# Patient Record
Sex: MALE | Race: WHITE | NOT HISPANIC OR LATINO | Employment: OTHER | URBAN - METROPOLITAN AREA
[De-identification: names, ages, dates, MRNs, and addresses within clinical notes are randomized per-mention and may not be internally consistent; named-entity substitution may affect disease eponyms.]

---

## 2017-01-09 ENCOUNTER — GENERIC CONVERSION - ENCOUNTER (OUTPATIENT)
Dept: OTHER | Facility: OTHER | Age: 77
End: 2017-01-09

## 2017-01-12 DIAGNOSIS — Z12.5 ENCOUNTER FOR SCREENING FOR MALIGNANT NEOPLASM OF PROSTATE: ICD-10-CM

## 2017-01-12 DIAGNOSIS — E78.5 HYPERLIPIDEMIA: ICD-10-CM

## 2017-01-12 DIAGNOSIS — M10.9 GOUT: ICD-10-CM

## 2017-01-12 DIAGNOSIS — N18.9 CHRONIC KIDNEY DISEASE: ICD-10-CM

## 2017-01-12 DIAGNOSIS — I10 ESSENTIAL (PRIMARY) HYPERTENSION: ICD-10-CM

## 2017-03-13 ENCOUNTER — APPOINTMENT (OUTPATIENT)
Dept: LAB | Facility: CLINIC | Age: 77
End: 2017-03-13
Payer: MEDICARE

## 2017-03-13 DIAGNOSIS — I10 ESSENTIAL (PRIMARY) HYPERTENSION: ICD-10-CM

## 2017-03-13 DIAGNOSIS — N18.9 CHRONIC KIDNEY DISEASE: ICD-10-CM

## 2017-03-13 DIAGNOSIS — E78.5 HYPERLIPIDEMIA: ICD-10-CM

## 2017-03-13 DIAGNOSIS — M10.9 GOUT: ICD-10-CM

## 2017-03-13 DIAGNOSIS — Z12.5 ENCOUNTER FOR SCREENING FOR MALIGNANT NEOPLASM OF PROSTATE: ICD-10-CM

## 2017-03-13 LAB
ALBUMIN SERPL BCP-MCNC: 3.7 G/DL (ref 3.5–5)
ALP SERPL-CCNC: 114 U/L (ref 46–116)
ALT SERPL W P-5'-P-CCNC: 31 U/L (ref 12–78)
ANION GAP SERPL CALCULATED.3IONS-SCNC: 9 MMOL/L (ref 4–13)
AST SERPL W P-5'-P-CCNC: 13 U/L (ref 5–45)
BASOPHILS # BLD AUTO: 0.01 THOUSANDS/ΜL (ref 0–0.1)
BASOPHILS NFR BLD AUTO: 0 % (ref 0–1)
BILIRUB SERPL-MCNC: 0.71 MG/DL (ref 0.2–1)
BUN SERPL-MCNC: 25 MG/DL (ref 5–25)
CALCIUM SERPL-MCNC: 9.4 MG/DL (ref 8.3–10.1)
CHLORIDE SERPL-SCNC: 103 MMOL/L (ref 100–108)
CHOLEST SERPL-MCNC: 165 MG/DL (ref 50–200)
CO2 SERPL-SCNC: 29 MMOL/L (ref 21–32)
CREAT SERPL-MCNC: 1.46 MG/DL (ref 0.6–1.3)
EOSINOPHIL # BLD AUTO: 0.22 THOUSAND/ΜL (ref 0–0.61)
EOSINOPHIL NFR BLD AUTO: 4 % (ref 0–6)
ERYTHROCYTE [DISTWIDTH] IN BLOOD BY AUTOMATED COUNT: 13.1 % (ref 11.6–15.1)
GFR SERPL CREATININE-BSD FRML MDRD: 46.8 ML/MIN/1.73SQ M
GLUCOSE SERPL-MCNC: 84 MG/DL (ref 65–140)
HCT VFR BLD AUTO: 41.7 % (ref 36.5–49.3)
HDLC SERPL-MCNC: 43 MG/DL (ref 40–60)
HGB BLD-MCNC: 14.3 G/DL (ref 12–17)
LDLC SERPL CALC-MCNC: 99 MG/DL (ref 0–100)
LYMPHOCYTES # BLD AUTO: 1.71 THOUSANDS/ΜL (ref 0.6–4.47)
LYMPHOCYTES NFR BLD AUTO: 29 % (ref 14–44)
MCH RBC QN AUTO: 30.6 PG (ref 26.8–34.3)
MCHC RBC AUTO-ENTMCNC: 34.3 G/DL (ref 31.4–37.4)
MCV RBC AUTO: 89 FL (ref 82–98)
MONOCYTES # BLD AUTO: 0.36 THOUSAND/ΜL (ref 0.17–1.22)
MONOCYTES NFR BLD AUTO: 6 % (ref 4–12)
NEUTROPHILS # BLD AUTO: 3.64 THOUSANDS/ΜL (ref 1.85–7.62)
NEUTS SEG NFR BLD AUTO: 61 % (ref 43–75)
NRBC BLD AUTO-RTO: 0 /100 WBCS
PLATELET # BLD AUTO: 192 THOUSANDS/UL (ref 149–390)
PMV BLD AUTO: 11.8 FL (ref 8.9–12.7)
POTASSIUM SERPL-SCNC: 3.5 MMOL/L (ref 3.5–5.3)
PROT SERPL-MCNC: 7.4 G/DL (ref 6.4–8.2)
RBC # BLD AUTO: 4.67 MILLION/UL (ref 3.88–5.62)
SODIUM SERPL-SCNC: 141 MMOL/L (ref 136–145)
TRIGL SERPL-MCNC: 115 MG/DL
TSH SERPL DL<=0.05 MIU/L-ACNC: 3.21 UIU/ML (ref 0.36–3.74)
URATE SERPL-MCNC: 7.6 MG/DL (ref 4.2–8)
WBC # BLD AUTO: 5.95 THOUSAND/UL (ref 4.31–10.16)

## 2017-03-13 PROCEDURE — 84443 ASSAY THYROID STIM HORMONE: CPT

## 2017-03-13 PROCEDURE — 84550 ASSAY OF BLOOD/URIC ACID: CPT

## 2017-03-13 PROCEDURE — 85025 COMPLETE CBC W/AUTO DIFF WBC: CPT

## 2017-03-13 PROCEDURE — 84153 ASSAY OF PSA TOTAL: CPT

## 2017-03-13 PROCEDURE — 84154 ASSAY OF PSA FREE: CPT

## 2017-03-13 PROCEDURE — 80053 COMPREHEN METABOLIC PANEL: CPT

## 2017-03-13 PROCEDURE — 36415 COLL VENOUS BLD VENIPUNCTURE: CPT

## 2017-03-13 PROCEDURE — 80061 LIPID PANEL: CPT

## 2017-03-17 ENCOUNTER — ALLSCRIPTS OFFICE VISIT (OUTPATIENT)
Dept: OTHER | Facility: OTHER | Age: 77
End: 2017-03-17

## 2017-03-27 ENCOUNTER — GENERIC CONVERSION - ENCOUNTER (OUTPATIENT)
Dept: OTHER | Facility: OTHER | Age: 77
End: 2017-03-27

## 2017-04-08 LAB — PSA SERPL-MCNC: NORMAL NG/ML

## 2017-04-10 ENCOUNTER — GENERIC CONVERSION - ENCOUNTER (OUTPATIENT)
Dept: OTHER | Facility: OTHER | Age: 77
End: 2017-04-10

## 2017-05-04 ENCOUNTER — GENERIC CONVERSION - ENCOUNTER (OUTPATIENT)
Dept: OTHER | Facility: OTHER | Age: 77
End: 2017-05-04

## 2017-06-05 ENCOUNTER — ALLSCRIPTS OFFICE VISIT (OUTPATIENT)
Dept: OTHER | Facility: OTHER | Age: 77
End: 2017-06-05

## 2017-06-09 ENCOUNTER — GENERIC CONVERSION - ENCOUNTER (OUTPATIENT)
Dept: OTHER | Facility: OTHER | Age: 77
End: 2017-06-09

## 2017-06-09 ENCOUNTER — ALLSCRIPTS OFFICE VISIT (OUTPATIENT)
Dept: OTHER | Facility: OTHER | Age: 77
End: 2017-06-09

## 2017-10-11 ENCOUNTER — ALLSCRIPTS OFFICE VISIT (OUTPATIENT)
Dept: OTHER | Facility: OTHER | Age: 77
End: 2017-10-11

## 2017-10-12 ENCOUNTER — GENERIC CONVERSION - ENCOUNTER (OUTPATIENT)
Dept: OTHER | Facility: OTHER | Age: 77
End: 2017-10-12

## 2017-10-14 NOTE — PROGRESS NOTES
Assessment  1  Bronchitis, acute (466 0) (J20 9)    Plan  Health Maintenance    · *VB - Urinary Incontinence Screen (Dx Z13 89 Screen for UI); Status:Complete -  Retrospective By Protocol Authorization;   Done: 64SFS6677 01:49PM    Discussion/Summary    1  Acute bronchitis-recommend starting Levaquin 500 mg daily for 10 days with food  Patient also given Advair 250/50 inhaler to be used twice daily and demonstrated in the office  He may also continue over-the-counter Tussin as needed for cough suppression in the evening  He is to follow-up in the next week if symptoms are not improving or sooner if symptoms would worsen  Chief Complaint  Pt c/o chest congestion, head congestion and wheezing x 10 days  Pt has been using Robitussin with no relief  Pt also had some Tessalon left over and he states that doesn't work either  Also pt states the Antibiotic given last time he would not like again b/c it left a terrible taste in his mouth kw      History of Present Illness  HPI: This is a 50-year-old gentleman that presents to the office with a history of severe bronchitis  He has started about 10 days ago having symptoms of increased sinus congestion and severe cough  Coughing episodes have been very violent and he has been concerned that he may pass out during an episode  He has tried over-the-counter Tussin and left over benzonatate without any relief  He has not had any fevers or chills present  He is feeling very fatigued and wiped out  Review of Systems    Constitutional: feeling poorly-- and-- feeling tired, but-- no fever-- and-- no chills  ENT: sore throat,-- nasal discharge-- and-- hoarseness, but-- no earache  Cardiovascular: the heart rate was not slow,-- no chest pain,-- the heart rate was not fast-- and-- no palpitations  Respiratory: cough, but-- as noted in HPI,-- no shortness of breath-- and-- no wheezing  Gastrointestinal: no nausea-- and-- no diarrhea  Musculoskeletal: no arthralgias  Neurological: no headache  Preventive Quality 65 and Older: The patient currently has no urinary incontinence symptoms  Active Problems  1  Actinic keratosis (702 0) (L57 0)   2  Bronchitis, acute (466 0) (J20 9)   3  Chronic kidney disease (585 9) (N18 9)   4  Encounter for prostate cancer screening (V76 44) (Z12 5)   5  Gout (274 9) (M10 9)   6  Hyperlipidemia (272 4) (E78 5)   7  Hypertension (401 9) (I10)   8  Irritable bowel syndrome (564 1) (K58 9)   9  Macular degeneration (362 50) (H35 30)   10  Special screening examination for neoplasm of prostate (V76 44) (Z12 5)   11  Urinary incontinence, post-void dribbling (788 35) (N39 43)    Past Medical History  1  History of Acute Medial Meniscus Tear (836 0)   2  History of Colonoscopy (Fiberoptic) Screening   3  History of Herniated nucleus pulposus, L4-5 (722 10) (M51 26)   4  History of Reactive airway disease (493 90) (J45 909)  Active Problems And Past Medical History Reviewed: The active problems and past medical history were reviewed and updated today  Family History  Mother    1  Family history of Heart Disease (V17 49)   2  Family history of Stroke Syndrome (V17 1)  Father    3  Family history of Colon Cancer (V16 0)   4  Family history of Congestive Heart Failure    Social History   · Former smoker (Z09 96) (W92 901)    Surgical History  1  History of Mastoidectomy    Current Meds   1  Aspirin 81 MG TABS; Take 1 tablet daily Recorded   2  Lisinopril-Hydrochlorothiazide 20-25 MG Oral Tablet; Take 1 tablet by mouth  daily; Therapy: 95IVU8023 to (Evaluate:03Rrk5908)  Requested for: 69EFR1389; Last   Rx:[id number] Ordered    The medication list was reviewed and updated today  Allergies  1   Erythromycin Derivatives    Vitals   Recorded: 00WDI4816 01:45PM   Temperature 98 7 F   Heart Rate 64   Respiration 18   Systolic 010   Diastolic 88   Height 5 ft 8 in   Weight 171 lb 8 oz   BMI Calculated 26 08   BSA Calculated 1 91     Physical Exam    Constitutional   General appearance: No acute distress, well appearing and well nourished  Eyes   Conjunctiva and lids: No swelling, erythema, or discharge  Pupils and irises: Equal, round and reactive to light  Ears, Nose, Mouth, and Throat   External inspection of ears and nose: Normal     Otoscopic examination: Tympanic membrance translucent with normal light reflex  Canals patent without erythema  Nasal mucosa, septum, and turbinates: Normal without edema or erythema  Oropharynx: Normal with no erythema, edema, exudate or lesions  Pulmonary   Respiratory effort: No increased work of breathing or signs of respiratory distress  Auscultation of lungs: Abnormal  -- Scant crackles bilateral lung fields  No wheezes, rhonchi, or rales  Cardiovascular   Auscultation of heart: Normal rate and rhythm, normal S1 and S2, without murmurs  Examination of extremities for edema and/or varicosities: Normal     Abdomen   Abdomen: Non-tender, no masses  Liver and spleen: No hepatomegaly or splenomegaly  Musculoskeletal   Gait and station: Normal     Skin   Skin and subcutaneous tissue: Normal without rashes or lesions  Neurologic   Reflexes: 2+ and symmetric  Sensation: No sensory loss      Psychiatric   Orientation to person, place and time: Normal     Mood and affect: Normal          Results/Data  *VB - Urinary Incontinence Screen (Dx Z13 89 Screen for UI) 27YDW8081 01:49PM Maura Hewitt     Test Name Result Flag Reference   Urinary Incontinence Assessment [id number]         Signatures   Electronically signed by : Najma Hathaway, [facility]; Oct 11 2017  2:02PM EST                       (Author)    Electronically signed by : DEANNE Mayberry ; Oct 12 2017  7:11PM EST

## 2017-11-08 ENCOUNTER — GENERIC CONVERSION - ENCOUNTER (OUTPATIENT)
Dept: OTHER | Facility: OTHER | Age: 77
End: 2017-11-08

## 2017-11-20 ENCOUNTER — GENERIC CONVERSION - ENCOUNTER (OUTPATIENT)
Dept: OTHER | Facility: OTHER | Age: 77
End: 2017-11-20

## 2018-01-12 VITALS
WEIGHT: 171.5 LBS | SYSTOLIC BLOOD PRESSURE: 172 MMHG | HEART RATE: 64 BPM | RESPIRATION RATE: 18 BRPM | HEIGHT: 68 IN | BODY MASS INDEX: 25.99 KG/M2 | DIASTOLIC BLOOD PRESSURE: 88 MMHG | TEMPERATURE: 98.7 F

## 2018-01-13 VITALS
HEIGHT: 68 IN | DIASTOLIC BLOOD PRESSURE: 78 MMHG | BODY MASS INDEX: 26.54 KG/M2 | SYSTOLIC BLOOD PRESSURE: 136 MMHG | WEIGHT: 175.13 LBS | HEART RATE: 72 BPM

## 2018-01-14 VITALS
HEIGHT: 68 IN | BODY MASS INDEX: 25.78 KG/M2 | WEIGHT: 170.13 LBS | HEART RATE: 64 BPM | SYSTOLIC BLOOD PRESSURE: 140 MMHG | DIASTOLIC BLOOD PRESSURE: 80 MMHG

## 2018-01-14 VITALS
WEIGHT: 170.13 LBS | SYSTOLIC BLOOD PRESSURE: 130 MMHG | BODY MASS INDEX: 25.87 KG/M2 | DIASTOLIC BLOOD PRESSURE: 70 MMHG | TEMPERATURE: 97.9 F

## 2018-01-15 NOTE — MISCELLANEOUS
Message   Recorded as Task   Date: 06/13/2017 08:59 AM, Created By: Genet Braun   Task Name: Medical Complaint Callback   Assigned To: [facility],Clinical Team   Regarding Patient: Moira Catalan, Status: In Progress   Comment:    Ann Marie Barraza - 13 Jun 2017 8:59 AM     TASK CREATED  Caller: Self; Medical Complaint; [phone] (Home); [phone] (Work)  PATIENT IS ON 2 MEDICATIONS FOR DIARHHEA, HE HAS BEEN ON ANTIBIOTIC FOR 8 DAYS AND ONE FOR GOUT, FOR THE LAST 12 DAYS, HAS HAD A BIG ISSUES WITH DIARHHEA, SHOULD HE STOPPED TAKING THE MEDICATION  GOUT HAS RESOLVED, ONLY HAS 2 DAYS LEFT FOR ANTIBIOTIC, [place]AVING [place] VACATION ON [place] [phone]   Bindu Lot - 13 Jun 2017 10:21 AM     TASK IN PROGRESS   Bindu Lot - 13 Jun 2017 10:26 AM     TASK EDITED  I spoke to pt and he states after 3 days of Colcrys he started with urgent bowel movements  I advised he D/C the Colcrys per TS and add Yogurt or Probiotic and finish antibiotic  Pt agrees  Active Problems    1  Actinic keratosis (702 0) (L57 0)   2  Bronchitis, acute (466 0) (J20 9)   3  Chronic kidney disease (585 9) (N18 9)   4  Encounter for prostate cancer screening (V76 44) (Z12 5)   5  Gout (274 9) (M10 9)   6  Hyperlipidemia (272 4) (E78 5)   7  Hypertension (401 9) (I10)   8  Irritable bowel syndrome (564 1) (K58 9)   9  Macular degeneration (362 50) (H35 30)   10  Special screening examination for neoplasm of prostate (V76 44) (Z12 5)   11  Urinary incontinence, post-void dribbling (788 35) (N39 43)    Current Meds   1  Aspirin 81 MG TABS; Take 1 tablet daily Recorded   2  Benzonatate 200 MG Oral Capsule; TAKE 1 CAPSULE 3 TIMES DAILY AS NEEDED; Therapy: 09WCH3136 to (Last Rx:05Jun2017)  Requested for: 95HQG5925 Ordered   3  Cefuroxime Axetil 500 MG Oral Tablet; Take 1 tablet twice daily; Therapy: 82MBW0831 to (Last Rx:05Jun2017)  Requested for: 57DKC0699 Ordered   4   Colcrys 0 6 MG Oral Tablet (Colchicine); Take 1 tablet 3 times daily as needed for gout; Therapy: 61Xkh8942 to (Last Rx:09Jun2017)  Requested for: 47OYL9133 Ordered   5  Lisinopril-Hydrochlorothiazide 20-25 MG Oral Tablet; Take 1 tablet by mouth  daily; Therapy: 30KQN7404 to (Evaluate:11Nov2017)  Requested for: 51VUV9275; Last   Rx:99Aqv9408 Ordered    Allergies    1   Erythromycin Derivatives    Signatures   Electronically signed by : Odilon Floyd, ; Jun 13 2017 10:26AM EST                       (Author)

## 2018-01-16 NOTE — RESULT NOTES
Verified Results  (1) PSA FREE & TOTAL 56XHS1050 07:48AM [facility] Order Number: [id number]     Test Name Result Flag Reference   PSA, FREE  ng/mL     % FREE PSA  %     PROSTATE SPECIFIC ANTIGEN TOTAL      SEE WRITTEN REPORT FROM [facility]

## 2018-01-29 DIAGNOSIS — I10 BENIGN ESSENTIAL HYPERTENSION: Primary | ICD-10-CM

## 2018-01-29 RX ORDER — LISINOPRIL AND HYDROCHLOROTHIAZIDE 25; 20 MG/1; MG/1
TABLET ORAL
Qty: 90 TABLET | Refills: 3 | Status: SHIPPED | OUTPATIENT
Start: 2018-01-29 | End: 2018-04-02 | Stop reason: SDUPTHER

## 2018-03-01 DIAGNOSIS — N18.9 CHRONIC KIDNEY DISEASE: ICD-10-CM

## 2018-03-01 DIAGNOSIS — M10.9 GOUT: ICD-10-CM

## 2018-03-01 DIAGNOSIS — I10 ESSENTIAL (PRIMARY) HYPERTENSION: ICD-10-CM

## 2018-03-01 DIAGNOSIS — E78.5 HYPERLIPIDEMIA: ICD-10-CM

## 2018-03-01 DIAGNOSIS — H61.20 IMPACTED CERUMEN: ICD-10-CM

## 2018-03-01 DIAGNOSIS — Z00.00 ENCOUNTER FOR GENERAL ADULT MEDICAL EXAMINATION WITHOUT ABNORMAL FINDINGS: ICD-10-CM

## 2018-03-12 PROBLEM — N39.43 URINARY INCONTINENCE, POST-VOID DRIBBLING: Status: ACTIVE | Noted: 2017-03-17

## 2018-03-13 ENCOUNTER — OFFICE VISIT (OUTPATIENT)
Dept: SURGICAL ONCOLOGY | Facility: CLINIC | Age: 78
End: 2018-03-13
Payer: MEDICARE

## 2018-03-13 VITALS
WEIGHT: 173 LBS | HEIGHT: 68 IN | DIASTOLIC BLOOD PRESSURE: 120 MMHG | SYSTOLIC BLOOD PRESSURE: 180 MMHG | BODY MASS INDEX: 26.22 KG/M2 | RESPIRATION RATE: 18 BRPM | HEART RATE: 92 BPM | TEMPERATURE: 97.9 F

## 2018-03-13 DIAGNOSIS — C49.0 SARCOMA OF SCALP (HCC): Primary | ICD-10-CM

## 2018-03-13 PROCEDURE — 99204 OFFICE O/P NEW MOD 45 MIN: CPT | Performed by: SURGERY

## 2018-03-13 NOTE — LETTER
March 13, 2018     Carrie Na,   [address]    Patient: Loc Banks   YOB: 1940   Date of Visit: 3/13/2018       Dear Dr Mikey Paez: Thank you for referring Reece Khoury to me for evaluation  Below are my notes for this consultation  If you have questions, please do not hesitate to call me  I look forward to following your patient along with you  Sincerely,        Sylvie Rasheed MD        CC: DEON Grant PA-C Enedelia Baar, PA-C Ozella Fogo, MD Tish Clink, MD  3/13/2018  9:30 AM  Sign at close encounter               Surgical Oncology Follow Up       [address] [facility] ONCOLOGY [facility]  [address] [address] [address] [facility]  1940  [id number]  [address] [facility] CANCER CARE SURGICAL ONCOLOGY [address] [address] [phone]    No chief complaint on file  Assessment/Plan:    No problem-specific Assessment & Plan notes found for this encounter  Diagnoses and all orders for this visit:    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[facility])  -     Ambulatory referral to Radiation Oncology; Future        Advance Care Planning/Advance Directives:  Discussed disease status, cancer treatment plans and/or cancer treatment goals with the patient  No history exists  History of Present Illness:  Patient is a 50-year-old man who was found have a scalp lesion back in the fall of 2017  He underwent resection and graft for what turned out to be a atypical epithelioid fibrohistiocytic tumor, 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  Since then, he has had what appears to be recurrence at the anterior portion of the graft  He is set to undergo resection by Dr Guerita Wiggins in the next week and half  He is here for 2nd opinion  Review of Systems   Constitutional: Negative  HENT: Negative  Eyes: Negative  Respiratory: Negative  Cardiovascular: Negative  Gastrointestinal: Negative  Endocrine: Negative  Genitourinary: Negative  Musculoskeletal: Negative  Skin:        Recurrent scalp lesion   Allergic/Immunologic: Negative  Neurological: Negative  Hematological: Negative  Psychiatric/Behavioral: Negative  Patient Active Problem List   Diagnosis    Actinic keratosis    Chronic kidney disease    Gout    Hyperlipidemia    Hypertension    Irritable bowel syndrome    Macular degeneration    Urinary incontinence, post-void dribbling    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[address] )     Past Medical History:   Diagnosis Date    Hard of hearing     Macular degeneration      History reviewed  No pertinent surgical history  Family History   Problem Relation Age of Onset    Colon cancer Father      Social History     Social History    Marital status: /Civil Union     Spouse name: N/A    Number of children: N/A    Years of education: N/A     Occupational History    Not on file  Social History Main Topics    Smoking status: Former Smoker     Quit date: 3/13/1988    Smokeless tobacco: Never Used    Alcohol use No    Drug use: No    Sexual activity: Not on file     Other Topics Concern    Not on file     Social History Narrative    No narrative on file       Current Outpatient Prescriptions:     aspirin 81 MG tablet, Take 1 tablet by mouth daily, Disp: , Rfl:     lisinopril-hydrochlorothiazide (PRINZIDE,ZESTORETIC) 20-25 MG per tablet, TAKE 1 TABLET BY MOUTH  DAILY, Disp: 90 tablet, Rfl: 3  Allergies   Allergen Reactions    Erythromycin      Vitals:    03/13/18 0842   BP: (!) 180/120   Pulse: 92   Resp: 18   Temp: 97 9 °F (36 6 °C)       Physical Exam   HENT:   Head: Normocephalic and atraumatic  Eyes: EOM are normal  Pupils are equal, round, and reactive to light  Neck: Normal range of motion  Neck supple     Cardiovascular: Normal rate and regular rhythm  Pulmonary/Chest: Effort normal and breath sounds normal    Abdominal: Soft  Lymphadenopathy:     He has no cervical adenopathy  Skin:   Scalp has raised pedunculated lesion measuring approximately 2 5 cm in diameter along the anterior aspect of a previous scalp graft on the vertex of the skull  Pathology:  Outside pathology report dated January 29, 2018 is a biopsy from the anterior aspect of the graft showing atypical epithelioid fibro histiocytic tumor, with lesion extending to the deep tissue edge  This is similar to prior biopsy done  Differential includes pleomorphic sarcoma versus an extreme atypical example of atypical fibro histiocytoma  Labs:  none    Imaging  No results found  I reviewed the above laboratory and imaging data  Discussion/Summary:  Scalp sarcoma  He is ready set up to undergo resection in the next week and a half  I advised him to keep that appointment  Will also set him up to see our radiation Oncology colleagues to determine whether not adjuvant treatment would be needed after his surgery has been completed

## 2018-03-13 NOTE — PROGRESS NOTES
Surgical Oncology Follow Up       [address] [facility] CANCER CARE SURGICAL ONCOLOGY [facility]  [address]  [place] [address] [facility] [place] [address] [phone]    Bobby Cheung  1940  [id number]  [address] [facility] CANCER CARE SURGICAL ONCOLOGY [facility]  [address]  [facility] [address] [facility] [place] [address] [phone]    No chief complaint on file  Assessment/Plan:    No problem-specific Assessment & Plan notes found for this encounter  Diagnoses and all orders for this visit:    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[facility])  -     Ambulatory referral to Radiation Oncology; Future        Advance Care Planning/Advance Directives:  Discussed disease status, cancer treatment plans and/or cancer treatment goals with the patient  No history exists  History of Present Illness:  Patient is a 74-year-old man who was found have a scalp lesion back in the fall of 2017  He underwent resection and graft for what turned out to be a atypical epithelioid fibrohistiocytic tumor, 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  Since then, he has had what appears to be recurrence at the anterior portion of the graft  He is set to undergo resection by Dr Jennifer Logan in the next week and half  He is here for 2nd opinion  Review of Systems   Constitutional: Negative  HENT: Negative  Eyes: Negative  Respiratory: Negative  Cardiovascular: Negative  Gastrointestinal: Negative  Endocrine: Negative  Genitourinary: Negative  Musculoskeletal: Negative  Skin:        Recurrent scalp lesion   Allergic/Immunologic: Negative  Neurological: Negative  Hematological: Negative  Psychiatric/Behavioral: Negative            Patient Active Problem List   Diagnosis    Actinic keratosis    Chronic kidney disease    Gout    Hyperlipidemia    Hypertension    Irritable bowel syndrome    Macular degeneration    Urinary incontinence, post-void dribbling    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[facility])     Past Medical History:   Diagnosis Date    Hard of hearing     Macular degeneration      History reviewed  No pertinent surgical history  Family History   Problem Relation Age of Onset    Colon cancer Father      Social History     Social History    Marital status: /Civil Union     Spouse name: N/A    Number of children: N/A    Years of education: N/A     Occupational History    Not on file  Social History Main Topics    Smoking status: Former Smoker     Quit date: 3/13/1988    Smokeless tobacco: Never Used    Alcohol use No    Drug use: No    Sexual activity: Not on file     Other Topics Concern    Not on file     Social History Narrative    No narrative on file       Current Outpatient Prescriptions:     aspirin 81 MG tablet, Take 1 tablet by mouth daily, Disp: , Rfl:     lisinopril-hydrochlorothiazide (PRINZIDE,ZESTORETIC) 20-25 MG per tablet, TAKE 1 TABLET BY MOUTH  DAILY, Disp: 90 tablet, Rfl: 3  Allergies   Allergen Reactions    Erythromycin      Vitals:    03/13/18 0842   BP: (!) 180/120   Pulse: 92   Resp: 18   Temp: 97 9 °F (36 6 °C)       Physical Exam   HENT:   Head: Normocephalic and atraumatic  Eyes: EOM are normal  Pupils are equal, round, and reactive to light  Neck: Normal range of motion  Neck supple  Cardiovascular: Normal rate and regular rhythm  Pulmonary/Chest: Effort normal and breath sounds normal    Abdominal: Soft  Lymphadenopathy:     He has no cervical adenopathy  Skin:   Scalp has raised pedunculated lesion measuring approximately 2 5 cm in diameter along the anterior aspect of a previous scalp graft on the vertex of the skull  Pathology:  Outside pathology report dated January 29, 2018 is a biopsy from the anterior aspect of the graft showing atypical epithelioid fibro histiocytic tumor, with lesion extending to the deep tissue edge  This is similar to prior biopsy done    Differential includes pleomorphic sarcoma versus an extreme atypical example of atypical fibro histiocytoma  Labs:  none    Imaging  No results found  I reviewed the above laboratory and imaging data  Discussion/Summary:  Scalp sarcoma  He is ready set up to undergo resection in the next week and a half  I advised him to keep that appointment  Will also set him up to see our radiation Oncology colleagues to determine whether not adjuvant treatment would be needed after his surgery has been completed

## 2018-03-15 ENCOUNTER — APPOINTMENT (OUTPATIENT)
Dept: LAB | Facility: HOSPITAL | Age: 78
End: 2018-03-15
Payer: MEDICARE

## 2018-03-15 ENCOUNTER — ANESTHESIA EVENT (OUTPATIENT)
Dept: PERIOP | Facility: HOSPITAL | Age: 78
End: 2018-03-15
Payer: MEDICARE

## 2018-03-15 ENCOUNTER — TRANSCRIBE ORDERS (OUTPATIENT)
Dept: ADMINISTRATIVE | Facility: HOSPITAL | Age: 78
End: 2018-03-15

## 2018-03-15 ENCOUNTER — HOSPITAL ENCOUNTER (OUTPATIENT)
Dept: NON INVASIVE DIAGNOSTICS | Facility: HOSPITAL | Age: 78
Discharge: HOME | End: 2018-03-15
Attending: SURGERY
Payer: MEDICARE

## 2018-03-15 ENCOUNTER — APPOINTMENT (OUTPATIENT)
Dept: PREADMISSION TESTING | Facility: HOSPITAL | Age: 78
End: 2018-03-15
Payer: MEDICARE

## 2018-03-15 DIAGNOSIS — I10 ESSENTIAL (PRIMARY) HYPERTENSION: ICD-10-CM

## 2018-03-15 DIAGNOSIS — Z01.810 PRE-OPERATIVE CARDIOVASCULAR EXAMINATION: ICD-10-CM

## 2018-03-15 DIAGNOSIS — D48.5 NEOPLASM OF UNCERTAIN BEHAVIOR OF SKIN: ICD-10-CM

## 2018-03-15 DIAGNOSIS — Z00.00 ENCOUNTER FOR GENERAL ADULT MEDICAL EXAMINATION WITHOUT ABNORMAL FINDINGS: ICD-10-CM

## 2018-03-15 DIAGNOSIS — H61.20 IMPACTED CERUMEN: ICD-10-CM

## 2018-03-15 DIAGNOSIS — M10.9 GOUT: ICD-10-CM

## 2018-03-15 DIAGNOSIS — Z01.818 PREOP EXAMINATION: ICD-10-CM

## 2018-03-15 DIAGNOSIS — Z01.818 PREOP EXAMINATION: Primary | ICD-10-CM

## 2018-03-15 DIAGNOSIS — N18.9 CHRONIC KIDNEY DISEASE: ICD-10-CM

## 2018-03-15 DIAGNOSIS — E78.5 HYPERLIPIDEMIA: ICD-10-CM

## 2018-03-15 LAB
ALBUMIN SERPL BCP-MCNC: 3.8 G/DL (ref 3.5–5)
ALP SERPL-CCNC: 113 U/L (ref 46–116)
ALT SERPL W P-5'-P-CCNC: 26 U/L (ref 12–78)
ANION GAP SERPL CALCULATED.3IONS-SCNC: 7 MMOL/L (ref 4–13)
AST SERPL W P-5'-P-CCNC: 17 U/L (ref 5–45)
ATRIAL RATE: 73 BPM
BASOPHILS # BLD AUTO: 0.02 THOUSANDS/ΜL (ref 0–0.1)
BASOPHILS NFR BLD AUTO: 0 % (ref 0–1)
BILIRUB SERPL-MCNC: 0.94 MG/DL (ref 0.2–1)
BUN SERPL-MCNC: 21 MG/DL (ref 5–25)
CALCIUM SERPL-MCNC: 10 MG/DL (ref 8.3–10.1)
CHLORIDE SERPL-SCNC: 103 MMOL/L (ref 100–108)
CHOLEST SERPL-MCNC: 165 MG/DL (ref 50–200)
CO2 SERPL-SCNC: 30 MMOL/L (ref 21–32)
CREAT SERPL-MCNC: 1.42 MG/DL (ref 0.6–1.3)
EOSINOPHIL # BLD AUTO: 0.2 THOUSAND/ΜL (ref 0–0.61)
EOSINOPHIL NFR BLD AUTO: 3 % (ref 0–6)
ERYTHROCYTE [DISTWIDTH] IN BLOOD BY AUTOMATED COUNT: 13.2 % (ref 11.6–15.1)
GFR SERPL CREATININE-BSD FRML MDRD: 47 ML/MIN/1.73SQ M
GLUCOSE P FAST SERPL-MCNC: 97 MG/DL (ref 65–99)
HCT VFR BLD AUTO: 41.3 % (ref 36.5–49.3)
HDLC SERPL-MCNC: 40 MG/DL (ref 40–60)
HGB BLD-MCNC: 14.7 G/DL (ref 12–17)
LDLC SERPL CALC-MCNC: 97 MG/DL (ref 0–100)
LYMPHOCYTES # BLD AUTO: 1.42 THOUSANDS/ΜL (ref 0.6–4.47)
LYMPHOCYTES NFR BLD AUTO: 20 % (ref 14–44)
MCH RBC QN AUTO: 31.7 PG (ref 26.8–34.3)
MCHC RBC AUTO-ENTMCNC: 35.6 G/DL (ref 31.4–37.4)
MCV RBC AUTO: 89 FL (ref 82–98)
MONOCYTES # BLD AUTO: 0.36 THOUSAND/ΜL (ref 0.17–1.22)
MONOCYTES NFR BLD AUTO: 5 % (ref 4–12)
NEUTROPHILS # BLD AUTO: 4.95 THOUSANDS/ΜL (ref 1.85–7.62)
NEUTS SEG NFR BLD AUTO: 72 % (ref 43–75)
NRBC BLD AUTO-RTO: 0 /100 WBCS
P AXIS: 81 DEGREES
PLATELET # BLD AUTO: 190 THOUSANDS/UL (ref 149–390)
PMV BLD AUTO: 10.7 FL (ref 8.9–12.7)
POTASSIUM SERPL-SCNC: 4.3 MMOL/L (ref 3.5–5.3)
PR INTERVAL: 144 MS
PROT SERPL-MCNC: 7.9 G/DL (ref 6.4–8.2)
QRS AXIS: 33 DEGREES
QRSD INTERVAL: 132 MS
QT INTERVAL: 408 MS
QTC INTERVAL: 449 MS
RBC # BLD AUTO: 4.64 MILLION/UL (ref 3.88–5.62)
SODIUM SERPL-SCNC: 140 MMOL/L (ref 136–145)
T WAVE AXIS: 33 DEGREES
TRIGL SERPL-MCNC: 139 MG/DL
TSH SERPL DL<=0.05 MIU/L-ACNC: 2.3 UIU/ML (ref 0.36–3.74)
URATE SERPL-MCNC: 6.8 MG/DL (ref 4.2–8)
VENTRICULAR RATE: 73 BPM
WBC # BLD AUTO: 6.95 THOUSAND/UL (ref 4.31–10.16)

## 2018-03-15 PROCEDURE — 80053 COMPREHEN METABOLIC PANEL: CPT

## 2018-03-15 PROCEDURE — 84550 ASSAY OF BLOOD/URIC ACID: CPT

## 2018-03-15 PROCEDURE — 36415 COLL VENOUS BLD VENIPUNCTURE: CPT

## 2018-03-15 PROCEDURE — 84153 ASSAY OF PSA TOTAL: CPT

## 2018-03-15 PROCEDURE — 93010 ELECTROCARDIOGRAM REPORT: CPT | Performed by: INTERNAL MEDICINE

## 2018-03-15 PROCEDURE — 80061 LIPID PANEL: CPT

## 2018-03-15 PROCEDURE — 84154 ASSAY OF PSA FREE: CPT

## 2018-03-15 PROCEDURE — 93005 ELECTROCARDIOGRAM TRACING: CPT

## 2018-03-15 PROCEDURE — 85025 COMPLETE CBC W/AUTO DIFF WBC: CPT

## 2018-03-15 PROCEDURE — 84443 ASSAY THYROID STIM HORMONE: CPT

## 2018-03-15 RX ORDER — SODIUM CHLORIDE 9 MG/ML
125 INJECTION, SOLUTION INTRAVENOUS CONTINUOUS
Status: CANCELLED | OUTPATIENT
Start: 2018-03-28

## 2018-03-15 RX ORDER — DESOXIMETASONE 2.5 MG/G
1 CREAM TOPICAL AS NEEDED
COMMUNITY
End: 2018-04-16

## 2018-03-15 RX ORDER — KETOCONAZOLE 20 MG/ML
1 SHAMPOO TOPICAL DAILY PRN
COMMUNITY
End: 2021-04-16

## 2018-03-15 RX ORDER — FLUOCINONIDE TOPICAL SOLUTION USP, 0.05% 0.5 MG/ML
1 SOLUTION TOPICAL AS NEEDED
COMMUNITY
End: 2018-04-16

## 2018-03-15 RX ORDER — ACETAMINOPHEN 500 MG
500-1000 TABLET ORAL EVERY 4 HOURS PRN
COMMUNITY

## 2018-03-15 NOTE — PRE-PROCEDURE INSTRUCTIONS
Pre-Surgery Instructions:   Medication Instructions    acetaminophen (TYLENOL) 500 mg tablet Instructed patient per Anesthesia Guidelines   aspirin 81 MG tablet Patient was instructed by Physician and understands   desoximetasone (TOPICORT) 0 25 % cream Instructed patient per Anesthesia Guidelines   fluocinonide (LIDEX) 0 05 % external solution Instructed patient per Anesthesia Guidelines   ketoconazole (NIZORAL) 2 % shampoo Instructed patient per Anesthesia Guidelines   lisinopril-hydrochlorothiazide (PRINZIDE,ZESTORETIC) 20-25 MG per tablet Instructed patient per Anesthesia Guidelines   Multiple Vitamins-Minerals (ICAPS AREDS 2) CAPS Instructed patient per Anesthesia Guidelines  No meds needed am of surgery per anesthesia DR PEREIRA

## 2018-03-15 NOTE — ANESTHESIA PREPROCEDURE EVALUATION
Review of Systems/Medical History  Patient summary reviewed  Chart reviewed      Cardiovascular  EKG reviewed, Hyperlipidemia, Hypertension ,    Pulmonary  Negative pulmonary ROS Smoker ex-smoker  Cumulative Pack Years: 8,        GI/Hepatic  Negative GI/hepatic ROS          Negative  ROS        Endo/Other  Negative endo/other ROS      GYN       Hematology  Negative hematology ROS      Musculoskeletal  Negative musculoskeletal ROS   Comment: Hearing aids in both ears    Macular degeneration      Neurology  Negative neurology ROS      Psychology   Negative psychology ROS              Physical Exam    Airway    Mallampati score: III  TM Distance: >3 FB  Neck ROM: full     Dental   No notable dental hx     Cardiovascular  Rhythm: regular, Rate: normal, Cardiovascular exam normal    Pulmonary  Pulmonary exam normal Breath sounds clear to auscultation,     Other Findings        Anesthesia Plan  ASA Score- 2     Anesthesia Type- general with ASA Monitors  Additional Monitors:   Airway Plan: LMA  Plan Factors- Patient instructed to abstain from smoking on day of procedure  Patient did not smoke on day of surgery  Induction- intravenous  Postoperative Plan- Plan for postoperative opioid use  Informed Consent- Anesthetic plan and risks discussed with patient

## 2018-03-16 LAB
PSA FREE MFR SERPL: 24.5 %
PSA FREE SERPL-MCNC: 1.3 NG/ML
PSA SERPL-MCNC: 5.3 NG/ML (ref 0–4)

## 2018-03-18 ENCOUNTER — TELEPHONE (OUTPATIENT)
Dept: FAMILY MEDICINE CLINIC | Facility: CLINIC | Age: 78
End: 2018-03-18

## 2018-03-18 DIAGNOSIS — R97.20 ELEVATED PSA MEASUREMENT: Primary | ICD-10-CM

## 2018-03-28 ENCOUNTER — HOSPITAL ENCOUNTER (OUTPATIENT)
Facility: HOSPITAL | Age: 78
Setting detail: OUTPATIENT SURGERY
Discharge: HOME | End: 2018-03-28
Attending: SURGERY | Admitting: SURGERY
Payer: MEDICARE

## 2018-03-28 ENCOUNTER — ANESTHESIA (OUTPATIENT)
Dept: PERIOP | Facility: HOSPITAL | Age: 78
End: 2018-03-28
Payer: MEDICARE

## 2018-03-28 VITALS
TEMPERATURE: 97.8 F | DIASTOLIC BLOOD PRESSURE: 72 MMHG | SYSTOLIC BLOOD PRESSURE: 147 MMHG | RESPIRATION RATE: 18 BRPM | BODY MASS INDEX: 26.1 KG/M2 | OXYGEN SATURATION: 97 % | HEIGHT: 68 IN | WEIGHT: 172.2 LBS | HEART RATE: 88 BPM

## 2018-03-28 DIAGNOSIS — C49.0 SARCOMA OF SCALP (HCC): Primary | ICD-10-CM

## 2018-03-28 DIAGNOSIS — C49.0 MALIGNANT NEOPLASM OF CONNECTIVE AND SOFT TISSUE OF HEAD, FACE, AND NECK (HCC): ICD-10-CM

## 2018-03-28 PROCEDURE — 88305 TISSUE EXAM BY PATHOLOGIST: CPT | Performed by: PATHOLOGY

## 2018-03-28 PROCEDURE — 88342 IMHCHEM/IMCYTCHM 1ST ANTB: CPT | Performed by: PATHOLOGY

## 2018-03-28 RX ORDER — HYDROCODONE BITARTRATE AND ACETAMINOPHEN 5; 325 MG/1; MG/1
1 TABLET ORAL EVERY 6 HOURS PRN
Qty: 40 TABLET | Refills: 0 | Status: SHIPPED | OUTPATIENT
Start: 2018-03-28 | End: 2018-04-07

## 2018-03-28 RX ORDER — CEPHALEXIN 500 MG/1
500 CAPSULE ORAL 4 TIMES DAILY
Qty: 40 CAPSULE | Refills: 0 | Status: SHIPPED | OUTPATIENT
Start: 2018-03-28 | End: 2018-04-07

## 2018-03-28 RX ORDER — DEXAMETHASONE SODIUM PHOSPHATE 4 MG/ML
INJECTION, SOLUTION INTRA-ARTICULAR; INTRALESIONAL; INTRAMUSCULAR; INTRAVENOUS; SOFT TISSUE AS NEEDED
Status: DISCONTINUED | OUTPATIENT
Start: 2018-03-28 | End: 2018-03-28 | Stop reason: SURG

## 2018-03-28 RX ORDER — MEPERIDINE HYDROCHLORIDE 50 MG/ML
12.5 INJECTION INTRAMUSCULAR; INTRAVENOUS; SUBCUTANEOUS
Status: DISCONTINUED | OUTPATIENT
Start: 2018-03-28 | End: 2018-03-28 | Stop reason: HOSPADM

## 2018-03-28 RX ORDER — HYDROCODONE BITARTRATE AND ACETAMINOPHEN 5; 325 MG/1; MG/1
1 TABLET ORAL EVERY 4 HOURS PRN
Status: DISCONTINUED | OUTPATIENT
Start: 2018-03-28 | End: 2018-03-28 | Stop reason: HOSPADM

## 2018-03-28 RX ORDER — SODIUM CHLORIDE 9 MG/ML
125 INJECTION, SOLUTION INTRAVENOUS CONTINUOUS
Status: DISCONTINUED | OUTPATIENT
Start: 2018-03-28 | End: 2018-03-28 | Stop reason: HOSPADM

## 2018-03-28 RX ORDER — FENTANYL CITRATE/PF 50 MCG/ML
25 SYRINGE (ML) INJECTION
Status: DISCONTINUED | OUTPATIENT
Start: 2018-03-28 | End: 2018-03-28 | Stop reason: HOSPADM

## 2018-03-28 RX ORDER — HYDROCODONE BITARTRATE AND ACETAMINOPHEN 5; 325 MG/1; MG/1
2 TABLET ORAL EVERY 4 HOURS PRN
Status: DISCONTINUED | OUTPATIENT
Start: 2018-03-28 | End: 2018-03-28 | Stop reason: HOSPADM

## 2018-03-28 RX ORDER — FENTANYL CITRATE 50 UG/ML
INJECTION, SOLUTION INTRAMUSCULAR; INTRAVENOUS AS NEEDED
Status: DISCONTINUED | OUTPATIENT
Start: 2018-03-28 | End: 2018-03-28 | Stop reason: SURG

## 2018-03-28 RX ORDER — ONDANSETRON 2 MG/ML
4 INJECTION INTRAMUSCULAR; INTRAVENOUS ONCE
Status: DISCONTINUED | OUTPATIENT
Start: 2018-03-28 | End: 2018-03-28 | Stop reason: HOSPADM

## 2018-03-28 RX ORDER — ONDANSETRON 2 MG/ML
INJECTION INTRAMUSCULAR; INTRAVENOUS AS NEEDED
Status: DISCONTINUED | OUTPATIENT
Start: 2018-03-28 | End: 2018-03-28 | Stop reason: SURG

## 2018-03-28 RX ORDER — PROPOFOL 10 MG/ML
INJECTION, EMULSION INTRAVENOUS AS NEEDED
Status: DISCONTINUED | OUTPATIENT
Start: 2018-03-28 | End: 2018-03-28 | Stop reason: SURG

## 2018-03-28 RX ORDER — LIDOCAINE HYDROCHLORIDE AND EPINEPHRINE 10; 10 MG/ML; UG/ML
INJECTION, SOLUTION INFILTRATION; PERINEURAL AS NEEDED
Status: DISCONTINUED | OUTPATIENT
Start: 2018-03-28 | End: 2018-03-28 | Stop reason: HOSPADM

## 2018-03-28 RX ORDER — GINSENG 100 MG
CAPSULE ORAL AS NEEDED
Status: DISCONTINUED | OUTPATIENT
Start: 2018-03-28 | End: 2018-03-28 | Stop reason: HOSPADM

## 2018-03-28 RX ADMIN — FENTANYL CITRATE 50 MCG: 50 INJECTION, SOLUTION INTRAMUSCULAR; INTRAVENOUS at 15:20

## 2018-03-28 RX ADMIN — FENTANYL CITRATE 50 MCG: 50 INJECTION, SOLUTION INTRAMUSCULAR; INTRAVENOUS at 15:12

## 2018-03-28 RX ADMIN — DEXAMETHASONE SODIUM PHOSPHATE 4 MG: 4 INJECTION, SOLUTION INTRAMUSCULAR; INTRAVENOUS at 15:12

## 2018-03-28 RX ADMIN — LIDOCAINE HYDROCHLORIDE 60 MG: 20 INJECTION, SOLUTION INTRAVENOUS at 15:10

## 2018-03-28 RX ADMIN — ONDANSETRON HYDROCHLORIDE 4 MG: 2 INJECTION, SOLUTION INTRAVENOUS at 15:12

## 2018-03-28 RX ADMIN — SODIUM CHLORIDE 125 ML/HR: 0.9 INJECTION, SOLUTION INTRAVENOUS at 12:57

## 2018-03-28 RX ADMIN — SODIUM CHLORIDE 125 ML/HR: 0.9 INJECTION, SOLUTION INTRAVENOUS at 16:50

## 2018-03-28 RX ADMIN — PROPOFOL 150 MG: 10 INJECTION, EMULSION INTRAVENOUS at 15:12

## 2018-03-28 NOTE — DISCHARGE INSTRUCTIONS
[facility]  Postoperative Instructions for Outpatient Surgery  [address] [facility], [address] [facility], [address], [place], [address] [facility] Karla / Lucius Gu  / www [email or web address]      These  instructions are being provided by you doctor to give you basic guidelines during you post-op recovery  Please let our office know your contact information has changed  Please call the office today to schedule a post operative appointment, and tell the office staff  that you doctor needs see you in our office in 7-10 days  Dressing:              Apply ice as needed       Bathing  Keep dressing dry scalp    Showering permitted          Medication    Resume preparative medication  Skin glue was applied to area  Motrin or Tylenol is OK    Other Instruction: ok to wash Right abdomen      Activities  No bending , lifting, or straining    You may drive when you are off you pain medications    Bruising, and welling I expected  incision and surrounding area  It is normal to have a slight fever after surgery  If the fever I above 101 5 please call our office  If you have a drain, leaking around the drain it may occur  The normal  Occasionally, a drain may clog  If this happens carefully remove the bulb and try milking the obstruction  out of the tubing  Garments after liposuction will become soiled  You should protect area where you will sitting or lying  The majority of the drainage should subside within 48 hrs  Do Not remove garment unless otherwise instructed  A side effect of the pain medication is constipation  If this dose happen your doctor recommends that you take Senokot, Colace or something over the counter for this  Do not hesitate to call the office at [phone] if you have any questions about your surgery  The nursing staff will be glad to assist you in any possible way   If it is necessary for you to contract a doctor when the office is closed or on the weekend, please call [phone] and it will direct you to the answering service  A physician will contact you to assist you with any problems or questions

## 2018-03-28 NOTE — ANESTHESIA POSTPROCEDURE EVALUATION
Post-Op Assessment Note      CV Status:  Stable    Mental Status:  Alert and awake    Hydration Status:  Euvolemic    PONV Controlled:  Controlled    Airway Patency:  Patent    Post Op Vitals Reviewed: Yes          Staff: Anesthesiologist           /60 (03/28/18 1654)    Temp 97 8 °F (36 6 °C) (03/28/18 1654)    Pulse 86 (03/28/18 1654)   Resp 15 (03/28/18 1654)    SpO2 96 % (03/28/18 1654)

## 2018-03-28 NOTE — OP NOTE
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, face, and neck ([address] ) [C49 0]    Procedure(s) (LRB):  SCALP SARCOMA EXCISION WITH FULL THICKNESS SKIN GRAFT (N/A)    Specimen(s):  ID Type Source Tests Collected by Time Destination   1 : 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Tissue Lesion TISSUE EXAM Adelina Pacheco MD 3/28/2018 1554        Estimated Blood Loss:   Minimal    Drains:       Anesthesia Type:   General    Operative Indications:  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, face, and neck ([address] ) [C49 0]  Sarcoma  5x6cm    Operative Findings:   involvement down to skull    Complications:   None    Procedure and Technique:  Patient was identified correctly on the table  Intubated by anesthesia  The area was prepped and draped in sterile surgical fashion  The lesion was located on the scalp  We marked out 10 mm margins  The total size of the lesion with margins was 5 x 6  cm  The donor site for the skin graft was the abdomen  Total size of the skin graft was 10 x 5  cm  The donor site was closed in a complex fashion with deep 2-0 Vicryl suture, 3-0 PDS, 3-0 Monocryl stitch and Dermabond dressing  The skin graft was trimmed to size then sutured in place with 4-0 Monocryl as bolster sutures and 4-0, 5 0 chromic sutures  The graft was bolstered in place with rest on foam Xeroform and bacitracin ointment  Patient tolerated procedure wel,l was awakened from surgery  Taken to recovery room  All counts were correct x2     I was present for the entire procedure    Patient Disposition:  extubated and stable    SIGNATURE: Adelina Pacheco MD  DATE: March 28, 2018  TIME: 4:26 PM

## 2018-03-28 NOTE — DISCHARGE SUMMARY
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 [address] ) [C49 0]  Principal Procedure:   SCALP SARCOMA EXCISION WITH FULL THICKNESS SKIN GRAFT (N/A Face)  Discharge Medications:  See after visit summary for reconciled discharge medications provided to patient and family  Reason for Admission:  Luna Born was electively admitted to undergo the above named procedure on 3/28/2018 as an outpatient  Hospital Course:  Patient underwent the above named procedure on the day of admission without complications  They were discharged home the same day  Disposition:  To home in care of self and family    Condition:  Good  Follow up:  Patient with follow up in the office with Dr Fabrice Aponte MD in 1 week(s) or as scheduled per his office  Fabrice Aponte MD  3/28/2018 3:10 PM

## 2018-04-02 DIAGNOSIS — I10 BENIGN ESSENTIAL HYPERTENSION: ICD-10-CM

## 2018-04-02 RX ORDER — LISINOPRIL AND HYDROCHLOROTHIAZIDE 25; 20 MG/1; MG/1
1 TABLET ORAL DAILY
Qty: 90 TABLET | Refills: 1 | Status: SHIPPED | OUTPATIENT
Start: 2018-04-02 | End: 2018-08-26 | Stop reason: SDUPTHER

## 2018-04-16 ENCOUNTER — RADIATION ONCOLOGY CONSULT (OUTPATIENT)
Dept: RADIATION ONCOLOGY | Facility: CLINIC | Age: 78
End: 2018-04-16

## 2018-04-16 ENCOUNTER — APPOINTMENT (OUTPATIENT)
Dept: RADIATION ONCOLOGY | Facility: CLINIC | Age: 78
End: 2018-04-16
Attending: RADIOLOGY
Payer: MEDICARE

## 2018-04-16 VITALS
RESPIRATION RATE: 16 BRPM | BODY MASS INDEX: 26.8 KG/M2 | SYSTOLIC BLOOD PRESSURE: 162 MMHG | HEIGHT: 68 IN | WEIGHT: 176.8 LBS | DIASTOLIC BLOOD PRESSURE: 100 MMHG | HEART RATE: 95 BPM | TEMPERATURE: 99 F

## 2018-04-16 DIAGNOSIS — C49.0 SARCOMA OF SCALP (HCC): ICD-10-CM

## 2018-04-16 DIAGNOSIS — C49.0 SARCOMA OF SCALP (HCC): Primary | ICD-10-CM

## 2018-04-16 PROCEDURE — 99214 OFFICE O/P EST MOD 30 MIN: CPT | Performed by: RADIOLOGY

## 2018-04-16 RX ORDER — ANTIOX #8/OM3/DHA/EPA/LUT/ZEAX 250-2.5 MG
1 CAPSULE ORAL 2 TIMES DAILY
COMMUNITY

## 2018-04-16 NOTE — PROGRESS NOTES
Emery Ruggiero  1940  Mr Ella Bentley is a 65 y o  male    Chief Complaint   Patient presents with    Cancer     radiation consult for scalp sarcoma       No matching staging information was found for the patient  Oncology History    Scalp lesion right vertex of scalp    10/12/17 shave biopsy of right vertex scalp   for atypical fibroxanthoma  Reviewed by [facility]: Upper portion of malignant spindle and epithelioid tumor, in conjunction with the excisional specimen, representing the upper portion of a superficial sarcoma     11/8/17 excision and full thickness graft for closure by Dr Middleton Course  11/8/17 right vertex scalp, excision reviewed by [facility]:Residual malignant tumor with substantial subcutaneous involvement, consistent with superficial sarcoma, high grade (2 5 cm per gross report); not seen at inked margins      On 1/29/18 he was seen for dermatology follow up, patient reported lump at edge of graft that is very tender  Biopsy of anterior aspect of graft revealed atypical epithelioid fibrohistiocytic tumor  Lesion extends to deep tissue edge  1/29/18 pathology reviewed by 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    History of multiple basal cell and squamous cell carcinomas of the skin with multiple Mohs surgery  History of melanoma x2, right flank, and left lateral cheek  Using Neosporin to scalp wound and covering with Xeroform twice daily  No bleeding or drainage  Denies pain  4/19/18 returns to Dr Dania Jenkins for wound check            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[facility] [address] )    11/8/2017 Surgery     right vertex of scalp - s/p excision and full thickness graft for closure on 11/8/17 for atypical fibroxanthoma  1/29/2018 Biopsy     biopsy from the anterior aspect of the graft showing atypical epithelioid fibro histiocytic tumor, with lesion extending to the deep tissue edge  This is similar to prior biopsy done  Differential includes pleomorphic sarcoma versus an extreme atypical example of atypical fibro histiocytoma  3/2018 Initial Diagnosis     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[address] )         3/28/2018 Surgery     excision of scalp sarcoma with full thickness graft - Dr Flora Blackburn  Soft Tissue/Skin, Sarcoma of scalp, wide excision:  - Recurrent pleomorphic sarcoma, 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  See Note  -- FNCLCC Histologic Grade 3  (total score: 7 of 8)  * Tumor differentiation score: 3 of 3         * Mitotic count score: 3 of 3; > 19 mitoses/10 HPF (33 mitoses/10 HPF)  * Necrosis score: 1 of 2; present, < 50%  - Tumor extends into deep reticular dermis, subcutis and fascia  - Perineural invasion: Present; intraneural invasion identified (0 4 mm largest nerve diameter)  - Lymphovascular invasion: Focally suspicious  - Bone invasion: Not identified    - Central nervous system extension: Not identified  - Margins: uninvolved by sarcoma but close  -- Sarcoma  0 15 mm from nearest deep margin    - Additional Pathologic Findings: Changes consistent with prior surgical site  -- Focal ulceration with bacterial colonization, acute and chronic inflammation       -- Best representative tumor block: A5              Clinical Trial: no    Health Maintenance   Topic Date Due    Depression Screening PHQ-9  06/03/1952    DTaP,Tdap,and Td Vaccines (1 - Tdap) 06/03/1960    Fall Risk  06/03/2004    PNEUMOCOCCAL POLYSACCHARIDE VACCINE AGE 72 AND OVER  06/03/2004    GLAUCOMA SCREENING 67+ YR  06/03/2006    INFLUENZA VACCINE  09/01/2017       Patient Active Problem List   Diagnosis    Actinic keratosis    Chronic kidney disease    Gout    Hyperlipidemia    Hypertension    Irritable bowel syndrome    Macular degeneration    Urinary incontinence, post-void dribbling    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HCC)    Basal cell carcinoma of skin of other parts of face     Past Medical History:   Diagnosis Date    Basal cell carcinoma (BCC)     Chronic bronchitis (HCC)     Gout     Hard of hearing     Hearing aid worn     ziyad    Hypertension     Macular degeneration     Melanoma ([address] )     left cheek- history    Rash     under left breast area- instructed to notify md if worsens    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[address] )     Skin cancer     Squamous cell carcinoma     Wears glasses      Past Surgical History:   Procedure Laterality Date    COLONOSCOPY      MASTOID SURGERY Left     MOHS SURGERY      OTHER SURGICAL HISTORY      sarcoma scalp with skin graft    SCALP EXCISION N/A 3/28/2018    Procedure: SCALP SARCOMA EXCISION WITH FULL THICKNESS SKIN GRAFT;  Surgeon: Priscilla Esposito MD;  Location: [facility];  Service: Plastics    TESTICLE SURGERY      TONSILLECTOMY AND ADENOIDECTOMY       Family History   Problem Relation Age of Onset    Colon cancer Father      Social History     Social History    Marital status: /Civil Union     Spouse name: N/A    Number of children: N/A    Years of education: N/A     Occupational History    Not on file       Social History Main Topics    Smoking status: Former Smoker     Quit date: 3/13/1988    Smokeless tobacco: Never Used    Alcohol use Yes      Comment: occasional beer    Drug use: No    Sexual activity: Not on file     Other Topics Concern    Not on file     Social History Narrative    No narrative on file       Current Outpatient Prescriptions:     acetaminophen (TYLENOL) 500 mg tablet, Take 500-1,000 mg by mouth every 4 (four) hours as needed for mild pain, Disp: , Rfl:     lisinopril-hydrochlorothiazide (PRINZIDE,ZESTORETIC) 20-25 MG per tablet, Take 1 tablet by mouth daily, Disp: 90 tablet, Rfl: 1   Multiple Vitamins-Minerals (PRESERVISION AREDS 2) CAPS, Take 1 capsule by mouth 2 (two) times a day, Disp: , Rfl:     ketoconazole (NIZORAL) 2 % shampoo, Apply 1 application topically daily as needed for dandruff, Disp: , Rfl:     Allergies   Allergen Reactions    Erythromycin Other (See Comments)     Thrush        Review of Systems:  Review of Systems   Constitutional: Negative  HENT: Positive for hearing loss (wears hearing aid in both ears)  Eyes: Positive for visual disturbance (macular degeneration)  Cardiovascular: Negative  Gastrointestinal: Negative  Endocrine: Negative  Genitourinary: Negative  Musculoskeletal: Negative  Skin: Positive for wound (scalp )  Allergic/Immunologic: Negative  Neurological: Negative  Hematological: Negative  Psychiatric/Behavioral: Negative  Vitals:    04/16/18 0945   BP: 162/100   Pulse: 95   Resp: 16   Temp: 99 °F (37 2 °C)   TempSrc: Temporal   Weight: 80 2 kg (176 lb 12 8 oz)   Height: 5' 8" (1 727 m)         Imaging:No results found      Teaching:  [employer] RT packet provided

## 2018-04-16 NOTE — PROGRESS NOTES
Consultation - Radiation Oncology     [facility]:[id number] : 1940  Encounter: [id number]  Patient Information: Loni Wilhelm    CHIEF COMPLAINT  Chief Complaint   Patient presents with    Cancer     radiation consult for scalp sarcoma        History of Present Illness   Loni Wilhelm is a 65y o  year old male who presents with a Scalp lesion right vertex of scalp     10/12/17 shave biopsy of right vertex scalp   for atypical fibroxanthoma      Reviewed by [facility] Upper portion of malignant spindle and epithelioid tumor, in conjunction with the excisional specimen, representing the upper portion of a superficial sarcoma      17 excision and full thickness graft for closure by Dr Karyna Hernandez  17 right vertex scalp, excision reviewed by [facility]:Residual malignant tumor with substantial subcutaneous involvement, consistent with superficial sarcoma, high grade (2 5 cm per gross report); not seen at inked margins     On 18 he was seen for dermatology follow up, patient reported lump at edge of graft that is very tender  Biopsy of anterior aspect of graft revealed atypical epithelioid fibrohistiocytic tumor  Lesion extends to deep tissue edge      18 pathology reviewed by [facility]  Recurrent pleomorphic sarcoma, 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      3/13/18 seen by Dr Taniya Arias for second opinion of recurrent tumor at anterior portion of graft  Recommended he undergo scheduled resection by Dr Kassie Corona  Refer to radiation oncology for evaluation of adjuvant treatment      3/28/18 excision of scalp sarcoma with full thickness graft by Dr Fernando Rather - recurrent pleomorphic sarcoma, 3 5 cm  Margins of resection were negative but the deep margin was close at 0 15 mm      History of multiple basal cell and squamous cell carcinomas of the skin with multiple Mohs surgery   He has been following with Dr Karyna Hernandez for 15 years     History of melanoma x2, right flank excised 7 or 8 years ago, and left lateral cheek excised at [address] by   Ej with skin graft approximately 10 years ago  Postoperatively, he was followed by Dr Deion Gustafson in medical oncology at [facility] for 5 years  He had a negative PET-CT done at that time      Using Neosporin to scalp wound and covering with Xeroform twice daily  No bleeding or drainage  Denies pain       18 returns to Dr Mac Meléndez for wound check  His father had skin carcinomas diagnosed in his 46s and  from metastatic colon carcinoma  His mother had no history of skin cancer and  with CHF at 80  He also has macular degeneration and goes for ocular injections every 8 weeks  Historical Information      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[facility] [address] )    2017 Surgery     right vertex of scalp - s/p excision and full thickness graft for closure on 17 for atypical fibroxanthoma  2018 Biopsy     biopsy from the anterior aspect of the graft showing atypical epithelioid fibro histiocytic tumor, with lesion extending to the deep tissue edge  This is similar to prior biopsy done  Differential includes pleomorphic sarcoma versus an extreme atypical example of atypical fibro histiocytoma  3/2018 Initial Diagnosis     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  See Note  -- FNCLCC Histologic Grade 3  (total score: 7 of 8)  * Tumor differentiation score: 3 of 3         * Mitotic count score: 3 of 3; > 19 mitoses/10 HPF (33 mitoses/10 HPF)  * Necrosis score: 1 of 2; present, < 50%  - Tumor extends into deep reticular dermis, subcutis and fascia  - Perineural invasion: Present; intraneural invasion identified (0 4 mm largest nerve diameter)  - Lymphovascular invasion: Focally suspicious  - Bone invasion: Not identified    - Central nervous system extension: Not identified     - Margins: uninvolved by sarcoma but close  -- Sarcoma  0 15 mm from nearest deep margin    - Additional Pathologic Findings: Changes consistent with prior surgical site  -- Focal ulceration with bacterial colonization, acute and chronic inflammation       -- Best representative tumor block: A5            Past Medical History:   Diagnosis Date    Basal cell carcinoma (BCC)     Chronic bronchitis (HCC)     Gout     Hard of hearing     Hearing aid worn     ziyad    Hypertension     Macular degeneration     Melanoma ([address] )     left cheek- history    Rash     under left breast area- instructed to notify md if worsens    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[address] )     Skin cancer     Squamous cell carcinoma     Wears glasses      Past Surgical History:   Procedure Laterality Date    COLONOSCOPY      MASTOID SURGERY Left     MOHS SURGERY      OTHER SURGICAL HISTORY      sarcoma scalp with skin graft    SCALP EXCISION N/A 3/28/2018    Procedure: SCALP SARCOMA EXCISION WITH FULL THICKNESS SKIN GRAFT;  Surgeon: Iona Wade MD;  Location: [facility];  Service: Plastics    TESTICLE SURGERY      TONSILLECTOMY AND ADENOIDECTOMY       Family History   Problem Relation Age of Onset    Colon cancer Father        Social History   History   Alcohol Use    Yes     Comment: occasional beer     History   Drug Use No     History   Smoking Status    Former Smoker    Quit date: 3/13/1988   Smokeless Tobacco    Never Used     Meds/Allergies     Current Outpatient Prescriptions:     acetaminophen (TYLENOL) 500 mg tablet, Take 500-1,000 mg by mouth every 4 (four) hours as needed for mild pain, Disp: , Rfl:     lisinopril-hydrochlorothiazide (PRINZIDE,ZESTORETIC) 20-25 MG per tablet, Take 1 tablet by mouth daily, Disp: 90 tablet, Rfl: 1    Multiple Vitamins-Minerals (PRESERVISION AREDS 2) CAPS, Take 1 capsule by mouth 2 (two) times a day, Disp: , Rfl:     ketoconazole (NIZORAL) 2 % shampoo, Apply 1 application topically daily as needed for dandruff, Disp: , Rfl:   Allergies   Allergen Reactions    Erythromycin Other (See Comments)     Thrush      Review of Systems  Constitutional: Negative  HENT: Positive for hearing loss (wears hearing aid in both ears)  Eyes: Positive for visual disturbance (macular degeneration)  Cardiovascular: Negative  Gastrointestinal: Negative  Endocrine: Negative  Genitourinary: Negative  Musculoskeletal: Negative  Skin: Positive for wound (scalp )  Allergic/Immunologic: Negative  Neurological: Negative  Hematological: Negative  Psychiatric/Behavioral: Negative  OBJECTIVE:   /100   Pulse 95   Temp 99 °F (37 2 °C) (Temporal)   Resp 16   Ht 5' 8" (1 727 m)   Wt 80 2 kg (176 lb 12 8 oz)   BMI 26 88 kg/m²   Pain Assessment:  0  Performance Status: ECOG/Zubrod/WHO: 1 - Symptomatic but completely ambulatory    Physical Exam   Constitutional: He is oriented to person, place, and time  He appears well-developed and well-nourished  No distress  HENT:   Head: Normocephalic and atraumatic  Mouth/Throat: No oropharyngeal exudate  Eyes: Conjunctivae and EOM are normal  Pupils are equal, round, and reactive to light  No scleral icterus  Neck: Normal range of motion  Neck supple  No tracheal deviation present  No thyromegaly present  Cardiovascular: Normal rate, regular rhythm and normal heart sounds  Pulmonary/Chest: Effort normal and breath sounds normal  No respiratory distress  He has no wheezes  He has no rales  He exhibits no tenderness  Abdominal: Soft  Bowel sounds are normal  He exhibits no distension and no mass  There is no tenderness  Musculoskeletal: Normal range of motion  He exhibits no edema or tenderness  Lymphadenopathy:     He has no cervical adenopathy  Right cervical: No superficial cervical, no deep cervical and no posterior cervical adenopathy present         Left cervical: No superficial cervical, no deep cervical and no posterior cervical adenopathy present  He has no axillary adenopathy  Neurological: He is alert and oriented to person, place, and time  No cranial nerve deficit  Coordination normal    Skin: Skin is warm and dry  No rash noted  He is not diaphoretic  No erythema  No pallor  Examination of the scalp at the vertex just to the right of midline reveals a 6 0 x 4 5 cm recent skin graft that is still healing  Posterior to this and abutting the posterior margin is a 2 5 x 1 5 cm previous skin graft that is healed  There is no nodularity, masses nor evidence of any recurrent disease  There is no evidence of any bleeding  Xeroform gauze is intact over the skin graft  Psychiatric: He has a normal mood and affect  His behavior is normal  Judgment and thought content normal    Nursing note and vitals reviewed  RESULTS  Lab Results    Chemistry        Component Value Date/Time     03/15/2018 0822     05/05/2015 0708    K 4 3 03/15/2018 0822    K 3 5 05/05/2015 0708     03/15/2018 0822     05/05/2015 0708    CO2 30 03/15/2018 0822    CO2 27 05/05/2015 0708    BUN 21 03/15/2018 0822    BUN 28 (H) 05/05/2015 0708    CREATININE 1 42 (H) 03/15/2018 0822    CREATININE 1 44 (H) 05/05/2015 0708        Component Value Date/Time    CALCIUM 10 0 03/15/2018 0822    CALCIUM 9 3 05/05/2015 0708    ALKPHOS 113 03/15/2018 0822    ALKPHOS 119 (H) 05/05/2015 0708    AST 17 03/15/2018 0822    AST 27 05/05/2015 0708    ALT 26 03/15/2018 0822    ALT 53 05/05/2015 0708    BILITOT 0 94 03/15/2018 0822    BILITOT 0 72 05/05/2015 0708        Lab Results   Component Value Date    WBC 6 95 03/15/2018    HGB 14 7 03/15/2018    HCT 41 3 03/15/2018    MCV 89 03/15/2018     03/15/2018     Imaging Studies  No results found  Pathology:  See Above    ASSESSMENT  1   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[address] )  CT chest wo contrast    CT soft tissue neck wo contrast    Radiation Simulation Treatment     No matching staging information was found for the patient  PLAN/DISCUSSION  Orders Placed This Encounter   Procedures    CT chest wo contrast    CT soft tissue neck wo contrast    Radiation Simulation Treatment      Yudy Strickland is a 65y o  year old male with a history of multiple basal cell and squamous cell carcinomas of the skin over the last 15 years and he is status post multiple Mohs surgical procedures  He also has a history of melanoma twice once in the right flank 7 or 8 years ago and left lateral cheek 10 years ago  He was diagnosed with a new superficial cutaneous sarcoma of the scalp in November 2017  He is status post excision and full-thickness skin graft with Dr Waqar Sotelo on November 8, 2017  He had biopsy proven recurrence at the edge of the skin graft on January 29, 2018  He then had excision with Dr Mac Meléndez on March 28, 2018 along with a full-thickness skin graft  Final pathology confirmed recurrent pleomorphic sarcoma 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  This was a grade 3 tumor with disease extending into the deep reticular dermis, subcutis and fascia  There was perineural invasion identified and there was focal suspicion for lymphovascular invasion  The margins were negative but close at the deep margin that was 0 15 mm  The patient is healing from surgery and will be seeing Dr Mac Meléndez for a wound check April 19, 2018  He is at high risk for local recurrence of his 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  He has a close margin with perineural invasion and lymphovascular invasion  We would recommend postoperative radiation therapy once his graft has healed  We discussed the acute side effects and the potential chronic complications of radiation therapy with him and his wife  This would include potential decreased wound healing in the future and/or breakdown of his graft  We also discussed observation once he heals from surgery and reserving radiation for the future if he develops recurrence    He is not in favor of observation and wishes to pursue more aggressive treatment to prevent local recurrence now  We would recommend a 6 week course of postoperative superficial electron beam radiation therapy to his resection bed including overlying skin graft  He will return tentatively for simulation on April 30, 2018  We do not plan to start treatment probably until mid May to allow for full healing from his surgery including skin graft  Gilberto Garcia MD  9/00/4230,05:12 AM      Portions of the record may have been created with voice recognition software   Occasional wrong word or "sound a like" substitutions may have occurred due to the inherent limitations of voice recognition software   Read the chart carefully and recognize, using context, where substitutions have occurred

## 2018-04-21 ENCOUNTER — HOSPITAL ENCOUNTER (OUTPATIENT)
Dept: CT IMAGING | Facility: HOSPITAL | Age: 78
Discharge: HOME | End: 2018-04-21
Attending: RADIOLOGY
Payer: MEDICARE

## 2018-04-21 DIAGNOSIS — C49.0 SARCOMA OF SCALP (HCC): ICD-10-CM

## 2018-04-21 PROCEDURE — 71250 CT THORAX DX C-: CPT

## 2018-04-21 PROCEDURE — 70490 CT SOFT TISSUE NECK W/O DYE: CPT

## 2018-04-30 ENCOUNTER — APPOINTMENT (OUTPATIENT)
Dept: RADIATION ONCOLOGY | Facility: CLINIC | Age: 78
End: 2018-04-30
Attending: RADIOLOGY
Payer: MEDICARE

## 2018-04-30 PROCEDURE — 77290 THER RAD SIMULAJ FIELD CPLX: CPT | Performed by: RADIOLOGY

## 2018-05-01 ENCOUNTER — APPOINTMENT (OUTPATIENT)
Dept: RADIATION ONCOLOGY | Facility: CLINIC | Age: 78
End: 2018-05-01
Attending: RADIOLOGY
Payer: MEDICARE

## 2018-05-01 DIAGNOSIS — R91.8 PULMONARY NODULES: ICD-10-CM

## 2018-05-01 DIAGNOSIS — C49.0 SARCOMA OF SCALP (HCC): Primary | ICD-10-CM

## 2018-05-15 PROCEDURE — 77334 RADIATION TREATMENT AID(S): CPT | Performed by: RADIOLOGY

## 2018-05-15 PROCEDURE — 77307 TELETHX ISODOSE PLAN CPLX: CPT | Performed by: RADIOLOGY

## 2018-05-16 ENCOUNTER — HOSPITAL ENCOUNTER (OUTPATIENT)
Dept: CT IMAGING | Facility: HOSPITAL | Age: 78
Discharge: HOME | End: 2018-05-16
Attending: RADIOLOGY
Payer: MEDICARE

## 2018-05-16 VITALS
OXYGEN SATURATION: 100 % | BODY MASS INDEX: 25.62 KG/M2 | HEART RATE: 101 BPM | SYSTOLIC BLOOD PRESSURE: 176 MMHG | RESPIRATION RATE: 18 BRPM | TEMPERATURE: 98.8 F | WEIGHT: 173 LBS | HEIGHT: 69 IN | DIASTOLIC BLOOD PRESSURE: 81 MMHG

## 2018-05-16 DIAGNOSIS — C49.0 SARCOMA OF SCALP (HCC): ICD-10-CM

## 2018-05-16 DIAGNOSIS — R91.8 PULMONARY NODULES: ICD-10-CM

## 2018-05-16 LAB
INR PPP: 1.04 (ref 0.86–1.16)
PROTHROMBIN TIME: 13.6 SECONDS (ref 11.8–14.2)

## 2018-05-16 PROCEDURE — 85610 PROTHROMBIN TIME: CPT | Performed by: RADIOLOGY

## 2018-05-16 PROCEDURE — 71250 CT THORAX DX C-: CPT

## 2018-05-16 RX ORDER — SODIUM CHLORIDE 9 MG/ML
75 INJECTION, SOLUTION INTRAVENOUS CONTINUOUS
Status: DISCONTINUED | OUTPATIENT
Start: 2018-05-16 | End: 2018-05-17 | Stop reason: HOSPADM

## 2018-05-16 RX ADMIN — SODIUM CHLORIDE 75 ML/HR: 0.9 INJECTION, SOLUTION INTRAVENOUS at 12:24

## 2018-05-16 NOTE — BRIEF OP NOTE (RAD/CATH)
CT CHEST WO CONTRAST  Procedure Note    PATIENT NAME: Krys Schaffer  : 1940  MRN: [id number]     Pre-op Diagnosis:   1  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[address] )    2  Pulmonary nodules      Post-op Diagnosis:   1  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[facility] [address] )    2  Pulmonary nodules        Surgeon:   Kenneth Ng MD  Assistants:     No qualified resident was available, Resident is only observing    Estimated Blood Loss: None  Findings: Multiple left pulmonary nodules have either resolved or decreased in size  Therefore, the biopsy was canceled      Specimens: none    Complications:  none    Anesthesia: None    Kenneth Ng MD     Date: 2018  Time: 3:19 PM

## 2018-05-16 NOTE — SEDATION DOCUMENTATION
Patient to CT for lung biopsy  Dr Estell Sandhoff spoke with Dr Dandre Henry after initial scanning completed and biopsy cancelled at this time

## 2018-05-21 PROCEDURE — 77280 THER RAD SIMULAJ FIELD SMPL: CPT | Performed by: RADIOLOGY

## 2018-05-29 PROCEDURE — 77331 SPECIAL RADIATION DOSIMETRY: CPT | Performed by: RADIOLOGY

## 2018-05-29 PROCEDURE — 77412 RADIATION TX DELIVERY LVL 3: CPT | Performed by: RADIOLOGY

## 2018-05-30 ENCOUNTER — APPOINTMENT (OUTPATIENT)
Dept: LAB | Facility: CLINIC | Age: 78
End: 2018-05-30
Attending: RADIOLOGY
Payer: MEDICARE

## 2018-05-30 DIAGNOSIS — C49.0 MALIGNANT NEOPLASM OF CONNECTIVE AND SOFT TISSUE OF HEAD, FACE, AND NECK (HCC): ICD-10-CM

## 2018-05-30 DIAGNOSIS — C49.0 MALIGNANT NEOPLASM OF CARTILAGE OF EAR (HCC): ICD-10-CM

## 2018-05-30 DIAGNOSIS — C49.0 MALIGNANT NEOPLASM OF CONNECTIVE AND SOFT TISSUE OF HEAD, FACE, AND NECK (HCC): Primary | ICD-10-CM

## 2018-05-30 LAB
ERYTHROCYTE [DISTWIDTH] IN BLOOD BY AUTOMATED COUNT: 14.2 % (ref 11.6–15.1)
GRANULOCYTES NFR BLD AUTO: 69.2 % (ref 47–80)
GRANULOCYTES NFR BLD: 5.1 THOUSAND/ΜL (ref 1.85–7.82)
HCT VFR BLD AUTO: 43.9 % (ref 36.5–49.3)
HGB BLD-MCNC: 14.1 G/DL (ref 12–17)
LYMPHOCYTES # BLD AUTO: 2 THOUSANDS/ΜL (ref 0.6–4.47)
LYMPHOCYTES NFR BLD AUTO: 27 % (ref 14–44)
MCH RBC QN AUTO: 30.2 PG (ref 26.8–34.3)
MCHC RBC AUTO-ENTMCNC: 32.2 G/DL (ref 31.4–37.4)
MCV RBC AUTO: 94 FL (ref 82–98)
MONOCYTES # BLD AUTO: 0.3 THOUSAND/ΜL (ref 0.17–1.22)
MONOCYTES NFR BLD AUTO: 4 % (ref 4–12)
PLATELET # BLD AUTO: 203 THOUSANDS/UL (ref 149–390)
PMV BLD AUTO: 7.4 FL (ref 8.9–12.7)
RBC # BLD AUTO: 4.68 MILLION/UL (ref 3.88–5.62)
WBC # BLD AUTO: 7.3 THOUSAND/UL (ref 4.31–10.16)
WBC NRBC COR # BLD: 7.3 THOUSAND/UL (ref 4.31–10.16)

## 2018-05-30 PROCEDURE — 77412 RADIATION TX DELIVERY LVL 3: CPT | Performed by: RADIOLOGY

## 2018-05-30 PROCEDURE — 85025 COMPLETE CBC W/AUTO DIFF WBC: CPT

## 2018-05-30 PROCEDURE — 36415 COLL VENOUS BLD VENIPUNCTURE: CPT

## 2018-05-31 PROCEDURE — 77412 RADIATION TX DELIVERY LVL 3: CPT | Performed by: RADIOLOGY

## 2018-06-01 ENCOUNTER — RADIATION THERAPY TREATMENT (OUTPATIENT)
Dept: RADIATION ONCOLOGY | Facility: CLINIC | Age: 78
End: 2018-06-01
Attending: RADIOLOGY
Payer: MEDICARE

## 2018-06-01 PROCEDURE — 77412 RADIATION TX DELIVERY LVL 3: CPT | Performed by: RADIOLOGY

## 2018-06-04 PROCEDURE — 77336 RADIATION PHYSICS CONSULT: CPT | Performed by: RADIOLOGY

## 2018-06-04 PROCEDURE — 77412 RADIATION TX DELIVERY LVL 3: CPT | Performed by: RADIOLOGY

## 2018-06-05 PROCEDURE — 77412 RADIATION TX DELIVERY LVL 3: CPT | Performed by: RADIOLOGY

## 2018-06-06 PROCEDURE — 77412 RADIATION TX DELIVERY LVL 3: CPT | Performed by: RADIOLOGY

## 2018-06-07 PROCEDURE — 77412 RADIATION TX DELIVERY LVL 3: CPT | Performed by: RADIOLOGY

## 2018-06-08 PROCEDURE — 77412 RADIATION TX DELIVERY LVL 3: CPT | Performed by: RADIOLOGY

## 2018-06-11 PROCEDURE — 77336 RADIATION PHYSICS CONSULT: CPT | Performed by: RADIOLOGY

## 2018-06-11 PROCEDURE — 77412 RADIATION TX DELIVERY LVL 3: CPT | Performed by: RADIOLOGY

## 2018-06-12 PROCEDURE — 77412 RADIATION TX DELIVERY LVL 3: CPT | Performed by: RADIOLOGY

## 2018-06-13 PROCEDURE — 77412 RADIATION TX DELIVERY LVL 3: CPT | Performed by: RADIOLOGY

## 2018-06-14 PROCEDURE — 77412 RADIATION TX DELIVERY LVL 3: CPT | Performed by: RADIOLOGY

## 2018-06-15 PROCEDURE — 77412 RADIATION TX DELIVERY LVL 3: CPT | Performed by: RADIOLOGY

## 2018-06-18 PROCEDURE — 77412 RADIATION TX DELIVERY LVL 3: CPT | Performed by: RADIOLOGY

## 2018-06-18 PROCEDURE — 77336 RADIATION PHYSICS CONSULT: CPT | Performed by: RADIOLOGY

## 2018-06-19 PROCEDURE — 77412 RADIATION TX DELIVERY LVL 3: CPT | Performed by: RADIOLOGY

## 2018-06-20 PROCEDURE — 77412 RADIATION TX DELIVERY LVL 3: CPT | Performed by: RADIOLOGY

## 2018-06-21 PROCEDURE — 77412 RADIATION TX DELIVERY LVL 3: CPT | Performed by: RADIOLOGY

## 2018-06-22 PROCEDURE — 77412 RADIATION TX DELIVERY LVL 3: CPT | Performed by: RADIOLOGY

## 2018-06-25 PROCEDURE — 77412 RADIATION TX DELIVERY LVL 3: CPT | Performed by: RADIOLOGY

## 2018-06-25 PROCEDURE — 77336 RADIATION PHYSICS CONSULT: CPT | Performed by: RADIOLOGY

## 2018-06-26 PROCEDURE — 77412 RADIATION TX DELIVERY LVL 3: CPT | Performed by: RADIOLOGY

## 2018-06-27 PROCEDURE — 77412 RADIATION TX DELIVERY LVL 3: CPT | Performed by: RADIOLOGY

## 2018-06-28 PROCEDURE — 77412 RADIATION TX DELIVERY LVL 3: CPT | Performed by: RADIOLOGY

## 2018-06-29 DIAGNOSIS — C49.0 MALIGNANT NEOPLASM OF CONNECTIVE AND SOFT TISSUE OF HEAD, FACE, AND NECK (HCC): Primary | ICD-10-CM

## 2018-06-29 PROCEDURE — 77412 RADIATION TX DELIVERY LVL 3: CPT | Performed by: RADIOLOGY

## 2018-07-02 ENCOUNTER — APPOINTMENT (OUTPATIENT)
Dept: RADIATION ONCOLOGY | Facility: CLINIC | Age: 78
End: 2018-07-02
Attending: RADIOLOGY
Payer: MEDICARE

## 2018-07-02 PROCEDURE — 77412 RADIATION TX DELIVERY LVL 3: CPT | Performed by: RADIOLOGY

## 2018-07-02 PROCEDURE — 77336 RADIATION PHYSICS CONSULT: CPT | Performed by: RADIOLOGY

## 2018-07-03 PROCEDURE — 77412 RADIATION TX DELIVERY LVL 3: CPT | Performed by: RADIOLOGY

## 2018-07-05 PROCEDURE — 77412 RADIATION TX DELIVERY LVL 3: CPT | Performed by: RADIOLOGY

## 2018-08-15 ENCOUNTER — HOSPITAL ENCOUNTER (OUTPATIENT)
Dept: CT IMAGING | Facility: HOSPITAL | Age: 78
Discharge: HOME | End: 2018-08-15
Attending: RADIOLOGY
Payer: MEDICARE

## 2018-08-15 DIAGNOSIS — C49.0 MALIGNANT NEOPLASM OF CONNECTIVE AND SOFT TISSUE OF HEAD, FACE, AND NECK (HCC): ICD-10-CM

## 2018-08-15 PROCEDURE — 71250 CT THORAX DX C-: CPT

## 2018-08-16 ENCOUNTER — RADIATION ONCOLOGY FOLLOW-UP (OUTPATIENT)
Dept: RADIATION ONCOLOGY | Facility: CLINIC | Age: 78
End: 2018-08-16
Attending: RADIOLOGY
Payer: MEDICARE

## 2018-08-16 ENCOUNTER — CLINICAL SUPPORT (OUTPATIENT)
Dept: RADIATION ONCOLOGY | Facility: CLINIC | Age: 78
End: 2018-08-16
Payer: MEDICARE

## 2018-08-16 ENCOUNTER — ONCOLOGY SURVIVORSHIP (OUTPATIENT)
Dept: RADIATION ONCOLOGY | Facility: CLINIC | Age: 78
End: 2018-08-16

## 2018-08-16 VITALS
RESPIRATION RATE: 18 BRPM | BODY MASS INDEX: 26.46 KG/M2 | SYSTOLIC BLOOD PRESSURE: 138 MMHG | HEART RATE: 94 BPM | DIASTOLIC BLOOD PRESSURE: 92 MMHG | OXYGEN SATURATION: 95 % | WEIGHT: 174.6 LBS | HEIGHT: 68 IN | TEMPERATURE: 98.7 F

## 2018-08-16 DIAGNOSIS — R91.8 PULMONARY NODULES: ICD-10-CM

## 2018-08-16 DIAGNOSIS — C49.0 SARCOMA OF SCALP (HCC): Primary | ICD-10-CM

## 2018-08-16 PROCEDURE — 99215 OFFICE O/P EST HI 40 MIN: CPT | Performed by: RADIOLOGY

## 2018-08-16 NOTE — PROGRESS NOTES
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  He is status post surgical resection with perineural invasion and focal suspicion for lymphovascular invasion and close margins resection  Therefore, recommendations were made for postoperative radiation therapy that was completed on July 5, 2018  He returns today for follow-up visit  Interval History:  8/15/18 CT chest:  IMPRESSION:   Most of the pulmonary nodules have continued to decrease in size   There are no new or enlarging nodules  Negrito Aaron are very likely infectious/inflammatory rather than neoplastic  Based on current Fleischner Society 2017 Guidelines on incidental pulmonary nodule, patients with a known malignancy are at increased risk of metastasis and should receive followup CT at intervals appropriate for the type of cancer and its risk of pulmonary metastases      Denies pain/discomfort of scalp  He has some scabbing on top area of scalp  He denies any bleeding  Some dryness of scalp, no itching  He alternates use of baby shampoo and Clean and Clear shampoo  Has not been applying any moisturizer such as Remedy cream nor Aquaphor  He has also not been using any antibiotic ointment  He denies any new or suspicious skin lesions      Dr Elizabeth Pathak (dermatoloy) - no appointments scheduled      Clinical Trial: no     Screening  Tobacco  Current tobacco user: no  If yes, brief counseling provided: NA     Hypertension  Hypertension screening performed: yes  Normotensive:  no  If no, referred to PCP: yes     Depression Screening  Screened for depression using PHQ-2: yes     Screened for depression using PHQ-9:  no  Screening positive or negative:  negative  If score >4, was any of the following actions taken?    Additional evaluation for depression, suicide risk assesment, referral to PCP or psychiatry, medication started:  n/a     Advanced Care Planning for Patients >65 years  Advanced Care Planning Discussed:  yes  Patient named surrogate decision maker or care plan in chart: yes    Historical Information      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[facility] )    11/8/2017 Surgery     right vertex of scalp - s/p excision and full thickness graft for closure on 11/8/17 for atypical fibroxanthoma  1/29/2018 Biopsy     biopsy from the anterior aspect of the graft showing atypical epithelioid fibro histiocytic tumor, with lesion extending to the deep tissue edge  This is similar to prior biopsy done  Differential includes pleomorphic sarcoma versus an extreme atypical example of atypical fibro histiocytoma  3/2018 Initial Diagnosis     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[facility] [address] )         3/28/2018 Surgery     excision of scalp sarcoma with full thickness graft - Dr Jose L Owen  Soft Tissue/Skin, Sarcoma of scalp, wide excision:  - Recurrent pleomorphic sarcoma, 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  See Note  -- FNCLCC Histologic Grade 3  (total score: 7 of 8)  * Tumor differentiation score: 3 of 3         * Mitotic count score: 3 of 3; > 19 mitoses/10 HPF (33 mitoses/10 HPF)  * Necrosis score: 1 of 2; present, < 50%  - Tumor extends into deep reticular dermis, subcutis and fascia  - Perineural invasion: Present; intraneural invasion identified (0 4 mm largest nerve diameter)  - Lymphovascular invasion: Focally suspicious  - Bone invasion: Not identified    - Central nervous system extension: Not identified  - Margins: uninvolved by sarcoma but close  -- Sarcoma  0 15 mm from nearest deep margin    - Additional Pathologic Findings: Changes consistent with prior surgical site  -- Focal ulceration with bacterial colonization, acute and chronic inflammation       -- Best representative tumor block: A5           5/29/2018 - 7/5/2018 Radiation     Plan ID Energy Fractions Dose per Fraction (cGy) Dose Correction (cGy) Total Dose Delivered (cGy) Elapsed Days   Vertex Scalp 9E 32 / 32 200 0 5,400 40                   Past Medical History:   Diagnosis Date    Basal cell carcinoma (BCC)     Chronic bronchitis (HCC)     Gout     Hard of hearing     Hearing aid worn     ziyad    Hypertension     Macular degeneration     Melanoma ([address] )     left cheek- history    Rash     under left breast area- instructed to notify md if worsens    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[address] )     Skin cancer     Squamous cell carcinoma     Wears glasses      Past Surgical History:   Procedure Laterality Date    COLONOSCOPY      HERNIA REPAIR      HYDROCELE EXCISION / REPAIR      MASTOID SURGERY Left     MOHS SURGERY      OTHER SURGICAL HISTORY      sarcoma scalp with skin graft    SCALP EXCISION N/A 3/28/2018    Procedure: SCALP SARCOMA EXCISION WITH FULL THICKNESS SKIN GRAFT;  Surgeon: Deb Schwab MD;  Location: [facility];  Service: Plastics    TONSILLECTOMY AND ADENOIDECTOMY         Social History   History   Alcohol Use    Yes     Comment: occasional beer     History   Drug Use No     History   Smoking Status    Former Smoker    Quit date: 3/13/1988   Smokeless Tobacco    Never Used     Meds/Allergies     Current Outpatient Prescriptions:     acetaminophen (TYLENOL) 500 mg tablet, Take 500-1,000 mg by mouth every 4 (four) hours as needed for mild pain, Disp: , Rfl:     lisinopril-hydrochlorothiazide (PRINZIDE,ZESTORETIC) 20-25 MG per tablet, Take 1 tablet by mouth daily, Disp: 90 tablet, Rfl: 1    Multiple Vitamins-Minerals (PRESERVISION AREDS 2) CAPS, Take 1 capsule by mouth 2 (two) times a day, Disp: , Rfl:     ketoconazole (NIZORAL) 2 % shampoo, Apply 1 application topically daily as needed for dandruff, Disp: , Rfl:   Allergies   Allergen Reactions    Erythromycin Other (See Comments)     Thrush      Review of Systems   Constitutional: Negative  HENT: Positive for hearing loss (wears b/l hearing aids)  Eyes: Negative  Respiratory: Negative  Cardiovascular: Negative  Gastrointestinal: Negative  Endocrine: Negative  Genitourinary: Negative  Musculoskeletal: Negative  Skin: Positive for color change (mild redness of scalp) and rash (arms (L>R) and chest, upper abdomen)  Some dryness and scabbing of scalp   Allergic/Immunologic: Negative  Neurological: Negative  Hematological: Negative  Psychiatric/Behavioral: Negative  OBJECTIVE:   /92   Pulse 94   Temp 98 7 °F (37 1 °C) (Temporal)   Resp 18   Ht 5' 8" (1 727 m)   Wt 79 2 kg (174 lb 9 6 oz)   SpO2 95%   BMI 26 55 kg/m²   Pain Assessment:  0  ECOG/Zubrod/WHO: 1 - Symptomatic but completely ambulatory    Physical Exam   Constitutional: He is oriented to person, place, and time  He appears well-developed and well-nourished  No distress  HENT:   Head: Normocephalic and atraumatic  Right Ear: External ear normal    Left Ear: External ear normal    Nose: Nose normal    Mouth/Throat: No oropharyngeal exudate  Eyes: Conjunctivae and EOM are normal  Pupils are equal, round, and reactive to light  No scleral icterus  Neck: Normal range of motion  Neck supple  No tracheal deviation present  No thyromegaly present  Cardiovascular: Normal rate, regular rhythm and normal heart sounds  Pulmonary/Chest: Effort normal and breath sounds normal  No respiratory distress  He has no wheezes  He has no rales  He exhibits no tenderness  Abdominal: Soft  Bowel sounds are normal  He exhibits no distension and no mass  There is no tenderness  Musculoskeletal: Normal range of motion  He exhibits no edema or tenderness  Lymphadenopathy:     He has no cervical adenopathy  He has no axillary adenopathy  Right: No supraclavicular adenopathy present  Left: No supraclavicular adenopathy present  Neurological: He is alert and oriented to person, place, and time  No cranial nerve deficit   Coordination normal    Skin: Skin is warm and dry  No rash noted  He is not diaphoretic  No erythema  No pallor  Skin of the scalp reveals some dryness and alopecia confined to the area of the radiation field  There is well-healed skin graft without any evidence of recurrent nodules  There are some actinic keratosis and dryness of the skin within the treated field  There is no evidence of any bleeding or infection  He also has dryness of skin throughout the scalp outside the treated area  Psychiatric: He has a normal mood and affect  His behavior is normal  Judgment and thought content normal    Nursing note and vitals reviewed  RESULTS    Lab Results: No results found for this or any previous visit (from the past 672 hour(s))  Imaging Studies:Ct Chest Wo Contrast    Result Date: 8/15/2018  Narrative: CT CHEST WITHOUT IV CONTRAST INDICATION:   C49 0: 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, face and neck  Patient has history of scalp sarcoma  Evaluate pulmonary nodules  COMPARISON:  Chest CT from 5/16/2018 at chest CT from 4/21/2018  TECHNIQUE: CT examination of the chest was performed without intravenous contrast   Axial, sagittal, and coronal 2D reformatted images were created from the source data and submitted for interpretation  Radiation dose length product (DLP) for this visit:  145 mGy-cm   This examination, like all CT scans performed in the [facility], was performed utilizing techniques to minimize radiation dose exposure, including the use of iterative reconstruction and automated exposure control  FINDINGS: LUNGS:  Most of the pre-existing pulmonary nodules have continued to decrease in size  For example an index left lower lobe pulmonary nodule on series 2 image 34 now measures 6 mm, compared to 9 mm on the 5/16/2018 CT, and 13 mm on the 4/21/2018 CT  A pulmonary nodule in the right lower lobe is unchanged in size at 8 mm (series 2 image 36 ) PLEURA:  Unremarkable  HEART/GREAT VESSELS:  Unremarkable for patient's age  MEDIASTINUM AND GALE:  Unremarkable  CHEST WALL AND LOWER NECK:   Unremarkable  VISUALIZED STRUCTURES IN THE UPPER ABDOMEN:  Unremarkable  OSSEOUS STRUCTURES:  No acute fracture or destructive osseous lesion  Impression: Most of the pulmonary nodules have continued to decrease in size  There are no new or enlarging nodules  These are very likely infectious/inflammatory rather than neoplastic  Based on current Fleischner Society 2017 Guidelines on incidental pulmonary nodule, patients with a known malignancy are at increased risk of metastasis and should receive followup CT at intervals appropriate for the type of cancer and its risk of pulmonary metastases  Workstation performed: [id number]       Assessment/Plan:  Orders Placed This Encounter   Procedures    CT chest wo contrast        Henry Borges is a 77y o  year old male with a history of multiple basal cell and squamous cell carcinomas of the skin over the last 15 years and he is status post multiple Mohs surgical procedures  He also has a history of melanoma twice once in the right flank 7 or 8 years ago and left lateral cheek 10 years ago  He was diagnosed with a new superficial cutaneous sarcoma of the scalp in November 2017  He is status post excision and full-thickness skin graft with Dr Jennifer Smith on November 8, 2017  He had biopsy proven recurrence at the edge of the skin graft on January 29, 2018  He then had excision with Dr Carlos Bello on March 28, 2018 along with a full-thickness skin graft  Final pathology confirmed recurrent pleomorphic sarcoma 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  This was a grade 3 tumor with disease extending into the deep reticular dermis, subcutis and fascia  There was perineural invasion identified and there was focal suspicion for lymphovascular invasion  The margins were negative but close at the deep margin that was 0 15 mm    He is at high risk for local recurrence of his 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  He has a close margin with perineural invasion and lymphovascular invasion  We recommended postoperative radiation therapy that was completed on July 5, 2018  He returns today for follow-up visit  He has no clinical evidence of any recurrent disease  There was some dryness of the scalp both within and outside of the treated areas  His graft sites are well healed and without any nodularity  He does have some actinic keratoses just lateral to the graft site within the treated field  We recommended he apply moisturizer daily with Remedy cream during the day and to use Aquaphor or antibiotic ointment in the evening  He previously had some nodules on chest CT that have been followed  His most recent chest CT August 15, 2018 reveals most of the pulmonary nodules continue to decrease in size  There were no new or enlarging nodules  These were thought be infectious or inflammatory in nature  He will return in January 2019 with a repeat chest CT for follow-up  He reports he has follow-up scheduled for October or November in Dr Ritesh Weller office  Angie Peters MD  8/16/2018,9:23 AM    Portions of the record may have been created with voice recognition software   Occasional wrong word or "sound a like" substitutions may have occurred due to the inherent limitations of voice recognition software   Read the chart carefully and recognize, using context, where substitutions have occurred

## 2018-08-16 NOTE — PROGRESS NOTES
Aryan Lang  1940   Mr Sammy Judge is a 77 y o  male       Chief Complaint   Patient presents with    Follow-up     radiation oncology       Cancer Staging  No matching staging information was found for the patient  Oncology History    77year old patient with sarcoma of scalp here for follow up status post radiation therapy completed on 7/5/2018  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[facility] [address] )    11/8/2017 Surgery     right vertex of scalp - s/p excision and full thickness graft for closure on 11/8/17 for atypical fibroxanthoma  1/29/2018 Biopsy     biopsy from the anterior aspect of the graft showing atypical epithelioid fibro histiocytic tumor, with lesion extending to the deep tissue edge  This is similar to prior biopsy done  Differential includes pleomorphic sarcoma versus an extreme atypical example of atypical fibro histiocytoma  3/2018 Initial Diagnosis     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[facility] [address] )         3/28/2018 Surgery     excision of scalp sarcoma with full thickness graft - Dr Devera Lesch  Soft Tissue/Skin, Sarcoma of scalp, wide excision:  - Recurrent pleomorphic sarcoma, 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  See Note  -- FNCLCC Histologic Grade 3  (total score: 7 of 8)  * Tumor differentiation score: 3 of 3         * Mitotic count score: 3 of 3; > 19 mitoses/10 HPF (33 mitoses/10 HPF)  * Necrosis score: 1 of 2; present, < 50%  - Tumor extends into deep reticular dermis, subcutis and fascia  - Perineural invasion: Present; intraneural invasion identified (0 4 mm largest nerve diameter)  - Lymphovascular invasion: Focally suspicious  - Bone invasion: Not identified    - Central nervous system extension: Not identified  - Margins: uninvolved by sarcoma but close  -- Sarcoma  0 15 mm from nearest deep margin    - Additional Pathologic Findings: Changes consistent with prior surgical site  -- Focal ulceration with bacterial colonization, acute and chronic inflammation       -- Best representative tumor block: A5           5/29/2018 - 7/5/2018 Radiation     Plan ID Energy Fractions Dose per Fraction (cGy) Dose Correction (cGy) Total Dose Delivered (cGy) Elapsed Days   Vertex Scalp 9E 27 / 27 200 0 5,400 37                   Clinical Trial: no    Interval History:  8/15/18 CT chest:  IMPRESSION:   Most of the pulmonary nodules have continued to decrease in size  There are no new or enlarging nodules  These are very likely infectious/inflammatory rather than neoplastic  Based on current Fleischner Society 2017 Guidelines on incidental pulmonary nodule, patients with a known malignancy are at increased risk of metastasis and should receive followup CT at intervals appropriate for the type of cancer and its risk of pulmonary metastases  Denies pain/discomfort of scalp  He has some scabbing on top area of scalp  He denies any bleeding  Some dryness of scalp, no itching  He alternates use of baby shampoo and Clean and Clear shampoo  Dr Deandre Krause (dermatoloy) - no appointments scheduled       Screening  Tobacco  Current tobacco user: no  If yes, brief counseling provided: NA    Hypertension  Hypertension screening performed: yes  Normotensive:  no  If no, referred to PCP: yes    Depression Screening  Screened for depression using PHQ-2: yes    Screened for depression using PHQ-9:  no  Screening positive or negative:  negative  If score >4, was any of the following actions taken?    Additional evaluation for depression, suicide risk assesment, referral to PCP or psychiatry, medication started:  n/a    Advanced Care Planning for Patients >65 years  Advanced Care Planning Discussed:  yes  Patient named surrogate decision maker or care plan in chart: yes      Health Maintenance   Topic Date Due    Depression Screening PHQ-9  1940    DTaP,Tdap,and Td Vaccines (1 - Tdap) 06/03/1960    Fall Risk  06/03/2004    PNEUMOCOCCAL POLYSACCHARIDE VACCINE AGE 65 AND OVER  06/03/2004    GLAUCOMA SCREENING 65 + YR  06/03/2006    INFLUENZA VACCINE  09/01/2018       Patient Active Problem List   Diagnosis    Actinic keratosis    Chronic kidney disease    Gout    Hyperlipidemia    Hypertension    Irritable bowel syndrome    Macular degeneration    Urinary incontinence, post-void dribbling    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[address]    Basal cell carcinoma of skin of other parts of face    Pulmonary nodules     Past Medical History:   Diagnosis Date    Basal cell carcinoma (BCC)     Chronic bronchitis (HCC)     Gout     Hard of hearing     Hearing aid worn     ziyad    Hypertension     Macular degeneration     Melanoma ([address] )     left cheek- history    Rash     under left breast area- instructed to notify md if worsens    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[address]     Skin cancer     Squamous cell carcinoma     Wears glasses      Past Surgical History:   Procedure Laterality Date    COLONOSCOPY      HERNIA REPAIR      HYDROCELE EXCISION / REPAIR      MASTOID SURGERY Left     MOHS SURGERY      OTHER SURGICAL HISTORY      sarcoma scalp with skin graft    SCALP EXCISION N/A 3/28/2018    Procedure: SCALP SARCOMA EXCISION WITH FULL THICKNESS SKIN GRAFT;  Surgeon: Pj Madsen MD;  Location: [facility];  Service: Plastics    TONSILLECTOMY AND ADENOIDECTOMY       Family History   Problem Relation Age of Onset    Colon cancer Father      Social History     Social History    Marital status: /Civil Union     Spouse name: N/A    Number of children: N/A    Years of education: N/A     Occupational History    Not on file       Social History Main Topics    Smoking status: Former Smoker     Quit date: 3/13/1988    Smokeless tobacco: Never Used    Alcohol use Yes      Comment: occasional beer    Drug use: No    Sexual activity: Not on file     Other Topics Concern    Not on file     Social History Narrative    No narrative on file       Current Outpatient Prescriptions:     acetaminophen (TYLENOL) 500 mg tablet, Take 500-1,000 mg by mouth every 4 (four) hours as needed for mild pain, Disp: , Rfl:     lisinopril-hydrochlorothiazide (PRINZIDE,ZESTORETIC) 20-25 MG per tablet, Take 1 tablet by mouth daily, Disp: 90 tablet, Rfl: 1    Multiple Vitamins-Minerals (PRESERVISION AREDS 2) CAPS, Take 1 capsule by mouth 2 (two) times a day, Disp: , Rfl:     ketoconazole (NIZORAL) 2 % shampoo, Apply 1 application topically daily as needed for dandruff, Disp: , Rfl:   Allergies   Allergen Reactions    Erythromycin Other (See Comments)     Thrush        Review of Systems:  Review of Systems   Constitutional: Negative  HENT: Positive for hearing loss (wears b/l hearing aids)  Eyes: Negative  Respiratory: Negative  Cardiovascular: Negative  Gastrointestinal: Negative  Endocrine: Negative  Genitourinary: Negative  Musculoskeletal: Negative  Skin: Positive for color change (mild redness of scalp) and rash (arms (L>R) and chest, upper abdomen)  Some dryness and scabbing of scalp   Allergic/Immunologic: Negative  Neurological: Negative  Hematological: Negative  Psychiatric/Behavioral: Negative  Vitals:    08/16/18 0814   BP: 138/92   Pulse: 94   Resp: 18   Temp: 98 7 °F (37 1 °C)   TempSrc: Temporal   SpO2: 95%   Weight: 79 2 kg (174 lb 9 6 oz)   Height: 5' 8" (1 727 m)            Imaging:Ct Chest Wo Contrast    Result Date: 8/15/2018  Narrative: CT CHEST WITHOUT IV CONTRAST INDICATION:   C49 0: 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, face and neck  Patient has history of scalp sarcoma  Evaluate pulmonary nodules  COMPARISON:  Chest CT from 5/16/2018 at chest CT from 4/21/2018  TECHNIQUE: CT examination of the chest was performed without intravenous contrast   Axial, sagittal, and coronal 2D reformatted images were created from the source data and submitted for interpretation  Radiation dose length product (DLP) for this visit:  145 mGy-cm     This examination, like all CT scans performed in the [facility], was performed utilizing techniques to minimize radiation dose exposure, including the use of iterative reconstruction and automated exposure control  FINDINGS: LUNGS:  Most of the pre-existing pulmonary nodules have continued to decrease in size  For example an index left lower lobe pulmonary nodule on series 2 image 34 now measures 6 mm, compared to 9 mm on the 5/16/2018 CT, and 13 mm on the 4/21/2018 CT  A pulmonary nodule in the right lower lobe is unchanged in size at 8 mm (series 2 image 36 ) PLEURA:  Unremarkable  HEART/GREAT VESSELS:  Unremarkable for patient's age  MEDIASTINUM AND GALE:  Unremarkable  CHEST WALL AND LOWER NECK:   Unremarkable  VISUALIZED STRUCTURES IN THE UPPER ABDOMEN:  Unremarkable  OSSEOUS STRUCTURES:  No acute fracture or destructive osseous lesion  Impression: Most of the pulmonary nodules have continued to decrease in size  There are no new or enlarging nodules  These are very likely infectious/inflammatory rather than neoplastic  Based on current Fleischner Society 2017 Guidelines on incidental pulmonary nodule, patients with a known malignancy are at increased risk of metastasis and should receive followup CT at intervals appropriate for the type of cancer and its risk of pulmonary metastases   Workstation performed: [id number]

## 2018-08-16 NOTE — PROGRESS NOTES
Survivorship treatment summary and care plan provided to patient and reviewed  All questions were answered and form signed  Copies made and sent to PCP on record and to [facility] for scanning to be placed into electronic chart

## 2018-08-26 DIAGNOSIS — I10 BENIGN ESSENTIAL HYPERTENSION: ICD-10-CM

## 2018-08-26 RX ORDER — LISINOPRIL AND HYDROCHLOROTHIAZIDE 25; 20 MG/1; MG/1
1 TABLET ORAL DAILY
Qty: 90 TABLET | Refills: 0 | Status: SHIPPED | OUTPATIENT
Start: 2018-08-26 | End: 2018-11-21 | Stop reason: SDUPTHER

## 2018-10-04 NOTE — TELEPHONE ENCOUNTER
Estrada, I received a call from patient requesting a 90 day refill for his Lisinopril/Hctz but pt has not been here in quite awhile , May we refill pt uses [facility]

## 2018-11-21 DIAGNOSIS — I10 BENIGN ESSENTIAL HYPERTENSION: ICD-10-CM

## 2018-11-21 RX ORDER — LISINOPRIL AND HYDROCHLOROTHIAZIDE 25; 20 MG/1; MG/1
1 TABLET ORAL DAILY
Qty: 90 TABLET | Refills: 3 | Status: SHIPPED | OUTPATIENT
Start: 2018-11-21 | End: 2019-10-03 | Stop reason: SDUPTHER

## 2019-01-02 ENCOUNTER — HOSPITAL ENCOUNTER (OUTPATIENT)
Dept: CT IMAGING | Facility: HOSPITAL | Age: 79
Discharge: HOME | End: 2019-01-02
Attending: RADIOLOGY
Payer: MEDICARE

## 2019-01-02 DIAGNOSIS — R91.8 PULMONARY NODULES: ICD-10-CM

## 2019-01-02 DIAGNOSIS — C49.0 SARCOMA OF SCALP (HCC): ICD-10-CM

## 2019-01-02 PROCEDURE — 71250 CT THORAX DX C-: CPT

## 2019-01-17 ENCOUNTER — CLINICAL SUPPORT (OUTPATIENT)
Dept: RADIATION ONCOLOGY | Facility: CLINIC | Age: 79
End: 2019-01-17

## 2019-01-17 ENCOUNTER — RADIATION ONCOLOGY FOLLOW-UP (OUTPATIENT)
Dept: RADIATION ONCOLOGY | Facility: CLINIC | Age: 79
End: 2019-01-17
Attending: RADIOLOGY
Payer: MEDICARE

## 2019-01-17 VITALS
OXYGEN SATURATION: 98 % | HEART RATE: 74 BPM | TEMPERATURE: 97.5 F | BODY MASS INDEX: 27.31 KG/M2 | SYSTOLIC BLOOD PRESSURE: 132 MMHG | DIASTOLIC BLOOD PRESSURE: 82 MMHG | HEIGHT: 67 IN | WEIGHT: 174 LBS | RESPIRATION RATE: 16 BRPM

## 2019-01-17 DIAGNOSIS — R91.8 PULMONARY NODULES: ICD-10-CM

## 2019-01-17 DIAGNOSIS — C44.319 BASAL CELL CARCINOMA OF SKIN OF OTHER PARTS OF FACE: Primary | ICD-10-CM

## 2019-01-17 DIAGNOSIS — C49.0 SARCOMA OF SCALP (HCC): Primary | ICD-10-CM

## 2019-01-17 PROCEDURE — 99214 OFFICE O/P EST MOD 30 MIN: CPT | Performed by: RADIOLOGY

## 2019-01-17 RX ORDER — FLUOCINONIDE TOPICAL SOLUTION USP, 0.05% 0.5 MG/ML
SOLUTION TOPICAL DAILY PRN
COMMUNITY
End: 2020-12-08

## 2019-01-17 RX ORDER — ALCLOMETASONE DIPROPIONATE 0.5 MG/G
CREAM TOPICAL 2 TIMES DAILY PRN
COMMUNITY
End: 2020-12-08

## 2019-01-17 NOTE — PROGRESS NOTES
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  He is status post surgical resection with perineural invasion and focal suspicion for lymphovascular invasion and close margins resection  Therefore, recommendations were made for postoperative radiation therapy that was completed on July 5, 2018  He returns today for follow-up visit  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[facility] [address] )    11/8/2017 Surgery     right vertex of scalp - s/p excision and full thickness graft for closure on 11/8/17 for atypical fibroxanthoma  1/29/2018 Biopsy     biopsy from the anterior aspect of the graft showing atypical epithelioid fibro histiocytic tumor, with lesion extending to the deep tissue edge  This is similar to prior biopsy done  Differential includes pleomorphic sarcoma versus an extreme atypical example of atypical fibro histiocytoma  3/2018 Initial Diagnosis     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[facility] [address] )         3/28/2018 Surgery     excision of scalp sarcoma with full thickness graft - Dr Yossi Holm  Soft Tissue/Skin, Sarcoma of scalp, wide excision:  - Recurrent pleomorphic sarcoma, 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  See Note  -- FNCLCC Histologic Grade 3  (total score: 7 of 8)  * Tumor differentiation score: 3 of 3         * Mitotic count score: 3 of 3; > 19 mitoses/10 HPF (33 mitoses/10 HPF)  * Necrosis score: 1 of 2; present, < 50%  - Tumor extends into deep reticular dermis, subcutis and fascia  - Perineural invasion: Present; intraneural invasion identified (0 4 mm largest nerve diameter)  - Lymphovascular invasion: Focally suspicious  - Bone invasion: Not identified    - Central nervous system extension: Not identified     - Margins: uninvolved by sarcoma but close  -- Sarcoma  0 15 mm from nearest deep margin    - Additional Pathologic Findings: Changes consistent with prior surgical site  -- Focal ulceration with bacterial colonization, acute and chronic inflammation  -- Best representative tumor block: A5           5/29/2018 - 7/5/2018 Radiation     Plan ID Energy Fractions Dose per Fraction (cGy) Dose Correction (cGy) Total Dose Delivered (cGy) Elapsed Days   Vertex Scalp 9E 27 / 27 200 0 5,400 37                   Clinical Trial: no    Interval History: Last seen on 8/16/18 1/2/19 CT chest  IMPRESSION:  Stable bilateral lung nodules  No new nodule  1/3/19 Dr Carlos Watt  Shave biopsy right mastoid lesion done  - benign keratosis as per phone call to Dr Ki Mix office    Denies any new lesions on scalp  He continues to use Ketoconazole 2% shampoo daily  He was recently prescribed Fluocinonide 0 05% solution to apply daily to scalp and ears and Alclometasone 0 05% cream to apply externally to affected areas twice daily by Dr Carlos Watt  4/2019 Dr Carlos Watt FU      Screening  Tobacco  Current tobacco user: no  If yes, brief counseling provided: NA    Hypertension  Hypertension screening performed: yes  Normotensive:  no  If no, referred to PCP: no    Depression Screening  Screened for depression using PHQ-2: yes    Screened for depression using PHQ-9:  no  Screening positive or negative:  negative  If score >4, was any of the following actions taken?    Additional evaluation for depression, suicide risk assesment, referral to PCP or psychiatry, medication started:  n/a    Advanced Care Planning for Patients >65 years  Advanced Care Planning Discussed:  yes  Patient named surrogate decision maker or care plan in chart: yes      Health Maintenance   Topic Date Due    Medicare Annual Wellness Visit (AWV)  1940    DTaP,Tdap,and Td Vaccines (1 - Tdap) 06/03/1960    Fall Risk  06/03/2004    Pneumococcal PPSV23/PCV13 65+ Years / High and Highest Risk (2 of 2 - PPSV23) 11/24/2016    Depression Screening PHQ  08/16/2019    INFLUENZA VACCINE  Completed       Patient Active Problem List   Diagnosis    Actinic keratosis    Chronic kidney disease    Gout    Hyperlipidemia    Hypertension    Irritable bowel syndrome    Macular degeneration    Urinary incontinence, post-void dribbling    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HCC)    Basal cell carcinoma of skin of other parts of face    Pulmonary nodules     Past Medical History:   Diagnosis Date    Basal cell carcinoma (BCC)     Chronic bronchitis (HCC)     Gout     Hard of hearing     Hearing aid worn     ziyad    Hypertension     Macular degeneration     Melanoma ([address] )     left cheek- history    Rash     under left breast area- instructed to notify md if worsens    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[address] )     Skin cancer     Squamous cell carcinoma     Wears glasses      Past Surgical History:   Procedure Laterality Date    COLONOSCOPY      HERNIA REPAIR      HYDROCELE EXCISION / REPAIR      MASTOID SURGERY Left     MOHS SURGERY      OTHER SURGICAL HISTORY      sarcoma scalp with skin graft    SCALP EXCISION N/A 3/28/2018    Procedure: SCALP SARCOMA EXCISION WITH FULL THICKNESS SKIN GRAFT;  Surgeon: Maddison Metcalf MD;  Location: [facility];  Service: Plastics    TONSILLECTOMY AND ADENOIDECTOMY       Family History   Problem Relation Age of Onset    Colon cancer Father      Social History     Social History    Marital status: /Civil Union     Spouse name: N/A    Number of children: N/A    Years of education: N/A     Occupational History    Not on file       Social History Main Topics    Smoking status: Former Smoker     Quit date: 3/13/1988    Smokeless tobacco: Never Used    Alcohol use Yes      Comment: occasional beer    Drug use: No    Sexual activity: Not on file     Other Topics Concern    Not on file     Social History Narrative    No narrative on file       Current Outpatient Prescriptions:     alclomethasone (ACLOVATE) 0 05 % cream, Apply topically 2 (two) times a day, Disp: , Rfl:     fluocinonide (LIDEX) 0 05 % external solution, Apply topically daily, Disp: , Rfl:     ketoconazole (NIZORAL) 2 % shampoo, Apply 1 application topically daily as needed for dandruff, Disp: , Rfl:     lisinopril-hydrochlorothiazide (PRINZIDE,ZESTORETIC) 20-25 MG per tablet, TAKE 1 TABLET BY MOUTH  DAILY, Disp: 90 tablet, Rfl: 3    Multiple Vitamins-Minerals (PRESERVISION AREDS 2) CAPS, Take 1 capsule by mouth 2 (two) times a day, Disp: , Rfl:     acetaminophen (TYLENOL) 500 mg tablet, Take 500-1,000 mg by mouth every 4 (four) hours as needed for mild pain, Disp: , Rfl:   Allergies   Allergen Reactions    Erythromycin Other (See Comments)     Thrush        Review of Systems:  Review of Systems   Constitutional: Negative  HENT: Positive for hearing loss (wears b/l hearing aids)  Eyes: Negative  Respiratory: Negative  Cardiovascular: Negative  Gastrointestinal: Negative  Endocrine: Negative  Genitourinary: Negative  Musculoskeletal: Negative  Skin: Negative  Allergic/Immunologic: Negative  Neurological: Negative  Hematological: Negative  Psychiatric/Behavioral: Negative  Vitals:    01/17/19 0805   BP: 132/82   Pulse: 74   Resp: 16   Temp: 97 5 °F (36 4 °C)   TempSrc: Temporal   SpO2: 98%   Weight: 78 9 kg (174 lb)   Height: 5' 7" (1 702 m)            Imaging:Ct Chest Wo Contrast    Result Date: 1/3/2019  Narrative: CT CHEST WITHOUT IV CONTRAST INDICATION:   C49 0: 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, face and neck R91 8: Other nonspecific abnormal finding of lung field  Former smoker  COMPARISON:  CT chest 8/15/2018  TECHNIQUE: CT examination of the chest was performed without intravenous contrast   Axial, sagittal, and coronal 2D reformatted images were created from the source data and submitted for interpretation  Radiation dose length product (DLP) for this visit:  378 mGy-cm   This examination, like all CT scans performed in the [facility], was performed utilizing techniques to minimize radiation dose exposure, including the use of iterative reconstruction and automated exposure control  FINDINGS: LUNGS:  Stable 8 mm right lower lobe nodule image 3/79  Stable 4 mm left lower lobe nodule image 3/72  No new nodule  Stable biapical scarring right greater than left  No endotracheal or endobronchial lesion  PLEURA:  Unremarkable  HEART/GREAT VESSELS:  Unremarkable for patient's age  MEDIASTINUM AND GALE:  Unremarkable  CHEST WALL AND LOWER NECK:   Unremarkable  VISUALIZED STRUCTURES IN THE UPPER ABDOMEN:  Unremarkable  OSSEOUS STRUCTURES:  No acute fracture or destructive osseous lesion  Impression: Stable bilateral lung nodules  No new nodule   Workstation performed: [id number]

## 2019-01-17 NOTE — PROGRESS NOTES
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   He is status post surgical resection with perineural invasion and focal suspicion for lymphovascular invasion and close margins resection   Therefore, recommendations were made for postoperative radiation therapy that was completed on July 5, 2018  Jolie Paz returns today for follow-up visit      Interval History: Last seen on 8/16/18 1/2/19 CT chest  IMPRESSION:  Stable bilateral lung nodules   No new nodule      1/3/19 Dr Alesia Joy  Shave biopsy right mastoid lesion done  - benign keratosis as per phone call to Dr Sharla Pyle office     Denies any new lesions on scalp  He continues to use Ketoconazole 2% shampoo daily  He was recently prescribed Fluocinonide 0 05% solution to apply daily to scalp and ears and Alclometasone 0 05% cream to apply externally to affected areas twice daily by Dr Alesia Joy  He was also started on a new vaseline sample moisturizer  Overall, he reports scalp skin healing much better  The area of the right mastoid has healed  He has no respiratory complaints      4/2019 Dr Alesia Joy     Clinical Trial: no      Screening  Tobacco  Current tobacco user: no  If yes, brief counseling provided: NA     Hypertension  Hypertension screening performed: yes  Normotensive:  no  If no, referred to PCP: no     Depression Screening  Screened for depression using PHQ-2: yes     Screened for depression using PHQ-9:  no  Screening positive or negative:  negative  If score >4, was any of the following actions taken?    Additional evaluation for depression, suicide risk assesment, referral to PCP or psychiatry, medication started:  n/a     Advanced Care Planning for Patients >65 years  Advanced Care Planning Discussed:  yes  Patient named surrogate decision maker or care plan in chart: yes    Historical Information      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[facility] [address] )    11/8/2017 Surgery     right vertex of scalp - s/p excision and full thickness graft for closure on 11/8/17 for atypical fibroxanthoma  1/29/2018 Biopsy     biopsy from the anterior aspect of the graft showing atypical epithelioid fibro histiocytic tumor, with lesion extending to the deep tissue edge  This is similar to prior biopsy done  Differential includes pleomorphic sarcoma versus an extreme atypical example of atypical fibro histiocytoma  3/2018 Initial Diagnosis     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[facility] [address] )         3/28/2018 Surgery     excision of scalp sarcoma with full thickness graft - Dr Nolan Vazquez  Soft Tissue/Skin, Sarcoma of scalp, wide excision:  - Recurrent pleomorphic sarcoma, 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  See Note  -- FNCLCC Histologic Grade 3  (total score: 7 of 8)  * Tumor differentiation score: 3 of 3         * Mitotic count score: 3 of 3; > 19 mitoses/10 HPF (33 mitoses/10 HPF)  * Necrosis score: 1 of 2; present, < 50%  - Tumor extends into deep reticular dermis, subcutis and fascia  - Perineural invasion: Present; intraneural invasion identified (0 4 mm largest nerve diameter)  - Lymphovascular invasion: Focally suspicious  - Bone invasion: Not identified    - Central nervous system extension: Not identified  - Margins: uninvolved by sarcoma but close  -- Sarcoma  0 15 mm from nearest deep margin    - Additional Pathologic Findings: Changes consistent with prior surgical site  -- Focal ulceration with bacterial colonization, acute and chronic inflammation       -- Best representative tumor block: A5           5/29/2018 - 7/5/2018 Radiation     Plan ID Energy Fractions Dose per Fraction (cGy) Dose Correction (cGy) Total Dose Delivered (cGy) Elapsed Days   Vertex Scalp 9E 27 / 27 200 0 5,400 40                   Past Medical History:   Diagnosis Date    Basal cell carcinoma (BCC)     Chronic bronchitis (HCC)     Gout     Hard of hearing     Hearing aid worn     ziyad    Hypertension     Macular degeneration     Melanoma ([address] )     left cheek- history    Rash     under left breast area- instructed to notify md if worsens    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[address] )     Skin cancer     Squamous cell carcinoma     Wears glasses      Past Surgical History:   Procedure Laterality Date    COLONOSCOPY      HERNIA REPAIR      HYDROCELE EXCISION / REPAIR      MASTOID SURGERY Left     MOHS SURGERY      OTHER SURGICAL HISTORY      sarcoma scalp with skin graft    SCALP EXCISION N/A 3/28/2018    Procedure: SCALP SARCOMA EXCISION WITH FULL THICKNESS SKIN GRAFT;  Surgeon: Mckenna Ignacio MD;  Location: [facility];  Service: Plastics    TONSILLECTOMY AND ADENOIDECTOMY         Social History   History   Alcohol Use    Yes     Comment: occasional beer     History   Drug Use No     History   Smoking Status    Former Smoker    Quit date: 3/13/1988   Smokeless Tobacco    Never Used     Meds/Allergies     Current Outpatient Prescriptions:     alclomethasone (ACLOVATE) 0 05 % cream, Apply topically 2 (two) times a day, Disp: , Rfl:     fluocinonide (LIDEX) 0 05 % external solution, Apply topically daily, Disp: , Rfl:     ketoconazole (NIZORAL) 2 % shampoo, Apply 1 application topically daily as needed for dandruff, Disp: , Rfl:     lisinopril-hydrochlorothiazide (PRINZIDE,ZESTORETIC) 20-25 MG per tablet, TAKE 1 TABLET BY MOUTH  DAILY, Disp: 90 tablet, Rfl: 3    Multiple Vitamins-Minerals (PRESERVISION AREDS 2) CAPS, Take 1 capsule by mouth 2 (two) times a day, Disp: , Rfl:     acetaminophen (TYLENOL) 500 mg tablet, Take 500-1,000 mg by mouth every 4 (four) hours as needed for mild pain, Disp: , Rfl:   Allergies   Allergen Reactions    Erythromycin Other (See Comments)     Thrush      Review of Systems   Constitutional: Negative      HENT: Positive for hearing loss (wears b/l hearing aids)  Eyes: Negative  Respiratory: Negative  Cardiovascular: Negative  Gastrointestinal: Negative  Endocrine: Negative  Genitourinary: Negative  Musculoskeletal: Negative  Skin: Negative  Allergic/Immunologic: Negative  Neurological: Negative  Hematological: Negative  Psychiatric/Behavioral: Negative  OBJECTIVE:   /82   Pulse 74   Temp 97 5 °F (36 4 °C) (Temporal)   Resp 16   Ht 5' 7" (1 702 m)   Wt 78 9 kg (174 lb)   SpO2 98%   BMI 27 25 kg/m²   Pain Assessment:  0  ECOG/Zubrod/WHO: 1 - Symptomatic but completely ambulatory    Physical Exam   Constitutional: He is oriented to person, place, and time  He appears well-developed and well-nourished  No distress  HENT:   Head: Normocephalic and atraumatic  Mouth/Throat: No oropharyngeal exudate  Eyes: Pupils are equal, round, and reactive to light  Conjunctivae and EOM are normal  No scleral icterus  Neck: Normal range of motion  Neck supple  No tracheal deviation present  No thyromegaly present  Cardiovascular: Normal rate, regular rhythm and normal heart sounds  Pulmonary/Chest: Effort normal and breath sounds normal  No respiratory distress  He has no wheezes  He has no rales  He exhibits no tenderness  Abdominal: Soft  Bowel sounds are normal  He exhibits no distension and no mass  There is no tenderness  Musculoskeletal: Normal range of motion  He exhibits no edema or tenderness  Lymphadenopathy:     He has no cervical adenopathy  He has no axillary adenopathy  Right: No supraclavicular adenopathy present  Left: No supraclavicular adenopathy present  Neurological: He is alert and oriented to person, place, and time  No cranial nerve deficit  Coordination normal    Skin: Skin is warm and dry  No rash noted  He is not diaphoretic  No erythema  No pallor     Skin of the scalp reveals less dryness and alopecia confined to the area of the radiation field  There is well-healed skin graft without any evidence of recurrent nodules  There are some actinic keratosis and dryness of the skin within the treated field  There is no evidence of any bleeding or infection  He also has dryness of skin throughout the scalp outside the treated area that has improved  Psychiatric: He has a normal mood and affect  His behavior is normal  Judgment and thought content normal    Nursing note and vitals reviewed  RESULTS    Lab Results: No results found for this or any previous visit (from the past 672 hour(s))  Imaging Studies:Ct Chest Wo Contrast    Result Date: 1/3/2019  Narrative: CT CHEST WITHOUT IV CONTRAST INDICATION:   C49 0: 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, face and neck R91 8: Other nonspecific abnormal finding of lung field  Former smoker  COMPARISON:  CT chest 8/15/2018  TECHNIQUE: CT examination of the chest was performed without intravenous contrast   Axial, sagittal, and coronal 2D reformatted images were created from the source data and submitted for interpretation  Radiation dose length product (DLP) for this visit:  378 mGy-cm   This examination, like all CT scans performed in the [facility], was performed utilizing techniques to minimize radiation dose exposure, including the use of iterative reconstruction and automated exposure control  FINDINGS: LUNGS:  Stable 8 mm right lower lobe nodule image 3/79  Stable 4 mm left lower lobe nodule image 3/72  No new nodule  Stable biapical scarring right greater than left  No endotracheal or endobronchial lesion  PLEURA:  Unremarkable  HEART/GREAT VESSELS:  Unremarkable for patient's age  MEDIASTINUM AND GALE:  Unremarkable  CHEST WALL AND LOWER NECK:   Unremarkable  VISUALIZED STRUCTURES IN THE UPPER ABDOMEN:  Unremarkable  OSSEOUS STRUCTURES:  No acute fracture or destructive osseous lesion  Impression: Stable bilateral lung nodules    No new nodule  Workstation performed: [id number]       Assessment/Plan:  Orders Placed This Encounter   Procedures    CT chest wo contrast        Vance Trevizo is a 77y o  year old male with a history of multiple basal cell and squamous cell carcinomas of the skin over the last 15 years and he is status post multiple Mohs surgical procedures   He also has a history of melanoma twice once in the right flank 7 or 8 years ago and left lateral cheek 10 years ago  Ari Norton was diagnosed with a new superficial cutaneous sarcoma of the scalp in November 2017  Ari Norton is status post excision and full-thickness skin graft with Dr Barb Rasheed on November 8[facility] 2017[facility] had biopsy proven recurrence at the edge of the skin graft on January 29, 2018  Ari Norton then had excision with Dr Garcia Rings 28, 2018 along with a full-thickness skin graft   Final pathology confirmed recurrent pleomorphic sarcoma 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   This was a grade 3 tumor with disease extending into the deep reticular dermis, subcutis and fascia   There was perineural invasion identified and there was focal suspicion for lymphovascular invasion   The margins were negative but close at the deep margin that was 0 15 mm   He is at high risk for local recurrence of his 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   He has a close margin with perineural invasion and lymphovascular invasion   We recommended postoperative radiation therapy that was completed on July 5, 2018  He returns today for follow-up visit      He has no clinical evidence of any recurrent disease  There was some dryness of the scalp both within and outside of the treated areas that has improved  His graft sites are well healed and without any nodularity  He will continue to apply moisturizer daily and use the akin/scalp products given to him by Dr Barb Rasheed outlined above  He previously had some nodules on chest CT that have been followed   His chest CT August 15, 2018 reveals most of the pulmonary nodules continue to decrease in size  There were no new or enlarging nodules  These were thought be infectious or inflammatory in nature  His most recent chest CT from January 2, 2019 revealed stable bilateral lung nodule with no new nodules  Results of chest CT were reviewed today with patient and his wife  He will return in July 2019 with a repeat chest CT for follow-up  He has follow-up scheduled for April with Dr Devin Amaya office  Dannielle Lemus MD  1/17/2019,8:42 AM    Portions of the record may have been created with voice recognition software   Occasional wrong word or "sound a like" substitutions may have occurred due to the inherent limitations of voice recognition software   Read the chart carefully and recognize, using context, where substitutions have occurred

## 2019-04-04 ENCOUNTER — TELEPHONE (OUTPATIENT)
Dept: FAMILY MEDICINE CLINIC | Facility: CLINIC | Age: 79
End: 2019-04-04

## 2019-04-04 DIAGNOSIS — I10 ESSENTIAL HYPERTENSION: Primary | ICD-10-CM

## 2019-04-04 DIAGNOSIS — E78.5 HYPERLIPIDEMIA, UNSPECIFIED HYPERLIPIDEMIA TYPE: ICD-10-CM

## 2019-04-04 DIAGNOSIS — M10.9 GOUT, UNSPECIFIED CAUSE, UNSPECIFIED CHRONICITY, UNSPECIFIED SITE: ICD-10-CM

## 2019-04-04 DIAGNOSIS — Z12.5 SCREENING FOR MALIGNANT NEOPLASM OF PROSTATE: ICD-10-CM

## 2019-04-04 DIAGNOSIS — N18.9 CHRONIC KIDNEY DISEASE, UNSPECIFIED CKD STAGE: ICD-10-CM

## 2019-05-30 ENCOUNTER — APPOINTMENT (OUTPATIENT)
Dept: LAB | Facility: CLINIC | Age: 79
End: 2019-05-30
Payer: MEDICARE

## 2019-05-30 LAB
ALBUMIN SERPL BCP-MCNC: 3.9 G/DL (ref 3.5–5)
ALP SERPL-CCNC: 117 U/L (ref 46–116)
ALT SERPL W P-5'-P-CCNC: 31 U/L (ref 12–78)
ANION GAP SERPL CALCULATED.3IONS-SCNC: 5 MMOL/L (ref 4–13)
AST SERPL W P-5'-P-CCNC: 17 U/L (ref 5–45)
BASOPHILS # BLD AUTO: 0.05 THOUSANDS/ΜL (ref 0–0.1)
BASOPHILS NFR BLD AUTO: 1 % (ref 0–1)
BILIRUB SERPL-MCNC: 0.71 MG/DL (ref 0.2–1)
BUN SERPL-MCNC: 25 MG/DL (ref 5–25)
CALCIUM SERPL-MCNC: 9.5 MG/DL (ref 8.3–10.1)
CHLORIDE SERPL-SCNC: 107 MMOL/L (ref 100–108)
CHOLEST SERPL-MCNC: 177 MG/DL (ref 50–200)
CO2 SERPL-SCNC: 28 MMOL/L (ref 21–32)
CREAT SERPL-MCNC: 1.35 MG/DL (ref 0.6–1.3)
EOSINOPHIL # BLD AUTO: 0.23 THOUSAND/ΜL (ref 0–0.61)
EOSINOPHIL NFR BLD AUTO: 4 % (ref 0–6)
ERYTHROCYTE [DISTWIDTH] IN BLOOD BY AUTOMATED COUNT: 13.2 % (ref 11.6–15.1)
GFR SERPL CREATININE-BSD FRML MDRD: 50 ML/MIN/1.73SQ M
GLUCOSE P FAST SERPL-MCNC: 90 MG/DL (ref 65–99)
HCT VFR BLD AUTO: 42.4 % (ref 36.5–49.3)
HDLC SERPL-MCNC: 34 MG/DL (ref 40–60)
HGB BLD-MCNC: 14.2 G/DL (ref 12–17)
IMM GRANULOCYTES # BLD AUTO: 0.02 THOUSAND/UL (ref 0–0.2)
IMM GRANULOCYTES NFR BLD AUTO: 0 % (ref 0–2)
LDLC SERPL CALC-MCNC: 117 MG/DL (ref 0–100)
LYMPHOCYTES # BLD AUTO: 1.91 THOUSANDS/ΜL (ref 0.6–4.47)
LYMPHOCYTES NFR BLD AUTO: 32 % (ref 14–44)
MCH RBC QN AUTO: 30.6 PG (ref 26.8–34.3)
MCHC RBC AUTO-ENTMCNC: 33.5 G/DL (ref 31.4–37.4)
MCV RBC AUTO: 91 FL (ref 82–98)
MONOCYTES # BLD AUTO: 0.45 THOUSAND/ΜL (ref 0.17–1.22)
MONOCYTES NFR BLD AUTO: 8 % (ref 4–12)
NEUTROPHILS # BLD AUTO: 3.37 THOUSANDS/ΜL (ref 1.85–7.62)
NEUTS SEG NFR BLD AUTO: 55 % (ref 43–75)
NRBC BLD AUTO-RTO: 0 /100 WBCS
PLATELET # BLD AUTO: 183 THOUSANDS/UL (ref 149–390)
PMV BLD AUTO: 11.3 FL (ref 8.9–12.7)
POTASSIUM SERPL-SCNC: 4.2 MMOL/L (ref 3.5–5.3)
PROT SERPL-MCNC: 7.5 G/DL (ref 6.4–8.2)
PSA SERPL-MCNC: 3.9 NG/ML (ref 0–4)
RBC # BLD AUTO: 4.64 MILLION/UL (ref 3.88–5.62)
SODIUM SERPL-SCNC: 140 MMOL/L (ref 136–145)
TRIGL SERPL-MCNC: 131 MG/DL
TSH SERPL DL<=0.05 MIU/L-ACNC: 2.53 UIU/ML (ref 0.36–3.74)
URATE SERPL-MCNC: 7.1 MG/DL (ref 4.2–8)
WBC # BLD AUTO: 6.03 THOUSAND/UL (ref 4.31–10.16)

## 2019-05-30 PROCEDURE — 80053 COMPREHEN METABOLIC PANEL: CPT

## 2019-05-30 PROCEDURE — 80061 LIPID PANEL: CPT

## 2019-05-30 PROCEDURE — G0103 PSA SCREENING: HCPCS

## 2019-05-30 PROCEDURE — 84443 ASSAY THYROID STIM HORMONE: CPT

## 2019-05-30 PROCEDURE — 85025 COMPLETE CBC W/AUTO DIFF WBC: CPT

## 2019-05-30 PROCEDURE — 36415 COLL VENOUS BLD VENIPUNCTURE: CPT

## 2019-05-30 PROCEDURE — 84550 ASSAY OF BLOOD/URIC ACID: CPT

## 2019-06-06 ENCOUNTER — OFFICE VISIT (OUTPATIENT)
Dept: FAMILY MEDICINE CLINIC | Facility: CLINIC | Age: 79
End: 2019-06-06
Payer: MEDICARE

## 2019-06-06 VITALS
SYSTOLIC BLOOD PRESSURE: 164 MMHG | HEART RATE: 100 BPM | BODY MASS INDEX: 27.15 KG/M2 | WEIGHT: 173 LBS | DIASTOLIC BLOOD PRESSURE: 94 MMHG | HEIGHT: 67 IN

## 2019-06-06 DIAGNOSIS — R91.8 PULMONARY NODULES: ICD-10-CM

## 2019-06-06 DIAGNOSIS — C49.0 SARCOMA OF SCALP (HCC): ICD-10-CM

## 2019-06-06 DIAGNOSIS — C43.62 MALIGNANT MELANOMA OF LEFT UPPER EXTREMITY INCLUDING SHOULDER (HCC): ICD-10-CM

## 2019-06-06 DIAGNOSIS — Z23 NEED FOR PNEUMOCOCCAL VACCINE: ICD-10-CM

## 2019-06-06 DIAGNOSIS — Z00.00 HEALTH CARE MAINTENANCE: ICD-10-CM

## 2019-06-06 DIAGNOSIS — E78.5 HYPERLIPIDEMIA, UNSPECIFIED HYPERLIPIDEMIA TYPE: ICD-10-CM

## 2019-06-06 DIAGNOSIS — R97.20 ELEVATED PSA: ICD-10-CM

## 2019-06-06 DIAGNOSIS — K40.90 LEFT INGUINAL HERNIA: ICD-10-CM

## 2019-06-06 DIAGNOSIS — N18.9 CHRONIC KIDNEY DISEASE, UNSPECIFIED CKD STAGE: ICD-10-CM

## 2019-06-06 DIAGNOSIS — N52.9 ERECTILE DYSFUNCTION, UNSPECIFIED ERECTILE DYSFUNCTION TYPE: ICD-10-CM

## 2019-06-06 DIAGNOSIS — I10 ESSENTIAL HYPERTENSION: Primary | ICD-10-CM

## 2019-06-06 DIAGNOSIS — R32 URINARY INCONTINENCE, UNSPECIFIED TYPE: ICD-10-CM

## 2019-06-06 PROCEDURE — G0009 ADMIN PNEUMOCOCCAL VACCINE: HCPCS

## 2019-06-06 PROCEDURE — 90732 PPSV23 VACC 2 YRS+ SUBQ/IM: CPT

## 2019-06-06 PROCEDURE — 99214 OFFICE O/P EST MOD 30 MIN: CPT | Performed by: PHYSICIAN ASSISTANT

## 2019-06-06 PROCEDURE — G0439 PPPS, SUBSEQ VISIT: HCPCS | Performed by: PHYSICIAN ASSISTANT

## 2019-06-18 ENCOUNTER — CONSULT (OUTPATIENT)
Dept: SURGERY | Facility: MEDICAL CENTER | Age: 79
End: 2019-06-18
Payer: MEDICARE

## 2019-06-18 VITALS
DIASTOLIC BLOOD PRESSURE: 100 MMHG | HEART RATE: 112 BPM | RESPIRATION RATE: 16 BRPM | SYSTOLIC BLOOD PRESSURE: 160 MMHG | BODY MASS INDEX: 26.74 KG/M2 | HEIGHT: 67 IN | WEIGHT: 170.4 LBS | TEMPERATURE: 96.6 F

## 2019-06-18 DIAGNOSIS — K40.90 LEFT INGUINAL HERNIA: ICD-10-CM

## 2019-06-18 DIAGNOSIS — K40.91 UNILATERAL RECURRENT INGUINAL HERNIA WITHOUT OBSTRUCTION OR GANGRENE: Primary | ICD-10-CM

## 2019-06-18 PROCEDURE — 99203 OFFICE O/P NEW LOW 30 MIN: CPT | Performed by: SURGERY

## 2019-06-18 PROCEDURE — 1123F ACP DISCUSS/DSCN MKR DOCD: CPT | Performed by: SURGERY

## 2019-06-19 ENCOUNTER — TELEPHONE (OUTPATIENT)
Dept: FAMILY MEDICINE CLINIC | Facility: CLINIC | Age: 79
End: 2019-06-19

## 2019-06-19 PROBLEM — K40.91 UNILATERAL RECURRENT INGUINAL HERNIA WITHOUT OBSTRUCTION OR GANGRENE: Status: ACTIVE | Noted: 2019-06-18

## 2019-06-24 RX ORDER — CEFAZOLIN SODIUM 1 G/50ML
1000 SOLUTION INTRAVENOUS ONCE
Status: CANCELLED | OUTPATIENT
Start: 2019-08-29 | End: 2019-08-15

## 2019-06-24 RX ORDER — SODIUM CHLORIDE, SODIUM LACTATE, POTASSIUM CHLORIDE, CALCIUM CHLORIDE 600; 310; 30; 20 MG/100ML; MG/100ML; MG/100ML; MG/100ML
125 INJECTION, SOLUTION INTRAVENOUS CONTINUOUS
Status: CANCELLED | OUTPATIENT
Start: 2019-08-29

## 2019-07-02 ENCOUNTER — HOSPITAL ENCOUNTER (OUTPATIENT)
Dept: CT IMAGING | Facility: HOSPITAL | Age: 79
Discharge: HOME | End: 2019-07-02
Attending: RADIOLOGY
Payer: MEDICARE

## 2019-07-02 DIAGNOSIS — R91.8 PULMONARY NODULES: ICD-10-CM

## 2019-07-02 DIAGNOSIS — C49.0 SARCOMA OF SCALP (HCC): ICD-10-CM

## 2019-07-02 PROCEDURE — 71250 CT THORAX DX C-: CPT

## 2019-07-12 ENCOUNTER — RADIATION ONCOLOGY FOLLOW-UP (OUTPATIENT)
Dept: RADIATION ONCOLOGY | Facility: CLINIC | Age: 79
End: 2019-07-12
Attending: RADIOLOGY
Payer: MEDICARE

## 2019-07-12 VITALS
DIASTOLIC BLOOD PRESSURE: 86 MMHG | HEIGHT: 68 IN | BODY MASS INDEX: 26.28 KG/M2 | TEMPERATURE: 98.7 F | RESPIRATION RATE: 18 BRPM | SYSTOLIC BLOOD PRESSURE: 144 MMHG | WEIGHT: 173.4 LBS | HEART RATE: 92 BPM | OXYGEN SATURATION: 97 %

## 2019-07-12 DIAGNOSIS — C49.0 SARCOMA OF SCALP (HCC): Primary | ICD-10-CM

## 2019-07-12 DIAGNOSIS — R91.8 PULMONARY NODULES: ICD-10-CM

## 2019-07-12 PROCEDURE — 99211 OFF/OP EST MAY X REQ PHY/QHP: CPT | Performed by: RADIOLOGY

## 2019-07-12 NOTE — PROGRESS NOTES
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   He is status post surgical resection with perineural invasion and focal suspicion for lymphovascular invasion and close margins of resection  Therefore, recommendations were made for postoperative radiation therapy that was completed on July 5, 2018  He was last seen by Rad Onc 1/17/19  He returns today for follow-up visit  Interval History:   1/3/19 Dr Javier Lemon  [place] biopsy right mastoid lesion done  - benign keratosis as per phone call to Dr Jadiel Crocker office     6/6/19- PCP monitoring PSA which has come down from 5 3 to 3 9, urinary incontinence, rec further evaluation by Urology     6/19/19 Dr Javier Lemon completed excision of malignant melanoma 2 8cm lesion of L deltoid  Specimen sent to pathology to ensure clear margins and patient will be contacted with results  Pt refused biopsy of L mid cheek lesion (patient was advised that it clinically resembles a basal cell carcinoma) Will observe      7/2/19 CT chest Stable pulmonary nodules   No new or suspicious lesions identified      7/3/19 Pt reports when he had stitches from Left deltoid removed he did get a biopsy of his L cheek, did not receive results yet     He denies any new lesions on scalp  He continues to use Ketoconazole 2% shampoo daily  He also uses Fluocinonide 0 05% solution to apply daily to scalp and ears and Alclometasone 0 05% cream to apply externally to affected areas twice daily given by Dr Javier Lemon  He is still using a vaseline moisturizer  Overall, he reports scalp skin feeling much better  He has no respiratory complaints  The left deltoid region has healed well  He remains active in cutting his lawn and always wears a hat    He is scheduled for left inguinal hernia repair August 28, 2019   His wife is recovering from a hip replacement in April 2019  They are planning a family trip with all their children and grandchildren over [place] to [place]  8/8/19 PCP f/u  8/28/19 hernia repair     Historical Information      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[facility] )    11/8/2017 Surgery     right vertex of scalp - s/p excision and full thickness graft for closure on 11/8/17 for atypical fibroxanthoma  1/29/2018 Biopsy     biopsy from the anterior aspect of the graft showing atypical epithelioid fibro histiocytic tumor, with lesion extending to the deep tissue edge  This is similar to prior biopsy done  Differential includes pleomorphic sarcoma versus an extreme atypical example of atypical fibro histiocytoma  3/2018 Initial Diagnosis     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[facility] )      3/28/2018 Surgery     excision of scalp sarcoma with full thickness graft - Dr Delphine Morris  Soft Tissue/Skin, Sarcoma of scalp, wide excision:  - Recurrent pleomorphic sarcoma, 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  See Note  -- FNCLCC Histologic Grade 3  (total score: 7 of 8)  * Tumor differentiation score: 3 of 3         * Mitotic count score: 3 of 3; > 19 mitoses/10 HPF (33 mitoses/10 HPF)  * Necrosis score: 1 of 2; present, < 50%  - Tumor extends into deep reticular dermis, subcutis and fascia  - Perineural invasion: Present; intraneural invasion identified (0 4 mm largest nerve diameter)  - Lymphovascular invasion: Focally suspicious  - Bone invasion: Not identified    - Central nervous system extension: Not identified  - Margins: uninvolved by sarcoma but close  -- Sarcoma  0 15 mm from nearest deep margin    - Additional Pathologic Findings: Changes consistent with prior surgical site  -- Focal ulceration with bacterial colonization, acute and chronic inflammation       -- Best representative tumor block: A5        5/29/2018 - 7/5/2018 Radiation     Plan ID Energy Fractions Dose per Fraction (cGy) Dose Correction (cGy) Total Dose Delivered (cGy) Elapsed Days   Vertex Scalp 9E 32 / 32 200 0 5,400 40                Past Medical History:   Diagnosis Date    Acute medial meniscus tear     Basal cell carcinoma (BCC)     Chronic bronchitis (HCC)     Gout     Hard of hearing     Hearing aid worn     ziyad    Herniated nucleus pulposus, L4-5     Hypertension     Macular degeneration     Melanoma ([address] )     left cheek- history    Rash     under left breast area- instructed to notify md if worsens    Reactive airway disease     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[address] )     Skin cancer     Squamous cell carcinoma     Wears glasses      Past Surgical History:   Procedure Laterality Date    COLONOSCOPY      HERNIA REPAIR      HYDROCELE EXCISION / REPAIR      MASTOID SURGERY Left     MOHS SURGERY      OTHER SURGICAL HISTORY      sarcoma scalp with skin graft    SCALP EXCISION N/A 3/28/2018    Procedure: SCALP SARCOMA EXCISION WITH FULL THICKNESS SKIN GRAFT;  Surgeon: Noreen Man MD;  Location: [facility];  Service: Plastics    TONSILLECTOMY AND ADENOIDECTOMY         Social History   Social History     Substance and Sexual Activity   Alcohol Use Yes    Comment: occasional beer     Social History     Substance and Sexual Activity   Drug Use No     Social History     Tobacco Use   Smoking Status Former Smoker    Types: Cigarettes    Last attempt to quit: 3/13/1988    Years since quittin 3   Smokeless Tobacco Never Used       Meds/Allergies     Current Outpatient Medications:     acetaminophen (TYLENOL) 500 mg tablet, Take 500-1,000 mg by mouth every 4 (four) hours as needed for mild pain, Disp: , Rfl:     fluocinonide (LIDEX) 0 05 % external solution, Apply topically daily, Disp: , Rfl:     ketoconazole (NIZORAL) 2 % shampoo, Apply 1 application topically daily as needed for dandruff, Disp: , Rfl:     lisinopril-hydrochlorothiazide (PRINZIDE,ZESTORETIC) 20-25 MG per tablet, TAKE 1 TABLET BY MOUTH  DAILY, Disp: 90 tablet, Rfl: 3    Multiple Vitamins-Minerals (PRESERVISION AREDS 2) CAPS, Take 1 capsule by mouth 2 (two) times a day, Disp: , Rfl:     alclomethasone (ACLOVATE) 0 05 % cream, Apply topically 2 (two) times a day, Disp: , Rfl:   Allergies   Allergen Reactions    Erythromycin Other (See Comments)     Thrush      Review of Systems   Constitutional: Negative  HENT: Positive for hearing loss (bilateral hearing aids)  Eyes: Positive for visual disturbance (macular degeneration, receives injections)  Respiratory: Negative  Cardiovascular: Negative  Gastrointestinal: Negative  Endocrine: Negative  Genitourinary: Negative  Nocturia x 3   Musculoskeletal: Negative  Skin: Positive for color change (Scalp skin graft well healed, lesion on L cheek) and wound (L deltoid surgical incision healing well, some dry area on scalp)  Allergic/Immunologic: Negative  Neurological: Negative  Hematological: Negative  Psychiatric/Behavioral: Negative         OBJECTIVE:   /86   Pulse 92   Temp 98 7 °F (37 1 °C)   Resp 18   Ht 5' 8" (1 727 m)   Wt 78 7 kg (173 lb 6 4 oz)   SpO2 97%   BMI 26 37 kg/m²   Pain Assessment:  0  ECOG/Zubrod/WHO: 1 - Symptomatic but completely ambulatory    Physical Exam   Constitutional: He is oriented to person, place, and time  He appears well-developed and well-nourished  No distress  HENT:   Head: Normocephalic and atraumatic  Mouth/Throat: No oropharyngeal exudate  Eyes: Pupils are equal, round, and reactive to light  Conjunctivae and EOM are normal  No scleral icterus  Neck: Normal range of motion  Neck supple  No tracheal deviation present  No thyromegaly present  Cardiovascular: Normal rate, regular rhythm and normal heart sounds  Pulmonary/Chest: Effort normal and breath sounds normal  No respiratory distress  He has no wheezes  He has no rales  He exhibits no tenderness  Abdominal: Soft   Bowel sounds are normal  He exhibits no distension and no mass  There is no tenderness  Musculoskeletal: Normal range of motion  He exhibits no edema or tenderness  Lymphadenopathy:     He has no cervical adenopathy  He has no axillary adenopathy  Right: No supraclavicular adenopathy present  Left: No supraclavicular adenopathy present  Neurological: He is alert and oriented to person, place, and time  No cranial nerve deficit  Coordination normal    Skin: Skin is warm and dry  No rash noted  He is not diaphoretic  No erythema  No pallor  Skin of the scalp reveals less dryness and alopecia confined to the area of the radiation field  There is well-healed skin graft without any evidence of recurrent nodules  There are some actinic keratosis and dryness of the skin within the treated field  There is no evidence of any bleeding or infection  He also has dryness of skin throughout the scalp outside the treated area that has improved  There is a well-healed incision along the left deltoid region without any underlying masses  Psychiatric: He has a normal mood and affect  His behavior is normal  Judgment and thought content normal    Nursing note and vitals reviewed  RESULTS    Lab Results: No results found for this or any previous visit (from the past 672 hour(s))  Imaging Studies:Ct Chest Wo Contrast    Result Date: 7/5/2019  Narrative: CT CHEST WITHOUT IV CONTRAST INDICATION:   C49 0: 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, face and neck R91 8: Other nonspecific abnormal finding of lung field  COMPARISON:  1/2/2019  TECHNIQUE: CT examination of the chest was performed without intravenous contrast   Axial, sagittal, and coronal 2D reformatted images were created from the source data and submitted for interpretation  Radiation dose length product (DLP) for this visit:  344 mGy-cm     This examination, like all CT scans performed in the [facility], was performed utilizing techniques to minimize radiation dose exposure, including the use of iterative reconstruction and automated exposure control  FINDINGS: LUNGS:  Stable linear 8 mm right lower lobe pulmonary nodule is noted series 3 image 80  Stable 4 mm subpleural left lower lobe pulmonary nodule is noted series 3 image 76  Lungs are otherwise clear  PLEURA:  Unremarkable  HEART/GREAT VESSELS:  Coronary artery calcification is present  MEDIASTINUM AND GALE:  Unremarkable  CHEST WALL AND LOWER NECK:   Unremarkable  VISUALIZED STRUCTURES IN THE UPPER ABDOMEN:  Unremarkable  OSSEOUS STRUCTURES:  No acute fracture or destructive osseous lesion  Impression: Stable pulmonary nodules  No new or suspicious lesions identified   Workstation performed: [id number]     Assessment/Plan:  Orders Placed This Encounter   Procedures    CT chest wo contrast        Raul Tomas is a [de-identified]y o  year old male with a history of multiple basal cell and squamous cell carcinomas of the skin over the last 15 years and he is status post multiple Mohs surgical procedures   He also has a history of melanoma twice once in the right flank 7 or 8 years ago and left lateral cheek 10 years ago  [facility] was diagnosed with a new superficial cutaneous sarcoma of the scalp in November 2017  [facility] is status post excision and full-thickness skin graft with Dr Skye Ferreira on November 8, 2017  [facility] had biopsy proven recurrence at the edge of the skin graft on January 29, 2018  [facility] then had excision with Dr Horner Ridge 28, 2018 along with a full-thickness skin graft   Final pathology confirmed recurrent pleomorphic sarcoma 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   This was a grade 3 tumor with disease extending into the deep reticular dermis, subcutis and fascia   There was perineural invasion identified and there was focal suspicion for lymphovascular invasion   The margins were negative but close at the deep margin that was 0 15 mm   He is at high risk for local recurrence of his 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  [facility] has a close margin with perineural invasion and lymphovascular invasion   We recommended postoperative radiation therapy that was completed on July 5, 2018  [facility] returns today for follow-up visit      He has no clinical evidence of any recurrent disease  Manjit Resides was some dryness of the scalp both within and outside of the treated areas that has improved   His graft sites are well healed and without any nodularity  [facility] will continue to apply moisturizer daily and continue to use the skin/scalp products given to him by Dr Gadiel Foley outlined above  [facility] previously had some nodules on chest CT that have been followed  His chest CT August 15, 2018 reveals most of the pulmonary nodules continue to decrease in size   There were no new or enlarging nodules  Evelene Pay were thought be infectious or inflammatory in nature    His chest CT from January 2, 2019 revealed stable bilateral lung nodule with no new nodules  His most recent chest CT from July 2, 2019 continues to reveal stable pulmonary nodules with no new or suspicious lesions  Results of chest CT were reviewed today with patient  He will return in January 2020 with a repeat chest CT for follow-up  [facility] recently had melanoma resected from the left deltoid with Dr Gadiel Foley which has healed well  He will continue follow-up with Dr Lucho Anton office   Cathryn Bella MD  7/12/2019,3:35 PM    Portions of the record may have been created with voice recognition software   Occasional wrong word or "sound a like" substitutions may have occurred due to the inherent limitations of voice recognition software   Read the chart carefully and recognize, using context, where substitutions have occurred

## 2019-07-12 NOTE — PROGRESS NOTES
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   He is status post surgical resection with perineural invasion and focal suspicion for lymphovascular invasion and close margins resection  Therefore, recommendations were made for postoperative radiation therapy that was completed on July 5, 2018  He was last seen by Rad Onc 1/17/19  He returns today for follow-up visit  1/3/19 Dr Poli Vu Cleaves biopsy right mastoid lesion done  - benign keratosis as per phone call to Dr Luz Maria Booker office    6/6/19- PCP monitoring PSA which has come down from 5 3 to 3 9, urinary incontinence, rec further evaluation by Urology    6/19/19 Dr Poli Scherer completed excision of malignant melanoma 2 8cm lesion of L deltoid  Specimen sent to pathology to ensure clear margins and patient will be contacted with results  Pt refused biopsy of L mid cheek lesion (patient was advised that it clinically resembles a basal cell carcinoma) Will observe     7/2/19 CT chest Stable pulmonary nodules  No new or suspicious lesions identified  7/3/19 Pt reports when he had stitches from L deltoid removed he did get a biopsy of his L cheek, did not receive results yet    8/8/19 PCP f/u  8/28/19 hernia repair                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[address] )    11/8/2017 Surgery     right vertex of scalp - s/p excision and full thickness graft for closure on 11/8/17 for atypical fibroxanthoma  1/29/2018 Biopsy     biopsy from the anterior aspect of the graft showing atypical epithelioid fibro histiocytic tumor, with lesion extending to the deep tissue edge  This is similar to prior biopsy done  Differential includes pleomorphic sarcoma versus an extreme atypical example of atypical fibro histiocytoma        3/2018 Initial Diagnosis     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[address] )      3/28/2018 Surgery     excision of scalp sarcoma with full thickness graft - Dr Ervin Organ  Soft Tissue/Skin, Sarcoma of scalp, wide excision:  - Recurrent pleomorphic sarcoma, 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  See Note  -- FNCLCC Histologic Grade 3  (total score: 7 of 8)  * Tumor differentiation score: 3 of 3         * Mitotic count score: 3 of 3; > 19 mitoses/10 HPF (33 mitoses/10 HPF)  * Necrosis score: 1 of 2; present, < 50%  - Tumor extends into deep reticular dermis, subcutis and fascia  - Perineural invasion: Present; intraneural invasion identified (0 4 mm largest nerve diameter)  - Lymphovascular invasion: Focally suspicious  - Bone invasion: Not identified    - Central nervous system extension: Not identified  - Margins: uninvolved by sarcoma but close  -- Sarcoma  0 15 mm from nearest deep margin    - Additional Pathologic Findings: Changes consistent with prior surgical site  -- Focal ulceration with bacterial colonization, acute and chronic inflammation       -- Best representative tumor block: A5        5/29/2018 - 7/5/2018 Radiation     Plan ID Energy Fractions Dose per Fraction (cGy) Dose Correction (cGy) Total Dose Delivered (cGy) Elapsed Days   Vertex Scalp 9E 27 / 27 200 0 5,400 37                Clinical Trial: no      Health Maintenance   Topic Date Due    BMI: Followup Plan  06/03/1957    HEPATITIS B VACCINES (1 of 3 - Risk 3-dose series) 06/03/1958    INFLUENZA VACCINE  07/01/2019    DTaP,Tdap,and Td Vaccines (1 - Tdap) 06/06/2020 (Originally 6/3/1960)    Fall Risk  06/06/2020    Depression Screening PHQ  06/06/2020    Medicare Annual Wellness Visit (AWV)  06/06/2020    BMI: Adult  06/18/2020    Pneumococcal Vaccine: 65+ Years  Completed    Pneumococcal Vaccine: Pediatrics (0 to 5 Years) and At-Risk Patients (6 to 59 Years)  Aged Out       Patient Active Problem List   Diagnosis    Actinic keratosis    Chronic kidney disease    Gout    Hyperlipidemia    Hypertension    Irritable bowel syndrome    Macular degeneration    Urinary incontinence, post-void dribbling    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HCC)    Basal cell carcinoma of skin of other parts of face    Pulmonary nodules    Left inguinal hernia    Unilateral recurrent inguinal hernia without obstruction or gangrene     Past Medical History:   Diagnosis Date    Acute medial meniscus tear     Basal cell carcinoma (BCC)     Chronic bronchitis (HCC)     Gout     Hard of hearing     Hearing aid worn     ziyad    Herniated nucleus pulposus, L4-5     Hypertension     Macular degeneration     Melanoma ([address] )     left cheek- history    Rash     under left breast area- instructed to notify md if worsens    Reactive airway disease     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[address] )     Skin cancer     Squamous cell carcinoma     Wears glasses      Past Surgical History:   Procedure Laterality Date    COLONOSCOPY      HERNIA REPAIR      HYDROCELE EXCISION / REPAIR      MASTOID SURGERY Left     MOHS SURGERY      OTHER SURGICAL HISTORY      sarcoma scalp with skin graft    SCALP EXCISION N/A 3/28/2018    Procedure: SCALP SARCOMA EXCISION WITH FULL THICKNESS SKIN GRAFT;  Surgeon: Priscilla Esposito MD;  Location: [facility];  Service: Plastics    TONSILLECTOMY AND ADENOIDECTOMY       Family History   Problem Relation Age of Onset    Colon cancer Father     Heart failure Father     Heart disease Mother     Stroke Mother      Social History     Socioeconomic History    Marital status: /Civil Union     Spouse name: Not on file    Number of children: Not on file    Years of education: Not on file    Highest education level: Not on file   Occupational History    Not on file   Social Needs    Financial resource strain: Not on file    Food insecurity:     Worry: Not on file     Inability: Not on file    Transportation needs:     Medical: Not on file     Non-medical: Not on file   Tobacco Use    Smoking status: Former Smoker     Types: Cigarettes     Last attempt to quit: 3/13/1988 Years since quittin 3    Smokeless tobacco: Never Used   Substance and Sexual Activity    Alcohol use: Yes     Comment: occasional beer    Drug use: No    Sexual activity: Not on file   Lifestyle    Physical activity:     Days per week: Not on file     Minutes per session: Not on file    Stress: Not on file   Relationships    Social connections:     Talks on phone: Not on file     Gets together: Not on file     Attends Confucianist service: Not on file     Active member of club or organization: Not on file     Attends meetings of clubs or organizations: Not on file     Relationship status: Not on file    Intimate partner violence:     Fear of current or ex partner: Not on file     Emotionally abused: Not on file     Physically abused: Not on file     Forced sexual activity: Not on file   Other Topics Concern    Not on file   Social History Narrative    Not on file       Current Outpatient Medications:     acetaminophen (TYLENOL) 500 mg tablet, Take 500-1,000 mg by mouth every 4 (four) hours as needed for mild pain, Disp: , Rfl:     fluocinonide (LIDEX) 0 05 % external solution, Apply topically daily, Disp: , Rfl:     ketoconazole (NIZORAL) 2 % shampoo, Apply 1 application topically daily as needed for dandruff, Disp: , Rfl:     lisinopril-hydrochlorothiazide (PRINZIDE,ZESTORETIC) 20-25 MG per tablet, TAKE 1 TABLET BY MOUTH  DAILY, Disp: 90 tablet, Rfl: 3    Multiple Vitamins-Minerals (PRESERVISION AREDS 2) CAPS, Take 1 capsule by mouth 2 (two) times a day, Disp: , Rfl:     alclomethasone (ACLOVATE) 0 05 % cream, Apply topically 2 (two) times a day, Disp: , Rfl:   Allergies   Allergen Reactions    Erythromycin Other (See Comments)     Thrush        Review of Systems:  Review of Systems   Constitutional: Negative  HENT: Positive for hearing loss (bilateral hearing aids)  Eyes: Positive for visual disturbance (macular degeneration, receives injections)  Respiratory: Negative  Cardiovascular: Negative  Gastrointestinal: Negative  Endocrine: Negative  Genitourinary: Negative  Nocturia x 3   Musculoskeletal: Negative  Skin: Positive for color change (Scalp skin graft well healed, lesion on L cheek) and wound (L deltoid surgical incision healing well, some dry area on scalp)  Allergic/Immunologic: Negative  Neurological: Negative  Hematological: Negative  Psychiatric/Behavioral: Negative  Vitals:    07/12/19 1450   BP: 144/86   Pulse: 92   Resp: 18   Temp: 98 7 °F (37 1 °C)   SpO2: 97%   Weight: 78 7 kg (173 lb 6 4 oz)   Height: 5' 8" (1 727 m)       Pain Score: 0-No pain    Imaging:Ct Chest Wo Contrast    Result Date: 7/5/2019  Narrative: CT CHEST WITHOUT IV CONTRAST INDICATION:   C49 0: 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, face and neck R91 8: Other nonspecific abnormal finding of lung field  COMPARISON:  1/2/2019  TECHNIQUE: CT examination of the chest was performed without intravenous contrast   Axial, sagittal, and coronal 2D reformatted images were created from the source data and submitted for interpretation  Radiation dose length product (DLP) for this visit:  344 mGy-cm   This examination, like all CT scans performed in the [facility], was performed utilizing techniques to minimize radiation dose exposure, including the use of iterative reconstruction and automated exposure control  FINDINGS: LUNGS:  Stable linear 8 mm right lower lobe pulmonary nodule is noted series 3 image 80  Stable 4 mm subpleural left lower lobe pulmonary nodule is noted series 3 image 76  Lungs are otherwise clear  PLEURA:  Unremarkable  HEART/GREAT VESSELS:  Coronary artery calcification is present  MEDIASTINUM AND GALE:  Unremarkable  CHEST WALL AND LOWER NECK:   Unremarkable  VISUALIZED STRUCTURES IN THE UPPER ABDOMEN:  Unremarkable  OSSEOUS STRUCTURES:  No acute fracture or destructive osseous lesion       Impression: Stable pulmonary nodules  No new or suspicious lesions identified   Workstation performed: [id number]

## 2019-08-02 ENCOUNTER — TELEPHONE (OUTPATIENT)
Dept: FAMILY MEDICINE CLINIC | Facility: CLINIC | Age: 79
End: 2019-08-02

## 2019-08-02 ENCOUNTER — TRANSCRIBE ORDERS (OUTPATIENT)
Dept: LAB | Facility: CLINIC | Age: 79
End: 2019-08-02

## 2019-08-02 DIAGNOSIS — Z01.818 PREOPERATIVE EVALUATION OF A MEDICAL CONDITION TO RULE OUT SURGICAL CONTRAINDICATIONS (TAR REQUIRED): Primary | ICD-10-CM

## 2019-08-02 NOTE — TELEPHONE ENCOUNTER
Patient just had full labs completed 2 months ago so I will only order what is necessary for the surgery at this time

## 2019-08-02 NOTE — TELEPHONE ENCOUNTER
SHERRI WOODS, PATIENT IS COMING IN ON Thursday FOR A PRE OP FOR YOU FOR HERNIA SURGERY  HE WENT TO [place] THIS MORNING FOR B/W AND THERE IS NONE IN THERE SO HE CAME HERE AND ASKED  JOSE ADVISED YOU TO PLEASE DECIDE WHAT B/W YOU'D LIKE FOR PT FOR PRE-OP THEN PLEASE CALL PT WHEN ORDERS ARE IN  HE WOULD LIKE TO GET THE B/W Monday  THANK YOU

## 2019-08-05 ENCOUNTER — APPOINTMENT (OUTPATIENT)
Dept: LAB | Facility: CLINIC | Age: 79
End: 2019-08-05
Payer: MEDICARE

## 2019-08-05 DIAGNOSIS — Z01.818 PREOPERATIVE EVALUATION OF A MEDICAL CONDITION TO RULE OUT SURGICAL CONTRAINDICATIONS (TAR REQUIRED): ICD-10-CM

## 2019-08-05 LAB
ANION GAP SERPL CALCULATED.3IONS-SCNC: 5 MMOL/L (ref 4–13)
BASOPHILS # BLD AUTO: 0.04 THOUSANDS/ΜL (ref 0–0.1)
BASOPHILS NFR BLD AUTO: 1 % (ref 0–1)
BUN SERPL-MCNC: 26 MG/DL (ref 5–25)
CALCIUM SERPL-MCNC: 9.6 MG/DL (ref 8.3–10.1)
CHLORIDE SERPL-SCNC: 108 MMOL/L (ref 100–108)
CO2 SERPL-SCNC: 30 MMOL/L (ref 21–32)
CREAT SERPL-MCNC: 1.47 MG/DL (ref 0.6–1.3)
EOSINOPHIL # BLD AUTO: 0.19 THOUSAND/ΜL (ref 0–0.61)
EOSINOPHIL NFR BLD AUTO: 3 % (ref 0–6)
ERYTHROCYTE [DISTWIDTH] IN BLOOD BY AUTOMATED COUNT: 12.8 % (ref 11.6–15.1)
GFR SERPL CREATININE-BSD FRML MDRD: 44 ML/MIN/1.73SQ M
GLUCOSE P FAST SERPL-MCNC: 96 MG/DL (ref 65–99)
HCT VFR BLD AUTO: 43 % (ref 36.5–49.3)
HGB BLD-MCNC: 14.2 G/DL (ref 12–17)
IMM GRANULOCYTES # BLD AUTO: 0.02 THOUSAND/UL (ref 0–0.2)
IMM GRANULOCYTES NFR BLD AUTO: 0 % (ref 0–2)
INR PPP: 1.05 (ref 0.84–1.19)
LYMPHOCYTES # BLD AUTO: 1.57 THOUSANDS/ΜL (ref 0.6–4.47)
LYMPHOCYTES NFR BLD AUTO: 28 % (ref 14–44)
MCH RBC QN AUTO: 30.5 PG (ref 26.8–34.3)
MCHC RBC AUTO-ENTMCNC: 33 G/DL (ref 31.4–37.4)
MCV RBC AUTO: 92 FL (ref 82–98)
MONOCYTES # BLD AUTO: 0.37 THOUSAND/ΜL (ref 0.17–1.22)
MONOCYTES NFR BLD AUTO: 7 % (ref 4–12)
NEUTROPHILS # BLD AUTO: 3.38 THOUSANDS/ΜL (ref 1.85–7.62)
NEUTS SEG NFR BLD AUTO: 61 % (ref 43–75)
NRBC BLD AUTO-RTO: 0 /100 WBCS
PLATELET # BLD AUTO: 188 THOUSANDS/UL (ref 149–390)
PMV BLD AUTO: 11.1 FL (ref 8.9–12.7)
POTASSIUM SERPL-SCNC: 3.9 MMOL/L (ref 3.5–5.3)
PROTHROMBIN TIME: 13.3 SECONDS (ref 11.6–14.5)
RBC # BLD AUTO: 4.66 MILLION/UL (ref 3.88–5.62)
SODIUM SERPL-SCNC: 143 MMOL/L (ref 136–145)
WBC # BLD AUTO: 5.57 THOUSAND/UL (ref 4.31–10.16)

## 2019-08-05 PROCEDURE — 80048 BASIC METABOLIC PNL TOTAL CA: CPT

## 2019-08-05 PROCEDURE — 85610 PROTHROMBIN TIME: CPT

## 2019-08-05 PROCEDURE — 36415 COLL VENOUS BLD VENIPUNCTURE: CPT

## 2019-08-05 PROCEDURE — 85025 COMPLETE CBC W/AUTO DIFF WBC: CPT

## 2019-08-08 ENCOUNTER — OFFICE VISIT (OUTPATIENT)
Dept: FAMILY MEDICINE CLINIC | Facility: CLINIC | Age: 79
End: 2019-08-08
Payer: MEDICARE

## 2019-08-08 VITALS
DIASTOLIC BLOOD PRESSURE: 88 MMHG | SYSTOLIC BLOOD PRESSURE: 138 MMHG | HEIGHT: 67 IN | WEIGHT: 174.4 LBS | BODY MASS INDEX: 27.37 KG/M2 | HEART RATE: 80 BPM

## 2019-08-08 DIAGNOSIS — N18.9 CHRONIC KIDNEY DISEASE, UNSPECIFIED CKD STAGE: ICD-10-CM

## 2019-08-08 DIAGNOSIS — I10 ESSENTIAL HYPERTENSION: ICD-10-CM

## 2019-08-08 DIAGNOSIS — Z01.818 PRE-OP EXAM: Primary | ICD-10-CM

## 2019-08-08 DIAGNOSIS — E78.5 HYPERLIPIDEMIA, UNSPECIFIED HYPERLIPIDEMIA TYPE: ICD-10-CM

## 2019-08-08 DIAGNOSIS — K40.91 UNILATERAL RECURRENT INGUINAL HERNIA WITHOUT OBSTRUCTION OR GANGRENE: ICD-10-CM

## 2019-08-08 PROCEDURE — 93000 ELECTROCARDIOGRAM COMPLETE: CPT | Performed by: PHYSICIAN ASSISTANT

## 2019-08-08 PROCEDURE — 99214 OFFICE O/P EST MOD 30 MIN: CPT | Performed by: PHYSICIAN ASSISTANT

## 2019-08-08 NOTE — PROGRESS NOTES
BMI Counseling: Body mass index is 27 31 kg/m²  Discussed the patient's BMI with him  The BMI is above average  BMI counseling and education was provided to the patient  Nutrition recommendations include reducing portion sizes  Assessment and Plan:  Patient Instructions    1  Preop consultation -patient is medically stable at this time for planned procedure scheduled on the 8/29/2019 with Dr Carolina Trujillo  EKG was performed in the office and without acute changes  Comparison from March of 2018 was stable  Blood work was reviewed with the patient and stable  Recent CT of the chest showing stable pulmonary nodules  2  Left inguinal hernia-stable for surgery as planned  3   Chronic kidney disease -presently stable  Creatinine is at baseline at 1 47   4   Hypertension -elevated in the office initially but patient does keep home readings of averaging 130/85  He continues lisinopril with hydrochlorothiazide  5   Hyperlipidemia, stable no medication changes  Problem List Items Addressed This Visit        Cardiovascular and Mediastinum    Hypertension       Genitourinary    Chronic kidney disease       Other    Hyperlipidemia    Unilateral recurrent inguinal hernia without obstruction or gangrene      Other Visit Diagnoses     Pre-op exam    -  Primary    Relevant Orders    POCT ECG (Completed)                 Diagnoses and all orders for this visit:    Pre-op exam  -     POCT ECG    Unilateral recurrent inguinal hernia without obstruction or gangrene    Chronic kidney disease, unspecified CKD stage    Hyperlipidemia, unspecified hyperlipidemia type    Essential hypertension              Subjective:      Patient ID: Luna Born is a [de-identified] y o  male      CC:    Chief Complaint   Patient presents with    Pre-op Exam     pt is having  left sisded hernia repaired on 8/29~cd       HPI:    HPI:  This is an 25-year-old gentleman that presents to the office for preop consultation for left inguinal surgery to be performed on the 8/29/2019 with Dr Jimbo Steve  He is here to review preop blood work that he had drawn last week  He also is here for EKG  He does have a history of chronic kidney disease but his creatinine has been stable at baseline between 1 4 in 1 5  He does have a history of hypertension and tends to get worked up at the office visits but at home his readings have been around 130/80  He does continue lisinopril with hydrochlorothiazide  Patient also continues to follow with dermatology regularly and has had multiple lesions removed from his skin including squamous cell, basal cell, and melanoma cancers  Melanoma has not been metastatic and was only superficial stage I  He has had a recent CT of the chest showing stable pulmonary nodules  The following portions of the patient's history were reviewed and updated as appropriate: allergies, current medications, past family history, past medical history, past social history, past surgical history and problem list       Review of Systems   Constitutional: Negative for chills, fatigue and fever  HENT: Negative for congestion, ear pain and sinus pressure  Eyes: Negative for visual disturbance  Respiratory: Negative for cough, chest tightness and shortness of breath  Cardiovascular: Negative for chest pain and palpitations  Gastrointestinal: Negative for diarrhea, nausea and vomiting  Endocrine: Negative for polyuria  Genitourinary: Negative for dysuria and frequency  Musculoskeletal: Negative for arthralgias and myalgias  Skin: Negative for pallor and rash  Neurological: Negative for dizziness, weakness, light-headedness, numbness and headaches  Psychiatric/Behavioral: Negative for agitation, behavioral problems and sleep disturbance  All other systems reviewed and are negative          Data to review:       Objective:    Vitals:    08/08/19 0949 08/08/19 1016   BP: 162/90 138/88   BP Location: Left arm    Patient Position: Sitting    Cuff Size: Standard    Pulse: 80    Weight: 79 1 kg (174 lb 6 4 oz)    Height: 5' 7" (1 702 m)         Physical Exam   Constitutional: He is oriented to person, place, and time  He appears well-developed and well-nourished  No distress  HENT:   Head: Normocephalic and atraumatic  Right Ear: External ear normal    Left Ear: External ear normal    Nose: Nose normal    Mouth/Throat: Oropharynx is clear and moist  No oropharyngeal exudate  Eyes: Pupils are equal, round, and reactive to light  Conjunctivae and EOM are normal    Neck: Normal range of motion  Neck supple  No tracheal deviation present  No thyromegaly present  Cardiovascular: Normal rate, regular rhythm and normal heart sounds  Exam reveals no friction rub  No murmur heard  Pulmonary/Chest: Effort normal and breath sounds normal  No respiratory distress  He has no wheezes  He has no rales  Abdominal: Soft  Bowel sounds are normal  He exhibits no distension  There is no tenderness  There is no rebound and no guarding  Musculoskeletal: Normal range of motion  He exhibits no edema or tenderness  Lymphadenopathy:     He has no cervical adenopathy  Neurological: He is alert and oriented to person, place, and time  No cranial nerve deficit  Coordination normal    Skin: Skin is warm and dry  No rash noted  No erythema  Psychiatric: He has a normal mood and affect  His behavior is normal  Thought content normal    Nursing note and vitals reviewed

## 2019-08-14 ENCOUNTER — ANESTHESIA EVENT (OUTPATIENT)
Dept: PERIOP | Facility: HOSPITAL | Age: 79
End: 2019-08-14
Payer: MEDICARE

## 2019-08-14 ENCOUNTER — OFFICE VISIT (OUTPATIENT)
Dept: PODIATRY | Facility: CLINIC | Age: 79
End: 2019-08-14
Payer: MEDICARE

## 2019-08-14 VITALS
HEART RATE: 78 BPM | HEIGHT: 67 IN | SYSTOLIC BLOOD PRESSURE: 130 MMHG | DIASTOLIC BLOOD PRESSURE: 80 MMHG | BODY MASS INDEX: 27.4 KG/M2 | WEIGHT: 174.6 LBS

## 2019-08-14 DIAGNOSIS — L60.3 NAIL DYSTROPHY: Primary | ICD-10-CM

## 2019-08-14 PROCEDURE — 99202 OFFICE O/P NEW SF 15 MIN: CPT | Performed by: PODIATRIST

## 2019-08-14 RX ORDER — SODIUM CHLORIDE 9 MG/ML
125 INJECTION, SOLUTION INTRAVENOUS CONTINUOUS
Status: CANCELLED | OUTPATIENT
Start: 2019-08-29

## 2019-08-14 NOTE — PROGRESS NOTES
Assessment/Plan:    Treatment consisted of nail trimming  Vascular status in sensorium is intact  No problem-specific Assessment & Plan notes found for this encounter  Diagnoses and all orders for this visit:    Nail dystrophy          Subjective:      Patient ID: Pako Ivy is a [de-identified] y o  male  HPI     Patient, an [de-identified]year old male with macular degeneration presents with extremely long toenails that he is unable to cut  He cannot see the nails to affectively trim them  No other foot disorder noted  The following portions of the patient's history were reviewed and updated as appropriate: allergies, current medications, past family history, past medical history, past social history, past surgical history and problem list     Review of Systems   HENT: Positive for hearing loss  Eyes: Positive for visual disturbance  Macular degeneration   Cardiovascular: Negative  Gastrointestinal: Negative  Musculoskeletal: Negative  Objective:      /80   Pulse 78   Ht 5' 7" (1 702 m)   Wt 79 2 kg (174 lb 9 6 oz)   BMI 27 35 kg/m²          Physical Exam   Constitutional: He is oriented to person, place, and time  He appears well-developed and well-nourished  Cardiovascular: Regular rhythm and intact distal pulses  Musculoskeletal: Normal range of motion  He exhibits no deformity  Neurological: He is alert and oriented to person, place, and time  No sensory deficit  Skin:   All toenails are extremely long

## 2019-08-15 ENCOUNTER — APPOINTMENT (OUTPATIENT)
Dept: PREADMISSION TESTING | Facility: HOSPITAL | Age: 79
End: 2019-08-15
Payer: MEDICARE

## 2019-08-15 NOTE — PRE-PROCEDURE INSTRUCTIONS
Pre-Surgery Instructions:   Medication Instructions    acetaminophen (TYLENOL) 500 mg tablet Instructed patient per Anesthesia Guidelines   alclomethasone (ACLOVATE) 0 05 % cream Instructed patient per Anesthesia Guidelines   fluocinonide (LIDEX) 0 05 % external solution Instructed patient per Anesthesia Guidelines   ketoconazole (NIZORAL) 2 % shampoo Instructed patient per Anesthesia Guidelines   lisinopril-hydrochlorothiazide (PRINZIDE,ZESTORETIC) 20-25 MG per tablet Instructed patient per Anesthesia Guidelines   Multiple Vitamins-Minerals (PRESERVISION AREDS 2) CAPS Instructed patient per Anesthesia Guidelines  Seen by Dr Caty Sutton  Instructed has no medications to be taken the morning of surgery  No aspirin NSAIDs, vitamins, or supplements 1 week before surgery

## 2019-08-15 NOTE — ANESTHESIA PREPROCEDURE EVALUATION
Review of Systems/Medical History  Patient summary reviewed  Chart reviewed  History of anesthetic complications PONV    Cardiovascular  EKG reviewed, Hyperlipidemia, Hypertension ,    Pulmonary  Smoker ex-smoker  Cumulative Pack Years: 8,   Comment: Nodules--stable     GI/Hepatic      Comment: IBS     Kidney disease CKD,        Endo/Other  Negative endo/other ROS      GYN       Hematology  Negative hematology ROS      Musculoskeletal  Negative musculoskeletal ROS Gout,   Comment: Hearing aids in both ears    Macular degeneration      Neurology    Visual impairment (MACULAR DEGENERATION),    Psychology   Negative psychology ROS              Physical Exam    Airway    Mallampati score: III  TM Distance: >3 FB  Neck ROM: full     Dental   No notable dental hx     Cardiovascular  Rhythm: regular, Rate: normal, Cardiovascular exam normal    Pulmonary  Pulmonary exam normal Breath sounds clear to auscultation,     Other Findings        Anesthesia Plan  ASA Score- 2     Anesthesia Type- general with ASA Monitors  Additional Monitors:   Airway Plan:         Plan Factors- Patient instructed to abstain from smoking on day of procedure  Patient did not smoke on day of surgery  Induction- intravenous  Postoperative Plan-     Informed Consent- Anesthetic plan and risks discussed with patient and spouse

## 2019-08-28 ENCOUNTER — OFFICE VISIT (OUTPATIENT)
Dept: PODIATRY | Facility: CLINIC | Age: 79
End: 2019-08-28
Payer: MEDICARE

## 2019-08-28 VITALS
HEART RATE: 60 BPM | BODY MASS INDEX: 27.56 KG/M2 | SYSTOLIC BLOOD PRESSURE: 123 MMHG | WEIGHT: 175.6 LBS | HEIGHT: 67 IN | DIASTOLIC BLOOD PRESSURE: 80 MMHG

## 2019-08-28 DIAGNOSIS — M79.674 PAIN IN TOE OF RIGHT FOOT: ICD-10-CM

## 2019-08-28 DIAGNOSIS — L60.0 INGROWN TOENAIL: Primary | ICD-10-CM

## 2019-08-28 PROCEDURE — 1123F ACP DISCUSS/DSCN MKR DOCD: CPT | Performed by: PODIATRIST

## 2019-08-28 PROCEDURE — 99212 OFFICE O/P EST SF 10 MIN: CPT | Performed by: PODIATRIST

## 2019-08-28 NOTE — PROGRESS NOTES
Patient presents for assessment of his right great toe  For the past 5 days he has had pain along the medial nail border  He is concerned that he may have an ingrown toenail infection  Patient is scheduled to have hernia repair tomorrow at the hospital   On exam, pedal pulses are within normal limits  Pain with palpation medial nail border right great toe  There is no drainage  Superficial avulsion was performed  Patient will contact me if he still has pain once he gets out of the hospital and a partial avulsion would be recommended

## 2019-08-29 ENCOUNTER — ANESTHESIA (OUTPATIENT)
Dept: PERIOP | Facility: HOSPITAL | Age: 79
End: 2019-08-29
Payer: MEDICARE

## 2019-08-29 ENCOUNTER — HOSPITAL ENCOUNTER (OUTPATIENT)
Facility: HOSPITAL | Age: 79
Setting detail: OUTPATIENT SURGERY
Discharge: HOME | End: 2019-08-29
Attending: SURGERY | Admitting: SURGERY
Payer: MEDICARE

## 2019-08-29 VITALS
DIASTOLIC BLOOD PRESSURE: 76 MMHG | TEMPERATURE: 98.5 F | OXYGEN SATURATION: 96 % | RESPIRATION RATE: 18 BRPM | WEIGHT: 174 LBS | HEART RATE: 81 BPM | HEIGHT: 67 IN | SYSTOLIC BLOOD PRESSURE: 161 MMHG | BODY MASS INDEX: 27.31 KG/M2

## 2019-08-29 DIAGNOSIS — K40.90 LEFT INGUINAL HERNIA: Primary | ICD-10-CM

## 2019-08-29 PROBLEM — K40.91 UNILATERAL RECURRENT INGUINAL HERNIA WITHOUT OBSTRUCTION OR GANGRENE: Status: RESOLVED | Noted: 2019-06-18 | Resolved: 2019-08-29

## 2019-08-29 PROCEDURE — NC001 PR NO CHARGE: Performed by: SURGERY

## 2019-08-29 PROCEDURE — C1781 MESH (IMPLANTABLE): HCPCS | Performed by: SURGERY

## 2019-08-29 PROCEDURE — 49651 LAP ING HERNIA REPAIR RECUR: CPT | Performed by: SURGERY

## 2019-08-29 DEVICE — MESH LAP BILATERAL ANTM PROGRIP: Type: IMPLANTABLE DEVICE | Site: INGUINAL | Status: FUNCTIONAL

## 2019-08-29 RX ORDER — HYDROCODONE BITARTRATE AND ACETAMINOPHEN 5; 325 MG/1; MG/1
1 TABLET ORAL EVERY 4 HOURS PRN
Qty: 16 TABLET | Refills: 0 | Status: SHIPPED | OUTPATIENT
Start: 2019-08-29 | End: 2019-09-08

## 2019-08-29 RX ORDER — PROPOFOL 10 MG/ML
INJECTION, EMULSION INTRAVENOUS AS NEEDED
Status: DISCONTINUED | OUTPATIENT
Start: 2019-08-29 | End: 2019-08-29 | Stop reason: SURG

## 2019-08-29 RX ORDER — BUPIVACAINE HYDROCHLORIDE 2.5 MG/ML
INJECTION, SOLUTION EPIDURAL; INFILTRATION; INTRACAUDAL AS NEEDED
Status: DISCONTINUED | OUTPATIENT
Start: 2019-08-29 | End: 2019-08-29 | Stop reason: HOSPADM

## 2019-08-29 RX ORDER — HYDROCODONE BITARTRATE AND ACETAMINOPHEN 5; 325 MG/1; MG/1
1 TABLET ORAL EVERY 4 HOURS PRN
Status: DISCONTINUED | OUTPATIENT
Start: 2019-08-29 | End: 2019-08-29 | Stop reason: HOSPADM

## 2019-08-29 RX ORDER — FENTANYL CITRATE/PF 50 MCG/ML
25 SYRINGE (ML) INJECTION
Status: COMPLETED | OUTPATIENT
Start: 2019-08-29 | End: 2019-08-29

## 2019-08-29 RX ORDER — GLYCOPYRROLATE 0.2 MG/ML
INJECTION INTRAMUSCULAR; INTRAVENOUS AS NEEDED
Status: DISCONTINUED | OUTPATIENT
Start: 2019-08-29 | End: 2019-08-29 | Stop reason: SURG

## 2019-08-29 RX ORDER — ONDANSETRON 2 MG/ML
4 INJECTION INTRAMUSCULAR; INTRAVENOUS ONCE AS NEEDED
Status: DISCONTINUED | OUTPATIENT
Start: 2019-08-29 | End: 2019-08-29 | Stop reason: HOSPADM

## 2019-08-29 RX ORDER — SODIUM CHLORIDE, SODIUM LACTATE, POTASSIUM CHLORIDE, CALCIUM CHLORIDE 600; 310; 30; 20 MG/100ML; MG/100ML; MG/100ML; MG/100ML
125 INJECTION, SOLUTION INTRAVENOUS CONTINUOUS
Status: DISCONTINUED | OUTPATIENT
Start: 2019-08-29 | End: 2019-08-29 | Stop reason: HOSPADM

## 2019-08-29 RX ORDER — NEOSTIGMINE METHYLSULFATE 1 MG/ML
INJECTION INTRAVENOUS AS NEEDED
Status: DISCONTINUED | OUTPATIENT
Start: 2019-08-29 | End: 2019-08-29 | Stop reason: SURG

## 2019-08-29 RX ORDER — MIDAZOLAM HYDROCHLORIDE 1 MG/ML
INJECTION INTRAMUSCULAR; INTRAVENOUS AS NEEDED
Status: DISCONTINUED | OUTPATIENT
Start: 2019-08-29 | End: 2019-08-29 | Stop reason: SURG

## 2019-08-29 RX ORDER — DEXAMETHASONE SODIUM PHOSPHATE 10 MG/ML
INJECTION, SOLUTION INTRAMUSCULAR; INTRAVENOUS AS NEEDED
Status: DISCONTINUED | OUTPATIENT
Start: 2019-08-29 | End: 2019-08-29 | Stop reason: SURG

## 2019-08-29 RX ORDER — FENTANYL CITRATE 50 UG/ML
INJECTION, SOLUTION INTRAMUSCULAR; INTRAVENOUS AS NEEDED
Status: DISCONTINUED | OUTPATIENT
Start: 2019-08-29 | End: 2019-08-29 | Stop reason: SURG

## 2019-08-29 RX ORDER — ROCURONIUM BROMIDE 10 MG/ML
INJECTION, SOLUTION INTRAVENOUS AS NEEDED
Status: DISCONTINUED | OUTPATIENT
Start: 2019-08-29 | End: 2019-08-29 | Stop reason: SURG

## 2019-08-29 RX ORDER — SODIUM CHLORIDE 9 MG/ML
125 INJECTION, SOLUTION INTRAVENOUS CONTINUOUS
Status: DISCONTINUED | OUTPATIENT
Start: 2019-08-29 | End: 2019-08-29 | Stop reason: HOSPADM

## 2019-08-29 RX ORDER — CEFAZOLIN SODIUM 1 G/50ML
1000 SOLUTION INTRAVENOUS ONCE
Status: COMPLETED | OUTPATIENT
Start: 2019-08-29 | End: 2019-08-29

## 2019-08-29 RX ADMIN — FENTANYL CITRATE 50 MCG: 50 INJECTION, SOLUTION INTRAMUSCULAR; INTRAVENOUS at 15:29

## 2019-08-29 RX ADMIN — GLYCOPYRROLATE 0.4 MG: 0.2 INJECTION INTRAMUSCULAR; INTRAVENOUS at 15:30

## 2019-08-29 RX ADMIN — FENTANYL CITRATE 25 MCG: 50 INJECTION INTRAMUSCULAR; INTRAVENOUS at 16:27

## 2019-08-29 RX ADMIN — NEOSTIGMINE METHYLSULFATE 3 MG: 1 INJECTION, SOLUTION INTRAVENOUS at 15:30

## 2019-08-29 RX ADMIN — PROPOFOL 200 MG: 10 INJECTION, EMULSION INTRAVENOUS at 14:24

## 2019-08-29 RX ADMIN — FENTANYL CITRATE 50 MCG: 50 INJECTION, SOLUTION INTRAMUSCULAR; INTRAVENOUS at 15:01

## 2019-08-29 RX ADMIN — DEXAMETHASONE SODIUM PHOSPHATE 4 MG: 10 INJECTION, SOLUTION INTRAMUSCULAR; INTRAVENOUS at 14:24

## 2019-08-29 RX ADMIN — SODIUM CHLORIDE 125 ML/HR: 0.9 INJECTION, SOLUTION INTRAVENOUS at 11:15

## 2019-08-29 RX ADMIN — FENTANYL CITRATE 25 MCG: 50 INJECTION INTRAMUSCULAR; INTRAVENOUS at 16:17

## 2019-08-29 RX ADMIN — HYDROCODONE BITARTRATE AND ACETAMINOPHEN 1 TABLET: 5; 325 TABLET ORAL at 18:16

## 2019-08-29 RX ADMIN — FENTANYL CITRATE 25 MCG: 50 INJECTION INTRAMUSCULAR; INTRAVENOUS at 15:57

## 2019-08-29 RX ADMIN — CEFAZOLIN SODIUM 1000 MG: 1 SOLUTION INTRAVENOUS at 14:11

## 2019-08-29 RX ADMIN — FENTANYL CITRATE 25 MCG: 50 INJECTION INTRAMUSCULAR; INTRAVENOUS at 16:07

## 2019-08-29 RX ADMIN — MIDAZOLAM 2 MG: 1 INJECTION INTRAMUSCULAR; INTRAVENOUS at 14:11

## 2019-08-29 RX ADMIN — FENTANYL CITRATE 100 MCG: 50 INJECTION, SOLUTION INTRAMUSCULAR; INTRAVENOUS at 14:24

## 2019-08-29 RX ADMIN — ROCURONIUM BROMIDE 50 MG: 100 INJECTION, SOLUTION INTRAVENOUS at 14:24

## 2019-08-29 NOTE — H&P
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[address] )    Basal cell carcinoma of skin of other parts of face    Pulmonary nodules    Left inguinal hernia    Unilateral recurrent inguinal hernia without obstruction or gangrene     Plan:  Arelis Verdin is scheduled for robotic repair of recurrent left inguinal hernia possible right inguinal hernia      HPI    Historical Information   Past Medical History:   Diagnosis Date    Acute medial meniscus tear     Basal cell carcinoma (BCC)     Chronic bronchitis (HCC)     Gout     Gout     Hard of hearing     Hearing aid worn     ziyad    Herniated nucleus pulposus, L4-5     Hypertension     Macular degeneration     Melanoma ([address] )     left cheek- history    Pulmonary nodules     Rash     under left breast area- instructed to notify md if worsens    Reactive airway disease     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[address] )     Skin cancer     Squamous cell carcinoma     Wears glasses      Past Surgical History:   Procedure Laterality Date    CATARACT EXTRACTION Bilateral     COLONOSCOPY      HERNIA REPAIR      HYDROCELE EXCISION / REPAIR      MASTOID SURGERY Left     MOHS SURGERY      OTHER SURGICAL HISTORY      sarcoma scalp with skin graft    SCALP EXCISION N/A 3/28/2018    Procedure: SCALP SARCOMA EXCISION WITH FULL THICKNESS SKIN GRAFT;  Surgeon: Priscilla Esposito MD;  Location: [facility];  Service: Plastics    TONSILLECTOMY AND ADENOIDECTOMY       Social History   Social History     Substance and Sexual Activity   Alcohol Use Yes    Frequency: Monthly or less    Comment: occasional beer     Social History     Substance and Sexual Activity   Drug Use No     Social History     Tobacco Use   Smoking Status Former Smoker    Types: Cigarettes    Last attempt to quit: 3/13/1988    Years since quittin 4   Smokeless Tobacco Never Used     Family History   Problem Relation Age of Onset    Colon cancer Father     Heart failure Father     Heart disease Mother     Stroke Mother         Meds/Allergies   Allergies   Allergen Reactions    Erythromycin Other (See Comments)     Thrush        Current Facility-Administered Medications:     ceFAZolin (ANCEF) IVPB (premix) 1,000 mg, 1,000 mg, Intravenous, Once, Reyna Shen MD    sodium chloride 0 9 % infusion, 125 mL/hr, Intravenous, Continuous, Helio Simon DO, Last Rate: 125 mL/hr at 19 1115, 125 mL/hr at 19 1115    Review of Systems    Vitals:    19 1054   BP: (!) 172/83   Pulse: 83   Resp: 16   Temp: (!) 97 3 °F (36 3 °C)   SpO2: 97%     Physical Exam  GEN: NAD, A+OX3   HEENT: Normocephalic, atraumatic,   NECK: Supple, trachea midline,   CARDIAC: regular rate & rhythm, S1 & S2 normal    LUNGS: Clear to auscultation, No Wheeze, Rales, or Rhonchi  ABDOMEN:  Left inguinal hernia  EXTREMITIES: No evidence of cyanosis, clubbing or edema  Pulses +2 B/L LE  NEURO: CN II-XII intact grossly, No sensory or motor deficits    Lab Results: I have personally reviewed pertinent lab results  Imaging: I have personally reviewed pertinent films in PACS  EKG, Pathology, and Other Studies: I have personally reviewed pertinent reports      Lab Results   Component Value Date    GLUCOSE 91 2015    CALCIUM 9 6 2019     2015    K 3 9 2019    CO2 30 2019     2019    BUN 26 (H) 2019    CREATININE 1 47 (H) 2019     Lab Results   Component Value Date    WBC 5 57 2019    HGB 14 2 2019    HCT 43 0 2019    MCV 92 2019     2019     Lab Results   Component Value Date    ALT 31 2019    AST 17 05/30/2019    ALKPHOS 117 (H) 05/30/2019    BILITOT 0 72 05/05/2015

## 2019-08-29 NOTE — ANESTHESIA POSTPROCEDURE EVALUATION
Post-Op Assessment Note    CV Status:  Stable  Pain Score: 2    Pain management: adequate     Mental Status:  Alert and awake   Hydration Status:  Euvolemic   PONV Controlled:  Controlled   Airway Patency:  Patent  Airway: intubated   Post Op Vitals Reviewed: Yes      Staff: Anesthesiologist           BP      Temp      Pulse 86 (08/29/19 1557)   Resp 12 (08/29/19 1557)    SpO2 96 % (08/29/19 1557)

## 2019-08-29 NOTE — OP NOTE
OPERATIVE REPORT  PATIENT NAME: Emery Ruggiero    :  1940  MRN: [id number]  Pt Location: [facility] 06    SURGERY DATE: 2019    Surgeon(s) and Role:     * Deysi Abad MD - Primary     Ángel Linares PA-C - Assisting    Preop Diagnosis:  Unilateral recurrent inguinal hernia without obstruction or gangrene [K40 91]    Post-Op Diagnosis Codes:     Bilateral recurrent inguinal hernia without obstruction or gangrene [K40 91]    Procedure(s) (LRB):  REPAIR RECURRENT BILATERAL HERNIA INGUINAL LAPAROSCOPIC W/ ROBOTICS (Bilateral)    Specimen(s):  * No specimens in log *    Estimated Blood Loss:   Minimal    Drains:  Urethral Catheter Latex 16 Fr  (Active)   Number of days: 0       Anesthesia Type:   General    Operative Indications:  Bilateral recurrent inguinal hernia without obstruction or gangrene [K40 91]      Operative Findings:  Recurrent left inguinal hernia direct  Right direct inguinal    Complications:   None    Procedure and Technique:  The patient was seen again in the Holding Room  The risks, benefits, complications, treatment options, and expected outcomes were discussed with the patient  The possibilities of reaction to medication, pulmonary aspiration, perforation of viscus, bleeding, postoperative short or long term nerve entrapment causing pain,recurrent infection, the need for additional procedures, and development of a complication requiring transfusion or further operation were discussed with the patient and/or family  There was concurrence with the proposed plan, and informed consent was obtained  The site of surgery was properly noted/marked  The patient was taken to the Operating Room, identified as Emery Ruggiero and the procedure verified as hernia repair  A Time Out was held and the above information confirmed  The patient was prepped and draped in a sterile fashion  A timeout was again performed  Local anesthesia was used in the incision   An umbilical incision was made   Dissection carried out to the fascia which was grasped with Kocher's and elevated  The fascia was incised an 8 mm trocar was placed in under direct visualization  The abdomen was inflated and the camera was placed in    Two8 mm trocars were placed lateral to rectus muscle approximately at the level of the umbilicus  At this point the patient was placed into Trendelenburg position and the robot was docked and the instruments were placed in  The patient was noted to have bilateral inguinal hernia   The peritoneum was incised from the medial umbilical fold out laterally past the internal ring on the left  Next the direct space was mobilized by exposing Angus's ligament all the way along its length to the pubic tubercle  If a direct hernia defect was seen this was dissected and reduced  If there was indirect hernia sac this was carefully mobilized off the cord structures with care to avoid injury to the gonadal vessels or spermatic cord  The remainder of the inferior flap was created  At this point  Pro  mesh was selected and placed into the preperitoneal space  The mesh was deployed and placed in the appropriate position  The edge of the peritoneum was well below the edge of the mesh  The peritoneum was then sutured closed with the V lock suture  The peritoneum was incised from the medial umbilical fold out laterally past the internal ring on the right  Next the direct space was mobilized by exposing Angus's ligament all the way along its length to the pubic tubercle  If a direct hernia defect was seen this was dissected and reduced  If there was indirect hernia sac this was carefully mobilized off the cord structures with care to avoid injury to the gonadal vessels or spermatic cord  The remainder of the inferior flap was created  At this point  Pro  mesh was selected and placed into the preperitoneal space  The mesh was deployed and placed in the appropriate position    The edge of the peritoneum was well below the edge of the mesh  The peritoneum was then sutured closed with the V lock suture  Now the repair was complete the robot was undocked  The umbilical trocor site was closed with an  0-vicryl using a suture Passer  The wound was closed in multiple layers using 3-0 Vicryl sutures and the skin closed using a 4-0 Monocryl subcuticular stitch  The wound was dressed  The patient was anatomically correct at the end of the procedure  The patient tolerated the procedure in good condition  Instrument, sponge, and needle counts were correct prior to closure and at the conclusion of the case  The PA was essential for assistance with abdominal access and docking the robot as well as retraction and suturing  This text is generated with voice recognition software  There may be translation, syntax,  or grammatical errors  If you have any questions, please contact the dictating provider        I was present for the entire procedure and A qualified resident physician was not available    Patient Disposition:  PACU     SIGNATURE: Tomasa Verma MD  DATE: August 29, 2019  TIME: 3:29 PM

## 2019-08-29 NOTE — DISCHARGE INSTRUCTIONS
[facility]  Post-Operative Care Instructions  Dr Rafita Bruce MD, [place] [address]  [phone]    1  General: You will feel pulling sensations around the wound or funny aches and pains around the incisions  This is normal  Even minor surgery is a change in your body and this is your bodys way of reaction to it  If you have had abdominal surgery, it may help to support the incision with a small pillow or blanket for comfort when moving or coughing  2  Wound care:  Okay to shower  The glue will fall off over the next week or 2     3  Water: You may shower over the wound, unless there are drain tubes left in place  Do not bathe or use a pool or hot tub until cleared by the physician  4  Activity: You may go up and down stairs, walk as much as you are comfortable, but walk at least 3 times each day  If you have had abdominal surgery, do not lift anything heavier than 20 pounds for at least 4 weeks, unless cleared by the doctor  5  Diet: You may resume a regular diet  If you had a same-day surgery or overnight stay surgery, he may wish to eat lightly for a few days: soups, crackers, and sandwiches  You may resume a regular diet when ready  6  Medications: Resume all of your previous medications, unless told otherwise by the doctor  A good option for pain control is to start with Tylenol and ibuprofen and alternate taking them every 2 hours for 1-2 days  If this is not sufficient then substituted the Tylenol for the narcotic pain medicine prescribed  Insure that you do not take more than 4000 mg of Tylenol per day  You do not need to take the narcotic pain medications unless you are having significant pain and discomfort  7  Driving: You will need someone to drive you home on the day of surgery  Do not drive or make any important decisions while on narcotic pain medication or 24 hours and after anesthesia or sedation for surgery   Generally, you may drive when your off all narcotic pain medications  8  Upset Stomach: You may take Maalox, Tums, or similar items for an upset stomach  If your narcotic pain medication causes an upset stomach, do not take it on an empty stomach  Try taking it with at least some crackers or toast      9  Constipation: Patients often experienced constipation after surgery  You may take over-the-counter medication for this, such as Metamucil, Senokot, Dulcolax, milk of magnesia, etc  You may take a suppository unless you have had anorectal surgery such as a procedure on your hemorrhoids  If you experience significant nausea or vomiting after abdominal surgery, call the office before trying any of these medications  10  Call the office: If you are experiencing any of the following, fevers above 101 5°, significant nausea or vomiting, if the wound develops drainage and/or is excessive redness around the wound, or if you have significant diarrhea or other worsening symptoms  11  Pain: You may be given a prescription for pain  This will be given to the hospital, the day of surgery  12  Sexual Activity: You may resume sexual activity when you feel ready and comfortable and your incision is sealed and healed without apparent infection risk

## 2019-09-03 ENCOUNTER — TELEPHONE (OUTPATIENT)
Dept: SURGERY | Facility: MEDICAL CENTER | Age: 79
End: 2019-09-03

## 2019-09-03 NOTE — TELEPHONE ENCOUNTER
Two day post op call  Spoke to patient  Patient states is doing well no questions or concerns    Reminded patient of post op appointment and to call office if there are any questions or concerns that arise prior to post op appointment

## 2019-09-11 ENCOUNTER — OFFICE VISIT (OUTPATIENT)
Dept: SURGERY | Facility: MEDICAL CENTER | Age: 79
End: 2019-09-11

## 2019-09-11 VITALS — WEIGHT: 171.6 LBS | BODY MASS INDEX: 26.93 KG/M2 | TEMPERATURE: 98.2 F | HEIGHT: 67 IN

## 2019-09-11 DIAGNOSIS — Z98.890 STATUS POST LAPAROSCOPIC HERNIA REPAIR: Primary | ICD-10-CM

## 2019-09-11 DIAGNOSIS — Z87.19 STATUS POST LAPAROSCOPIC HERNIA REPAIR: Primary | ICD-10-CM

## 2019-09-11 PROCEDURE — 99024 POSTOP FOLLOW-UP VISIT: CPT | Performed by: SURGERY

## 2019-09-11 NOTE — PROGRESS NOTES
Assessment/Plan: Shamir Hinds is a [de-identified]year old male who presents today in post-operative state status post robotic laparoscopic recurrent bilateral inguinal repair performed on 8/29/19  Patient is doing well after the surgery  Explained to him that the swelling and tenderness should improve over time  He still has lifting restrictions of no greater than 20 pounds for two more weeks  He may follow up as needed  He knows to contact the office if any questions or concerns arise  No problem-specific Assessment & Plan notes found for this encounter  Diagnoses and all orders for this visit:    Status post laparoscopic hernia repair          Subjective:      Patient ID: Loni Wilhelm is a [de-identified] y o  male  Shamir Hinds is a [de-identified]year old male who presents today in post-operative state status post robotic laparoscopic left inguinal hernia repair performed on 8/29/19  He states that he is sore on the groin and testicles, especially on the left side  He reports that he is doing well overall  The following portions of the patient's history were reviewed and updated as appropriate: allergies, current medications, past family history, past medical history, past social history, past surgical history and problem list     Review of Systems      Objective:      Temp 98 2 °F (36 8 °C) (Tympanic)   Ht 5' 7" (1 702 m)   Wt 77 8 kg (171 lb 9 6 oz)   BMI 26 88 kg/m²          Physical Exam   Skin:   Incisions are healing well         By signing my name below, I, Evangelist Meyer, attest that this documentation has been prepared under the direction and in the presence of Bisi Buck MD  Electronically Signed: Maryann Moy  9/11/19  Michelle Tipton personally performed the services described in this documentation  All medical record entries made by the scribe were at my direction and in my presence   I have reviewed the chart and discharge instructions and agree that the record reflects my personal performance and is accurate and complete  Maribell Leonardo MD  9/11/19

## 2019-10-03 DIAGNOSIS — I10 BENIGN ESSENTIAL HYPERTENSION: ICD-10-CM

## 2019-10-03 RX ORDER — LISINOPRIL AND HYDROCHLOROTHIAZIDE 25; 20 MG/1; MG/1
1 TABLET ORAL DAILY
Qty: 90 TABLET | Refills: 3 | Status: SHIPPED | OUTPATIENT
Start: 2019-10-03 | End: 2020-11-14

## 2020-01-02 ENCOUNTER — HOSPITAL ENCOUNTER (OUTPATIENT)
Dept: CT IMAGING | Facility: HOSPITAL | Age: 80
Discharge: HOME | End: 2020-01-02
Attending: RADIOLOGY
Payer: MEDICARE

## 2020-01-02 DIAGNOSIS — C49.0 SARCOMA OF SCALP (HCC): ICD-10-CM

## 2020-01-02 DIAGNOSIS — R91.8 PULMONARY NODULES: ICD-10-CM

## 2020-01-02 PROCEDURE — 71250 CT THORAX DX C-: CPT

## 2020-01-09 ENCOUNTER — CLINICAL SUPPORT (OUTPATIENT)
Dept: RADIATION ONCOLOGY | Facility: CLINIC | Age: 80
End: 2020-01-09
Attending: RADIOLOGY
Payer: MEDICARE

## 2020-01-09 VITALS
TEMPERATURE: 98.3 F | HEIGHT: 67 IN | BODY MASS INDEX: 27.61 KG/M2 | OXYGEN SATURATION: 98 % | SYSTOLIC BLOOD PRESSURE: 142 MMHG | WEIGHT: 175.93 LBS | DIASTOLIC BLOOD PRESSURE: 80 MMHG | RESPIRATION RATE: 18 BRPM | HEART RATE: 84 BPM

## 2020-01-09 DIAGNOSIS — C49.0 SARCOMA OF SCALP (HCC): Primary | ICD-10-CM

## 2020-01-09 DIAGNOSIS — R91.8 PULMONARY NODULES: ICD-10-CM

## 2020-01-09 PROCEDURE — 99211 OFF/OP EST MAY X REQ PHY/QHP: CPT | Performed by: RADIOLOGY

## 2020-01-09 NOTE — PROGRESS NOTES
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, no treatment given, continue moisturizer    1/2/20 CT Chest- Stable lung nodules as described above  Based on current Fleischner Society 2017 Guidelines on incidental pulmonary nodule, patients with a known malignancy are at increased risk of metastasis and should receive followup CT at intervals appropriate for   the type of cancer and its risk of pulmonary metastases  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[address] )    11/8/2017 Surgery     right vertex of scalp - s/p excision and full thickness graft for closure on 11/8/17 for atypical fibroxanthoma  1/29/2018 Biopsy     biopsy from the anterior aspect of the graft showing atypical epithelioid fibro histiocytic tumor, with lesion extending to the deep tissue edge  This is similar to prior biopsy done  Differential includes pleomorphic sarcoma versus an extreme atypical example of atypical fibro histiocytoma  3/2018 Initial Diagnosis     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[facility] [address] )      3/28/2018 Surgery     excision of scalp sarcoma with full thickness graft - Dr Roxy Hernandez   Soft Tissue/Skin, Sarcoma of scalp, wide excision:  - Recurrent pleomorphic sarcoma, 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  See Note  -- FNCLCC Histologic Grade 3  (total score: 7 of 8)  * Tumor differentiation score: 3 of 3         * Mitotic count score: 3 of 3; > 19 mitoses/10 HPF (33 mitoses/10 HPF)  * Necrosis score: 1 of 2; present, < 50%  - Tumor extends into deep reticular dermis, subcutis and fascia  - Perineural invasion: Present; intraneural invasion identified (0 4 mm largest nerve diameter)  - Lymphovascular invasion: Focally suspicious  - Bone invasion: Not identified    - Central nervous system extension: Not identified  - Margins: uninvolved by sarcoma but close  -- Sarcoma  0 15 mm from nearest deep margin    - Additional Pathologic Findings: Changes consistent with prior surgical site  -- Focal ulceration with bacterial colonization, acute and chronic inflammation       -- Best representative tumor block: A5        5/29/2018 - 7/5/2018 Radiation     Plan ID Energy Fractions Dose per Fraction (cGy) Dose Correction (cGy) Total Dose Delivered (cGy) Elapsed Days   Vertex Scalp 9E 27 / 27 200 0 5,400 37               Basal cell carcinoma of skin of other parts of face     Initial Diagnosis     Basal cell carcinoma of skin of other parts of face         Clinical Trial: no      Health Maintenance   Topic Date Due    Influenza Vaccine  07/01/2019    DTaP,Tdap,and Td Vaccines (1 - Tdap) 06/06/2020 (Originally 6/3/1951)    Fall Risk  06/06/2020    Medicare Annual Wellness Visit (AWV)  06/06/2020    Depression Screening PHQ  07/12/2020    BMI: Followup Plan  08/08/2020    BMI: Adult  09/11/2020    Pneumococcal Vaccine: 65+ Years  Completed    Pneumococcal Vaccine: Pediatrics (0 to 5 Years) and At-Risk Patients (6 to 59 Years)  Aged Out    HIB Vaccine  Aged Out    Hepatitis B Vaccine  Aged Out    IPV Vaccine  Aged Out    Hepatitis A Vaccine  Aged Out    Meningococcal ACWY Vaccine  Aged Out    HPV Vaccine  Aged Out       Patient Active Problem List   Diagnosis    Actinic keratosis    Chronic kidney disease    Gout    Hyperlipidemia    Hypertension    Irritable bowel syndrome    Macular degeneration    Urinary incontinence, post-void dribbling    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HCC)    Basal cell carcinoma of skin of other parts of face    Pulmonary nodules     Past Medical History:   Diagnosis Date    Acute medial meniscus tear     Basal cell carcinoma (BCC)     Chronic bronchitis (HCC)     Gout     Gout     Hard of hearing     Hearing aid worn     ziyad    Herniated nucleus pulposus, L4-5     Hypertension     Macular degeneration     Melanoma ([address] )     left cheek- history    Pulmonary nodules     Rash     under left breast area- instructed to notify md if worsens    Reactive airway disease     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[address] )     Skin cancer     Squamous cell carcinoma     Wears glasses      Past Surgical History:   Procedure Laterality Date    CATARACT EXTRACTION Bilateral     COLONOSCOPY      HERNIA REPAIR      HYDROCELE EXCISION / REPAIR      MASTOID SURGERY Left     MOHS SURGERY      OTHER SURGICAL HISTORY      sarcoma scalp with skin graft    SCALP EXCISION N/A 3/28/2018    Procedure: SCALP SARCOMA EXCISION WITH FULL THICKNESS SKIN GRAFT;  Surgeon: Jorge L Vilchis MD;  Location: [facility];  Service: Plastics    TONSILLECTOMY AND ADENOIDECTOMY       Family History   Problem Relation Age of Onset    Colon cancer Father     Heart failure Father     Heart disease Mother     Stroke Mother      Social History     Socioeconomic History    Marital status: /Civil Union     Spouse name: Not on file    Number of children: Not on file    Years of education: Not on file    Highest education level: Not on file   Occupational History    Not on file   Social Needs    Financial resource strain: Not on file    Food insecurity:     Worry: Not on file     Inability: Not on file    Transportation needs:     Medical: Not on file Positive for hearing loss (bilateral hearing aids)  Eyes: Positive for visual disturbance (macular degeneration, receives injections)  Respiratory: Negative  Cardiovascular: Negative  Gastrointestinal: Negative  Endocrine: Negative  Genitourinary: Negative  Nocturia x 2-3   Musculoskeletal: Negative  Skin: Positive for color change (Scalp skin graft well healed, lesion on L cheek)  Lesion L cheek, scheduled for MOH's surgery, scalp with open area and surrounding erythema   Allergic/Immunologic: Negative  Neurological: Negative  Hematological: Negative  Psychiatric/Behavioral: Negative  Vitals:    01/09/20 1435   BP: 142/80   Pulse: 84   Resp: 18   Temp: 98 3 °F (36 8 °C)   SpO2: 98%   Weight: 79 8 kg (175 lb 14 8 oz)   Height: 5' 7" (1 702 m)        Pain Score: 0-No pain      Imaging:Ct Chest Wo Contrast    Result Date: 1/7/2020  Narrative: CT CHEST WITHOUT IV CONTRAST INDICATION:   C49 0: 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, face and neck R91 8: Other nonspecific abnormal finding of lung field  COMPARISON:  CT dated 7/2/2019  TECHNIQUE: CT examination of the chest was performed without intravenous contrast   Axial, sagittal, and coronal 2D reformatted images were created from the source data and submitted for interpretation  Radiation dose length product (DLP) for this visit:  344 mGy-cm   This examination, like all CT scans performed in the [facility], was performed utilizing techniques to minimize radiation dose exposure, including the use of iterative reconstruction and automated exposure control  FINDINGS: LUNGS:  Multiple stable appearing lung nodules   Index lesions as below: 4 mm solid nodule in the right middle lobe on series 3, image #56 7 mm solid nodule in the right lower lobe on series 3, image #73 3 mm nodule in the left upper lobe on series 3, image #44  3 mm nodule in the left lower lobe on series 3, image #91 There is no tracheal or endobronchial lesion  PLEURA:  Unremarkable  HEART/GREAT VESSELS:  Severe coronary artery calcifications  MEDIASTINUM AND GALE:  Unremarkable  CHEST WALL AND LOWER NECK:   Unremarkable  VISUALIZED STRUCTURES IN THE UPPER ABDOMEN:  Severe hepatic steatosis  Partially visualized duodenal diverticulum  OSSEOUS STRUCTURES:  No acute fracture or destructive osseous lesion  Impression: Stable lung nodules as described above  Based on current Fleischner Society 2017 Guidelines on incidental pulmonary nodule, patients with a known malignancy are at increased risk of metastasis and should receive followup CT at intervals appropriate for the type of cancer and its risk of pulmonary metastases   Workstation performed: [id number]

## 2020-01-09 NOTE — PROGRESS NOTES
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   He is status post surgical resection with perineural invasion and focal suspicion for lymphovascular invasion and close margins resection  Therefore, recommendations were made for postoperative radiation therapy that was completed on July 5, 2018  He was last seen on 7/12/19  He returns today for follow-up visit  Interval History:  8/29/19 Robotic laparoscopic repair of recurrent bilateral inguinal hernias by Dr Tod Duong     10/17/19 Dr Mela Danielson, dermatology FU  - recommending skin biopsy for several areas of possible [facility], Mohs scheduled for 1/20/20 for L mid cheek, patient declining biopsy and treatment of other areas of skin cancer  - 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, no treatment given, continue moisturizer    12/2019 - Dr Yvonne Her  1/2/20 CT Chest- Stable lung nodules as described above  Based on current Fleischner Society 2017 Guidelines on incidental pulmonary nodule, patients with a known malignancy are at increased risk of metastasis and should receive followup CT at intervals appropriate for   the type of cancer and its risk of pulmonary metastases  Patient reports he is feeling well  He denies any pain nor bleeding in the scalp  There is an open area/ulcer of the scalp without any infection  He has recovered well from his bilateral inguinal hernia repairs and has no inguinal pains  He will be going to [facility] for plastic surgery consultation  Historical Information      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[facility] [address] )    11/8/2017 Surgery     right vertex of scalp - s/p excision and full thickness graft for closure on 11/8/17 for atypical fibroxanthoma          1/29/2018 Biopsy     biopsy from the anterior aspect of the graft showing atypical epithelioid fibro histiocytic tumor, with lesion extending to the deep tissue edge  This is similar to prior biopsy done  Differential includes pleomorphic sarcoma versus an extreme atypical example of atypical fibro histiocytoma  3/2018 Initial Diagnosis     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[address] )      3/28/2018 Surgery     excision of scalp sarcoma with full thickness graft - Dr Chloe Leiva  Soft Tissue/Skin, Sarcoma of scalp, wide excision:  - Recurrent pleomorphic sarcoma, 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  See Note  -- FNCLCC Histologic Grade 3  (total score: 7 of 8)  * Tumor differentiation score: 3 of 3         * Mitotic count score: 3 of 3; > 19 mitoses/10 HPF (33 mitoses/10 HPF)  * Necrosis score: 1 of 2; present, < 50%  - Tumor extends into deep reticular dermis, subcutis and fascia  - Perineural invasion: Present; intraneural invasion identified (0 4 mm largest nerve diameter)  - Lymphovascular invasion: Focally suspicious  - Bone invasion: Not identified    - Central nervous system extension: Not identified  - Margins: uninvolved by sarcoma but close  -- Sarcoma  0 15 mm from nearest deep margin    - Additional Pathologic Findings: Changes consistent with prior surgical site  -- Focal ulceration with bacterial colonization, acute and chronic inflammation       -- Best representative tumor block: A5        5/29/2018 - 7/5/2018 Radiation     Plan ID Energy Fractions Dose per Fraction (cGy) Dose Correction (cGy) Total Dose Delivered (cGy) Elapsed Days   Vertex Scalp 9E 27 / 27 200 0 5,400 37               Basal cell carcinoma of skin of other parts of face     Initial Diagnosis     Basal cell carcinoma of skin of other parts of face         Past Medical History:   Diagnosis Date    Acute medial meniscus tear     Basal cell carcinoma (BCC)     Chronic bronchitis (HCC)     Gout     Gout     Hard of hearing     Hearing aid worn     ziyad    Herniated nucleus pulposus, L4-5     Hypertension     Macular degeneration     Melanoma ([facility] [address] )     left cheek- history    Pulmonary nodules     Rash     under left breast area- instructed to notify md if worsens    Reactive airway disease     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[facility] [address] )     Skin cancer     Squamous cell carcinoma     Wears glasses      Past Surgical History:   Procedure Laterality Date    CATARACT EXTRACTION Bilateral     COLONOSCOPY      HERNIA REPAIR      HYDROCELE EXCISION / REPAIR      MASTOID SURGERY Left     MOHS SURGERY      OTHER SURGICAL HISTORY      sarcoma scalp with skin graft    SCALP EXCISION N/A 3/28/2018    Procedure: SCALP SARCOMA EXCISION WITH FULL THICKNESS SKIN GRAFT;  Surgeon: Hilaria St MD;  Location: [facility];  Service: Plastics    TONSILLECTOMY AND ADENOIDECTOMY         Social History   Social History     Substance and Sexual Activity   Alcohol Use Yes    Frequency: Monthly or less    Comment: occasional beer     Social History     Substance and Sexual Activity   Drug Use No     Social History     Tobacco Use   Smoking Status Former Smoker    Types: Cigarettes    Last attempt to quit: 3/13/1988    Years since quittin 8   Smokeless Tobacco Never Used     Meds/Allergies     Current Outpatient Medications:     acetaminophen (TYLENOL) 500 mg tablet, Take 500-1,000 mg by mouth every 4 (four) hours as needed for mild pain, Disp: , Rfl:     alclomethasone (ACLOVATE) 0 05 % cream, Apply topically 2 (two) times a day as needed , Disp: , Rfl:     fluocinonide (LIDEX) 0 05 % external solution, Apply topically daily, Disp: , Rfl:     ketoconazole (NIZORAL) 2 % shampoo, Apply 1 application topically daily as needed for dandruff, Disp: , Rfl:     lisinopril-hydrochlorothiazide (PRINZIDE,ZESTORETIC) 20-25 MG per tablet, TAKE 1 TABLET BY MOUTH  DAILY, Disp: 90 tablet, Rfl: 3    Multiple Vitamins-Minerals (PRESERVISION AREDS 2) CAPS, Take 1 capsule by mouth 2 (two) times a day, Disp: , Rfl:   Allergies   Allergen Reactions    Erythromycin Other (See Comments)     Thrush      Review of Systems  Constitutional: Negative  HENT: Positive for hearing loss (bilateral hearing aids)  Eyes: Positive for visual disturbance (macular degeneration, receives injections)  Respiratory: Negative  Cardiovascular: Negative  Gastrointestinal: Negative  Endocrine: Negative  Genitourinary: Negative  Nocturia x 2-3   Musculoskeletal: Negative  Skin: Positive for color change (Scalp skin graft well healed, lesion on L cheek)  Lesion L cheek, scheduled for MOH's surgery, scalp with open area and surrounding erythema   Allergic/Immunologic: Negative  Neurological: Negative  Hematological: Negative  Psychiatric/Behavioral: Negative  OBJECTIVE:   /80   Pulse 84   Temp 98 3 °F (36 8 °C)   Resp 18   Ht 5' 7" (1 702 m)   Wt 79 8 kg (175 lb 14 8 oz)   SpO2 98%   BMI 27 55 kg/m²   Pain Assessment:  0  ECOG/Zubrod/WHO: 1 - Symptomatic but completely ambulatory    Physical Exam   Constitutional: He is oriented to person, place, and time  He appears well-developed and well-nourished  No distress  HENT:   Head: Normocephalic and atraumatic  Mouth/Throat: No oropharyngeal exudate  Eyes: Pupils are equal, round, and reactive to light  Conjunctivae and EOM are normal  No scleral icterus  Neck: Normal range of motion  Neck supple  No tracheal deviation present  No thyromegaly present  Cardiovascular: Normal rate, regular rhythm and normal heart sounds     Pulmonary/Chest: Effort normal and breath sounds normal  No respiratory distress  He has no wheezes  He has no rales  He exhibits no tenderness  Abdominal: Soft  Bowel sounds are normal  He exhibits no distension and no mass  There is no tenderness  Musculoskeletal: Normal range of motion  He exhibits no edema or tenderness  Lymphadenopathy:     He has no cervical adenopathy      He has no axillary adenopathy         Right: No supraclavicular adenopathy present       Left: No supraclavicular adenopathy present  Neurological: He is alert and oriented to person, place, and time  No cranial nerve deficit  Coordination normal    Skin: Skin is warm and dry  No rash noted  He is not diaphoretic  No erythema  No pallor    Skin of the scalp reveals less dryness and alopecia confined to the area of the radiation field   There is well-healed skin graft without any evidence of recurrent nodules  Apple Rich is a 2 x 3 5 cm area of ulceration within the prior treatment area  There are some actinic keratosis and dryness of the skin within the treated field   There is no evidence of any bleeding or infection   He also has dryness of skin throughout the scalp outside the treated area that has improved    There is a well-healed incision along the left deltoid region without any underlying masses  Psychiatric: He has a normal mood and affect  His behavior is normal  Judgment and thought content normal    Nursing note and vitals reviewed  RESULTS    Lab Results: No results found for this or any previous visit (from the past 672 hour(s))  Imaging Studies:Ct Chest Wo Contrast    Result Date: 1/7/2020  Narrative: CT CHEST WITHOUT IV CONTRAST INDICATION:   C49 0: 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, face and neck R91 8: Other nonspecific abnormal finding of lung field  COMPARISON:  CT dated 7/2/2019  TECHNIQUE: CT examination of the chest was performed without intravenous contrast   Axial, sagittal, and coronal 2D reformatted images were created from the source data and submitted for interpretation  Radiation dose length product (DLP) for this visit:  344 mGy-cm   This examination, like all CT scans performed in the [facility], was performed utilizing techniques to minimize radiation dose exposure, including the use of iterative reconstruction and automated exposure control  FINDINGS: LUNGS:  Multiple stable appearing lung nodules   Index lesions as below: 4 mm solid nodule in the right middle lobe on series 3, image #56 7 mm solid nodule in the right lower lobe on series 3, image #73 3 mm nodule in the left upper lobe on series 3, image #44  3 mm nodule in the left lower lobe on series 3, image #91 There is no tracheal or endobronchial lesion  PLEURA:  Unremarkable  HEART/GREAT VESSELS:  Severe coronary artery calcifications  MEDIASTINUM AND GALE:  Unremarkable  CHEST WALL AND LOWER NECK:   Unremarkable  VISUALIZED STRUCTURES IN THE UPPER ABDOMEN:  Severe hepatic steatosis  Partially visualized duodenal diverticulum  OSSEOUS STRUCTURES:  No acute fracture or destructive osseous lesion  Impression: Stable lung nodules as described above  Based on current Fleischner Society 2017 Guidelines on incidental pulmonary nodule, patients with a known malignancy are at increased risk of metastasis and should receive followup CT at intervals appropriate for the type of cancer and its risk of pulmonary metastases   Workstation performed: [id number]     Assessment/Plan:  Orders Placed This Encounter   Procedures    CT chest wo contrast        Brady Leach is a [de-identified]y o  year old male with a history of multiple basal cell and squamous cell carcinomas of the skin over the last 15 years and he is status post multiple Mohs surgical procedures  Eleonora Betancourt also has a history of melanoma twice once in the right flank 7 or 8 years ago and left lateral cheek 10 years ago  Elenoora Betancourt was diagnosed with a new superficial cutaneous sarcoma of the scalp in November 2017  Eleonora Betancourt is status post excision and full-thickness skin graft with Dr Akhil Celeste on November 8, 2017  Eleonora Betancourt had biopsy proven recurrence at the edge of the skin graft on January 29, 2018  Eleonora Betancourt then had excision with Dr Shweta Woods 28, 2018 along with a full-thickness skin graft   Final pathology confirmed recurrent pleomorphic sarcoma 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   This was a grade 3 tumor with disease extending into the deep reticular dermis, subcutis and fascia   There was perineural invasion identified and there was focal suspicion for lymphovascular invasion   The margins were negative but close at the deep margin that was 0 15 mm   He is at high risk for local recurrence of his 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   He has a close margin with perineural invasion and lymphovascular invasion   We recommended postoperative radiation therapy that was completed on July 5, 2018  [facility] returns today for follow-up visit      He has no clinical evidence of any recurrent disease  Eron Spencer was some dryness of the scalp both within and outside of the treated areas that has improved   His graft sites are well healed and without any nodularity  Fadiane Footevelyne is an area breakdown/ulceration within the treatment area without any signs of infection or bleeding  He will continue to apply moisturizer daily and continue to use the skin/scalp products given to him by Dr Ludy Melton outlined above  [facility] previously had some nodules on chest CT that have been followed  His chest CT August 15, 2018 reveals most of the pulmonary nodules continue to decrease in size   There were no new or enlarging nodules  Kath Newton were thought be infectious or inflammatory in nature    His chest CT from January 2, 2019 revealed stable bilateral lung nodule with no new nodules  His chest CT from July 2, 2019 and January 7, 2020 continues to reveal stable pulmonary nodules with no new or suspicious lesions     Results of chest CT were reviewed today with patient  [facility] will return in 9 months with a repeat chest CT for follow-up  [facility] he has an appointment for Mohs surgery January 20, 2020 of the left mid cheek with Dr Ludy Melton    He recently saw Dr Suma Sage regarding the area of ulceration in the scalp and will be going to [facility] for consultation with plastic surgery to discuss his surgical options for reconstruction/closure    Justin Francisco MD  1/9/2020,3:38 PM    Portions of the record may have been created with voice recognition software   Occasional wrong word or "sound a like" substitutions may have occurred due to the inherent limitations of voice recognition software   Read the chart carefully and recognize, using context, where substitutions have occurred

## 2020-01-23 ENCOUNTER — TRANSCRIBE ORDERS (OUTPATIENT)
Dept: ADMINISTRATIVE | Facility: HOSPITAL | Age: 80
End: 2020-01-23

## 2020-01-23 DIAGNOSIS — D49.2 NEOPLASM OF SCALP: Primary | ICD-10-CM

## 2020-01-29 ENCOUNTER — HOSPITAL ENCOUNTER (OUTPATIENT)
Dept: MRI IMAGING | Facility: HOSPITAL | Age: 80
Discharge: HOME | End: 2020-01-29
Attending: PLASTIC SURGERY
Payer: MEDICARE

## 2020-01-29 DIAGNOSIS — D49.2 NEOPLASM OF SCALP: ICD-10-CM

## 2020-01-29 PROCEDURE — A9585 GADOBUTROL INJECTION: HCPCS | Performed by: PLASTIC SURGERY

## 2020-01-29 PROCEDURE — 70543 MRI ORBT/FAC/NCK W/O &W/DYE: CPT

## 2020-01-29 RX ADMIN — GADOBUTROL 8 ML: 604.72 INJECTION INTRAVENOUS at 14:34

## 2020-02-20 ENCOUNTER — TRANSCRIBE ORDERS (OUTPATIENT)
Dept: ADMINISTRATIVE | Facility: HOSPITAL | Age: 80
End: 2020-02-20
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
## 2020-02-20 DIAGNOSIS — L59.8 SOFT TISSUE RADIONECROSIS: ICD-10-CM
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
## 2020-02-20 DIAGNOSIS — L59.8 RADIATION NECROSIS OF SKIN AND SUBCUTANEOUS: Primary | ICD-10-CM

## 2020-02-25 ENCOUNTER — HOSPITAL ENCOUNTER (OUTPATIENT)
Dept: CT IMAGING | Facility: HOSPITAL | Age: 80
Discharge: HOME | End: 2020-02-25
Payer: MEDICARE
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
## 2020-02-25 DIAGNOSIS — L59.8 SOFT TISSUE RADIONECROSIS: ICD-10-CM

## 2020-02-25 DIAGNOSIS — L59.8 RADIATION NECROSIS OF SKIN AND SUBCUTANEOUS: ICD-10-CM
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
## 2020-02-25 PROCEDURE — 70450 CT HEAD/BRAIN W/O DYE: CPT

## 2020-06-17 ENCOUNTER — OFFICE VISIT (OUTPATIENT)
Dept: PODIATRY | Facility: CLINIC | Age: 80
End: 2020-06-17
Payer: MEDICARE

## 2020-06-17 VITALS — HEIGHT: 67 IN | BODY MASS INDEX: 26.92 KG/M2 | WEIGHT: 171.5 LBS

## 2020-06-17 DIAGNOSIS — I73.9 PERIPHERAL VASCULAR DISEASE, UNSPECIFIED (HCC): Primary | ICD-10-CM

## 2020-06-17 PROCEDURE — 11719 TRIM NAIL(S) ANY NUMBER: CPT | Performed by: PODIATRIST

## 2020-06-18 ENCOUNTER — TELEPHONE (OUTPATIENT)
Dept: FAMILY MEDICINE CLINIC | Facility: CLINIC | Age: 80
End: 2020-06-18

## 2020-06-18 DIAGNOSIS — Z01.818 PRE-OP TESTING: Primary | ICD-10-CM

## 2020-06-25 ENCOUNTER — TRANSCRIBE ORDERS (OUTPATIENT)
Dept: LAB | Facility: CLINIC | Age: 80
End: 2020-06-25

## 2020-06-25 DIAGNOSIS — Z01.818 OTHER SPECIFIED PRE-OPERATIVE EXAMINATION: Primary | ICD-10-CM

## 2020-06-29 DIAGNOSIS — Z01.818 OTHER SPECIFIED PRE-OPERATIVE EXAMINATION: ICD-10-CM

## 2020-06-29 LAB — SARS-COV-2 RNA RESP QL NAA+PROBE: NEGATIVE

## 2020-06-29 PROCEDURE — U0003 INFECTIOUS AGENT DETECTION BY NUCLEIC ACID (DNA OR RNA); SEVERE ACUTE RESPIRATORY SYNDROME CORONAVIRUS 2 (SARS-COV-2) (CORONAVIRUS DISEASE [COVID-19]), AMPLIFIED PROBE TECHNIQUE, MAKING USE OF HIGH THROUGHPUT TECHNOLOGIES AS DESCRIBED BY CMS-2020-01-R: HCPCS

## 2020-06-30 ENCOUNTER — TELEPHONE (OUTPATIENT)
Dept: FAMILY MEDICINE CLINIC | Facility: CLINIC | Age: 80
End: 2020-06-30

## 2020-10-02 ENCOUNTER — HOSPITAL ENCOUNTER (OUTPATIENT)
Dept: CT IMAGING | Facility: HOSPITAL | Age: 80
Discharge: HOME | End: 2020-10-02
Attending: RADIOLOGY
Payer: MEDICARE

## 2020-10-02 DIAGNOSIS — R91.8 PULMONARY NODULES: ICD-10-CM

## 2020-10-02 DIAGNOSIS — C49.0 SARCOMA OF SCALP (HCC): ICD-10-CM

## 2020-10-02 PROCEDURE — 71250 CT THORAX DX C-: CPT

## 2020-10-08 ENCOUNTER — CLINICAL SUPPORT (OUTPATIENT)
Dept: RADIATION ONCOLOGY | Facility: CLINIC | Age: 80
End: 2020-10-08
Attending: RADIOLOGY

## 2020-10-08 ENCOUNTER — TELEMEDICINE (OUTPATIENT)
Dept: RADIATION ONCOLOGY | Facility: CLINIC | Age: 80
End: 2020-10-08
Attending: RADIOLOGY

## 2020-10-08 VITALS — WEIGHT: 173 LBS | HEIGHT: 67 IN | BODY MASS INDEX: 27.15 KG/M2

## 2020-10-08 DIAGNOSIS — C49.0 SARCOMA OF SCALP (HCC): Primary | ICD-10-CM

## 2020-10-08 DIAGNOSIS — R91.8 PULMONARY NODULES: ICD-10-CM

## 2020-10-08 RX ORDER — LISINOPRIL 20 MG/1
20 TABLET ORAL DAILY
COMMUNITY
End: 2020-12-18 | Stop reason: SDUPTHER

## 2020-11-14 DIAGNOSIS — I10 BENIGN ESSENTIAL HYPERTENSION: ICD-10-CM

## 2020-11-14 RX ORDER — LISINOPRIL AND HYDROCHLOROTHIAZIDE 25; 20 MG/1; MG/1
1 TABLET ORAL DAILY
Qty: 90 TABLET | Refills: 3 | Status: SHIPPED | OUTPATIENT
Start: 2020-11-14 | End: 2021-01-11 | Stop reason: SDUPTHER

## 2020-12-08 ENCOUNTER — OFFICE VISIT (OUTPATIENT)
Dept: DERMATOLOGY | Facility: CLINIC | Age: 80
End: 2020-12-08
Payer: MEDICARE

## 2020-12-08 VITALS — HEIGHT: 67 IN | TEMPERATURE: 98.4 F | BODY MASS INDEX: 27.62 KG/M2 | WEIGHT: 176 LBS

## 2020-12-08 DIAGNOSIS — L21.9 SEBORRHEIC DERMATITIS: ICD-10-CM

## 2020-12-08 DIAGNOSIS — Z85.828 HISTORY OF SQUAMOUS CELL CARCINOMA OF SKIN: ICD-10-CM

## 2020-12-08 DIAGNOSIS — L57.8 ACTINIC SKIN DAMAGE: ICD-10-CM

## 2020-12-08 DIAGNOSIS — Z85.820 HISTORY OF MELANOMA: ICD-10-CM

## 2020-12-08 DIAGNOSIS — D48.5 NEOPLASM OF UNCERTAIN BEHAVIOR OF SKIN: Primary | ICD-10-CM

## 2020-12-08 DIAGNOSIS — Z85.828 HISTORY OF BASAL CELL CARCINOMA: ICD-10-CM

## 2020-12-08 PROCEDURE — 99204 OFFICE O/P NEW MOD 45 MIN: CPT | Performed by: DERMATOLOGY

## 2020-12-08 RX ORDER — FLUOCINOLONE ACETONIDE 0.11 MG/ML
OIL TOPICAL
Qty: 118.28 ML | Refills: 5 | Status: SHIPPED | OUTPATIENT
Start: 2020-12-08 | End: 2022-02-22 | Stop reason: SDUPTHER

## 2020-12-08 RX ORDER — IMIQUIMOD 12.5 MG/.25G
CREAM TOPICAL
Qty: 24 EACH | Refills: 0 | Status: SHIPPED | OUTPATIENT
Start: 2020-12-08 | End: 2021-06-08 | Stop reason: SDUPTHER

## 2020-12-08 RX ORDER — IMIQUIMOD 12.5 MG/.25G
1 CREAM TOPICAL 3 TIMES WEEKLY
COMMUNITY
End: 2020-12-08

## 2020-12-18 DIAGNOSIS — I10 ESSENTIAL HYPERTENSION: Primary | ICD-10-CM

## 2020-12-18 RX ORDER — LISINOPRIL 20 MG/1
20 TABLET ORAL DAILY
Qty: 90 TABLET | Refills: 2 | Status: SHIPPED | OUTPATIENT
Start: 2020-12-18 | End: 2021-04-16

## 2021-01-11 DIAGNOSIS — I10 BENIGN ESSENTIAL HYPERTENSION: ICD-10-CM

## 2021-01-11 RX ORDER — LISINOPRIL AND HYDROCHLOROTHIAZIDE 25; 20 MG/1; MG/1
1 TABLET ORAL DAILY
Qty: 30 TABLET | Refills: 0 | Status: SHIPPED | OUTPATIENT
Start: 2021-01-11 | End: 2021-04-28 | Stop reason: SDUPTHER

## 2021-01-20 ENCOUNTER — OFFICE VISIT (OUTPATIENT)
Dept: PODIATRY | Facility: CLINIC | Age: 81
End: 2021-01-20
Payer: MEDICARE

## 2021-01-20 VITALS — BODY MASS INDEX: 27.62 KG/M2 | HEIGHT: 67 IN | WEIGHT: 176 LBS

## 2021-01-20 DIAGNOSIS — I73.9 PERIPHERAL VASCULAR DISEASE, UNSPECIFIED (HCC): Primary | ICD-10-CM

## 2021-01-20 PROCEDURE — 11719 TRIM NAIL(S) ANY NUMBER: CPT | Performed by: PODIATRIST

## 2021-01-20 NOTE — PROGRESS NOTES
Established patient with class findings presents for nail care  Vascular exam:  DP  0/4 bilateral; PT  0 4 bilateral   Dermatological exam:  Each toenail is thick and  dystrophic  Diagnosis:  PVD  Treatment: Trimmed multiple dystrophic toenails      Nail removal    Date/Time: 1/20/2021 2:26 PM  Performed by: Yvonne Olsen DPM  Authorized by: Yvonne Olsen DPM     Nails trimmed:     Number of nails trimmed:  10

## 2021-03-04 ENCOUNTER — TELEPHONE (OUTPATIENT)
Dept: FAMILY MEDICINE CLINIC | Facility: CLINIC | Age: 81
End: 2021-03-04

## 2021-03-04 DIAGNOSIS — Z12.5 SCREENING FOR MALIGNANT NEOPLASM OF PROSTATE: ICD-10-CM

## 2021-03-04 DIAGNOSIS — I10 ESSENTIAL HYPERTENSION: Primary | ICD-10-CM

## 2021-03-04 DIAGNOSIS — M10.9 GOUT, UNSPECIFIED CAUSE, UNSPECIFIED CHRONICITY, UNSPECIFIED SITE: ICD-10-CM

## 2021-03-04 DIAGNOSIS — N18.9 CHRONIC KIDNEY DISEASE, UNSPECIFIED CKD STAGE: ICD-10-CM

## 2021-03-04 DIAGNOSIS — E78.5 HYPERLIPIDEMIA, UNSPECIFIED HYPERLIPIDEMIA TYPE: ICD-10-CM

## 2021-03-04 NOTE — TELEPHONE ENCOUNTER
PT IS ASKING FOR THE GAMBIT OF BLOOD WORK, HE SAID HE IS DUE AND WOULD LIKE TO HAVE IT ALL DONE EVEN THE PROSTATE    PT CAN BE REACHED -[phone]

## 2021-03-09 ENCOUNTER — LAB (OUTPATIENT)
Dept: LAB | Facility: CLINIC | Age: 81
End: 2021-03-09
Payer: MEDICARE

## 2021-03-09 LAB
ALBUMIN SERPL BCP-MCNC: 4 G/DL (ref 3.5–5)
ALP SERPL-CCNC: 136 U/L (ref 46–116)
ALT SERPL W P-5'-P-CCNC: 41 U/L (ref 12–78)
ANION GAP SERPL CALCULATED.3IONS-SCNC: 6 MMOL/L (ref 4–13)
AST SERPL W P-5'-P-CCNC: 20 U/L (ref 5–45)
BASOPHILS # BLD AUTO: 0.03 THOUSANDS/ΜL (ref 0–0.1)
BASOPHILS NFR BLD AUTO: 0 % (ref 0–1)
BILIRUB SERPL-MCNC: 0.93 MG/DL (ref 0.2–1)
BILIRUB UR QL STRIP: NEGATIVE
BUN SERPL-MCNC: 28 MG/DL (ref 5–25)
CALCIUM SERPL-MCNC: 9.5 MG/DL (ref 8.3–10.1)
CHLORIDE SERPL-SCNC: 109 MMOL/L (ref 100–108)
CHOLEST SERPL-MCNC: 175 MG/DL (ref 50–200)
CLARITY UR: CLEAR
CO2 SERPL-SCNC: 28 MMOL/L (ref 21–32)
COLOR UR: NORMAL
CREAT SERPL-MCNC: 1.54 MG/DL (ref 0.6–1.3)
EOSINOPHIL # BLD AUTO: 0.48 THOUSAND/ΜL (ref 0–0.61)
EOSINOPHIL NFR BLD AUTO: 7 % (ref 0–6)
ERYTHROCYTE [DISTWIDTH] IN BLOOD BY AUTOMATED COUNT: 13.3 % (ref 11.6–15.1)
GFR SERPL CREATININE-BSD FRML MDRD: 42 ML/MIN/1.73SQ M
GLUCOSE P FAST SERPL-MCNC: 82 MG/DL (ref 65–99)
GLUCOSE UR STRIP-MCNC: NEGATIVE MG/DL
HCT VFR BLD AUTO: 43 % (ref 36.5–49.3)
HDLC SERPL-MCNC: 36 MG/DL
HGB BLD-MCNC: 14.4 G/DL (ref 12–17)
HGB UR QL STRIP.AUTO: NEGATIVE
IMM GRANULOCYTES # BLD AUTO: 0.03 THOUSAND/UL (ref 0–0.2)
IMM GRANULOCYTES NFR BLD AUTO: 0 % (ref 0–2)
KETONES UR STRIP-MCNC: NEGATIVE MG/DL
LDLC SERPL CALC-MCNC: 115 MG/DL (ref 0–100)
LEUKOCYTE ESTERASE UR QL STRIP: NEGATIVE
LYMPHOCYTES # BLD AUTO: 2.11 THOUSANDS/ΜL (ref 0.6–4.47)
LYMPHOCYTES NFR BLD AUTO: 29 % (ref 14–44)
MCH RBC QN AUTO: 30.6 PG (ref 26.8–34.3)
MCHC RBC AUTO-ENTMCNC: 33.5 G/DL (ref 31.4–37.4)
MCV RBC AUTO: 91 FL (ref 82–98)
MONOCYTES # BLD AUTO: 0.46 THOUSAND/ΜL (ref 0.17–1.22)
MONOCYTES NFR BLD AUTO: 6 % (ref 4–12)
NEUTROPHILS # BLD AUTO: 4.23 THOUSANDS/ΜL (ref 1.85–7.62)
NEUTS SEG NFR BLD AUTO: 58 % (ref 43–75)
NITRITE UR QL STRIP: NEGATIVE
NRBC BLD AUTO-RTO: 0 /100 WBCS
PH UR STRIP.AUTO: 5.5 [PH]
PLATELET # BLD AUTO: 203 THOUSANDS/UL (ref 149–390)
PMV BLD AUTO: 11.5 FL (ref 8.9–12.7)
POTASSIUM SERPL-SCNC: 3.7 MMOL/L (ref 3.5–5.3)
PROT SERPL-MCNC: 7.8 G/DL (ref 6.4–8.2)
PROT UR STRIP-MCNC: NEGATIVE MG/DL
PSA SERPL-MCNC: 4.8 NG/ML (ref 0–4)
RBC # BLD AUTO: 4.71 MILLION/UL (ref 3.88–5.62)
SODIUM SERPL-SCNC: 143 MMOL/L (ref 136–145)
SP GR UR STRIP.AUTO: 1.02 (ref 1–1.03)
TRIGL SERPL-MCNC: 121 MG/DL
TSH SERPL DL<=0.05 MIU/L-ACNC: 3.09 UIU/ML (ref 0.36–3.74)
URATE SERPL-MCNC: 8.3 MG/DL (ref 4.2–8)
UROBILINOGEN UR QL STRIP.AUTO: 0.2 E.U./DL
WBC # BLD AUTO: 7.34 THOUSAND/UL (ref 4.31–10.16)

## 2021-03-09 PROCEDURE — 81003 URINALYSIS AUTO W/O SCOPE: CPT

## 2021-03-09 PROCEDURE — 84443 ASSAY THYROID STIM HORMONE: CPT

## 2021-03-09 PROCEDURE — 36415 COLL VENOUS BLD VENIPUNCTURE: CPT

## 2021-03-09 PROCEDURE — 85025 COMPLETE CBC W/AUTO DIFF WBC: CPT

## 2021-03-09 PROCEDURE — 80053 COMPREHEN METABOLIC PANEL: CPT

## 2021-03-09 PROCEDURE — 80061 LIPID PANEL: CPT

## 2021-03-09 PROCEDURE — G0103 PSA SCREENING: HCPCS

## 2021-03-09 PROCEDURE — 84550 ASSAY OF BLOOD/URIC ACID: CPT

## 2021-03-18 ENCOUNTER — OFFICE VISIT (OUTPATIENT)
Dept: FAMILY MEDICINE CLINIC | Facility: CLINIC | Age: 81
End: 2021-03-18
Payer: MEDICARE

## 2021-03-18 VITALS
WEIGHT: 177 LBS | HEIGHT: 67 IN | BODY MASS INDEX: 27.78 KG/M2 | SYSTOLIC BLOOD PRESSURE: 138 MMHG | DIASTOLIC BLOOD PRESSURE: 88 MMHG | HEART RATE: 88 BPM | RESPIRATION RATE: 20 BRPM | TEMPERATURE: 98.4 F

## 2021-03-18 DIAGNOSIS — R97.20 ELEVATED PSA: ICD-10-CM

## 2021-03-18 DIAGNOSIS — N18.9 CHRONIC KIDNEY DISEASE, UNSPECIFIED CKD STAGE: ICD-10-CM

## 2021-03-18 DIAGNOSIS — C49.0 SARCOMA OF SCALP (HCC): ICD-10-CM

## 2021-03-18 DIAGNOSIS — I10 ESSENTIAL HYPERTENSION: ICD-10-CM

## 2021-03-18 DIAGNOSIS — E78.5 HYPERLIPIDEMIA, UNSPECIFIED HYPERLIPIDEMIA TYPE: Primary | ICD-10-CM

## 2021-03-18 DIAGNOSIS — K43.2 INCISIONAL HERNIA, WITHOUT OBSTRUCTION OR GANGRENE: ICD-10-CM

## 2021-03-18 DIAGNOSIS — Z00.00 HEALTH CARE MAINTENANCE: ICD-10-CM

## 2021-03-18 PROCEDURE — G0439 PPPS, SUBSEQ VISIT: HCPCS | Performed by: PHYSICIAN ASSISTANT

## 2021-03-18 PROCEDURE — 1123F ACP DISCUSS/DSCN MKR DOCD: CPT | Performed by: PHYSICIAN ASSISTANT

## 2021-03-18 PROCEDURE — 99214 OFFICE O/P EST MOD 30 MIN: CPT | Performed by: PHYSICIAN ASSISTANT

## 2021-03-18 NOTE — PATIENT INSTRUCTIONS
Assessment/plan:  1  Benign essential hypertension-stable with lisinopril hydrochlorothiazide  2  Hyperlipidemia-presently stable with diet control, no medication changes  3  Sarcoma of the scalp-stable status post surgery in July of 2020  Patient continues follow-up with specialist   3  Chronic kidney disease-stable with baseline creatinine of 1 4-1 5   4  Elevated PSA -slightly elevated at 4 8 however when assessed 3 years ago it was 5 3  Will continue to monitor  Patient favors conservative treatment at this time and does not desire further evaluation  5  Healthcare maintenance-annual Medicare wellness visit completed  See separate note

## 2021-03-18 NOTE — PROGRESS NOTES
Assessment and Plan:  Patient Instructions   Assessment/plan:  1  Benign essential hypertension-stable with lisinopril hydrochlorothiazide  2  Hyperlipidemia-presently stable with diet control, no medication changes  3  Sarcoma of the scalp-stable status post surgery in July of 2020  Patient continues follow-up with specialist   3  Chronic kidney disease-stable with baseline creatinine of 1 4-1 5   4  Elevated PSA -slightly elevated at 4 8 however when assessed 3 years ago it was 5 3  Will continue to monitor  Patient favors conservative treatment at this time and does not desire further evaluation  5  Healthcare maintenance-annual Medicare wellness visit completed  See separate note  Problem List Items Addressed This Visit        Cardiovascular and Mediastinum    Hypertension    Relevant Orders    CBC and differential    Comprehensive metabolic panel    Lipid Panel with Direct LDL reflex       Genitourinary    Chronic kidney disease       Other    Hyperlipidemia - Primary    Relevant Orders    CBC and differential    Comprehensive metabolic panel    Lipid Panel with Direct LDL reflex    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[address] )      Other Visit Diagnoses     Elevated PSA        Health care maintenance        Incisional hernia, without obstruction or gangrene        Relevant Orders    Ambulatory referral to General Surgery                 Diagnoses and all orders for this visit:    Hyperlipidemia, unspecified hyperlipidemia type  -     CBC and differential; Future  -     Comprehensive metabolic panel; Future  -     Lipid Panel with Direct LDL reflex; Future    Essential hypertension  -     CBC and differential; Future  -     Comprehensive metabolic panel; Future  -     Lipid Panel with Direct LDL reflex;  Future    Chronic kidney disease, unspecified CKD stage    Elevated PSA    Health care maintenance    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HCC)    Incisional hernia, without obstruction or gangrene  -     Ambulatory referral to General Surgery; Future    Other orders  -     Multiple Vitamins-Minerals (PRESERVISION AREDS 2 PO); Take by mouth 2 (two) times a day  -     Cancel: Ambulatory referral to Urology; Future              Subjective:      Patient ID: Brady Leach is a 79 y o  male  CC:    Chief Complaint   Patient presents with    Follow-up     Patient present today for a follow up on his lab results   Medicare Wellness Visit     pt due       HPI:    HPI:  This is an 24-year-old gentleman that presents to the office for follow-up of recent blood work and chronic health conditions as well as annual Medicare wellness visit  He does have a history of hypertension which has been stable with lisinopril and hydrochlorothiazide  He also has history of hyperlipidemia which has been diet controlled  He did have major surgery this past July for sarcoma of the scalp  He had a large section of bone removed from the right parietal lobe and he has had skin grafting from bilateral thighs cover the area  He continues follow-up with specialist and oncologist regularly  He had been through radiation therapy which did not seem to be effective  He is feeling well and still has a good quality of life  He enjoys being physically active and has been able to do his own snow blowing and shoveling this winter  The following portions of the patient's history were reviewed and updated as appropriate: allergies, current medications, past family history, past medical history, past social history, past surgical history and problem list       Review of Systems   Constitutional: Negative for chills, fatigue and fever  HENT: Negative for congestion, ear pain and sinus pressure  Eyes: Negative for visual disturbance  Respiratory: Negative for cough, chest tightness and shortness of breath  Cardiovascular: Negative for chest pain and palpitations  Gastrointestinal: Negative for diarrhea, nausea and vomiting  Endocrine: Negative for polyuria  Genitourinary: Negative for dysuria and frequency  Musculoskeletal: Negative for arthralgias and myalgias  Skin: Negative for pallor and rash  Neurological: Negative for dizziness, weakness, light-headedness, numbness and headaches  Psychiatric/Behavioral: Negative for agitation, behavioral problems and sleep disturbance  All other systems reviewed and are negative  Data to review:       Objective:    Vitals:    03/18/21 0952 03/18/21 1028   BP: 158/84 138/88   BP Location: Left arm    Patient Position: Sitting    Cuff Size: Large    Pulse: 88    Resp: 20    Temp: 98 4 °F (36 9 °C)    TempSrc: Tympanic    Weight: 80 3 kg (177 lb)    Height: 5' 7" (1 702 m)         Physical Exam  Constitutional:       General: He is not in acute distress  Appearance: He is well-developed  HENT:      Head: Normocephalic and atraumatic  Right Ear: Tympanic membrane normal       Left Ear: Tympanic membrane normal    Eyes:      Conjunctiva/sclera: Conjunctivae normal    Neck:      Musculoskeletal: Normal range of motion  Cardiovascular:      Rate and Rhythm: Normal rate and regular rhythm  Pulmonary:      Effort: Pulmonary effort is normal    Abdominal:      General: Abdomen is flat  Bowel sounds are normal  There is no distension  Palpations: Abdomen is soft  There is no mass  Musculoskeletal: Normal range of motion  Skin:     General: Skin is warm  Findings: No rash  Neurological:      Mental Status: He is alert and oriented to person, place, and time  Psychiatric:         Mood and Affect: Mood normal            BMI Counseling: Body mass index is 27 72 kg/m²  The BMI is above normal  Nutrition recommendations include decreasing portion sizes  Exercise recommendations include exercising 3-5 times per week

## 2021-03-18 NOTE — PROGRESS NOTES
Assessment and Plan:   1  Healthcare maintenance-annual Medicare wellness visit  Problem List Items Addressed This Visit        Cardiovascular and Mediastinum    Hypertension       Genitourinary    Chronic kidney disease       Other    Hyperlipidemia - Primary      Other Visit Diagnoses     Elevated PSA               Preventive health issues were discussed with patient, and age appropriate screening tests were ordered as noted in patient's After Visit Summary  Personalized health advice and appropriate referrals for health education or preventive services given if needed, as noted in patient's After Visit Summary       History of Present Illness:     Patient presents for Medicare Annual Wellness visit    Patient Care Team:  Stephenie Lombard, PA-C as PCP - General (Family Medicine)  Cheral Fischer, PA-C Stephenie Lombard, PA-C Lemuel Aquas, PA-C Janise China, MD (Radiation Oncology)  Hilaria St MD (Plastic Surgery)  Freda Henry DO (Dermatology Cancer Specialist)     Problem List:     Patient Active Problem List   Diagnosis    Actinic keratosis    Chronic kidney disease    Gout    Hyperlipidemia    Hypertension    Irritable bowel syndrome    Macular degeneration    Urinary incontinence, post-void dribbling    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[address] )    Basal cell carcinoma of skin of other parts of face    Pulmonary nodules      Past Medical and Surgical History:     Past Medical History:   Diagnosis Date    Acute medial meniscus tear     Basal cell carcinoma (BCC)     Chronic bronchitis ([address] )     Gout     Gout     Hard of hearing     Hearing aid worn     ziyad    Herniated nucleus pulposus, L4-5     Hypertension     Macular degeneration     Melanoma ([address] )     left cheek- history    Pulmonary nodules     Rash     under left breast area- instructed to notify md if worsens    Reactive airway disease     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[address] )     Skin cancer     Squamous cell carcinoma     Wears glasses Past Surgical History:   Procedure Laterality Date    CATARACT EXTRACTION Bilateral     COLONOSCOPY      HERNIA REPAIR      HYDROCELE EXCISION / REPAIR      MASTOID SURGERY Left     MOHS SURGERY      OTHER SURGICAL HISTORY      sarcoma scalp with skin graft    SCALP EXCISION N/A 3/28/2018    Procedure: SCALP SARCOMA EXCISION WITH FULL THICKNESS SKIN GRAFT;  Surgeon: Lloyd Lopez MD;  Location: [facility];  Service: Plastics    SKIN CANCER EXCISION      TONSILLECTOMY AND ADENOIDECTOMY        Family History:     Family History   Problem Relation Age of Onset    Colon cancer Father     Heart failure Father     Heart disease Mother     Stroke Mother       Social History:     E-Cigarette/Vaping    E-Cigarette Use Never User      E-Cigarette/Vaping Substances    Nicotine No     THC No     CBD No     Flavoring No     Other No     Unknown No      Social History     Socioeconomic History    Marital status: /Civil Union     Spouse name: None    Number of children: None    Years of education: None    Highest education level: None   Occupational History    None   Social Needs    Financial resource strain: None    Food insecurity     Worry: None     Inability: None    Transportation needs     Medical: None     Non-medical: None   Tobacco Use    Smoking status: Former Smoker     Types: Cigarettes     Quit date: 3/13/1988     Years since quittin 0    Smokeless tobacco: Never Used   Substance and Sexual Activity    Alcohol use: Yes     Frequency: Monthly or less     Comment: occasional beer    Drug use: No    Sexual activity: None   Lifestyle    Physical activity     Days per week: None     Minutes per session: None    Stress: None   Relationships    Social connections     Talks on phone: None     Gets together: None     Attends [employer] service: None     Active member of club or organization: None     Attends meetings of clubs or organizations: None     Relationship status: None    Intimate partner violence     Fear of current or ex partner: None     Emotionally abused: None     Physically abused: None     Forced sexual activity: None   Other Topics Concern    None   Social History Narrative    None      Medications and Allergies:     Current Outpatient Medications   Medication Sig Dispense Refill    acetaminophen (TYLENOL) 500 mg tablet Take 500-1,000 mg by mouth every 4 (four) hours as needed for mild pain      Fluocinolone Acetonide Scalp 0 01 % OIL Apply a thin layer topically daily at night one hour before bedtime  118 28 mL 5    imiquimod (ALDARA) 5 % cream Apply topically once a day Monday through Friday for 6 weeks straight  24 each 0    ketoconazole (NIZORAL) 2 % shampoo Apply 1 application topically daily as needed for dandruff      lisinopril-hydrochlorothiazide (PRINZIDE,ZESTORETIC) 20-25 MG per tablet Take 1 tablet by mouth daily 30 tablet 0    lisinopril (ZESTRIL) 20 mg tablet Take 1 tablet (20 mg total) by mouth daily (Patient not taking: Reported on 3/18/2021) 90 tablet 2    Multiple Vitamins-Minerals (PRESERVISION AREDS 2 PO) Take by mouth 2 (two) times a day      Multiple Vitamins-Minerals (PRESERVISION AREDS 2) CAPS Take 1 capsule by mouth 2 (two) times a day       No current facility-administered medications for this visit  Allergies   Allergen Reactions    Erythromycin Other (See Comments)     Thrush       Immunizations:     Immunization History   Administered Date(s) Administered    INFLUENZA 10/01/2020    Influenza Quadrivalent Preservative Free 3 years and older IM 10/28/2014    Influenza Split High Dose Preservative Free IM 11/08/2012    Pneumococcal Conjugate 13-Valent 09/29/2016    Pneumococcal Polysaccharide PPV23 06/06/2019    SARS-CoV-2 / COVID-19 mRNA IM (Moderna) 01/12/2021, 02/09/2021    Zoster 02/23/2011    influenza, trivalent, adjuvanted 10/19/2018      Health Maintenance:      There are no preventive care reminders to display for this patient  Topic Date Due    DTaP,Tdap,and Td Vaccines (1 - Tdap) Never done    Influenza Vaccine (1) 09/01/2020      Medicare Health Risk Assessment:     /84 (BP Location: Left arm, Patient Position: Sitting, Cuff Size: Large)   Pulse 88   Temp 98 4 °F (36 9 °C) (Tympanic)   Resp 20   Ht 5' 7" (1 702 m)   Wt 80 3 kg (177 lb)   BMI 27 72 kg/m²      Emmie Kidd is here for his Subsequent Wellness visit  Health Risk Assessment:   Patient rates overall health as very good  Patient feels that their physical health rating is slightly worse  Patient is very satisfied with their life  Eyesight was rated as same  Hearing was rated as same  Patient feels that their emotional and mental health rating is same  Patients states they are never, rarely angry  Patient states they are sometimes unusually tired/fatigued  Pain experienced in the last 7 days has been some  Patient's pain rating has been 6/10  Patient states that he has experienced no weight loss or gain in last 6 months  Chronic back pain    Fall Risk Screening: In the past year, patient has experienced: no history of falling in past year      Home Safety:  Patient has trouble with stairs inside or outside of their home  Patient has working smoke alarms and has working carbon monoxide detector  Home safety hazards include: none  Nutrition:   Current diet is Regular  Medications:   Patient is not currently taking any over-the-counter supplements  Patient is able to manage medications  Activities of Daily Living (ADLs)/Instrumental Activities of Daily Living (IADLs):   Walk and transfer into and out of bed and chair?: Yes  Dress and groom yourself?: Yes    Bathe or shower yourself?: Yes    Feed yourself?  Yes  Do your laundry/housekeeping?: Yes  Manage your money, pay your bills and track your expenses?: Yes  Make your own meals?: Yes    Do your own shopping?: Yes    Previous Hospitalizations:   Any hospitalizations or ED visits within the last 12 months?: Yes      Hospitalization Comments: Due to multiple surgeries  Advance Care Planning:   Living will: Yes    Advanced directive: Yes      Cognitive Screening:   Provider or family/friend/caregiver concerned regarding cognition?: No    PREVENTIVE SCREENINGS      Cardiovascular Screening:    General: Screening Not Indicated and History Lipid Disorder      Diabetes Screening:     General: Screening Current      Colorectal Cancer Screening:     General: Screening Not Indicated      Prostate Cancer Screening:    General: Screening Not Indicated      Osteoporosis Screening:    General: Screening Not Indicated      Abdominal Aortic Aneurysm (AAA) Screening:    Risk factors include: tobacco use        Lung Cancer Screening:     General: Screening Not Indicated      Hepatitis C Screening:    General: Patient Declines    Screening, Brief Intervention, and Referral to Treatment (SBIRT)    Screening  Typical number of drinks in a day: 0  Typical number of drinks in a week: 0  Interpretation: Low risk drinking behavior      Single Item Drug Screening:  How often have you used an illegal drug (including marijuana) or a prescription medication for non-medical reasons in the past year? never    Single Item Drug Screen Score: 0  Interpretation: Negative screen for possible drug use disorder      Lady George PA-C

## 2021-04-06 ENCOUNTER — CONSULT (OUTPATIENT)
Dept: SURGERY | Facility: CLINIC | Age: 81
End: 2021-04-06
Payer: MEDICARE

## 2021-04-06 VITALS
SYSTOLIC BLOOD PRESSURE: 184 MMHG | RESPIRATION RATE: 16 BRPM | DIASTOLIC BLOOD PRESSURE: 99 MMHG | BODY MASS INDEX: 27.88 KG/M2 | HEART RATE: 99 BPM | HEIGHT: 67 IN | TEMPERATURE: 98.1 F | WEIGHT: 177.6 LBS

## 2021-04-06 DIAGNOSIS — K43.2 INCISIONAL HERNIA, WITHOUT OBSTRUCTION OR GANGRENE: Primary | ICD-10-CM

## 2021-04-06 PROCEDURE — 99213 OFFICE O/P EST LOW 20 MIN: CPT | Performed by: SURGERY

## 2021-04-06 RX ORDER — SODIUM CHLORIDE, SODIUM LACTATE, POTASSIUM CHLORIDE, CALCIUM CHLORIDE 600; 310; 30; 20 MG/100ML; MG/100ML; MG/100ML; MG/100ML
125 INJECTION, SOLUTION INTRAVENOUS CONTINUOUS
OUTPATIENT
Start: 2021-04-22

## 2021-04-06 NOTE — PROGRESS NOTES
Assessment/Plan:    Incisional hernia, without obstruction or gangrene    He has evidence of an incisional hernia at his supraumbilical incision  It is small at this point  We discussed options  I recommend proceeding with repair as it will only get larger over time  Will be a open repair with mesh assistance out of hospital   We same day surgery  The risks benefits alternatives explained to is agreeable to proceed  He will discuss dates with his wife and will call back for scheduling       Diagnoses and all orders for this visit:    Incisional hernia, without obstruction or gangrene  -     Ambulatory referral to General Surgery          Subjective:      Patient ID: Marquez Pritchard is a 79 y o  male  Mr Andres Vásquez An 79year-old gentleman that is known to us since status post robotic repair bilateral inguinal hernias in September 2019  He states a few months ago he started noticing slight bulging just above his umbilicus  He does not remember specific time or event but he does report a lot of physical activity over the winter from all the snow fall  He denies any pain at this point  Of significance he had a sarcoma of the scalp that was excised and repaired with a full-thickness skin graft  In addition he does complain of some back stiffness especially in the mornings  The following portions of the patient's history were reviewed and updated as appropriate: allergies, current medications, past family history, past medical history, past social history, past surgical history and problem list     Review of Systems   Constitutional: Negative for chills and fever  HENT: Negative for ear pain and sore throat  Eyes: Negative for pain and visual disturbance  Respiratory: Negative for cough and shortness of breath  Cardiovascular: Negative for chest pain and palpitations  Gastrointestinal: Negative for abdominal pain and vomiting  Genitourinary: Positive for frequency  Negative for dysuria  Musculoskeletal: Positive for arthralgias and back pain  Skin: Negative for color change and rash  Neurological: Negative for seizures and syncope  Psychiatric/Behavioral: Negative for agitation and behavioral problems  All other systems reviewed and are negative  Objective:      BP (!) 184/99   Pulse 99   Temp 98 1 °F (36 7 °C)   Resp 16   Ht 5' 7" (1 702 m)   Wt 80 6 kg (177 lb 9 6 oz)   BMI 27 82 kg/m²          Physical Exam  Vitals signs and nursing note reviewed  Constitutional:       General: He is not in acute distress  Appearance: He is well-developed  He is not diaphoretic  HENT:      Head: Normocephalic and atraumatic  Eyes:      Pupils: Pupils are equal, round, and reactive to light  Neck:      Musculoskeletal: Normal range of motion and neck supple  Cardiovascular:      Rate and Rhythm: Normal rate and regular rhythm  Heart sounds: Normal heart sounds  No murmur  Pulmonary:      Effort: Pulmonary effort is normal  No respiratory distress  Breath sounds: Normal breath sounds  No wheezing  Abdominal:      General: Bowel sounds are normal       Palpations: Abdomen is soft  Hernia: A hernia is present  Comments: He has a small reducible incisional hernia at his supraumbilical incision site  There was no evidence of recurrent inguinal hernias  Musculoskeletal: Normal range of motion  Skin:     General: Skin is warm and dry  Neurological:      Mental Status: He is alert and oriented to person, place, and time     Psychiatric:         Behavior: Behavior normal

## 2021-04-06 NOTE — ASSESSMENT & PLAN NOTE
He has evidence of an incisional hernia at his supraumbilical incision  It is small at this point  We discussed options  I recommend proceeding with repair as it will only get larger over time  Will be a open repair with mesh assistance out of hospital   We same day surgery  The risks benefits alternatives explained to is agreeable to proceed    He will discuss dates with his wife and will call back for scheduling

## 2021-04-06 NOTE — H&P (VIEW-ONLY)
Assessment/Plan:    Incisional hernia, without obstruction or gangrene    He has evidence of an incisional hernia at his supraumbilical incision  It is small at this point  We discussed options  I recommend proceeding with repair as it will only get larger over time  Will be a open repair with mesh assistance out of hospital   We same day surgery  The risks benefits alternatives explained to is agreeable to proceed  He will discuss dates with his wife and will call back for scheduling       Diagnoses and all orders for this visit:    Incisional hernia, without obstruction or gangrene  -     Ambulatory referral to General Surgery          Subjective:      Patient ID: Francisco Javier Benton is a 79 y o  male  Mr Jean Perez An 79year-old gentleman that is known to us since status post robotic repair bilateral inguinal hernias in September 2019  He states a few months ago he started noticing slight bulging just above his umbilicus  He does not remember specific time or event but he does report a lot of physical activity over the winter from all the snow fall  He denies any pain at this point  Of significance he had a sarcoma of the scalp that was excised and repaired with a full-thickness skin graft  In addition he does complain of some back stiffness especially in the mornings  The following portions of the patient's history were reviewed and updated as appropriate: allergies, current medications, past family history, past medical history, past social history, past surgical history and problem list     Review of Systems   Constitutional: Negative for chills and fever  HENT: Negative for ear pain and sore throat  Eyes: Negative for pain and visual disturbance  Respiratory: Negative for cough and shortness of breath  Cardiovascular: Negative for chest pain and palpitations  Gastrointestinal: Negative for abdominal pain and vomiting  Genitourinary: Positive for frequency  Negative for dysuria  Musculoskeletal: Positive for arthralgias and back pain  Skin: Negative for color change and rash  Neurological: Negative for seizures and syncope  Psychiatric/Behavioral: Negative for agitation and behavioral problems  All other systems reviewed and are negative  Objective:      BP (!) 184/99   Pulse 99   Temp 98 1 °F (36 7 °C)   Resp 16   Ht 5' 7" (1 702 m)   Wt 80 6 kg (177 lb 9 6 oz)   BMI 27 82 kg/m²          Physical Exam  Vitals signs and nursing note reviewed  Constitutional:       General: He is not in acute distress  Appearance: He is well-developed  He is not diaphoretic  HENT:      Head: Normocephalic and atraumatic  Eyes:      Pupils: Pupils are equal, round, and reactive to light  Neck:      Musculoskeletal: Normal range of motion and neck supple  Cardiovascular:      Rate and Rhythm: Normal rate and regular rhythm  Heart sounds: Normal heart sounds  No murmur  Pulmonary:      Effort: Pulmonary effort is normal  No respiratory distress  Breath sounds: Normal breath sounds  No wheezing  Abdominal:      General: Bowel sounds are normal       Palpations: Abdomen is soft  Hernia: A hernia is present  Comments: He has a small reducible incisional hernia at his supraumbilical incision site  There was no evidence of recurrent inguinal hernias  Musculoskeletal: Normal range of motion  Skin:     General: Skin is warm and dry  Neurological:      Mental Status: He is alert and oriented to person, place, and time     Psychiatric:         Behavior: Behavior normal

## 2021-04-08 ENCOUNTER — HOSPITAL ENCOUNTER (OUTPATIENT)
Dept: NON INVASIVE DIAGNOSTICS | Facility: HOSPITAL | Age: 81
Discharge: HOME | End: 2021-04-08
Attending: SURGERY
Payer: MEDICARE

## 2021-04-08 DIAGNOSIS — K43.2 INCISIONAL HERNIA, WITHOUT OBSTRUCTION OR GANGRENE: ICD-10-CM

## 2021-04-08 LAB
ATRIAL RATE: 81 BPM
P AXIS: 72 DEGREES
PR INTERVAL: 152 MS
QRS AXIS: 7 DEGREES
QRSD INTERVAL: 138 MS
QT INTERVAL: 386 MS
QTC INTERVAL: 448 MS
T WAVE AXIS: 25 DEGREES
VENTRICULAR RATE: 81 BPM

## 2021-04-08 PROCEDURE — 93005 ELECTROCARDIOGRAM TRACING: CPT

## 2021-04-08 PROCEDURE — 93010 ELECTROCARDIOGRAM REPORT: CPT | Performed by: INTERNAL MEDICINE

## 2021-04-16 NOTE — PRE-PROCEDURE INSTRUCTIONS
Pre-Surgery Instructions:   Medication Instructions    acetaminophen (TYLENOL) 500 mg tablet Instructed patient per Anesthesia Guidelines   Fluocinolone Acetonide Scalp 0 01 % OIL Instructed patient per Anesthesia Guidelines   imiquimod (ALDARA) 5 % cream Instructed patient per Anesthesia Guidelines   lisinopril-hydrochlorothiazide (PRINZIDE,ZESTORETIC) 20-25 MG per tablet Instructed patient per Anesthesia Guidelines   Multiple Vitamins-Minerals (PRESERVISION AREDS 2) CAPS Instructed patient per Anesthesia Guidelines   Naproxen Sodium (ALEVE PO) Instructed patient per Anesthesia Guidelines  Instructed to take tylenol if needed am of surgery with sip of water per anesthesia guidelines

## 2021-04-21 ENCOUNTER — ANESTHESIA EVENT (OUTPATIENT)
Dept: PERIOP | Facility: HOSPITAL | Age: 81
End: 2021-04-21
Payer: MEDICARE

## 2021-04-22 ENCOUNTER — HOSPITAL ENCOUNTER (OUTPATIENT)
Facility: HOSPITAL | Age: 81
Setting detail: OUTPATIENT SURGERY
Discharge: HOME | End: 2021-04-22
Attending: SURGERY | Admitting: SURGERY
Payer: MEDICARE

## 2021-04-22 ENCOUNTER — ANESTHESIA (OUTPATIENT)
Dept: PERIOP | Facility: HOSPITAL | Age: 81
End: 2021-04-22
Payer: MEDICARE

## 2021-04-22 VITALS
WEIGHT: 177 LBS | HEIGHT: 67 IN | RESPIRATION RATE: 18 BRPM | BODY MASS INDEX: 27.78 KG/M2 | DIASTOLIC BLOOD PRESSURE: 74 MMHG | OXYGEN SATURATION: 94 % | TEMPERATURE: 98.7 F | HEART RATE: 85 BPM | SYSTOLIC BLOOD PRESSURE: 152 MMHG

## 2021-04-22 DIAGNOSIS — K43.2 INCISIONAL HERNIA, WITHOUT OBSTRUCTION OR GANGRENE: Primary | ICD-10-CM

## 2021-04-22 PROCEDURE — 49560 PR REPAIR INCISIONAL HERNIA,REDUCIBLE: CPT | Performed by: SURGERY

## 2021-04-22 PROCEDURE — 49568 PR IMPLANT MESH HERNIA REPAIR/DEBRIDEMENT CLOSURE: CPT | Performed by: SURGERY

## 2021-04-22 PROCEDURE — C1781 MESH (IMPLANTABLE): HCPCS | Performed by: SURGERY

## 2021-04-22 DEVICE — VENTRALEX ST HERNIA PATCH
Type: IMPLANTABLE DEVICE | Site: ABDOMEN | Status: FUNCTIONAL
Brand: VENTRALEX ST HERNIA PATCH

## 2021-04-22 RX ORDER — HYDROMORPHONE HCL/PF 1 MG/ML
SYRINGE (ML) INJECTION AS NEEDED
Status: DISCONTINUED | OUTPATIENT
Start: 2021-04-22 | End: 2021-04-22

## 2021-04-22 RX ORDER — HYDROMORPHONE HCL/PF 1 MG/ML
0.5 SYRINGE (ML) INJECTION
Status: DISCONTINUED | OUTPATIENT
Start: 2021-04-22 | End: 2021-04-22 | Stop reason: HOSPADM

## 2021-04-22 RX ORDER — OXYCODONE HYDROCHLORIDE 5 MG/1
5 TABLET ORAL EVERY 4 HOURS PRN
Qty: 20 TABLET | Refills: 0 | Status: SHIPPED | OUTPATIENT
Start: 2021-04-22 | End: 2021-05-02

## 2021-04-22 RX ORDER — FENTANYL CITRATE 50 UG/ML
INJECTION, SOLUTION INTRAMUSCULAR; INTRAVENOUS AS NEEDED
Status: DISCONTINUED | OUTPATIENT
Start: 2021-04-22 | End: 2021-04-22

## 2021-04-22 RX ORDER — PROMETHAZINE HYDROCHLORIDE 25 MG/ML
12.5 INJECTION, SOLUTION INTRAMUSCULAR; INTRAVENOUS ONCE AS NEEDED
Status: DISCONTINUED | OUTPATIENT
Start: 2021-04-22 | End: 2021-04-22 | Stop reason: HOSPADM

## 2021-04-22 RX ORDER — MAGNESIUM HYDROXIDE 1200 MG/15ML
LIQUID ORAL AS NEEDED
Status: DISCONTINUED | OUTPATIENT
Start: 2021-04-22 | End: 2021-04-22 | Stop reason: HOSPADM

## 2021-04-22 RX ORDER — ROCURONIUM BROMIDE 10 MG/ML
INJECTION, SOLUTION INTRAVENOUS AS NEEDED
Status: DISCONTINUED | OUTPATIENT
Start: 2021-04-22 | End: 2021-04-22

## 2021-04-22 RX ORDER — GLYCOPYRROLATE 0.2 MG/ML
INJECTION INTRAMUSCULAR; INTRAVENOUS AS NEEDED
Status: DISCONTINUED | OUTPATIENT
Start: 2021-04-22 | End: 2021-04-22

## 2021-04-22 RX ORDER — MORPHINE SULFATE 10 MG/ML
2 INJECTION, SOLUTION INTRAMUSCULAR; INTRAVENOUS
Status: DISCONTINUED | OUTPATIENT
Start: 2021-04-22 | End: 2021-04-22 | Stop reason: HOSPADM

## 2021-04-22 RX ORDER — ONDANSETRON 2 MG/ML
4 INJECTION INTRAMUSCULAR; INTRAVENOUS ONCE AS NEEDED
Status: COMPLETED | OUTPATIENT
Start: 2021-04-22 | End: 2021-04-22

## 2021-04-22 RX ORDER — NEOSTIGMINE METHYLSULFATE 1 MG/ML
INJECTION INTRAVENOUS AS NEEDED
Status: DISCONTINUED | OUTPATIENT
Start: 2021-04-22 | End: 2021-04-22

## 2021-04-22 RX ORDER — LIDOCAINE HYDROCHLORIDE 20 MG/ML
INJECTION, SOLUTION EPIDURAL; INFILTRATION; INTRACAUDAL; PERINEURAL AS NEEDED
Status: DISCONTINUED | OUTPATIENT
Start: 2021-04-22 | End: 2021-04-22

## 2021-04-22 RX ORDER — CEFAZOLIN SODIUM 2 G/50ML
SOLUTION INTRAVENOUS AS NEEDED
Status: DISCONTINUED | OUTPATIENT
Start: 2021-04-22 | End: 2021-04-22

## 2021-04-22 RX ORDER — LABETALOL 20 MG/4 ML (5 MG/ML) INTRAVENOUS SYRINGE
AS NEEDED
Status: DISCONTINUED | OUTPATIENT
Start: 2021-04-22 | End: 2021-04-22

## 2021-04-22 RX ORDER — CEFAZOLIN SODIUM 2 G/50ML
2000 SOLUTION INTRAVENOUS ONCE
Status: CANCELLED | OUTPATIENT
Start: 2021-04-22 | End: 2021-04-22

## 2021-04-22 RX ORDER — OXYCODONE HYDROCHLORIDE AND ACETAMINOPHEN 5; 325 MG/1; MG/1
1 TABLET ORAL EVERY 4 HOURS PRN
Status: DISCONTINUED | OUTPATIENT
Start: 2021-04-22 | End: 2021-04-22 | Stop reason: HOSPADM

## 2021-04-22 RX ORDER — FENTANYL CITRATE/PF 50 MCG/ML
25 SYRINGE (ML) INJECTION
Status: COMPLETED | OUTPATIENT
Start: 2021-04-22 | End: 2021-04-22

## 2021-04-22 RX ORDER — SODIUM CHLORIDE 9 MG/ML
125 INJECTION, SOLUTION INTRAVENOUS CONTINUOUS
Status: DISCONTINUED | OUTPATIENT
Start: 2021-04-22 | End: 2021-04-22 | Stop reason: HOSPADM

## 2021-04-22 RX ORDER — BUPIVACAINE HYDROCHLORIDE 2.5 MG/ML
INJECTION, SOLUTION EPIDURAL; INFILTRATION; INTRACAUDAL AS NEEDED
Status: DISCONTINUED | OUTPATIENT
Start: 2021-04-22 | End: 2021-04-22 | Stop reason: HOSPADM

## 2021-04-22 RX ORDER — PROPOFOL 10 MG/ML
INJECTION, EMULSION INTRAVENOUS AS NEEDED
Status: DISCONTINUED | OUTPATIENT
Start: 2021-04-22 | End: 2021-04-22

## 2021-04-22 RX ORDER — ONDANSETRON 2 MG/ML
INJECTION INTRAMUSCULAR; INTRAVENOUS AS NEEDED
Status: DISCONTINUED | OUTPATIENT
Start: 2021-04-22 | End: 2021-04-22

## 2021-04-22 RX ADMIN — CEFAZOLIN SODIUM 2000 MG: 2 SOLUTION INTRAVENOUS at 13:49

## 2021-04-22 RX ADMIN — HYDROMORPHONE HYDROCHLORIDE 0.5 MG: 1 INJECTION, SOLUTION INTRAMUSCULAR; INTRAVENOUS; SUBCUTANEOUS at 14:22

## 2021-04-22 RX ADMIN — ONDANSETRON 4 MG: 2 INJECTION INTRAMUSCULAR; INTRAVENOUS at 16:12

## 2021-04-22 RX ADMIN — SODIUM CHLORIDE 125 ML/HR: 0.9 INJECTION, SOLUTION INTRAVENOUS at 11:37

## 2021-04-22 RX ADMIN — FENTANYL CITRATE 25 MCG: 50 INJECTION, SOLUTION INTRAMUSCULAR; INTRAVENOUS at 15:28

## 2021-04-22 RX ADMIN — FENTANYL CITRATE 25 MCG: 50 INJECTION, SOLUTION INTRAMUSCULAR; INTRAVENOUS at 15:05

## 2021-04-22 RX ADMIN — PROPOFOL 150 MG: 10 INJECTION, EMULSION INTRAVENOUS at 13:57

## 2021-04-22 RX ADMIN — LIDOCAINE HYDROCHLORIDE 60 MG: 20 INJECTION, SOLUTION EPIDURAL; INFILTRATION; INTRACAUDAL; PERINEURAL at 13:57

## 2021-04-22 RX ADMIN — HYDROMORPHONE HYDROCHLORIDE 0.5 MG: 1 INJECTION, SOLUTION INTRAMUSCULAR; INTRAVENOUS; SUBCUTANEOUS at 15:53

## 2021-04-22 RX ADMIN — ROCURONIUM BROMIDE 40 MG: 10 INJECTION, SOLUTION INTRAVENOUS at 13:57

## 2021-04-22 RX ADMIN — FENTANYL CITRATE 50 MCG: 50 INJECTION INTRAMUSCULAR; INTRAVENOUS at 13:57

## 2021-04-22 RX ADMIN — SODIUM CHLORIDE: 0.9 INJECTION, SOLUTION INTRAVENOUS at 14:49

## 2021-04-22 RX ADMIN — LABETALOL 20 MG/4 ML (5 MG/ML) INTRAVENOUS SYRINGE 10 MG: at 14:54

## 2021-04-22 RX ADMIN — HYDROMORPHONE HYDROCHLORIDE 0.5 MG: 1 INJECTION, SOLUTION INTRAMUSCULAR; INTRAVENOUS; SUBCUTANEOUS at 16:05

## 2021-04-22 RX ADMIN — NEOSTIGMINE METHYLSULFATE 4 MG: 1 INJECTION INTRAVENOUS at 14:46

## 2021-04-22 RX ADMIN — LABETALOL 20 MG/4 ML (5 MG/ML) INTRAVENOUS SYRINGE 5 MG: at 14:50

## 2021-04-22 RX ADMIN — FENTANYL CITRATE 25 MCG: 50 INJECTION, SOLUTION INTRAMUSCULAR; INTRAVENOUS at 15:21

## 2021-04-22 RX ADMIN — FENTANYL CITRATE 25 MCG: 50 INJECTION, SOLUTION INTRAMUSCULAR; INTRAVENOUS at 15:13

## 2021-04-22 RX ADMIN — GLYCOPYRROLATE 0.6 MG: 0.2 INJECTION, SOLUTION INTRAMUSCULAR; INTRAVENOUS at 14:46

## 2021-04-22 RX ADMIN — ONDANSETRON 4 MG: 2 INJECTION INTRAMUSCULAR; INTRAVENOUS at 13:57

## 2021-04-22 RX ADMIN — HYDROMORPHONE HYDROCHLORIDE 0.5 MG: 1 INJECTION, SOLUTION INTRAMUSCULAR; INTRAVENOUS; SUBCUTANEOUS at 15:42

## 2021-04-22 RX ADMIN — FENTANYL CITRATE 50 MCG: 50 INJECTION INTRAMUSCULAR; INTRAVENOUS at 14:16

## 2021-04-22 RX ADMIN — TRIMETHOBENZAMIDE HYDROCHLORIDE 200 MG: 100 INJECTION INTRAMUSCULAR at 17:28

## 2021-04-22 NOTE — PROGRESS NOTES
Patient received IM tigan for nausea  At this time, nausea unresolved  He declines anything which will make him sleepy  He ambulated in the hallways and use the restroom  Patient belching, reports some relief  He returned to his space  Oral intake encouraged  Wife bedside  Call bell within reach

## 2021-04-22 NOTE — OP NOTE
OPERATIVE REPORT  PATIENT NAME: Malorie Farris    :  1940  MRN: [id number]  Pt Location: [facility]    SURGERY DATE: 2021    Surgeon(s) and Role:     * Lissa Larose MD - Primary     * Lore Mccoy MD - Assisting    Preop Diagnosis:  Incisional hernia, without obstruction or gangrene [K43 2]    Post-Op Diagnosis Codes:     * Incisional hernia, without obstruction or gangrene [K43 2]    Procedure(s) (LRB):  REPAIR HERNIA INCISIONAL OPEN WITH MESH (N/A)    Specimen(s):  * No specimens in log *    Estimated Blood Loss:   Minimal    Drains:  * No LDAs found *    Anesthesia Type:   General    Operative Indications:  Incisional hernia, without obstruction or gangrene [K43 2]      Operative Findings:  Incisional hernia    Complications:   None    Procedure and Technique:  The patient was seen in the Holding Room  The risks, benefits, complications, treatment options, and expected outcomes were discussed with the patient  The possibilities of reaction to medication, pulmonary aspiration, perforation of viscus, bleeding, recurrent infection, the need for additional procedures, failure to diagnose a condition, and creating a complication requiring transfusion or operation were discussed with the patient  The patient concurred with the proposed plan, giving informed consent  The site of surgery properly noted/marked  The patient was taken to Operating Room, identified as Malorie Farris and staff verified patient name, , and procedure  A Time Out was held and the above information confirmed  The patient was placed supine  After establishing general anesthesia, the abdomen was prepped and draped in standard fashion  Local anesthesia was used at the incision  An supraumbilical incision was made  Dissection was carried down to the hernia sac located above the fascia and was mobilized from surrounding structures  The edge of the fascia was identifiedcircumferentially around the defect   Adhesions anterior and posterior to the fascia were lysed using cautery and/or blunt dissection  This created a preperitoneal pocket and the peritoneum was reapproximated with 0 Vicryl suture to close off the space from the abdominal cavity  Next a medium Ventralex mesh was placed into the preperitoneal space secured the 4 corners with 2-0 Prolene suture  Then the fascia was closed the midline with 4 interrupted figure-of-eight 2 0 Prolene sutures taken leaflets of the mesh   The soft tissue was irrigated and closed in layers,using Vicryl sutures  Hemostasis was confirmed  The skin incision was closed in layers with a 4-0 Monocryl subcuticular closure  Histocryl glue was used  Instrument, sponge, and needle counts were correct prior to closure and at the conclusion of the case  This text is generated with voice recognition software  There may be translation, syntax,  or grammatical errors  If you have any questions, please contact the dictating provider          I was present for the entire procedure    Patient Disposition:  PACU     SIGNATURE: Chetan Bea MD  DATE: April 22, 2021  TIME: 2:42 PM

## 2021-04-22 NOTE — DISCHARGE INSTRUCTIONS
[facility] Surgical  Post-Operative Care Instructions  Dr Isha Tadeo MD, [facility]  [phone]    1  General: You will feel pulling sensations around the wound or funny aches and pains around the incisions  This is normal  Even minor surgery is a change in your body and this is your bodys way of reaction to it  If you have had abdominal surgery, it may help to support the incision with a small pillow or blanket for comfort when moving or coughing  2  Wound care:  Okay to shower  The glue will fall off over the next week or 2     3  Water: You may shower over the wound, unless there are drain tubes left in place  Do not bathe or use a pool or hot tub until cleared by the physician  4  Activity: You may go up and down stairs, walk as much as you are comfortable, but walk at least 3 times each day  If you have had abdominal surgery, do not lift anything heavier than 20 pounds for at least 4 weeks, unless cleared by the doctor  5  Diet: You may resume a regular diet  If you had a same-day surgery or overnight stay surgery, he may wish to eat lightly for a few days: soups, crackers, and sandwiches  You may resume a regular diet when ready  6  Medications: Resume all of your previous medications, unless told otherwise by the doctor  A good option for pain control is to start with Tylenol and ibuprofen and alternate taking them every 2 hours for 1-2 days  If this is not sufficient then substituted the Tylenol for the narcotic pain medicine prescribed  Insure that you do not take more than 4000 mg of Tylenol per day  You do not need to take the narcotic pain medications unless you are having significant pain and discomfort  7  Driving: You will need someone to drive you home on the day of surgery  Do not drive or make any important decisions while on narcotic pain medication or 24 hours and after anesthesia or sedation for surgery   Generally, you may drive when your off all narcotic pain medications  8  Upset Stomach: You may take Maalox, Tums, or similar items for an upset stomach  If your narcotic pain medication causes an upset stomach, do not take it on an empty stomach  Try taking it with at least some crackers or toast      9  Constipation: Patients often experienced constipation after surgery  You may take over-the-counter medication for this, such as Metamucil, Senokot, Dulcolax, milk of magnesia, etc  You may take a suppository unless you have had anorectal surgery such as a procedure on your hemorrhoids  If you experience significant nausea or vomiting after abdominal surgery, call the office before trying any of these medications  10  Call the office: If you are experiencing any of the following, fevers above 101 5°, significant nausea or vomiting, if the wound develops drainage and/or is excessive redness around the wound, or if you have significant diarrhea or other worsening symptoms  11  Pain: You may be given a prescription for pain  This will be given to the hospital, the day of surgery  12  Sexual Activity: You may resume sexual activity when you feel ready and comfortable and your incision is sealed and healed without apparent infection risk

## 2021-04-22 NOTE — INTERVAL H&P NOTE
H&P reviewed  After examining the patient I find no changes in the patients condition since the H&P had been written      Vitals:    04/22/21 1142   BP: (!) 188/88   Pulse:    Resp:    Temp:    SpO2:

## 2021-04-22 NOTE — ANESTHESIA POSTPROCEDURE EVALUATION
Post-Op Assessment Note    CV Status:  Stable  Pain Score: 2    Pain management: adequate     Mental Status:  Alert and awake   Hydration Status:  Euvolemic and stable   PONV Controlled:  Controlled   Airway Patency:  Patent      Post Op Vitals Reviewed: Yes      Staff: Anesthesiologist         No complications documented      BP      Temp 97 5 °F (36 4 °C) (04/22/21 1616)    Pulse     Resp 15 (04/22/21 1616)    SpO2 96 % (04/22/21 1616)

## 2021-04-22 NOTE — ANESTHESIA PREPROCEDURE EVALUATION
Procedure:  REPAIR HERNIA INCISIONAL OPEN WITH MESH (N/A Abdomen)    Relevant Problems   CARDIO   (+) Hyperlipidemia   (+) Hypertension      /RENAL   (+) Chronic kidney disease      MUSCULOSKELETAL   (+) Gout      Other   (+) Basal cell carcinoma of skin of other parts of face   (+) 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HCC)        Physical Exam    Airway    Mallampati score: II  TM Distance: >3 FB  Neck ROM: full     Dental   No notable dental hx     Cardiovascular  Rhythm: regular, Rate: normal, Cardiovascular exam normal    Pulmonary  Pulmonary exam normal Breath sounds clear to auscultation,     Other Findings        Anesthesia Plan  ASA Score- 2     Anesthesia Type- general with ASA Monitors  Additional Monitors:   Airway Plan: ETT  Plan Factors-    Chart reviewed  Patient summary reviewed  Induction- intravenous  Postoperative Plan- Plan for postoperative opioid use  Informed Consent- Anesthetic plan and risks discussed with patient and spouse

## 2021-04-27 ENCOUNTER — TELEPHONE (OUTPATIENT)
Dept: SURGERY | Facility: CLINIC | Age: 81
End: 2021-04-27

## 2021-04-28 DIAGNOSIS — I10 BENIGN ESSENTIAL HYPERTENSION: ICD-10-CM

## 2021-04-28 RX ORDER — LISINOPRIL AND HYDROCHLOROTHIAZIDE 25; 20 MG/1; MG/1
1 TABLET ORAL DAILY
Qty: 30 TABLET | Refills: 0 | Status: SHIPPED | OUTPATIENT
Start: 2021-04-28 | End: 2021-05-03 | Stop reason: SDUPTHER

## 2021-05-03 RX ORDER — LISINOPRIL AND HYDROCHLOROTHIAZIDE 25; 20 MG/1; MG/1
1 TABLET ORAL DAILY
Qty: 90 TABLET | Refills: 3 | Status: SHIPPED | OUTPATIENT
Start: 2021-05-03 | End: 2021-06-10 | Stop reason: HOSPADM

## 2021-05-03 NOTE — TELEPHONE ENCOUNTER
Estrada, can you please re-send if appropriate  Katina Vásquez He needs a 90 day sent to [facility]   Thanks

## 2021-05-05 ENCOUNTER — OFFICE VISIT (OUTPATIENT)
Dept: SURGERY | Facility: CLINIC | Age: 81
End: 2021-05-05

## 2021-05-05 VITALS — TEMPERATURE: 97.8 F | RESPIRATION RATE: 16 BRPM | HEIGHT: 67 IN | WEIGHT: 174.8 LBS | BODY MASS INDEX: 27.44 KG/M2

## 2021-05-05 DIAGNOSIS — Z87.19 STATUS POST REPAIR OF VENTRAL HERNIA: Primary | ICD-10-CM

## 2021-05-05 DIAGNOSIS — Z98.890 STATUS POST REPAIR OF VENTRAL HERNIA: Primary | ICD-10-CM

## 2021-05-05 PROCEDURE — 99024 POSTOP FOLLOW-UP VISIT: CPT | Performed by: SURGERY

## 2021-05-05 NOTE — PROGRESS NOTES
Assessment/Plan:    Status post repair of ventral hernia   Overall is doing very well  His pain is well controlled  He is tolerating regular diet  His incision is clean dry and intact  Postop instructions were provided  He can follow up p r n  Diagnoses and all orders for this visit:    Status post repair of ventral hernia          Subjective:      Patient ID: Dalia Winkler is a 79 y o  male      HPI        Review of Systems      Objective:      Temp 97 8 °F (36 6 °C)   Resp 16   Ht 5' 7" (1 702 m)   Wt 79 3 kg (174 lb 12 8 oz)   BMI 27 38 kg/m²          Physical Exam

## 2021-05-05 NOTE — ASSESSMENT & PLAN NOTE
Overall is doing very well  His pain is well controlled  He is tolerating regular diet  His incision is clean dry and intact  Postop instructions were provided  He can follow up p r n

## 2021-05-26 ENCOUNTER — OFFICE VISIT (OUTPATIENT)
Dept: PODIATRY | Facility: CLINIC | Age: 81
End: 2021-05-26
Payer: MEDICARE

## 2021-05-26 VITALS
DIASTOLIC BLOOD PRESSURE: 72 MMHG | SYSTOLIC BLOOD PRESSURE: 148 MMHG | WEIGHT: 174.2 LBS | BODY MASS INDEX: 27.34 KG/M2 | HEIGHT: 67 IN

## 2021-05-26 DIAGNOSIS — I73.9 PERIPHERAL VASCULAR DISEASE, UNSPECIFIED (HCC): Primary | ICD-10-CM

## 2021-05-26 PROCEDURE — 11719 TRIM NAIL(S) ANY NUMBER: CPT | Performed by: PODIATRIST

## 2021-05-26 NOTE — PROGRESS NOTES
Established patient with class findings presents for nail care  Vascular exam:  DP  0/4 bilateral; PT  0 4 bilateral   Dermatological exam:  Each toenail is thick and  dystrophic  Diagnosis:  PVD  Treatment: Trimmed multiple dystrophic toenails      Nail removal    Date/Time: 5/26/2021 2:13 PM  Performed by: Sonia Hughes DPM  Authorized by: Sonia Hughes DPM     Nails trimmed:     Number of nails trimmed:  10

## 2021-06-08 ENCOUNTER — OFFICE VISIT (OUTPATIENT)
Dept: DERMATOLOGY | Facility: CLINIC | Age: 81
End: 2021-06-08
Payer: MEDICARE

## 2021-06-08 ENCOUNTER — APPOINTMENT (EMERGENCY)
Dept: CT IMAGING | Facility: HOSPITAL | Age: 81
DRG: 641 | End: 2021-06-08
Payer: MEDICARE

## 2021-06-08 ENCOUNTER — HOSPITAL ENCOUNTER (INPATIENT)
Facility: HOSPITAL | Age: 81
LOS: 2 days | Discharge: HOME | DRG: 641 | End: 2021-06-10
Attending: EMERGENCY MEDICINE | Admitting: INTERNAL MEDICINE
Payer: MEDICARE

## 2021-06-08 VITALS — TEMPERATURE: 98.4 F | HEIGHT: 67 IN | BODY MASS INDEX: 27.15 KG/M2 | WEIGHT: 173 LBS

## 2021-06-08 DIAGNOSIS — Z85.89 HISTORY OF SQUAMOUS CELL CARCINOMA: ICD-10-CM

## 2021-06-08 DIAGNOSIS — Z85.820 HISTORY OF MELANOMA: ICD-10-CM

## 2021-06-08 DIAGNOSIS — L57.0 ACTINIC KERATOSIS: Primary | ICD-10-CM

## 2021-06-08 DIAGNOSIS — Z85.828 HISTORY OF BASAL CELL CARCINOMA: ICD-10-CM

## 2021-06-08 DIAGNOSIS — R93.0 ABNORMAL CT OF THE HEAD: ICD-10-CM

## 2021-06-08 DIAGNOSIS — I10 ESSENTIAL HYPERTENSION: ICD-10-CM

## 2021-06-08 DIAGNOSIS — R42 DIZZINESS: Primary | ICD-10-CM

## 2021-06-08 DIAGNOSIS — I10 HIGH BLOOD PRESSURE: ICD-10-CM

## 2021-06-08 DIAGNOSIS — D48.5 NEOPLASM OF UNCERTAIN BEHAVIOR OF SKIN: ICD-10-CM

## 2021-06-08 PROBLEM — R35.0 URINARY FREQUENCY: Status: ACTIVE | Noted: 2021-06-08

## 2021-06-08 LAB
ANION GAP SERPL CALCULATED.3IONS-SCNC: 8 MMOL/L (ref 4–13)
ATRIAL RATE: 80 BPM
BASOPHILS # BLD AUTO: 0.03 THOUSANDS/ΜL (ref 0–0.1)
BASOPHILS NFR BLD AUTO: 0 % (ref 0–1)
BUN SERPL-MCNC: 28 MG/DL (ref 5–25)
CALCIUM SERPL-MCNC: 9.8 MG/DL (ref 8.3–10.1)
CHLORIDE SERPL-SCNC: 104 MMOL/L (ref 100–108)
CO2 SERPL-SCNC: 29 MMOL/L (ref 21–32)
CREAT SERPL-MCNC: 1.46 MG/DL (ref 0.6–1.3)
EOSINOPHIL # BLD AUTO: 0.07 THOUSAND/ΜL (ref 0–0.61)
EOSINOPHIL NFR BLD AUTO: 1 % (ref 0–6)
ERYTHROCYTE [DISTWIDTH] IN BLOOD BY AUTOMATED COUNT: 13 % (ref 11.6–15.1)
GFR SERPL CREATININE-BSD FRML MDRD: 44 ML/MIN/1.73SQ M
GLUCOSE SERPL-MCNC: 100 MG/DL (ref 65–140)
GLUCOSE SERPL-MCNC: 110 MG/DL (ref 65–140)
HCT VFR BLD AUTO: 40.7 % (ref 36.5–49.3)
HGB BLD-MCNC: 13.6 G/DL (ref 12–17)
IMM GRANULOCYTES # BLD AUTO: 0.03 THOUSAND/UL (ref 0–0.2)
IMM GRANULOCYTES NFR BLD AUTO: 0 % (ref 0–2)
LYMPHOCYTES # BLD AUTO: 1.84 THOUSANDS/ΜL (ref 0.6–4.47)
LYMPHOCYTES NFR BLD AUTO: 25 % (ref 14–44)
MCH RBC QN AUTO: 30.7 PG (ref 26.8–34.3)
MCHC RBC AUTO-ENTMCNC: 33.4 G/DL (ref 31.4–37.4)
MCV RBC AUTO: 92 FL (ref 82–98)
MONOCYTES # BLD AUTO: 0.44 THOUSAND/ΜL (ref 0.17–1.22)
MONOCYTES NFR BLD AUTO: 6 % (ref 4–12)
NEUTROPHILS # BLD AUTO: 5.07 THOUSANDS/ΜL (ref 1.85–7.62)
NEUTS SEG NFR BLD AUTO: 68 % (ref 43–75)
NRBC BLD AUTO-RTO: 0 /100 WBCS
P AXIS: 86 DEGREES
PLATELET # BLD AUTO: 165 THOUSANDS/UL (ref 149–390)
PMV BLD AUTO: 11.2 FL (ref 8.9–12.7)
POTASSIUM SERPL-SCNC: 3.5 MMOL/L (ref 3.5–5.3)
PR INTERVAL: 158 MS
QRS AXIS: 36 DEGREES
QRSD INTERVAL: 144 MS
QT INTERVAL: 378 MS
QTC INTERVAL: 435 MS
RBC # BLD AUTO: 4.43 MILLION/UL (ref 3.88–5.62)
SODIUM SERPL-SCNC: 141 MMOL/L (ref 136–145)
T WAVE AXIS: 61 DEGREES
TROPONIN I SERPL-MCNC: <0.02 NG/ML
VENTRICULAR RATE: 80 BPM
WBC # BLD AUTO: 7.48 THOUSAND/UL (ref 4.31–10.16)

## 2021-06-08 PROCEDURE — 82948 REAGENT STRIP/BLOOD GLUCOSE: CPT

## 2021-06-08 PROCEDURE — 93005 ELECTROCARDIOGRAM TRACING: CPT

## 2021-06-08 PROCEDURE — 80048 BASIC METABOLIC PNL TOTAL CA: CPT | Performed by: EMERGENCY MEDICINE

## 2021-06-08 PROCEDURE — 70498 CT ANGIOGRAPHY NECK: CPT

## 2021-06-08 PROCEDURE — 84484 ASSAY OF TROPONIN QUANT: CPT | Performed by: EMERGENCY MEDICINE

## 2021-06-08 PROCEDURE — 96361 HYDRATE IV INFUSION ADD-ON: CPT

## 2021-06-08 PROCEDURE — 96374 THER/PROPH/DIAG INJ IV PUSH: CPT

## 2021-06-08 PROCEDURE — 96375 TX/PRO/DX INJ NEW DRUG ADDON: CPT

## 2021-06-08 PROCEDURE — 99285 EMERGENCY DEPT VISIT HI MDM: CPT | Performed by: EMERGENCY MEDICINE

## 2021-06-08 PROCEDURE — 99214 OFFICE O/P EST MOD 30 MIN: CPT | Performed by: DERMATOLOGY

## 2021-06-08 PROCEDURE — 36415 COLL VENOUS BLD VENIPUNCTURE: CPT | Performed by: EMERGENCY MEDICINE

## 2021-06-08 PROCEDURE — 93010 ELECTROCARDIOGRAM REPORT: CPT | Performed by: INTERNAL MEDICINE

## 2021-06-08 PROCEDURE — 85025 COMPLETE CBC W/AUTO DIFF WBC: CPT | Performed by: EMERGENCY MEDICINE

## 2021-06-08 PROCEDURE — 99223 1ST HOSP IP/OBS HIGH 75: CPT | Performed by: INTERNAL MEDICINE

## 2021-06-08 PROCEDURE — 70496 CT ANGIOGRAPHY HEAD: CPT

## 2021-06-08 PROCEDURE — 99285 EMERGENCY DEPT VISIT HI MDM: CPT

## 2021-06-08 PROCEDURE — 85049 AUTOMATED PLATELET COUNT: CPT | Performed by: INTERNAL MEDICINE

## 2021-06-08 PROCEDURE — 84484 ASSAY OF TROPONIN QUANT: CPT | Performed by: INTERNAL MEDICINE

## 2021-06-08 PROCEDURE — 17000 DESTRUCT PREMALG LESION: CPT | Performed by: DERMATOLOGY

## 2021-06-08 PROCEDURE — 17003 DESTRUCT PREMALG LES 2-14: CPT | Performed by: DERMATOLOGY

## 2021-06-08 RX ORDER — IMIQUIMOD 12.5 MG/.25G
CREAM TOPICAL
Qty: 24 EACH | Refills: 1 | Status: SHIPPED | OUTPATIENT
Start: 2021-06-08

## 2021-06-08 RX ORDER — MECLIZINE HCL 12.5 MG/1
25 TABLET ORAL ONCE
Status: COMPLETED | OUTPATIENT
Start: 2021-06-08 | End: 2021-06-08

## 2021-06-08 RX ORDER — LISINOPRIL 20 MG/1
20 TABLET ORAL DAILY
Status: DISCONTINUED | OUTPATIENT
Start: 2021-06-09 | End: 2021-06-10 | Stop reason: HOSPADM

## 2021-06-08 RX ORDER — HEPARIN SODIUM 5000 [USP'U]/ML
5000 INJECTION, SOLUTION INTRAVENOUS; SUBCUTANEOUS EVERY 8 HOURS SCHEDULED
Status: DISCONTINUED | OUTPATIENT
Start: 2021-06-08 | End: 2021-06-10 | Stop reason: HOSPADM

## 2021-06-08 RX ORDER — ASPIRIN 81 MG/1
81 TABLET, CHEWABLE ORAL DAILY
Status: DISCONTINUED | OUTPATIENT
Start: 2021-06-08 | End: 2021-06-10 | Stop reason: HOSPADM

## 2021-06-08 RX ORDER — DIAZEPAM 5 MG/ML
5 INJECTION, SOLUTION INTRAMUSCULAR; INTRAVENOUS ONCE
Status: COMPLETED | OUTPATIENT
Start: 2021-06-08 | End: 2021-06-08

## 2021-06-08 RX ORDER — AMLODIPINE BESYLATE 5 MG/1
5 TABLET ORAL DAILY
Status: DISCONTINUED | OUTPATIENT
Start: 2021-06-08 | End: 2021-06-10 | Stop reason: HOSPADM

## 2021-06-08 RX ORDER — SODIUM CHLORIDE 9 MG/ML
75 INJECTION, SOLUTION INTRAVENOUS CONTINUOUS
Status: DISCONTINUED | OUTPATIENT
Start: 2021-06-08 | End: 2021-06-09

## 2021-06-08 RX ORDER — ONDANSETRON 2 MG/ML
4 INJECTION INTRAMUSCULAR; INTRAVENOUS EVERY 6 HOURS PRN
Status: DISCONTINUED | OUTPATIENT
Start: 2021-06-08 | End: 2021-06-10 | Stop reason: HOSPADM

## 2021-06-08 RX ORDER — HYDRALAZINE HYDROCHLORIDE 20 MG/ML
5 INJECTION INTRAMUSCULAR; INTRAVENOUS EVERY 6 HOURS PRN
Status: DISCONTINUED | OUTPATIENT
Start: 2021-06-08 | End: 2021-06-10 | Stop reason: HOSPADM

## 2021-06-08 RX ORDER — LORAZEPAM 2 MG/ML
0.5 INJECTION INTRAMUSCULAR ONCE AS NEEDED
Status: COMPLETED | OUTPATIENT
Start: 2021-06-08 | End: 2021-06-09

## 2021-06-08 RX ORDER — DOCUSATE SODIUM 100 MG/1
100 CAPSULE, LIQUID FILLED ORAL 2 TIMES DAILY
Status: DISCONTINUED | OUTPATIENT
Start: 2021-06-08 | End: 2021-06-10 | Stop reason: HOSPADM

## 2021-06-08 RX ORDER — ATORVASTATIN CALCIUM 40 MG/1
40 TABLET, FILM COATED ORAL EVERY EVENING
Status: DISCONTINUED | OUTPATIENT
Start: 2021-06-08 | End: 2021-06-10 | Stop reason: HOSPADM

## 2021-06-08 RX ORDER — ONDANSETRON 2 MG/ML
4 INJECTION INTRAMUSCULAR; INTRAVENOUS ONCE
Status: COMPLETED | OUTPATIENT
Start: 2021-06-08 | End: 2021-06-08

## 2021-06-08 RX ORDER — HYDRALAZINE HYDROCHLORIDE 20 MG/ML
5 INJECTION INTRAMUSCULAR; INTRAVENOUS ONCE
Status: COMPLETED | OUTPATIENT
Start: 2021-06-08 | End: 2021-06-08

## 2021-06-08 RX ADMIN — ATORVASTATIN CALCIUM 40 MG: 40 TABLET, FILM COATED ORAL at 22:42

## 2021-06-08 RX ADMIN — AMLODIPINE BESYLATE 5 MG: 5 TABLET ORAL at 22:42

## 2021-06-08 RX ADMIN — MECLIZINE 25 MG: 12.5 TABLET ORAL at 16:57

## 2021-06-08 RX ADMIN — ONDANSETRON 4 MG: 2 INJECTION INTRAMUSCULAR; INTRAVENOUS at 16:56

## 2021-06-08 RX ADMIN — ASPIRIN 81 MG: 81 TABLET, CHEWABLE ORAL at 22:42

## 2021-06-08 RX ADMIN — SODIUM CHLORIDE 1000 ML: 0.9 INJECTION, SOLUTION INTRAVENOUS at 16:53

## 2021-06-08 RX ADMIN — HYDRALAZINE HYDROCHLORIDE 5 MG: 20 INJECTION, SOLUTION INTRAMUSCULAR; INTRAVENOUS at 21:34

## 2021-06-08 RX ADMIN — SODIUM CHLORIDE 75 ML/HR: 0.9 INJECTION, SOLUTION INTRAVENOUS at 21:35

## 2021-06-08 RX ADMIN — DOCUSATE SODIUM 100 MG: 100 CAPSULE ORAL at 22:42

## 2021-06-08 RX ADMIN — IOHEXOL 85 ML: 350 INJECTION, SOLUTION INTRAVENOUS at 17:53

## 2021-06-08 RX ADMIN — HEPARIN SODIUM 5000 UNITS: 5000 INJECTION INTRAVENOUS; SUBCUTANEOUS at 22:42

## 2021-06-08 RX ADMIN — DIAZEPAM 5 MG: 10 INJECTION, SOLUTION INTRAMUSCULAR; INTRAVENOUS at 16:56

## 2021-06-08 NOTE — PATIENT INSTRUCTIONS
1  ACTINIC KERATOSIS    Physical Exam:   Anatomic Location Affected:  Right temple   Morphological Description: Thin pink papule(s) with gritty scale       Assessment and Plan:  Based on a thorough discussion of this condition and the management approach to it (including a comprehensive discussion of the known risks, side effects and potential benefits of treatment), the patient (family) agrees to implement the following specific plan:   Treated with cryotherapy today; written and verbal consent obtained    Begin imiquimod cream daily Monday thru Friday for 6 weeks      6   NEOPLASM OF UNCERTAIN BEHAVIOR OF SKIN    Physical Exam:   (Anatomic Location);  o Nose, to be biopsied in July after vacation

## 2021-06-08 NOTE — ED ATTENDING ATTESTATION
6/8/2021  Dinora GUZMAN, saw and evaluated the patient  I have discussed the patient with the resident/non-physician practitioner and agree with the resident's/non-physician practitioner's findings, Plan of Care, and MDM as documented in the resident's/non-physician practitioner's note, except where noted  All available labs and Radiology studies were reviewed  I was present for key portions of any procedure(s) performed by the resident/non-physician practitioner and I was immediately available to provide assistance  At this point I agree with the current assessment done in the Emergency Department  I have conducted an independent evaluation of this patient a history and physical is as follows: An 80 yo male with pmhx of HTN, CKD, HLD, melanoma, sarcoma of the scalp and squamous cell carcinoma; presents with dizziness  Pt reports this morning while mowing the lawn he felt unwell, described as generalized malaise  Pt returned inside and took a cold shower with resolution of his symptoms  Pt reports around 1:30 pm today he develop room spinning dizziness  Dizziness has persisted since onset and worsened  Pt's wife now has to assist him with ambulation  Pt has had associated nausea and vomiting  Pt otherwise denies fever, chills, chest pain, SOB, abd pain, diarrhea, peripheral edema and rashes  Physical Exam  General Appearance: alert and oriented, nad, non toxic appearing  Skin:  Warm, dry, intact  HEENT: Atraumatic  Soft tissue swelling and healed surgical incision along the right scalp (chronic per patient)    PERRLA, subtle left-sided horizontal nystagmus  Neck: Supple, trachea midline  Cardiac: RRR; no murmurs, rub, gallops  Pulmonary: lungs CTAB; no wheezes, rales, rhonchi  Gastrointestinal: abdomen soft, nontender, nondistended; no guarding or rebound tenderness; good bowel sounds, no mass or bruits  Extremities:  no pedal edema, 2+ pulses; no calf tenderness, no clubbing, no cyanosis  Neuro:  no focal motor or sensory deficits, CN 2-12 grossly intact  Psych:  Normal mood and affect, normal judgement and insight    Assessment and Plan:  Dizziness, described as vertiginous  Pt resting comfortable however does have subtle left sided horizontal nystagmus  Given rapid onset, more likely peripheral in nature  Also possibly related to dehydration given that pt was mowing the lawn in the heat  Will check labs and CTA head/neck for further evaluation  Will treat symptomatically and re-assess        ED Course         Critical Care Time  Procedures

## 2021-06-08 NOTE — PROGRESS NOTES
[facility] Follow Up Note    Patient Name: Neftali Rivera  Encounter Date: 06/08/2021    Today's Chief Concerns:  [facility] Concern #1:  Skin exam   Concern #2:  lesion on nose      Current Medications:    Current Outpatient Medications:     acetaminophen (TYLENOL) 500 mg tablet, Take 500-1,000 mg by mouth every 4 (four) hours as needed for mild pain, Disp: , Rfl:     lisinopril-hydrochlorothiazide (PRINZIDE,ZESTORETIC) 20-25 MG per tablet, Take 1 tablet by mouth daily, Disp: 90 tablet, Rfl: 3    Multiple Vitamins-Minerals (PRESERVISION AREDS 2) CAPS, Take 1 capsule by mouth 2 (two) times a day, Disp: , Rfl:     Naproxen Sodium (ALEVE PO), Take 1 tablet by mouth 2 (two) times a day as needed , Disp: , Rfl:     Fluocinolone Acetonide Scalp 0 01 % OIL, Apply a thin layer topically daily at night one hour before bedtime  (Patient taking differently: Apply 1 application topically daily Apply a thin layer topically daily at night one hour before bedtime  2-3 x wk), Disp: 118 28 mL, Rfl: 5    imiquimod (ALDARA) 5 % cream, Apply topically once a day Monday through Friday for 6 weeks straight  (Patient taking differently: Apply topically once a day Monday through Friday for 6 weeks straight  Prn to ears), Disp: 24 each, Rfl: 0    CONSTITUTIONAL:   Vitals:    06/08/21 1325   Temp: 98 4 °F (36 9 °C)   TempSrc: Tympanic   Weight: 78 5 kg (173 lb)   Height: 5' 7" (1 702 m)       Specific Alerts:    Have you been seen by a [facility]atologist in the last 3 years? YES    Are you pregnant or planning to become pregnant? N/A    Are you currently or planning to be nursing or breast feeding? N/A    Allergies   Allergen Reactions    Erythromycin Other (See Comments)     Thrush        May we call your Preferred Phone number to discuss your specific medical information? YES    May we leave a detailed message that includes your specific medical information?  YES    Have you traveled outside of the [place] in the past 3 months? No    Do you currently have a pacemaker or defibrillator? No    Do you have any artificial heart valves, joints, plates, screws, rods, stents, pins, etc? No   - If Yes, were any placed within the last 2 years? Do you require any medications prior to a surgical procedure? No   - If Yes, for which procedure? - If Yes, what medications to you require? Are you taking any medications that cause you to bleed more easily ("blood thinners") No    Have you ever experienced a rapid heartbeat with epinephrine? No    Have you ever been treated with "gold" (gold sodium thiomalate) therapy? No    [facility] can help with wrinkles, "laugh lines," facial volume loss, "double chin," "love handles," age spots, and more  Are you interested in learning today about some of the skin enhancement procedures that we offer? (If Yes, please provide more detail) No    Review of Systems:  Have you recently had or currently have any of the following?     · Fever or chills: No  · Night Sweats: No  · Headaches: No  · Weight Gain: No  · Weight Loss: No  · Blurry Vision: No  · Nausea: No  · Vomiting: No  · Diarrhea: No  · Blood in Stool: No  · Abdominal Pain: No  · Itchy Skin: No  · Painful Joints: No  · Swollen Joints: No  · Muscle Pain: No  · Irregular Mole: No  · Sun Burn: No  · Dry Skin: No  · Skin Color Changes: No  · Scar or Keloid: No  · Cold Sores/Fever Blisters: No  · Bacterial Infections/MRSA: No  · Anxiety: No  · Depression: No  · Suicidal or Homicidal Thoughts: No      PSYCH: Normal mood and affect  EYES: Normal conjunctiva  ENT: Normal lips and oral mucosa  CARDIOVASCULAR: No edema  RESPIRATORY: Normal respirations  HEME/LYMPH/IMMUNO:  No regional lymphadenopathy except as noted below in ASSESSMENT AND PLAN BY DIAGNOSIS    FULL ORGAN SYSTEM SKIN EXAM (SKIN)  Hair, Scalp, Ears, Face Normal except as noted below in Assessment   Neck, Cervical Chain Nodes Normal except as noted below in Assessment   Right Arm/Hand/Fingers Normal except as noted below in Assessment   Left Arm/Hand/Fingers Normal except as noted below in Assessment   Chest/Breasts/Axillae Viewed areas Normal except as noted below in Assessment   Abdomen, Umbilicus Normal except as noted below in Assessment   Back/Spine Normal except as noted below in Assessment   Groin/Genitalia/Buttocks Viewed areas Normal except as noted below in Assessment   Right Leg, Foot, Toes Normal except as noted below in Assessment   Left Leg, Foot, Toes Normal except as noted below in Assessment       1  ACTINIC KERATOSIS    Physical Exam:   Anatomic Location Affected:  Right temple   Morphological Description: Thin pink papule(s) with gritty scale       Assessment and Plan:  Based on a thorough discussion of this condition and the management approach to it (including a comprehensive discussion of the known risks, side effects and potential benefits of treatment), the patient (family) agrees to implement the following specific plan:   Treated with cryotherapy today; written and verbal consent obtained    Begin imiquimod cream daily Monday thru Friday for 6 weeks    PROCEDURE:  DESTRUCTION OF PRE-MALIGNANT LESIONS  After a thorough discussion of treatment options and risk/benefits/alternatives (including but not limited to local pain, scarring, dyspigmentation, blistering, and possible superinfection), verbal and written consent were obtained and the aforementioned lesions were treated on with cryotherapy using liquid nitrogen x 2 cycles for 5-10 seconds  The patient tolerated the procedure well, and after-care instructions were provided   TOTAL NUMBER of 2 pre-malignant lesions were treated today on the ANATOMIC LOCATION: Right temple  Patient instructions: Your pre-cancerous lesions (called actinic keratosis) were treated with liquid nitrogen today  The treated areas will get more red, crusted over the next few days   There might be some blistering  Apply vaseline to the treated area for the next week to help it heal fully  Do not pick at the area  Return in 3-4 weeks for another round of liquid nitrogen treatment if lesion(s)  fails to fully resolve  2  HISTORY OF MELANOMA    Physical Exam:   Anatomic Location Affected:  Left cheek and left deltoid( 0 9 mm excised 06/19/2019 by Dr Johnathan Muñoz)  Alfonso Pert Morphological Description of Scar:  Well healed   Year Treated: Left cheek-2007, left deltoid 2019   TNM Classification: unknown   Suspected Recurrence: no   Regional adenopathy: no    Additional History of Present Condition:  Treated by Dr Pranay Hatch and Plan:  Based on a thorough discussion of this condition and the management approach to it (including a comprehensive discussion of the known risks, side effects and potential benefits of treatment), the patient (family) agrees to implement the following specific plan:   Continue routine skin exams   Recommend sun protection SPF 30 or higher    What happens at follow-up? The main purpose of follow-up is to detect recurrences early (metastatic melanoma), but it also offers an opportunity to diagnose a new primary melanoma at the first possible opportunity  A second invasive melanoma occurs in 5-10% of melanoma patients and a new melanoma in situ is diagnosed in more than 20% of melanoma patients  Our practice makes the following recommendations for follow-up for patients with invasive melanoma     At-least "monthly" self-skin examinations    Routine skin checks by a board certified dermatologist   Follow-up intervals are "every 3 months" within 2 years of a new melanoma diagnosis; "every 6 months" between 2-4 years of a new melanoma diagnosis; and "annually" after 4 years of a new melanoma diagnosis   Individual patient's needs should be considered before an appropriate follow-up is offered   Provide education and support to help the patient adjust to their illness    Follow-up appointments should include:   A check of the scar where the primary melanoma was removed   Checking the regional lymph nodes   A general skin examination   A full physical examination at least annually by your primary care physician    In those with more advanced primary disease, follow-up may include:   Blood tests   Imaging: ultrasound, X-ray, CT, MRI and PET scan  Most tests are not worthwhile for patients with stage 1 or 2 melanoma unless there are signs or symptoms of disease recurrence or metastasis  No tests are necessary for healthy patients who have remained well for five years or longer after removal of their melanoma  What is the outlook for patients with melanoma?  Melanoma in situ is cured by excision because it has no potential to spread around the body   The risk of spread and ultimate death from invasive melanoma depends on several factors, but the main one is the Breslow thickness of the melanoma at the time it was surgically removed   Metastases are rare for melanomas < 0 75 mm and the risk for tumours 0 75-1 mm thick is about 5%  The risk steadily increases with thickness so that melanomas > 4 mm have a risk of metastasis of about 40%  Melanoma is a potentially serious type of skin cancer, in which there is uncontrolled growth of melanocytes (pigment cells)  Melanoma is sometimes called malignant melanoma  Normal melanocytes are found in the basal layer of the epidermis (the outer layer of skin)  Melanocytes produce a protein called melanin, which protects skin cells by absorbing ultraviolet (UV) radiation  Melanocytes are found in equal numbers in black and white skin, but melanocytes in black skin produce much more melanin  People with dark brown or black skin are very much less likely to be damaged by UV radiation than those with white skin  3 HISTORY OF SQUAMOUS CELL CARCINOMA  4   HISTORY OF BASAL CELL CARCINOMA  Physical Exam:   Anatomic Location Affected:  Multiple sites   Morphological Description:  Well healed surgical scars      Additional History of Present Condition:  Treated for the last 30 years    Assessment and Plan:  Based on a thorough discussion of this condition and the management approach to it (including a comprehensive discussion of the known risks, side effects and potential benefits of treatment), the patient (family) agrees to implement the following specific plan:   Continue routine skin exams   Recommend sun protection SPF 30 or higher    5  HISTORY OF PLEOMORPHIC SARCOMA    Physical Exam:   Anatomic Location Affected:  Scalp   Morphological Description:  100 % take of free flap, no signs of recurrence      Additional History of Present Condition:  Treated at [address] [place] [address]    Assessment and Plan:  Based on a thorough discussion of this condition and the management approach to it (including a comprehensive discussion of the known risks, side effects and potential benefits of treatment), the patient (family) agrees to implement the following specific plan:   Continue to [facility] at [facility], patient to have free flap debulked in the fall    6  NEOPLASM OF UNCERTAIN BEHAVIOR OF SKIN    Physical Exam:   (Anatomic Location); (Size and Morphological Description); (Differential Diagnosis):  o Right ala; 0 6 cm lucent erosion; probable Basal sana carcinoma      Additional History of Present Condition:  Present for 6-9 months    Assessment and Plan:   I have discussed with the patient that a sample of skin via a "skin biopsy would be potentially helpful to further make a specific diagnosis under the microscope     Based on a thorough discussion of this condition and the management approach to it (including a comprehensive discussion of the known risks, side effects and potential benefits of treatment), the patient (family) agrees to implement the following specific plan:      Skin Biopsy to be done in July after trip to [facility] Attestation    I,:  Mauricio Olmos MA am acting as a scribe while in the presence of the attending physician :       I,:  Alden Smith MD personally performed the services described in this documentation    as scribed in my presence :

## 2021-06-08 NOTE — ED PROVIDER NOTES
Final Diagnosis:  1  Dizziness    2  High blood pressure    3  Abnormal CT of the head        Chief Complaint   Patient presents with    Dizziness     Pt reports mowing lawn today and since then reports dizziness  Denies falling, denies syncope  Pt also rports bilateral heaviness inlegs x1 month  HPI  Patient presents for evaluation dizziness  Patient has past medical history significant for basal cell carcinoma status post excision, melanoma, sarcoma, back pain with disc herniation  Patient states that he got up this morning at approximately 8:30 a m  In the morning and was mowing his lawn  He became hot and dehydrated so he only did have of his lawn and then went inside to rest and rehydrate  States that he felt in general weak but was improved after taking a shower going down  He then went to his Dermatology appointment  On the way there he started to feel mild unsteadiness  He did not mention this while at the appointment  He was then finding it more difficult to ambulate out to his car  He drove home  Upon arriving home he felt a progression of his symptoms  Found it difficult to walk around his house  He then called his wife to help him move from the bedroom into the living room  Due to his persistent symptoms and her inability to be able to move him into the their car she called EMS to bring him into the hospital for further evaluation  Patient denies any head trauma, loss of vision, focal neuro deficits  He does endorse general nausea  He describes his dizziness as a room spinning sensation which is still present while he is sitting in bed  This is worse if he moves in states that if he would sit up he would feel like he is going to fall over  Denies any history of the same in the past   No tinnitus  Patient denies any recent fever chills, chest pain, palpitations  No stroke history  No blood thinner use  - No language barrier    - History obtained from patient     - There are no limitations to the history obtained  - Previous charting was reviewed    PMH:   has a past medical history of Back pain, Balance problem, Basal cell carcinoma (BCC), Gout, Hard of hearing, Hearing aid worn, Herniated nucleus pulposus, L4-5, Hypertension, Incisional hernia, Macular degeneration, Melanoma ([facility] [address] ), Pulmonary nodules, Reactive airway disease, 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[facility] [address] ), Skin cancer (07/01/2020), and Squamous cell carcinoma  PSH:   has a past surgical history that includes Other surgical history; Mastoid surgery (Left); Colonoscopy; Tonsillectomy and adenoidectomy; SCALP EXCISION (N/A, 3/28/2018); Mohs surgery; Hernia repair; Hydrocele excision / repair; Skin cancer excision; Cataract extraction (Bilateral); pr repair incisional hernia,reducible (N/A, 4/22/2021); and Skin biopsy  ROS:  Review of Systems   Constitutional: Negative for activity change, chills, fatigue and fever  Respiratory: Negative for cough and shortness of breath  Cardiovascular: Negative for chest pain and palpitations  Gastrointestinal: Negative for abdominal distention, abdominal pain, constipation, diarrhea, nausea and vomiting  Genitourinary: Negative for dysuria and hematuria  Musculoskeletal: Negative for arthralgias, myalgias and neck pain  Neurological: Positive for dizziness  Negative for syncope, light-headedness and headaches  All other systems reviewed and are negative  PE:   Vitals:    06/08/21 1944 06/08/21 2030 06/08/21 2100 06/08/21 2145   BP: (!) 174/86 (!) 193/88 (!) 203/108 (!) 175/81   BP Location: Right arm      Pulse:  78 88 68   Resp:  22 (!) 24 18   Temp:       TempSrc:       SpO2:    96%     Vitals reviewed by me  Physical Exam  Vitals signs reviewed  Constitutional:       General: He is not in acute distress  Appearance: He is well-developed  He is not diaphoretic  HENT:      Head: Normocephalic        Comments: Large skin mass on right side ahead secondary to skin graft performed almost 1 year ago     Right Ear: External ear normal       Left Ear: External ear normal    Eyes:      General:         Right eye: No discharge  Left eye: No discharge  Conjunctiva/sclera: Conjunctivae normal       Pupils: Pupils are equal, round, and reactive to light  Comments: Persistent left beating nystagmus   Neck:      Musculoskeletal: Normal range of motion and neck supple  Vascular: No JVD  Trachea: No tracheal deviation  Cardiovascular:      Rate and Rhythm: Normal rate and regular rhythm  Heart sounds: Normal heart sounds  No murmur  No friction rub  No gallop  Pulmonary:      Effort: Pulmonary effort is normal  No respiratory distress  Breath sounds: Normal breath sounds  No wheezing or rales  Abdominal:      General: Bowel sounds are normal  There is no distension  Palpations: Abdomen is soft  There is no mass  Tenderness: There is no abdominal tenderness  There is no guarding  Musculoskeletal: Normal range of motion  General: No tenderness or deformity  Neurological:      Mental Status: He is alert and oriented to person, place, and time  Cranial Nerves: No cranial nerve deficit  Sensory: No sensory deficit  Motor: No abnormal muscle tone  Coordination: Coordination normal       Comments: GCS 15  Sensation grossly intact  No cranial nerve deficits noted  5/5 strength bilateral upper and lower extremities  Finger-to-nose good  Psychiatric:         Behavior: Behavior normal          Thought Content: Thought content normal          Judgment: Judgment normal           A:  - Nursing note reviewed  -                   ED Course as of Jun 08 2153   Tue Jun 08, 2021   1737 At baseline   Creatinine(!): 1 46   1752 Troponin I: <0 02   1752 POC Glucose: 110     CTA head and neck with and without contrast   Final Result      No evidence of acute intracranial hemorrhage        No evidence of hemodynamic significant stenosis, aneurysm or dissection  Right temporoparietal scalp postsurgical changes with increased residual soft tissues measuring 4 2 cm in AP dimension suspicious for recurrent/residual sarcoma  There is parietal bone destruction highly where there is thinning of the calvarium near the    vertex suspicious for recurrent/residual aggressive tumor  Surgical consult recommended  Follow-up nonemergent outpatient contrast enhanced brain MRI is recommended for further evaluation  I personally discussed this study with [address] [place] [address] on 6/8/2021 at 6:13 PM                               Workstation performed: [id number]           Orders Placed This Encounter   Procedures    CTA head and neck with and without contrast    CBC and differential    Basic metabolic panel    Troponin I    Diet Regular; Regular House    Insert peripheral IV    EKG RESULTS    ECG 12 lead    Inpatient Admission     Labs Reviewed   BASIC METABOLIC PANEL - Abnormal       Result Value Ref Range Status    Sodium 141  136 - 145 mmol/L Final    Potassium 3 5  3 5 - 5 3 mmol/L Final    Chloride 104  100 - 108 mmol/L Final    CO2 29  21 - 32 mmol/L Final    ANION GAP 8  4 - 13 mmol/L Final    BUN 28 (*) 5 - 25 mg/dL Final    Creatinine 1 46 (*) 0 60 - 1 30 mg/dL Final    Comment: Standardized to IDMS reference method    Glucose 100  65 - 140 mg/dL Final    Comment: If the patient is fasting, the ADA then defines impaired fasting glucose as > 100 mg/dL and diabetes as > or equal to 123 mg/dL  Specimen collection should occur prior to Sulfasalazine administration due to the potential for falsely depressed results  Specimen collection should occur prior to Sulfapyridine administration due to the potential for falsely elevated results      Calcium 9 8  8 3 - 10 1 mg/dL Final    eGFR 44  ml/min/1 73sq m Final    Narrative:     [place] guidelines for Chronic Kidney Disease (CKD):     Stage 1 with normal or high GFR (GFR > 90 mL/min/1 73 square meters)    Stage 2 Mild CKD (GFR = 60-89 mL/min/1 73 square meters)    Stage 3A Moderate CKD (GFR = 45-59 mL/min/1 73 square meters)    Stage 3B Moderate CKD (GFR = 30-44 mL/min/1 73 square meters)    Stage 4 Severe CKD (GFR = 15-29 mL/min/1 73 square meters)    Stage 5 End Stage CKD (GFR <15 mL/min/1 73 square meters)  Note: GFR calculation is accurate only with a steady state creatinine   TROPONIN I - Normal    Troponin I <0 02  <=0 04 ng/mL Final    Comment: 3Autovalidation override  [employer] Chemistry analyzer 99% cutoff is > 0 04 ng/mL in network labs     o cTnI 99% cutoff is useful only when applied to patients in the clinical setting of myocardial ischemia   o cTnI 99% cutoff should be interpreted in the context of clinical history, ECG findings and possibly cardiac imaging to establish correct diagnosis  o cTnI 99% cutoff may be suggestive but clearly not indicative of a coronary event without the clinical setting of myocardial ischemia       POCT GLUCOSE - Normal    POC Glucose 110  65 - 140 mg/dl Final   CBC AND DIFFERENTIAL    WBC 7 48  4 31 - 10 16 Thousand/uL Final    RBC 4 43  3 88 - 5 62 Million/uL Final    Hemoglobin 13 6  12 0 - 17 0 g/dL Final    Hematocrit 40 7  36 5 - 49 3 % Final    MCV 92  82 - 98 fL Final    MCH 30 7  26 8 - 34 3 pg Final    MCHC 33 4  31 4 - 37 4 g/dL Final    RDW 13 0  11 6 - 15 1 % Final    MPV 11 2  8 9 - 12 7 fL Final    Platelets 103  219 - 390 Thousands/uL Final    nRBC 0  /100 WBCs Final    Neutrophils Relative 68  43 - 75 % Final    Immat GRANS % 0  0 - 2 % Final    Lymphocytes Relative 25  14 - 44 % Final    Monocytes Relative 6  4 - 12 % Final    Eosinophils Relative 1  0 - 6 % Final    Basophils Relative 0  0 - 1 % Final    Neutrophils Absolute 5 07  1 85 - 7 62 Thousands/µL Final    Immature Grans Absolute 0 03  0 00 - 0 20 Thousand/uL Final    Lymphocytes Absolute 1 84  0 60 - 4 47 Thousands/µL Final Monocytes Absolute 0 44  0 17 - 1 22 Thousand/µL Final    Eosinophils Absolute 0 07  0 00 - 0 61 Thousand/µL Final    Basophils Absolute 0 03  0 00 - 0 10 Thousands/µL Final         Final Diagnosis:  1  Dizziness    2  High blood pressure    3  Abnormal CT of the head        P:  - CT head, CBC, BMP, troponin, Antivert, Valium, fluids  -patient felt improved after symptomatic treatment but not return to fully to baseline  I discussed that his CT did not show any acute vessel occlusion and discussed admission to the hospital for this  He was agreeable at this time     -received a phone call from Radiology  Patient CT scan was interpreted as worsening sarcoma with erosion of parietal bone  I went had a very lengthy discussion with the patient and his wife about the possible pathology present on his CT scan  There is no imaging from after his procedure which was done in July  He has recently followed up with his specialist to believe that his site is healing well  At this point he and his wife are concerned that there could have been a recurrence of the sarcoma but do not believe that that is the primary pathology driving his current presentation   -I discussed the patient with the medicine admitting service to initially suggested the patient be transferred to [facility] or to his specialist at [facility]  I discussed this with the patient who would prefer to stay here for further evaluation and then determine if he needed to be transferred for further surgical intervention  -medicine Team came and discussed with patient admitted him to their service for further evaluation and monitoring      Medications   sodium chloride 0 9 % infusion (75 mL/hr Intravenous New Bag 6/8/21 2135)   ondansetron (ZOFRAN) injection 4 mg (4 mg Intravenous Given 6/8/21 1656)   sodium chloride 0 9 % bolus 1,000 mL (0 mL Intravenous Stopped 6/8/21 1845)   meclizine (ANTIVERT) tablet 25 mg (25 mg Oral Given 6/8/21 1657)   diazepam (VALIUM) injection 5 mg (5 mg Intravenous Given 6/8/21 1656)   iohexol (OMNIPAQUE) 350 MG/ML injection (SINGLE-DOSE) 85 mL (85 mL Intravenous Given 6/8/21 5563)   hydrALAZINE (APRESOLINE) injection 5 mg (5 mg Intravenous Given 6/8/21 7034)     Time reflects when diagnosis was documented in both MDM as applicable and the Disposition within this note     Time User Action Codes Description Comment    6/8/2021  9:21 PM Tammy Ken Add [R42] Dizziness     6/8/2021  9:21 PM Ángel Amin Add [I10] High blood pressure     6/8/2021  9:21 PM Ángel Amin Add [R93 0] Abnormal CT of the head       ED Disposition     ED Disposition Condition Date/Time Comment    Admit Stable Tue Jun 8, 2021  9:21 PM Case was discussed with [facility] and the patient's admission status was agreed to be Admission Status: inpatient status to the service of Dr Arcadio Corado   Follow-up Information    None       Patient's Medications   Discharge Prescriptions    IMIQUIMOD (ALDARA) 5 % CREAM    Apply topically once a day Monday thru Friday for 6 weeks       Start Date: 6/8/2021  End Date: --       Order Dose: --       Quantity: 24 each    Refills: 1     No discharge procedures on file  Prior to Admission Medications   Prescriptions Last Dose Informant Patient Reported? Taking? Fluocinolone Acetonide Scalp 0 01 % OIL   No No   Sig: Apply a thin layer topically daily at night one hour before bedtime  Patient taking differently: Apply 1 application topically daily Apply a thin layer topically daily at night one hour before bedtime   2-3 x wk   Multiple Vitamins-Minerals (PRESERVISION AREDS 2) CAPS  Self Yes Yes   Sig: Take 1 capsule by mouth 2 (two) times a day   Naproxen Sodium (ALEVE PO)   Yes Yes   Sig: Take 1 tablet by mouth 2 (two) times a day as needed    acetaminophen (TYLENOL) 500 mg tablet  Self Yes Yes   Sig: Take 500-1,000 mg by mouth every 4 (four) hours as needed for mild pain   imiquimod (ALDARA) 5 % cream   No No   Sig: Apply topically once a day Monday thru Friday for 6 weeks   lisinopril-hydrochlorothiazide (PRINZIDE,ZESTORETIC) 20-25 MG per tablet 6/8/2021 at Unknown time  No Yes   Sig: Take 1 tablet by mouth daily      Facility-Administered Medications: None       Portions of the record may have been created with voice recognition software  Occasional wrong word or "sound a like" substitutions may have occurred due to the inherent limitations of voice recognition software  Read the chart carefully and recognize, using context, where substitutions have occurred      Electronically signed by:  Gallo Bowling, PGY 3, MD Bianca Bello MD  06/08/21 5191

## 2021-06-09 ENCOUNTER — APPOINTMENT (INPATIENT)
Dept: MRI IMAGING | Facility: HOSPITAL | Age: 81
DRG: 641 | End: 2021-06-09
Payer: MEDICARE

## 2021-06-09 ENCOUNTER — APPOINTMENT (INPATIENT)
Dept: NON INVASIVE DIAGNOSTICS | Facility: HOSPITAL | Age: 81
DRG: 641 | End: 2021-06-09
Payer: MEDICARE

## 2021-06-09 PROBLEM — M54.16 LUMBAR BACK PAIN WITH RADICULOPATHY AFFECTING LOWER EXTREMITY: Status: ACTIVE | Noted: 2021-06-09

## 2021-06-09 LAB
ANION GAP SERPL CALCULATED.3IONS-SCNC: 9 MMOL/L (ref 4–13)
BILIRUB UR QL STRIP: NEGATIVE
BUN SERPL-MCNC: 22 MG/DL (ref 5–25)
CALCIUM SERPL-MCNC: 9 MG/DL (ref 8.3–10.1)
CHLORIDE SERPL-SCNC: 107 MMOL/L (ref 100–108)
CHOLEST SERPL-MCNC: 169 MG/DL (ref 50–200)
CLARITY UR: CLEAR
CO2 SERPL-SCNC: 26 MMOL/L (ref 21–32)
COLOR UR: COLORLESS
CREAT SERPL-MCNC: 1.39 MG/DL (ref 0.6–1.3)
ERYTHROCYTE [DISTWIDTH] IN BLOOD BY AUTOMATED COUNT: 13.1 % (ref 11.6–15.1)
GFR SERPL CREATININE-BSD FRML MDRD: 47 ML/MIN/1.73SQ M
GLUCOSE SERPL-MCNC: 88 MG/DL (ref 65–140)
GLUCOSE UR STRIP-MCNC: NEGATIVE MG/DL
HCT VFR BLD AUTO: 37.3 % (ref 36.5–49.3)
HDLC SERPL-MCNC: 33 MG/DL
HGB BLD-MCNC: 12.6 G/DL (ref 12–17)
HGB UR QL STRIP.AUTO: NEGATIVE
KETONES UR STRIP-MCNC: NEGATIVE MG/DL
LDLC SERPL CALC-MCNC: 109 MG/DL (ref 0–100)
LEUKOCYTE ESTERASE UR QL STRIP: NEGATIVE
MCH RBC QN AUTO: 31 PG (ref 26.8–34.3)
MCHC RBC AUTO-ENTMCNC: 33.8 G/DL (ref 31.4–37.4)
MCV RBC AUTO: 92 FL (ref 82–98)
NITRITE UR QL STRIP: NEGATIVE
PH UR STRIP.AUTO: 5.5 [PH]
PLATELET # BLD AUTO: 181 THOUSANDS/UL (ref 149–390)
PLATELET # BLD AUTO: 205 THOUSANDS/UL (ref 149–390)
PMV BLD AUTO: 10.1 FL (ref 8.9–12.7)
PMV BLD AUTO: 10.2 FL (ref 8.9–12.7)
POTASSIUM SERPL-SCNC: 3.5 MMOL/L (ref 3.5–5.3)
PROT UR STRIP-MCNC: NEGATIVE MG/DL
RBC # BLD AUTO: 4.06 MILLION/UL (ref 3.88–5.62)
SODIUM SERPL-SCNC: 142 MMOL/L (ref 136–145)
SP GR UR STRIP.AUTO: 1.01 (ref 1–1.03)
TRIGL SERPL-MCNC: 135 MG/DL
TROPONIN I SERPL-MCNC: <0.02 NG/ML
TROPONIN I SERPL-MCNC: <0.02 NG/ML
UROBILINOGEN UR QL STRIP.AUTO: 0.2 E.U./DL
WBC # BLD AUTO: 6.15 THOUSAND/UL (ref 4.31–10.16)

## 2021-06-09 PROCEDURE — 97166 OT EVAL MOD COMPLEX 45 MIN: CPT

## 2021-06-09 PROCEDURE — 84484 ASSAY OF TROPONIN QUANT: CPT | Performed by: INTERNAL MEDICINE

## 2021-06-09 PROCEDURE — 99232 SBSQ HOSP IP/OBS MODERATE 35: CPT | Performed by: PHYSICIAN ASSISTANT

## 2021-06-09 PROCEDURE — 80048 BASIC METABOLIC PNL TOTAL CA: CPT | Performed by: INTERNAL MEDICINE

## 2021-06-09 PROCEDURE — G1004 CDSM NDSC: HCPCS

## 2021-06-09 PROCEDURE — 81003 URINALYSIS AUTO W/O SCOPE: CPT | Performed by: INTERNAL MEDICINE

## 2021-06-09 PROCEDURE — 93306 TTE W/DOPPLER COMPLETE: CPT

## 2021-06-09 PROCEDURE — 80061 LIPID PANEL: CPT | Performed by: INTERNAL MEDICINE

## 2021-06-09 PROCEDURE — A9585 GADOBUTROL INJECTION: HCPCS | Performed by: INTERNAL MEDICINE

## 2021-06-09 PROCEDURE — 85027 COMPLETE CBC AUTOMATED: CPT | Performed by: INTERNAL MEDICINE

## 2021-06-09 PROCEDURE — 70553 MRI BRAIN STEM W/O & W/DYE: CPT

## 2021-06-09 PROCEDURE — 97163 PT EVAL HIGH COMPLEX 45 MIN: CPT

## 2021-06-09 RX ORDER — TAMSULOSIN HYDROCHLORIDE 0.4 MG/1
0.4 CAPSULE ORAL
Status: DISCONTINUED | OUTPATIENT
Start: 2021-06-09 | End: 2021-06-10 | Stop reason: HOSPADM

## 2021-06-09 RX ADMIN — HEPARIN SODIUM 5000 UNITS: 5000 INJECTION INTRAVENOUS; SUBCUTANEOUS at 14:45

## 2021-06-09 RX ADMIN — HEPARIN SODIUM 5000 UNITS: 5000 INJECTION INTRAVENOUS; SUBCUTANEOUS at 05:18

## 2021-06-09 RX ADMIN — TAMSULOSIN HYDROCHLORIDE 0.4 MG: 0.4 CAPSULE ORAL at 17:15

## 2021-06-09 RX ADMIN — LORAZEPAM 0.5 MG: 2 INJECTION INTRAMUSCULAR; INTRAVENOUS at 22:36

## 2021-06-09 RX ADMIN — GADOBUTROL 8 ML: 604.72 INJECTION INTRAVENOUS at 23:17

## 2021-06-09 RX ADMIN — SODIUM CHLORIDE 75 ML/HR: 0.9 INJECTION, SOLUTION INTRAVENOUS at 09:53

## 2021-06-09 RX ADMIN — ATORVASTATIN CALCIUM 40 MG: 40 TABLET, FILM COATED ORAL at 17:12

## 2021-06-09 RX ADMIN — HEPARIN SODIUM 5000 UNITS: 5000 INJECTION INTRAVENOUS; SUBCUTANEOUS at 21:34

## 2021-06-09 RX ADMIN — ASPIRIN 81 MG: 81 TABLET, CHEWABLE ORAL at 09:47

## 2021-06-09 RX ADMIN — DOCUSATE SODIUM 100 MG: 100 CAPSULE ORAL at 09:47

## 2021-06-09 RX ADMIN — LISINOPRIL 20 MG: 20 TABLET ORAL at 09:47

## 2021-06-09 RX ADMIN — DOCUSATE SODIUM 100 MG: 100 CAPSULE ORAL at 17:12

## 2021-06-09 RX ADMIN — AMLODIPINE BESYLATE 5 MG: 5 TABLET ORAL at 09:47

## 2021-06-09 NOTE — ASSESSMENT & PLAN NOTE
-patient has a previous history of sarcoma of the scalp, grade 3, recurrent, pleomorphic  -[facility] is [facility], Surgical Oncology, as well as [address] [place] [address]  -patient is status post prior surgical resection, with perineural invasion and concerns for lymphovascular invasion there for underwent subsequent postoperative radiation therapy that was completed in 2018  -follow-up MRI revealed postoperative changes, and thinning of the calvarium  The scalp defect progressed  He was evaluated by neuro surgery noted to have right posterior parasagittal scalp necrosis, exposed calvarium and underwent craniotomy with scalp reconstruction, and skin graft 07/2020  -since that time patient is followed up with Neurosurgery and Plastic surgery at [facility]  -patient had a CT/CTA of the head and neck in the ER that revealed  "Right temporoparietal scalp postsurgical changes with increased residual soft tissues measuring 4 2 cm in AP dimension suspicious for recurrent/residual sarcoma  There is parietal bone destruction highly where there is thinning of the calvarium near the   vertex suspicious for recurrent/residual aggressive tumor  Surgical consult recommended   Follow-up nonemergent outpatient contrast enhanced brain MRI is recommended for further evaluation"  -this was discussed with the on-call oncology team, and recommendation was for patient to be transferred to his primary team at [facility], or to the [facility] where he would have benefit from neuro surgical/plastic/Surgical Oncology evaluation, and comparison of his films  -patient has wife declined transfer to both [facility]  -will order MRI of the brain, with attention to the areas of the temporoparietal scalp, and the calvarium near the vertex with air concerns for possible recurrence  -discussed with the MRI Department who notes that patient's MRI may not be completed as an inpatient in the next 24 hours, due to wait list, and this was conveyed to patient and his wife

## 2021-06-09 NOTE — ASSESSMENT & PLAN NOTE
· Patient noting urinary frequency nocturia  · UA negative for infection  · Start Flomax  · Outpatient neurology follow-up

## 2021-06-09 NOTE — ASSESSMENT & PLAN NOTE
Lab Results   Component Value Date    EGFR 47 06/09/2021    EGFR 44 06/08/2021    EGFR 42 03/09/2021    CREATININE 1 39 (H) 06/09/2021    CREATININE 1 46 (H) 06/08/2021    CREATININE 1 54 (H) 03/09/2021     · History of CKD stage 3 with baseline 1 4-1 5  · Currently stable at 1 39

## 2021-06-09 NOTE — ASSESSMENT & PLAN NOTE
Lab Results   Component Value Date    EGFR 44 06/08/2021    EGFR 42 03/09/2021    EGFR 44 08/05/2019    CREATININE 1 46 (H) 06/08/2021    CREATININE 1 54 (H) 03/09/2021    CREATININE 1 47 (H) 08/05/2019     Patient's old records are reviewed, he appears to have chronic kidney disease stage 3 with baseline creatinine 1 4-1 5  Current creatinine at his baseline  Continue to monitor    Avoid nephrotoxic agents  Patient confirms he is not taking Naprosyn, however was listed as an old medication

## 2021-06-09 NOTE — ASSESSMENT & PLAN NOTE
-patient presented to the ER for evaluation of dizziness  -patient relates he mowed his lawn for several hours this morning in the 80 degree temperature  -after mowing his lawn, he shower then went to his dermatology appointment  -he relates that his dermatology appointment when he stood up he felt dizziness which he clarified as the room spinning around him  He also felt lightheaded  He noted when he walked his balance felt unsteady  He returned home   -patient notes symptoms progressed so he presented to the ER  -in the ER patient was noted to have markedly elevated blood pressure of 220/114  -he had a CTA of the head and neck that revealed increasing soft tissue density at the right temporoparietal scalp, and partial bone destruction with thinning of the calvarium near the vertex, concerning for residual/aggressive tumor  -patient notes his vertiginous symptoms have improved, primarily precipitated by standing  -patient be admitted to the hospital for evaluation of dizziness  A) orthostatic:  Patient is on antihypertensive that contains a diuretic  He mowed the lawn for several hours in the intense heat  Patient likely has a component of dehydration  -check orthostatic vital signs  -start IV fluid  -discontinue the hydrochlorothiazide component of his lisinopril/HCTZ    b) cardiovascular:  No evidence of acute ischemia on his EKG, and normal troponin  -patient denies any chest pain, chest pressure, palpitations or any other symptoms concerning for acute ischemia  -check serial cardiac enzymes and EKGs to evaluate for ischemia  -monitor on telemetry for any arrhythmia  -check 2D echocardiogram to evaluate for significant valvular heart disease    c) neurologic:  -MRI to evaluate possible recurrent/residual sarcoma of the temporoparietal scalp and the parietal bone near the vertex  -will placed on the stroke pathway    No focal neurologic signs or symptoms  -continue aspirin, will initiate statin, neuro checks    D) possible heat exhaustion:  Continue supportive measures, and hydration  No hyperthermia    E) possible vertigo:  Patient was given Valium and meclizine in the ER with some improvement  -continue meclizine p r n     F) hypertensive urgency:  Patient has a history of essential hypertension however review of outpatient records his blood pressure has not been controlled on his home lisinopril/HCTZ 20-25  His blood pressure has been ranging approximately 043 systolic   -he presented to the ER with a blood pressure 220/114    Since that time his systolic blood pressures been ranging 170/200  -will confer new lisinopril 20 mg daily and discontinue the hydrochlorothiazide component  -start Norvasc 5 mg daily 1st dose now  -hydralazine IV p r n   -goal is gradual improvement in his blood pressure and avoid rapid normalization

## 2021-06-09 NOTE — PLAN OF CARE
Problem: OCCUPATIONAL THERAPY ADULT  Goal: Performs self-care activities at highest level of function for planned discharge setting  See evaluation for individualized goals  Description: Treatment Interventions: ADL retraining, UE strengthening/ROM, Functional transfer training, Cognitive reorientation, Endurance training, Patient/family training, Equipment evaluation/education, Compensatory technique education, Energy conservation, Activityengagement          See flowsheet documentation for full assessment, interventions and recommendations  Note: Limitation: Decreased ADL status, Decreased UE strength, Decreased Safe judgement during ADL, Decreased endurance, Decreased self-care trans, Decreased high-level ADLs  Prognosis: Good  Assessment: Pt is a 79 y o  male seen for OT evaluation s/p admit to [facility] on [facility]/8/[facility]21 w/ Dizziness and impaired balance and possible heat exhaustion  Comorbidities affecting pt's functional performance at time of assessment include: lumbar back pain, urinary frequency, hypertensive urgency, sarcoma of scalp followed by Estefania Zhang and [facility] radiology w/ h/o right posterior parasagittal scalp necrosis, exposed calvarium scalp reconstruction w/ craniotomy, and skin graft 07/2020, macular degeneration, CKD III  CTA head and neck: Right temporoparietal scalp postsurgical changes with increased residual soft tissues measuring 4 2 cm in AP dimension suspicious for recurrent/residual sarcoma  There is parietal bone destruction highly where there is thinning of the calvarium near the vertex suspicious for recurrent/residual aggressive tumor MRI pending  Personal factors affecting pt at time of IE include: lives w/ supportive wife  Prior to admission, pt was living w/ wife and reports independent w/ ADLs, independent w/ functional transfers and mobility w/ no AD, independent w/ IADLs, driving   Upon evaluation: Pt requires MOD I bed mobility, supervision sit<>stand functional transfers w/ VCs for hand placement, supervision functional mobility w/ no AD, supervision toileting 2* the following deficits impacting occupational performance: slightly impulsive, cues to redirect to tasks, decreased endurance, fall risk, hypertension (205/100, 181/87, 190/88, 174/80)  Pt to benefit from continued skilled OT tx while in the hospital to address deficits as defined above and maximize level of functional independence w ADL's and functional mobility  Occupational Performance areas to address include: grooming, bathing/shower, toilet hygiene, dressing, health maintenance, functional mobility, clothing management, cleaning and meal prep, home safety education  Pt receptive to [facility] education and pacing self  From OT standpoint, recommendation at time of d/c would be home w/ family support  The patient's raw score on the AM-PAC Daily Activity inpatient short form is 21, standardized score is 44 27, greater than 39 4  Patients at this level are likely to benefit from discharge to home  Please refer to the recommendation of the Occupational Therapist for safe discharge planning       OT Discharge Recommendation: No rehabilitation needs  OT - OK to Discharge: Yes(when medically stable)

## 2021-06-09 NOTE — PLAN OF CARE
Problem: Potential for Falls  Goal: Patient will remain free of falls  Description: INTERVENTIONS:  - Assess patient frequently for physical needs  -  Identify cognitive and physical deficits and behaviors that affect risk of falls  -  [place] fall precautions as indicated by assessment   - Educate patient/family on patient safety including physical limitations  - Instruct patient to call for assistance with activity based on assessment  - Modify environment to reduce risk of injury  - Consider OT/PT consult to assist with strengthening/mobility  Outcome: Progressing     Problem: Neurological Deficit  Goal: Neurological status is stable or improving  Description: Interventions:  - Monitor and assess patient's level of consciousness, motor function, sensory function, and level of assistance needed for ADLs  - Monitor and report changes from baseline  Collaborate with interdisciplinary team to initiate plan and implement interventions as ordered  - Provide and maintain a safe environment  - Consider seizure precautions  - Consider fall precautions  - Consider aspiration precautions  - Consider bleeding precautions  Outcome: Progressing     Problem: Activity Intolerance/Impaired Mobility  Goal: Mobility/activity is maintained at optimum level for patient  Description: Interventions:  - Assess and monitor patient  barriers to mobility and need for assistive/adaptive devices  - Assess patient's emotional response to limitations  - Collaborate with interdisciplinary team and initiate plans and interventions as ordered  - Encourage independent activity per ability   - Maintain proper body alignment  - Perform active/passive rom as tolerated/ordered    - Plan activities to conserve energy   - Turn patient as appropriate  Outcome: Progressing     Problem: Communication Impairment  Goal: Ability to express needs and understand communication  Description: Assess patient's communication skills and ability to understand information  Patient will demonstrate use of effective communication techniques, alternative methods of communication and understanding even if not able to speak  - Encourage communication and provide alternate methods of communication as needed  - Collaborate with case management/ for discharge needs  - Include patient/family/caregiver in decisions related to communication  Outcome: Progressing     Problem: Potential for Aspiration  Goal: Non-ventilated patient's risk of aspiration is minimized  Description: Assess and monitor vital signs, respiratory status, and labs (WBC)  Monitor for signs of aspiration (tachypnea, cough, rales, wheezing, cyanosis, fever)  - Assess and monitor patient's ability to swallow  - Place patient up in chair to eat if possible  - HOB up at 90 degrees to eat if unable to get patient up into chair   - Supervise patient during oral intake  - Instruct patient/ family to take small bites  - Instruct patient/ family to take small single sips when taking liquids  - Follow patient-specific strategies generated by speech pathologist   Outcome: Progressing  Goal: Ventilated patient's risk of aspiration is minimized  Description: Assess and monitor vital signs, respiratory status, airway cuff pressure, and labs (WBC)  Monitor for signs of aspiration (tachypnea, cough, rales, wheezing, cyanosis, fever)  - Elevate head of bed 30 degrees if patient has tube feeding   - Monitor tube feeding  Outcome: Progressing     Problem: Nutrition  Goal: Nutrition/Hydration status is improving  Description: Monitor and assess patient's nutrition/hydration status for malnutrition (ex- brittle hair, bruises, dry skin, pale skin and conjunctiva, muscle wasting, smooth red tongue, and disorientation)  Collaborate with interdisciplinary team and initiate plan and interventions as ordered  Monitor patient's weight and dietary intake as ordered or per policy   Utilize nutrition screening tool and intervene per policy  Determine patient's food preferences and provide high-protein, high-caloric foods as appropriate  - Assist patient with eating   - Allow adequate time for meals   - Encourage patient to take dietary supplement as ordered  - Collaborate with clinical nutritionist   - Include patient/family/caregiver in decisions related to nutrition    Outcome: Progressing     Problem: PAIN - ADULT  Goal: Verbalizes/displays adequate comfort level or baseline comfort level  Description: Interventions:  - Encourage patient to monitor pain and request assistance  - Assess pain using appropriate pain scale  - Administer analgesics based on type and severity of pain and evaluate response  - Implement non-pharmacological measures as appropriate and evaluate response  - Consider cultural and social influences on pain and pain management  - Notify physician/advanced practitioner if interventions unsuccessful or patient reports new pain  Outcome: Progressing     Problem: INFECTION - ADULT  Goal: Absence or prevention of progression during hospitalization  Description: INTERVENTIONS:  - Assess and monitor for signs and symptoms of infection  - Monitor lab/diagnostic results  - Monitor all insertion sites, i e  indwelling lines, tubes, and drains  - Monitor endotracheal if appropriate and nasal secretions for changes in amount and color  - [place] appropriate cooling/warming therapies per order  - Administer medications as ordered  - Instruct and encourage patient and family to use good hand hygiene technique  - Identify and instruct in appropriate isolation precautions for identified infection/condition  Outcome: Progressing  Goal: Absence of fever/infection during neutropenic period  Description: INTERVENTIONS:  - Monitor WBC    Outcome: Progressing     Problem: SAFETY ADULT  Goal: Patient will remain free of falls  Description: INTERVENTIONS:  - Assess patient frequently for physical needs  -  Identify cognitive and physical deficits and behaviors that affect risk of falls    -  [place] fall precautions as indicated by assessment   - Educate patient/family on patient safety including physical limitations  - Instruct patient to call for assistance with activity based on assessment  - Modify environment to reduce risk of injury  - Consider OT/PT consult to assist with strengthening/mobility  Outcome: Progressing  Goal: Maintain or return to baseline ADL function  Description: INTERVENTIONS:  -  Assess patient's ability to carry out ADLs; assess patient's baseline for ADL function and identify physical deficits which impact ability to perform ADLs (bathing, care of mouth/teeth, toileting, grooming, dressing, etc )  - Assess/evaluate cause of self-care deficits   - Assess range of motion  - Assess patient's mobility; develop plan if impaired  - Assess patient's need for assistive devices and provide as appropriate  - Encourage maximum independence but intervene and supervise when necessary  - Involve family in performance of ADLs  - Assess for home care needs following discharge   - Consider OT consult to assist with ADL evaluation and planning for discharge  - Provide patient education as appropriate  Outcome: Progressing  Goal: Maintain or return mobility status to optimal level  Description: INTERVENTIONS:  - Assess patient's baseline mobility status (ambulation, transfers, stairs, etc )    - Identify cognitive and physical deficits and behaviors that affect mobility  - Identify mobility aids required to assist with transfers and/or ambulation (gait belt, sit-to-stand, lift, walker, cane, etc )  - [place] fall precautions as indicated by assessment  - Record patient progress and toleration of activity level on Mobility SBAR; progress patient to next Phase/Stage  - Instruct patient to call for assistance with activity based on assessment  - Consider rehabilitation consult to assist with strengthening/weightbearing, etc   Outcome: Progressing     Problem: DISCHARGE PLANNING  Goal: Discharge to home or other facility with appropriate resources  Description: INTERVENTIONS:  - Identify barriers to discharge w/patient and caregiver  - Arrange for needed discharge resources and transportation as appropriate  - Identify discharge learning needs (meds, wound care, etc )  - Arrange for interpretive services to assist at discharge as needed  - Refer to Case Management Department for coordinating discharge planning if the patient needs post-hospital services based on physician/advanced practitioner order or complex needs related to functional status, cognitive ability, or social support system  Outcome: Progressing     Problem: Knowledge Deficit  Goal: Patient/family/caregiver demonstrates understanding of disease process, treatment plan, medications, and discharge instructions  Description: Complete learning assessment and assess knowledge base    Interventions:  - Provide teaching at level of understanding  - Provide teaching via preferred learning methods  Outcome: Progressing

## 2021-06-09 NOTE — NURSING NOTE
MRI checklist was completed upon patient's admission  The 3370 MRI order created a second MRI checklist on 6/9/21  RN contacted MRI to confirm that the first completed MRI checklist was on file  MRI confirmed that the first MRI checklist is completed and on file; as a result, the patient is on the list to receive an MRI at some point during hospital between today and tomorrow

## 2021-06-09 NOTE — PROGRESS NOTES
[address] [place] [address]  Progress Note - Waqas Langston 1940, 79 y o  male MRN: [id number]  Unit/Bed#: E4 -01 Encounter: [id number]  Primary Care Provider: Anuel Villagran PA-C   Date and time admitted to hospital: 6/8/2021  4:21 PM    * Dizziness  Assessment & Plan  · Patient is present Hospital complaint of dizziness after being outside and mowing his lawn for several hours in the heat today  · Denies any syncope but notes he was feeling lightheaded and very off balance with blurry vision  · BP was elevated to 220/114 in the ED  · CTA head and neck revealing increased soft tissue density in right temporoparietal scalp and partial bone destruction thinning calvarium near vertex concerning for residual or aggressive tumor  · Patient was hydrated with IV fluids overnight and his HCTZ was discontinued, notes symptomatic improvement today, likely large component of dehydration  · EKG without evidence of ischemia, troponins were trended and resulted normal  · Telemetry monitoring negative for acute arrhythmias  · Echocardiogram pending  · MRI ordered to evaluate recurrent/residual sarcoma of temporoparietal scalp and parietal bone  · Continue aspirin and statin  · Continue to monitor blood pressure-started on Norvasc given elevated BP in ED    Lumbar back pain with radiculopathy affecting lower extremity  Assessment & Plan  · Patient reports progressively worsening lumbar back pain with numbness and tingling to both of his legs into his knees  · Notes he has woken with back pain nearly every day radiating from his low back to his groin  · Notes numbness and tingling is worse with certain positions  · Given history of cancer and concern for metastasis, will order lumbar spine MRI with and without contrast  · PT/OT consult    Urinary frequency  Assessment & Plan  · Patient noting urinary frequency nocturia  · UA negative for infection  · Start Flomax  · Outpatient neurology follow-up    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[facility])  Assessment & Plan  · Patient with history of sarcoma of scalp, grade 3, recurrent, pleomorphic  · Follows with [facility], surgical Oncology and [facility] radiation oncology  · Status post surgical resection and radiation therapy, a status post skin graft 7/2020  · CT head in ED showing right temporoparietal scalp postsurgical changes with increased residual soft tissue measuring 4 2 centimeters in AP dimension suspicious for recurrent/residual sarcoma, parietal bone destruction  · MRI ordered and pending  · Patient was offered transfer to [facility] or [facility], declined transfer to both  · Will follow-up with [facility] if MRI showing abnormalities    Macular degeneration  Assessment & Plan  · Continue outpatient ophthalmology follow-up    Hypertension  Assessment & Plan  · History of essential hypertension, maintained outpatient on lisinopril/HCTZ 20/12 5 mg daily  · Per outpatient records, BP has not been controlled over the last several months  · HCTZ was discontinued given dehydration  · Continue lisinopril 20 milligrams daily  · Started on Norvasc 5 milligrams daily, will monitor throughout the day and consider increasing to 10 milligrams daily if BP remains above goal    Stage 3 chronic kidney disease [facility])  Assessment & Plan  Lab Results   Component Value Date    EGFR 47 06/09/2021    EGFR 44 06/08/2021    EGFR 42 03/09/2021    CREATININE 1 39 (H) 06/09/2021    CREATININE 1 46 (H) 06/08/2021    CREATININE 1 54 (H) 03/09/2021     · History of CKD stage 3 with baseline 1 4-1 5  · Currently stable at 1 39    VTE Pharmacologic Prophylaxis:   Pharmacologic: Heparin  Mechanical VTE Prophylaxis in Place: No    Patient Centered Rounds: I have performed bedside rounds with nursing staff today  Discussions with Specialists or Other Care Team Provider: PT/OT    Education and Discussions with Family / Patient: patient     Time Spent for Care: 45 minutes    More than 50% of total time spent on counseling and coordination of care as described above  Current Length of Stay: 1 day(s)    Current Patient Status: Inpatient   Certification Statement: The patient will continue to require additional inpatient hospital stay due to dizziness with abnormal CTA pending MRI     Discharge Plan: pending MRI     Code Status: Level 1 - Full Code      Subjective:   Patient notes he is doing okay today  Denies any further episodes of dizziness or lightheadedness  Does note he woke up this morning with lower back pain and numbness radiating down both of his legs which he notes has been occurring frequently when he gets up or sits in certain positions  Objective:     Vitals:   Temp (24hrs), Av 9 °F (36 6 °C), Min:97 6 °F (36 4 °C), Max:98 4 °F (36 9 °C)    Temp:  [97 6 °F (36 4 °C)-98 4 °F (36 9 °C)] 97 9 °F (36 6 °C)  HR:  [58-88] 76  Resp:  [16-24] 18  BP: (111-220)/() 155/73  SpO2:  [95 %-98 %] 96 %  Body mass index is 26 93 kg/m²  Input and Output Summary (last 24 hours): Intake/Output Summary (Last 24 hours) at 2021 1241  Last data filed at 2021 0953  Gross per 24 hour   Intake 2140 ml   Output 400 ml   Net 1740 ml       Physical Exam:     Physical Exam  Vitals signs reviewed  Constitutional:       General: He is not in acute distress  HENT:      Head: Normocephalic and atraumatic  Comments: Scalp skin flap intact  Eyes:      General: No scleral icterus  Conjunctiva/sclera: Conjunctivae normal    Neck:      Musculoskeletal: Neck supple  Cardiovascular:      Rate and Rhythm: Normal rate and regular rhythm  Heart sounds: No murmur  Pulmonary:      Effort: Pulmonary effort is normal  No respiratory distress  Breath sounds: Normal breath sounds  No wheezing  Abdominal:      General: Bowel sounds are normal  There is no distension  Palpations: Abdomen is soft  Tenderness: There is no abdominal tenderness     Musculoskeletal: Right lower leg: No edema  Left lower leg: No edema  Skin:     General: Skin is warm and dry  Neurological:      Mental Status: He is alert and oriented to person, place, and time  Psychiatric:         Mood and Affect: Mood normal          Behavior: Behavior normal        Additional Data:     Labs:    Results from last 7 days   Lab Units 06/09/21  0302  06/08/21  1628   WBC Thousand/uL 6 15  --  7 48   HEMOGLOBIN g/dL 12 6  --  13 6   HEMATOCRIT % 37 3  --  40 7   PLATELETS Thousands/uL 181   < > 165   NEUTROS PCT %  --   --  68   LYMPHS PCT %  --   --  25   MONOS PCT %  --   --  6   EOS PCT %  --   --  1    < > = values in this interval not displayed  Results from last 7 days   Lab Units 06/09/21  0302   SODIUM mmol/L 142   POTASSIUM mmol/L 3 5   CHLORIDE mmol/L 107   CO2 mmol/L 26   BUN mg/dL 22   CREATININE mg/dL 1 39*   ANION GAP mmol/L 9   CALCIUM mg/dL 9 0   GLUCOSE RANDOM mg/dL 88         Results from last 7 days   Lab Units 06/08/21  1630   POC GLUCOSE mg/dl 110                   * I Have Reviewed All Lab Data Listed Above  * Additional Pertinent Lab Tests Reviewed:  Diego 65 Admission Reviewed    Imaging:    Imaging Reports Reviewed Today Include: CTA   Imaging Personally Reviewed by Myself Includes:  none    Recent Cultures (last 7 days):           Last 24 Hours Medication List:   Current Facility-Administered Medications   Medication Dose Route Frequency Provider Last Rate    amLODIPine  5 mg Oral Daily Marylen Kill, MD      aspirin  81 mg Oral Daily Marylen Kill, MD      atorvastatin  40 mg Oral QPM Marylen Kill, MD      docusate sodium  100 mg Oral BID Marylen Kill, MD      heparin (porcine)  5,000 Units Subcutaneous [facility] Marylen Kill, MD      hydrALAZINE  5 mg Intravenous Q6H PRN Marylen Kill, MD      lisinopril  20 mg Oral Daily Marylen Kill, MD      LORazepam  0 5 mg Intravenous Once PRN Marylen Kill, MD      ondansetron  4 mg Intravenous Q6H PRN Saturnino Torres MD      sodium chloride  75 mL/hr Intravenous Continuous Saturnino Torres MD 75 mL/hr (06/09/21 0832)        Today, Patient Was Seen By: Dejon Pino PA-C    ** Please Note: Dictation voice to text software may have been used in the creation of this document   **

## 2021-06-09 NOTE — OCCUPATIONAL THERAPY NOTE
Occupational Therapy Evaluation     Patient Name: Suze Nava  EMSBM'E Date: 6/9/2021  Problem List  Principal Problem:    Dizziness  Active Problems:    Stage 3 chronic kidney disease (HCC)    Hypertension    Macular degeneration    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HCC)    Urinary frequency    Lumbar back pain with radiculopathy affecting lower extremity    Past Medical History  Past Medical History:   Diagnosis Date    Back pain     Balance problem     Basal cell carcinoma (BCC)     in past    Gout     Hard of hearing     Hearing aid worn     ziyad    Herniated nucleus pulposus, L4-5     Hypertension     Incisional hernia     Macular degeneration     Melanoma ([address] )     left cheek- history    Pulmonary nodules     Reactive airway disease     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    Skin cancer 07/01/2020    AFX- scalp    Squamous cell carcinoma     in past     Past Surgical History  Past Surgical History:   Procedure Laterality Date    CATARACT EXTRACTION Bilateral     COLONOSCOPY      HERNIA REPAIR      HYDROCELE EXCISION / REPAIR      MASTOID SURGERY Left     MOHS SURGERY      OTHER SURGICAL HISTORY      sarcoma scalp with skin graft    [facility] REPAIR INCISIONAL HERNIA,REDUCIBLE N/A 4/22/2021    Procedure: REPAIR HERNIA INCISIONAL OPEN WITH MESH;  Surgeon: Vesta Kawasaki, MD;  [facility];  Service: General    SCALP EXCISION N/A 3/28/2018    Procedure: SCALP SARCOMA EXCISION WITH FULL THICKNESS SKIN GRAFT;  Surgeon: Lucho Morrison MD;  [facility];  Service: Plastics    SKIN BIOPSY      SKIN CANCER EXCISION      TONSILLECTOMY AND ADENOIDECTOMY               06/09/21 1056   OT Last Visit   OT Visit Date 06/09/21   Note Type   Note type Evaluation   Restrictions/Precautions   Weight Bearing Precautions Per Order No   Other Precautions Fall Risk;Multiple lines;Telemetry; Visual impairment   Pain Assessment   Pain Assessment Tool Pain Assessment not indicated - pt denies pain   Pain Score No Pain   Home Living   Type of Home House   Home Layout Two level;Performs ADLs on one level; Able to live on main level with bedroom/bathroom  (2 STARR, 13 steps to walk in shower, 13 steps basement)   Bathroom Shower/Tub Walk-in shower  (1st floor has tub shower)   Bathroom Toilet Raised   Bathroom Equipment Grab bars in shower;Grab bars around toilet   [address] Walker;Cane  (not using at baseline)   Additional Comments pt driving PTA, active w/ no use of DME   Prior Function   Level of [place] Independent with ADLs and functional mobility  (w/ AD)   Lives With Spouse   Receives Help From Family   ADL Assistance Independent   IADLs Independent   Falls in the last 6 months 0   Vocational Retired   Comments pt w/ no use of DME at baseline, does yardwork and household tasks, supportive wife at home who can physically assist   Lifestyle   Autonomy per pt independent w/ ADLS, independent w/ functional transfers and mobility w/ no AD, independent w/ IADLs   Reciprocal Relationships spouse   Service to Others retired 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   [address] [place] "I am better today,I want to see if I get dizzy when moving"   ADL   Where [address] 5  Supervision/Setup   Grooming Assistance 5  [address] [place] [address]  5  Supervision/Setup   [address] 5  Supervision/Setup   [address] 5  Supervision/Setup    [facility] 5  [address]  5  [id number] [facility] 5  Supervision/Setup   Bed Mobility   Supine to Sit 6  Modified independent   Additional items HOB elevated; Increased time required   Transfers   Sit to Stand 5  Supervision   Additional items Armrests; Bedrails;Verbal cues   Stand to Sit 5  Supervision   Additional items Verbal cues;Armrests   Additional Comments cues for safety and to take time, impulsive at times as wants to do alot   Functional Mobility   Functional Mobility 5  Supervision   Additional Comments assist x1 w/ increased time to complete   Additional items Rolling walker   Balance   Static Sitting Normal   Dynamic Sitting Good   Static Standing Fair   Dynamic Standing Fair -   Ambulatory Fair -   Activity Tolerance   Activity Tolerance Patient tolerated treatment well   Nurse Made Aware appropriate to see per Thee GALINDO Assessment   RUE Assessment WFL  (4-/5)   LUE Assessment   LUE Assessment WFL  (4-/5)   Hand Function   Gross Motor Coordination Functional   Fine Motor Coordination Functional   Sensation   Light Touch No apparent deficits   Proprioception   Proprioception No apparent deficits   Vision-Basic Assessment   Current Vision Wears glasses only for reading  (impaired vision, blurry)   Vision - Complex Assessment   Ocular Range of Motion [facility]   Acuity Able to read clock/calendar on wall without difficulty   Cognition   Overall Cognitive Status [facility]   Arousal/Participation Alert; Cooperative;Responsive   Attention Attends with cues to redirect   Orientation Level Oriented X4   Memory Within functional limits   Following Commands Follows one step commands without difficulty   Comments pt engages in appropriate conversations, talkative requires redirection   Assessment   Limitation Decreased ADL status; Decreased UE strength;Decreased Safe judgement during ADL;Decreased endurance;Decreased self-care trans;Decreased high-level ADLs   Prognosis Good   Assessment Pt is a 79 y o  male seen for OT evaluation s/p admit to [facility] on 6/8/2021 w/ Dizziness and impaired balance and possible heat exhaustion    Comorbidities affecting pt's functional performance at time of assessment include: lumbar back pain, urinary frequency, hypertensive urgency, sarcoma of scalp followed by [facility] and st  Melvinia Poor radiology w/ h/o right posterior parasagittal scalp necrosis, exposed calvarium scalp reconstruction w/ craniotomy, and skin graft 07/2020, macular degeneration, CKD III  CTA head and neck: Right temporoparietal scalp postsurgical changes with increased residual soft tissues measuring 4 2 cm in AP dimension suspicious for recurrent/residual sarcoma  There is parietal bone destruction highly where there is thinning of the calvarium near the vertex suspicious for recurrent/residual aggressive tumor MRI pending  Personal factors affecting pt at time of IE include: lives w/ supportive wife  Prior to admission, pt was living w/ wife and reports independent w/ ADLs, independent w/ functional transfers and mobility w/ no AD, independent w/ IADLs, driving  Upon evaluation: Pt requires MOD I bed mobility, supervision sit<>stand functional transfers w/ VCs for hand placement, supervision functional mobility w/ no AD, supervision toileting 2* the following deficits impacting occupational performance: slightly impulsive, cues to redirect to tasks, decreased endurance, fall risk, hypertension (205/100, 181/87, 190/88, 174/80)  Pt to benefit from continued skilled OT tx while in the hospital to address deficits as defined above and maximize level of functional independence w ADL's and functional mobility  Occupational Performance areas to address include: grooming, bathing/shower, toilet hygiene, dressing, health maintenance, functional mobility, clothing management, cleaning and meal prep, home safety education  Pt receptive to [facility] education and pacing self  From OT standpoint, recommendation at time of d/c would be home w/ family support  The patient's raw score on the AM-PAC Daily Activity inpatient short form is 21, standardized score is 44 27, greater than 39 4  Patients at this level are likely to benefit from discharge to home  Please refer to the recommendation of the Occupational Therapist for safe discharge planning     Goals   Patient Goals "to go home"   LTG Time Frame 7-10 Long Term Goal please see below goals   Plan   Treatment Interventions ADL retraining;UE strengthening/ROM; Functional transfer training;Cognitive reorientation; Endurance training;Patient/family training;Equipment evaluation/education; Compensatory technique education; Energy conservation; Activityengagement   Goal Expiration Date 06/19/21   OT Frequency 1-2x/wk   Recommendation   OT Discharge Recommendation No rehabilitation needs   OT - OK to Discharge Yes  (when medically stable)   AM-PAC Daily Activity Inpatient   Lower Body Dressing 3   Bathing 3   Toileting 3   Upper Body Dressing 4   Grooming 4   Eating 4   Daily Activity Raw Score 21   Daily Activity Standardized Score (Calc for Raw Score >=11) 44 27   Occupational Therapy Goals to be met in 7-10 days:  1) Pt will complete ADLs/self care w/ mod I   2) Pt will complete toileting w/ mod I w/ G hygiene/thoroughness using DME PRN  3) Pt will improve functional transfers on/off all surfaces using DME PRN w/ G balance/safety including toileting w/ mod I  4) Pt will improve fx'l mobility during I/ADl/leisure tasks using DME PRN w/ g balance/safety w/ mod I  5) Pt will engage in ongoing cognitive assessment w/ G participation to A w/ safe d/c planning/recommendations  6) Pt will demonstrate G carryover of pt/caregiver education and training as appropriate w/ mod I  w/ G tolerance  7) Pt will demonstrate 100% carryover of E C  techniques w/ mod I t/o fx'l I/ADL/leisure tasks w/o cues s/p skilled education  8) Pt will engage in activity configuration activity w/ G participation and mod I to increase time management skills and improve participation in a structured routine to improve overall quality of life    Documentation completed by: Chuyita Martell, MS, OTR/L

## 2021-06-09 NOTE — PHYSICAL THERAPY NOTE
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[address] )    Basal cell carcinoma of skin of other parts of face    Pulmonary nodules    Incisional hernia, without obstruction or gangrene    Status post repair of ventral hernia    Dizziness    Urinary frequency       Admitting Diagnoses:   Dizziness [R42]  Leg pain [M79 606]  High blood pressure [I10]  Abnormal CT of the head [R93 0]    Past Medical History:   Diagnosis Date    Back pain     Balance problem     Basal cell carcinoma (BCC)     in past    Gout     Hard of hearing     Hearing aid worn     ziyad    Herniated nucleus pulposus, L4-5     Hypertension     Incisional hernia     Macular degeneration     Melanoma ([address] )     left cheek- history    Pulmonary nodules     Reactive airway disease     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    Skin cancer 07/01/2020    AFX- scalp    Squamous cell carcinoma     in past       Past Surgical History:   Procedure Laterality Date    CATARACT EXTRACTION Bilateral     COLONOSCOPY      HERNIA REPAIR      HYDROCELE EXCISION / REPAIR      MASTOID SURGERY Left     MOHS SURGERY      OTHER SURGICAL HISTORY      sarcoma scalp with skin graft    MS REPAIR INCISIONAL HERNIA,REDUCIBLE N/A 4/22/2021    Procedure: REPAIR HERNIA INCISIONAL OPEN WITH MESH;  Surgeon: Allen Richards MD;  [facility];  Service: General    SCALP EXCISION N/A 3/28/2018    Procedure: SCALP SARCOMA EXCISION WITH FULL THICKNESS SKIN GRAFT;  Surgeon: Maddison Metcalf MD;  [facility];  Service: Plastics    SKIN BIOPSY      SKIN CANCER EXCISION      TONSILLECTOMY AND ADENOIDECTOMY Imaging Studies:  CTA head and neck with and without contrast   Final Result by Tae Suh MD (06/08 1813)      No evidence of acute intracranial hemorrhage  No evidence of hemodynamic significant stenosis, aneurysm or dissection  Right temporoparietal scalp postsurgical changes with increased residual soft tissues measuring 4 2 cm in AP dimension suspicious for recurrent/residual sarcoma  There is parietal bone destruction highly where there is thinning of the calvarium near the    vertex suspicious for recurrent/residual aggressive tumor  Surgical consult recommended  Follow-up nonemergent outpatient contrast enhanced brain MRI is recommended for further evaluation  I personally discussed this study with [address] [place] [address] on 6/8/2021 at 6:13 PM                               Workstation performed: [id number]         MRI inpatient order    (Results Pending)   MRI inpatient order    (Results Pending)        06/09/21 1030   PT Last Visit   PT Visit Date 06/09/21   Note Type   Note type Evaluation   Pain Assessment   Pain Score No Pain   Home Living   Type of Home House  ([address] [place] [address],[address] Floor)   Home Layout Two level; Laundry in basement;Bed/bath upstairs;Stairs to enter with rails  (2STE w/o HR; 13steps in between levels)   Bathroom Shower/Tub Walk-in shower  (also has tub/shower on the 1st flr bathroom)   Bathroom Toilet Raised   Bathroom Equipment Grab bars in shower;Grab bars around toilet   Home Equipment Walker;Cane  (but does not use at baseline)   Prior Function   Level of [place] Independent with ADLs and functional mobility  (w/o AD)   Lives With Spouse   Receives Help From   [address] in the last 6 months 0   Vocational Retired   Comments (+) ; active lifestyle; never alone   Restrictions/Precautions   Wells Marvell Bearing Precautions Per Order No   Other Precautions Telemetry; Fall Risk;Multiple lines   General   Additional Pertinent History h/o SCALP SARCOMA EXCISION WITH FULL THICKNESS SKIN GRAFT in 3/28/2018; h/o right posterior parasagittal scalp necrosis, exposed calvarium and underwent craniotomy with scalp reconstruction, and skin graft in 07/2020   Family/Caregiver Present No   Cognition   Overall Cognitive Status WFL   Arousal/Participation Alert   Orientation Level Oriented X4   Following Commands Follows one step commands without difficulty   Comments pleasant & cooperative; talkative; detail-oriented   RUE Assessment   RUE Assessment   (refer to OT)   LUE Assessment   LUE Assessment   (refer to OT)   RLE Assessment   RLE Assessment WFL  (4+/5 grossly)   LLE Assessment   LLE Assessment WFL  (4+/5 grossly)   Coordination   Movements are Fluid and Coordinated 1   Sensation X  (chronic tingling sensation to B/L thigh 2* to back pain)   Bed Mobility   Supine to Sit 6  Modified independent   Additional items HOB elevated;Verbal cues   Additional Comments cues for safety   Transfers   Sit to Stand 5  Supervision   Additional items Verbal cues; Bedrails   Stand to Sit 5  Supervision   Additional items Armrests; Verbal cues   Additional Comments cues for techniques & safety; impulsive at times but can be redirected   Ambulation/Elevation   Gait pattern Wide KUNAL; Decreased foot clearance; Short stride; Excessively slow   Gait Assistance 5  Supervision   Additional items Verbal cues   Assistive Device None   Distance 15'x2 + 40'x1  (limit amb 2* to elevated BP)   Balance   Static Sitting Normal   Dynamic Sitting Good   Static Standing Fair   Dynamic Standing Fair -   Ambulatory Fair -   Endurance Deficit   Endurance Deficit No   Activity Tolerance   Activity Tolerance Patient tolerated treatment well   Medical Staff Made Aware OT Joan   Nurse Made Aware JOSIE Jack   Assessment   Prognosis Good   Problem List Decreased strength;Decreased endurance; Impaired balance;Decreased mobility; Impaired judgement; Impaired sensation   Assessment Pt  81 y o male w/ h/o scalp sarcoma w/ s/p excision & skin graft, presented w/ dizziness  Pt found to have elevated BP of 220/114 in ED  Pt admitted for hypertensive urgency w/ possible heat exhaustion, possible recurrent/residual sarcoma of scalp & for urinary frequency 2* to UTI vs  BPH  Per CTA of the head and neck showed increasing soft tissue density at the right temporoparietal scalp, and partial bone destruction with thinning of the calvarium near the vertex, concerning for residual/aggressive tumor  Pt referred to PT for mobility assessment & D/C planning w/ orders of up & OOB as tolerated  PTA, pt reports being I w/o AD  On eval, pt demonstrate minimal dec mobility, balance, endurance & amb  Pt require modified I w/ bed mobility and S for transfers & amb w/o AD  Gait deviations as above, slow w/ dec foot clearance but no gross LOB noted  Limit amb this session 2* to elevated BP noted  No dizziness reported t/o session  BP as follows: 205/100 supine; 181/87 sitting; 190/88 standing; 174/80 after amb  Nsg staff most recent vital signs as follows: /73 (BP Location: Left arm)   Pulse 76   Temp 97 9 °F (36 6 °C) (Temporal)   Resp 18   Ht 5' 7" (1 702 m)   Wt 78 kg (171 lb 15 3 oz)   SpO2 96%   BMI 26 93 kg/m²   At end of session, pt OOB in chair in stable condition, call bell & phone in reach, all lines intact  Fall precautions reinforced w/ good understanding  Pt functioning below baseline hence will continue skilled PT to improve function & safety  The patient's AM-PAC Basic Mobility Inpatient Short Form Raw Score is 20, Standardized Score is 43 99  A standardized score greater than 42 9 suggests the patient may benefit from discharge to home  From PT standpoint, will anticipate good progress in PT for safe D/C to home w/ family support  CM to follow  Nsg staff to continue to mobilized pt (OOB in chair for all meals & ambulate in room/unit) as tolerated to prevent further decline in function  Nsg notified   Co-eval was necessary to complete this PT eval for the pt's best interest given pt's medical acuity & complexity  This session includes the skills, knowledge & judgment of both disciplines to allow pt to receive optimal benefit from skilled therapy interventions  Barriers to Discharge None   Goals   Patient Goals to go home   STG Expiration Date 06/16/21   Short Term Goal #1 Goals to be met in 7 days; pt will be able to: 1) inc strength & balance by 1/2 grade to improve overall functional mobility & dec fall risk; 2) inc bed mobility to I for pt to be able to get in/OOB safely w/ proper techniques 100% of the time, to dec caregiver burden & safely function at home; 3) inc transfers to modified I for pt to transition safely from one surface to another w/o % of the time, to dec caregiver burden & safely function at home; 4) inc amb w/ appropriate AD approx  >350' w/ I/modified I for pt to ambulate community distances w/o any % of the time, to dec caregiver burden & safely function at home; 5) negotiate stairs w/ modified I for inc safety during stair mgt inside/outside of home & dec caregiver burden; 6) pt/caregiver ed   PT Treatment Day 0   Plan   Treatment/Interventions Functional transfer training;LE strengthening/ROM; Elevations; Therapeutic exercise; Endurance training;Patient/family training;Bed mobility;Gait training;Spoke to nursing;OT   PT Frequency Other (Comment)  (3-5x/wk)   Recommendation   PT Discharge Recommendation No rehabilitation needs   AM-PAC Basic Mobility Inpatient   Turning in Bed Without Bedrails 4   Lying on Back to Sitting on Edge of Flat Bed 4   Moving Bed to Chair 3   Standing Up From Chair 3   Walk in Room 3   Climb 3-5 Stairs 3   Basic Mobility Inpatient Raw Score 20   Basic Mobility Standardized Score 43 99   Hx/personal factors: co-morbidities, inaccessible home, advanced age, mutliple lines, telemetry, fall risk and assist w/ ADL's  Examination: dec mobility, dec balance, dec endurance, dec amb, risk for falls  Clinical: unpredictable (ongoing medical status, abnormal lab values and risk for falls)  Complexity: high     Loc Cummins, PT

## 2021-06-09 NOTE — ASSESSMENT & PLAN NOTE
· Patient is present Hospital complaint of dizziness after being outside and mowing his lawn for several hours in the heat today  · Denies any syncope but notes he was feeling lightheaded and very off balance with blurry vision  · BP was elevated to 220/114 in the ED  · CTA head and neck revealing increased soft tissue density in right temporoparietal scalp and partial bone destruction thinning calvarium near vertex concerning for residual or aggressive tumor  · Patient was hydrated with IV fluids overnight and his HCTZ was discontinued, notes symptomatic improvement today, likely large component of dehydration  · EKG without evidence of ischemia, troponins were trended and resulted normal  · Telemetry monitoring negative for acute arrhythmias  · Echocardiogram pending  · MRI ordered to evaluate recurrent/residual sarcoma of temporoparietal scalp and parietal bone  · Continue aspirin and statin  · Continue to monitor blood pressure-started on Norvasc given elevated BP in ED

## 2021-06-09 NOTE — PLAN OF CARE
Problem: PHYSICAL THERAPY ADULT  Goal: Performs mobility at highest level of function for planned discharge setting  See evaluation for individualized goals  Description: Treatment/Interventions: Functional transfer training, LE strengthening/ROM, Elevations, Therapeutic exercise, Endurance training, Patient/family training, Bed mobility, Gait training, Spoke to nursing, OT          See flowsheet documentation for full assessment, interventions and recommendations  Note: Prognosis: Good  Problem List: Decreased strength, Decreased endurance, Impaired balance, Decreased mobility, Impaired judgement, Impaired sensation  Assessment: Pt  81 y o male w/ h/o scalp sarcoma w/ s/p excision & skin graft, presented w/ dizziness  Pt found to have elevated BP of 220/114 in ED  Pt admitted for hypertensive urgency w/ possible heat exhaustion, possible recurrent/residual sarcoma of scalp & for urinary frequency 2* to UTI vs  BPH  Per CTA of the head and neck showed increasing soft tissue density at the right temporoparietal scalp, and partial bone destruction with thinning of the calvarium near the vertex, concerning for residual/aggressive tumor  Pt referred to PT for mobility assessment & D/C planning w/ orders of up & OOB as tolerated  PTA, pt reports being I w/o AD  On eval, pt demonstrate minimal dec mobility, balance, endurance & amb  Pt require modified I w/ bed mobility and S for transfers & amb w/o AD  Gait deviations as above, slow w/ dec foot clearance but no gross LOB noted  Limit amb this session 2* to elevated BP noted  No dizziness reported t/o session  BP as follows: 205/100 supine; 181/87 sitting; 190/88 standing; 174/80 after amb  Nsg staff most recent vital signs as follows: /73 (BP Location: Left arm)   Pulse 76   Temp 97 9 °F (36 6 °C) (Temporal)   Resp 18   Ht 5' 7" (1 702 m)   Wt 78 kg (171 lb 15 3 oz)   SpO2 96%   BMI 26 93 kg/m²    At end of session, pt OOB in chair in stable condition, call bell & phone in reach, all lines intact  Fall precautions reinforced w/ good understanding  Pt functioning below baseline hence will continue skilled PT to improve function & safety  The patient's AM-PAC Basic Mobility Inpatient Short Form Raw Score is 20, Standardized Score is 43 99  A standardized score greater than 42 9 suggests the patient may benefit from discharge to home  From PT standpoint, will anticipate good progress in PT for safe D/C to home w/ family support  CM to follow  Nsg staff to continue to mobilized pt (OOB in chair for all meals & ambulate in room/unit) as tolerated to prevent further decline in function  Nsg notified  Co-eval was necessary to complete this PT eval for the pt's best interest given pt's medical acuity & complexity  This session includes the skills, knowledge & judgment of both disciplines to allow pt to receive optimal benefit from skilled therapy interventions  Barriers to Discharge: None        PT Discharge Recommendation: No rehabilitation needs          See flowsheet documentation for full assessment

## 2021-06-09 NOTE — ASSESSMENT & PLAN NOTE
Patient notes urinary frequency and nocturia  Check urinalysis, if unremarkable, symptoms most likely secondary to BPH  Patient has not been tried on Flomax in the past  Recommend outpatient follow-up  Hold initiating flomax until orthostatic vital signs have been evaluated

## 2021-06-09 NOTE — ASSESSMENT & PLAN NOTE
· Patient with history of sarcoma of scalp, grade 3, recurrent, pleomorphic  · Follows with [facility], surgical Oncology and [facility] radiation oncology  · Status post surgical resection and radiation therapy, a status post skin graft 7/2020  · CT head in ED showing right temporoparietal scalp postsurgical changes with increased residual soft tissue measuring 4 2 centimeters in AP dimension suspicious for recurrent/residual sarcoma, parietal bone destruction  · MRI ordered and pending  · Patient was offered transfer to [facility] or [facility], declined transfer to both  · Will follow-up with [facility] if MRI showing abnormalities

## 2021-06-09 NOTE — PLAN OF CARE
Problem: Potential for Falls  Goal: Patient will remain free of falls  Description: INTERVENTIONS:  - Assess patient frequently for physical needs  -  Identify cognitive and physical deficits and behaviors that affect risk of falls  -  [place] fall precautions as indicated by assessment   - Educate patient/family on patient safety including physical limitations  - Instruct patient to call for assistance with activity based on assessment  - Modify environment to reduce risk of injury  - Consider OT/PT consult to assist with strengthening/mobility  Outcome: Progressing     Problem: Neurological Deficit  Goal: Neurological status is stable or improving  Description: Interventions:  - Monitor and assess patient's level of consciousness, motor function, sensory function, and level of assistance needed for ADLs  - Monitor and report changes from baseline  Collaborate with interdisciplinary team to initiate plan and implement interventions as ordered  - Provide and maintain a safe environment  - Consider seizure precautions  - Consider fall precautions  - Consider aspiration precautions  - Consider bleeding precautions  Outcome: Progressing     Problem: Activity Intolerance/Impaired Mobility  Goal: Mobility/activity is maintained at optimum level for patient  Description: Interventions:  - Assess and monitor patient  barriers to mobility and need for assistive/adaptive devices  - Assess patient's emotional response to limitations  - Collaborate with interdisciplinary team and initiate plans and interventions as ordered  - Encourage independent activity per ability   - Maintain proper body alignment  - Perform active/passive rom as tolerated/ordered    - Plan activities to conserve energy   - Turn patient as appropriate  Outcome: Progressing     Problem: Communication Impairment  Goal: Ability to express needs and understand communication  Description: Assess patient's communication skills and ability to understand information  Patient will demonstrate use of effective communication techniques, alternative methods of communication and understanding even if not able to speak  - Encourage communication and provide alternate methods of communication as needed  - Collaborate with case management/ for discharge needs  - Include patient/family/caregiver in decisions related to communication  Outcome: Progressing     Problem: Potential for Aspiration  Goal: Non-ventilated patient's risk of aspiration is minimized  Description: Assess and monitor vital signs, respiratory status, and labs (WBC)  Monitor for signs of aspiration (tachypnea, cough, rales, wheezing, cyanosis, fever)  - Assess and monitor patient's ability to swallow  - Place patient up in chair to eat if possible  - HOB up at 90 degrees to eat if unable to get patient up into chair   - Supervise patient during oral intake  - Instruct patient/ family to take small bites  - Instruct patient/ family to take small single sips when taking liquids  - Follow patient-specific strategies generated by speech pathologist   Outcome: Progressing  Goal: Ventilated patient's risk of aspiration is minimized  Description: Assess and monitor vital signs, respiratory status, airway cuff pressure, and labs (WBC)  Monitor for signs of aspiration (tachypnea, cough, rales, wheezing, cyanosis, fever)  - Elevate head of bed 30 degrees if patient has tube feeding   - Monitor tube feeding  Outcome: Progressing     Problem: Nutrition  Goal: Nutrition/Hydration status is improving  Description: Monitor and assess patient's nutrition/hydration status for malnutrition (ex- brittle hair, bruises, dry skin, pale skin and conjunctiva, muscle wasting, smooth red tongue, and disorientation)  Collaborate with interdisciplinary team and initiate plan and interventions as ordered  Monitor patient's weight and dietary intake as ordered or per policy   Utilize nutrition screening tool and intervene per policy  Determine patient's food preferences and provide high-protein, high-caloric foods as appropriate  - Assist patient with eating   - Allow adequate time for meals   - Encourage patient to take dietary supplement as ordered  - Collaborate with clinical nutritionist   - Include patient/family/caregiver in decisions related to nutrition    Outcome: Progressing     Problem: PAIN - ADULT  Goal: Verbalizes/displays adequate comfort level or baseline comfort level  Description: Interventions:  - Encourage patient to monitor pain and request assistance  - Assess pain using appropriate pain scale  - Administer analgesics based on type and severity of pain and evaluate response  - Implement non-pharmacological measures as appropriate and evaluate response  - Consider cultural and social influences on pain and pain management  - Notify physician/advanced practitioner if interventions unsuccessful or patient reports new pain  Outcome: Progressing     Problem: INFECTION - ADULT  Goal: Absence or prevention of progression during hospitalization  Description: INTERVENTIONS:  - Assess and monitor for signs and symptoms of infection  - Monitor lab/diagnostic results  - Monitor all insertion sites, i e  indwelling lines, tubes, and drains  - Monitor endotracheal if appropriate and nasal secretions for changes in amount and color  - [place] appropriate cooling/warming therapies per order  - Administer medications as ordered  - Instruct and encourage patient and family to use good hand hygiene technique  - Identify and instruct in appropriate isolation precautions for identified infection/condition  Outcome: Progressing  Goal: Absence of fever/infection during neutropenic period  Description: INTERVENTIONS:  - Monitor WBC    Outcome: Progressing     Problem: SAFETY ADULT  Goal: Patient will remain free of falls  Description: INTERVENTIONS:  - Assess patient frequently for physical needs  -  Identify cognitive and physical deficits and behaviors that affect risk of falls    -  [place] fall precautions as indicated by assessment   - Educate patient/family on patient safety including physical limitations  - Instruct patient to call for assistance with activity based on assessment  - Modify environment to reduce risk of injury  - Consider OT/PT consult to assist with strengthening/mobility  Outcome: Progressing  Goal: Maintain or return to baseline ADL function  Description: INTERVENTIONS:  -  Assess patient's ability to carry out ADLs; assess patient's baseline for ADL function and identify physical deficits which impact ability to perform ADLs (bathing, care of mouth/teeth, toileting, grooming, dressing, etc )  - Assess/evaluate cause of self-care deficits   - Assess range of motion  - Assess patient's mobility; develop plan if impaired  - Assess patient's need for assistive devices and provide as appropriate  - Encourage maximum independence but intervene and supervise when necessary  - Involve family in performance of ADLs  - Assess for home care needs following discharge   - Consider OT consult to assist with ADL evaluation and planning for discharge  - Provide patient education as appropriate  Outcome: Progressing  Goal: Maintain or return mobility status to optimal level  Description: INTERVENTIONS:  - Assess patient's baseline mobility status (ambulation, transfers, stairs, etc )    - Identify cognitive and physical deficits and behaviors that affect mobility  - Identify mobility aids required to assist with transfers and/or ambulation (gait belt, sit-to-stand, lift, walker, cane, etc )  - [place] fall precautions as indicated by assessment  - Record patient progress and toleration of activity level on Mobility SBAR; progress patient to next Phase/Stage  - Instruct patient to call for assistance with activity based on assessment  - Consider rehabilitation consult to assist with strengthening/weightbearing, etc   Outcome: Progressing     Problem: DISCHARGE PLANNING  Goal: Discharge to home or other facility with appropriate resources  Description: INTERVENTIONS:  - Identify barriers to discharge w/patient and caregiver  - Arrange for needed discharge resources and transportation as appropriate  - Identify discharge learning needs (meds, wound care, etc )  - Arrange for interpretive services to assist at discharge as needed  - Refer to Case Management Department for coordinating discharge planning if the patient needs post-hospital services based on physician/advanced practitioner order or complex needs related to functional status, cognitive ability, or social support system  Outcome: Progressing     Problem: Knowledge Deficit  Goal: Patient/family/caregiver demonstrates understanding of disease process, treatment plan, medications, and discharge instructions  Description: Complete learning assessment and assess knowledge base    Interventions:  - Provide teaching at level of understanding  - Provide teaching via preferred learning methods  Outcome: Progressing

## 2021-06-09 NOTE — PROGRESS NOTES
[facility]  Progress Note - Loreto Pate 1940, 79 y o  male MRN: [id number]  Unit/Bed#: E4 -01 Encounter: [id number]  Primary Care Provider: Cary Lo PA-C   Date and time admitted to hospital: 6/8/2021  4:21 PM    * Dizziness  Assessment & Plan  -patient presented to the ER for evaluation of dizziness  -patient relates he mowed his lawn for several hours this morning in the 80 degree temperature  -after mowing his lawn, he shower then went to his dermatology appointment  -he relates that his dermatology appointment when he stood up he felt dizziness which he clarified as the room spinning around him  He also felt lightheaded  He noted when he walked his balance felt unsteady  He returned home   -patient notes symptoms progressed so he presented to the ER  -in the ER patient was noted to have markedly elevated blood pressure of 220/114  -he had a CTA of the head and neck that revealed increasing soft tissue density at the right temporoparietal scalp, and partial bone destruction with thinning of the calvarium near the vertex, concerning for residual/aggressive tumor  -patient notes his vertiginous symptoms have improved, primarily precipitated by standing  -patient be admitted to the hospital for evaluation of dizziness  A) orthostatic:  Patient is on antihypertensive that contains a diuretic  He mowed the lawn for several hours in the intense heat    Patient likely has a component of dehydration  -check orthostatic vital signs  -start IV fluid  -discontinue the hydrochlorothiazide component of his lisinopril/HCTZ    b) cardiovascular:  No evidence of acute ischemia on his EKG, and normal troponin  -patient denies any chest pain, chest pressure, palpitations or any other symptoms concerning for acute ischemia  -check serial cardiac enzymes and EKGs to evaluate for ischemia  -monitor on telemetry for any arrhythmia  -check 2D echocardiogram to evaluate for significant valvular heart disease    c) neurologic:  -MRI to evaluate possible recurrent/residual sarcoma of the temporoparietal scalp and the parietal bone near the vertex  -will placed on the stroke pathway  No focal neurologic signs or symptoms  -continue aspirin, will initiate statin, neuro checks    D) possible heat exhaustion:  Continue supportive measures, and hydration  No hyperthermia    E) possible vertigo:  Patient was given Valium and meclizine in the ER with some improvement  -continue meclizine p r n     F) hypertensive urgency:  Patient has a history of essential hypertension however review of outpatient records his blood pressure has not been controlled on his home lisinopril/HCTZ 20-25  His blood pressure has been ranging approximately 222 systolic   -he presented to the ER with a blood pressure 220/114  Since that time his systolic blood pressures been ranging 170/200  -will confer new lisinopril 20 mg daily and discontinue the hydrochlorothiazide component  -start Norvasc 5 mg daily 1st dose now  -hydralazine IV p r n   -goal is gradual improvement in his blood pressure and avoid rapid normalization    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HCC)  Assessment & Plan  -patient has a previous history of sarcoma of the scalp, grade 3, recurrent, pleomorphic  -he is followed at [facility] Neuro[facility], Surgical Oncology, as well as [address] [place] [address]  -patient is status post prior surgical resection, with perineural invasion and concerns for lymphovascular invasion there for underwent subsequent postoperative radiation therapy that was completed in 2018  -follow-up MRI revealed postoperative changes, and thinning of the calvarium  The scalp defect progressed    He was evaluated by neuro surgery noted to have right posterior parasagittal scalp necrosis, exposed calvarium and underwent craniotomy with scalp reconstruction, and skin graft 07/2020  -since that time patient is followed up with Neurosurgery and Plastic surgery at [facility]  -patient had a CT/CTA of the head and neck in the ER that revealed  "Right temporoparietal scalp postsurgical changes with increased residual soft tissues measuring 4 2 cm in AP dimension suspicious for recurrent/residual sarcoma  There is parietal bone destruction highly where there is thinning of the calvarium near the   vertex suspicious for recurrent/residual aggressive tumor  Surgical consult recommended   Follow-up nonemergent outpatient contrast enhanced brain MRI is recommended for further evaluation"  -this was discussed with the on-call oncology team, and recommendation was for patient to be transferred to his primary team at [facility], or to the [facility] where he would have benefit from neuro surgical/plastic/Surgical Oncology evaluation, and comparison of his films  -patient has wife declined transfer to both [facility]  -will order MRI of the brain, with attention to the areas of the temporoparietal scalp, and the calvarium near the vertex with air concerns for possible recurrence  -discussed with the MRI Department who notes that patient's MRI may not be completed as an inpatient in the next 24 hours, due to wait list, and this was conveyed to patient and his wife    Hypertension  Assessment & Plan  -Patient has a history of essential hypertension for which he is prescribed lisinopril/HCTZ 20/12 5 mg daily  -patient's outpatient records are reviewed, during an office visit 03/2021 his blood pressure was 160/89, and his last PCP office visit his blood pressure was 158/84  -blood pressure is not been controlled in the last several months  -has wife notes initially after his brain surgery also required a 2nd antihypertensive agent, with lisinopril without HCTZ at night  -patient presented with dizziness, and was noted to have a markedly elevated blood pressure of 220/114 that 160/89 without intervention  -blood pressure currently 206/108 during my exam, symmetrical bilaterally  -patient's dizziness is possibly secondary to hypertensive urgency  -provide IV hydralazine  -continue lisinopril however will remove hydrochlorothiazide due to concerns over dehydration  -will add Norvasc 5 mg daily 1st dose now  -continue to monitor and titrate to goal      Urinary frequency  Assessment & Plan  Patient notes urinary frequency and nocturia  Check urinalysis, if unremarkable, symptoms most likely secondary to BPH  Patient has not been tried on Flomax in the past  Recommend outpatient follow-up  Hold initiating flomax until orthostatic vital signs have been evaluated    Macular degeneration  Assessment & Plan  Continue outpatient follow-up    Stage 3 chronic kidney disease [facility])  Assessment & Plan  Lab Results   Component Value Date    EGFR 44 06/08/2021    EGFR 42 03/09/2021    EGFR 44 08/05/2019    CREATININE 1 46 (H) 06/08/2021    CREATININE 1 54 (H) 03/09/2021    CREATININE 1 47 (H) 08/05/2019     Patient's old records are reviewed, he appears to have chronic kidney disease stage 3 with baseline creatinine 1 4-1 5  Current creatinine at his baseline  Continue to monitor  Avoid nephrotoxic agents  Patient confirms he is not taking Naprosyn, however was listed as an old medication        ===================================    History of Present Illness     HPI:  Waqas Langston is a 79 y o  male who presents with dizziness  History is currently taken from patient has wife at the bedside  They relate he was in his usual state of health until this morning  Patient mold the lawn, in the 80 degree heat for several hours  He notes he has a history of chronic back pain, with subsequent lower extremity fatigue and occasional lower extremity numbness  He had to stop mowing the lawn due to his back pain  He did take his lisinopril/HCTZ 1st thing this morning  After he mowed the lawn he went to his dermatology appointment    While he was in the Dermatology office when he stood to ambulate he felt dizzy  He describes if further as a vertigo sensation with the room spinning around him  Also had some lightheadedness and when he ambulated he felt unsteady gait  Patient notes he returned home  The symptoms progressed so he came to the ER for further evaluation  Patient notes hence he has been in the ER, he received Valium 5 mg, meclizine 25 mg, Zofran 4 mg, and his vertigo has improved    He denies any headache  He notes he has macular degeneration  He denies any new visual changes  He denies any current numbness  He notes occasionally he has a pins and needles sensation radiating down his legs bilaterally  None currently  He feels he has chronic bilateral lower extremity weakness, which he attributes to his chronic back pain  No new, or focal weakness  Patient denies any chest pain, chest pressure, or chest discomfort currently or earlier  He denies any palpitations  He denies any shortness of breath, chest congestion, cough  He notes earlier he had some mild nausea which has since resolved  He denies any current nausea, vomiting, diarrhea              Historical Information   Past Medical History:   Diagnosis Date    Back pain     Balance problem     Basal cell carcinoma (BCC)     in past    Gout     Hard of hearing     Hearing aid worn     ziyad    Herniated nucleus pulposus, L4-5     Hypertension     Incisional hernia     Macular degeneration     Melanoma ([address] )     left cheek- history    Pulmonary nodules     Reactive airway disease     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    Skin cancer 07/01/2020    AFX- scalp    Squamous cell carcinoma     in past     Patient Active Problem List   Diagnosis    Actinic keratosis    Stage 3 chronic kidney disease ([address] )    Gout    Hyperlipidemia    Hypertension    Irritable bowel syndrome    Macular degeneration    Urinary incontinence, post-void dribbling    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HCC)    Basal cell carcinoma of skin of other parts of face    Pulmonary nodules    Incisional hernia, without obstruction or gangrene    Status post repair of ventral hernia    Dizziness    Urinary frequency     Past Surgical History:   Procedure Laterality Date    CATARACT EXTRACTION Bilateral     COLONOSCOPY      HERNIA REPAIR      HYDROCELE EXCISION / REPAIR      MASTOID SURGERY Left     MOHS SURGERY      OTHER SURGICAL HISTORY      sarcoma scalp with skin graft    CO REPAIR INCISIONAL HERNIA,REDUCIBLE N/A 2021    Procedure: REPAIR HERNIA INCISIONAL OPEN WITH MESH;  Surgeon: Queenie Wiley MD;  Location: [facility];  Service: General    SCALP EXCISION N/A 3/28/2018    Procedure: SCALP SARCOMA EXCISION WITH FULL THICKNESS SKIN GRAFT;  Surgeon: Maggie Santiago MD;  Location: [facility];  Service: Plastics    SKIN BIOPSY      SKIN CANCER EXCISION      TONSILLECTOMY AND ADENOIDECTOMY         Social History   Social History     Substance and Sexual Activity   Alcohol Use Yes    Frequency: Monthly or less    Drinks per session: 1 or 2    Binge frequency: Never    Comment: beer     Social History     Substance and Sexual Activity   Drug Use No     Social History     Tobacco Use   Smoking Status Former Smoker    Types: Cigarettes    Quit date: 3/13/1988    Years since quittin 2   Smokeless Tobacco Never Used       Family History:   Family History   Problem Relation Age of Onset    Colon cancer Father     Heart failure Father     Heart disease Mother     Stroke Mother        Meds/Allergies       Current Facility-Administered Medications:     amLODIPine (NORVASC) tablet 5 mg, 5 mg, Oral, Daily, Anamika Panda MD    aspirin chewable tablet 81 mg, 81 mg, Oral, Daily, Anamika Panda MD    atorvastatin (LIPITOR) tablet 40 mg, 40 mg, Oral, QPM, Anamika Panda MD    docusate sodium (COLACE) capsule 100 mg, 100 mg, Oral, BID, Anamika Panda MD    heparin (porcine) subcutaneous injection 5,000 Units, 5,000 Units, Subcutaneous, Q8H [address] **AND** Platelet count, , , Once, Jared Reyes MD    hydrALAZINE (APRESOLINE) injection 5 mg, 5 mg, Intravenous, Q6H PRN, Jared Reyes MD  [employer]  [START ON 6/9/2021] lisinopril (ZESTRIL) tablet 20 mg, 20 mg, Oral, Daily, Jared Reyes MD    LORazepam (ATIVAN) injection 0 5 mg, 0 5 mg, Intravenous, Once PRN, Jared Reyes MD    ondansetron [facility]) injection 4 mg, 4 mg, Intravenous, Q6H PRN, Jared Reyes MD    sodium chloride 0 9 % infusion, 75 mL/hr, Intravenous, Continuous, Jared Reyes MD, Last Rate: 75 mL/hr at 06/08/21 2135, 75 mL/hr at 06/08/21 2135    Allergies   Allergen Reactions    Erythromycin Other (See Comments)     Thrush        Review of Systems  A detailed 12 point review of systems was conducted and is negative apart from those mentioned in the HPI  Objective   Vitals: Blood pressure (!) 175/81, pulse 68, temperature 97 9 °F (36 6 °C), temperature source Oral, resp  rate 18, SpO2 96 %  Physical Exam   General:  Very pleasant male  No acute distress  Not tachypneic  Able to speak in complete sentences without having to stop for dyspnea  HEENT: PERRLA, EOMI, sclera anicteric, dry mucous membranes,   Neck: supple,  Heart: Regular rate and rhythm, S1S2 present  No murmur, rub or gallop  Lungs; Clear to auscultation bilaterally  No wheezing, crackles or rhonchi  No accessory muscle use or respiratory distress  Abdomen: soft, non-tender, non-distended, NABS  No guarding or rebound  No peritoneal sound or mass  Extremities: no clubbing, cyanosis, or edema  2+ pedal pulses bilaterally  Full range of motion bilateral upper extremities and bilateral lower extremity  Neurologic:  Cranial nerves II-XII intact discretely  Strength:   5/5 bilaterally, biceps 5/5 bilaterally, triceps 5/5 bilaterally  Ankle flexion 5/5 bilaterally  Hip extension 5/5 bilaterally  Josiah Goody Speech fluent and goal directed    Awake, alert and oriented x 3   Skin: warm and dry  No petechiae, purpura or rash  Lab Results:   Results from last 7 days   Lab Units 21  1628   WBC Thousand/uL 7 48   HEMOGLOBIN g/dL 13 6   HEMATOCRIT % 40 7   PLATELETS Thousands/uL 165     Results from last 7 days   Lab Units 21  1628   POTASSIUM mmol/L 3 5   CHLORIDE mmol/L 104   CO2 mmol/L 29   BUN mg/dL 28*   CREATININE mg/dL 1 46*   CALCIUM mg/dL 9 8         Imagin/8:  CTA head and neck  No evidence of acute intracranial hemorrhage  No evidence of hemodynamic significant stenosis, aneurysm or dissection  Right temporoparietal scalp postsurgical changes with increased residual soft tissues measuring 4 2 cm in AP dimension suspicious for recurrent/residual sarcoma  There is parietal bone destruction highly where there is thinning of the calvarium near the   vertex suspicious for recurrent/residual aggressive tumor  Surgical consult recommended  Follow-up nonemergent outpatient contrast enhanced brain MRI is recommended for further evaluation  EKG:  Normal sinus rhythm  Right bundle branch block  No acute ST or T-wave changes        Code Status: Level 1 - Full Code   Patient has wife has conferred, patient wishes to be a full code, however in the event that he is on life support, without any meaningful chance of recovery will continuing his independent life, he would not wish to continue on life support    Family:  Updated patient's wife at the bedside  Reviewed all test results and treatment plan  Disposition:  Due to patient's dizziness, abnormal CT scan of the brain, and uncontrolled hypertension he will be admitted to the hospital   It is anticipated that is length of stay will be greater than 2 midnights and will be placed on inpatient status      Portions of the record may have been created with voice recognition software

## 2021-06-09 NOTE — ASSESSMENT & PLAN NOTE
· History of essential hypertension, maintained outpatient on lisinopril/HCTZ 20/12 5 mg daily  · Per outpatient records, BP has not been controlled over the last several months  · HCTZ was discontinued given dehydration  · Continue lisinopril 20 milligrams daily  · Started on Norvasc 5 milligrams daily, will monitor throughout the day and consider increasing to 10 milligrams daily if BP remains above goal

## 2021-06-09 NOTE — ASSESSMENT & PLAN NOTE
· Patient reports progressively worsening lumbar back pain with numbness and tingling to both of his legs into his knees  · Notes he has woken with back pain nearly every day radiating from his low back to his groin  · Notes numbness and tingling is worse with certain positions  · Given history of cancer and concern for metastasis, will order lumbar spine MRI with and without contrast  · PT/OT consult

## 2021-06-09 NOTE — ASSESSMENT & PLAN NOTE
-Patient has a history of essential hypertension for which he is prescribed lisinopril/HCTZ 20/12 5 mg daily  -patient's outpatient records are reviewed, during an office visit 03/2021 his blood pressure was 160/89, and his last PCP office visit his blood pressure was 158/84  -blood pressure is not been controlled in the last several months  -has wife notes initially after his brain surgery also required a 2nd antihypertensive agent, with lisinopril without HCTZ at night  -patient presented with dizziness, and was noted to have a markedly elevated blood pressure of 220/114 that 160/89 without intervention  -blood pressure currently 206/108 during my exam, symmetrical bilaterally  -patient's dizziness is possibly secondary to hypertensive urgency  -provide IV hydralazine  -continue lisinopril however will remove hydrochlorothiazide due to concerns over dehydration  -will add Norvasc 5 mg daily 1st dose now  -continue to monitor and titrate to goal

## 2021-06-09 NOTE — H&P
History and physical exam:      * Dizziness  Assessment & Plan  -patient presented to the ER for evaluation of dizziness  -patient relates he mowed his lawn for several hours this morning in the 80 degree temperature  -after mowing his lawn, he shower then went to his dermatology appointment  -he relates that his dermatology appointment when he stood up he felt dizziness which he clarified as the room spinning around him  He also felt lightheaded  He noted when he walked his balance felt unsteady  He returned home   -patient notes symptoms progressed so he presented to the ER  -in the ER patient was noted to have markedly elevated blood pressure of 220/114  -he had a CTA of the head and neck that revealed increasing soft tissue density at the right temporoparietal scalp, and partial bone destruction with thinning of the calvarium near the vertex, concerning for residual/aggressive tumor  -patient notes his vertiginous symptoms have improved, primarily precipitated by standing  -patient be admitted to the hospital for evaluation of dizziness  A) orthostatic:  Patient is on antihypertensive that contains a diuretic  He mowed the lawn for several hours in the intense heat  Patient likely has a component of dehydration  -check orthostatic vital signs  -start IV fluid  -discontinue the hydrochlorothiazide component of his lisinopril/HCTZ    b) cardiovascular:  No evidence of acute ischemia on his EKG, and normal troponin  -patient denies any chest pain, chest pressure, palpitations or any other symptoms concerning for acute ischemia  -check serial cardiac enzymes and EKGs to evaluate for ischemia  -monitor on telemetry for any arrhythmia  -check 2D echocardiogram to evaluate for significant valvular heart disease    c) neurologic:  -MRI to evaluate possible recurrent/residual sarcoma of the temporoparietal scalp and the parietal bone near the vertex  -will placed on the stroke pathway    No focal neurologic signs or symptoms  -continue aspirin, will initiate statin, neuro checks    D) possible heat exhaustion:  Continue supportive measures, and hydration  No hyperthermia    E) possible vertigo:  Patient was given Valium and meclizine in the ER with some improvement  -continue meclizine p r n     F) hypertensive urgency:  Patient has a history of essential hypertension however review of outpatient records his blood pressure has not been controlled on his home lisinopril/HCTZ 20-25  His blood pressure has been ranging approximately 077 systolic   -he presented to the ER with a blood pressure 220/114  Since that time his systolic blood pressures been ranging 170/200  -will confer new lisinopril 20 mg daily and discontinue the hydrochlorothiazide component  -start Norvasc 5 mg daily 1st dose now  -hydralazine IV p r n   -goal is gradual improvement in his blood pressure and avoid rapid normalization    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HCC)  Assessment & Plan  -patient has a previous history of sarcoma of the scalp, grade 3, recurrent, pleomorphic  -he is followed at [facility], as well as [address] [place] [address]  -patient is status post prior surgical resection, with perineural invasion and concerns for lymphovascular invasion there for underwent subsequent postoperative radiation therapy that was completed in 2018  -follow-up MRI revealed postoperative changes, and thinning of the calvarium  The scalp defect progressed    He was evaluated by neuro surgery noted to have right posterior parasagittal scalp necrosis, exposed calvarium and underwent craniotomy with scalp reconstruction, and skin graft 07/2020  -since that time patient is followed up with Neurosurgery and Plastic surgery at [facility]  -patient had a CT/CTA of the head and neck in the ER that revealed  "Right temporoparietal scalp postsurgical changes with increased residual soft tissues measuring 4 2 cm in AP dimension suspicious for recurrent/residual sarcoma  There is parietal bone destruction highly where there is thinning of the calvarium near the   vertex suspicious for recurrent/residual aggressive tumor  Surgical consult recommended   Follow-up nonemergent outpatient contrast enhanced brain MRI is recommended for further evaluation"  -this was discussed with the on-call oncology team, and recommendation was for patient to be transferred to his primary team at [facility], or to the [facility] where he would have benefit from neuro surgical/plastic/Surgical Oncology evaluation, and comparison of his films  -patient has wife declined transfer to both [place] and [place]  -will order MRI of the brain, with attention to the areas of the temporoparietal scalp, and the calvarium near the vertex with air concerns for possible recurrence  -discussed with the MRI Department who notes that patient's MRI may not be completed as an inpatient in the next 24 hours, due to wait list, and this was conveyed to patient and his wife    Hypertension  Assessment & Plan  -Patient has a history of essential hypertension for which he is prescribed lisinopril/HCTZ 20/12 5 mg daily  -patient's outpatient records are reviewed, during an office visit 03/2021 his blood pressure was 160/89, and his last PCP office visit his blood pressure was 158/84  -blood pressure is not been controlled in the last several months  -has wife notes initially after his brain surgery also required a 2nd antihypertensive agent, with lisinopril without HCTZ at night  -patient presented with dizziness, and was noted to have a markedly elevated blood pressure of 220/114 that 160/89 without intervention  -blood pressure currently 206/108 during my exam, symmetrical bilaterally  -patient's dizziness is possibly secondary to hypertensive urgency  -provide IV hydralazine  -continue lisinopril however will remove hydrochlorothiazide due to concerns over dehydration  -will add Norvasc 5 mg daily 1st dose now  -continue to monitor and titrate to goal      Urinary frequency  Assessment & Plan  Patient notes urinary frequency and nocturia  Check urinalysis, if unremarkable, symptoms most likely secondary to BPH  Patient has not been tried on Flomax in the past  Recommend outpatient follow-up  Hold initiating flomax until orthostatic vital signs have been evaluated    Macular degeneration  Assessment & Plan  Continue outpatient follow-up    Stage 3 chronic kidney disease [facility])  Assessment & Plan  Lab Results   Component Value Date    EGFR 44 06/08/2021    EGFR 42 03/09/2021    EGFR 44 08/05/2019    CREATININE 1 46 (H) 06/08/2021    CREATININE 1 54 (H) 03/09/2021    CREATININE 1 47 (H) 08/05/2019     Patient's old records are reviewed, he appears to have chronic kidney disease stage 3 with baseline creatinine 1 4-1 5  Current creatinine at his baseline  Continue to monitor  Avoid nephrotoxic agents  Patient confirms he is not taking Naprosyn, however was listed as an old medication        ===================================    History of Present Illness     HPI:  Junior Rubalcava is a 79 y o  male who presents with dizziness  History is currently taken from patient has wife at the bedside  They relate he was in his usual state of health until this morning  Patient mold the lawn, in the 80 degree heat for several hours  He notes he has a history of chronic back pain, with subsequent lower extremity fatigue and occasional lower extremity numbness  He had to stop mowing the lawn due to his back pain  He did take his lisinopril/HCTZ 1st thing this morning  After he mowed the lawn he went to his dermatology appointment  While he was in the Dermatology office when he stood to ambulate he felt dizzy  He describes if further as a vertigo sensation with the room spinning around him  Also had some lightheadedness and when he ambulated he felt unsteady gait  Patient notes he returned home    The symptoms progressed so he came to the ER for further evaluation  Patient notes hence he has been in the ER, he received Valium 5 mg, meclizine 25 mg, Zofran 4 mg, and his vertigo has improved    He denies any headache  He notes he has macular degeneration  He denies any new visual changes  He denies any current numbness  He notes occasionally he has a pins and needles sensation radiating down his legs bilaterally  None currently  He feels he has chronic bilateral lower extremity weakness, which he attributes to his chronic back pain  No new, or focal weakness  Patient denies any chest pain, chest pressure, or chest discomfort currently or earlier  He denies any palpitations  He denies any shortness of breath, chest congestion, cough  He notes earlier he had some mild nausea which has since resolved  He denies any current nausea, vomiting, diarrhea              Historical Information   Past Medical History:   Diagnosis Date    Back pain     Balance problem     Basal cell carcinoma (BCC)     in past    Gout     Hard of hearing     Hearing aid worn     ziyad    Herniated nucleus pulposus, L4-5     Hypertension     Incisional hernia     Macular degeneration     Melanoma ([facility] )     left cheek- history    Pulmonary nodules     Reactive airway disease     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[facility]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    Skin cancer 07/01/2020    AFX- scalp    Squamous cell carcinoma     in past     Patient Active Problem List   Diagnosis    Actinic keratosis    Stage 3 chronic kidney disease ([facility] )    Gout    Hyperlipidemia    Hypertension    Irritable bowel syndrome    Macular degeneration    Urinary incontinence, post-void dribbling    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HCC)    Basal cell carcinoma of skin of other parts of face    Pulmonary nodules    Incisional hernia, without obstruction or gangrene    Status post repair of ventral hernia    Dizziness    Urinary frequency     Past Surgical History:   Procedure Laterality Date    CATARACT EXTRACTION Bilateral     COLONOSCOPY      HERNIA REPAIR      HYDROCELE EXCISION / REPAIR      MASTOID SURGERY Left     MOHS SURGERY      OTHER SURGICAL HISTORY      sarcoma scalp with skin graft    KS REPAIR INCISIONAL HERNIA,REDUCIBLE N/A 2021    Procedure: REPAIR HERNIA INCISIONAL OPEN WITH MESH;  Surgeon: Jan Sibley MD;  Location: [facility];  Service: General    SCALP EXCISION N/A 3/28/2018    Procedure: SCALP SARCOMA EXCISION WITH FULL THICKNESS SKIN GRAFT;  Surgeon: Harvey Sharma MD;  Location: [facility];  Service: Plastics    SKIN BIOPSY      SKIN CANCER EXCISION      TONSILLECTOMY AND ADENOIDECTOMY         Social History   Social History     Substance and Sexual Activity   Alcohol Use Yes    Frequency: Monthly or less    Drinks per session: 1 or 2    Binge frequency: Never    Comment: beer     Social History     Substance and Sexual Activity   Drug Use No     Social History     Tobacco Use   Smoking Status Former Smoker    Types: Cigarettes    Quit date: 3/13/1988    Years since quittin 2   Smokeless Tobacco Never Used       Family History:   Family History   Problem Relation Age of Onset    Colon cancer Father     Heart failure Father     Heart disease Mother     Stroke Mother        Meds/Allergies       Current Facility-Administered Medications:     amLODIPine (NORVASC) tablet 5 mg, 5 mg, Oral, Daily, Sravani Dave MD    aspirin chewable tablet 81 mg, 81 mg, Oral, Daily, Sravani Dave MD    atorvastatin (LIPITOR) tablet 40 mg, 40 mg, Oral, QPM, Sravani Dave MD    docusate sodium (COLACE) capsule 100 mg, 100 mg, Oral, BID, Sravani Dave MD    heparin (porcine) subcutaneous injection 5,000 Units, 5,000 Units, Subcutaneous, Q8H [address] **AND** Platelet count, , , Once, Sravani Dave MD    hydrALAZINE (APRESOLINE) injection 5 mg, 5 mg, Intravenous, Q6H PRN, Sravani Dave MD    [START ON 2021] lisinopril (ZESTRIL) tablet 20 mg, 20 mg, Oral, Daily, Cher Garces MD    LORazepam (ATIVAN) injection 0 5 mg, 0 5 mg, Intravenous, Once PRN, Cher Garces MD    ondansetron [facility]) injection 4 mg, 4 mg, Intravenous, Q6H PRN, hCer Garces MD    sodium chloride 0 9 % infusion, 75 mL/hr, Intravenous, Continuous, Cher Garces MD, Last Rate: 75 mL/hr at 06/08/21 2135, 75 mL/hr at 06/08/21 2135    Allergies   Allergen Reactions    Erythromycin Other (See Comments)     Thrush        Review of Systems  A detailed 12 point review of systems was conducted and is negative apart from those mentioned in the HPI  Objective   Vitals: Blood pressure (!) 175/81, pulse 68, temperature 97 9 °F (36 6 °C), temperature source Oral, resp  rate 18, SpO2 96 %  Physical Exam   General:  Very pleasant male  No acute distress  Not tachypneic  Able to speak in complete sentences without having to stop for dyspnea  HEENT: PERRLA, EOMI, sclera anicteric, dry mucous membranes,   Neck: supple,  Heart: Regular rate and rhythm, S1S2 present  No murmur, rub or gallop  Lungs; Clear to auscultation bilaterally  No wheezing, crackles or rhonchi  No accessory muscle use or respiratory distress  Abdomen: soft, non-tender, non-distended, NABS  No guarding or rebound  No peritoneal sound or mass  Extremities: no clubbing, cyanosis, or edema  2+ pedal pulses bilaterally  Full range of motion bilateral upper extremities and bilateral lower extremity  Neurologic:  Cranial nerves II-XII intact discretely  Strength:   5/5 bilaterally, biceps 5/5 bilaterally, triceps 5/5 bilaterally  Ankle flexion 5/5 bilaterally  Hip extension 5/5 bilaterally  [place] Speech fluent and goal directed  Awake, alert and oriented x 3  Skin: warm and dry  No petechiae, purpura or rash      Lab Results:   Results from last 7 days   Lab Units 06/08/21  1628   WBC Thousand/uL 7 48   HEMOGLOBIN g/dL 13 6   HEMATOCRIT % 40 7   PLATELETS Thousands/uL 165 Results from last 7 days   Lab Units 21  1628   POTASSIUM mmol/L 3 5   CHLORIDE mmol/L 104   CO2 mmol/L 29   BUN mg/dL 28*   CREATININE mg/dL 1 46*   CALCIUM mg/dL 9 8         Imagin/8:  CTA head and neck  No evidence of acute intracranial hemorrhage  No evidence of hemodynamic significant stenosis, aneurysm or dissection  Right temporoparietal scalp postsurgical changes with increased residual soft tissues measuring 4 2 cm in AP dimension suspicious for recurrent/residual sarcoma  There is parietal bone destruction highly where there is thinning of the calvarium near the   vertex suspicious for recurrent/residual aggressive tumor  Surgical consult recommended  Follow-up nonemergent outpatient contrast enhanced brain MRI is recommended for further evaluation  EKG:  Normal sinus rhythm  Right bundle branch block  No acute ST or T-wave changes        Code Status: Level 1 - Full Code   Patient has wife has conferred, patient wishes to be a full code, however in the event that he is on life support, without any meaningful chance of recovery will continuing his independent life, he would not wish to continue on life support    Family:  Updated patient's wife at the bedside  Reviewed all test results and treatment plan  Disposition:  Due to patient's dizziness, abnormal CT scan of the brain, and uncontrolled hypertension he will be admitted to the hospital   It is anticipated that is length of stay will be greater than 2 midnights and will be placed on inpatient status      Portions of the record may have been created with voice recognition software

## 2021-06-10 VITALS
SYSTOLIC BLOOD PRESSURE: 176 MMHG | BODY MASS INDEX: 27.23 KG/M2 | OXYGEN SATURATION: 98 % | HEIGHT: 67 IN | DIASTOLIC BLOOD PRESSURE: 84 MMHG | HEART RATE: 79 BPM | RESPIRATION RATE: 18 BRPM | TEMPERATURE: 98.1 F | WEIGHT: 173.5 LBS

## 2021-06-10 LAB
ANION GAP SERPL CALCULATED.3IONS-SCNC: 6 MMOL/L (ref 4–13)
BASOPHILS # BLD AUTO: 0.02 THOUSANDS/ΜL (ref 0–0.1)
BASOPHILS NFR BLD AUTO: 0 % (ref 0–1)
BUN SERPL-MCNC: 24 MG/DL (ref 5–25)
CALCIUM SERPL-MCNC: 9 MG/DL (ref 8.3–10.1)
CHLORIDE SERPL-SCNC: 108 MMOL/L (ref 100–108)
CO2 SERPL-SCNC: 28 MMOL/L (ref 21–32)
CREAT SERPL-MCNC: 1.43 MG/DL (ref 0.6–1.3)
EOSINOPHIL # BLD AUTO: 0.2 THOUSAND/ΜL (ref 0–0.61)
EOSINOPHIL NFR BLD AUTO: 4 % (ref 0–6)
ERYTHROCYTE [DISTWIDTH] IN BLOOD BY AUTOMATED COUNT: 12.9 % (ref 11.6–15.1)
GFR SERPL CREATININE-BSD FRML MDRD: 45 ML/MIN/1.73SQ M
GLUCOSE SERPL-MCNC: 90 MG/DL (ref 65–140)
HCT VFR BLD AUTO: 36.9 % (ref 36.5–49.3)
HGB BLD-MCNC: 12.4 G/DL (ref 12–17)
IMM GRANULOCYTES # BLD AUTO: 0.01 THOUSAND/UL (ref 0–0.2)
IMM GRANULOCYTES NFR BLD AUTO: 0 % (ref 0–2)
LYMPHOCYTES # BLD AUTO: 1.42 THOUSANDS/ΜL (ref 0.6–4.47)
LYMPHOCYTES NFR BLD AUTO: 29 % (ref 14–44)
MCH RBC QN AUTO: 31.1 PG (ref 26.8–34.3)
MCHC RBC AUTO-ENTMCNC: 33.6 G/DL (ref 31.4–37.4)
MCV RBC AUTO: 93 FL (ref 82–98)
MONOCYTES # BLD AUTO: 0.4 THOUSAND/ΜL (ref 0.17–1.22)
MONOCYTES NFR BLD AUTO: 8 % (ref 4–12)
NEUTROPHILS # BLD AUTO: 2.89 THOUSANDS/ΜL (ref 1.85–7.62)
NEUTS SEG NFR BLD AUTO: 59 % (ref 43–75)
NRBC BLD AUTO-RTO: 0 /100 WBCS
PLATELET # BLD AUTO: 154 THOUSANDS/UL (ref 149–390)
PMV BLD AUTO: 10.2 FL (ref 8.9–12.7)
POTASSIUM SERPL-SCNC: 4 MMOL/L (ref 3.5–5.3)
RBC # BLD AUTO: 3.99 MILLION/UL (ref 3.88–5.62)
SODIUM SERPL-SCNC: 142 MMOL/L (ref 136–145)
WBC # BLD AUTO: 4.94 THOUSAND/UL (ref 4.31–10.16)

## 2021-06-10 PROCEDURE — 85025 COMPLETE CBC W/AUTO DIFF WBC: CPT | Performed by: PHYSICIAN ASSISTANT

## 2021-06-10 PROCEDURE — 80048 BASIC METABOLIC PNL TOTAL CA: CPT | Performed by: PHYSICIAN ASSISTANT

## 2021-06-10 PROCEDURE — 99239 HOSP IP/OBS DSCHRG MGMT >30: CPT | Performed by: PHYSICIAN ASSISTANT

## 2021-06-10 RX ORDER — AMLODIPINE BESYLATE 5 MG/1
5 TABLET ORAL DAILY
Qty: 30 TABLET | Refills: 0 | Status: SHIPPED | OUTPATIENT
Start: 2021-06-11 | End: 2021-06-23 | Stop reason: SDUPTHER

## 2021-06-10 RX ORDER — LISINOPRIL 20 MG/1
20 TABLET ORAL DAILY
Qty: 30 TABLET | Refills: 0 | Status: SHIPPED | OUTPATIENT
Start: 2021-06-11 | End: 2021-06-23 | Stop reason: SDUPTHER

## 2021-06-10 RX ORDER — TAMSULOSIN HYDROCHLORIDE 0.4 MG/1
0.4 CAPSULE ORAL
Qty: 30 CAPSULE | Refills: 0 | Status: SHIPPED | OUTPATIENT
Start: 2021-06-10

## 2021-06-10 RX ADMIN — DOCUSATE SODIUM 100 MG: 100 CAPSULE ORAL at 09:15

## 2021-06-10 RX ADMIN — AMLODIPINE BESYLATE 5 MG: 5 TABLET ORAL at 09:15

## 2021-06-10 RX ADMIN — ASPIRIN 81 MG: 81 TABLET, CHEWABLE ORAL at 09:15

## 2021-06-10 RX ADMIN — LISINOPRIL 20 MG: 20 TABLET ORAL at 09:15

## 2021-06-10 RX ADMIN — HEPARIN SODIUM 5000 UNITS: 5000 INJECTION INTRAVENOUS; SUBCUTANEOUS at 06:21

## 2021-06-10 NOTE — PHYSICAL THERAPY NOTE
PHYSICAL THERAPY NOTE          Patient Name: Libertad SINGHNICCILAURAQ Date: 6/10/2021     (+) MRI results  Family meeting w/ [facility] in progress  Will continue to follow per POC Evelyne Spurling, PT

## 2021-06-10 NOTE — ASSESSMENT & PLAN NOTE
Lab Results   Component Value Date    EGFR 45 06/10/2021    EGFR 47 06/09/2021    EGFR 44 06/08/2021    CREATININE 1 43 (H) 06/10/2021    CREATININE 1 39 (H) 06/09/2021    CREATININE 1 46 (H) 06/08/2021     · History of CKD stage 3 with baseline 1 4-1 5  · Stable at 1 43

## 2021-06-10 NOTE — PLAN OF CARE
Problem: Potential for Falls  Goal: Patient will remain free of falls  Description: INTERVENTIONS:  - Assess patient frequently for physical needs  -  Identify cognitive and physical deficits and behaviors that affect risk of falls  -  [place] fall precautions as indicated by assessment   - Educate patient/family on patient safety including physical limitations  - Instruct patient to call for assistance with activity based on assessment  - Modify environment to reduce risk of injury  - Consider OT/PT consult to assist with strengthening/mobility  6/10/2021 1654 by Loida Poe RN  Outcome: Adequate for Discharge  6/10/2021 0919 by Loida Poe RN  Outcome: Progressing     Problem: Neurological Deficit  Goal: Neurological status is stable or improving  Description: Interventions:  - Monitor and assess patient's level of consciousness, motor function, sensory function, and level of assistance needed for ADLs  - Monitor and report changes from baseline  Collaborate with interdisciplinary team to initiate plan and implement interventions as ordered  - Provide and maintain a safe environment  - Consider seizure precautions  - Consider fall precautions  - Consider aspiration precautions  - Consider bleeding precautions  6/10/2021 1654 by Loida Poe RN  Outcome: Adequate for Discharge  6/10/2021 0919 by Loida Poe RN  Outcome: Progressing     Problem: Activity Intolerance/Impaired Mobility  Goal: Mobility/activity is maintained at optimum level for patient  Description: Interventions:  - Assess and monitor patient  barriers to mobility and need for assistive/adaptive devices  - Assess patient's emotional response to limitations  - Collaborate with interdisciplinary team and initiate plans and interventions as ordered  - Encourage independent activity per ability   - Maintain proper body alignment  - Perform active/passive rom as tolerated/ordered    - Plan activities to conserve energy   - Turn patient as appropriate  6/10/2021 1654 by Eleazar Giraldo RN  Outcome: Adequate for Discharge  6/10/2021 0919 by Eleazar Giraldo RN  Outcome: Progressing     Problem: Communication Impairment  Goal: Ability to express needs and understand communication  Description: Assess patient's communication skills and ability to understand information  Patient will demonstrate use of effective communication techniques, alternative methods of communication and understanding even if not able to speak  - Encourage communication and provide alternate methods of communication as needed  - Collaborate with case management/ for discharge needs  - Include patient/family/caregiver in decisions related to communication  6/10/2021 1654 by Eleazar Giraldo RN  Outcome: Adequate for Discharge  6/10/2021 0919 by Eleazar Giraldo RN  Outcome: Progressing     Problem: Potential for Aspiration  Goal: Non-ventilated patient's risk of aspiration is minimized  Description: Assess and monitor vital signs, respiratory status, and labs (WBC)  Monitor for signs of aspiration (tachypnea, cough, rales, wheezing, cyanosis, fever)  - Assess and monitor patient's ability to swallow  - Place patient up in chair to eat if possible  - HOB up at 90 degrees to eat if unable to get patient up into chair   - Supervise patient during oral intake  - Instruct patient/ family to take small bites  - Instruct patient/ family to take small single sips when taking liquids  - Follow patient-specific strategies generated by speech pathologist   6/10/2021 1654 by Eleazar Giraldo RN  Outcome: Adequate for Discharge  6/10/2021 0919 by Eleazar Giraldo RN  Outcome: Progressing  Goal: Ventilated patient's risk of aspiration is minimized  Description: Assess and monitor vital signs, respiratory status, airway cuff pressure, and labs (WBC)    Monitor for signs of aspiration (tachypnea, cough, rales, wheezing, cyanosis, fever)  - Elevate head of bed 30 degrees if patient has tube feeding   - Monitor tube feeding  6/10/2021 1654 by Estefania Vasquez RN  Outcome: Adequate for Discharge  6/10/2021 0919 by Estefania Vasquez RN  Outcome: Progressing     Problem: Nutrition  Goal: Nutrition/Hydration status is improving  Description: Monitor and assess patient's nutrition/hydration status for malnutrition (ex- brittle hair, bruises, dry skin, pale skin and conjunctiva, muscle wasting, smooth red tongue, and disorientation)  Collaborate with interdisciplinary team and initiate plan and interventions as ordered  Monitor patient's weight and dietary intake as ordered or per policy  Utilize nutrition screening tool and intervene per policy  Determine patient's food preferences and provide high-protein, high-caloric foods as appropriate  - Assist patient with eating   - Allow adequate time for meals   - Encourage patient to take dietary supplement as ordered  - Collaborate with clinical nutritionist   - Include patient/family/caregiver in decisions related to nutrition    6/10/2021 1654 by Estefania Vasquez RN  Outcome: Adequate for Discharge  6/10/2021 0919 by Estefania Vasquez RN  Outcome: Progressing     Problem: PAIN - ADULT  Goal: Verbalizes/displays adequate comfort level or baseline comfort level  Description: Interventions:  - Encourage patient to monitor pain and request assistance  - Assess pain using appropriate pain scale  - Administer analgesics based on type and severity of pain and evaluate response  - Implement non-pharmacological measures as appropriate and evaluate response  - Consider cultural and social influences on pain and pain management  - Notify physician/advanced practitioner if interventions unsuccessful or patient reports new pain  6/10/2021 1654 by Estefania Vasquez RN  Outcome: Adequate for Discharge  6/10/2021 0919 by Estefania Vasquez RN  Outcome: Progressing     Problem: INFECTION - ADULT  Goal: Absence or prevention of progression during hospitalization  Description: INTERVENTIONS:  - Assess and monitor for signs and symptoms of infection  - Monitor lab/diagnostic results  - Monitor all insertion sites, i e  indwelling lines, tubes, and drains  - Monitor endotracheal if appropriate and nasal secretions for changes in amount and color  - [place] appropriate cooling/warming therapies per order  - Administer medications as ordered  - Instruct and encourage patient and family to use good hand hygiene technique  - Identify and instruct in appropriate isolation precautions for identified infection/condition  6/10/2021 1654 by Jose Shin RN  Outcome: Adequate for Discharge  6/10/2021 0919 by Jose Shin RN  Outcome: Progressing  Goal: Absence of fever/infection during neutropenic period  Description: INTERVENTIONS:  - Monitor WBC    6/10/2021 1654 by Jose Shin RN  Outcome: Adequate for Discharge  6/10/2021 0919 by Jose Shni RN  Outcome: Progressing     Problem: SAFETY ADULT  Goal: Patient will remain free of falls  Description: INTERVENTIONS:  - Assess patient frequently for physical needs  -  Identify cognitive and physical deficits and behaviors that affect risk of falls    -  [place] fall precautions as indicated by assessment   - Educate patient/family on patient safety including physical limitations  - Instruct patient to call for assistance with activity based on assessment  - Modify environment to reduce risk of injury  - Consider OT/PT consult to assist with strengthening/mobility  6/10/2021 1654 by Jose Shin RN  Outcome: Adequate for Discharge  6/10/2021 0919 by Jose Shin RN  Outcome: Progressing  Goal: Maintain or return to baseline ADL function  Description: INTERVENTIONS:  -  Assess patient's ability to carry out ADLs; assess patient's baseline for ADL function and identify physical deficits which impact ability to perform ADLs (bathing, care of mouth/teeth, toileting, grooming, dressing, etc )  - Assess/evaluate cause of self-care deficits   - Assess range of motion  - Assess patient's mobility; develop plan if impaired  - Assess patient's need for assistive devices and provide as appropriate  - Encourage maximum independence but intervene and supervise when necessary  - Involve family in performance of ADLs  - Assess for home care needs following discharge   - Consider OT consult to assist with ADL evaluation and planning for discharge  - Provide patient education as appropriate  6/10/2021 1654 by Mitch Wagner RN  Outcome: Adequate for Discharge  6/10/2021 0919 by Mitch Wagner RN  Outcome: Progressing  Goal: Maintain or return mobility status to optimal level  Description: INTERVENTIONS:  - Assess patient's baseline mobility status (ambulation, transfers, stairs, etc )    - Identify cognitive and physical deficits and behaviors that affect mobility  - Identify mobility aids required to assist with transfers and/or ambulation (gait belt, sit-to-stand, lift, walker, cane, etc )  - [place] fall precautions as indicated by assessment  - Record patient progress and toleration of activity level on Mobility SBAR; progress patient to next Phase/Stage  - Instruct patient to call for assistance with activity based on assessment  - Consider rehabilitation consult to assist with strengthening/weightbearing, etc   6/10/2021 1654 by Mitch Wagner RN  Outcome: Adequate for Discharge  6/10/2021 0919 by Mitch Wagner RN  Outcome: Progressing     Problem: DISCHARGE PLANNING  Goal: Discharge to home or other facility with appropriate resources  Description: INTERVENTIONS:  - Identify barriers to discharge w/patient and caregiver  - Arrange for needed discharge resources and transportation as appropriate  - Identify discharge learning needs (meds, wound care, etc )  - Arrange for interpretive services to assist at discharge as needed  - Refer to Case Management Department for coordinating discharge planning if the patient needs post-hospital services based on physician/advanced practitioner order or complex needs related to functional status, cognitive ability, or social support system  6/10/2021 1654 by Tiffanie Parker RN  Outcome: Adequate for Discharge  6/10/2021 0919 by Tiffanie Parker RN  Outcome: Progressing     Problem: Knowledge Deficit  Goal: Patient/family/caregiver demonstrates understanding of disease process, treatment plan, medications, and discharge instructions  Description: Complete learning assessment and assess knowledge base  Interventions:  - Provide teaching at level of understanding  - Provide teaching via preferred learning methods  6/10/2021 1654 by Tiffanie Parker RN  Outcome: Adequate for Discharge  6/10/2021 0919 by Tiffanie Parker RN  Outcome: Progressing     Problem: PHYSICAL THERAPY ADULT  Goal: Performs mobility at highest level of function for planned discharge setting  See evaluation for individualized goals  Description: Treatment/Interventions: Functional transfer training, LE strengthening/ROM, Elevations, Therapeutic exercise, Endurance training, Patient/family training, Bed mobility, Gait training, Spoke to nursing, OT          See flowsheet documentation for full assessment, interventions and recommendations  Outcome: Adequate for Discharge     Problem: OCCUPATIONAL THERAPY ADULT  Goal: Performs self-care activities at highest level of function for planned discharge setting  See evaluation for individualized goals    Description: Treatment Interventions: ADL retraining, UE strengthening/ROM, Functional transfer training, Cognitive reorientation, Endurance training, Patient/family training, Equipment evaluation/education, Compensatory technique education, Energy conservation, Activityengagement          See flowsheet documentation for full assessment, interventions and recommendations     Outcome: Adequate for Discharge

## 2021-06-10 NOTE — ASSESSMENT & PLAN NOTE
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

## 2021-06-10 NOTE — NURSING NOTE
Pt to be discharged home  Discharge teaching and instructions provided, pt and pt wife verbalized understanding of the same  IV removed  Pt to be taken down in wheelchair accompanied by PCA and wife

## 2021-06-10 NOTE — ASSESSMENT & PLAN NOTE
· Patient is present Hospital complaint of dizziness after being outside and mowing his lawn for several hours in the heat today  · Denies any syncope but notes he was feeling lightheaded and very off balance with blurry vision  · BP was elevated to 220/114 in the ED  · CTA head and neck revealing increased soft tissue density in right temporoparietal scalp and partial bone destruction thinning calvarium near vertex concerning for residual or aggressive tumor  · Patient was hydrated with IV fluids overnight and his HCTZ was discontinued, notes symptomatic improvement today, likely large component of dehydration  · EKG without evidence of ischemia, troponins were trended and resulted normal  · Telemetry monitoring negative for acute arrhythmias  · Echocardiogram with grade 1 diastolic dysfunction and EF 60%  · MRI ordered to evaluate recurrent/residual sarcoma of temporoparietal scalp and parietal bone  · Continue aspirin and statin  · BP better controlled today with addition of Norvasc

## 2021-06-10 NOTE — ASSESSMENT & PLAN NOTE
· Patient noting urinary frequency nocturia  · UA negative for infection  · Continue Flomax at discharge  · Outpatient Urology follow-up

## 2021-06-10 NOTE — PLAN OF CARE
Problem: Potential for Falls  Goal: Patient will remain free of falls  Description: INTERVENTIONS:  - Assess patient frequently for physical needs  -  Identify cognitive and physical deficits and behaviors that affect risk of falls  -  [place] fall precautions as indicated by assessment   - Educate patient/family on patient safety including physical limitations  - Instruct patient to call for assistance with activity based on assessment  - Modify environment to reduce risk of injury  - Consider OT/PT consult to assist with strengthening/mobility  Outcome: Progressing     Problem: Neurological Deficit  Goal: Neurological status is stable or improving  Description: Interventions:  - Monitor and assess patient's level of consciousness, motor function, sensory function, and level of assistance needed for ADLs  - Monitor and report changes from baseline  Collaborate with interdisciplinary team to initiate plan and implement interventions as ordered  - Provide and maintain a safe environment  - Consider seizure precautions  - Consider fall precautions  - Consider aspiration precautions  - Consider bleeding precautions  Outcome: Progressing     Problem: Activity Intolerance/Impaired Mobility  Goal: Mobility/activity is maintained at optimum level for patient  Description: Interventions:  - Assess and monitor patient  barriers to mobility and need for assistive/adaptive devices  - Assess patient's emotional response to limitations  - Collaborate with interdisciplinary team and initiate plans and interventions as ordered  - Encourage independent activity per ability   - Maintain proper body alignment  - Perform active/passive rom as tolerated/ordered    - Plan activities to conserve energy   - Turn patient as appropriate  Outcome: Progressing     Problem: Communication Impairment  Goal: Ability to express needs and understand communication  Description: Assess patient's communication skills and ability to understand information  Patient will demonstrate use of effective communication techniques, alternative methods of communication and understanding even if not able to speak  - Encourage communication and provide alternate methods of communication as needed  - Collaborate with case management/ for discharge needs  - Include patient/family/caregiver in decisions related to communication  Outcome: Progressing     Problem: Potential for Aspiration  Goal: Non-ventilated patient's risk of aspiration is minimized  Description: Assess and monitor vital signs, respiratory status, and labs (WBC)  Monitor for signs of aspiration (tachypnea, cough, rales, wheezing, cyanosis, fever)  - Assess and monitor patient's ability to swallow  - Place patient up in chair to eat if possible  - HOB up at 90 degrees to eat if unable to get patient up into chair   - Supervise patient during oral intake  - Instruct patient/ family to take small bites  - Instruct patient/ family to take small single sips when taking liquids  - Follow patient-specific strategies generated by speech pathologist   Outcome: Progressing  Goal: Ventilated patient's risk of aspiration is minimized  Description: Assess and monitor vital signs, respiratory status, airway cuff pressure, and labs (WBC)  Monitor for signs of aspiration (tachypnea, cough, rales, wheezing, cyanosis, fever)  - Elevate head of bed 30 degrees if patient has tube feeding   - Monitor tube feeding  Outcome: Progressing     Problem: Nutrition  Goal: Nutrition/Hydration status is improving  Description: Monitor and assess patient's nutrition/hydration status for malnutrition (ex- brittle hair, bruises, dry skin, pale skin and conjunctiva, muscle wasting, smooth red tongue, and disorientation)  Collaborate with interdisciplinary team and initiate plan and interventions as ordered  Monitor patient's weight and dietary intake as ordered or per policy   Utilize nutrition screening tool and intervene per policy  Determine patient's food preferences and provide high-protein, high-caloric foods as appropriate  - Assist patient with eating   - Allow adequate time for meals   - Encourage patient to take dietary supplement as ordered  - Collaborate with clinical nutritionist   - Include patient/family/caregiver in decisions related to nutrition    Outcome: Progressing     Problem: PAIN - ADULT  Goal: Verbalizes/displays adequate comfort level or baseline comfort level  Description: Interventions:  - Encourage patient to monitor pain and request assistance  - Assess pain using appropriate pain scale  - Administer analgesics based on type and severity of pain and evaluate response  - Implement non-pharmacological measures as appropriate and evaluate response  - Consider cultural and social influences on pain and pain management  - Notify physician/advanced practitioner if interventions unsuccessful or patient reports new pain  Outcome: Progressing     Problem: INFECTION - ADULT  Goal: Absence or prevention of progression during hospitalization  Description: INTERVENTIONS:  - Assess and monitor for signs and symptoms of infection  - Monitor lab/diagnostic results  - Monitor all insertion sites, i e  indwelling lines, tubes, and drains  - Monitor endotracheal if appropriate and nasal secretions for changes in amount and color  - [place] appropriate cooling/warming therapies per order  - Administer medications as ordered  - Instruct and encourage patient and family to use good hand hygiene technique  - Identify and instruct in appropriate isolation precautions for identified infection/condition  Outcome: Progressing  Goal: Absence of fever/infection during neutropenic period  Description: INTERVENTIONS:  - Monitor WBC    Outcome: Progressing     Problem: SAFETY ADULT  Goal: Patient will remain free of falls  Description: INTERVENTIONS:  - Assess patient frequently for physical needs  -  Identify cognitive and physical deficits and behaviors that affect risk of falls    -  [place] fall precautions as indicated by assessment   - Educate patient/family on patient safety including physical limitations  - Instruct patient to call for assistance with activity based on assessment  - Modify environment to reduce risk of injury  - Consider OT/PT consult to assist with strengthening/mobility  Outcome: Progressing  Goal: Maintain or return to baseline ADL function  Description: INTERVENTIONS:  -  Assess patient's ability to carry out ADLs; assess patient's baseline for ADL function and identify physical deficits which impact ability to perform ADLs (bathing, care of mouth/teeth, toileting, grooming, dressing, etc )  - Assess/evaluate cause of self-care deficits   - Assess range of motion  - Assess patient's mobility; develop plan if impaired  - Assess patient's need for assistive devices and provide as appropriate  - Encourage maximum independence but intervene and supervise when necessary  - Involve family in performance of ADLs  - Assess for home care needs following discharge   - Consider OT consult to assist with ADL evaluation and planning for discharge  - Provide patient education as appropriate  Outcome: Progressing  Goal: Maintain or return mobility status to optimal level  Description: INTERVENTIONS:  - Assess patient's baseline mobility status (ambulation, transfers, stairs, etc )    - Identify cognitive and physical deficits and behaviors that affect mobility  - Identify mobility aids required to assist with transfers and/or ambulation (gait belt, sit-to-stand, lift, walker, cane, etc )  - [place] fall precautions as indicated by assessment  - Record patient progress and toleration of activity level on Mobility SBAR; progress patient to next Phase/Stage  - Instruct patient to call for assistance with activity based on assessment  - Consider rehabilitation consult to assist with strengthening/weightbearing, etc   Outcome: Progressing     Problem: DISCHARGE PLANNING  Goal: Discharge to home or other facility with appropriate resources  Description: INTERVENTIONS:  - Identify barriers to discharge w/patient and caregiver  - Arrange for needed discharge resources and transportation as appropriate  - Identify discharge learning needs (meds, wound care, etc )  - Arrange for interpretive services to assist at discharge as needed  - Refer to Case Management Department for coordinating discharge planning if the patient needs post-hospital services based on physician/advanced practitioner order or complex needs related to functional status, cognitive ability, or social support system  Outcome: Progressing     Problem: Knowledge Deficit  Goal: Patient/family/caregiver demonstrates understanding of disease process, treatment plan, medications, and discharge instructions  Description: Complete learning assessment and assess knowledge base    Interventions:  - Provide teaching at level of understanding  - Provide teaching via preferred learning methods  Outcome: Progressing

## 2021-06-10 NOTE — ASSESSMENT & PLAN NOTE
· History of essential hypertension, maintained outpatient on lisinopril/HCTZ 20/12 5 mg daily  · Per outpatient records, BP has not been controlled over the last several months  · HCTZ was discontinued given dehydration  · Continue lisinopril 20 milligrams daily  · Started on Norvasc yesterday with improvement in blood pressure

## 2021-06-10 NOTE — DISCHARGE SUMMARY
[facility]  Discharge- Christen Argueta 1940, 79 y o  male MRN: [id number]  Unit/Bed#: E4 -01 Encounter: [id number]  Primary Care Provider: Emmie Raphael PA-C   Date and time admitted to hospital: 6/8/2021  4:21 PM    * Dizziness  Assessment & Plan  · Patient is present Hospital complaint of dizziness after being outside and mowing his lawn for several hours in the heat today  · Denies any syncope but notes he was feeling lightheaded and very off balance with blurry vision  · BP was elevated to 220/114 in the ED  · CTA head and neck revealing increased soft tissue density in right temporoparietal scalp and partial bone destruction thinning calvarium near vertex concerning for residual or aggressive tumor  · Patient was hydrated with IV fluids overnight and his HCTZ was discontinued, notes symptomatic improvement today, likely large component of dehydration  · EKG without evidence of ischemia, troponins were trended and resulted normal  · Telemetry monitoring negative for acute arrhythmias  · Echocardiogram with grade 1 diastolic dysfunction and EF 60%  · MRI ordered to evaluate recurrent/residual sarcoma of temporoparietal scalp and parietal bone  · Continue aspirin and statin  · BP better controlled today with addition of Norvasc    Lumbar back pain with radiculopathy affecting lower extremity  Assessment & Plan  · Patient reports progressively worsening lumbar back pain with numbness and tingling to both of his legs into his knees  · Notes he has woken with back pain nearly every day radiating from his low back to his groin  · Notes numbness and tingling is worse with certain positions  · Patient declining lumbar spine MRI at this time as he would like to focus on his recurrent sarcoma of skull, will place referral for comprehensive spine  · Following up outpatient with [facility] neurosurgery  · PT/OT consult-cleared for home without needs    Urinary frequency  Assessment & Plan  · Patient noting urinary frequency nocturia  · UA negative for infection  · Continue Flomax at discharge  · Outpatient Urology follow-up    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[facility])  Assessment & Plan  · Patient with history of sarcoma of scalp, grade 3, recurrent, pleomorphic  · Follows with [facility] Neurosurgery, surgical Oncology and [place] [id number] radiation oncology  · Status post surgical resection and radiation therapy, a status post skin graft 7/2020  · CT head in ED showing right temporoparietal scalp postsurgical changes with increased residual soft tissue measuring 4 2 centimeters in AP dimension suspicious for recurrent/residual sarcoma, parietal bone destruction  · MRI with evidence of recurrence of right parietal scalp sarcoma with local bony destruction, no convincing evidence of intracranial extension  · Spoke with Jackelyn Dominguez today, patient now scheduled for a neurosurgery appointment on June 17th and plastic surgery appointment on June 21st    Macular degeneration  Assessment & Plan  · Continue outpatient ophthalmology follow-up    Hypertension  Assessment & Plan  · History of essential hypertension, maintained outpatient on lisinopril/HCTZ 20/12 5 mg daily  · Per outpatient records, BP has not been controlled over the last several months  · HCTZ was discontinued given dehydration  · Continue lisinopril 20 milligrams daily  · Started on Norvasc yesterday with improvement in blood pressure    Stage 3 chronic kidney disease [facility])  Assessment & Plan  Lab Results   Component Value Date    EGFR 45 06/10/2021    EGFR 47 06/09/2021    EGFR 44 06/08/2021    CREATININE 1 43 (H) 06/10/2021    CREATININE 1 39 (H) 06/09/2021    CREATININE 1 46 (H) 06/08/2021     · History of CKD stage 3 with baseline 1 4-1 5  · Stable at 1 43    Discharging Physician / Practitioner: Blayne Gamboa PA-C  PCP: Chandni Aguilera PA-C  Admission Date:   Admission Orders (From admission, onward)     Ordered        06/08/21 2122  Inpatient Admission Once                   Discharge Date: 06/10/21    Resolved Problems  Date Reviewed: 6/10/2021    None          Consultations During Hospital Stay:  · PT/OT    Procedures Performed:   Cta Head And Neck With And Without Contrast    Result Date: 6/8/2021  Impression: No evidence of acute intracranial hemorrhage  No evidence of hemodynamic significant stenosis, aneurysm or dissection  Right temporoparietal scalp postsurgical changes with increased residual soft tissues measuring 4 2 cm in AP dimension suspicious for recurrent/residual sarcoma  There is parietal bone destruction highly where there is thinning of the calvarium near the vertex suspicious for recurrent/residual aggressive tumor  Surgical consult recommended  Follow-up nonemergent outpatient contrast enhanced brain MRI is recommended for further evaluation  I personally discussed this study with [address] [place] [address] on 6/8/2021 at 6:13 PM  Workstation performed: [id number]     Mri Brain W Wo Contrast    Result Date: 6/10/2021  Impression: Recurrence of the right parietal scalp sarcoma with local bony destruction, but no convincing evidence for pachymeningeal enhancement or intracranial extension  Workstation performed: [id number]       Significant Findings / Test Results:   · See above    Incidental Findings:   · See above     Test Results Pending at Discharge (will require follow up): · None     Outpatient Tests Requested:  · Outpatient follow-up with [facility] neurosurgery and plastic surgeon    Complications:  Recurrence scalp sarcoma    Reason for Admission:  Dizziness    Hospital Course:     Oksana Pérez is a 79 y o  male patient who originally presented to the hospital on 6/8/2021 due to dizziness  Patient is present the hospital with complaint of dizziness  Patient noted earlier in the day he was out mowing his lawn for multiple hours in the heat  Patient was hydrated with IV fluids with improvement in his symptoms    Patient blood pressure was elevated at time of admission  HCTZ was discontinued given dehydration and patient was started on Norvasc for hypertensive urgency  Blood pressure remained stable after addition of Norvasc  CT head was obtained and was found to be concerning for recurrent sarcoma  MRI was also completed with evidence of recurrent sarcoma  [facility] was contacted and patient was set up with outpatient follow-up with both Neurosurgery and Plastic surgery within the next 2 weeks  Patient was given discs of MRI and CTA to take with him to [facility]  Please see above list of diagnoses and related plan for additional information  Condition at Discharge: stable     Discharge Day Visit / Exam:     Subjective:  Patient reports he is doing okay today  Denies any lightheadedness or dizziness  Very unhappy to hear about recurrence of his cancer, aware of followups with [facility]  Vitals: Blood Pressure: 154/84 (06/10/21 1100)  Pulse: 84 (06/10/21 1100)  Temperature: 98 1 °F (36 7 °C) (06/10/21 1100)  Temp Source: Temporal (06/10/21 1100)  Respirations: 18 (06/10/21 1100)  Height: 5' 7" (170 2 cm) (06/08/21 2200)  Weight - Scale: 78 7 kg (173 lb 8 oz) (06/10/21 0546)  SpO2: 96 % (06/10/21 1100)  Exam:   Physical Exam  Vitals signs reviewed  Constitutional:       General: He is not in acute distress  HENT:      Head: Normocephalic and atraumatic  Comments: Skin graft in place  Eyes:      General: No scleral icterus  Conjunctiva/sclera: Conjunctivae normal    Neck:      Musculoskeletal: Neck supple  Cardiovascular:      Rate and Rhythm: Normal rate and regular rhythm  Heart sounds: No murmur  Pulmonary:      Effort: Pulmonary effort is normal  No respiratory distress  Breath sounds: Normal breath sounds  No wheezing  Abdominal:      General: Bowel sounds are normal  There is no distension  Palpations: Abdomen is soft  Tenderness: There is no abdominal tenderness     Musculoskeletal:      Right lower leg: No edema  Left lower leg: No edema  Skin:     General: Skin is warm and dry  Neurological:      Mental Status: He is alert and oriented to person, place, and time  Psychiatric:         Mood and Affect: Mood normal          Behavior: Behavior normal        Discussion with Family: spoke with wife at bedside    Discharge instructions/Information to patient and family:   See after visit summary for information provided to patient and family  Provisions for Follow-Up Care:  See after visit summary for information related to follow-up care and any pertinent home health orders  Disposition:     Home    For Discharges to [facility] SNF:   · Not Applicable to this Patient - Not Applicable to this Patient    Planned Readmission:  None     Discharge Statement:  I spent 60 minutes discharging the patient  This time was spent on the day of discharge  I had direct contact with the patient on the day of discharge  Greater than 50% of the total time was spent examining patient, answering all patient questions, arranging and discussing plan of care with patient as well as directly providing post-discharge instructions  Additional time then spent on discharge activities  Discharge Medications:  See after visit summary for reconciled discharge medications provided to patient and family        ** Please Note: This note has been constructed using a voice recognition system **

## 2021-06-10 NOTE — PLAN OF CARE
Problem: Potential for Falls  Goal: Patient will remain free of falls  Description: INTERVENTIONS:  - Assess patient frequently for physical needs  -  Identify cognitive and physical deficits and behaviors that affect risk of falls  -  [place] fall precautions as indicated by assessment   - Educate patient/family on patient safety including physical limitations  - Instruct patient to call for assistance with activity based on assessment  - Modify environment to reduce risk of injury  - Consider OT/PT consult to assist with strengthening/mobility  Outcome: Progressing     Problem: Neurological Deficit  Goal: Neurological status is stable or improving  Description: Interventions:  - Monitor and assess patient's level of consciousness, motor function, sensory function, and level of assistance needed for ADLs  - Monitor and report changes from baseline  Collaborate with interdisciplinary team to initiate plan and implement interventions as ordered  - Provide and maintain a safe environment  - Consider seizure precautions  - Consider fall precautions  - Consider aspiration precautions  - Consider bleeding precautions  Outcome: Progressing     Problem: Activity Intolerance/Impaired Mobility  Goal: Mobility/activity is maintained at optimum level for patient  Description: Interventions:  - Assess and monitor patient  barriers to mobility and need for assistive/adaptive devices  - Assess patient's emotional response to limitations  - Collaborate with interdisciplinary team and initiate plans and interventions as ordered  - Encourage independent activity per ability   - Maintain proper body alignment  - Perform active/passive rom as tolerated/ordered    - Plan activities to conserve energy   - Turn patient as appropriate  Outcome: Progressing     Problem: Communication Impairment  Goal: Ability to express needs and understand communication  Description: Assess patient's communication skills and ability to understand information  Patient will demonstrate use of effective communication techniques, alternative methods of communication and understanding even if not able to speak  - Encourage communication and provide alternate methods of communication as needed  - Collaborate with case management/ for discharge needs  - Include patient/family/caregiver in decisions related to communication  Outcome: Progressing     Problem: Potential for Aspiration  Goal: Non-ventilated patient's risk of aspiration is minimized  Description: Assess and monitor vital signs, respiratory status, and labs (WBC)  Monitor for signs of aspiration (tachypnea, cough, rales, wheezing, cyanosis, fever)  - Assess and monitor patient's ability to swallow  - Place patient up in chair to eat if possible  - HOB up at 90 degrees to eat if unable to get patient up into chair   - Supervise patient during oral intake  - Instruct patient/ family to take small bites  - Instruct patient/ family to take small single sips when taking liquids  - Follow patient-specific strategies generated by speech pathologist   Outcome: Progressing  Goal: Ventilated patient's risk of aspiration is minimized  Description: Assess and monitor vital signs, respiratory status, airway cuff pressure, and labs (WBC)  Monitor for signs of aspiration (tachypnea, cough, rales, wheezing, cyanosis, fever)  - Elevate head of bed 30 degrees if patient has tube feeding   - Monitor tube feeding  Outcome: Progressing     Problem: Nutrition  Goal: Nutrition/Hydration status is improving  Description: Monitor and assess patient's nutrition/hydration status for malnutrition (ex- brittle hair, bruises, dry skin, pale skin and conjunctiva, muscle wasting, smooth red tongue, and disorientation)  Collaborate with interdisciplinary team and initiate plan and interventions as ordered  Monitor patient's weight and dietary intake as ordered or per policy   Utilize nutrition screening tool and intervene per policy  Determine patient's food preferences and provide high-protein, high-caloric foods as appropriate  - Assist patient with eating   - Allow adequate time for meals   - Encourage patient to take dietary supplement as ordered  - Collaborate with clinical nutritionist   - Include patient/family/caregiver in decisions related to nutrition    Outcome: Progressing     Problem: PAIN - ADULT  Goal: Verbalizes/displays adequate comfort level or baseline comfort level  Description: Interventions:  - Encourage patient to monitor pain and request assistance  - Assess pain using appropriate pain scale  - Administer analgesics based on type and severity of pain and evaluate response  - Implement non-pharmacological measures as appropriate and evaluate response  - Consider cultural and social influences on pain and pain management  - Notify physician/advanced practitioner if interventions unsuccessful or patient reports new pain  Outcome: Progressing     Problem: INFECTION - ADULT  Goal: Absence or prevention of progression during hospitalization  Description: INTERVENTIONS:  - Assess and monitor for signs and symptoms of infection  - Monitor lab/diagnostic results  - Monitor all insertion sites, i e  indwelling lines, tubes, and drains  - Monitor endotracheal if appropriate and nasal secretions for changes in amount and color  - [place] appropriate cooling/warming therapies per order  - Administer medications as ordered  - Instruct and encourage patient and family to use good hand hygiene technique  - Identify and instruct in appropriate isolation precautions for identified infection/condition  Outcome: Progressing  Goal: Absence of fever/infection during neutropenic period  Description: INTERVENTIONS:  - Monitor WBC    Outcome: Progressing     Problem: SAFETY ADULT  Goal: Patient will remain free of falls  Description: INTERVENTIONS:  - Assess patient frequently for physical needs  -  Identify cognitive and physical deficits and behaviors that affect risk of falls    -  [place] fall precautions as indicated by assessment   - Educate patient/family on patient safety including physical limitations  - Instruct patient to call for assistance with activity based on assessment  - Modify environment to reduce risk of injury  - Consider OT/PT consult to assist with strengthening/mobility  Outcome: Progressing  Goal: Maintain or return to baseline ADL function  Description: INTERVENTIONS:  -  Assess patient's ability to carry out ADLs; assess patient's baseline for ADL function and identify physical deficits which impact ability to perform ADLs (bathing, care of mouth/teeth, toileting, grooming, dressing, etc )  - Assess/evaluate cause of self-care deficits   - Assess range of motion  - Assess patient's mobility; develop plan if impaired  - Assess patient's need for assistive devices and provide as appropriate  - Encourage maximum independence but intervene and supervise when necessary  - Involve family in performance of ADLs  - Assess for home care needs following discharge   - Consider OT consult to assist with ADL evaluation and planning for discharge  - Provide patient education as appropriate  Outcome: Progressing  Goal: Maintain or return mobility status to optimal level  Description: INTERVENTIONS:  - Assess patient's baseline mobility status (ambulation, transfers, stairs, etc )    - Identify cognitive and physical deficits and behaviors that affect mobility  - Identify mobility aids required to assist with transfers and/or ambulation (gait belt, sit-to-stand, lift, walker, cane, etc )  - [place] fall precautions as indicated by assessment  - Record patient progress and toleration of activity level on Mobility SBAR; progress patient to next Phase/Stage  - Instruct patient to call for assistance with activity based on assessment  - Consider rehabilitation consult to assist with strengthening/weightbearing, etc   Outcome: Progressing     Problem: DISCHARGE PLANNING  Goal: Discharge to home or other facility with appropriate resources  Description: INTERVENTIONS:  - Identify barriers to discharge w/patient and caregiver  - Arrange for needed discharge resources and transportation as appropriate  - Identify discharge learning needs (meds, wound care, etc )  - Arrange for interpretive services to assist at discharge as needed  - Refer to Case Management Department for coordinating discharge planning if the patient needs post-hospital services based on physician/advanced practitioner order or complex needs related to functional status, cognitive ability, or social support system  Outcome: Progressing     Problem: Knowledge Deficit  Goal: Patient/family/caregiver demonstrates understanding of disease process, treatment plan, medications, and discharge instructions  Description: Complete learning assessment and assess knowledge base    Interventions:  - Provide teaching at level of understanding  - Provide teaching via preferred learning methods  Outcome: Progressing

## 2021-06-10 NOTE — ASSESSMENT & PLAN NOTE
· Patient reports progressively worsening lumbar back pain with numbness and tingling to both of his legs into his knees  · Notes he has woken with back pain nearly every day radiating from his low back to his groin  · Notes numbness and tingling is worse with certain positions  · Patient declining lumbar spine MRI at this time as he would like to focus on his recurrent sarcoma of skull, will place referral for comprehensive spine  · Following up outpatient with [facility] neurosurgery  · PT/OT consult-cleared for home without needs

## 2021-06-11 ENCOUNTER — TRANSITIONAL CARE MANAGEMENT (OUTPATIENT)
Dept: FAMILY MEDICINE CLINIC | Facility: CLINIC | Age: 81
End: 2021-06-11

## 2021-06-23 ENCOUNTER — OFFICE VISIT (OUTPATIENT)
Dept: FAMILY MEDICINE CLINIC | Facility: CLINIC | Age: 81
End: 2021-06-23
Payer: MEDICARE

## 2021-06-23 VITALS
SYSTOLIC BLOOD PRESSURE: 164 MMHG | HEIGHT: 67 IN | BODY MASS INDEX: 27.78 KG/M2 | WEIGHT: 177 LBS | TEMPERATURE: 98.2 F | DIASTOLIC BLOOD PRESSURE: 84 MMHG | HEART RATE: 76 BPM

## 2021-06-23 DIAGNOSIS — E78.5 HYPERLIPIDEMIA, UNSPECIFIED HYPERLIPIDEMIA TYPE: Primary | ICD-10-CM

## 2021-06-23 DIAGNOSIS — C49.0 SARCOMA OF SCALP (HCC): ICD-10-CM

## 2021-06-23 DIAGNOSIS — N18.30 STAGE 3 CHRONIC KIDNEY DISEASE, UNSPECIFIED WHETHER STAGE 3A OR 3B CKD (HCC): ICD-10-CM

## 2021-06-23 DIAGNOSIS — I10 ESSENTIAL HYPERTENSION: ICD-10-CM

## 2021-06-23 DIAGNOSIS — L98.494: ICD-10-CM
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
## 2021-06-23 DIAGNOSIS — M87.38 RADIATION NECROSIS OF SKULL (HCC): ICD-10-CM

## 2021-06-23 PROBLEM — E86.0 DEHYDRATION: Status: ACTIVE | Noted: 2021-06-23

## 2021-06-23 PROCEDURE — 99214 OFFICE O/P EST MOD 30 MIN: CPT | Performed by: PHYSICIAN ASSISTANT

## 2021-06-23 RX ORDER — LISINOPRIL 20 MG/1
20 TABLET ORAL DAILY
Qty: 90 TABLET | Refills: 3 | Status: SHIPPED | OUTPATIENT
Start: 2021-06-23 | End: 2022-05-31

## 2021-06-23 RX ORDER — TAMSULOSIN HYDROCHLORIDE 0.4 MG/1
0.4 CAPSULE ORAL
Qty: 30 CAPSULE | Refills: 0 | Status: CANCELLED | OUTPATIENT
Start: 2021-06-23

## 2021-06-23 RX ORDER — AMLODIPINE BESYLATE 5 MG/1
5 TABLET ORAL DAILY
Qty: 90 TABLET | Refills: 3 | Status: SHIPPED | OUTPATIENT
Start: 2021-06-23 | End: 2021-09-28 | Stop reason: SDUPTHER

## 2021-06-23 NOTE — PATIENT INSTRUCTIONS
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

## 2021-06-23 NOTE — PROGRESS NOTES
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 [facility]  6  BPH-patient did not seem to have significant benefit with Flomax and I will be discontinued  No problem-specific Assessment & Plan notes found for this encounter  Diagnoses and all orders for this visit:    Hyperlipidemia, unspecified hyperlipidemia type    Essential hypertension  -     lisinopril (ZESTRIL) 20 mg tablet; Take 1 tablet (20 mg total) by mouth daily  -     amLODIPine (NORVASC) 5 mg tablet; Take 1 tablet (5 mg total) by mouth daily    Stage 3 chronic kidney disease, unspecified whether stage 3a or 3b CKD (HCC)    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HCC)    Scalp ulceration, with necrosis of bone (HCC)    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HCC)    Other orders  -     Cancel: tamsulosin (FLOMAX) 0 4 mg; Take 1 capsule (0 4 mg total) by mouth daily with dinner         Subjective:     Patient ID: Loreto Pate is a 79 y o  male  HPI:  This is an 65-year-old gentleman that presents to the office for follow-up of recent hospitalization  He was out on the lawnmower in the sun and heat for some time and was rushing around  He went to a dermatology appointment that day and when he returned he started to feel lightheaded and off balance  His wife called an ambulance and sent him to the hospital   His blood pressure was in excess of 200 and he was dehydrated  He was started on amlodipine and lisinopril in place of hydrochlorothiazide    He has been feeling well on these medications since  He has not had any recurrent symptoms  He states in the hospital that he also mentions that he wakes 2-3 times at night with nocturia and he was started on Flomax  Since he has been on the medication for the past week or so he has not noticed any benefit from it  He does continue to follow with specialist at [facility] after history of flap of the right cranium status post excision of cranial sarcoma  Patient did have some initial scare after imaging in the hospital, thinking that his cancer was recurrent however his specialist at [facility] told him it was the way it was supposed to look after his procedure  Review of Systems   Constitutional: Negative for chills, fatigue and fever  HENT: Negative for congestion, ear pain and sinus pressure  Eyes: Negative for visual disturbance  Respiratory: Negative for cough, chest tightness and shortness of breath  Cardiovascular: Negative for chest pain and palpitations  Gastrointestinal: Negative for diarrhea, nausea and vomiting  Endocrine: Negative for polyuria  Genitourinary: Negative for dysuria and frequency  Musculoskeletal: Negative for arthralgias and myalgias  Skin: Negative for pallor and rash  Neurological: Negative for dizziness, weakness, light-headedness, numbness and headaches  Psychiatric/Behavioral: Negative for agitation, behavioral problems and sleep disturbance  All other systems reviewed and are negative  Objective:     Physical Exam  Vitals and nursing note reviewed  Constitutional:       General: He is not in acute distress  Appearance: He is well-developed  HENT:      Head: Normocephalic and atraumatic  Right Ear: External ear normal       Left Ear: External ear normal       Nose: Nose normal       Mouth/Throat:      Pharynx: No oropharyngeal exudate  Eyes:      Conjunctiva/sclera: Conjunctivae normal       Pupils: Pupils are equal, round, and reactive to light     Neck: Thyroid: No thyromegaly  Trachea: No tracheal deviation  Cardiovascular:      Rate and Rhythm: Normal rate and regular rhythm  Heart sounds: Normal heart sounds  No murmur heard  No friction rub  Pulmonary:      Effort: Pulmonary effort is normal  No respiratory distress  Breath sounds: Normal breath sounds  No wheezing or rales  Abdominal:      General: Bowel sounds are normal  There is no distension  Palpations: Abdomen is soft  Tenderness: There is no abdominal tenderness  There is no guarding or rebound  Musculoskeletal:         General: No tenderness  Normal range of motion  Cervical back: Normal range of motion and neck supple  Lymphadenopathy:      Cervical: No cervical adenopathy  Skin:     General: Skin is warm and dry  Findings: No erythema or rash  Neurological:      Mental Status: He is alert and oriented to person, place, and time  Cranial Nerves: No cranial nerve deficit  Coordination: Coordination normal    Psychiatric:         Behavior: Behavior normal          Thought Content: Thought content normal            Vitals:    06/23/21 1430   BP: 164/84   BP Location: Left arm   Patient Position: Sitting   Cuff Size: Adult   Pulse: 76   Temp: 98 2 °F (36 8 °C)   TempSrc: Probe   Weight: 80 3 kg (177 lb)   Height: 5' 7" (1 702 m)       Transitional Care Management Review:  Bianka Bocanegra is a 79 y o  male here for TCM follow up       During the TCM phone call patient stated:    TCM Call (since 5/23/2021)     Date and time call was made  6/11/2021  9:24 AM    Hospital care reviewed  Records reviewed    Patient was hospitialized at  [facility]        Date of Admission  06/08/21    Date of discharge  06/10/21    Diagnosis  DIzziness    Disposition  Home    Were the patients medications reviewed and updated  No    Current Symptoms  None      TCM Call (since 5/23/2021)     Post hospital issues  None    Should patient be enrolled in [facility] monitoring? No    Scheduled for follow up?   Yes    Patients specialists  Neurologist    Neurologist name  Dr Neftali Coats    Neurologist contact #  [phone]    Referrals needed  Per wife appointment is scheduled for 6/17/21 at [facility]    Did you obtain your prescribed medications  Yes    Do you need help managing your prescriptions or medications  Yes    Why type of assitance do you need  wife manages his medications    Is transportation to your appointment needed  No    I have advised the patient to call PCP with any new or worsening symptoms  [employer] MA    Living Arrangements  Spouse or Significiant other    Support System  Spouse    The type of support provided  None    Do you have social support  Yes, as much as I need    Are you recieving any outpatient services  No    Are you recieving home care services  No    Are you using any community resources  No    Current waiver services  No    Have you fallen in the last 12 months  No    Interperter language line needed  No          Emmie Raphael PA-C

## 2021-07-13 ENCOUNTER — OFFICE VISIT (OUTPATIENT)
Dept: DERMATOLOGY | Facility: CLINIC | Age: 81
End: 2021-07-13
Payer: MEDICARE

## 2021-07-13 VITALS — HEIGHT: 67 IN | TEMPERATURE: 98.2 F | BODY MASS INDEX: 27.15 KG/M2 | WEIGHT: 173 LBS

## 2021-07-13 DIAGNOSIS — D48.5 NEOPLASM OF UNCERTAIN BEHAVIOR OF SKIN: Primary | ICD-10-CM

## 2021-07-13 PROCEDURE — 99214 OFFICE O/P EST MOD 30 MIN: CPT | Performed by: DERMATOLOGY

## 2021-07-13 NOTE — PROGRESS NOTES
[facility] Follow Up Note    Patient Name: Krishna Wade  Encounter Date: 07/13/2021    Today's Chief Concerns:  Radha Hylton Concern #1:  Lesions on nose and upper lip      Current Medications:    Current Outpatient Medications:     acetaminophen (TYLENOL) 500 mg tablet, Take 500-1,000 mg by mouth every 4 (four) hours as needed for mild pain, Disp: , Rfl:     amLODIPine (NORVASC) 5 mg tablet, Take 1 tablet (5 mg total) by mouth daily, Disp: 90 tablet, Rfl: 3    Fluocinolone Acetonide Scalp 0 01 % OIL, Apply a thin layer topically daily at night one hour before bedtime  (Patient taking differently: Apply 1 application topically daily Apply a thin layer topically daily at night one hour before bedtime  2-3 x wk), Disp: 118 28 mL, Rfl: 5    imiquimod (ALDARA) 5 % cream, Apply topically once a day Monday thru Friday for 6 weeks, Disp: 24 each, Rfl: 1    lisinopril (ZESTRIL) 20 mg tablet, Take 1 tablet (20 mg total) by mouth daily, Disp: 90 tablet, Rfl: 3    Multiple Vitamins-Minerals (PRESERVISION AREDS 2) CAPS, Take 1 capsule by mouth 2 (two) times a day, Disp: , Rfl:     Naproxen Sodium (ALEVE PO), Take 1 tablet by mouth 2 (two) times a day as needed , Disp: , Rfl:     tamsulosin (FLOMAX) 0 4 mg, Take 1 capsule (0 4 mg total) by mouth daily with dinner, Disp: 30 capsule, Rfl: 0    CONSTITUTIONAL:   Vitals:    07/13/21 1000   Temp: 98 2 °F (36 8 °C)   TempSrc: Tympanic   Weight: 78 5 kg (173 lb)   Height: 5' 7" (1 702 m)       Specific Alerts:    Have you been seen by a [facility]atologist in the last 3 years? YES    Are you pregnant or planning to become pregnant? N/A    Are you currently or planning to be nursing or breast feeding? N/A    Allergies   Allergen Reactions    Erythromycin Other (See Comments)     Thrush        May we call your Preferred Phone number to discuss your specific medical information?  YES    May we leave a detailed message that includes your specific medical information? YES    Have you traveled outside of the [facility] in the past 3 months? No    Do you currently have a pacemaker or defibrillator? No    Do you have any artificial heart valves, joints, plates, screws, rods, stents, pins, etc? No   - If Yes, were any placed within the last 2 years? Do you require any medications prior to a surgical procedure? No   - If Yes, for which procedure? - If Yes, what medications to you require? Are you taking any medications that cause you to bleed more easily ("blood thinners") No    Have you ever experienced a rapid heartbeat with epinephrine? No    Have you ever been treated with "gold" (gold sodium thiomalate) therapy? No    [facility] can help with wrinkles, "laugh lines," facial volume loss, "double chin," "love handles," age spots, and more  Are you interested in learning today about some of the skin enhancement procedures that we offer? (If Yes, please provide more detail) No    Review of Systems:  Have you recently had or currently have any of the following?     · Fever or chills: No  · Night Sweats: No  · Headaches: No  · Weight Gain: No  · Weight Loss: No  · Blurry Vision: No  · Nausea: No  · Vomiting: No  · Diarrhea: No  · Blood in Stool: No  · Abdominal Pain: No  · Itchy Skin: No  · Painful Joints: No  · Swollen Joints: No  · Muscle Pain: No  · Irregular Mole: No  · Sun Burn: No  · Dry Skin: No  · Skin Color Changes: No  · Scar or Keloid: No  · Cold Sores/Fever Blisters: No  · Bacterial Infections/MRSA: No  · Anxiety: No  · Depression: No  · Suicidal or Homicidal Thoughts: No      PSYCH: Normal mood and affect  EYES: Normal conjunctiva  ENT: Normal lips and oral mucosa  CARDIOVASCULAR: No edema  RESPIRATORY: Normal respirations  HEME/LYMPH/IMMUNO:  No regional lymphadenopathy except as noted below in ASSESSMENT AND PLAN BY DIAGNOSIS    FULL ORGAN SYSTEM SKIN EXAM (SKIN)    Face Normal except as noted below in Assessment 1  NEOPLASM OF UNCERTAIN BEHAVIOR OF SKIN    Physical Exam:   (Anatomic Location); (Size and Morphological Description); (Differential Diagnosis):  o Specimen A; Right temple; 2 cm crusted eschar  o Specimen B; Right ala; 1 5 cm crusted eschar  o Specimen C; Left upper lip; 1 5 cm crusted eschar   Pertinent Positives:   Pertinent Negatives: Additional History of Present Condition:  Patient used imiquimod for 2-3 weeks  Patient stopped using on 07/09/2021    Assessment and Plan:   I have discussed with the patient that a sample of skin via a "skin biopsy would be potentially helpful to further make a specific diagnosis under the microscope   Based on a thorough discussion of this condition and the management approach to it (including a comprehensive discussion of the known risks, side effects and potential benefits of treatment), the patient (family) agrees to implement the following specific plan:    Begin mupirocin ointment  Two three times a day to right temple, right nose and left upper lip  I noted that biopsy initially after imiquimod will yield spurious result   Follow up 3-4 weeks   Per Dr Jimmy Bradshaw patient to be seen 8/17/2021 at 11:30     Scribe Attestation    I,:  Saturnino Beckham MA am acting as a scribe while in the presence of the attending physician :       I,:  Darlene Man MD personally performed the services described in this documentation    as scribed in my presence :

## 2021-07-13 NOTE — PATIENT INSTRUCTIONS
1  NEOPLASM OF UNCERTAIN BEHAVIOR OF SKIN    Physical Exam:   (Anatomic Location); (Size and Morphological Description); (Differential Diagnosis):  o Specimen A; Right temple; 2 cm crusted eschar  o Specimen B; Right ala; 1 5 cm crusted eschar  o Specimen C; Left upper lip; 1 5 cm crusted eschar      Assessment and Plan:   I have discussed with the patient that a sample of skin via a "skin biopsy would be potentially helpful to further make a specific diagnosis under the microscope     Based on a thorough discussion of this condition and the management approach to it (including a comprehensive discussion of the known risks, side effects and potential benefits of treatment), the patient (family) agrees to implement the following specific plan:    Begin mupirocin ointment  Two three times a day to right temple, right nose and left upper lip     Follow up 3-4 weeks, Appointment given for 8/17/2021 at 11;30 with Dr Daniel Scruggs in [address] [place] [address]

## 2021-08-17 ENCOUNTER — OFFICE VISIT (OUTPATIENT)
Dept: DERMATOLOGY | Facility: CLINIC | Age: 81
End: 2021-08-17
Payer: MEDICARE

## 2021-08-17 VITALS — WEIGHT: 176 LBS | BODY MASS INDEX: 27.62 KG/M2 | TEMPERATURE: 97.9 F | HEIGHT: 67 IN

## 2021-08-17 DIAGNOSIS — D48.5 NEOPLASM OF UNCERTAIN BEHAVIOR OF SKIN: Primary | ICD-10-CM

## 2021-08-17 PROCEDURE — 11102 TANGNTL BX SKIN SINGLE LES: CPT | Performed by: DERMATOLOGY

## 2021-08-17 PROCEDURE — 88305 TISSUE EXAM BY PATHOLOGIST: CPT | Performed by: PATHOLOGY

## 2021-08-17 PROCEDURE — 99213 OFFICE O/P EST LOW 20 MIN: CPT | Performed by: DERMATOLOGY

## 2021-08-17 NOTE — PROGRESS NOTES
[facility] Follow Up Note    Patient Name: Chano Toney  Encounter Date: 8/17/2021    Today's Chief Concerns:  Ruba Bustos Concern #1:  Nose follow up   Concern #2:    Ruba Bustos Concern #3:      Current Medications:    Current Outpatient Medications:     acetaminophen (TYLENOL) 500 mg tablet, Take 500-1,000 mg by mouth every 4 (four) hours as needed for mild pain, Disp: , Rfl:     amLODIPine (NORVASC) 5 mg tablet, Take 1 tablet (5 mg total) by mouth daily, Disp: 90 tablet, Rfl: 3    Fluocinolone Acetonide Scalp 0 01 % OIL, Apply a thin layer topically daily at night one hour before bedtime  (Patient taking differently: Apply 1 application topically daily Apply a thin layer topically daily at night one hour before bedtime  2-3 x wk), Disp: 118 28 mL, Rfl: 5    imiquimod (ALDARA) 5 % cream, Apply topically once a day Monday thru Friday for 6 weeks, Disp: 24 each, Rfl: 1    lisinopril (ZESTRIL) 20 mg tablet, Take 1 tablet (20 mg total) by mouth daily, Disp: 90 tablet, Rfl: 3    Multiple Vitamins-Minerals (PRESERVISION AREDS 2) CAPS, Take 1 capsule by mouth 2 (two) times a day, Disp: , Rfl:     mupirocin (BACTROBAN) 2 % ointment, Apply topically two to three times a day to sore areas, Disp: 22 g, Rfl: 3    Naproxen Sodium (ALEVE PO), Take 1 tablet by mouth 2 (two) times a day as needed , Disp: , Rfl:     tamsulosin (FLOMAX) 0 4 mg, Take 1 capsule (0 4 mg total) by mouth daily with dinner, Disp: 30 capsule, Rfl: 0    CONSTITUTIONAL:   Vitals:    08/17/21 1106   Temp: 97 9 °F (36 6 °C)   Weight: 79 8 kg (176 lb)   Height: 5' 7" (1 702 m)       Specific Alerts:    Have you been seen by a [facility] Dermatologist in the last 3 years? YES    Are you pregnant or planning to become pregnant? N/A    Are you currently or planning to be nursing or breast feeding?  N/A    Allergies   Allergen Reactions    Erythromycin Other (See Comments)     Thrush        May we call your Preferred Phone number to discuss your specific medical information? YES    May we leave a detailed message that includes your specific medical information? YES    Have you traveled outside of the [facility] in the past 3 months? No    Do you currently have a pacemaker or defibrillator? No    Do you have any artificial heart valves, joints, plates, screws, rods, stents, pins, etc? No   - If Yes, were any placed within the last 2 years? Do you require any medications prior to a surgical procedure? No   - If Yes, for which procedure? - If Yes, what medications to you require? Are you taking any medications that cause you to bleed more easily ("blood thinners") No    Have you ever experienced a rapid heartbeat with epinephrine? No    Have you ever been treated with "gold" (gold sodium thiomalate) therapy? No    [facility] can help with wrinkles, "laugh lines," facial volume loss, "double chin," "love handles," age spots, and more  Are you interested in learning today about some of the skin enhancement procedures that we offer? (If Yes, please provide more detail) No    Review of Systems:  Have you recently had or currently have any of the following?     · Fever or chills: No  · Night Sweats: No  · Headaches: No  · Weight Gain: No  · Weight Loss: No  · Blurry Vision: No  · Nausea: No  · Vomiting: No  · Diarrhea: No  · Blood in Stool: No  · Abdominal Pain: No  · Itchy Skin: No  · Painful Joints: No  · Swollen Joints: No  · Muscle Pain: No  · Irregular Mole: No  · Sun Burn: No  · Dry Skin: No  · Skin Color Changes: No  · Scar or Keloid: No  · Cold Sores/Fever Blisters: No  · Bacterial Infections/MRSA: No  · Anxiety: No  · Depression: No  · Suicidal or Homicidal Thoughts: No      PSYCH: Normal mood and affect  EYES: Normal conjunctiva  ENT: Normal lips and oral mucosa  CARDIOVASCULAR: No edema  RESPIRATORY: Normal respirations  HEME/LYMPH/IMMUNO:  No regional lymphadenopathy except as noted below in ASSESSMENT AND PLAN BY DIAGNOSIS    FULL ORGAN SYSTEM SKIN EXAM (SKIN)  Hair, Scalp, Ears, Face Normal except as noted below in Assessment       1  NEOPLASM OF UNCERTAIN BEHAVIOR OF SKIN    Physical Exam:   (Anatomic Location); (Size and Morphological Description); (Differential Diagnosis):  o Right tip of nose; Non-healing ulcerated nodule 1 cm; Squamous Cell Carcinoma versus Irritated Warty Keratosis  o   Assessment and Plan:   I have discussed with the patient that a sample of skin via a "skin biopsy would be potentially helpful to further make a specific diagnosis under the microscope   Based on a thorough discussion of this condition and the management approach to it (including a comprehensive discussion of the known risks, side effects and potential benefits of treatment), the patient (family) agrees to implement the following specific plan:    o Procedure:  Skin Biopsy  After a thorough discussion of treatment options and risk/benefits/alternatives (including but not limited to local pain, scarring, dyspigmentation, blistering, possible superinfection, and inability to confirm a diagnosis via histopathology), verbal and written consent were obtained and portion of the rash was biopsied for tissue sample  See below for consent that was obtained from patient and subsequent Procedure Note  PROCEDURE SHAVE BIOPSY NOTE:     Performing Physician: Dr Caraballo   Anatomic Location; Clinical Description with size (cm); Pre-Op Diagnosis:   o Right tip of nose; Non-healing ulcerated nodule 1 cm; Squamous Cell Carcinoma versus Irritated Warty Keratosis   Post-op diagnosis: Same      Local anesthesia: 1% xylocaine with epi       Topical anesthesia: None     Hemostasis: Aluminum chloride       After obtaining informed consent  at which time there was a discussion about the purpose of biopsy  and low risks of infection and bleeding  The area was prepped and draped in the usual fashion   Anesthesia was obtained with 1% lidocaine with epinephrine  A shave biopsy to an appropriate sampling depth was obtained with a sterile blade (such as a 15-blade or DermaBlade)  The resulting wound was covered with surgical ointment and bandaged appropriately  The patient tolerated the procedure well without complications and was without signs of functional compromise  Specimen has been sent for review by Dermatopathology  Standard post-procedure care has been explained and has been included in written form within the patient's copy of Informed Consent  INFORMED CONSENT DISCUSSION AND POST-OPERATIVE INSTRUCTIONS FOR PATIENT    I   RATIONALE FOR PROCEDURE  I understand that a skin biopsy allows the Dermatologist to test a lesion or rash under the microscope to obtain a diagnosis  It usually involves numbing the area with numbing medication and removing a small piece of skin; sometimes the area will be closed with sutures  In this specific procedure, sutures are not usually needed  If any sutures are placed, then they are usually need to be removed in 2 weeks or less  I understand that my Dermatologist recommends that a skin "shave" biopsy be performed today  A local anesthetic, similar to the kind that a dentist uses when filling a cavity, will be injected with a very small needle into the skin area to be sampled  The injected skin and tissue underneath "will go to sleep and become numb so no pain should be felt afterwards  An instrument shaped like a tiny "razor blade" (shave biopsy instrument) will be used to cut a small piece of tissue and skin from the area so that a sample of tissue can be taken and examined more closely under the microscope  A slight amount of bleeding will occur, but it will be stopped with direct pressure and a pressure bandage and any other appropriate methods  I understands that a scar will form where the wound was created  Surgical ointment will be applied to help protect the wound    Sutures are not usually needed  II   RISKS AND POTENTIAL COMPLICATIONS   I understand the risks and potential complications of a skin biopsy include but are not limited to the following:   Bleeding   Infection   Pain   Scar/keloid   Skin discoloration   Incomplete Removal   Recurrence   Nerve Damage/Numbness/Loss of Function   Allergic Reaction to Anesthesia   Biopsies are diagnostic procedures and based on findings additional treatment or evaluation may be required   Loss or destruction of specimen resulting in no additional findings    My Dermatologist has explained to me the nature of the condition, the nature of the procedure, and the benefits to be reasonably expected compared with alternative approaches  My Dermatologist has discussed the likelihood of major risks or complications of this procedure including the specific risks listed above, such as bleeding, infection, and scarring/keloid  I understand that a scar is expected after this procedure  I understand that my physician cannot predict if the scar will form a "keloid," which extends beyond the borders of the wound that is created  A keloid is a thick, painful, and bumpy scar  A keloid can be difficult to treat, as it does not always respond well to therapy, which includes injecting cortisone directly into the keloid every few weeks  While this usually reduces the pain and size of the scar, it does not eliminate it  I understand that photographs may be taken before and after the procedure  These will be maintained as part of the medical providers confidential records and may not be made available to me  I further authorize the medical provider to use the photographs for teaching purposes or to illustrate scientific papers, books, or lectures if in his/her judgment, medical research, education, or science may benefit from its use  I have had an opportunity to fully inquire about the risks and benefits of this procedure and its alternatives  I have been given ample time and opportunity to ask questions and to seek a second opinion if I wished to do so  I acknowledge that there have specifically been no guarantees as to the cosmetic results from the procedure  I am aware that with any procedure there is always the possibility of an unexpected complication  III  POST-PROCEDURAL CARE (WHAT YOU WILL NEED TO DO "AFTER THE BIOPSY" TO OPTIMIZE HEALING)     Keep the area clean and dry  Try NOT to remove the bandage or get it wet for the first 24 hours   Gently clean the area and apply surgical ointment (such as Vaseline petrolatum ointment, which is available "over the counter" and not a prescription) to the biopsy site for up to 2 weeks straight  This acts to protect the wound from the outside world   Sutures are not usually placed in this procedure  If any sutures were placed, return for suture removal as instructed (generally 1 week for the face, 2 weeks for the body)   Take Acetaminophen (Tylenol) for discomfort, if no contraindications  Ibuprofen or aspirin could make bleeding worse   Call our office immediately for signs of infection: fever, chills, increased redness, warmth, tenderness, discomfort/pain, or pus or foul smell coming from the wound  WHAT TO DO IF THERE IS ANY BLEEDING? If a small amount of bleeding is noticed, place a clean cloth over the area and apply firm pressure for ten minutes  Check the wound after 10 minutes of direct pressure  If bleeding persists, try one more time for an additional 10 minutes of direct pressure on the area  If the bleeding becomes heavier or does not stop after the second attempt, or if you have any other questions about this procedure, then please call your [facility] by calling [phone] (SKIN)       I hereby acknowledge that I have reviewed and verified the site with my Dermatologist and have requested and authorized my Dermatologist to proceed with the procedure

## 2021-08-17 NOTE — PATIENT INSTRUCTIONS
1  NEOPLASM OF UNCERTAIN BEHAVIOR OF SKIN    Assessment and Plan:   I have discussed with the patient that a sample of skin via a "skin biopsy would be potentially helpful to further make a specific diagnosis under the microscope   Based on a thorough discussion of this condition and the management approach to it (including a comprehensive discussion of the known risks, side effects and potential benefits of treatment), the patient (family) agrees to implement the following specific plan:    o Procedure:  Skin Biopsy  After a thorough discussion of treatment options and risk/benefits/alternatives (including but not limited to local pain, scarring, dyspigmentation, blistering, possible superinfection, and inability to confirm a diagnosis via histopathology), verbal and written consent were obtained and portion of the rash was biopsied for tissue sample  See below for consent that was obtained from patient and subsequent Procedure Note  PROCEDURE SHAVE BIOPSY NOTE:        After obtaining informed consent  at which time there was a discussion about the purpose of biopsy  and low risks of infection and bleeding  The area was prepped and draped in the usual fashion  Anesthesia was obtained with 1% lidocaine with epinephrine  A shave biopsy to an appropriate sampling depth was obtained with a sterile blade (such as a 15-blade or DermaBlade)  The resulting wound was covered with surgical ointment and bandaged appropriately  The patient tolerated the procedure well without complications and was without signs of functional compromise  Specimen has been sent for review by Dermatopathology  Standard post-procedure care has been explained and has been included in written form within the patient's copy of Informed Consent      INFORMED CONSENT DISCUSSION AND POST-OPERATIVE INSTRUCTIONS FOR PATIENT    I   RATIONALE FOR PROCEDURE  I understand that a skin biopsy allows the Dermatologist to test a lesion or rash under the microscope to obtain a diagnosis  It usually involves numbing the area with numbing medication and removing a small piece of skin; sometimes the area will be closed with sutures  In this specific procedure, sutures are not usually needed  If any sutures are placed, then they are usually need to be removed in 2 weeks or less  I understand that my Dermatologist recommends that a skin "shave" biopsy be performed today  A local anesthetic, similar to the kind that a dentist uses when filling a cavity, will be injected with a very small needle into the skin area to be sampled  The injected skin and tissue underneath "will go to sleep and become numb so no pain should be felt afterwards  An instrument shaped like a tiny "razor blade" (shave biopsy instrument) will be used to cut a small piece of tissue and skin from the area so that a sample of tissue can be taken and examined more closely under the microscope  A slight amount of bleeding will occur, but it will be stopped with direct pressure and a pressure bandage and any other appropriate methods  I understands that a scar will form where the wound was created  Surgical ointment will be applied to help protect the wound  Sutures are not usually needed  II   RISKS AND POTENTIAL COMPLICATIONS   I understand the risks and potential complications of a skin biopsy include but are not limited to the following:   Bleeding   Infection   Pain   Scar/keloid   Skin discoloration   Incomplete Removal   Recurrence   Nerve Damage/Numbness/Loss of Function   Allergic Reaction to Anesthesia   Biopsies are diagnostic procedures and based on findings additional treatment or evaluation may be required   Loss or destruction of specimen resulting in no additional findings    My Dermatologist has explained to me the nature of the condition, the nature of the procedure, and the benefits to be reasonably expected compared with alternative approaches  My Dermatologist has discussed the likelihood of major risks or complications of this procedure including the specific risks listed above, such as bleeding, infection, and scarring/keloid  I understand that a scar is expected after this procedure  I understand that my physician cannot predict if the scar will form a "keloid," which extends beyond the borders of the wound that is created  A keloid is a thick, painful, and bumpy scar  A keloid can be difficult to treat, as it does not always respond well to therapy, which includes injecting cortisone directly into the keloid every few weeks  While this usually reduces the pain and size of the scar, it does not eliminate it  I understand that photographs may be taken before and after the procedure  These will be maintained as part of the medical providers confidential records and may not be made available to me  I further authorize the medical provider to use the photographs for teaching purposes or to illustrate scientific papers, books, or lectures if in his/her judgment, medical research, education, or science may benefit from its use  I have had an opportunity to fully inquire about the risks and benefits of this procedure and its alternatives  I have been given ample time and opportunity to ask questions and to seek a second opinion if I wished to do so  I acknowledge that there have specifically been no guarantees as to the cosmetic results from the procedure  I am aware that with any procedure there is always the possibility of an unexpected complication  III  POST-PROCEDURAL CARE (WHAT YOU WILL NEED TO DO "AFTER THE BIOPSY" TO OPTIMIZE HEALING)     Keep the area clean and dry  Try NOT to remove the bandage or get it wet for the first 24 hours       Gently clean the area and apply surgical ointment (such as Vaseline petrolatum ointment, which is available "over the counter" and not a prescription) to the biopsy site for up to 2 weeks straight  This acts to protect the wound from the outside world   Sutures are not usually placed in this procedure  If any sutures were placed, return for suture removal as instructed (generally 1 week for the face, 2 weeks for the body)   Take Acetaminophen (Tylenol) for discomfort, if no contraindications  Ibuprofen or aspirin could make bleeding worse   Call our office immediately for signs of infection: fever, chills, increased redness, warmth, tenderness, discomfort/pain, or pus or foul smell coming from the wound  WHAT TO DO IF THERE IS ANY BLEEDING? If a small amount of bleeding is noticed, place a clean cloth over the area and apply firm pressure for ten minutes  Check the wound after 10 minutes of direct pressure  If bleeding persists, try one more time for an additional 10 minutes of direct pressure on the area  If the bleeding becomes heavier or does not stop after the second attempt, or if you have any other questions about this procedure, then please call your [facility]ologist by calling [phone] (SKIN)  I hereby acknowledge that I have reviewed and verified the site with my Dermatologist and have requested and authorized my Dermatologist to proceed with the procedure

## 2021-08-23 NOTE — RESULT ENCOUNTER NOTE
DERMATOPATHOLOGY RESULT NOTE    Results reviewed by ordering physician  Called patient to personally discuss results  Discussed results with patient  Instructions for Clinical Derm Team:   (remember to route Result Note to appropriate staff):    Call patient and schedule for MOHS me or Dr Celestina Silverman by Specimen:    Specimen A: malignant  Plan: [facility]    Final Diagnosis  A   Skin, right tip of nose, shave biopsy:  - Basal cell carcinoma, nodular type

## 2021-08-24 ENCOUNTER — TELEPHONE (OUTPATIENT)
Dept: DERMATOLOGY | Facility: CLINIC | Age: 81
End: 2021-08-24

## 2021-08-24 NOTE — TELEPHONE ENCOUNTER
Pre- operative Mohs Telephone Scheduling Note    Do you have a pacemaker or defibrillator? no    Do you take antibiotics before skin or dental procedures? no  If yes, will likely require pre-operative antibiotics  Ask  the patient why they take the antibiotics (usually because of joint replacement)  Do you have a history of a joint replacements within the past 2 years? no   If yes, will likely require pre-operative antibiotics  Ask if orthopaedic surgeon has prescribed pre-operative antibiotics to take before procedures/dental work? Do you take any OTC medications that thin your blood (Aspirin, Aleve, Ibuprofen) or supplements that thin your blood (fish oil, garlic, vitamin E, Ginko Biloba)? no    Do you take any prescribed medications that thin your blood (Coumadin, Plavix, Xarelto, Eliquis or another prescribed blood thinner)? no    Do you have an allergy to lidocaine or epinephrine? no    Do you have an allergy to shellfish? no    Do you smoke? no      If yes,  patient to try and stop 2 days before surgery and 7 days after the surgery  Minimizing smoking as much as possible during this time will improve healing and the cosmetic result after surgery  Date scheduled: BCC right tip of nose scheduled with Dr Eryn Morris on 9/29/21 @ 9:00AM    [facility] required? no   IF YES, PLEASE FORWARD TO APPROPRIATE PERSONNEL TO HELP COORDINATE  Are there remaining tumors to be scheduled?  no

## 2021-08-25 ENCOUNTER — OFFICE VISIT (OUTPATIENT)
Dept: PODIATRY | Facility: CLINIC | Age: 81
End: 2021-08-25
Payer: MEDICARE

## 2021-08-25 VITALS
SYSTOLIC BLOOD PRESSURE: 150 MMHG | HEIGHT: 67 IN | WEIGHT: 175.6 LBS | BODY MASS INDEX: 27.56 KG/M2 | DIASTOLIC BLOOD PRESSURE: 72 MMHG

## 2021-08-25 DIAGNOSIS — I73.9 PERIPHERAL VASCULAR DISEASE, UNSPECIFIED (HCC): Primary | ICD-10-CM

## 2021-08-25 PROCEDURE — 11719 TRIM NAIL(S) ANY NUMBER: CPT | Performed by: PODIATRIST

## 2021-08-25 NOTE — PROGRESS NOTES
Established patient with class findings presents for nail care  Vascular exam:  DP  0/4 bilateral; PT  0 4 bilateral   Dermatological exam:  Each toenail is thick and  dystrophic  Diagnosis:  PVD  Treatment: Trimmed multiple dystrophic toenails      Nail removal    Date/Time: 8/25/2021 3:09 PM  Performed by: Micheal Hernández DPM  Authorized by: Micheal Hernández DPM     Nails trimmed:     Number of nails trimmed:  10

## 2021-09-22 ENCOUNTER — APPOINTMENT (OUTPATIENT)
Dept: LAB | Facility: CLINIC | Age: 81
End: 2021-09-22
Payer: MEDICARE

## 2021-09-22 DIAGNOSIS — E78.5 HYPERLIPIDEMIA, UNSPECIFIED HYPERLIPIDEMIA TYPE: ICD-10-CM

## 2021-09-22 DIAGNOSIS — I10 ESSENTIAL HYPERTENSION: ICD-10-CM

## 2021-09-22 LAB
ALBUMIN SERPL BCP-MCNC: 3.6 G/DL (ref 3.5–5)
ALP SERPL-CCNC: 125 U/L (ref 46–116)
ALT SERPL W P-5'-P-CCNC: 42 U/L (ref 12–78)
ANION GAP SERPL CALCULATED.3IONS-SCNC: 3 MMOL/L (ref 4–13)
AST SERPL W P-5'-P-CCNC: 20 U/L (ref 5–45)
BASOPHILS # BLD AUTO: 0.03 THOUSANDS/ΜL (ref 0–0.1)
BASOPHILS NFR BLD AUTO: 1 % (ref 0–1)
BILIRUB SERPL-MCNC: 0.87 MG/DL (ref 0.2–1)
BUN SERPL-MCNC: 22 MG/DL (ref 5–25)
CALCIUM SERPL-MCNC: 9.3 MG/DL (ref 8.3–10.1)
CHLORIDE SERPL-SCNC: 111 MMOL/L (ref 100–108)
CHOLEST SERPL-MCNC: 177 MG/DL (ref 50–200)
CO2 SERPL-SCNC: 27 MMOL/L (ref 21–32)
CREAT SERPL-MCNC: 1.3 MG/DL (ref 0.6–1.3)
EOSINOPHIL # BLD AUTO: 0.3 THOUSAND/ΜL (ref 0–0.61)
EOSINOPHIL NFR BLD AUTO: 5 % (ref 0–6)
ERYTHROCYTE [DISTWIDTH] IN BLOOD BY AUTOMATED COUNT: 13.1 % (ref 11.6–15.1)
GFR SERPL CREATININE-BSD FRML MDRD: 51 ML/MIN/1.73SQ M
GLUCOSE P FAST SERPL-MCNC: 89 MG/DL (ref 65–99)
HCT VFR BLD AUTO: 42 % (ref 36.5–49.3)
HDLC SERPL-MCNC: 34 MG/DL
HGB BLD-MCNC: 14 G/DL (ref 12–17)
IMM GRANULOCYTES # BLD AUTO: 0.02 THOUSAND/UL (ref 0–0.2)
IMM GRANULOCYTES NFR BLD AUTO: 0 % (ref 0–2)
LDLC SERPL CALC-MCNC: 121 MG/DL (ref 0–100)
LYMPHOCYTES # BLD AUTO: 2 THOUSANDS/ΜL (ref 0.6–4.47)
LYMPHOCYTES NFR BLD AUTO: 34 % (ref 14–44)
MCH RBC QN AUTO: 30.7 PG (ref 26.8–34.3)
MCHC RBC AUTO-ENTMCNC: 33.3 G/DL (ref 31.4–37.4)
MCV RBC AUTO: 92 FL (ref 82–98)
MONOCYTES # BLD AUTO: 0.4 THOUSAND/ΜL (ref 0.17–1.22)
MONOCYTES NFR BLD AUTO: 7 % (ref 4–12)
NEUTROPHILS # BLD AUTO: 3.08 THOUSANDS/ΜL (ref 1.85–7.62)
NEUTS SEG NFR BLD AUTO: 53 % (ref 43–75)
NRBC BLD AUTO-RTO: 0 /100 WBCS
PLATELET # BLD AUTO: 184 THOUSANDS/UL (ref 149–390)
PMV BLD AUTO: 11.9 FL (ref 8.9–12.7)
POTASSIUM SERPL-SCNC: 3.5 MMOL/L (ref 3.5–5.3)
PROT SERPL-MCNC: 7.5 G/DL (ref 6.4–8.2)
RBC # BLD AUTO: 4.56 MILLION/UL (ref 3.88–5.62)
SODIUM SERPL-SCNC: 141 MMOL/L (ref 136–145)
TRIGL SERPL-MCNC: 108 MG/DL
WBC # BLD AUTO: 5.83 THOUSAND/UL (ref 4.31–10.16)

## 2021-09-22 PROCEDURE — 85025 COMPLETE CBC W/AUTO DIFF WBC: CPT

## 2021-09-22 PROCEDURE — 36415 COLL VENOUS BLD VENIPUNCTURE: CPT

## 2021-09-22 PROCEDURE — 80061 LIPID PANEL: CPT

## 2021-09-22 PROCEDURE — 80053 COMPREHEN METABOLIC PANEL: CPT

## 2021-09-28 ENCOUNTER — OFFICE VISIT (OUTPATIENT)
Dept: FAMILY MEDICINE CLINIC | Facility: CLINIC | Age: 81
End: 2021-09-28
Payer: MEDICARE

## 2021-09-28 VITALS
HEIGHT: 67 IN | WEIGHT: 176 LBS | SYSTOLIC BLOOD PRESSURE: 162 MMHG | DIASTOLIC BLOOD PRESSURE: 80 MMHG | BODY MASS INDEX: 27.62 KG/M2 | TEMPERATURE: 97.6 F | HEART RATE: 88 BPM

## 2021-09-28 DIAGNOSIS — C49.0 SARCOMA OF SCALP (HCC): ICD-10-CM

## 2021-09-28 DIAGNOSIS — Z23 NEED FOR IMMUNIZATION AGAINST INFLUENZA: ICD-10-CM

## 2021-09-28 DIAGNOSIS — Z12.5 ENCOUNTER FOR SCREENING FOR MALIGNANT NEOPLASM OF PROSTATE: ICD-10-CM

## 2021-09-28 DIAGNOSIS — G89.29 CHRONIC BILATERAL LOW BACK PAIN WITH BILATERAL SCIATICA: ICD-10-CM

## 2021-09-28 DIAGNOSIS — M54.41 CHRONIC BILATERAL LOW BACK PAIN WITH BILATERAL SCIATICA: ICD-10-CM

## 2021-09-28 DIAGNOSIS — M87.38 RADIATION NECROSIS OF SKULL (HCC): ICD-10-CM
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
## 2021-09-28 DIAGNOSIS — I10 ESSENTIAL HYPERTENSION: Primary | ICD-10-CM

## 2021-09-28 DIAGNOSIS — M54.42 CHRONIC BILATERAL LOW BACK PAIN WITH BILATERAL SCIATICA: ICD-10-CM

## 2021-09-28 DIAGNOSIS — N18.30 STAGE 3 CHRONIC KIDNEY DISEASE, UNSPECIFIED WHETHER STAGE 3A OR 3B CKD (HCC): ICD-10-CM

## 2021-09-28 DIAGNOSIS — R29.898 LEG WEAKNESS, BILATERAL: ICD-10-CM

## 2021-09-28 PROCEDURE — 99214 OFFICE O/P EST MOD 30 MIN: CPT

## 2021-09-28 PROCEDURE — 90662 IIV NO PRSV INCREASED AG IM: CPT

## 2021-09-28 PROCEDURE — G0008 ADMIN INFLUENZA VIRUS VAC: HCPCS

## 2021-09-28 RX ORDER — AMLODIPINE BESYLATE 10 MG/1
10 TABLET ORAL DAILY
Qty: 90 TABLET | Refills: 3 | Status: SHIPPED | OUTPATIENT
Start: 2021-09-28

## 2021-09-28 NOTE — PROGRESS NOTES
Assessment and Plan:  Patient Instructions     Assessment/plan:  1  Benign essential hypertension -  Not at goal, readings have been high  Would recommend avoiding salt  Will increase amlodipine to  10 mg daily and continue with lisinopril  2   Sarcoma of the scalp   -Stable status post surgical intervention  3   Radiation necrosis of  Skull    -Stable status post surgery  4  Stage 3 chronic kidney disease  -Stable with GFR of 51   5  Elevated PSA -will reassess lab value with next labs in spring  6  Low back pain with bilateral leg radiculopathy-would recommend x-ray to better evaluate  Will consider physical therapy as needed after x-ray or further evaluation by specialist   High-dose flu vaccine given today        Problem List Items Addressed This Visit        Cardiovascular and Mediastinum    Hypertension - Primary    Relevant Medications    amLODIPine (NORVASC) 10 mg tablet    Other Relevant Orders    CBC and differential    Comprehensive metabolic panel    Lipid Panel with Direct LDL reflex    TSH, 3rd generation    PSA, Total Screen       Musculoskeletal and Integument    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HCC)    Relevant Orders    CBC and differential    Comprehensive metabolic panel    Lipid Panel with Direct LDL reflex    TSH, 3rd generation    PSA, Total Screen       Genitourinary    Stage 3 chronic kidney disease (HCC)    Relevant Orders    CBC and differential    Comprehensive metabolic panel    Lipid Panel with Direct LDL reflex    TSH, 3rd generation    PSA, Total Screen       Other    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HCC)    Relevant Orders    CBC and differential    Comprehensive metabolic panel    Lipid Panel with Direct LDL reflex    TSH, 3rd generation    PSA, Total Screen      Other Visit Diagnoses     Need for immunization against influenza        Relevant Orders    influenza vaccine, high-dose, PF 0 7 mL (FLUZONE HIGH-DOSE)    Leg weakness, bilateral        Relevant Orders    XR spine lumbar minimum 4 views non injury    CBC and differential    Comprehensive metabolic panel    Lipid Panel with Direct LDL reflex    TSH, 3rd generation    PSA, Total Screen    Chronic bilateral low back pain with bilateral sciatica        Relevant Orders    XR spine lumbar minimum 4 views non injury    Encounter for screening for malignant neoplasm of prostate         Relevant Orders    PSA, Total Screen                 Diagnoses and all orders for this visit:    Essential hypertension  -     amLODIPine (NORVASC) 10 mg tablet; Take 1 tablet (10 mg total) by mouth daily  -     CBC and differential; Future  -     Comprehensive metabolic panel; Future  -     Lipid Panel with Direct LDL reflex; Future  -     TSH, 3rd generation; Future  -     PSA, Total Screen; Future    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HCC)  -     CBC and differential; Future  -     Comprehensive metabolic panel; Future  -     Lipid Panel with Direct LDL reflex; Future  -     TSH, 3rd generation; Future  -     PSA, Total Screen; Future    Stage 3 chronic kidney disease, unspecified whether stage 3a or 3b CKD (HCC)  -     CBC and differential; Future  -     Comprehensive metabolic panel; Future  -     Lipid Panel with Direct LDL reflex; Future  -     TSH, 3rd generation; Future  -     PSA, Total Screen; Future    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HCC)  -     CBC and differential; Future  -     Comprehensive metabolic panel; Future  -     Lipid Panel with Direct LDL reflex; Future  -     TSH, 3rd generation; Future  -     PSA, Total Screen; Future    Need for immunization against influenza  -     influenza vaccine, high-dose, PF 0 7 mL (FLUZONE HIGH-DOSE)    Leg weakness, bilateral  -     XR spine lumbar minimum 4 views non injury; Future  -     CBC and differential; Future  -     Comprehensive metabolic panel; Future  -     Lipid Panel with Direct LDL reflex; Future  -     TSH, 3rd generation; Future  -     PSA, Total Screen;  Future    Chronic bilateral low back pain with bilateral sciatica  -     XR spine lumbar minimum 4 views non injury; Future    Encounter for screening for malignant neoplasm of prostate   -     PSA, Total Screen; Future              Subjective:      Patient ID: Karen Case is a 79 y o  male  CC:    Chief Complaint   Patient presents with    Gout    Hyperlipidemia     Review BW results   Hypertension    Back Pain     Ongoing low back pain that is now radiating into his upper legs  He notes he is stumbling a lot  High dose flu immunization administered today -  [facility]       HPI:      HPI: This is an 65-year-old gentleman that presents to the office for follow-up of chronic health conditions including hypertension, hyperlipidemia, and sarcoma of the skull  He does continue to follow with Oncology and has been status post surgical intervention  He has been feeling well with the exception of recent back pain which started radiating down the backs of both legs  Patient states that this has been going on for a while but has been getting worse lately and seems to be causing more weakness of the legs  He has been bothered by it and it seems to be limiting the things that he enjoys doing  The following portions of the patient's history were reviewed and updated as appropriate: allergies, current medications, past family history, past medical history, past social history, past surgical history and problem list       Review of Systems   Constitutional: Negative for chills, fatigue and fever  HENT: Negative for congestion, ear pain and sinus pressure  Eyes: Negative for visual disturbance  Respiratory: Negative for cough, chest tightness and shortness of breath  Cardiovascular: Negative for chest pain and palpitations  Gastrointestinal: Negative for diarrhea, nausea and vomiting  Endocrine: Negative for polyuria  Genitourinary: Negative for dysuria and frequency  Musculoskeletal: Positive for arthralgias, back pain and myalgias     Skin: Negative for pallor and rash  Neurological: Negative for dizziness, weakness, light-headedness, numbness and headaches  Psychiatric/Behavioral: Negative for agitation, behavioral problems and sleep disturbance  All other systems reviewed and are negative  Data to review:       Objective:    Vitals:    09/28/21 0846 09/28/21 0905   BP: (!) 180/104 162/80   BP Location: Left arm    Patient Position: Sitting    Cuff Size: Large    Pulse: 88    Temp: 97 6 °F (36 4 °C)    TempSrc: Oral    Weight: 79 8 kg (176 lb)    Height: 5' 7" (1 702 m)         Physical Exam  Constitutional:       General: He is not in acute distress  Appearance: He is well-developed  HENT:      Head: Normocephalic and atraumatic  Right Ear: Tympanic membrane normal       Left Ear: Tympanic membrane normal    Eyes:      Conjunctiva/sclera: Conjunctivae normal    Cardiovascular:      Rate and Rhythm: Normal rate and regular rhythm  Pulmonary:      Effort: Pulmonary effort is normal    Abdominal:      General: Abdomen is flat  Bowel sounds are normal  There is no distension  Palpations: Abdomen is soft  There is no mass  Musculoskeletal:         General: Normal range of motion  Cervical back: Normal range of motion  Skin:     General: Skin is warm  Findings: No rash  Neurological:      Mental Status: He is alert and oriented to person, place, and time     Psychiatric:         Mood and Affect: Mood normal

## 2021-09-28 NOTE — PATIENT INSTRUCTIONS
Assessment/plan:  1  Benign essential hypertension -  Not at goal, readings have been high  Would recommend avoiding salt  Will increase amlodipine to  10 mg daily and continue with lisinopril  2   Sarcoma of the scalp   -Stable status post surgical intervention  3   Radiation necrosis of  Skull    -Stable status post surgery  4  Stage 3 chronic kidney disease  -Stable with GFR of 51   5  Elevated PSA -will reassess lab value with next labs in spring  6  Low back pain with bilateral leg radiculopathy-would recommend x-ray to better evaluate  Will consider physical therapy as needed after x-ray or further evaluation by specialist   High-dose flu vaccine given today

## 2021-09-29 ENCOUNTER — PROCEDURE VISIT (OUTPATIENT)
Dept: DERMATOLOGY | Facility: CLINIC | Age: 81
End: 2021-09-29
Payer: MEDICARE

## 2021-09-29 VITALS
HEIGHT: 67 IN | HEART RATE: 72 BPM | DIASTOLIC BLOOD PRESSURE: 88 MMHG | WEIGHT: 175 LBS | TEMPERATURE: 97.8 F | BODY MASS INDEX: 27.47 KG/M2 | SYSTOLIC BLOOD PRESSURE: 168 MMHG | RESPIRATION RATE: 18 BRPM

## 2021-09-29 DIAGNOSIS — C44.311 BASAL CELL CARCINOMA (BCC) OF RIGHT NASAL TIP: ICD-10-CM

## 2021-09-29 PROCEDURE — 17312 MOHS ADDL STAGE: CPT | Performed by: DERMATOLOGY

## 2021-09-29 PROCEDURE — 17311 MOHS 1 STAGE H/N/HF/G: CPT | Performed by: DERMATOLOGY

## 2021-09-29 PROCEDURE — 15260 FTH/GFT FR N/E/E/L 20 SQCM/<: CPT | Performed by: DERMATOLOGY

## 2021-09-29 NOTE — LETTER
2021     Digna Persaud PA-C  [address] [facility] [id number]    Patient: Rosendo Davenport   YOB: 1940   Date of Visit: 2021       Dear Dr Magalie Moore: Thank you for referring Gilbert Huynh to me for evaluation  Below are my notes for this consultation  If you have questions, please do not hesitate to call me  I look forward to following your patient along with you  Sincerely,        Juan Francisco Be MD        CC: No Recipients  Juan Francisco Be MD  2021  4:15 PM  Sign when Signing Visit  MOHS Procedure Note    Patient: Rosendo Davenport  : 1940  MRN: [id number]  Date: 2021    History of Present Illness: The patient is a 79 y o  male who presents with complaints of basal cell carcinoma of the right tip of nose      Past Medical History:   Diagnosis Date    Back pain     Balance problem     Basal cell carcinoma (BCC)     in past    BCC (basal cell carcinoma of skin) 2021    Right tip of nose    Gout     Hard of hearing     Hearing aid worn     ziyad    Herniated nucleus pulposus, L4-5     Hypertension     Incisional hernia     Macular degeneration     Melanoma ([address] )     left cheek- history    Pulmonary nodules     Reactive airway disease     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    Skin cancer 2020    AFX- scalp    Squamous cell carcinoma     in past       Past Surgical History:   Procedure Laterality Date    CATARACT EXTRACTION Bilateral     COLONOSCOPY      HERNIA REPAIR      HYDROCELE EXCISION / REPAIR      MASTOID SURGERY Left     MOHS SURGERY      MOHS SURGERY  2021    Right tip of nose-Dr Claudia Morris    OTHER SURGICAL HISTORY      sarcoma scalp with skin graft    NE REPAIR INCISIONAL HERNIA,REDUCIBLE N/A 2021    Procedure: REPAIR HERNIA INCISIONAL OPEN WITH MESH;  Surgeon: Ita Faye MD;  Location: [facility];  Service: General    SCALP EXCISION N/A 3/28/2018    Procedure: SCALP SARCOMA EXCISION WITH FULL THICKNESS SKIN GRAFT;  Surgeon: Vanesa Jerry MD;  Location: [facility];  Service: Plastics    SKIN BIOPSY      SKIN CANCER EXCISION      TONSILLECTOMY AND ADENOIDECTOMY           Current Outpatient Medications:     acetaminophen (TYLENOL) 500 mg tablet, Take 500-1,000 mg by mouth every 4 (four) hours as needed for mild pain, Disp: , Rfl:     amLODIPine (NORVASC) 10 mg tablet, Take 1 tablet (10 mg total) by mouth daily, Disp: 90 tablet, Rfl: 3    lisinopril (ZESTRIL) 20 mg tablet, Take 1 tablet (20 mg total) by mouth daily, Disp: 90 tablet, Rfl: 3    Multiple Vitamins-Minerals (PRESERVISION AREDS 2) CAPS, Take 1 capsule by mouth 2 (two) times a day, Disp: , Rfl:     mupirocin (BACTROBAN) 2 % ointment, Apply topically two to three times a day to sore areas, Disp: 22 g, Rfl: 3    Naproxen Sodium (ALEVE PO), Take 1 tablet by mouth 2 (two) times a day as needed , Disp: , Rfl:     Fluocinolone Acetonide Scalp 0 01 % OIL, Apply a thin layer topically daily at night one hour before bedtime  (Patient taking differently: Apply 1 application topically daily Apply a thin layer topically daily at night one hour before bedtime   2-3 x wk), Disp: 118 28 mL, Rfl: 5    imiquimod (ALDARA) 5 % cream, Apply topically once a day Monday thru Friday for 6 weeks (Patient not taking: Reported on 9/29/2021), Disp: 24 each, Rfl: 1    tamsulosin (FLOMAX) 0 4 mg, Take 1 capsule (0 4 mg total) by mouth daily with dinner (Patient not taking: Reported on 9/28/2021), Disp: 30 capsule, Rfl: 0    Allergies   Allergen Reactions    Erythromycin Other (See Comments)     Thrush        Physical Exam:   Vitals:    09/29/21 0837   BP: 168/88   Pulse: 72   Resp: 18   Temp: 97 8 °F (36 6 °C)     General: Awake, Alert, Oriented x 3, Mood and affect appropriate  Respiratory: Respirations even and unlabored  Cardiovascular: Peripheral pulses intact; no edema  Musculoskeletal Exam: N/A    Assessment: 0 8 cm crusted erosion; biopsy confirmed basal cell carcinoma    Plan: Mohs    MOHS Procedure Timeout      Most Recent Value   Timeout:  0276   Patient Identity Verified:  Yes   Correct Site Verified:  Yes   Correct Procedure Verified:  Yes          MOHS Diagnosis/Indication/Location/ID      Most Recent Value   Pathology Type  Basal cell carcinoma   Anatomic Site  -- [Right tip of nose]   Indications for MOHS  tumor location, porrly defined tumor margins   MOHS ID  [id number]          MOHS Site/Accession/Pre-Post      Most Recent Value   Original Site Identified (as submitted by referring clinician)  Photo   Biopsy Accession/Specimen # (as submitted by referring clincian)  [id number]   Pre-MOHS Size Length (cm)  0 8   Pre-MOHS Size Width (cm)  0 8   Post-MOHS Size-Length (cm)  2 4   Post MOHS Size-Width (cm)  2 4   Repair Type  Full thickness skin graft   Suture Type  Ethilon, Fast absorbing gut, Vicryl   Ethilon Suture Size  6   Fast Absorbing Suture Size  5   Vicryl Suture Size  6   Flap/Graft area (cm2)  5 76   Anesthetic Used  1% Lidocaine with epinephrine [with Bicarb]          MOHS Tumor Stage 1 Information      Most Recent Value   Tissue Sections (blocks)  2   Microscopic Exam Section 1:  Cords of basophilic cells with peripheral palisading and retraction artifact consistent with basal cell carcinoma were noted on microscopic analysis  [small nests basaloid cell in fibrotic stroma 12, 00]   Microscopic Exam Section 2:  No tumor identified in section  Tumor Clear After Stage I? No          MOHS Tumor Stage 2 Information      Most Recent Value   Tissue Sections (blocks)  1   Microscopic Exam Section 1:  No tumor identified  Tumor Clear After Stage II? Yes                              Patient identified, procedure verified, site identified and verified  Time out completed   Surgical removal of the lesion discussed with the patient (risks and benefits, including possibility of scarring, infection, recurrence or potential for further treatment)  I have specifically identified the site with the patient  I have discussed the fact that the patient will have a scar after the procedure regardless of granulation or repair with sutures  I have discussed that the repair options can range from granulation in some cases to linear or curvilinear closures to larger flaps or grafts  There are sometimes flaps or grafts used that require multiples stages of surgery and will not be completed today, rather be completed over a series of appointments  I have discussed that occasionally due to location, size or depth of the lesion I may recommend consultation with and transfer of care for further removal or the reconstruction to another provider such as ophthalmology surgery, plastic surgery, ENT surgery, or surgical oncology  There are cases in which other testing such as imaging with MRI or CT scan or testing of lymph nodes is recommended because of the nature/depth/location of tumor seen during the removal  There is a risk of injury to nerves causing temporary or permanent numbness or the inability to move muscles full such as the inability to lift eyebrows  Questions answered and verbal and written consent was obtained  Indications for this graft was size and lack of adjacent tissue to mobilize;    OPERATIVE REPORT: FULL-THICKNESS SKIN GRAFT    With the patient in the supine position and under adequate local anesthesia with 1% lidocaine with epinephrine 1:200,000, the defect was scrubbed with Hibiclens  Sterile drapes were placed from the sterile tray  Because of the location of the surgical defect,  a full thickness skin graft was judged to give the best possible cosmetic and functional result  A Telfa template of the recipient site was used to create the shape of the skin resected from the donor site, the left preauricular    The graft donor site was raised, hemostasis was achieved, cutaneous margins were approximated and closed with buried subcutaneous sutures as noted above  The cutaneous margins were approximated and closed as well utilizing sutures as noted above  The wound was dressed with petrolatum, a non-stick pad, and a compression dressing  The full thickness skin graft was defatted  The bed of the recipient site was prepared by limited surgical debridement  The graft was placed into the recipient site and secured by simple interrupted sutures as noted above  The patient tolerated the procedure well  The wound was dressed with    petrolatum, a non-stick pad, and a compression dressing  Wound care was discussed with the patient, and a wound care instruction sheet was given  Elena Angel MD served as the surgeon and pathologist during the procedure  Postoperative care: Wound care discussed at length  I urged the patient to call us if any problems or question should arise  Complications: none  Post-op medications: none  Patient condition after procedure: stable  Discharge plans: Return for suture removal for  bolster site in 7 days  The tumor qualifies for Mohs based on AUC criteria  Dr Daniel Scruggs served as the surgeon and pathologist during the procedure  Postoperative care: No would care should be necessary prior to the next visit but I urged the patient to call us if any problems or question should arise prior to that time  If circumstances should change, we will contact the patient to make other arrangements       Scribe Attestation    I,:  Juan Bishop MA am acting as a scribe while in the presence of the attending physician :       I,:  Elena Angel MD personally performed the services described in this documentation    as scribed in my presence :

## 2021-09-29 NOTE — PROGRESS NOTES
MOHS Procedure Note    Patient: Una Baldwin  : 1940  MRN: [id number]  Date: 2021    History of Present Illness: The patient is a 79 y o  male who presents with complaints of basal cell carcinoma of the right tip of nose      Past Medical History:   Diagnosis Date    Back pain     Balance problem     Basal cell carcinoma (BCC)     in past    BCC (basal cell carcinoma of skin) 2021    Right tip of nose    Gout     Hard of hearing     Hearing aid worn     ziyad    Herniated nucleus pulposus, L4-5     Hypertension     Incisional hernia     Macular degeneration     Melanoma ([address] )     left cheek- history    Pulmonary nodules     Reactive airway disease     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    Skin cancer 2020    AFX- scalp    Squamous cell carcinoma     in past       Past Surgical History:   Procedure Laterality Date    CATARACT EXTRACTION Bilateral     COLONOSCOPY      HERNIA REPAIR      HYDROCELE EXCISION / REPAIR      MASTOID SURGERY Left     MOHS SURGERY      MOHS SURGERY  2021    Right tip of nose-Dr Alex Beebe    OTHER SURGICAL HISTORY      sarcoma scalp with skin graft    OR REPAIR INCISIONAL HERNIA,REDUCIBLE N/A 2021    Procedure: REPAIR HERNIA INCISIONAL OPEN WITH MESH;  Surgeon: Luigi Reyes MD;  Location: [facility];  Service: General    SCALP EXCISION N/A 3/28/2018    Procedure: SCALP SARCOMA EXCISION WITH FULL THICKNESS SKIN GRAFT;  Surgeon: Iona Wade MD;  Location: [facility];  Service: Plastics    SKIN BIOPSY      SKIN CANCER EXCISION      TONSILLECTOMY AND ADENOIDECTOMY           Current Outpatient Medications:     acetaminophen (TYLENOL) 500 mg tablet, Take 500-1,000 mg by mouth every 4 (four) hours as needed for mild pain, Disp: , Rfl:     amLODIPine (NORVASC) 10 mg tablet, Take 1 tablet (10 mg total) by mouth daily, Disp: 90 tablet, Rfl: 3    lisinopril (ZESTRIL) 20 mg tablet, Take 1 tablet (20 mg total) by mouth daily, Disp: 90 tablet, Rfl: 3    Multiple Vitamins-Minerals (PRESERVISION AREDS 2) CAPS, Take 1 capsule by mouth 2 (two) times a day, Disp: , Rfl:     mupirocin (BACTROBAN) 2 % ointment, Apply topically two to three times a day to sore areas, Disp: 22 g, Rfl: 3    Naproxen Sodium (ALEVE PO), Take 1 tablet by mouth 2 (two) times a day as needed , Disp: , Rfl:     Fluocinolone Acetonide Scalp 0 01 % OIL, Apply a thin layer topically daily at night one hour before bedtime  (Patient taking differently: Apply 1 application topically daily Apply a thin layer topically daily at night one hour before bedtime  2-3 x wk), Disp: 118 28 mL, Rfl: 5    imiquimod (ALDARA) 5 % cream, Apply topically once a day Monday thru Friday for 6 weeks (Patient not taking: Reported on 9/29/2021), Disp: 24 each, Rfl: 1    tamsulosin (FLOMAX) 0 4 mg, Take 1 capsule (0 4 mg total) by mouth daily with dinner (Patient not taking: Reported on 9/28/2021), Disp: 30 capsule, Rfl: 0    Allergies   Allergen Reactions    Erythromycin Other (See Comments)     Thrush        Physical Exam:   Vitals:    09/29/21 0837   BP: 168/88   Pulse: 72   Resp: 18   Temp: 97 8 °F (36 6 °C)     General: Awake, Alert, Oriented x 3, Mood and affect appropriate  Respiratory: Respirations even and unlabored  Cardiovascular: Peripheral pulses intact; no edema  Musculoskeletal Exam: N/A    Assessment: 0 8 cm crusted erosion; biopsy confirmed basal cell carcinoma    Plan:  Mohs    MOHS Procedure Timeout      Most Recent Value   Timeout:  7837   Patient Identity Verified:  Yes   Correct Site Verified:  Yes   Correct Procedure Verified:  Yes          MOHS Diagnosis/Indication/Location/ID      Most Recent Value   Pathology Type  Basal cell carcinoma   Anatomic Site  -- [Right tip of nose]   Indications for MOHS  tumor location, porrly defined tumor margins   MOHS ID  [id number]          MOHS Site/Accession/Pre-Post      Most Recent Value   Original Site Identified (as submitted by referring clinician)  Photo   Biopsy Accession/Specimen # (as submitted by referring clincian)  [id number]   Pre-MOHS Size Length (cm)  0 8   Pre-MOHS Size Width (cm)  0 8   Post-MOHS Size-Length (cm)  2 4   Post MOHS Size-Width (cm)  2 4   Repair Type  Full thickness skin graft   Suture Type  Ethilon, Fast absorbing gut, Vicryl   Ethilon Suture Size  6   Fast Absorbing Suture Size  5   Vicryl Suture Size  6   Flap/Graft area (cm2)  5 76   Anesthetic Used  1% Lidocaine with epinephrine [with Bicarb]          MOHS Tumor Stage 1 Information      Most Recent Value   Tissue Sections (blocks)  2   Microscopic Exam Section 1:  Cords of basophilic cells with peripheral palisading and retraction artifact consistent with basal cell carcinoma were noted on microscopic analysis  [small nests basaloid cell in fibrotic stroma 12, 00]   Microscopic Exam Section 2:  No tumor identified in section  Tumor Clear After Stage I? No          MOHS Tumor Stage 2 Information      Most Recent Value   Tissue Sections (blocks)  1   Microscopic Exam Section 1:  No tumor identified  Tumor Clear After Stage II? Yes                              Patient identified, procedure verified, site identified and verified  Time out completed  Surgical removal of the lesion discussed with the patient (risks and benefits, including possibility of scarring, infection, recurrence or potential for further treatment)  I have specifically identified the site with the patient  I have discussed the fact that the patient will have a scar after the procedure regardless of granulation or repair with sutures  I have discussed that the repair options can range from granulation in some cases to linear or curvilinear closures to larger flaps or grafts  There are sometimes flaps or grafts used that require multiples stages of surgery and will not be completed today, rather be completed over a series of appointments   I have discussed that occasionally due to location, size or depth of the lesion I may recommend consultation with and transfer of care for further removal or the reconstruction to another provider such as ophthalmology surgery, plastic surgery, ENT surgery, or surgical oncology  There are cases in which other testing such as imaging with MRI or CT scan or testing of lymph nodes is recommended because of the nature/depth/location of tumor seen during the removal  There is a risk of injury to nerves causing temporary or permanent numbness or the inability to move muscles full such as the inability to lift eyebrows  Questions answered and verbal and written consent was obtained  Indications for this graft was size and lack of adjacent tissue to mobilize;    OPERATIVE REPORT: FULL-THICKNESS SKIN GRAFT    With the patient in the supine position and under adequate local anesthesia with 1% lidocaine with epinephrine 1:200,000, the defect was scrubbed with Hibiclens  Sterile drapes were placed from the sterile tray  Because of the location of the surgical defect,  a full thickness skin graft was judged to give the best possible cosmetic and functional result  A Telfa template of the recipient site was used to create the shape of the skin resected from the donor site, the left preauricular  The graft donor site was raised, hemostasis was achieved, cutaneous margins were approximated and closed with buried subcutaneous sutures as noted above  The cutaneous margins were approximated and closed as well utilizing sutures as noted above  The wound was dressed with petrolatum, a non-stick pad, and a compression dressing  The full thickness skin graft was defatted  The bed of the recipient site was prepared by limited surgical debridement  The graft was placed into the recipient site and secured by simple interrupted sutures as noted above  The patient tolerated the procedure well    The wound was dressed with    petrolatum, a non-stick pad, and a compression dressing  Wound care was discussed with the patient, and a wound care instruction sheet was given  Gisell Anthony MD served as the surgeon and pathologist during the procedure  Postoperative care: Wound care discussed at length  I urged the patient to call us if any problems or question should arise  Complications: none  Post-op medications: none  Patient condition after procedure: stable  Discharge plans: Return for suture removal for  bolster site in 7 days  The tumor qualifies for Mohs based on AUC criteria  Dr Apple Krueger served as the surgeon and pathologist during the procedure  Postoperative care: No would care should be necessary prior to the next visit but I urged the patient to call us if any problems or question should arise prior to that time  If circumstances should change, we will contact the patient to make other arrangements       Scribe Attestation    I,:  Monique Tinajero MA am acting as a scribe while in the presence of the attending physician :       I,:  Gisell Anthony MD personally performed the services described in this documentation    as scribed in my presence :

## 2021-09-29 NOTE — PATIENT INSTRUCTIONS
Mohs Microscopic Surgery After Care    WOUND CARE AFTER SURGERY:    For the donor site on the Left preauricular:    1  Try NOT to remove the pressure bandage for 48 hours  Keep the area clean and dry while this bandage is on  2  After removing the bandage for the first time, gently clean the area with soap and water  If the bandage is difficult to remove, getting the bandage wet in the shower will sometimes help soften the adhesive and allow it to be removed more easily  3  You will now need to cleanse this area daily in the shower with gentle soap  There is no need to scrub the area  For the graft site (Right tip of nose):     Do NOT remove the top taped-on pressure bandage   There is a bandage  will stay in place for a week  Do Not remove the  bandage  You will need to keep the entire area  dry while this bandage is on  RESTRICTIONS:     For two DAYS:   - You will need to take it very easy as this time is highest risk for bleeding  Being a "couch potato" during these two days is generally recommended  - For surgeries on the face/neck/scalp: Avoid leaning down to pick things up off the floor as this brings blood up to your head  Instead, squat down to pick things up  For two WEEKS:   - No heavy lifting (anything greater than 10 pounds)   - You can start to do slow, gentle activities such as slow walking but nothing to increase your heart rate and blood pressure too much (such as cardiovascular exercise)  It is important to take it easy as there is still a risk for bleeding and a high risk popping of stitches open during this time  If we did surgery near the eyes (including the nose, forehead, front part of your scalp, cheeks): It is VERY common to get a large amount of swelling around your eyes (puffy eyes)  Although less frequent, this can be enough to swell your eyes shut and can also come along with bruising   This should not hurt and is very expected and normal  It is typically worst at ~ 3 days out from your surgery and dramatically better 1 week post-operatively  If we did surgery around your nose: No blowing your nose as this puts you at higher risk of popping stitches durign this time  Instead dab under your nose with a tissue or use a Q-tip inside your nose  If we did surgery on the skin above or bellow your lip or your lip itself: No sipping from straws as this uses a lot of the muscles around your mouth and increases the risk of popping stitches during this time  MANAGING YOUR PAIN AFTER SURGERY     You can expect to have some pain after surgery  This is normal  The pain is typically worse the first two days after surgery, and quickly begins to get better  The best strategy for controlling your pain after surgery is around the clock pain control  You can take over the counter Acetaminophen (Tylenol) for discomfort, if no contraindications  If you are taking this at the maximum dose, you can alternate this with Motrin (ibuprofen or Advil) as well  Alternating these medications with each other allows you to maximize your pain control  In addition to Tylenol and Motrin, you can use heating pads or ice packs on your incisions to help reduce your pain  How will I alternate your regular strength over-the-counter pain medication? You will take a dose of pain medication every three hours   Start by taking 650 mg of Tylenol (2 pills of 325 mg)   3 hours later take 600 mg of Motrin (3 pills of 200 mg)   3 hours after taking the Motrin take 650 mg of Tylenol   3 hours after that take 600 mg of Motrin      See example - if your first dose of Tylenol is at 12:00 PM     12:00 PM  Tylenol 650 mg (2 pills of 325 mg)    3:00 PM  Motrin 600 mg (3 pills of 200 mg)    6:00 PM  Tylenol 650 mg (2 pills of 325 mg)    9:00 PM  Motrin 600 mg (3 pills of 200 mg)    Continue alternating every 3 hours      Important:   Do not take more than 4000mg of Tylenol or 3200mg of Motrin in a 24-hour period  What if I still have pain? If you have pain that is not controlled with the over-the-counter pain medications (Tylenol and Motrin or Advil), don't hesitate to call our staff using the number provided  We will help make sure you are managing your pain in the best way possible, and if necessary, we can provide a prescription for additional pain medication  CALL OUR OFFICE IMMEDIATELY FOR ANY SIGNS OF INFECTION:    This includes fever, chills, increased redness, warmth, tenderness, severe discomfort/pain, or pus or foul smell coming from the wound  [facility] directly at [phone] (SKIN)    IF BLEEDING IS NOTICED:    Place a clean cloth over the area and apply firm pressure for thirty minutes  Check the wound ONLY after 30 minutes of direct pressure; do not cheat and sneak a peak, as that does not count  If bleeding persists after 30 minutes of legitimate direct pressure, then try one more round of direct pressure to the area  Should the bleeding become heavier or not stop after the second attempt, call [facility] directly at [phone] (SKIN)  Your call will get routed to the dermatology surgeon on call even after hours

## 2021-10-04 ENCOUNTER — HOSPITAL ENCOUNTER (OUTPATIENT)
Dept: CT IMAGING | Facility: HOSPITAL | Age: 81
Discharge: HOME | End: 2021-10-04
Attending: RADIOLOGY
Payer: MEDICARE

## 2021-10-04 ENCOUNTER — HOSPITAL ENCOUNTER (OUTPATIENT)
Dept: RADIOLOGY | Facility: HOSPITAL | Age: 81
Discharge: HOME | End: 2021-10-04
Payer: MEDICARE

## 2021-10-04 DIAGNOSIS — M54.41 CHRONIC BILATERAL LOW BACK PAIN WITH BILATERAL SCIATICA: ICD-10-CM

## 2021-10-04 DIAGNOSIS — R91.8 PULMONARY NODULES: ICD-10-CM

## 2021-10-04 DIAGNOSIS — C49.0 SARCOMA OF SCALP (HCC): ICD-10-CM

## 2021-10-04 DIAGNOSIS — G89.29 CHRONIC BILATERAL LOW BACK PAIN WITH BILATERAL SCIATICA: ICD-10-CM

## 2021-10-04 DIAGNOSIS — R29.898 LEG WEAKNESS, BILATERAL: ICD-10-CM

## 2021-10-04 DIAGNOSIS — M54.42 CHRONIC BILATERAL LOW BACK PAIN WITH BILATERAL SCIATICA: ICD-10-CM

## 2021-10-04 PROCEDURE — G1004 CDSM NDSC: HCPCS

## 2021-10-04 PROCEDURE — 71250 CT THORAX DX C-: CPT

## 2021-10-04 PROCEDURE — 72110 X-RAY EXAM L-2 SPINE 4/>VWS: CPT

## 2021-10-06 ENCOUNTER — OFFICE VISIT (OUTPATIENT)
Dept: DERMATOLOGY | Facility: CLINIC | Age: 81
End: 2021-10-06

## 2021-10-06 DIAGNOSIS — Z48.02 ENCOUNTER FOR REMOVAL OF SUTURES: Primary | ICD-10-CM

## 2021-10-06 PROCEDURE — 99024 POSTOP FOLLOW-UP VISIT: CPT | Performed by: DERMATOLOGY

## 2021-10-12 ENCOUNTER — TELEPHONE (OUTPATIENT)
Dept: FAMILY MEDICINE CLINIC | Facility: CLINIC | Age: 81
End: 2021-10-12

## 2021-10-12 DIAGNOSIS — M54.16 LUMBAR BACK PAIN WITH RADICULOPATHY AFFECTING LOWER EXTREMITY: Primary | ICD-10-CM

## 2021-10-14 ENCOUNTER — CLINICAL SUPPORT (OUTPATIENT)
Dept: RADIATION ONCOLOGY | Facility: CLINIC | Age: 81
End: 2021-10-14
Attending: RADIOLOGY
Payer: MEDICARE

## 2021-10-14 ENCOUNTER — OFFICE VISIT (OUTPATIENT)
Dept: DERMATOLOGY | Facility: CLINIC | Age: 81
End: 2021-10-14

## 2021-10-14 VITALS
WEIGHT: 173.5 LBS | TEMPERATURE: 97.7 F | DIASTOLIC BLOOD PRESSURE: 83 MMHG | HEART RATE: 85 BPM | SYSTOLIC BLOOD PRESSURE: 163 MMHG | HEIGHT: 67 IN | BODY MASS INDEX: 27.23 KG/M2 | RESPIRATION RATE: 17 BRPM

## 2021-10-14 DIAGNOSIS — R91.8 PULMONARY NODULES: ICD-10-CM

## 2021-10-14 DIAGNOSIS — C49.0 SARCOMA OF SCALP (HCC): Primary | ICD-10-CM

## 2021-10-14 DIAGNOSIS — Z48.02 ENCOUNTER FOR REMOVAL OF SUTURES: Primary | ICD-10-CM

## 2021-10-14 PROCEDURE — 99213 OFFICE O/P EST LOW 20 MIN: CPT | Performed by: RADIOLOGY

## 2021-10-14 PROCEDURE — 99024 POSTOP FOLLOW-UP VISIT: CPT | Performed by: DERMATOLOGY

## 2021-10-14 PROCEDURE — 99211 OFF/OP EST MAY X REQ PHY/QHP: CPT | Performed by: RADIOLOGY

## 2021-10-22 ENCOUNTER — NURSE TRIAGE (OUTPATIENT)
Dept: PHYSICAL THERAPY | Facility: OTHER | Age: 81
End: 2021-10-22

## 2021-10-22 DIAGNOSIS — M54.50 ACUTE EXACERBATION OF CHRONIC LOW BACK PAIN: Primary | ICD-10-CM

## 2021-10-22 DIAGNOSIS — G89.29 ACUTE EXACERBATION OF CHRONIC LOW BACK PAIN: Primary | ICD-10-CM

## 2021-11-04 ENCOUNTER — OFFICE VISIT (OUTPATIENT)
Dept: DERMATOLOGY | Facility: CLINIC | Age: 81
End: 2021-11-04

## 2021-11-04 DIAGNOSIS — Z51.89 VISIT FOR WOUND CHECK: Primary | ICD-10-CM

## 2021-11-04 PROCEDURE — 99024 POSTOP FOLLOW-UP VISIT: CPT | Performed by: DERMATOLOGY

## 2021-12-01 ENCOUNTER — OFFICE VISIT (OUTPATIENT)
Dept: PODIATRY | Facility: CLINIC | Age: 81
End: 2021-12-01
Payer: MEDICARE

## 2021-12-01 VITALS
WEIGHT: 177 LBS | DIASTOLIC BLOOD PRESSURE: 76 MMHG | BODY MASS INDEX: 27.78 KG/M2 | SYSTOLIC BLOOD PRESSURE: 140 MMHG | HEIGHT: 67 IN

## 2021-12-01 DIAGNOSIS — I73.9 PERIPHERAL VASCULAR DISEASE, UNSPECIFIED (HCC): Primary | ICD-10-CM

## 2021-12-01 PROCEDURE — 11719 TRIM NAIL(S) ANY NUMBER: CPT | Performed by: PODIATRIST

## 2022-02-22 ENCOUNTER — OFFICE VISIT (OUTPATIENT)
Dept: DERMATOLOGY | Facility: CLINIC | Age: 82
End: 2022-02-22
Payer: MEDICARE

## 2022-02-22 VITALS — TEMPERATURE: 97.8 F | BODY MASS INDEX: 26.52 KG/M2 | WEIGHT: 175 LBS | HEIGHT: 68 IN

## 2022-02-22 DIAGNOSIS — D48.5 NEOPLASM OF UNCERTAIN BEHAVIOR OF SKIN: ICD-10-CM

## 2022-02-22 DIAGNOSIS — L40.9 PSORIASIS: ICD-10-CM

## 2022-02-22 DIAGNOSIS — L21.9 SEBORRHEIC DERMATITIS: ICD-10-CM

## 2022-02-22 DIAGNOSIS — Z51.89 VISIT FOR WOUND CHECK: Primary | ICD-10-CM

## 2022-02-22 PROCEDURE — 99213 OFFICE O/P EST LOW 20 MIN: CPT | Performed by: DERMATOLOGY

## 2022-02-22 RX ORDER — FLUOCINOLONE ACETONIDE 0.11 MG/ML
OIL TOPICAL
Qty: 118.28 ML | Refills: 5 | Status: SHIPPED | OUTPATIENT
Start: 2022-02-22

## 2022-02-22 NOTE — PATIENT INSTRUCTIONS
WOUND CHECK    Assessment and Plan:  Based on a thorough discussion of this condition and the management approach to it (including a comprehensive discussion of the known risks, side effects and potential benefits of treatment), the patient (family) agrees to implement the following specific plan:   Will monitor     PSORIASIS    Assessment and Plan:  Based on a thorough discussion of this condition and the management approach to it (including a comprehensive discussion of the known risks, side effects and potential benefits of treatment), the patient (family) agrees to implement the following specific plan:   Could consider light therapy    Continue to apply Fluocinolone 0 01% scalp oil   Use T-Gel shampoo    In Summer months get a little bit of sunlight on scalp     Psoriasis is a chronic inflammatory condition that causes the body to make new skin cells in days rather than weeks  As these cells pile up on the surface of the skin, you may see thick, scaly patches of thickened skin  Psoriasis affects 2-4% of males and females  It can start at any age including childhood, with peaks of onset at 15-25 years and 50-60 years  It tends to persist lifelong, fluctuating in extent and severity  It is particularly common in Caucasians but may affect people of any race  About one-third of patients with psoriasis have family members with psoriasis  Psoriasis is multifactorial  It is classified as an immune-mediated inflammatory disease (IMID)  Genetic factors are important and influence the type of psoriasis and response to treatment  What are the signs and symptoms of psoriasis? There are many different types of psoriasis that each have present uniquely  The types of psoriasis include:    Plaque psoriasis: About 80% to 90% of people who have psoriasis develop this type   When plaque psoriasis appears, you may see:  Plaque psoriasis usually presents with symmetrically distributed, red, scaly plaques with well-defined edges  The scale is typically silvery white, except in skin folds where the plaques often appear shiny and they may have a moist peeling surface  The most common sites are scalp, elbows and knees, but any part of the skin can be involved  The plaques are usually very persistent without treatment  Itch is mostly mild but may be severe in some patients, leading to scratching and lichenification (thickened leathery skin with increased skin markings)  Painful skin cracks or fissures may occur  When psoriatic plaques clear up, they may leave brown or pale marks that can be expected to fade over several months  Guttate psoriasis: When someone gets this type of psoriasis, you often see tiny bumps appear on the skin quite suddenly  The bumps tend to cover much of the torso, legs, and arms  Sometimes, the bumps also develop on the face, scalp, and ears  No matter where they appear, the bumps tend to be:    Small and scaly   [place]-colored to pink   Temporary, clearing in a few weeks or months without treatment  When guttate psoriasis clears, it may never return  Why this happens is still a bit of a mystery  Guttate psoriasis tends to develop in children and young adults who've had an infection, such as strep throat  It's possible that when the infection clears so does guttate psoriasis  It's also possible to have:   Guttate psoriasis for life   See the guttate psoriasis clear and plaque psoriasis develop later in life   Plaque psoriasis when you develop guttate psoriasis  There's no way to predict what will happen after the first flare-up of guttate psoriasis clears  Inverse psoriasis: This type of psoriasis develops in areas where skin touches skin, such as the armpits, genitals, and crease of the buttocks   Where the inverse psoriasis appears, you're likely to notice:   Smooth, red patches of skin that look raw   Little, if any, silvery-white coating   Sore or painful skin  Other names for this type of psoriasis are intertriginous psoriasis or flexural psoriasis  Pustular psoriasis: This type of psoriasis causes pus-filled bumps that usually appear only on the feet and hands  While the pus-filled bumps may look like an infection, the skin is not infected  The bumps don't contain bacteria or anything else that could cause an infection  Where pustular psoriasis appears, you tend to notice:   Red, swollen skin that is dotted with pus-filled bumps   Extremely sore or painful skin   Brown dots (and sometimes scale) appear as the pus-filled bumps dry  Pustular psoriasis can make just about any activity that requires your hands or feet, such as typing or walking, unbearably painful  Pustular psoriasis (generalized): Serious and life-threatening, this rare type of psoriasis causes pus-filled bumps to develop on much of the skin  Also called von Zumbusch psoriasis, a flare-up causes this sequence of events:  1  Skin on most of the body suddenly turns dry, red, and tender  2  Within hours, pus-filled bumps cover most of the skin  3  Often within a day, the pus-filled bumps break open and pools of pus leak onto the skin  4  As the pus dries (usually within 24 to 48 hours), the skin dries out and peels (as shown in this picture)  5  When the dried skin peels off, you see a smooth, glazed surface  6  In a few days or weeks, you may see a new crop of pus-filled bumps covering most of the skin, as the cycle repeats itself  Anyone with pustular psoriasis also feels very sick, and may develop a fever, headache, muscle weakness, and other symptoms  Medical care is often necessary to save the person's life  Erythrodermic psoriasis: Serious and life-threatening, this type of psoriasis requires immediate medical care   When someone develops erythrodermic psoriasis, you may notice:   Skin on most of the body looks burnt   Chills, fever, and the person looks extremely ill   Muscle weakness, a rapid pulse, and severe itch  The person may also be unable to keep warm, so hypothermia can set in quickly  Most people who develop this type of psoriasis already have another type of psoriasis  Before developing erythrodermic psoriasis, they often notice that their psoriasis is worsening or not improving with treatment  If you notice either of these happening, see a board-certified dermatologist     Nails    Nail psoriasis: With any type of psoriasis, you may see changes to your fingernails or toenails  About half of the people who have plaque psoriasis see signs of psoriasis on their fingernails at some point2  When psoriasis affects the nails, you may notice:   Tiny dents in your nails (called nail pits)   White, yellow, or brown discoloration under one or more nails   Crumbling, rough nails   A nail lifting up so that it's no longer attached   Buildup of skin cells beneath one or more nails, which lifts up the nail  Treatment and proper nail care can help you control nail psoriasis  Psoriatic arthritis: If you have psoriasis, it's important to pay attention to your joints  Some people who have psoriasis develop a type of arthritis called psoriatic arthritis  This is more likely to occur if you have severe psoriasis  Most people notice psoriasis on their skin years before they develop psoriatic arthritis  It's also possible to get psoriatic arthritis before psoriasis, but this is less common  When psoriatic arthritis develops, the signs can be subtle  At first, you may notice:   A swollen and tender joint, especially in a finger or toe   Heel pain   Swelling on the back of your leg, just above your heel   Stiffness in the morning that fades during the day  Like psoriasis, psoriatic arthritis cannot be cured  Treatment can prevent psoriatic arthritis from worsening, which is important  Allowed to progress, psoriatic arthritis can become disabling      Diagnosis and treatment of psoriasis   Psoriasis is usually diagnosed by clinical features, and skin biopsy if necessary  It is important to decrease factors that aggravate psoriasis  These include treating streptococcal infections, minimizing skin injuries, avoiding sun exposure if it exacerbates psoriasis, smoking, alcohol usage, decreasing stress, and maintaining an optimal body weight  Certain medications such as lithium, beta blockers, antimalarials, and NSAIDs have also been implicated  Suddenly stopping oral steroids or strong topical steroids can cause rebound disease  There are many categories of psoriasis treatments available  Topical therapy  Mild psoriasis is generally treated with topical agents alone  Which treatment is selected may depend on body site, extent and severity of psoriasis   Emollients   Coal tar preparations   Dithranol   Salicylic acid   Vitamin D analogue (calcipotriol)   Topical corticosteroids   Calcineurin inhibitor (tacrolimus, pimecrolimus)  Phototherapy  Most psoriasis centres offer phototherapy with ultraviolet (UV) radiation, often in combination with topical or systemic agents  Types of phototherapy include   Narrowband UVB   Broadband UVB   Photochemotherapy (PUVA)   Targeted phototherapy  Systemic therapy  Moderate to severe psoriasis warrants treatment with a systemic agent and/or phototherapy  The most common treatments are:   Methotrexate   Ciclosporin   Acitretin  Other medicines occasionally used for psoriasis include:   Mycophenolate   Apremilast   Hydroxyurea   Azathioprine   6-mercaptopurine  Systemic corticosteroids are best avoided due to a risk of severe withdrawal flare of psoriasis and adverse effects  Biologics or targeted therapies are reserved for conventional treatment-resistant severe psoriasis, mainly because of expense, as side effects compare favorably with other systemic agents   These include:   Anti-tumour necrosis factor-alpha antagonists (anti-TNF?) infliximab, adalimumab and etanercept   The interleukin (IL)-12/23 antagonist ustekinumab   IL-17 antagonists such as secukinumab  Many other monoclonal antibodies are under investigation in the treatment of psoriasis  NEOPLASM OF UNCERTAIN BEHAVIOR OF SKIN    Assessment and Plan:   I have discussed with the patient that a sample of skin via a "skin biopsy would be potentially helpful to further make a specific diagnosis under the microscope   Based on a thorough discussion of this condition and the management approach to it (including a comprehensive discussion of the known risks, side effects and potential benefits of treatment), the patient (family) agrees to implement the following specific plan:       Will monitor     Pt deferred biopsy today    Follow up in 6 months

## 2022-02-22 NOTE — PROGRESS NOTES
WOUND CHECK    Physical Exam:   Anatomic Location Affected:  Right tip of nose   Description of wound: well healed  · Closure Type: Full-thickness skin graft     Additional History of Present Condition:   S/P [facility]HS on 9/29/2021 with Dr Itzel Mcmillan for a [address] on the right tip of nose  Wound closed with a full - thickness skin graft  Pt instructed to wash wound daily and apply Vaseline and keep covered  Pt here today for a wound check  Assessment and Plan:  Based on a thorough discussion of this condition and the management approach to it (including a comprehensive discussion of the known risks, side effects and potential benefits of treatment), the patient (family) agrees to implement the following specific plan:   Will monitor     PSORIASIS    Physical Exam:   Anatomic Location Affected:  scalp   Morphological Description:  Scaly dry skin   Severity: moderate   Body Percent Affected: 1%   Pertinent Positives:   Pertinent Negatives: Additional History of Present Condition:  Patient had some scaling on scalp and using Fluocinolone 0 01% scalp oil    Assessment and Plan:  Based on a thorough discussion of this condition and the management approach to it (including a comprehensive discussion of the known risks, side effects and potential benefits of treatment), the patient (family) agrees to implement the following specific plan:   Could consider light therapy    Continue to apply Fluocinolone 0 01% scalp oil   Use T-Gel shampoo    In Summer months get a little bit of sunlight on scalp     Psoriasis is a chronic inflammatory condition that causes the body to make new skin cells in days rather than weeks  As these cells pile up on the surface of the skin, you may see thick, scaly patches of thickened skin  Psoriasis affects 2-4% of males and females  It can start at any age including childhood, with peaks of onset at 15-25 years and 50-60 years   It tends to persist lifelong, fluctuating in extent and severity  It is particularly common in Caucasians but may affect people of any race  About one-third of patients with psoriasis have family members with psoriasis  Psoriasis is multifactorial  It is classified as an immune-mediated inflammatory disease (IMID)  Genetic factors are important and influence the type of psoriasis and response to treatment  What are the signs and symptoms of psoriasis? There are many different types of psoriasis that each have present uniquely  The types of psoriasis include:    Plaque psoriasis: About 80% to 90% of people who have psoriasis develop this type  When plaque psoriasis appears, you may see:  Plaque psoriasis usually presents with symmetrically distributed, red, scaly plaques with well-defined edges  The scale is typically silvery white, except in skin folds where the plaques often appear shiny and they may have a moist peeling surface  The most common sites are scalp, elbows and knees, but any part of the skin can be involved  The plaques are usually very persistent without treatment  Itch is mostly mild but may be severe in some patients, leading to scratching and lichenification (thickened leathery skin with increased skin markings)  Painful skin cracks or fissures may occur  When psoriatic plaques clear up, they may leave brown or pale marks that can be expected to fade over several months  Guttate psoriasis: When someone gets this type of psoriasis, you often see tiny bumps appear on the skin quite suddenly  The bumps tend to cover much of the torso, legs, and arms  Sometimes, the bumps also develop on the face, scalp, and ears  No matter where they appear, the bumps tend to be:    Small and scaly   [place]-colored to pink   Temporary, clearing in a few weeks or months without treatment  When guttate psoriasis clears, it may never return  Why this happens is still a bit of a mystery   Guttate psoriasis tends to develop in children and young adults who've had an infection, such as strep throat  It's possible that when the infection clears so does guttate psoriasis  It's also possible to have:   Guttate psoriasis for life   See the guttate psoriasis clear and plaque psoriasis develop later in life   Plaque psoriasis when you develop guttate psoriasis  There's no way to predict what will happen after the first flare-up of guttate psoriasis clears  Inverse psoriasis: This type of psoriasis develops in areas where skin touches skin, such as the armpits, genitals, and crease of the buttocks  Where the inverse psoriasis appears, you're likely to notice:   Smooth, red patches of skin that look raw   Little, if any, silvery-white coating   Sore or painful skin  Other names for this type of psoriasis are intertriginous psoriasis or flexural psoriasis  Pustular psoriasis: This type of psoriasis causes pus-filled bumps that usually appear only on the feet and hands  While the pus-filled bumps may look like an infection, the skin is not infected  The bumps don't contain bacteria or anything else that could cause an infection  Where pustular psoriasis appears, you tend to notice:   Red, swollen skin that is dotted with pus-filled bumps   Extremely sore or painful skin   Brown dots (and sometimes scale) appear as the pus-filled bumps dry  Pustular psoriasis can make just about any activity that requires your hands or feet, such as typing or walking, unbearably painful  Pustular psoriasis (generalized): Serious and life-threatening, this rare type of psoriasis causes pus-filled bumps to develop on much of the skin  Also called von Zumbusch psoriasis, a flare-up causes this sequence of events:  1  Skin on most of the body suddenly turns dry, red, and tender  2  Within hours, pus-filled bumps cover most of the skin  3  Often within a day, the pus-filled bumps break open and pools of pus leak onto the skin    4  As the pus dries (usually within 24 to 48 hours), the skin dries out and peels (as shown in this picture)  5  When the dried skin peels off, you see a smooth, glazed surface  6  In a few days or weeks, you may see a new crop of pus-filled bumps covering most of the skin, as the cycle repeats itself  Anyone with pustular psoriasis also feels very sick, and may develop a fever, headache, muscle weakness, and other symptoms  Medical care is often necessary to save the person's life  Erythrodermic psoriasis: Serious and life-threatening, this type of psoriasis requires immediate medical care  When someone develops erythrodermic psoriasis, you may notice:   Skin on most of the body looks burnt   Chills, fever, and the person looks extremely ill   Muscle weakness, a rapid pulse, and severe itch  The person may also be unable to keep warm, so hypothermia can set in quickly  Most people who develop this type of psoriasis already have another type of psoriasis  Before developing erythrodermic psoriasis, they often notice that their psoriasis is worsening or not improving with treatment  If you notice either of these happening, see a board-certified dermatologist     Nails    Nail psoriasis: With any type of psoriasis, you may see changes to your fingernails or toenails  About half of the people who have plaque psoriasis see signs of psoriasis on their fingernails at some point2  When psoriasis affects the nails, you may notice:   Tiny dents in your nails (called nail pits)   White, yellow, or brown discoloration under one or more nails   Crumbling, rough nails   A nail lifting up so that it's no longer attached   Buildup of skin cells beneath one or more nails, which lifts up the nail  Treatment and proper nail care can help you control nail psoriasis  Psoriatic arthritis: If you have psoriasis, it's important to pay attention to your joints  Some people who have psoriasis develop a type of arthritis called psoriatic arthritis   This is more likely to occur if you have severe psoriasis  Most people notice psoriasis on their skin years before they develop psoriatic arthritis  It's also possible to get psoriatic arthritis before psoriasis, but this is less common  When psoriatic arthritis develops, the signs can be subtle  At first, you may notice:   A swollen and tender joint, especially in a finger or toe   Heel pain   Swelling on the back of your leg, just above your heel   Stiffness in the morning that fades during the day  Like psoriasis, psoriatic arthritis cannot be cured  Treatment can prevent psoriatic arthritis from worsening, which is important  Allowed to progress, psoriatic arthritis can become disabling  Diagnosis and treatment of psoriasis   Psoriasis is usually diagnosed by clinical features, and skin biopsy if necessary  It is important to decrease factors that aggravate psoriasis  These include treating streptococcal infections, minimizing skin injuries, avoiding sun exposure if it exacerbates psoriasis, smoking, alcohol usage, decreasing stress, and maintaining an optimal body weight  Certain medications such as lithium, beta blockers, antimalarials, and NSAIDs have also been implicated  Suddenly stopping oral steroids or strong topical steroids can cause rebound disease  There are many categories of psoriasis treatments available  Topical therapy  Mild psoriasis is generally treated with topical agents alone  Which treatment is selected may depend on body site, extent and severity of psoriasis   Emollients   Coal tar preparations   Dithranol   Salicylic acid   Vitamin D analogue (calcipotriol)   Topical corticosteroids   Calcineurin inhibitor (tacrolimus, pimecrolimus)  Phototherapy  Most psoriasis centres offer phototherapy with ultraviolet (UV) radiation, often in combination with topical or systemic agents   Types of phototherapy include   Narrowband UVB   Broadband UVB   Photochemotherapy (PUVA)   Targeted phototherapy  Systemic therapy  Moderate to severe psoriasis warrants treatment with a systemic agent and/or phototherapy  The most common treatments are:   Methotrexate   Ciclosporin   Acitretin  Other medicines occasionally used for psoriasis include:   Mycophenolate   Apremilast   Hydroxyurea   Azathioprine   6-mercaptopurine  Systemic corticosteroids are best avoided due to a risk of severe withdrawal flare of psoriasis and adverse effects  Biologics or targeted therapies are reserved for conventional treatment-resistant severe psoriasis, mainly because of expense, as side effects compare favorably with other systemic agents  These include:   Anti-tumour necrosis factor-alpha antagonists (anti-TNF?) infliximab, adalimumab and etanercept   The interleukin (IL)-12/23 antagonist ustekinumab   IL-17 antagonists such as secukinumab  Many other monoclonal antibodies are under investigation in the treatment of psoriasis  NEOPLASM OF UNCERTAIN BEHAVIOR OF SKIN    Physical Exam:   (Anatomic Location); (Size and Morphological Description); (Differential Diagnosis):  o Right temple   Pertinent Positives:   Pertinent Negatives: Additional History of Present Condition:  Discovered upon skin exam    Assessment and Plan:   I have discussed with the patient that a sample of skin via a "skin biopsy would be potentially helpful to further make a specific diagnosis under the microscope   Based on a thorough discussion of this condition and the management approach to it (including a comprehensive discussion of the known risks, side effects and potential benefits of treatment), the patient (family) agrees to implement the following specific plan: Will monitor     Pt deferred biopsy today  He is aware that this could represent early nonmelanotic skin cancer      Follow up in 6 months    Scribe Attestation    I,:  Bettyann Nageotte am acting as a scribe while in the presence of the attending physician : I,:  Abhijeet Hankins MD personally performed the services described in this documentation    as scribed in my presence :

## 2022-03-30 ENCOUNTER — APPOINTMENT (OUTPATIENT)
Dept: LAB | Facility: CLINIC | Age: 82
End: 2022-03-30
Payer: MEDICARE

## 2022-03-30 DIAGNOSIS — I10 ESSENTIAL HYPERTENSION: ICD-10-CM

## 2022-03-30 DIAGNOSIS — N18.30 STAGE 3 CHRONIC KIDNEY DISEASE, UNSPECIFIED WHETHER STAGE 3A OR 3B CKD (HCC): ICD-10-CM
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
## 2022-03-30 DIAGNOSIS — C49.0 SARCOMA OF SCALP (HCC): ICD-10-CM

## 2022-03-30 DIAGNOSIS — Z12.5 ENCOUNTER FOR SCREENING FOR MALIGNANT NEOPLASM OF PROSTATE: ICD-10-CM

## 2022-03-30 DIAGNOSIS — R29.898 LEG WEAKNESS, BILATERAL: ICD-10-CM

## 2022-03-30 DIAGNOSIS — M87.38 RADIATION NECROSIS OF SKULL (HCC): ICD-10-CM

## 2022-03-30 LAB
ALBUMIN SERPL BCP-MCNC: 3.8 G/DL (ref 3.5–5)
ALP SERPL-CCNC: 149 U/L (ref 46–116)
ALT SERPL W P-5'-P-CCNC: 38 U/L (ref 12–78)
ANION GAP SERPL CALCULATED.3IONS-SCNC: 4 MMOL/L (ref 4–13)
AST SERPL W P-5'-P-CCNC: 22 U/L (ref 5–45)
BASOPHILS # BLD AUTO: 0.06 THOUSANDS/ΜL (ref 0–0.1)
BASOPHILS NFR BLD AUTO: 1 % (ref 0–1)
BILIRUB SERPL-MCNC: 1.11 MG/DL (ref 0.2–1)
BUN SERPL-MCNC: 23 MG/DL (ref 5–25)
CALCIUM SERPL-MCNC: 9.8 MG/DL (ref 8.3–10.1)
CHLORIDE SERPL-SCNC: 107 MMOL/L (ref 100–108)
CHOLEST SERPL-MCNC: 174 MG/DL
CO2 SERPL-SCNC: 28 MMOL/L (ref 21–32)
CREAT SERPL-MCNC: 1.44 MG/DL (ref 0.6–1.3)
EOSINOPHIL # BLD AUTO: 0.32 THOUSAND/ΜL (ref 0–0.61)
EOSINOPHIL NFR BLD AUTO: 5 % (ref 0–6)
ERYTHROCYTE [DISTWIDTH] IN BLOOD BY AUTOMATED COUNT: 13.1 % (ref 11.6–15.1)
GFR SERPL CREATININE-BSD FRML MDRD: 44 ML/MIN/1.73SQ M
GLUCOSE P FAST SERPL-MCNC: 101 MG/DL (ref 65–99)
HCT VFR BLD AUTO: 43.4 % (ref 36.5–49.3)
HDLC SERPL-MCNC: 34 MG/DL
HGB BLD-MCNC: 14.3 G/DL (ref 12–17)
IMM GRANULOCYTES # BLD AUTO: 0.03 THOUSAND/UL (ref 0–0.2)
IMM GRANULOCYTES NFR BLD AUTO: 1 % (ref 0–2)
LDLC SERPL CALC-MCNC: 114 MG/DL (ref 0–100)
LYMPHOCYTES # BLD AUTO: 2.08 THOUSANDS/ΜL (ref 0.6–4.47)
LYMPHOCYTES NFR BLD AUTO: 32 % (ref 14–44)
MCH RBC QN AUTO: 29.9 PG (ref 26.8–34.3)
MCHC RBC AUTO-ENTMCNC: 32.9 G/DL (ref 31.4–37.4)
MCV RBC AUTO: 91 FL (ref 82–98)
MONOCYTES # BLD AUTO: 0.46 THOUSAND/ΜL (ref 0.17–1.22)
MONOCYTES NFR BLD AUTO: 7 % (ref 4–12)
NEUTROPHILS # BLD AUTO: 3.61 THOUSANDS/ΜL (ref 1.85–7.62)
NEUTS SEG NFR BLD AUTO: 54 % (ref 43–75)
NRBC BLD AUTO-RTO: 0 /100 WBCS
PLATELET # BLD AUTO: 197 THOUSANDS/UL (ref 149–390)
PMV BLD AUTO: 11 FL (ref 8.9–12.7)
POTASSIUM SERPL-SCNC: 3.9 MMOL/L (ref 3.5–5.3)
PROT SERPL-MCNC: 7.7 G/DL (ref 6.4–8.2)
PSA SERPL-MCNC: 4.2 NG/ML (ref 0–4)
RBC # BLD AUTO: 4.78 MILLION/UL (ref 3.88–5.62)
SODIUM SERPL-SCNC: 139 MMOL/L (ref 136–145)
TRIGL SERPL-MCNC: 131 MG/DL
TSH SERPL DL<=0.05 MIU/L-ACNC: 3.09 UIU/ML (ref 0.36–3.74)
WBC # BLD AUTO: 6.56 THOUSAND/UL (ref 4.31–10.16)

## 2022-03-30 PROCEDURE — 36415 COLL VENOUS BLD VENIPUNCTURE: CPT

## 2022-03-30 PROCEDURE — G0103 PSA SCREENING: HCPCS

## 2022-03-30 PROCEDURE — 84443 ASSAY THYROID STIM HORMONE: CPT

## 2022-03-30 PROCEDURE — 80061 LIPID PANEL: CPT

## 2022-03-30 PROCEDURE — 80053 COMPREHEN METABOLIC PANEL: CPT

## 2022-03-30 PROCEDURE — 85025 COMPLETE CBC W/AUTO DIFF WBC: CPT

## 2022-04-05 ENCOUNTER — OFFICE VISIT (OUTPATIENT)
Dept: FAMILY MEDICINE CLINIC | Facility: CLINIC | Age: 82
End: 2022-04-05
Payer: MEDICARE

## 2022-04-05 VITALS
DIASTOLIC BLOOD PRESSURE: 84 MMHG | SYSTOLIC BLOOD PRESSURE: 158 MMHG | WEIGHT: 179 LBS | RESPIRATION RATE: 14 BRPM | TEMPERATURE: 97.8 F | HEIGHT: 68 IN | BODY MASS INDEX: 27.13 KG/M2 | HEART RATE: 98 BPM

## 2022-04-05 DIAGNOSIS — Z00.00 HEALTHCARE MAINTENANCE: ICD-10-CM

## 2022-04-05 DIAGNOSIS — C49.0 SARCOMA OF SCALP (HCC): ICD-10-CM

## 2022-04-05 DIAGNOSIS — E78.5 HYPERLIPIDEMIA, UNSPECIFIED HYPERLIPIDEMIA TYPE: ICD-10-CM

## 2022-04-05 DIAGNOSIS — I10 PRIMARY HYPERTENSION: Primary | ICD-10-CM

## 2022-04-05 DIAGNOSIS — N18.30 STAGE 3 CHRONIC KIDNEY DISEASE, UNSPECIFIED WHETHER STAGE 3A OR 3B CKD (HCC): ICD-10-CM

## 2022-04-05 PROCEDURE — 99214 OFFICE O/P EST MOD 30 MIN: CPT | Performed by: PHYSICIAN ASSISTANT

## 2022-04-05 PROCEDURE — G0439 PPPS, SUBSEQ VISIT: HCPCS | Performed by: PHYSICIAN ASSISTANT

## 2022-04-05 PROCEDURE — 1123F ACP DISCUSS/DSCN MKR DOCD: CPT | Performed by: PHYSICIAN ASSISTANT

## 2022-04-05 RX ORDER — METOPROLOL SUCCINATE 50 MG/1
50 TABLET, EXTENDED RELEASE ORAL DAILY
Qty: 90 TABLET | Refills: 3 | Status: SHIPPED | OUTPATIENT
Start: 2022-04-05

## 2022-04-05 RX ORDER — MOMETASONE FUROATE 1 MG/G
CREAM TOPICAL
COMMUNITY
Start: 2022-03-14

## 2022-04-05 RX ORDER — ERYTHROMYCIN 5 MG/G
3.5 OINTMENT OPHTHALMIC
COMMUNITY

## 2022-04-05 RX ORDER — OFLOXACIN 3 MG/ML
SOLUTION/ DROPS OPHTHALMIC
COMMUNITY
Start: 2022-03-25

## 2022-04-05 NOTE — PROGRESS NOTES
Assessment and Plan:     1  Healthcare maintenance -annual Medicare wellness visit  Problem List Items Addressed This Visit        Cardiovascular and Mediastinum    Hypertension - Primary       Genitourinary    Stage 3 chronic kidney disease ([address] )       Other    Hyperlipidemia    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[address] )      Other Visit Diagnoses     Healthcare maintenance               Preventive health issues were discussed with patient, and age appropriate screening tests were ordered as noted in patient's After Visit Summary  Personalized health advice and appropriate referrals for health education or preventive services given if needed, as noted in patient's After Visit Summary       History of Present Illness:     Patient presents for Medicare Annual Wellness visit    Patient Care Team:  Reva Adhikari PA-C as PCP - General (Family Medicine)  Opal Gusman MD (Radiation Oncology)  Annamarie Cordero MD (Plastic Surgery)  Neville Cosby DO (Dermatology Cancer Specialist)     Problem List:     Patient Active Problem List   Diagnosis    Actinic keratosis    Stage 3 chronic kidney disease ([address] )    Gout    Hyperlipidemia    Hypertension    Irritable bowel syndrome    Macular degeneration    Urinary incontinence, post-void dribbling    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[address] )    Basal cell carcinoma of skin of other parts of face    Pulmonary nodules    Incisional hernia, without obstruction or gangrene    Status post repair of ventral hernia    Dizziness    Urinary frequency    Lumbar back pain with radiculopathy affecting lower extremity    Dehydration    Scalp ulceration, with necrosis of bone ([address] )    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[address] )      Past Medical and Surgical History:     Past Medical History:   Diagnosis Date    Back pain     Balance problem     Basal cell carcinoma (BCC)     in past    BCC (basal cell carcinoma of skin) 08/17/2021    Right tip of nose    Gout     Hard of hearing     Hearing aid worn ziyad    Herniated nucleus pulposus, L4-5     Hypertension     Incisional hernia     Macular degeneration     Melanoma ([address] )     left cheek- history    Pulmonary nodules     Reactive airway disease     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    Skin cancer 2020    AFX- scalp    Squamous cell carcinoma     in past     Past Surgical History:   Procedure Laterality Date    CATARACT EXTRACTION Bilateral     COLONOSCOPY      HERNIA REPAIR      HYDROCELE EXCISION / REPAIR      MASTOID SURGERY Left     MOHS SURGERY      MOHS SURGERY  2021    Right tip of nose-Dr Taya Liao    OTHER SURGICAL HISTORY      sarcoma scalp with skin graft    AR REPAIR INCISIONAL HERNIA,REDUCIBLE N/A 2021    Procedure: REPAIR HERNIA INCISIONAL OPEN WITH MESH;  Surgeon: Nilton Kay MD;  Location: [facility];  Service: General    SCALP EXCISION N/A 3/28/2018    Procedure: SCALP SARCOMA EXCISION WITH FULL THICKNESS SKIN GRAFT;  Surgeon: Akhil Veronica MD;  Location: [facility];  Service: Plastics    SKIN BIOPSY      SKIN CANCER EXCISION      TONSILLECTOMY AND ADENOIDECTOMY        Family History:     Family History   Problem Relation Age of Onset    Colon cancer Father     Heart failure Father     Heart disease Mother     Stroke Mother       Social History:     Social History     Socioeconomic History    Marital status: /Civil Union     Spouse name: None    Number of children: None    Years of education: None    Highest education level: None   Occupational History    None   Tobacco Use    Smoking status: Former Smoker     Packs/day: 0 25     Years: 15 00     Pack years: 3 75     Types: Cigarettes     Quit date: 3/13/1988     Years since quittin 0    Smokeless tobacco: Never Used   Vaping Use    Vaping Use: Never used   Substance and Sexual Activity    Alcohol use: Yes     Comment: beer    Drug use: No    Sexual activity: Yes   Other Topics Concern    None   Social History Narrative    None     Social Determinants of Health     Financial Resource Strain: Not on file   Food Insecurity: Not on file   Transportation Needs: Not on file   Physical Activity: Not on file   Stress: Not on file   Social Connections: Not on file   Intimate Partner Violence: Not on file   Housing Stability: Not on file      Medications and Allergies:     Current Outpatient Medications   Medication Sig Dispense Refill    acetaminophen (TYLENOL) 500 mg tablet Take 500-1,000 mg by mouth every 4 (four) hours as needed for mild pain      amLODIPine (NORVASC) 10 mg tablet Take 1 tablet (10 mg total) by mouth daily 90 tablet 3    bimatoprost (LUMIGAN) 0 01 % ophthalmic drops Administer 1 drop to the right eye daily at bedtime      erythromycin (ILOTYCIN) ophthalmic ointment Administer 3 5 inches to the right eye daily at bedtime      Fluocinolone Acetonide Scalp 0 01 % OIL Apply a thin layer topically daily at night one hour before bedtime   118 28 mL 5    lisinopril (ZESTRIL) 20 mg tablet Take 1 tablet (20 mg total) by mouth daily 90 tablet 3    mometasone (ELOCON) 0 1 % cream APPLY TO EARS ONCE TO TWICE DAILY AS NEEDED FOR SCALING      mupirocin (BACTROBAN) 2 % ointment Apply topically two to three times a day to sore areas 22 g 3    Naproxen Sodium (ALEVE PO) Take 1 tablet by mouth 2 (two) times a day as needed       ofloxacin (OCUFLOX) 0 3 % ophthalmic solution INSTILL 1 DROP IN LEFT EYE FOUR TIMES DAILY      imiquimod (ALDARA) 5 % cream Apply topically once a day Monday thru Friday for 6 weeks (Patient not taking: Reported on 4/5/2022 ) 24 each 1    Multiple Vitamins-Minerals (PRESERVISION AREDS 2) CAPS Take 1 capsule by mouth 2 (two) times a day (Patient not taking: Reported on 2/22/2022 )      tamsulosin (FLOMAX) 0 4 mg Take 1 capsule (0 4 mg total) by mouth daily with dinner (Patient not taking: Reported on 9/28/2021) 30 capsule 0     No current facility-administered medications for this visit  Allergies   Allergen Reactions    Erythromycin Other (See Comments)     Thrush       Immunizations:     Immunization History   Administered Date(s) Administered    COVID-19 MODERNA VACC 0 5 ML IM 01/12/2021, 02/09/2021, 10/29/2021    INFLUENZA 10/01/2020    Influenza Quadrivalent Preservative Free 3 years and older IM 10/28/2014    Influenza Split High Dose Preservative Free IM 11/08/2012    Influenza, high dose seasonal 0 7 mL 09/28/2021    Pneumococcal Conjugate 13-Valent 09/29/2016    Pneumococcal Polysaccharide PPV23 06/06/2019    Zoster 02/23/2011    influenza, trivalent, adjuvanted 10/19/2018      Health Maintenance: There are no preventive care reminders to display for this patient  Topic Date Due    DTaP,Tdap,and Td Vaccines (1 - Tdap) Never done      Medicare Health Risk Assessment:     /84 (BP Location: Left arm, Patient Position: Sitting, Cuff Size: Adult)   Pulse 98   Temp 97 8 °F (36 6 °C) (Temporal)   Resp 14   Ht 5' 8" (1 727 m)   Wt 81 2 kg (179 lb)   BMI 27 22 kg/m²      Jeanette Awad is here for his Subsequent Wellness visit  Health Risk Assessment:   Patient rates overall health as good  Patient feels that their physical health rating is same  Patient is satisfied with their life  Eyesight was rated as same  Hearing was rated as same  Patient feels that their emotional and mental health rating is same  Patients states they are sometimes angry  Patient states they are always unusually tired/fatigued  Pain experienced in the last 7 days has been some  Patient's pain rating has been 5/10  Patient states that he has experienced no weight loss or gain in last 6 months  Depression Screening:   PHQ-2 Score: 0      Fall Risk Screening: In the past year, patient has experienced: no history of falling in past year      Home Safety:  Patient does not have trouble with stairs inside or outside of their home   Patient has working smoke alarms and has working carbon monoxide detector  Home safety hazards include: none  Nutrition:   Current diet is Regular  Medications:   Patient is currently taking over-the-counter supplements  OTC medications include: see medication list  Patient is able to manage medications  Activities of Daily Living (ADLs)/Instrumental Activities of Daily Living (IADLs):   Walk and transfer into and out of bed and chair?: Yes  Dress and groom yourself?: Yes    Bathe or shower yourself?: Yes    Feed yourself? Yes  Do your laundry/housekeeping?: Yes  Manage your money, pay your bills and track your expenses?: Yes  Make your own meals?: Yes    Do your own shopping?: Yes    Previous Hospitalizations:   Any hospitalizations or ED visits within the last 12 months?: No      Advance Care Planning:   Living will: Yes    Durable POA for healthcare: Yes    Advanced directive: Yes      Cognitive Screening:   Provider or family/friend/caregiver concerned regarding cognition?: No    PREVENTIVE SCREENINGS      Cardiovascular Screening:    General: Screening Not Indicated and History Lipid Disorder      Diabetes Screening:     General: Screening Current      Colorectal Cancer Screening:     General: Risks and Benefits Discussed      Prostate Cancer Screening:    General: Screening Not Indicated      Osteoporosis Screening:    General: Screening Not Indicated      Abdominal Aortic Aneurysm (AAA) Screening:    Risk factors include: tobacco use        Lung Cancer Screening:     General: Screening Not Indicated      Hepatitis C Screening:    General: Patient Declines    Screening, Brief Intervention, and Referral to Treatment (SBIRT)    Screening  Typical number of drinks in a day: 0  Typical number of drinks in a week: 0  Interpretation: Low risk drinking behavior        Romain Portillo PA-C

## 2022-04-05 NOTE — PATIENT INSTRUCTIONS
Assessment/plan:  1  Benign essential hypertension -presently not at goal with amlodipine and lisinopril  Discussed additional at medication and will add metoprolol XL 50 mg once daily  Continue home monitoring of pressure  Follow up if any side effects  2   Sarcoma of the scalp  -Stable status post surgical intervention and radiation  3  Stage 3 chronic kidney disease  -Stable on ACE-inhibitor therapy, no medication changes  4   Hyperlipidemia  -Stable with diet and exercise control, no medication changes  5   Healthcare maintenance -annual Medicare wellness visit completed  See separate note  Follow-up in 6 months

## 2022-04-05 NOTE — PROGRESS NOTES
Assessment and Plan:  Patient Instructions     Assessment/plan:  1  Benign essential hypertension -presently not at goal with amlodipine and lisinopril  Discussed additional at medication and will add metoprolol XL 50 mg once daily  Continue home monitoring of pressure  Follow up if any side effects  2   Sarcoma of the scalp  -Stable status post surgical intervention and radiation  3  Stage 3 chronic kidney disease  -Stable on ACE-inhibitor therapy, no medication changes  4   Hyperlipidemia  -Stable with diet and exercise control, no medication changes  5   Healthcare maintenance -annual Medicare wellness visit completed  See separate note  Follow-up in 6 months        Problem List Items Addressed This Visit        Cardiovascular and Mediastinum    Hypertension - Primary    Relevant Medications    metoprolol succinate (TOPROL-XL) 50 mg 24 hr tablet    Other Relevant Orders    CBC and differential    Comprehensive metabolic panel    Lipid Panel with Direct LDL reflex    TSH, 3rd generation    Microalbumin / creatinine urine ratio    Vitamin D 25 hydroxy       Genitourinary    Stage 3 chronic kidney disease (HCC)    Relevant Orders    CBC and differential    Comprehensive metabolic panel    Lipid Panel with Direct LDL reflex    TSH, 3rd generation    Microalbumin / creatinine urine ratio    Vitamin D 25 hydroxy       Other    Hyperlipidemia    Relevant Orders    CBC and differential    Comprehensive metabolic panel    Lipid Panel with Direct LDL reflex    TSH, 3rd generation    Microalbumin / creatinine urine ratio    Vitamin D 25 hydroxy    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HCC)    Relevant Orders    CBC and differential    Comprehensive metabolic panel    Lipid Panel with Direct LDL reflex    TSH, 3rd generation    Microalbumin / creatinine urine ratio    Vitamin D 25 hydroxy      Other Visit Diagnoses     Healthcare maintenance                     Diagnoses and all orders for this visit:    Primary hypertension  - CBC and differential; Future  -     Comprehensive metabolic panel; Future  -     Lipid Panel with Direct LDL reflex; Future  -     TSH, 3rd generation; Future  -     Microalbumin / creatinine urine ratio; Future  -     Vitamin D 25 hydroxy; Future  -     metoprolol succinate (TOPROL-XL) 50 mg 24 hr tablet; Take 1 tablet (50 mg total) by mouth daily    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HCC)  -     CBC and differential; Future  -     Comprehensive metabolic panel; Future  -     Lipid Panel with Direct LDL reflex; Future  -     TSH, 3rd generation; Future  -     Microalbumin / creatinine urine ratio; Future  -     Vitamin D 25 hydroxy; Future    Stage 3 chronic kidney disease, unspecified whether stage 3a or 3b CKD (HCC)  -     CBC and differential; Future  -     Comprehensive metabolic panel; Future  -     Lipid Panel with Direct LDL reflex; Future  -     TSH, 3rd generation; Future  -     Microalbumin / creatinine urine ratio; Future  -     Vitamin D 25 hydroxy; Future    Hyperlipidemia, unspecified hyperlipidemia type  -     CBC and differential; Future  -     Comprehensive metabolic panel; Future  -     Lipid Panel with Direct LDL reflex; Future  -     TSH, 3rd generation; Future  -     Microalbumin / creatinine urine ratio; Future  -     Vitamin D 25 hydroxy; Future    Healthcare maintenance    Other orders  -     ofloxacin (OCUFLOX) 0 3 % ophthalmic solution; INSTILL 1 DROP IN LEFT EYE FOUR TIMES DAILY  -     mometasone (ELOCON) 0 1 % cream; APPLY TO EARS ONCE TO TWICE DAILY AS NEEDED FOR SCALING  -     erythromycin (ILOTYCIN) ophthalmic ointment; Administer 3 5 inches to the right eye daily at bedtime  -     bimatoprost (LUMIGAN) 0 01 % ophthalmic drops; Administer 1 drop to the right eye daily at bedtime              Subjective:      Patient ID: Andreea Moseley is a 79 y o  male      CC:    Chief Complaint   Patient presents with    Follow-up     Patient here for follow up   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  He has had removal and grafting of the scalp  He continues on amlodipine and lisinopril for his blood pressure  He continues diet control of elevated cholesterol and has a history of stage 3 chronic kidney disease which has been stable on ACE-inhibitor  He is also present for annual Medicare wellness visit today  He does note that he was at the eye doctor recently and had a burst vessel in his right eye  The pressure in the eye was 29 which was elevated  He was started on Lumigan drops  He does have some concern about his blood pressure being elevated when his readings at home are typically in the 150s  He is currently taking NSAIDs fairly often because of back pain  He did previously have x-ray and referral to pain management but decided not to go  The following portions of the patient's history were reviewed and updated as appropriate: allergies, current medications, past family history, past medical history, past social history, past surgical history and problem list       Review of Systems   Constitutional: Negative for chills and fever  HENT: Negative for ear pain and sore throat  Eyes: Negative for pain and visual disturbance  Respiratory: Negative for cough and shortness of breath  Cardiovascular: Negative for chest pain and palpitations  Gastrointestinal: Negative for abdominal pain and vomiting  Genitourinary: Negative for dysuria and hematuria  Musculoskeletal: Negative for arthralgias and back pain  Skin: Negative for color change and rash  Neurological: Negative for seizures and syncope  All other systems reviewed and are negative          Data to review:       Objective:    Vitals:    04/05/22 0843   BP: 158/84   BP Location: Left arm   Patient Position: Sitting   Cuff Size: Adult   Pulse: 98   Resp: 14   Temp: 97 8 °F (36 6 °C)   TempSrc: Temporal   Weight: 81 2 kg (179 lb)   Height: 5' 8" (1 727 m)        Physical Exam  Constitutional:       General: He is not in acute distress  Appearance: He is well-developed  HENT:      Head: Normocephalic and atraumatic  Right Ear: Tympanic membrane normal       Left Ear: Tympanic membrane normal    Eyes:      Conjunctiva/sclera: Conjunctivae normal    Cardiovascular:      Rate and Rhythm: Normal rate and regular rhythm  Pulmonary:      Effort: Pulmonary effort is normal    Abdominal:      General: Abdomen is flat  Bowel sounds are normal  There is no distension  Palpations: Abdomen is soft  There is no mass  Musculoskeletal:         General: Normal range of motion  Cervical back: Normal range of motion  Skin:     General: Skin is warm  Findings: No rash  Neurological:      Mental Status: He is alert and oriented to person, place, and time  Psychiatric:         Mood and Affect: Mood normal            BMI Counseling: Body mass index is 27 22 kg/m²  The BMI is above normal  Nutrition recommendations include decreasing portion sizes  Exercise recommendations include exercising 3-5 times per week  Rationale for BMI follow-up plan is due to patient being overweight or obese  Depression Screening and Follow-up Plan: Patient was screened for depression during today's encounter  They screened negative with a PHQ-2 score of 0

## 2022-04-06 ENCOUNTER — OFFICE VISIT (OUTPATIENT)
Dept: PODIATRY | Facility: CLINIC | Age: 82
End: 2022-04-06
Payer: MEDICARE

## 2022-04-06 VITALS
HEIGHT: 68 IN | DIASTOLIC BLOOD PRESSURE: 66 MMHG | BODY MASS INDEX: 27.13 KG/M2 | SYSTOLIC BLOOD PRESSURE: 142 MMHG | WEIGHT: 179 LBS

## 2022-04-06 DIAGNOSIS — I73.9 PERIPHERAL VASCULAR DISEASE, UNSPECIFIED (HCC): Primary | ICD-10-CM

## 2022-04-06 PROCEDURE — 11719 TRIM NAIL(S) ANY NUMBER: CPT | Performed by: PODIATRIST

## 2022-04-06 NOTE — PROGRESS NOTES
Established patient with class findings presents for nail care  Vascular exam:  DP  0/4 bilateral; PT  0 4 bilateral   Dermatological exam:  Each toenail is thick and  dystrophic  Diagnosis:  PVD  Treatment: Trimmed multiple dystrophic toenails      Nail removal    Date/Time: 4/6/2022 1:56 PM  Performed by: Meghann Ritter DPM  Authorized by: Meghann Ritter DPM     Nails trimmed:     Number of nails trimmed:  10

## 2022-05-10 ENCOUNTER — TELEPHONE (OUTPATIENT)
Dept: DERMATOLOGY | Facility: CLINIC | Age: 82
End: 2022-05-10

## 2022-05-10 NOTE — TELEPHONE ENCOUNTER
Pt left v/m wanting to schedule his 6 month f/u (August) with Dr Kristin Leventhal at the [facility], c/b but n/a, left v/m with c/b info

## 2022-05-31 DIAGNOSIS — I10 ESSENTIAL HYPERTENSION: ICD-10-CM

## 2022-05-31 RX ORDER — LISINOPRIL 20 MG/1
TABLET ORAL
Qty: 90 TABLET | Refills: 3 | Status: SHIPPED | OUTPATIENT
Start: 2022-05-31

## 2022-07-11 ENCOUNTER — TELEPHONE (OUTPATIENT)
Dept: DERMATOLOGY | Facility: CLINIC | Age: 82
End: 2022-07-11

## 2022-07-11 NOTE — TELEPHONE ENCOUNTER
Bleeding lips and cheek has a sore, pt is looking to get in sooner, should we look to overbook this pt?

## 2022-07-14 NOTE — TELEPHONE ENCOUNTER
Telephone to patient to move up his appointment  Patient is scheduled for 8/9/2022 at 9:40 with Dr Trell White in [place] for 2 lesions on his face

## 2022-08-04 ENCOUNTER — TELEPHONE (OUTPATIENT)
Dept: DERMATOLOGY | Facility: CLINIC | Age: 82
End: 2022-08-04

## 2022-08-08 ENCOUNTER — OFFICE VISIT (OUTPATIENT)
Dept: DERMATOLOGY | Facility: CLINIC | Age: 82
End: 2022-08-08
Payer: MEDICARE

## 2022-08-08 VITALS — TEMPERATURE: 98.2 F | BODY MASS INDEX: 27.26 KG/M2 | WEIGHT: 179.9 LBS | HEIGHT: 68 IN

## 2022-08-08 DIAGNOSIS — D48.5 NEOPLASM OF UNCERTAIN BEHAVIOR OF SKIN: Primary | ICD-10-CM

## 2022-08-08 PROCEDURE — 88341 IMHCHEM/IMCYTCHM EA ADD ANTB: CPT | Performed by: STUDENT IN AN ORGANIZED HEALTH CARE EDUCATION/TRAINING PROGRAM

## 2022-08-08 PROCEDURE — 99214 OFFICE O/P EST MOD 30 MIN: CPT | Performed by: DERMATOLOGY

## 2022-08-08 PROCEDURE — 11103 TANGNTL BX SKIN EA SEP/ADDL: CPT | Performed by: DERMATOLOGY

## 2022-08-08 PROCEDURE — 11102 TANGNTL BX SKIN SINGLE LES: CPT | Performed by: DERMATOLOGY

## 2022-08-08 PROCEDURE — 88305 TISSUE EXAM BY PATHOLOGIST: CPT | Performed by: STUDENT IN AN ORGANIZED HEALTH CARE EDUCATION/TRAINING PROGRAM

## 2022-08-08 PROCEDURE — 88342 IMHCHEM/IMCYTCHM 1ST ANTB: CPT | Performed by: STUDENT IN AN ORGANIZED HEALTH CARE EDUCATION/TRAINING PROGRAM

## 2022-08-08 NOTE — PROGRESS NOTES
[facility] Follow Up Note    Patient Name: Bianka Bocanegra  Encounter Date: 08/08/2022    Today's Chief Concerns:  Concern #1:  Spot of concern lip, right cheek, left hand, and left lower leg     Current Medications:    Current Outpatient Medications:     acetaminophen (TYLENOL) 500 mg tablet, Take 500-1,000 mg by mouth every 4 (four) hours as needed for mild pain, Disp: , Rfl:     amLODIPine (NORVASC) 10 mg tablet, Take 1 tablet (10 mg total) by mouth daily, Disp: 90 tablet, Rfl: 3    bimatoprost (LUMIGAN) 0 01 % ophthalmic drops, Administer 1 drop to the right eye daily at bedtime, Disp: , Rfl:     erythromycin (ILOTYCIN) ophthalmic ointment, Administer 3 5 inches to the right eye daily at bedtime, Disp: , Rfl:     Fluocinolone Acetonide Scalp 0 01 % OIL, Apply a thin layer topically daily at night one hour before bedtime  , Disp: 118 28 mL, Rfl: 5    lisinopril (ZESTRIL) 20 mg tablet, TAKE 1 TABLET BY MOUTH  DAILY, Disp: 90 tablet, Rfl: 3    metoprolol succinate (TOPROL-XL) 50 mg 24 hr tablet, Take 1 tablet (50 mg total) by mouth daily, Disp: 90 tablet, Rfl: 3    mometasone (ELOCON) 0 1 % cream, APPLY TO EARS ONCE TO TWICE DAILY AS NEEDED FOR SCALING, Disp: , Rfl:     Multiple Vitamins-Minerals (PRESERVISION AREDS 2) CAPS, Take 1 capsule by mouth 2 (two) times a day, Disp: , Rfl:     mupirocin (BACTROBAN) 2 % ointment, Apply topically two to three times a day to sore areas, Disp: 22 g, Rfl: 3    Naproxen Sodium (ALEVE PO), Take 1 tablet by mouth 2 (two) times a day as needed , Disp: , Rfl:     imiquimod (ALDARA) 5 % cream, Apply topically once a day Monday thru Friday for 6 weeks (Patient not taking: No sig reported), Disp: 24 each, Rfl: 1    ofloxacin (OCUFLOX) 0 3 % ophthalmic solution, INSTILL 1 DROP IN LEFT EYE FOUR TIMES DAILY (Patient not taking: Reported on 8/8/2022), Disp: , Rfl:     tamsulosin (FLOMAX) 0 4 mg, Take 1 capsule (0 4 mg total) by mouth daily with dinner (Patient not taking: No sig reported), Disp: 30 capsule, Rfl: 0    CONSTITUTIONAL:   Vitals:    08/08/22 0925   Temp: 98 2 °F (36 8 °C)   TempSrc: Temporal   Weight: 81 6 kg (179 lb 14 4 oz)   Height: 5' 8" (1 727 m)         Specific Alerts:    Have you been seen by a [facility] D[facility]atologist in the last 3 years? YES    Are you pregnant or planning to become pregnant? N/A    Are you currently or planning to be nursing or breast feeding? N/A    Allergies   Allergen Reactions    Erythromycin Other (See Comments)     Thrush        May we call your Preferred Phone number to discuss your specific medical information? YES    May we leave a detailed message that includes your specific medical information? YES    Have you traveled outside of the [facility] in the past 3 months? No    Do you currently have a pacemaker or defibrillator? No    Do you have any artificial heart valves, joints, plates, screws, rods, stents, pins, etc? No   - If Yes, were any placed within the last 2 years? Do you require any medications prior to a surgical procedure? No    Are you taking any medications that cause you to bleed more easily ("blood thinners") No    Have you ever experienced a rapid heartbeat with epinephrine? No    Have you ever been treated with "gold" (gold sodium thiomalate) therapy? No    [facility] can help with wrinkles, "laugh lines," facial volume loss, "double chin," "love handles," age spots, and more  Are you interested in learning today about some of the skin enhancement procedures that we offer? (If Yes, please provide more detail) No    Review of Systems:  Have you recently had or currently have any of the following?     Fever or chills: No  Night Sweats: No  Headaches: No  Weight Gain: No  Weight Loss: No  Blurry Vision: YES  Nausea: No  Vomiting: No  Diarrhea: No  Blood in Stool: No  Abdominal Pain: No  Itchy Skin: No  Painful Joints: No  Swollen Joints: No  Muscle Pain: No  Irregular Mole: No  Sun Burn: No  Dry Skin: YES  Skin Color Changes: YES  Scar or Keloid: YES  Cold Sores/Fever Blisters: No  Bacterial Infections/MRSA: No  Anxiety: No  Depression: No  Suicidal or Homicidal Thoughts: No      PSYCH: Normal mood and affect  EYES: Normal conjunctiva  ENT: Normal lips and oral mucosa  CARDIOVASCULAR: No edema  RESPIRATORY: Normal respirations  HEME/LYMPH/IMMUNO:  No regional lymphadenopathy except as noted below in ASSESSMENT AND PLAN BY DIAGNOSIS    FULL ORGAN SYSTEM SKIN EXAM (SKIN)   Hair, Scalp, Ears, Face Normal except as noted below in Assessment   Neck, Cervical Chain Nodes Normal except as noted below in Assessment   Right Arm/Hand/Fingers Normal except as noted below in Assessment   Left Arm/Hand/Fingers Normal except as noted below in Assessment   Chest/Breasts/Axillae Viewed areas Normal except as noted below in Assessment   Abdomen, Umbilicus Normal except as noted below in Assessment   Back/Spine Normal except as noted below in Assessment   Groin/Genitalia/Buttocks Viewed areas Normal except as noted below in Assessment   Right Leg, Foot, Toes Normal except as noted below in Assessment   Left Leg, Foot, Toes Normal except as noted below in Assessment       1  NEOPLASM OF UNCERTAIN BEHAVIOR OF SKIN    Physical Exam:  (Anatomic Location); (Size and Morphological Description); (Differential Diagnosis):  Specimen A: Right cheek; 1 2 cm; lucent nodule probable; Differential Diagnosis; Basal cell carcinoma   Specimen B: Right neck; 1 0 cm; atypical pigmented macule; differential diagnosis; atypical pigmented lesion question melanocytic dyplasia   Specimen C: Left lip; 1 0 cm; indurated nodule; differential Basal Cell Carcinoma vs Squamous Cell Carcinoma    Pertinent Positives:  Pertinent Negatives:     Additional History of Present Condition:  N/A    Assessment and Plan:  I have discussed with the patient that a sample of skin via a "skin biopsy would be potentially helpful to further make a specific diagnosis under the microscope  Based on a thorough discussion of this condition and the management approach to it (including a comprehensive discussion of the known risks, side effects and potential benefits of treatment), the patient (family) agrees to implement the following specific plan:    Procedure:  Skin Biopsy  After a thorough discussion of treatment options and risk/benefits/alternatives (including but not limited to local pain, scarring, dyspigmentation, blistering, possible superinfection, and inability to confirm a diagnosis via histopathology), verbal and written consent were obtained and portion of the rash was biopsied for tissue sample  See below for consent that was obtained from patient and subsequent Procedure Note  PROCEDURE TANGENTIAL (SHAVE) BIOPSY NOTE:    Performing Physician: Dr Caraballo  Anatomic Location; Clinical Description with size (cm); Pre-Op Diagnosis:   Specimen A: Right cheek; 1 2 cm; lucent nodule probable; Differential Diagnosis; Basal cell carcinoma        Post-op diagnosis: Same     Local anesthesia: 1% xylocaine with epi      Topical anesthesia: None    Hemostasis: Aluminum chloride       After obtaining informed consent  at which time there was a discussion about the purpose of biopsy  and low risks of infection and bleeding  The area was prepped and draped in the usual fashion  Anesthesia was obtained with 1% lidocaine with epinephrine  A shave biopsy to an appropriate sampling depth was obtained by Shave (Dermablade or 15 blade) The resulting wound was covered with surgical ointment and bandaged appropriately  The patient tolerated the procedure well without complications and was without signs of functional compromise  Specimen has been sent for review by Dermatopathology  Standard post-procedure care has been explained and has been included in written form within the patient's copy of Informed Consent      PROCEDURE TANGENTIAL (SHAVE) BIOPSY NOTE:    Performing Physician: Dr Caraballo  Anatomic Location; Clinical Description with size (cm); Pre-Op Diagnosis:   Specimen B: Right neck; 1 0 cm; atypical pigmented macule; differential diagnosis; atypical pigmented lesion question melanocytic dyplasia         Post-op diagnosis: Same     Local anesthesia: 1% xylocaine with epi      Topical anesthesia: None    Hemostasis: Aluminum chloride       After obtaining informed consent  at which time there was a discussion about the purpose of biopsy  and low risks of infection and bleeding  The area was prepped and draped in the usual fashion  Anesthesia was obtained with 1% lidocaine with epinephrine  A shave biopsy to an appropriate sampling depth was obtained by Shave (Dermablade or 15 blade) The resulting wound was covered with surgical ointment and bandaged appropriately  The patient tolerated the procedure well without complications and was without signs of functional compromise  Specimen has been sent for review by Dermatopathology  Standard post-procedure care has been explained and has been included in written form within the patient's copy of Informed Consent  PROCEDURE TANGENTIAL (SHAVE) BIOPSY NOTE:    Performing Physician: Dr Caraballo  Anatomic Location; Clinical Description with size (cm); Pre-Op Diagnosis:   Specimen C: Left lip; 1 0 cm; indurated nodule; differential Basal Cell Carcinoma vs Squamous Cell Carcinoma          Post-op diagnosis: Same     Local anesthesia: 1% xylocaine with epi      Topical anesthesia: None    Hemostasis: Aluminum chloride       After obtaining informed consent  at which time there was a discussion about the purpose of biopsy  and low risks of infection and bleeding  The area was prepped and draped in the usual fashion  Anesthesia was obtained with 1% lidocaine with epinephrine   A shave biopsy to an appropriate sampling depth was obtained by Shave (Dermablade or 15 blade) The resulting wound was covered with surgical ointment and bandaged appropriately  The patient tolerated the procedure well without complications and was without signs of functional compromise  Specimen has been sent for review by Dermatopathology  Standard post-procedure care has been explained and has been included in written form within the patient's copy of Informed Consent  INFORMED CONSENT DISCUSSION AND POST-OPERATIVE INSTRUCTIONS FOR PATIENT    I   RATIONALE FOR PROCEDURE  I understand that a skin biopsy allows the Dermatologist to test a lesion or rash under the microscope to obtain a diagnosis  It usually involves numbing the area with numbing medication and removing a small piece of skin; sometimes the area will be closed with sutures  In this specific procedure, sutures are not usually needed  If any sutures are placed, then they are usually need to be removed in 2 weeks or less  I understand that my Dermatologist recommends that a skin "shave" biopsy be performed today  A local anesthetic, similar to the kind that a dentist uses when filling a cavity, will be injected with a very small needle into the skin area to be sampled  The injected skin and tissue underneath "will go to sleep and become numb so no pain should be felt afterwards  An instrument shaped like a tiny "razor blade" (shave biopsy instrument) will be used to cut a small piece of tissue and skin from the area so that a sample of tissue can be taken and examined more closely under the microscope  A slight amount of bleeding will occur, but it will be stopped with direct pressure and a pressure bandage and any other appropriate methods  I understands that a scar will form where the wound was created  Surgical ointment will be applied to help protect the wound  Sutures are not usually needed      II   RISKS AND POTENTIAL COMPLICATIONS   I understand the risks and potential complications of a skin biopsy include but are not limited to the following:  Bleeding  Infection  Pain  Scar/keloid  Skin discoloration  Incomplete Removal  Recurrence  Nerve Damage/Numbness/Loss of Function  Allergic Reaction to Anesthesia  Biopsies are diagnostic procedures and based on findings additional treatment or evaluation may be required  Loss or destruction of specimen resulting in no additional findings    My Dermatologist has explained to me the nature of the condition, the nature of the procedure, and the benefits to be reasonably expected compared with alternative approaches  My Dermatologist has discussed the likelihood of major risks or complications of this procedure including the specific risks listed above, such as bleeding, infection, and scarring/keloid  I understand that a scar is expected after this procedure  I understand that my physician cannot predict if the scar will form a "keloid," which extends beyond the borders of the wound that is created  A keloid is a thick, painful, and bumpy scar  A keloid can be difficult to treat, as it does not always respond well to therapy, which includes injecting cortisone directly into the keloid every few weeks  While this usually reduces the pain and size of the scar, it does not eliminate it  I understand that photographs may be taken before and after the procedure  These will be maintained as part of the medical providers confidential records and may not be made available to me  I further authorize the medical provider to use the photographs for teaching purposes or to illustrate scientific papers, books, or lectures if in his/her judgment, medical research, education, or science may benefit from its use  I have had an opportunity to fully inquire about the risks and benefits of this procedure and its alternatives  I have been given ample time and opportunity to ask questions and to seek a second opinion if I wished to do so    I acknowledge that there have specifically been no guarantees as to the cosmetic results from the procedure  I am aware that with any procedure there is always the possibility of an unexpected complication  III  POST-PROCEDURAL CARE (WHAT YOU WILL NEED TO DO "AFTER THE BIOPSY" TO OPTIMIZE HEALING)    Keep the area clean and dry  Try NOT to remove the bandage or get it wet for the first 24 hours  Gently clean the area and apply surgical ointment (such as Vaseline petrolatum ointment, which is available "over the counter" and not a prescription) to the biopsy site for up to 2 weeks straight  This acts to protect the wound from the outside world  Sutures are not usually placed in this procedure  If any sutures were placed, return for suture removal as instructed (generally 1 week for the face, 2 weeks for the body)  Take Acetaminophen (Tylenol) for discomfort, if no contraindications  Ibuprofen or aspirin could make bleeding worse  Call our office immediately for signs of infection: fever, chills, increased redness, warmth, tenderness, discomfort/pain, or pus or foul smell coming from the wound  WHAT TO DO IF THERE IS ANY BLEEDING? If a small amount of bleeding is noticed, place a clean cloth over the area and apply firm pressure for ten minutes  Check the wound after 10 minutes of direct pressure  If bleeding persists, try one more time for an additional 10 minutes of direct pressure on the area  If the bleeding becomes heavier or does not stop after the second attempt, or if you have any other questions about this procedure, then please call your [facility]'s Dermatologist by calling [phone] (SKIN)  I hereby acknowledge that I have reviewed and verified the site with my Dermatologist and have requested and authorized my Dermatologist to proceed with the procedure            Scribe Attestation    I,:  Amando Milligan am acting as a scribe while in the presence of the attending physician :       I,:  Ange Milan MD personally performed the services described in this documentation    as scribed in my presence :

## 2022-08-08 NOTE — PATIENT INSTRUCTIONS
1  NEOPLASM OF UNCERTAIN BEHAVIOR OF SKIN      Assessment and Plan:  I have discussed with the patient that a sample of skin via a "skin biopsy would be potentially helpful to further make a specific diagnosis under the microscope  Based on a thorough discussion of this condition and the management approach to it (including a comprehensive discussion of the known risks, side effects and potential benefits of treatment), the patient (family) agrees to implement the following specific plan:    Procedure:  Skin Biopsy  After a thorough discussion of treatment options and risk/benefits/alternatives (including but not limited to local pain, scarring, dyspigmentation, blistering, possible superinfection, and inability to confirm a diagnosis via histopathology), verbal and written consent were obtained and portion of the rash was biopsied for tissue sample  See below for consent that was obtained from patient and subsequent Procedure Note  PROCEDURE TANGENTIAL (SHAVE) BIOPSY NOTE:      INFORMED CONSENT DISCUSSION AND POST-OPERATIVE INSTRUCTIONS FOR PATIENT    I   RATIONALE FOR PROCEDURE  I understand that a skin biopsy allows the Dermatologist to test a lesion or rash under the microscope to obtain a diagnosis  It usually involves numbing the area with numbing medication and removing a small piece of skin; sometimes the area will be closed with sutures  In this specific procedure, sutures are not usually needed  If any sutures are placed, then they are usually need to be removed in 2 weeks or less  I understand that my Dermatologist recommends that a skin "shave" biopsy be performed today  A local anesthetic, similar to the kind that a dentist uses when filling a cavity, will be injected with a very small needle into the skin area to be sampled  The injected skin and tissue underneath "will go to sleep and become numb so no pain should be felt afterwards    An instrument shaped like a tiny "razor blade" (shave biopsy instrument) will be used to cut a small piece of tissue and skin from the area so that a sample of tissue can be taken and examined more closely under the microscope  A slight amount of bleeding will occur, but it will be stopped with direct pressure and a pressure bandage and any other appropriate methods  I understands that a scar will form where the wound was created  Surgical ointment will be applied to help protect the wound  Sutures are not usually needed  II   RISKS AND POTENTIAL COMPLICATIONS   I understand the risks and potential complications of a skin biopsy include but are not limited to the following:  Bleeding  Infection  Pain  Scar/keloid  Skin discoloration  Incomplete Removal  Recurrence  Nerve Damage/Numbness/Loss of Function  Allergic Reaction to Anesthesia  Biopsies are diagnostic procedures and based on findings additional treatment or evaluation may be required  Loss or destruction of specimen resulting in no additional findings    My Dermatologist has explained to me the nature of the condition, the nature of the procedure, and the benefits to be reasonably expected compared with alternative approaches  My Dermatologist has discussed the likelihood of major risks or complications of this procedure including the specific risks listed above, such as bleeding, infection, and scarring/keloid  I understand that a scar is expected after this procedure  I understand that my physician cannot predict if the scar will form a "keloid," which extends beyond the borders of the wound that is created  A keloid is a thick, painful, and bumpy scar  A keloid can be difficult to treat, as it does not always respond well to therapy, which includes injecting cortisone directly into the keloid every few weeks  While this usually reduces the pain and size of the scar, it does not eliminate it  I understand that photographs may be taken before and after the procedure    These will be maintained as part of the medical providers confidential records and may not be made available to me  I further authorize the medical provider to use the photographs for teaching purposes or to illustrate scientific papers, books, or lectures if in his/her judgment, medical research, education, or science may benefit from its use  I have had an opportunity to fully inquire about the risks and benefits of this procedure and its alternatives  I have been given ample time and opportunity to ask questions and to seek a second opinion if I wished to do so  I acknowledge that there have specifically been no guarantees as to the cosmetic results from the procedure  I am aware that with any procedure there is always the possibility of an unexpected complication  III  POST-PROCEDURAL CARE (WHAT YOU WILL NEED TO DO "AFTER THE BIOPSY" TO OPTIMIZE HEALING)    Keep the area clean and dry  Try NOT to remove the bandage or get it wet for the first 24 hours  Gently clean the area and apply surgical ointment (such as Vaseline petrolatum ointment, which is available "over the counter" and not a prescription) to the biopsy site for up to 2 weeks straight  This acts to protect the wound from the outside world  Sutures are not usually placed in this procedure  If any sutures were placed, return for suture removal as instructed (generally 1 week for the face, 2 weeks for the body)  Take Acetaminophen (Tylenol) for discomfort, if no contraindications  Ibuprofen or aspirin could make bleeding worse  Call our office immediately for signs of infection: fever, chills, increased redness, warmth, tenderness, discomfort/pain, or pus or foul smell coming from the wound  WHAT TO DO IF THERE IS ANY BLEEDING? If a small amount of bleeding is noticed, place a clean cloth over the area and apply firm pressure for ten minutes  Check the wound after 10 minutes of direct pressure    If bleeding persists, try one more time for an additional 10 minutes of direct pressure on the area  If the bleeding becomes heavier or does not stop after the second attempt, or if you have any other questions about this procedure, then please call your [facility]atologist by calling [phone] (SKIN)  I hereby acknowledge that I have reviewed and verified the site with my Dermatologist and have requested and authorized my Dermatologist to proceed with the procedure

## 2022-08-17 ENCOUNTER — OFFICE VISIT (OUTPATIENT)
Dept: PODIATRY | Facility: CLINIC | Age: 82
End: 2022-08-17
Payer: MEDICARE

## 2022-08-17 VITALS
HEIGHT: 68 IN | DIASTOLIC BLOOD PRESSURE: 68 MMHG | WEIGHT: 180.6 LBS | SYSTOLIC BLOOD PRESSURE: 122 MMHG | BODY MASS INDEX: 27.37 KG/M2

## 2022-08-17 DIAGNOSIS — I73.9 PERIPHERAL VASCULAR DISEASE, UNSPECIFIED (HCC): Primary | ICD-10-CM

## 2022-08-17 PROCEDURE — G0127 TRIM NAIL(S): HCPCS | Performed by: PODIATRIST

## 2022-08-17 NOTE — PROGRESS NOTES
Established patient with class findings presents for nail care  Vascular exam:  DP  0/4 bilateral; PT  0 4 bilateral   Dermatological exam:  Each toenail is thick and  dystrophic  Diagnosis:  PVD  Treatment: Trimmed multiple dystrophic toenails      Nail removal    Date/Time: 8/17/2022 2:43 PM  Performed by: Ludivina Browning DPM  Authorized by: Ludivina Browning DPM     Nails trimmed:     Number of nails trimmed:  10

## 2022-08-19 DIAGNOSIS — C44.01 BASAL CELL CARCINOMA (BCC) OF SKIN OF LIP: Primary | ICD-10-CM

## 2022-08-19 DIAGNOSIS — C44.319 BASAL CELL CARCINOMA (BCC) OF SKIN OF OTHER PART OF FACE: Primary | ICD-10-CM

## 2022-08-19 NOTE — RESULT ENCOUNTER NOTE
DERMATOPATHOLOGY RESULT NOTE    Results reviewed by ordering physician  Called patient to personally discuss results  Discussed results with patient  Instructions for Clinical Derm Team:   (remember to route Result Note to appropriate staff):    Call patient and schedule for [facility] two separate slots  The lip should be end of day 2:00  so I have extra time  Result & Plan by Specimen:    Specimen A: malignant  Plan: MOHs      Specimen B: malignant  Plan: excision  by Dr AGRAWAL      Specimen C: malignant  Plan: MOHs Must be 200 slot to allow repair  Case Report  Surgical Pathology Report                         Case: [id number]                                   Authorizing Provider: Vanessa Lee MD          Collected:           08/08/2022 1022              Ordering Location:     [facility]      Received:            08/08/2022 1023                                   [facility]                                                                Pathologist:           Eden Urias MD                                                           Specimens:   A) - Skin, Other, A: Right Cheek                                                                    B) - Skin, Other, B: Right Neck                                                                     C) - Skin, Other, C: Left Lip                                                            Final Diagnosis  A  Skin, right cheek, shave biopsy:     BASAL CELL CARCINOMA (NODULAR TYPE); extending to the tissue edges            B  Skin, right neck, shave biopsy:     MELANOMA, 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 (thickness: 0 4 mm); extending to the tissue edges (see note)      Note: SOX10 and MART-1 immunostains were performed and reviewed   A few neoplastic melanocytes appear to be deeper in the dermis than the reported thickness above, but these findings are interpreted as being secondary to tangential sectioning of follicular involvement by melanoma  Please see synoptic report for additional details      Synoptic report for melanoma of the skin  Thickness: 0 4 mm  Ulceration: not seen  Anatomic Verlean Raw) level: II  Type: lentigo maligna melanoma  Mitoses: 0/mm2  Microsatellites: not seen  Lymphovascular invasion: not seen  Neurotropism: not seen  Tumor regression: not seen  Tumor-infiltrating lymphocytes (TIL): not applicable  Margin assessment: melanoma in situ extends to peripheral specimen margin and deep margin via follicular extension  Pathologic stage: pT1a  Associated nevus: not seen              C  Skin, left lip, shave biopsy:     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

## 2022-08-23 DIAGNOSIS — I10 ESSENTIAL HYPERTENSION: ICD-10-CM

## 2022-08-23 RX ORDER — AMLODIPINE BESYLATE 10 MG/1
TABLET ORAL
Qty: 90 TABLET | Refills: 3 | Status: SHIPPED | OUTPATIENT
Start: 2022-08-23

## 2022-08-24 ENCOUNTER — TELEPHONE (OUTPATIENT)
Dept: DERMATOLOGY | Facility: CLINIC | Age: 82
End: 2022-08-24

## 2022-08-24 NOTE — TELEPHONE ENCOUNTER
Pt called and is upset that he didn't get a returned phone call within 24hrs from our office after he left a v/m on the [facility] line  I did let the pt know he was scheduled for October and he argued with me telling me that how can they just schedule me and never call me  He also stated that October is to far and Dr Stephenie Rangel told him he would be scheduled with a different doctor  Pt would like someone from [facility] to call him back as soon as possible   Thank you

## 2022-08-24 NOTE — TELEPHONE ENCOUNTER
Patient was scheduled for tentative date for 10/06/22 for lip  Patient needs to return our call to confirmed date and to schedule remaining sites

## 2022-08-24 NOTE — TELEPHONE ENCOUNTER
pts wife called upset that no one has called pt back  I explained to her that a message was just sent at 1230pm and they are with pts  They would like a call back because they have lots of questions

## 2022-08-25 NOTE — TELEPHONE ENCOUNTER
Telephone call to patient to schedule excision and another Mohs appointment  Patient is schedule for excision of MM on right neck 9/14/2022 at 1100 with Dr Favian Jacob  Mohs [facility] left lip 10/6/2022 at 200 with Dr Merissa Webb and Mohs for [facility] right cheek  10/13/2022 at 1000 with Dr Merissa Webb  Patient also has a Skin exam scheduled for 10/4/2022 at 1120 with Dr Merissa Webb in [facility]

## 2022-09-14 ENCOUNTER — PROCEDURE VISIT (OUTPATIENT)
Dept: DERMATOLOGY | Facility: CLINIC | Age: 82
End: 2022-09-14
Payer: MEDICARE

## 2022-09-14 DIAGNOSIS — D03.4 MELANOMA IN SITU OF NECK (HCC): Primary | ICD-10-CM

## 2022-09-14 PROCEDURE — 88341 IMHCHEM/IMCYTCHM EA ADD ANTB: CPT | Performed by: PATHOLOGY

## 2022-09-14 PROCEDURE — 11624 EXC S/N/H/F/G MAL+MRG 3.1-4: CPT | Performed by: DERMATOLOGY

## 2022-09-14 PROCEDURE — 88342 IMHCHEM/IMCYTCHM 1ST ANTB: CPT | Performed by: PATHOLOGY

## 2022-09-14 PROCEDURE — 12034 INTMD RPR S/TR/EXT 7.6-12.5: CPT | Performed by: DERMATOLOGY

## 2022-09-14 PROCEDURE — 88305 TISSUE EXAM BY PATHOLOGIST: CPT | Performed by: PATHOLOGY

## 2022-09-14 NOTE — PROGRESS NOTES
PROCEDURE:  EXCISION WITH INTERMEDIATE LAYERED CLOSURE     Attending: Ermelinda Villarreal - Dr Jakub Verdin  Assistant: Bruce Turner    Pre-Op Diagnosis: Melanoma in situ   Post-Op Diagnosis: Same   Benign versus Malignant Malignant      Lesion Anatomic Location: Right neck (Previous Accession Number: [id number])  Pre-op size: 1 5 cm x 0 5 cm  Size of defect:  3 5 cm x 2 5 (with 1 0 x 1 0 centimeter margins)  Final repaired wound length:  8 cm    Written and verbal, witnessed informed consent was obtained  I discussed that excision is a method of removing lesions both benign and malignant lesions  A portion of normal skin is often taken to ensure completeness of removal   I reviewed that procedure will include numbing the area,  cutting around and under defect, undermining tissue, and closing the wound with sutures both inside and out  These sutures are usually removed in 7 to 14 days  Risks (bleeding, pain, infection, scarring, recurrence) and benefits discussed  It was discussed with patient that every effort is made to minimize scar, but scarring is influenced also by extrinsic factor such as location, age and genetics  Time Out: performed:  yes  Correct patient: yes  Correct site per Clinic Report: yes  Correct site per Patient Report: yes    LOCAL ANESTHESIA: 1% xylocaine with epi     DESCRIPTION OF PROCEDURE: The patient was brought back into the procedure room, anesthetized locally, prepped and draped in the usual fashion  Using a #15 blade with a scalpel, the lesion was excised in elliptical fashion  The wound was  undermined in the  fascial plane  Hemostasis was achieved with light electrocoagulation  Purpose of undermining was to decrease wound tension and facilitate closure      The wound was closed with subcutaneous sutures as follows:    Deep suture:5-0 Vicryl      Epidermal edge closure was accomplished with superficial sutures as follows:    Superficial suture: 5-0 Ethilon  Superficial suture type: Intermediate     Estimated blood loss less than 3ml  The patient tolerated the procedure well without any complications  The wound was cleaned with sterile saline, dried off, surgical vaseline ointment was applied, and the wound was covered  A pressure dressing was applied for stabilization and light pressure  The patient was given detailed oral and written instructions on postoperative care as detailed in consent  The patient left in good medical condition  POSTOP DISCUSSION DISCUSSION AND INSTRUCTIONS FOR PATIENT      Rationale for Procedure  A skin excision allows the dermatologist to remove a lesion  The procedure involves a local numbing medication and removing the entire lesion  Typically, the lesion is being removed because it is atypical, traumatized, or for significant appearance reasons  The area will be open like a brush burn and allowed to heal    There will be no sutures  Tissue is sent to pathologist who will reconfirm diagnosis and verify completeness of lesion removal     Description of Procedure  We would like to perform a skin excision today  A local anesthetic, similar to the kind that a dentist uses when filling a cavity, will be injected with a very small needle into the skin area to be sampled  The injected skin and tissue underneath should go to sleep and become numbed so that no further pain should be felt  A scalpel will be used to cut around the lesion and tissue will be submitted to pathology for examination  Depending on the diagnosis the lesion will be excised with a certain amount of normal skin to help assure completeness of lesion removal   The physician will discuss in advance the anticipated size and extent of removal    Bleeding will occur, but it will stopped with direct pressure, sutures, and electrocautery  Surgical Vaseline-type ointment will also applied after the procedure to help create a barrier between the wound and the outside world        Risks and Potential Complications  The advantage of a skin excision is that it allows us to remove a problem lesion quickly  Although this usually permits the lesion to heal as soon as possible with the least scarring, there are some risks and potential complications that include but are not limited to the following:  - Some bleeding is normal at time of procedure and some bleeding on gauze is normal  the first few days after surgery  Profuse bleeding and bleeding with swelling and pain should be reported as detailed  below  - Infection is uncommon in skin surgery  Infection should be reported and is indicated by pain, redness, and discharge of purulent material   - Some dull to at time sharp pain could occur initially the day after surgery  Persistent pain or increasing pain days after surgery is not expected and should be reported  - Every effort is made to minimize scar, but location, size, and genetics do play a role in scar appearance  A surgical wound does not achieve its optimal appearance until 6 months  There are several treatments available if scarring would be problematic including scar creams, silicone pad, laser and scar revision   - Skin discoloration can occur especially in people of color  Its important to avoid sun on wound in first 6 months after surgery  Treatment is available if pigment is problematic   - Incomplete removal of the lesion or recurrence of lesion can occur and this would then require further treatment and more surgery   - Nerve Damage/Numbness/Loss of Function is very rare, but is most likely to occur if lesion is large or if it is in a high risk location  - Allergic Reaction to lidocaine is rare  More commonly,  epinephrine is used  with the lidocaine  Occasionally, epinephrine (aka adrenalin) may cause a brief  feeling of anxiety or jitteriness  - The person at the microscope  (pathologist) may provide additional information that was unexpected   This unexpected finding could provoke the need for additional treatment or evaluation  What You Will Need to Do After the Procedure  1  Keep the area clean and dry the first TWO days  Try NOT to remove the bandage until day two   2  Gently clean the area with soap and warm water and apply PLAIN Vaseline ointment (this is over the counter and not a prescription) DO NOT USE Neosporin  Apply Vaseline to the excision  site for up to 2 weeks  3  Apply a clean appropriately sized bandage to area  Gauze and paper tape are recommended for sensitive skin  4  Return for suture removal as instructed (generally 2 weeks for the body)  5  Take Acetaminophen (Tylenol) for discomfort, if no contraindications  Do NOT take Ibuprofen or aspirin unless specifically told to do so by your Dermatologist because these medications can make bleeding worse  6  Call our office immediately for signs of infection: fever, chills, increased redness, warmth, tenderness, discomfort/pain, or pus or foul smell coming from the wound  If bleeding is noticed, place a clean cloth over the area and apply firm pressure for thirty minutes  Check the wound ONLY after 30 minutes of direct pressure; do not cheat and sneak a peak, as that does not count  If bleeding persists after 30 minutes of legitimate direct pressure, then try one more round of direct pressure for an additional 10 minutes to the area  Should the bleeding become heavier or not stop after the second attempt, call [facility] directly at [phone] (SKIN) or, if after hours, go to your local Emergency Room/Emergency Department  MM excised with 1cm margins for 3 5cm excision and 8cm closure  Well tolerated  S/R in 2 weeks      Scribe Attestation    I,:  Bruce Turner am acting as a scribe while in the presence of the attending physician :       I,:  Tabitha Ledesma MD personally performed the services described in this documentation    as scribed in my presence :

## 2022-09-14 NOTE — PATIENT INSTRUCTIONS
POSTOP DISCUSSION DISCUSSION AND INSTRUCTIONS FOR PATIENT      Rationale for Procedure  A skin excision allows the dermatologist to remove a lesion  The procedure involves a local numbing medication and removing the entire lesion  Typically, the lesion is being removed because it is atypical, traumatized, or for significant appearance reasons  The area will be open like a brush burn and allowed to heal    There will be no sutures  Tissue is sent to pathologist who will reconfirm diagnosis and verify completeness of lesion removal     Description of Procedure  We would like to perform a skin excision today  A local anesthetic, similar to the kind that a dentist uses when filling a cavity, will be injected with a very small needle into the skin area to be sampled  The injected skin and tissue underneath should go to sleep and become numbed so that no further pain should be felt  A scalpel will be used to cut around the lesion and tissue will be submitted to pathology for examination  Depending on the diagnosis the lesion will be excised with a certain amount of normal skin to help assure completeness of lesion removal   The physician will discuss in advance the anticipated size and extent of removal    Bleeding will occur, but it will stopped with direct pressure, sutures, and electrocautery  Surgical Vaseline-type ointment will also applied after the procedure to help create a barrier between the wound and the outside world  Risks and Potential Complications  The advantage of a skin excision is that it allows us to remove a problem lesion quickly  Although this usually permits the lesion to heal as soon as possible with the least scarring, there are some risks and potential complications that include but are not limited to the following:  Some bleeding is normal at time of procedure and some bleeding on gauze is normal  the first few days after surgery    Profuse bleeding and bleeding with swelling and pain should be reported as detailed  below  Infection is uncommon in skin surgery  Infection should be reported and is indicated by pain, redness, and discharge of purulent material   Some dull to at time sharp pain could occur initially the day after surgery  Persistent pain or increasing pain days after surgery is not expected and should be reported  Every effort is made to minimize scar, but location, size, and genetics do play a role in scar appearance  A surgical wound does not achieve its optimal appearance until 6 months  There are several treatments available if scarring would be problematic including scar creams, silicone pad, laser and scar revision  Skin discoloration can occur especially in people of color  Its important to avoid sun on wound in first 6 months after surgery  Treatment is available if pigment is problematic  Incomplete removal of the lesion or recurrence of lesion can occur and this would then require further treatment and more surgery  Nerve Damage/Numbness/Loss of Function is very rare, but is most likely to occur if lesion is large or if it is in a high risk location  Allergic Reaction to lidocaine is rare  More commonly,  epinephrine is used  with the lidocaine  Occasionally, epinephrine (aka adrenalin) may cause a brief  feeling of anxiety or jitteriness  The person at the microscope  (pathologist) may provide additional information that was unexpected  This unexpected finding could provoke the need for additional treatment or evaluation  What You Will Need to Do After the Procedure  Keep the area clean and dry the first TWO days  Try NOT to remove the bandage until day two  Gently clean the area with soap and warm water and apply PLAIN Vaseline ointment (this is over the counter and not a prescription) DO NOT USE Neosporin  Apply Vaseline to the excision  site for up to 2 weeks  Apply a clean appropriately sized bandage to area    Gauze and paper tape are recommended for sensitive skin  Return for suture removal as instructed (generally 2 weeks for the body)  Take Acetaminophen (Tylenol) for discomfort, if no contraindications  Do NOT take Ibuprofen or aspirin unless specifically told to do so by your Dermatologist because these medications can make bleeding worse  Call our office immediately for signs of infection: fever, chills, increased redness, warmth, tenderness, discomfort/pain, or pus or foul smell coming from the wound  If bleeding is noticed, place a clean cloth over the area and apply firm pressure for thirty minutes  Check the wound ONLY after 30 minutes of direct pressure; do not cheat and sneak a peak, as that does not count  If bleeding persists after 30 minutes of legitimate direct pressure, then try one more round of direct pressure for an additional 10 minutes to the area  Should the bleeding become heavier or not stop after the second attempt, call [facility] directly at [phone] (SKIN) or, if after hours, go to your local Emergency Room/Emergency Department

## 2022-09-28 ENCOUNTER — OFFICE VISIT (OUTPATIENT)
Dept: DERMATOLOGY | Facility: CLINIC | Age: 82
End: 2022-09-28

## 2022-09-28 DIAGNOSIS — Z48.02 ENCOUNTER FOR REMOVAL OF SUTURES: Primary | ICD-10-CM

## 2022-09-28 PROCEDURE — RECHECK: Performed by: DERMATOLOGY

## 2022-09-28 NOTE — PROGRESS NOTES
Suture removal    Date/Time: 9/28/2022 10:35 AM  Performed by: Sheila Diaz  Authorized by: Carola Singh MD   Universal Protocol:  Consent: Verbal consent obtained  Written consent obtained  Risks and benefits: risks, benefits and alternatives were discussed  Consent given by: patient  Time out: Immediately prior to procedure a "time out" was called to verify the correct patient, procedure, equipment, support staff and site/side marked as required  Timeout called at: 9/28/2022 10:36 AM   Patient understanding: patient states understanding of the procedure being performed  Patient consent: the patient's understanding of the procedure matches consent given  Procedure consent: procedure consent matches procedure scheduled  Relevant documents: relevant documents present and verified  Test results: test results available and properly labeled  Site marked: the operative site was marked  Radiology Images displayed and confirmed  If images not available, report reviewed: imaging studies not available  Patient identity confirmed: verbally with patient        Patient location:  Clinic  Location:     Laterality:  Right    Location:  [address] [place] [address] location:  Neck  Procedure details: Tools used:  Suture removal kit    Wound appearance:  No sign(s) of infection, good wound healing, clean, moist and pink    Number of sutures removed:  15  Post-procedure details:     Post-procedure assessment: Vaseline  Patient tolerance of procedure: Tolerated well, no immediate complications    Complication (if applicable):  I advised the patient to continue to apply Vaseline  The bottom area of the surgical site was pink, and sensitive  Patient stated he "might" have hit the area with a razor when shaving

## 2022-10-04 ENCOUNTER — OFFICE VISIT (OUTPATIENT)
Dept: DERMATOLOGY | Facility: CLINIC | Age: 82
End: 2022-10-04
Payer: MEDICARE

## 2022-10-04 VITALS — HEIGHT: 68 IN | BODY MASS INDEX: 27.01 KG/M2 | WEIGHT: 178.2 LBS

## 2022-10-04 DIAGNOSIS — Z85.828 HISTORY OF BASAL CELL CARCINOMA: ICD-10-CM

## 2022-10-04 DIAGNOSIS — D48.5 NEOPLASM OF UNCERTAIN BEHAVIOR OF SKIN: ICD-10-CM

## 2022-10-04 DIAGNOSIS — Z85.89 HISTORY OF SQUAMOUS CELL CARCINOMA: ICD-10-CM

## 2022-10-04 DIAGNOSIS — Z85.820 HISTORY OF MELANOMA: Primary | ICD-10-CM

## 2022-10-04 PROCEDURE — 99214 OFFICE O/P EST MOD 30 MIN: CPT | Performed by: DERMATOLOGY

## 2022-10-04 PROCEDURE — 11102 TANGNTL BX SKIN SINGLE LES: CPT | Performed by: DERMATOLOGY

## 2022-10-04 PROCEDURE — 88305 TISSUE EXAM BY PATHOLOGIST: CPT | Performed by: PATHOLOGY

## 2022-10-04 NOTE — PROGRESS NOTES
[facility] Follow Up Note    Patient Name: Brigette Mayfield  Encounter Date: 10/04/2022    Today's Chief Concerns:  Concern #1:  Full Body Check       Current Medications:    Current Outpatient Medications:     acetaminophen (TYLENOL) 500 mg tablet, Take 500-1,000 mg by mouth every 4 (four) hours as needed for mild pain, Disp: , Rfl:     amLODIPine (NORVASC) 10 mg tablet, TAKE 1 TABLET BY MOUTH  DAILY, Disp: 90 tablet, Rfl: 3    bimatoprost (LUMIGAN) 0 01 % ophthalmic drops, Administer 1 drop to the right eye daily at bedtime, Disp: , Rfl:     erythromycin (ILOTYCIN) ophthalmic ointment, Administer 3 5 inches to the right eye daily at bedtime, Disp: , Rfl:     Fluocinolone Acetonide Scalp 0 01 % OIL, Apply a thin layer topically daily at night one hour before bedtime  , Disp: 118 28 mL, Rfl: 5    lisinopril (ZESTRIL) 20 mg tablet, TAKE 1 TABLET BY MOUTH  DAILY, Disp: 90 tablet, Rfl: 3    metoprolol succinate (TOPROL-XL) 50 mg 24 hr tablet, Take 1 tablet (50 mg total) by mouth daily, Disp: 90 tablet, Rfl: 3    mometasone (ELOCON) 0 1 % cream, APPLY TO EARS ONCE TO TWICE DAILY AS NEEDED FOR SCALING, Disp: , Rfl:     Multiple Vitamins-Minerals (PRESERVISION AREDS 2) CAPS, Take 1 capsule by mouth 2 (two) times a day, Disp: , Rfl:     mupirocin (BACTROBAN) 2 % ointment, Apply topically two to three times a day to sore areas, Disp: 22 g, Rfl: 3    Naproxen Sodium (ALEVE PO), Take 1 tablet by mouth 2 (two) times a day as needed , Disp: , Rfl:     imiquimod (ALDARA) 5 % cream, Apply topically once a day Monday thru Friday for 6 weeks (Patient not taking: No sig reported), Disp: 24 each, Rfl: 1    ofloxacin (OCUFLOX) 0 3 % ophthalmic solution, INSTILL 1 DROP IN LEFT EYE FOUR TIMES DAILY (Patient not taking: No sig reported), Disp: , Rfl:     tamsulosin (FLOMAX) 0 4 mg, Take 1 capsule (0 4 mg total) by mouth daily with dinner (Patient not taking: No sig reported), Disp: 30 capsule, Rfl: 0    CONSTITUTIONAL:   Vitals:    10/04/22 1126   Weight: 80 8 kg (178 lb 3 2 oz)   Height: 5' 8" (1 727 m)       Specific Alerts:    Have you been seen by a [facility] in the last 3 years? YES    Are you pregnant or planning to become pregnant? N/A    Are you currently or planning to be nursing or breast feeding? N/A    Allergies   Allergen Reactions    Erythromycin Other (See Comments)     Thrush        May we call your Preferred Phone number to discuss your specific medical information? YES    May we leave a detailed message that includes your specific medical information? YES    Have you traveled outside of the [facility] in the past 3 months? No    Do you currently have a pacemaker or defibrillator? No    Do you have any artificial heart valves, joints, plates, screws, rods, stents, pins, etc? No    Do you require any medications prior to a surgical procedure? No    Are you taking any medications that cause you to bleed more easily ("blood thinners") No    Have you ever experienced a rapid heartbeat with epinephrine? No    Have you ever been treated with "gold" (gold sodium thiomalate) therapy? No    [facility] can help with wrinkles, "laugh lines," facial volume loss, "double chin," "love handles," age spots, and more  Are you interested in learning today about some of the skin enhancement procedures that we offer? (If Yes, please provide more detail) No    Review of Systems:  Have you recently had or currently have any of the following?     Fever or chills: No  Night Sweats: No  Headaches: No  Weight Gain: No  Weight Loss: No  Blurry Vision: No  Nausea: No  Vomiting: No  Diarrhea: No  Blood in Stool: No  Abdominal Pain: No  Itchy Skin: No  Painful Joints: No  Swollen Joints: No  Muscle Pain: No  Irregular Mole: No  Sun Burn: No  Dry Skin: YES  Skin Color Changes: No  Scar or Keloid: No  Cold Sores/Fever Blisters: No  Bacterial Infections/MRSA: No  Anxiety: No  Depression: No  Suicidal or Homicidal Thoughts: No      PSYCH: Normal mood and affect  EYES: Normal conjunctiva  ENT: Normal lips and oral mucosa  CARDIOVASCULAR: No edema  RESPIRATORY: Normal respirations  HEME/LYMPH/IMMUNO:  No regional lymphadenopathy except as noted below in ASSESSMENT AND PLAN BY DIAGNOSIS    FULL ORGAN SYSTEM SKIN EXAM (SKIN)   Hair, Scalp, Ears, Face Normal except as noted below in Assessment   Neck, Cervical Chain Nodes Normal except as noted below in Assessment   Right Arm/Hand/Fingers Normal except as noted below in Assessment   Left Arm/Hand/Fingers Normal except as noted below in Assessment   Chest/Breasts/Axillae Viewed areas Normal except as noted below in Assessment   Abdomen, Umbilicus Normal except as noted below in Assessment   Back/Spine Normal except as noted below in Assessment   Groin/Genitalia/Buttocks Viewed areas Normal except as noted below in Assessment   Right Leg, Foot, Toes Normal except as noted below in Assessment   Left Leg, Foot, Toes Normal except as noted below in Assessment       1  HISTORY OF MELANOMA     Physical Exam:  Anatomic Location Affected:  Left cheek and left deltoid( 0 9 mm excised 06/19/2019 by Dr Cindy Harden)  Morphological Description of Scar:  Well healed  Year Treated: Left cheek-2007, left deltoid 2019  TNM Classification: unknown  Suspected Recurrence: no  Regional adenopathy: no     Additional History of Present Condition:  Treated by Dr Aylin Brown and Plan:  Based on a thorough discussion of this condition and the management approach to it (including a comprehensive discussion of the known risks, side effects and potential benefits of treatment), the patient (family) agrees to implement the following specific plan:  Continue routine skin exams  Recommend sun protection SPF 30 or higher     What happens at follow-up?   The main purpose of follow-up is to detect recurrences early (metastatic melanoma), but it also offers an opportunity to diagnose a new primary melanoma at the first possible opportunity  A second invasive melanoma occurs in 5-10% of melanoma patients and a new melanoma in situ is diagnosed in more than 20% of melanoma patients      Our practice makes the following recommendations for follow-up for patients with invasive melanoma  At-least "monthly" self-skin examinations   Routine skin checks by a board certified dermatologist  Follow-up intervals are "every 3 months" within 2 years of a new melanoma diagnosis; "every 6 months" between 2-4 years of a new melanoma diagnosis; and "annually" after 4 years of a new melanoma diagnosis  Individual patient's needs should be considered before an appropriate follow-up is offered  Provide education and support to help the patient adjust to their illness     Follow-up appointments should include:  A check of the scar where the primary melanoma was removed  Checking the regional lymph nodes  A general skin examination  A full physical examination at least annually by your primary care physician     In those with more advanced primary disease, follow-up may include:  Blood tests  Imaging: ultrasound, X-ray, CT, MRI and PET scan      Most tests are not worthwhile for patients with stage 1 or 2 melanoma unless there are signs or symptoms of disease recurrence or metastasis  No tests are necessary for healthy patients who have remained well for five years or longer after removal of their melanoma      What is the outlook for patients with melanoma? Melanoma in situ is cured by excision because it has no potential to spread around the body  The risk of spread and ultimate death from invasive melanoma depends on several factors, but the main one is the Breslow thickness of the melanoma at the time it was surgically removed  Metastases are rare for melanomas < 0 75 mm and the risk for tumours 0 75-1 mm thick is about 5%   The risk steadily increases with thickness so that melanomas > 4 mm have a risk of metastasis of about 40%     Melanoma is a potentially serious type of skin cancer, in which there is uncontrolled growth of melanocytes (pigment cells)  Melanoma is sometimes called malignant melanoma  Normal melanocytes are found in the basal layer of the epidermis (the outer layer of skin)  Melanocytes produce a protein called melanin, which protects skin cells by absorbing ultraviolet (UV) radiation  Melanocytes are found in equal numbers in black and white skin, but melanocytes in black skin produce much more melanin  People with dark brown or black skin are very much less likely to be damaged by UV radiation than those with white skin      2   HISTORY OF SQUAMOUS CELL CARCINOMA  3  HISTORY OF BASAL CELL CARCINOMA  Physical Exam:  Anatomic Location Affected:  Multiple sites  Morphological Description:  Well healed surgical scars        Additional History of Present Condition:  Treated for the last 30 years     Assessment and Plan:  Based on a thorough discussion of this condition and the management approach to it (including a comprehensive discussion of the known risks, side effects and potential benefits of treatment), the patient (family) agrees to implement the following specific plan:  Continue routine skin exams  Recommend sun protection SPF 30 or higher      4  NEOPLASM OF UNCERTAIN BEHAVIOR OF SKIN    Physical Exam:  (Anatomic Location); (Size and Morphological Description); (Differential Diagnosis):  Right temple; 1 0 cm  crusted nodule  Differential diagnosis; possible squamous cell carcinoma   Pertinent Positives:  Pertinent Negatives: Additional History of Present Condition:  N/A    Assessment and Plan:  I have discussed with the patient that a sample of skin via a "skin biopsy would be potentially helpful to further make a specific diagnosis under the microscope    Based on a thorough discussion of this condition and the management approach to it (including a comprehensive discussion of the known risks, side effects and potential benefits of treatment), the patient (family) agrees to implement the following specific plan:    Procedure:  Skin Biopsy  After a thorough discussion of treatment options and risk/benefits/alternatives (including but not limited to local pain, scarring, dyspigmentation, blistering, possible superinfection, and inability to confirm a diagnosis via histopathology), verbal and written consent were obtained and portion of the rash was biopsied for tissue sample  See below for consent that was obtained from patient and subsequent Procedure Note  PROCEDURE TANGENTIAL (SHAVE) BIOPSY NOTE:    Performing Physician: Dr Caraballo  Anatomic Location; Clinical Description with size (cm); Pre-Op Diagnosis:   Right temple; 1 0 cm  crusted nodule  Differential diagnosis; possible squamous cell carcinoma   Post-op diagnosis: Same     Local anesthesia: 1% Lidocaine HCL     Topical anesthesia: None    Hemostasis: Aluminum chloride       After obtaining informed consent  at which time there was a discussion about the purpose of biopsy  and low risks of infection and bleeding  The area was prepped and draped in the usual fashion  Anesthesia was obtained with 1% lidocaine with epinephrine  A shave biopsy to an appropriate sampling depth was obtained by Shave (Dermablade or 15 blade) The resulting wound was covered with surgical ointment and bandaged appropriately  The patient tolerated the procedure well without complications and was without signs of functional compromise  Specimen has been sent for review by Dermatopathology  Standard post-procedure care has been explained and has been included in written form within the patient's copy of Informed Consent      INFORMED CONSENT DISCUSSION AND POST-OPERATIVE INSTRUCTIONS FOR PATIENT    I   RATIONALE FOR PROCEDURE  I understand that a skin biopsy allows the Dermatologist to test a lesion or rash under the microscope to obtain a diagnosis  It usually involves numbing the area with numbing medication and removing a small piece of skin; sometimes the area will be closed with sutures  In this specific procedure, sutures are not usually needed  If any sutures are placed, then they are usually need to be removed in 2 weeks or less  I understand that my Dermatologist recommends that a skin "shave" biopsy be performed today  A local anesthetic, similar to the kind that a dentist uses when filling a cavity, will be injected with a very small needle into the skin area to be sampled  The injected skin and tissue underneath "will go to sleep and become numb so no pain should be felt afterwards  An instrument shaped like a tiny "razor blade" (shave biopsy instrument) will be used to cut a small piece of tissue and skin from the area so that a sample of tissue can be taken and examined more closely under the microscope  A slight amount of bleeding will occur, but it will be stopped with direct pressure and a pressure bandage and any other appropriate methods  I understands that a scar will form where the wound was created  Surgical ointment will be applied to help protect the wound  Sutures are not usually needed  II   RISKS AND POTENTIAL COMPLICATIONS   I understand the risks and potential complications of a skin biopsy include but are not limited to the following:  Bleeding  Infection  Pain  Scar/keloid  Skin discoloration  Incomplete Removal  Recurrence  Nerve Damage/Numbness/Loss of Function  Allergic Reaction to Anesthesia  Biopsies are diagnostic procedures and based on findings additional treatment or evaluation may be required  Loss or destruction of specimen resulting in no additional findings    My Dermatologist has explained to me the nature of the condition, the nature of the procedure, and the benefits to be reasonably expected compared with alternative approaches    My Dermatologist has discussed the likelihood of major risks or complications of this procedure including the specific risks listed above, such as bleeding, infection, and scarring/keloid  I understand that a scar is expected after this procedure  I understand that my physician cannot predict if the scar will form a "keloid," which extends beyond the borders of the wound that is created  A keloid is a thick, painful, and bumpy scar  A keloid can be difficult to treat, as it does not always respond well to therapy, which includes injecting cortisone directly into the keloid every few weeks  While this usually reduces the pain and size of the scar, it does not eliminate it  I understand that photographs may be taken before and after the procedure  These will be maintained as part of the medical providers confidential records and may not be made available to me  I further authorize the medical provider to use the photographs for teaching purposes or to illustrate scientific papers, books, or lectures if in his/her judgment, medical research, education, or science may benefit from its use  I have had an opportunity to fully inquire about the risks and benefits of this procedure and its alternatives  I have been given ample time and opportunity to ask questions and to seek a second opinion if I wished to do so  I acknowledge that there have specifically been no guarantees as to the cosmetic results from the procedure  I am aware that with any procedure there is always the possibility of an unexpected complication  III  POST-PROCEDURAL CARE (WHAT YOU WILL NEED TO DO "AFTER THE BIOPSY" TO OPTIMIZE HEALING)    Keep the area clean and dry  Try NOT to remove the bandage or get it wet for the first 24 hours  Gently clean the area and apply surgical ointment (such as Vaseline petrolatum ointment, which is available "over the counter" and not a prescription) to the biopsy site for up to 2 weeks straight    This acts to protect the wound from the outside world       Sutures are not usually placed in this procedure  If any sutures were placed, return for suture removal as instructed (generally 1 week for the face, 2 weeks for the body)  Take Acetaminophen (Tylenol) for discomfort, if no contraindications  Ibuprofen or aspirin could make bleeding worse  Call our office immediately for signs of infection: fever, chills, increased redness, warmth, tenderness, discomfort/pain, or pus or foul smell coming from the wound  WHAT TO DO IF THERE IS ANY BLEEDING? If a small amount of bleeding is noticed, place a clean cloth over the area and apply firm pressure for ten minutes  Check the wound after 10 minutes of direct pressure  If bleeding persists, try one more time for an additional 10 minutes of direct pressure on the area  If the bleeding becomes heavier or does not stop after the second attempt, or if you have any other questions about this procedure, then please call your [facility]'s Dermatologist by calling [phone] (SKIN)  I hereby acknowledge that I have reviewed and verified the site with my Dermatologist and have requested and authorized my Dermatologist to proceed with the procedure                                  Scribe Attestation    I,:  Paul Hightower am acting as a scribe while in the presence of the attending physician :       I,:  Jesenia Mims MD personally performed the services described in this documentation    as scribed in my presence :

## 2022-10-04 NOTE — PATIENT INSTRUCTIONS
1  HISTORY OF MELANOMA      Assessment and Plan:  Based on a thorough discussion of this condition and the management approach to it (including a comprehensive discussion of the known risks, side effects and potential benefits of treatment), the patient (family) agrees to implement the following specific plan:  Continue routine skin exams  Recommend sun protection SPF 30 or higher     What happens at follow-up? The main purpose of follow-up is to detect recurrences early (metastatic melanoma), but it also offers an opportunity to diagnose a new primary melanoma at the first possible opportunity  A second invasive melanoma occurs in 5-10% of melanoma patients and a new melanoma in situ is diagnosed in more than 20% of melanoma patients  Our practice makes the following recommendations for follow-up for patients with invasive melanoma  At-least "monthly" self-skin examinations   Routine skin checks by a board certified dermatologist  Follow-up intervals are "every 3 months" within 2 years of a new melanoma diagnosis; "every 6 months" between 2-4 years of a new melanoma diagnosis; and "annually" after 4 years of a new melanoma diagnosis  Individual patient's needs should be considered before an appropriate follow-up is offered  Provide education and support to help the patient adjust to their illness     Follow-up appointments should include:  A check of the scar where the primary melanoma was removed  Checking the regional lymph nodes  A general skin examination  A full physical examination at least annually by your primary care physician     In those with more advanced primary disease, follow-up may include:  Blood tests  Imaging: ultrasound, X-ray, CT, MRI and PET scan  Most tests are not worthwhile for patients with stage 1 or 2 melanoma unless there are signs or symptoms of disease recurrence or metastasis   No tests are necessary for healthy patients who have remained well for five years or longer after removal of their melanoma  What is the outlook for patients with melanoma? Melanoma in situ is cured by excision because it has no potential to spread around the body  The risk of spread and ultimate death from invasive melanoma depends on several factors, but the main one is the Breslow thickness of the melanoma at the time it was surgically removed  Metastases are rare for melanomas < 0 75 mm and the risk for tumours 0 75-1 mm thick is about 5%  The risk steadily increases with thickness so that melanomas > 4 mm have a risk of metastasis of about 40%  Melanoma is a potentially serious type of skin cancer, in which there is uncontrolled growth of melanocytes (pigment cells)  Melanoma is sometimes called malignant melanoma  Normal melanocytes are found in the basal layer of the epidermis (the outer layer of skin)  Melanocytes produce a protein called melanin, which protects skin cells by absorbing ultraviolet (UV) radiation  Melanocytes are found in equal numbers in black and white skin, but melanocytes in black skin produce much more melanin  People with dark brown or black skin are very much less likely to be damaged by UV radiation than those with white skin  2  HISTORY OF SQUAMOUS CELL CARCINOMA  3  HISTORY OF BASAL CELL CARCINOMA     Assessment and Plan:  Based on a thorough discussion of this condition and the management approach to it (including a comprehensive discussion of the known risks, side effects and potential benefits of treatment), the patient (family) agrees to implement the following specific plan:  Continue routine skin exams  Recommend sun protection SPF 30 or higher      4  NEOPLASM OF UNCERTAIN BEHAVIOR OF SKIN      Assessment and Plan:  I have discussed with the patient that a sample of skin via a "skin biopsy would be potentially helpful to further make a specific diagnosis under the microscope    Based on a thorough discussion of this condition and the management approach to it (including a comprehensive discussion of the known risks, side effects and potential benefits of treatment), the patient (family) agrees to implement the following specific plan:    Procedure:  Skin Biopsy  After a thorough discussion of treatment options and risk/benefits/alternatives (including but not limited to local pain, scarring, dyspigmentation, blistering, possible superinfection, and inability to confirm a diagnosis via histopathology), verbal and written consent were obtained and portion of the rash was biopsied for tissue sample  See below for consent that was obtained from patient and subsequent Procedure Note  PROCEDURE TANGENTIAL (SHAVE) BIOPSY NOTE:    INFORMED CONSENT DISCUSSION AND POST-OPERATIVE INSTRUCTIONS FOR PATIENT    I   RATIONALE FOR PROCEDURE  I understand that a skin biopsy allows the Dermatologist to test a lesion or rash under the microscope to obtain a diagnosis  It usually involves numbing the area with numbing medication and removing a small piece of skin; sometimes the area will be closed with sutures  In this specific procedure, sutures are not usually needed  If any sutures are placed, then they are usually need to be removed in 2 weeks or less  I understand that my Dermatologist recommends that a skin "shave" biopsy be performed today  A local anesthetic, similar to the kind that a dentist uses when filling a cavity, will be injected with a very small needle into the skin area to be sampled  The injected skin and tissue underneath "will go to sleep and become numb so no pain should be felt afterwards  An instrument shaped like a tiny "razor blade" (shave biopsy instrument) will be used to cut a small piece of tissue and skin from the area so that a sample of tissue can be taken and examined more closely under the microscope    A slight amount of bleeding will occur, but it will be stopped with direct pressure and a pressure bandage and any other appropriate methods  I understands that a scar will form where the wound was created  Surgical ointment will be applied to help protect the wound  Sutures are not usually needed  II   RISKS AND POTENTIAL COMPLICATIONS   I understand the risks and potential complications of a skin biopsy include but are not limited to the following:  Bleeding  Infection  Pain  Scar/keloid  Skin discoloration  Incomplete Removal  Recurrence  Nerve Damage/Numbness/Loss of Function  Allergic Reaction to Anesthesia  Biopsies are diagnostic procedures and based on findings additional treatment or evaluation may be required  Loss or destruction of specimen resulting in no additional findings    My Dermatologist has explained to me the nature of the condition, the nature of the procedure, and the benefits to be reasonably expected compared with alternative approaches  My Dermatologist has discussed the likelihood of major risks or complications of this procedure including the specific risks listed above, such as bleeding, infection, and scarring/keloid  I understand that a scar is expected after this procedure  I understand that my physician cannot predict if the scar will form a "keloid," which extends beyond the borders of the wound that is created  A keloid is a thick, painful, and bumpy scar  A keloid can be difficult to treat, as it does not always respond well to therapy, which includes injecting cortisone directly into the keloid every few weeks  While this usually reduces the pain and size of the scar, it does not eliminate it  I understand that photographs may be taken before and after the procedure  These will be maintained as part of the medical providers confidential records and may not be made available to me    I further authorize the medical provider to use the photographs for teaching purposes or to illustrate scientific papers, books, or lectures if in his/her judgment, medical research, education, or science may benefit from its use  I have had an opportunity to fully inquire about the risks and benefits of this procedure and its alternatives  I have been given ample time and opportunity to ask questions and to seek a second opinion if I wished to do so  I acknowledge that there have specifically been no guarantees as to the cosmetic results from the procedure  I am aware that with any procedure there is always the possibility of an unexpected complication  III  POST-PROCEDURAL CARE (WHAT YOU WILL NEED TO DO "AFTER THE BIOPSY" TO OPTIMIZE HEALING)    Keep the area clean and dry  Try NOT to remove the bandage or get it wet for the first 24 hours  Gently clean the area and apply surgical ointment (such as Vaseline petrolatum ointment, which is available "over the counter" and not a prescription) to the biopsy site for up to 2 weeks straight  This acts to protect the wound from the outside world  Sutures are not usually placed in this procedure  If any sutures were placed, return for suture removal as instructed (generally 1 week for the face, 2 weeks for the body)  Take Acetaminophen (Tylenol) for discomfort, if no contraindications  Ibuprofen or aspirin could make bleeding worse  Call our office immediately for signs of infection: fever, chills, increased redness, warmth, tenderness, discomfort/pain, or pus or foul smell coming from the wound  WHAT TO DO IF THERE IS ANY BLEEDING? If a small amount of bleeding is noticed, place a clean cloth over the area and apply firm pressure for ten minutes  Check the wound after 10 minutes of direct pressure  If bleeding persists, try one more time for an additional 10 minutes of direct pressure on the area  If the bleeding becomes heavier or does not stop after the second attempt, or if you have any other questions about this procedure, then please call your [address] [place] [address]'s Dermatologist by calling [phone] (SKIN)       I hereby acknowledge that I have reviewed and verified the site with my Dermatologist and have requested and authorized my Dermatologist to proceed with the procedure

## 2022-10-05 ENCOUNTER — APPOINTMENT (OUTPATIENT)
Dept: LAB | Facility: CLINIC | Age: 82
End: 2022-10-05
Payer: MEDICARE

## 2022-10-05 DIAGNOSIS — E78.5 HYPERLIPIDEMIA, UNSPECIFIED HYPERLIPIDEMIA TYPE: ICD-10-CM

## 2022-10-05 DIAGNOSIS — C49.0 SARCOMA OF SCALP (HCC): ICD-10-CM

## 2022-10-05 DIAGNOSIS — I10 PRIMARY HYPERTENSION: ICD-10-CM

## 2022-10-05 DIAGNOSIS — N18.30 STAGE 3 CHRONIC KIDNEY DISEASE, UNSPECIFIED WHETHER STAGE 3A OR 3B CKD (HCC): ICD-10-CM

## 2022-10-05 LAB
25(OH)D3 SERPL-MCNC: 33.1 NG/ML (ref 30–100)
ALBUMIN SERPL BCP-MCNC: 3.8 G/DL (ref 3.5–5)
ALP SERPL-CCNC: 114 U/L (ref 46–116)
ALT SERPL W P-5'-P-CCNC: 39 U/L (ref 12–78)
ANION GAP SERPL CALCULATED.3IONS-SCNC: 4 MMOL/L (ref 4–13)
AST SERPL W P-5'-P-CCNC: 21 U/L (ref 5–45)
BASOPHILS # BLD AUTO: 0.05 THOUSANDS/ΜL (ref 0–0.1)
BASOPHILS NFR BLD AUTO: 1 % (ref 0–1)
BILIRUB SERPL-MCNC: 0.91 MG/DL (ref 0.2–1)
BUN SERPL-MCNC: 20 MG/DL (ref 5–25)
CALCIUM SERPL-MCNC: 9.6 MG/DL (ref 8.3–10.1)
CHLORIDE SERPL-SCNC: 108 MMOL/L (ref 96–108)
CHOLEST SERPL-MCNC: 178 MG/DL
CO2 SERPL-SCNC: 27 MMOL/L (ref 21–32)
CREAT SERPL-MCNC: 1.5 MG/DL (ref 0.6–1.3)
CREAT UR-MCNC: 111 MG/DL
EOSINOPHIL # BLD AUTO: 0.22 THOUSAND/ΜL (ref 0–0.61)
EOSINOPHIL NFR BLD AUTO: 3 % (ref 0–6)
ERYTHROCYTE [DISTWIDTH] IN BLOOD BY AUTOMATED COUNT: 13.2 % (ref 11.6–15.1)
GFR SERPL CREATININE-BSD FRML MDRD: 42 ML/MIN/1.73SQ M
GLUCOSE P FAST SERPL-MCNC: 104 MG/DL (ref 65–99)
HCT VFR BLD AUTO: 42.7 % (ref 36.5–49.3)
HDLC SERPL-MCNC: 38 MG/DL
HGB BLD-MCNC: 14.5 G/DL (ref 12–17)
IMM GRANULOCYTES # BLD AUTO: 0.02 THOUSAND/UL (ref 0–0.2)
IMM GRANULOCYTES NFR BLD AUTO: 0 % (ref 0–2)
LDLC SERPL CALC-MCNC: 112 MG/DL (ref 0–100)
LYMPHOCYTES # BLD AUTO: 2.16 THOUSANDS/ΜL (ref 0.6–4.47)
LYMPHOCYTES NFR BLD AUTO: 33 % (ref 14–44)
MCH RBC QN AUTO: 30.8 PG (ref 26.8–34.3)
MCHC RBC AUTO-ENTMCNC: 34 G/DL (ref 31.4–37.4)
MCV RBC AUTO: 91 FL (ref 82–98)
MICROALBUMIN UR-MCNC: 38.3 MG/L (ref 0–20)
MICROALBUMIN/CREAT 24H UR: 35 MG/G CREATININE (ref 0–30)
MONOCYTES # BLD AUTO: 0.43 THOUSAND/ΜL (ref 0.17–1.22)
MONOCYTES NFR BLD AUTO: 7 % (ref 4–12)
NEUTROPHILS # BLD AUTO: 3.59 THOUSANDS/ΜL (ref 1.85–7.62)
NEUTS SEG NFR BLD AUTO: 56 % (ref 43–75)
NRBC BLD AUTO-RTO: 0 /100 WBCS
PLATELET # BLD AUTO: 206 THOUSANDS/UL (ref 149–390)
PMV BLD AUTO: 10.6 FL (ref 8.9–12.7)
POTASSIUM SERPL-SCNC: 4 MMOL/L (ref 3.5–5.3)
PROT SERPL-MCNC: 7.7 G/DL (ref 6.4–8.4)
RBC # BLD AUTO: 4.71 MILLION/UL (ref 3.88–5.62)
SODIUM SERPL-SCNC: 139 MMOL/L (ref 135–147)
TRIGL SERPL-MCNC: 138 MG/DL
TSH SERPL DL<=0.05 MIU/L-ACNC: 3.05 UIU/ML (ref 0.45–4.5)
WBC # BLD AUTO: 6.47 THOUSAND/UL (ref 4.31–10.16)

## 2022-10-05 PROCEDURE — 82306 VITAMIN D 25 HYDROXY: CPT

## 2022-10-05 PROCEDURE — 85025 COMPLETE CBC W/AUTO DIFF WBC: CPT

## 2022-10-05 PROCEDURE — 80053 COMPREHEN METABOLIC PANEL: CPT

## 2022-10-05 PROCEDURE — 82043 UR ALBUMIN QUANTITATIVE: CPT

## 2022-10-05 PROCEDURE — 84443 ASSAY THYROID STIM HORMONE: CPT

## 2022-10-05 PROCEDURE — 36415 COLL VENOUS BLD VENIPUNCTURE: CPT

## 2022-10-05 PROCEDURE — 82570 ASSAY OF URINE CREATININE: CPT

## 2022-10-05 PROCEDURE — 80061 LIPID PANEL: CPT

## 2022-10-06 ENCOUNTER — PROCEDURE VISIT (OUTPATIENT)
Dept: DERMATOLOGY | Facility: CLINIC | Age: 82
End: 2022-10-06
Payer: MEDICARE

## 2022-10-06 VITALS
HEART RATE: 72 BPM | WEIGHT: 177 LBS | RESPIRATION RATE: 20 BRPM | HEIGHT: 68 IN | SYSTOLIC BLOOD PRESSURE: 150 MMHG | BODY MASS INDEX: 26.83 KG/M2 | DIASTOLIC BLOOD PRESSURE: 80 MMHG | TEMPERATURE: 98.4 F

## 2022-10-06 DIAGNOSIS — C44.01 BASAL CELL CARCINOMA (BCC) OF SKIN OF LIP: ICD-10-CM

## 2022-10-06 PROCEDURE — 17311 MOHS 1 STAGE H/N/HF/G: CPT | Performed by: DERMATOLOGY

## 2022-10-06 PROCEDURE — 17312 MOHS ADDL STAGE: CPT | Performed by: DERMATOLOGY

## 2022-10-06 PROCEDURE — 14060 TIS TRNFR E/N/E/L 10 SQ CM/<: CPT | Performed by: DERMATOLOGY

## 2022-10-06 NOTE — PATIENT INSTRUCTIONS
Mohs Microscopic Surgery After Care    WOUND CARE AFTER SURGERY:    Do NOT to remove the pressure bandage for 48 hours  Keep the area clean and dry while this bandage is on  After removing the bandage for the first time, gently clean the area with soap and water  If the bandage is difficult to remove, getting the bandage wet in the shower will sometimes help soften the adhesive and allow it to be removed more easily  You will now need to cleanse this area daily in the shower with gentle soap  There is no need to scrub the area  Apply plain Vaseline ointment (this is over the counter and not a prescription) to the site followed by a clean appropriately sized bandage to area  Non stick dressing and paper tape (or Hypafix) are recommended for sensitive skin but a bandaid is fine if it covers the area well  You will need to continue the above daily wound care until you return for suture removal in 7 days (generally 7 days for the face, 10-14 days off the face)      RESTRICTIONS:     For two DAYS:   - You will need to take it very easy as this time is highest risk for bleeding  Being a "couch potato" during these two days is generally recommended  - For surgeries on the face/neck/scalp: Avoid leaning down to pick things up off the floor as this brings blood up to your head  Instead, squat down to pick things up  For two WEEKS:   - No heavy lifting (anything greater than 10 pounds)   - You can start to do slow, gentle activities such as slow walking but nothing to increase your heart rate and blood pressure too much (such as cardiovascular exercise)  It is important to take it easy as there is still a risk for bleeding and a high risk popping of stitches open during this time  If we did surgery near the eyes (including the nose, forehead, front part of your scalp, cheeks): It is VERY common to get a large amount of swelling around your eyes (puffy eyes)   Although less frequent, this can be enough to swell your eyes shut and can also come along with bruising  This should not hurt and is very expected and normal  It is typically worst at ~ 3 days out from your surgery and dramatically better 1 week post-operatively  If we did surgery around your nose: No blowing your nose as this puts you at higher risk of popping stitches durign this time  Instead dab under your nose with a tissue or use a Q-tip inside your nose  If we did surgery on the skin above or bellow your lip or your lip itself: No sipping from straws as this uses a lot of the muscles around your mouth and increases the risk of popping stitches during this time  MANAGING YOUR PAIN AFTER SURGERY     You can expect to have some pain after surgery  This is normal  The pain is typically worse the first two days after surgery, and quickly begins to get better  The best strategy for controlling your pain after surgery is around the clock pain control  You can take over the counter Acetaminophen (Tylenol) for discomfort, if no contraindications  If you are taking this at the maximum dose, you can alternate this with Motrin (ibuprofen or Advil) as well  Alternating these medications with each other allows you to maximize your pain control  In addition to Tylenol and Motrin, you can use heating pads or ice packs on your incisions to help reduce your pain  How will I alternate your regular strength over-the-counter pain medication? You will take a dose of pain medication every three hours  Start by taking 650 mg of Tylenol (2 pills of 325 mg)   3 hours later take 600 mg of Motrin (3 pills of 200 mg)   3 hours after taking the Motrin take 650 mg of Tylenol   3 hours after that take 600 mg of Motrin      See example - if your first dose of Tylenol is at 12:00 PM     12:00 PM  Tylenol 650 mg (2 pills of 325 mg)    3:00 PM  Motrin 600 mg (3 pills of 200 mg)    6:00 PM  Tylenol 650 mg (2 pills of 325 mg)    9:00 PM  Motrin 600 mg (3 pills of 200 mg)    Continue alternating every 3 hours      Important:   Do not take more than 4000mg of Tylenol or 3200mg of Motrin in a 24-hour period  What if I still have pain? If you have pain that is not controlled with the over-the-counter pain medications (Tylenol and Motrin or Advil), don't hesitate to call our staff using the number provided  We will help make sure you are managing your pain in the best way possible, and if necessary, we can provide a prescription for additional pain medication  CALL OUR OFFICE IMMEDIATELY FOR ANY SIGNS OF INFECTION:    This includes fever, chills, increased redness, warmth, tenderness, severe discomfort/pain, or pus or foul smell coming from the wound  [facility] directly at [phone] (SKIN)    IF BLEEDING IS NOTICED:    Place a clean cloth over the area and apply firm pressure for thirty minutes  Check the wound ONLY after 30 minutes of direct pressure; do not cheat and sneak a peak, as that does not count  If bleeding persists after 30 minutes of legitimate direct pressure, then try one more round of direct pressure to the area  Should the bleeding become heavier or not stop after the second attempt, call [facility] directly at [phone] (SKIN)  Your call will get routed to the dermatology surgeon on call even after hours

## 2022-10-06 NOTE — PROGRESS NOTES
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(upper)      Past Medical History:   Diagnosis Date    Back pain     Balance problem     Basal cell carcinoma (BCC)     in past    BCC (basal cell carcinoma of skin) 2021    Right tip of nose    BCC (basal cell carcinoma of skin) 2022    Left lip    Gout     Hard of hearing     Hearing aid worn     ziyad    Herniated nucleus pulposus, L4-5     Hypertension     Incisional hernia     Macular degeneration     Melanoma ([address] )     left cheek- history    Pulmonary nodules     Reactive airway disease     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    Skin cancer 2020    AFX- scalp    Squamous cell carcinoma     in past       Past Surgical History:   Procedure Laterality Date    CATARACT EXTRACTION Bilateral     COLONOSCOPY      HERNIA REPAIR      HYDROCELE EXCISION / REPAIR      MASTOID SURGERY Left     MOHS SURGERY      MOHS SURGERY  2021    Right tip of nose-Dr Tiara Alamo    MOHS SURGERY  10/06/2022    Left lip-Dr Tiara Alamo    OTHER SURGICAL HISTORY      sarcoma scalp with skin graft    [place] REPAIR INCISIONAL HERNIA,REDUCIBLE N/A 2021    Procedure: REPAIR HERNIA INCISIONAL OPEN WITH MESH;  Surgeon: Rivka Kellogg MD;  Location: [facility];  Service: General    SCALP EXCISION N/A 2018    Procedure: SCALP SARCOMA EXCISION WITH FULL THICKNESS SKIN GRAFT;  Surgeon: Ajit Fernando MD;  Location: [facility];  Service: Plastics    SKIN BIOPSY      SKIN CANCER EXCISION      TONSILLECTOMY AND ADENOIDECTOMY           Current Outpatient Medications:     acetaminophen (TYLENOL) 500 mg tablet, Take 500-1,000 mg by mouth every 4 (four) hours as needed for mild pain, Disp: , Rfl:     amLODIPine (NORVASC) 10 mg tablet, TAKE 1 TABLET BY MOUTH DAILY, Disp: 90 tablet, Rfl: 3    bimatoprost (LUMIGAN) 0 01 % ophthalmic drops, Administer 1 drop to the right eye daily at bedtime, Disp: , Rfl:     erythromycin (ILOTYCIN) ophthalmic ointment, Administer 3 5 inches to the right eye daily at bedtime, Disp: , Rfl:     Fluocinolone Acetonide Scalp 0 01 % OIL, Apply a thin layer topically daily at night one hour before bedtime  , Disp: 118 28 mL, Rfl: 5    lisinopril (ZESTRIL) 20 mg tablet, TAKE 1 TABLET BY MOUTH  DAILY, Disp: 90 tablet, Rfl: 3    metoprolol succinate (TOPROL-XL) 50 mg 24 hr tablet, Take 1 tablet (50 mg total) by mouth daily, Disp: 90 tablet, Rfl: 3    mometasone (ELOCON) 0 1 % cream, APPLY TO EARS ONCE TO TWICE DAILY AS NEEDED FOR SCALING, Disp: , Rfl:     Multiple Vitamins-Minerals (PRESERVISION AREDS 2) CAPS, Take 1 capsule by mouth 2 (two) times a day, Disp: , Rfl:     mupirocin (BACTROBAN) 2 % ointment, Apply topically two to three times a day to sore areas, Disp: 22 g, Rfl: 3    Naproxen Sodium (ALEVE PO), Take 1 tablet by mouth 2 (two) times a day as needed , Disp: , Rfl:     imiquimod (ALDARA) 5 % cream, Apply topically once a day Monday thru Friday for 6 weeks (Patient not taking: No sig reported), Disp: 24 each, Rfl: 1    ofloxacin (OCUFLOX) 0 3 % ophthalmic solution, INSTILL 1 DROP IN LEFT EYE FOUR TIMES DAILY (Patient not taking: No sig reported), Disp: , Rfl:     tamsulosin (FLOMAX) 0 4 mg, Take 1 capsule (0 4 mg total) by mouth daily with dinner (Patient not taking: No sig reported), Disp: 30 capsule, Rfl: 0    Allergies   Allergen Reactions    Erythromycin Other (See Comments)     Thrush        Physical Exam:   Vitals:    10/06/22 1318   BP: 150/80   Pulse: 72   Resp: 20   Temp: 98 4 °F (36 9 °C)     General: Awake, Alert, Oriented x 3, Mood and affect appropriate  Respiratory: Respirations even and unlabored  Cardiovascular: Peripheral pulses intact; no edema  Musculoskeletal Exam: N/A    Assessment: 1 3 cm crusted erosion; biopsy confirmed basal cell carcinoma    Plan: Mohs    MOHS Procedure Timeout    Flowsheet Row Most Recent Value   Timeout: 1330   Patient Identity Verified: Yes   Correct Site Verified: Yes   Correct Procedure Verified: Yes          MOHS Diagnosis/Indication/Location/ID    Flowsheet Row Most Recent Value   Pathology Type Basal cell carcinoma   Anatomic Site --  [Left lip]   Indications for MOHS tumor location, large tumor size, histologic pattern, porrly defined tumor margins   MOHS ID [id number]          MOHS Site/Accession/Pre-Post    Flowsheet Row Most Recent Value   Original Site Identified (as submitted by referring clinician) Photo   Biopsy Accession/Specimen # (as submitted by referring clincian) [id number]   Pre-MOHS Size Length (cm) 1 3   Pre-MOHS Size Width (cm) 1 3   Post-MOHS Size-Length (cm) 2 8   Post MOHS Size-Width (cm) 1 6   Repair Type Island pedicle flap   Suture Type Ethilon, Vicryl   Ethilon Suture Size 6   Vicryl Suture Size 4   Flap/Graft area (cm2) 8   Anesthetic Used 1% Lidocaine with epinephrine  [with Bicarb]          MOHS Tumor Stage 1 Information    Flowsheet Row Most Recent Value   Tissue Sections (blocks) 2   Microscopic Exam Section 1: No tumor identified in section  Microscopic Exam Section 2: Emanating from the epidermis and infiltrating the dermis are irregularly shaped islands of basaloid keratinocytes  The islands are associated with a fibromyxoid stroma and clefting  The tumor breaks up into a small, micronodular, infiltrative islands  [positive  margins at 2:00 and 5:00]   Tumor Clear After Stage I? No          MOHS Tumor Stage 2 Information    Flowsheet Row Most Recent Value   Tissue Sections (blocks) 1   Microscopic Exam Section 1: No tumor identified in section  Tumor Clear After Stage II? Yes                      Patient identified, procedure verified, site identified and verified  Time out completed   Surgical removal of the lesion discussed with the patient (risks and benefits, including possibility of scarring, infection, recurrence or potential for further treatment)  I have specifically identified the site with the patient  I have discussed the fact that the patient will have a scar after the procedure regardless of granulation or repair with sutures  I have discussed that the repair options can range from granulation in some cases to linear or curvilinear closures to larger flaps or grafts  There are sometimes flaps or grafts used that require multiples stages of surgery and will not be completed today, rather be completed over a series of appointments  I have discussed that occasionally due to location, size or depth of the lesion I may recommend consultation with and transfer of care for further removal or the reconstruction to another provider such as ophthalmology surgery, plastic surgery, ENT surgery, or surgical oncology  There are cases in which other testing such as imaging with MRI or CT scan or testing of lymph nodes is recommended because of the nature/depth/location of tumor seen during the removal  There is a risk of injury to nerves causing temporary or permanent numbness or the inability to move muscles full such as the inability to lift eyebrows  Questions answered and verbal and written consent was obtained  With the patient in the supine position and under adequate local anesthesia with 1% lidocaine with epinephrine 1:200,000, the defect was scrubbed with Hibiclens  Sterile drapes were placed from the sterile tray  Because of the location of the surgical defect,  a complex advancement flap with both Island advancement and O to T was judged to give the best possible cosmetic and functional result  I noted linear repair was not feasible due to size  I noted Sujatha flap was considered but concern re age and toleranc noted  The edges of the defect were carefully debrided removing any dead or coagulated tissue    The edges of the defect and the area to be used for advancement of tissue at the base of the flap were minimally undermined  The advancement flap was freed up and draped over the defect  The flap was secured in place by a dermal layer of sutures and the cutaneous margins were approximated and closed by superficial sutures, as noted above  The standing cones of tissue at the end of the excision were excised with a #15 scalpel blade and closed in two layers as described previously  Closure entailed both deep and cuticular sutures  The patient tolerated the procedure well  The wound was dressed with petrolatum, a non-stick pad, and a compression dressing  Wound care was discussed with the patient, and a wound care instruction sheet was given  The patient tolerated the procedure well  The wound was dressed with petrolatum, a non-stick pad, and a compression dressing  Natalee Carmona served as the surgeon and pathologist during the procedure  Postoperative care: Wound care discussed at length  I urged the patient to call us if any problems or question should arise  Complications: none  Post-op medications: none  Patient condition after procedure: stable  Discharge plans: Plan for suture removal 7 days at next scheduled appointment  Wound check tomorrow    The tumor qualifies for Mohs based on AUC criteria  Dr Trell White served as the surgeon and pathologist during the procedure  Postoperative care: No would care should be necessary prior to the next visit but I urged the patient to call us if any problems or question should arise prior to that time  If circumstances should change, we will contact the patient to make other arrangements       Scribe Attestation    I,:  Star Bee MA am acting as a scribe while in the presence of the attending physician :       I,:  Natalee Carmona MD personally performed the services described in this documentation    as scribed in my presence :

## 2022-10-07 ENCOUNTER — OFFICE VISIT (OUTPATIENT)
Dept: DERMATOLOGY | Facility: CLINIC | Age: 82
End: 2022-10-07

## 2022-10-07 DIAGNOSIS — Z51.89 VISIT FOR WOUND CHECK: Primary | ICD-10-CM

## 2022-10-07 PROCEDURE — 99024 POSTOP FOLLOW-UP VISIT: CPT | Performed by: DERMATOLOGY

## 2022-10-07 NOTE — PROGRESS NOTES
WOUND CHECK    Physical Exam:   Anatomic Location Affected:  Left upper lip   Description of wound: all sutures still intact and minimal bleeding on site   Closure Type: island pedicle flap     Assessment and Plan:  Based on a thorough discussion of this condition and the management approach to it (including a comprehensive discussion of the known risks, side effects and potential benefits of treatment), the patient (family) agrees to implement the following specific plan:     Suture removal in  10/13/2022  Continue with wound care:     1  Do NOT to remove the pressure bandage for 48 hours  Keep the area clean and dry while this bandage is on  2  After removing the bandage for the first time, gently clean the area with soap and water  If the bandage is difficult to remove, getting the bandage wet in the shower will sometimes help soften the adhesive and allow it to be removed more easily  3  You will now need to cleanse this area daily in the shower with gentle soap  There is no need to scrub the area  Apply plain Vaseline ointment (this is over the counter and not a prescription) to the site followed by a clean appropriately sized bandage to area  Non stick dressing and paper tape (or Hypafix) are recommended for sensitive skin but a bandaid is fine if it covers the area well  You will need to continue the above daily wound care until you return for suture removal in 6 days (generally 7 days for the face, 10-14 days off the face)    IF BLEEDING IS NOTICED:    Place a clean cloth over the area and apply firm pressure for thirty minutes  Check the wound ONLY after 30 minutes of direct pressure; do not cheat and sneak a peak, as that does not count  If bleeding persists after 30 minutes of legitimate direct pressure, then try one more round of direct pressure to the area    Should the bleeding become heavier or not stop after the second attempt, call [facility] directly at [phone] (SKIN)  Your call will get routed to the dermatology surgeon on call even after hours      Scribe Attestation    I,:  Gage Castillo MA am acting as a scribe while in the presence of the attending physician :       I,:  Monica Alanis MD personally performed the services described in this documentation    as scribed in my presence :

## 2022-10-12 PROBLEM — E86.0 DEHYDRATION: Status: RESOLVED | Noted: 2021-06-23 | Resolved: 2022-10-12

## 2022-10-13 ENCOUNTER — PROCEDURE VISIT (OUTPATIENT)
Dept: DERMATOLOGY | Facility: CLINIC | Age: 82
End: 2022-10-13
Payer: MEDICARE

## 2022-10-13 ENCOUNTER — OFFICE VISIT (OUTPATIENT)
Dept: FAMILY MEDICINE CLINIC | Facility: CLINIC | Age: 82
End: 2022-10-13
Payer: MEDICARE

## 2022-10-13 VITALS
HEIGHT: 68 IN | DIASTOLIC BLOOD PRESSURE: 80 MMHG | WEIGHT: 177 LBS | HEART RATE: 68 BPM | RESPIRATION RATE: 18 BRPM | TEMPERATURE: 98.1 F | BODY MASS INDEX: 26.83 KG/M2 | SYSTOLIC BLOOD PRESSURE: 140 MMHG

## 2022-10-13 VITALS
WEIGHT: 178.4 LBS | OXYGEN SATURATION: 98 % | HEART RATE: 79 BPM | DIASTOLIC BLOOD PRESSURE: 80 MMHG | HEIGHT: 68 IN | SYSTOLIC BLOOD PRESSURE: 148 MMHG | BODY MASS INDEX: 27.04 KG/M2

## 2022-10-13 DIAGNOSIS — Z48.02 VISIT FOR SUTURE REMOVAL: ICD-10-CM

## 2022-10-13 DIAGNOSIS — D48.5 NEOPLASM OF UNCERTAIN BEHAVIOR OF SKIN: Primary | ICD-10-CM

## 2022-10-13 DIAGNOSIS — Z12.5 ENCOUNTER FOR SCREENING FOR MALIGNANT NEOPLASM OF PROSTATE: ICD-10-CM

## 2022-10-13 DIAGNOSIS — Z23 NEED FOR INFLUENZA VACCINATION: Primary | ICD-10-CM

## 2022-10-13 DIAGNOSIS — C49.0 SARCOMA OF SCALP (HCC): ICD-10-CM

## 2022-10-13 DIAGNOSIS — C44.319 BASAL CELL CARCINOMA (BCC) OF RIGHT CHEEK: ICD-10-CM
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
## 2022-10-13 DIAGNOSIS — N18.30 STAGE 3 CHRONIC KIDNEY DISEASE, UNSPECIFIED WHETHER STAGE 3A OR 3B CKD (HCC): ICD-10-CM

## 2022-10-13 DIAGNOSIS — E78.5 HYPERLIPIDEMIA, UNSPECIFIED HYPERLIPIDEMIA TYPE: ICD-10-CM

## 2022-10-13 DIAGNOSIS — M87.38 RADIATION NECROSIS OF SKULL (HCC): ICD-10-CM

## 2022-10-13 DIAGNOSIS — I10 PRIMARY HYPERTENSION: ICD-10-CM

## 2022-10-13 PROCEDURE — G0008 ADMIN INFLUENZA VIRUS VAC: HCPCS

## 2022-10-13 PROCEDURE — 99214 OFFICE O/P EST MOD 30 MIN: CPT | Performed by: DERMATOLOGY

## 2022-10-13 PROCEDURE — 99214 OFFICE O/P EST MOD 30 MIN: CPT

## 2022-10-13 PROCEDURE — 90662 IIV NO PRSV INCREASED AG IM: CPT

## 2022-10-13 NOTE — PROGRESS NOTES
Name: Bianka Bocanegra      : 1940      MRN: [id number]  Encounter Provider: Paticia Merlin, PA-C  Encounter Date: 10/13/2022   Encounter department: [address] PRIMARY CARE    Assessment & Plan     Patient Instructions     Assessment/plan:  1  Benign essential hypertension -stable with metoprolol , lisinopril, and amlodipine, no medication changes  2  2   Mixed hyperlipidemia -stable with diet and exercise control  3  3   Chronic kidney disease- stage III-presently stable with blood pressure control and to lisinopril  Continue to stay well hydrated and avoid salt  4  4  Sarcoma of the scalp -status post excision  Patient continues to follow with oncology  5  5  Radiation necrosis of skull -presently stable  Patient has had surgical procedure, and doing well  6  6   Basal cell carcinoma on the lipid-patient has follow-up with Dermatology today after excision procedure  7  7   History of melanoma -stable status post excision of the right neck  Continues to follow with dermatology  8  Health maintenance -flu vaccine given today  1  Need for influenza vaccination  -     influenza vaccine, high-dose, PF 0 7 mL (FLUZONE HIGH-DOSE)    2  Primary hypertension  -     CBC and differential; Future; Expected date: 2023  -     Comprehensive metabolic panel; Future; Expected date: 2023  -     Lipid Panel with Direct LDL reflex; Future; Expected date: 2023  -     Vitamin D 25 hydroxy; Future; Expected date: 2023  -     TSH, 3rd generation; Future; Expected date: 2023  -     Vitamin D 25 hydroxy; Future; Expected date: 2023  -     PSA, Total Screen; Future; Expected date: 2023    3  Stage 3 chronic kidney disease, unspecified whether stage 3a or 3b CKD (HCC)  -     CBC and differential; Future; Expected date: 2023  -     Comprehensive metabolic panel; Future; Expected date: 2023  -     Lipid Panel with Direct LDL reflex;  Future; Expected date: 04/03/2023  -     Vitamin D 25 hydroxy; Future; Expected date: 04/03/2023  -     TSH, 3rd generation; Future; Expected date: 04/03/2023  -     Vitamin D 25 hydroxy; Future; Expected date: 04/03/2023  -     PSA, Total Screen; Future; Expected date: 04/03/2023    4  Hyperlipidemia, unspecified hyperlipidemia type  -     CBC and differential; Future; Expected date: 04/03/2023  -     Comprehensive metabolic panel; Future; Expected date: 04/03/2023  -     Lipid Panel with Direct LDL reflex; Future; Expected date: 04/03/2023  -     Vitamin D 25 hydroxy; Future; Expected date: 04/03/2023  -     TSH, 3rd generation; Future; Expected date: 04/03/2023  -     Vitamin D 25 hydroxy; Future; Expected date: 04/03/2023  -     PSA, Total Screen; Future; Expected date: 04/03/2023    5  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[address] )  -     CBC and differential; Future; Expected date: 04/03/2023  -     Comprehensive metabolic panel; Future; Expected date: 04/03/2023  -     Lipid Panel with Direct LDL reflex; Future; Expected date: 04/03/2023  -     Vitamin D 25 hydroxy; Future; Expected date: 04/03/2023  -     TSH, 3rd generation; Future; Expected date: 04/03/2023  -     Vitamin D 25 hydroxy; Future; Expected date: 04/03/2023  -     PSA, Total Screen; Future; Expected date: 04/03/2023    6  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HCC)    7  Encounter for screening for malignant neoplasm of prostate   -     PSA, Total Screen; Future; Expected date: 04/03/2023      Depression Screening and Follow-up Plan: Patient was screened for depression during today's encounter  They screened negative with a PHQ-2 score of 0  Subjective        HPI:  This is an 79-year-old gentleman that presents to the office for follow-up of chronic health conditions including hypertension, hyperlipidemia, and chronic kidney disease  He has been feeling well for the most part  He continues to work with dermatology for multiple types of skin cancer    He had melanoma excised from the right side of his neck about a month ago  He recently had basal cell excised from his lip and has follow-up with them today to remove stitches  He has also had sarcoma of the scalp and had radiation necrosis to the skull bone  He has had a grafting procedure with Plastic surgery  He is doing quite well with regards to this and continues to follow with Oncology, recently having CT surveillance  Review of Systems   Constitutional: Negative for chills, fatigue and fever  HENT: Negative for congestion, ear pain and sinus pressure  Eyes: Negative for visual disturbance  Respiratory: Negative for cough, chest tightness and shortness of breath  Cardiovascular: Negative for chest pain and palpitations  Gastrointestinal: Negative for diarrhea, nausea and vomiting  Endocrine: Negative for polyuria  Genitourinary: Negative for dysuria and frequency  Musculoskeletal: Negative for arthralgias and myalgias  Skin: Negative for pallor and rash  Neurological: Negative for dizziness, weakness, light-headedness, numbness and headaches  Psychiatric/Behavioral: Negative for agitation, behavioral problems and sleep disturbance  All other systems reviewed and are negative  Current Outpatient Medications on File Prior to Visit   Medication Sig   • acetaminophen (TYLENOL) 500 mg tablet Take 500-1,000 mg by mouth every 4 (four) hours as needed for mild pain   • amLODIPine (NORVASC) 10 mg tablet TAKE 1 TABLET BY MOUTH  DAILY   • bimatoprost (LUMIGAN) 0 01 % ophthalmic drops Administer 1 drop to the right eye daily at bedtime   • erythromycin (ILOTYCIN) ophthalmic ointment Administer 3 5 inches to the right eye daily at bedtime   • Fluocinolone Acetonide Scalp 0 01 % OIL Apply a thin layer topically daily at night one hour before bedtime     • lisinopril (ZESTRIL) 20 mg tablet TAKE 1 TABLET BY MOUTH  DAILY   • metoprolol succinate (TOPROL-XL) 50 mg 24 hr tablet Take 1 tablet (50 mg total) by mouth daily   • mometasone (ELOCON) 0 1 % cream APPLY TO EARS ONCE TO TWICE DAILY AS NEEDED FOR SCALING   • Multiple Vitamins-Minerals (PRESERVISION AREDS 2) CAPS Take 1 capsule by mouth 2 (two) times a day   • mupirocin (BACTROBAN) 2 % ointment Apply topically two to three times a day to sore areas   • Naproxen Sodium (ALEVE PO) Take 1 tablet by mouth 2 (two) times a day as needed    • imiquimod (ALDARA) 5 % cream Apply topically once a day Monday thru Friday for 6 weeks (Patient not taking: Reported on 10/13/2022)   • ofloxacin (OCUFLOX) 0 3 % ophthalmic solution INSTILL 1 DROP IN LEFT EYE FOUR TIMES DAILY (Patient not taking: No sig reported)   • tamsulosin (FLOMAX) 0 4 mg Take 1 capsule (0 4 mg total) by mouth daily with dinner (Patient not taking: No sig reported)   • [DISCONTINUED] alclomethasone (ACLOVATE) 0 05 % cream Apply topically 2 (two) times a day as needed        Objective     /80 (BP Location: Left arm, Patient Position: Sitting, Cuff Size: Standard)   Pulse 79   Ht 5' 8" (1 727 m)   Wt 80 9 kg (178 lb 6 4 oz)   SpO2 98%   BMI 27 13 kg/m²     Physical Exam  Vitals and nursing note reviewed  Constitutional:       General: He is not in acute distress  Appearance: He is well-developed  HENT:      Head: Normocephalic and atraumatic  Right Ear: External ear normal       Left Ear: External ear normal       Nose: Nose normal       Mouth/Throat:      Pharynx: No oropharyngeal exudate  Eyes:      Conjunctiva/sclera: Conjunctivae normal       Pupils: Pupils are equal, round, and reactive to light  Neck:      Thyroid: No thyromegaly  Trachea: No tracheal deviation  Cardiovascular:      Rate and Rhythm: Normal rate and regular rhythm  Heart sounds: Normal heart sounds  No murmur heard  No friction rub  Pulmonary:      Effort: Pulmonary effort is normal  No respiratory distress  Breath sounds: Normal breath sounds  No wheezing or rales     Abdominal:      General: Bowel sounds are normal  There is no distension  Palpations: Abdomen is soft  Tenderness: There is no abdominal tenderness  There is no guarding or rebound  Musculoskeletal:         General: No tenderness  Normal range of motion  Cervical back: Normal range of motion and neck supple  Lymphadenopathy:      Cervical: No cervical adenopathy  Skin:     General: Skin is warm and dry  Findings: No erythema or rash  Neurological:      Mental Status: He is alert and oriented to person, place, and time  Cranial Nerves: No cranial nerve deficit  Coordination: Coordination normal    Psychiatric:         Behavior: Behavior normal          Thought Content:  Thought content normal        Nils Herr PA-C

## 2022-10-13 NOTE — PROGRESS NOTES
[facility] Follow Up Note    Patient Name: Saskia Joiner  Encounter Date: 10/13/2022    Today's Chief Concerns:  • Concern #1:  BCC right cheek  • Concern #2:  Suture removal lip      Current Medications:    Current Outpatient Medications:   •  acetaminophen (TYLENOL) 500 mg tablet, Take 500-1,000 mg by mouth every 4 (four) hours as needed for mild pain, Disp: , Rfl:   •  amLODIPine (NORVASC) 10 mg tablet, TAKE 1 TABLET BY MOUTH  DAILY, Disp: 90 tablet, Rfl: 3  •  bimatoprost (LUMIGAN) 0 01 % ophthalmic drops, Administer 1 drop to the right eye daily at bedtime, Disp: , Rfl:   •  erythromycin (ILOTYCIN) ophthalmic ointment, Administer 3 5 inches to the right eye daily at bedtime, Disp: , Rfl:   •  Fluocinolone Acetonide Scalp 0 01 % OIL, Apply a thin layer topically daily at night one hour before bedtime  , Disp: 118 28 mL, Rfl: 5  •  lisinopril (ZESTRIL) 20 mg tablet, TAKE 1 TABLET BY MOUTH  DAILY, Disp: 90 tablet, Rfl: 3  •  metoprolol succinate (TOPROL-XL) 50 mg 24 hr tablet, Take 1 tablet (50 mg total) by mouth daily, Disp: 90 tablet, Rfl: 3  •  mometasone (ELOCON) 0 1 % cream, APPLY TO EARS ONCE TO TWICE DAILY AS NEEDED FOR SCALING, Disp: , Rfl:   •  Multiple Vitamins-Minerals (PRESERVISION AREDS 2) CAPS, Take 1 capsule by mouth 2 (two) times a day, Disp: , Rfl:   •  mupirocin (BACTROBAN) 2 % ointment, Apply topically two to three times a day to sore areas, Disp: 22 g, Rfl: 3  •  Naproxen Sodium (ALEVE PO), Take 1 tablet by mouth 2 (two) times a day as needed , Disp: , Rfl:   •  ofloxacin (OCUFLOX) 0 3 % ophthalmic solution, INSTILL 1 DROP IN LEFT EYE FOUR TIMES DAILY, Disp: , Rfl:   •  imiquimod (ALDARA) 5 % cream, Apply topically once a day Monday thru Friday for 6 weeks (Patient not taking: No sig reported), Disp: 24 each, Rfl: 1  •  tamsulosin (FLOMAX) 0 4 mg, Take 1 capsule (0 4 mg total) by mouth daily with dinner (Patient not taking: No sig reported), Disp: 30 capsule, Rfl: 0    CONSTITUTIONAL:   Vitals:    10/13/22 1007   BP: 140/80   BP Location: Left arm   Patient Position: Sitting   Cuff Size: Standard   Pulse: 68   Resp: 18   Temp: 98 1 °F (36 7 °C)   TempSrc: Temporal   Weight: 80 3 kg (177 lb)   Height: 5' 8" (1 727 m)         Specific Alerts:    Have you been seen by a [facility]atologist in the last 3 years? YES    Are you pregnant or planning to become pregnant? N/A    Are you currently or planning to be nursing or breast feeding? N/A    Allergies   Allergen Reactions   • Erythromycin Other (See Comments)     Thrush        May we call your Preferred Phone number to discuss your specific medical information? YES    May we leave a detailed message that includes your specific medical information? YES    Have you traveled outside of the [facility] in the past 3 months? No    Do you currently have a pacemaker or defibrillator? No    Do you have any artificial heart valves, joints, plates, screws, rods, stents, pins, etc? No   - If Yes, were any placed within the last 2 years? Do you require any medications prior to a surgical procedure? No   - If Yes, for which procedure? - If Yes, what medications to you require? Are you taking any medications that cause you to bleed more easily ("blood thinners") No    Have you ever experienced a rapid heartbeat with epinephrine? No    Have you ever been treated with "gold" (gold sodium thiomalate) therapy? No    [facility] can help with wrinkles, "laugh lines," facial volume loss, "double chin," "love handles," age spots, and more  Are you interested in learning today about some of the skin enhancement procedures that we offer? (If Yes, please provide more detail) No    Review of Systems:  Have you recently had or currently have any of the following?     · Fever or chills: No  · Night Sweats: No  · Headaches: No  · Weight Gain: No  · Weight Loss: No  · Blurry Vision: No  · Nausea: No  · Vomiting: No  · Diarrhea: No  · Blood in Stool: No  · Abdominal Pain: No  · Itchy Skin: No  · Painful Joints: No  · Swollen Joints: No  · Muscle Pain: No  · Irregular Mole: No  · Sun Burn: No  · Dry Skin: YES  · Skin Color Changes: YES, left side of nose  · Scar or Keloid: No  · Cold Sores/Fever Blisters: No  · Bacterial Infections/MRSA: No  · Anxiety: No  · Depression: No  · Suicidal or Homicidal Thoughts: No      PSYCH: Normal mood and affect  EYES: Normal conjunctiva  ENT: Normal lips and oral mucosa  CARDIOVASCULAR: No edema  RESPIRATORY: Normal respirations  HEME/LYMPH/IMMUNO:  No regional lymphadenopathy except as noted below in ASSESSMENT AND PLAN BY DIAGNOSIS    FULL ORGAN SYSTEM SKIN EXAM (SKIN)    Face Normal except as noted below in Assessment                                           1  BASAL CELL CARCINOMA     Physical Exam:  • Anatomic Location Affected:  Right cheek  • Morphological Description:  0 6 cm lucent nodule                                    Additional History of Present Condition:  Previously biopsied 8/8/2022, Accession # [id number]  Patient defers treatment today  Assessment and Plan:  Based on a thorough discussion of this condition and the management approach to it (including a comprehensive discussion of the known risks, side effects and potential benefits of treatment), the patient (family) agrees to implement the following specific plan:  • Mohs to be scheduled in the New year  2  NEOPLASM OF UNCERTAIN BEHAVIOR OF SKIN    Physical Exam:  • (Anatomic Location); (Size and Morphological Description); (Differential Diagnosis):  o Left ala; 2 cm brown macule; Differential diagnosis; Lentigo maligna vs  Malignant melanoma  •       Additional History of Present Condition:  Patient defers biopsy today      Assessment and Plan:  • I have discussed with the patient that a sample of skin via a "skin biopsy” would be potentially helpful to further make a specific diagnosis under the microscope  • Based on a thorough discussion of this condition and the management approach to it (including a comprehensive discussion of the known risks, side effects and potential benefits of treatment), the patient (family) agrees to implement the following specific plan:    o Schedule:  Skin Biopsy after Thanksgiving  I note that  lip healing is proceeding nicely  He deferred treatment of other lesions at this time and I note that my greatest concern is the are of pigmentation on the  Left nostril  This 1 4 cm tan macule has dermatoscopic features of atypia and could be lenigo maligna  Biopsy was recommended but defer by patient till November  Suture removal    Date/Time: 10/13/2022 10:36 AM  Performed by: Mary Moffett MA  Authorized by: Nichole Shirley MD   Universal Protocol:  Consent: Verbal consent obtained  Written consent not obtained  Risks and benefits: risks, benefits and alternatives were discussed  Consent given by: patient  Patient understanding: patient states understanding of the procedure being performed  Patient consent: the patient's understanding of the procedure matches consent given  Procedure consent: procedure consent matches procedure scheduled  Relevant documents: relevant documents present and verified  Test results: test results available and properly labeled  Site marked: the operative site was marked  Radiology Images displayed and confirmed  If images not available, report reviewed: imaging studies not available  Patient identity confirmed: verbally with patient        Patient location:  Clinic  Location:     Anesthesia laterality: Left upper lip  Procedure details: Tools used:  Suture removal kit    Wound appearance:  No sign(s) of infection, good wound healing and pink    Number of sutures removed:  30  Post-procedure details:     Post-procedure assessment: vaseline ointment  Patient tolerance of procedure:   Tolerated well, no immediate complications  Comments: Dr Poncho Luong removed the sutures          Scribe Attestation    I,:  Chaya Salter MA am acting as a scribe while in the presence of the attending physician :       I,:  Belen Gonzalez MD personally performed the services described in this documentation    as scribed in my presence :

## 2022-10-13 NOTE — PATIENT INSTRUCTIONS
1  BASAL CELL CARCINOMA     Physical Exam:  Anatomic Location Affected:  Right cheek    Assessment and Plan:  Based on a thorough discussion of this condition and the management approach to it (including a comprehensive discussion of the known risks, side effects and potential benefits of treatment), the patient (family) agrees to implement the following specific plan: Mohs to be scheduled in the New year  2  NEOPLASM OF UNCERTAIN BEHAVIOR OF SKIN    Physical Exam:  (Anatomic Location); (Size and Morphological Description); (Differential Diagnosis):  Left ala; 2 cm brown macule    Additional History of Present Condition:  Patient defers biopsy today  Assessment and Plan:  I have discussed with the patient that a sample of skin via a "skin biopsy” would be potentially helpful to further make a specific diagnosis under the microscope    Based on a thorough discussion of this condition and the management approach to it (including a comprehensive discussion of the known risks, side effects and potential benefits of treatment), the patient (family) agrees to implement the following specific plan:    Schedule:  Skin Biopsy after Thanksgiving

## 2022-10-17 NOTE — RESULT ENCOUNTER NOTE
DERMATOPATHOLOGY RESULT NOTE    Results reviewed by ordering physician  Called patient to personally discuss results  Discussed results with patient  Instructions for Clinical Derm Team:   (remember to route Result Note to appropriate staff):    None    Result & Plan by Specimen:    Specimen A: malignant  Plan:patient is aware of diagnosis and need for treatment  However he asked that treatment be deferred and prioritized  Final Diagnosis  A   Skin, right temple, shave biopsy:  Basal cell carcinoma, nodular type       Electronically signed by Eduard Dixon

## 2022-11-23 ENCOUNTER — OFFICE VISIT (OUTPATIENT)
Dept: PODIATRY | Facility: CLINIC | Age: 82
End: 2022-11-23

## 2022-11-23 VITALS
WEIGHT: 174.6 LBS | BODY MASS INDEX: 26.46 KG/M2 | HEIGHT: 68 IN | SYSTOLIC BLOOD PRESSURE: 134 MMHG | DIASTOLIC BLOOD PRESSURE: 74 MMHG

## 2022-11-23 DIAGNOSIS — I73.9 PERIPHERAL VASCULAR DISEASE, UNSPECIFIED (HCC): Primary | ICD-10-CM

## 2022-11-23 NOTE — PROGRESS NOTES
Established patient with class findings presents for nail care  Vascular exam:  DP  0/4 bilateral; PT  0 4 bilateral   Dermatological exam:  Each toenail is thick and  dystrophic  Diagnosis:  PVD  Treatment: Trimmed multiple dystrophic toenails      Nail removal    Date/Time: 11/23/2022 1:33 PM  Performed by: Thaddeus Erwin DPM  Authorized by: Thaddeus Erwin DPM     Nails trimmed:     Number of nails trimmed:  10

## 2022-12-06 ENCOUNTER — OFFICE VISIT (OUTPATIENT)
Dept: DERMATOLOGY | Facility: CLINIC | Age: 82
End: 2022-12-06

## 2022-12-06 VITALS — HEIGHT: 68 IN | BODY MASS INDEX: 27.13 KG/M2 | WEIGHT: 179 LBS

## 2022-12-06 DIAGNOSIS — D48.5 NEOPLASM OF UNCERTAIN BEHAVIOR OF SKIN: Primary | ICD-10-CM

## 2022-12-06 NOTE — PROGRESS NOTES
[facility] [address] Note     Patient Name: Francisco Javier Benton  Encounter Date: 12/6/22     Have you been cared for by a [facility] [facility] [address] [facility]atologist in the last 3 years and, if so, which description applies to you? Yes  I have been here within the last 3 years, and my medical history has NOT changed since that time  I am MALE/not capable of bearing children  REVIEW OF SYSTEMS:  Have you recently had or currently have any of the following? · No changes in my recent health  PAST MEDICAL HISTORY:  Have you personally ever had or currently have any of the following? If "YES," then please provide more detail  · No changes in my medical history  FAMILY HISTORY:  Any "first degree relatives" (parent, brother, sister, or child) with the following? • No changes in my family's known health  PATIENT EXPERIENCE:    • Do you want the Dermatologist to perform a COMPLETE skin exam today including a clinical examination under the "bra and underwear" areas? NO  • If necessary, do we have your permission to call and leave a detailed message on your Preferred Phone number that includes your specific medical information? Yes      Allergies   Allergen Reactions   • Erythromycin Other (See Comments)     Thrush       Current Outpatient Medications:   •  acetaminophen (TYLENOL) 500 mg tablet, Take 500-1,000 mg by mouth every 4 (four) hours as needed for mild pain, Disp: , Rfl:   •  amLODIPine (NORVASC) 10 mg tablet, TAKE 1 TABLET BY MOUTH  DAILY, Disp: 90 tablet, Rfl: 3  •  bimatoprost (LUMIGAN) 0 01 % ophthalmic drops, Administer 1 drop to the right eye daily at bedtime, Disp: , Rfl:   •  erythromycin (ILOTYCIN) ophthalmic ointment, Administer 3 5 inches to the right eye daily at bedtime, Disp: , Rfl:   •  Fluocinolone Acetonide Scalp 0 01 % OIL, Apply a thin layer topically daily at night one hour before bedtime  , Disp: 118 28 mL, Rfl: 5  •  imiquimod (ALDARA) 5 % cream, Apply topically once a day Monday thru Friday for 6 weeks, Disp: 24 each, Rfl: 1  •  lisinopril (ZESTRIL) 20 mg tablet, TAKE 1 TABLET BY MOUTH  DAILY, Disp: 90 tablet, Rfl: 3  •  metoprolol succinate (TOPROL-XL) 50 mg 24 hr tablet, Take 1 tablet (50 mg total) by mouth daily, Disp: 90 tablet, Rfl: 3  •  mometasone (ELOCON) 0 1 % cream, APPLY TO EARS ONCE TO TWICE DAILY AS NEEDED FOR SCALING, Disp: , Rfl:   •  Multiple Vitamins-Minerals (PRESERVISION AREDS 2) CAPS, Take 1 capsule by mouth 2 (two) times a day, Disp: , Rfl:   •  mupirocin (BACTROBAN) 2 % ointment, Apply topically two to three times a day to sore areas, Disp: 22 g, Rfl: 3  •  Naproxen Sodium (ALEVE PO), Take 1 tablet by mouth 2 (two) times a day as needed , Disp: , Rfl:   •  ofloxacin (OCUFLOX) 0 3 % ophthalmic solution, INSTILL 1 DROP IN LEFT EYE FOUR TIMES DAILY, Disp: , Rfl:   •  tamsulosin (FLOMAX) 0 4 mg, Take 1 capsule (0 4 mg total) by mouth daily with dinner, Disp: 30 capsule, Rfl: 0          • Whom besides the patient is providing clinical information about today's encounter?   o NO ADDITIONAL HISTORIAN (patient alone provided history)    Physical Exam and Assessment/Plan by Diagnosis:    NEOPLASM OF UNCERTAIN BEHAVIOR OF SKIN    Physical Exam:  • (Anatomic Location); (Size and Morphological Description); (Differential Diagnosis):  o Left ala; 2cm brown macule; DDX lentigo maligna versus Malignant melanoma   • Pertinent Positives:  • Pertinent Negatives:    Assessment and Plan:  • I have discussed with the patient that a sample of skin via a "skin biopsy” would be potentially helpful to further make a specific diagnosis under the microscope  • Based on a thorough discussion of this condition and the management approach to it (including a comprehensive discussion of the known risks, side effects and potential benefits of treatment), the patient (family) agrees to implement the following specific plan:    o Procedure:  Skin Biopsy    After a thorough discussion of treatment options and risk/benefits/alternatives (including but not limited to local pain, scarring, dyspigmentation, blistering, possible superinfection, and inability to confirm a diagnosis via histopathology), verbal and written consent were obtained and portion of the rash was biopsied for tissue sample  See below for consent that was obtained from patient and subsequent Procedure Note  PROCEDURE TANGENTIAL (SHAVE) BIOPSY NOTE:    • Performing Physician: Dr Caraballo  • Anatomic Location; Clinical Description with size (cm); Pre-Op Diagnosis:   • Left ala; 2cm brown macule; DDX lentigo maligna versus Malignant melanoma   • Post-op diagnosis: Same     • Local anesthesia: 2% Lidocaine  HCL     • Topical anesthesia: None    • Hemostasis: Aluminum chloride       After obtaining informed consent  at which time there was a discussion about the purpose of biopsy  and low risks of infection and bleeding  The area was prepped and draped in the usual fashion  Anesthesia was obtained with 1% lidocaine with epinephrine  A shave biopsy to an appropriate sampling depth was obtained by Shave (Dermablade or 15 blade) The resulting wound was covered with surgical ointment and bandaged appropriately  The patient tolerated the procedure well without complications and was without signs of functional compromise  Specimen has been sent for review by Dermatopathology  Standard post-procedure care has been explained and has been included in written form within the patient's copy of Informed Consent  INFORMED CONSENT DISCUSSION AND POST-OPERATIVE INSTRUCTIONS FOR PATIENT    I   RATIONALE FOR PROCEDURE  I understand that a skin biopsy allows the Dermatologist to test a lesion or rash under the microscope to obtain a diagnosis  It usually involves numbing the area with numbing medication and removing a small piece of skin; sometimes the area will be closed with sutures  In this specific procedure, sutures are not usually needed    If any sutures are placed, then they are usually need to be removed in 2 weeks or less  I understand that my Dermatologist recommends that a skin "shave" biopsy be performed today  A local anesthetic, similar to the kind that a dentist uses when filling a cavity, will be injected with a very small needle into the skin area to be sampled  The injected skin and tissue underneath "will go to sleep” and become numb so no pain should be felt afterwards  An instrument shaped like a tiny "razor blade" (shave biopsy instrument) will be used to cut a small piece of tissue and skin from the area so that a sample of tissue can be taken and examined more closely under the microscope  A slight amount of bleeding will occur, but it will be stopped with direct pressure and a pressure bandage and any other appropriate methods  I understands that a scar will form where the wound was created  Surgical ointment will be applied to help protect the wound  Sutures are not usually needed  II   RISKS AND POTENTIAL COMPLICATIONS   I understand the risks and potential complications of a skin biopsy include but are not limited to the following:  • Bleeding  • Infection  • Pain  • Scar/keloid  • Skin discoloration  • Incomplete Removal  • Recurrence  • Nerve Damage/Numbness/Loss of Function  • Allergic Reaction to Anesthesia  • Biopsies are diagnostic procedures and based on findings additional treatment or evaluation may be required  • Loss or destruction of specimen resulting in no additional findings    My Dermatologist has explained to me the nature of the condition, the nature of the procedure, and the benefits to be reasonably expected compared with alternative approaches  My Dermatologist has discussed the likelihood of major risks or complications of this procedure including the specific risks listed above, such as bleeding, infection, and scarring/keloid  I understand that a scar is expected after this procedure    I understand that my physician cannot predict if the scar will form a "keloid," which extends beyond the borders of the wound that is created  A keloid is a thick, painful, and bumpy scar  A keloid can be difficult to treat, as it does not always respond well to therapy, which includes injecting cortisone directly into the keloid every few weeks  While this usually reduces the pain and size of the scar, it does not eliminate it  I understand that photographs may be taken before and after the procedure  These will be maintained as part of the medical providers confidential records and may not be made available to me  I further authorize the medical provider to use the photographs for teaching purposes or to illustrate scientific papers, books, or lectures if in his/her judgment, medical research, education, or science may benefit from its use  I have had an opportunity to fully inquire about the risks and benefits of this procedure and its alternatives  I have been given ample time and opportunity to ask questions and to seek a second opinion if I wished to do so  I acknowledge that there have specifically been no guarantees as to the cosmetic results from the procedure  I am aware that with any procedure there is always the possibility of an unexpected complication  III  POST-PROCEDURAL CARE (WHAT YOU WILL NEED TO DO "AFTER THE BIOPSY" TO OPTIMIZE HEALING)    • Keep the area clean and dry  Try NOT to remove the bandage or get it wet for the first 24 hours  • Gently clean the area and apply surgical ointment (such as Vaseline petrolatum ointment, which is available "over the counter" and not a prescription) to the biopsy site for up to 2 weeks straight  This acts to protect the wound from the outside world  • Sutures are not usually placed in this procedure  If any sutures were placed, return for suture removal as instructed (generally 1 week for the face, 2 weeks for the body)        • Take Acetaminophen (Tylenol) for discomfort, if no contraindications  Ibuprofen or aspirin could make bleeding worse  • Call our office immediately for signs of infection: fever, chills, increased redness, warmth, tenderness, discomfort/pain, or pus or foul smell coming from the wound  WHAT TO DO IF THERE IS ANY BLEEDING? If a small amount of bleeding is noticed, place a clean cloth over the area and apply firm pressure for ten minutes  Check the wound after 10 minutes of direct pressure  If bleeding persists, try one more time for an additional 10 minutes of direct pressure on the area  If the bleeding becomes heavier or does not stop after the second attempt, or if you have any other questions about this procedure, then please call your [facility] by calling [phone] (SKIN)  I hereby acknowledge that I have reviewed and verified the site with my Dermatologist and have requested and authorized my Dermatologist to proceed with the procedure      Scribe Attestation    I,:  Ru Milian am acting as a scribe while in the presence of the attending physician :       I,:  Jesenia Mims MD personally performed the services described in this documentation    as scribed in my presence :

## 2022-12-06 NOTE — PATIENT INSTRUCTIONS
INFORMED CONSENT DISCUSSION AND POST-OPERATIVE INSTRUCTIONS FOR PATIENT    I   RATIONALE FOR PROCEDURE  I understand that a skin biopsy allows the Dermatologist to test a lesion or rash under the microscope to obtain a diagnosis  It usually involves numbing the area with numbing medication and removing a small piece of skin; sometimes the area will be closed with sutures  In this specific procedure, sutures are not usually needed  If any sutures are placed, then they are usually need to be removed in 2 weeks or less  I understand that my Dermatologist recommends that a skin "shave" biopsy be performed today  A local anesthetic, similar to the kind that a dentist uses when filling a cavity, will be injected with a very small needle into the skin area to be sampled  The injected skin and tissue underneath "will go to sleep” and become numb so no pain should be felt afterwards  An instrument shaped like a tiny "razor blade" (shave biopsy instrument) will be used to cut a small piece of tissue and skin from the area so that a sample of tissue can be taken and examined more closely under the microscope  A slight amount of bleeding will occur, but it will be stopped with direct pressure and a pressure bandage and any other appropriate methods  I understands that a scar will form where the wound was created  Surgical ointment will be applied to help protect the wound  Sutures are not usually needed      II   RISKS AND POTENTIAL COMPLICATIONS   I understand the risks and potential complications of a skin biopsy include but are not limited to the following:  Bleeding  Infection  Pain  Scar/keloid  Skin discoloration  Incomplete Removal  Recurrence  Nerve Damage/Numbness/Loss of Function  Allergic Reaction to Anesthesia  Biopsies are diagnostic procedures and based on findings additional treatment or evaluation may be required  Loss or destruction of specimen resulting in no additional findings    My Dermatologist has explained to me the nature of the condition, the nature of the procedure, and the benefits to be reasonably expected compared with alternative approaches  My Dermatologist has discussed the likelihood of major risks or complications of this procedure including the specific risks listed above, such as bleeding, infection, and scarring/keloid  I understand that a scar is expected after this procedure  I understand that my physician cannot predict if the scar will form a "keloid," which extends beyond the borders of the wound that is created  A keloid is a thick, painful, and bumpy scar  A keloid can be difficult to treat, as it does not always respond well to therapy, which includes injecting cortisone directly into the keloid every few weeks  While this usually reduces the pain and size of the scar, it does not eliminate it  I understand that photographs may be taken before and after the procedure  These will be maintained as part of the medical providers confidential records and may not be made available to me  I further authorize the medical provider to use the photographs for teaching purposes or to illustrate scientific papers, books, or lectures if in his/her judgment, medical research, education, or science may benefit from its use  I have had an opportunity to fully inquire about the risks and benefits of this procedure and its alternatives  I have been given ample time and opportunity to ask questions and to seek a second opinion if I wished to do so  I acknowledge that there have specifically been no guarantees as to the cosmetic results from the procedure  I am aware that with any procedure there is always the possibility of an unexpected complication  III  POST-PROCEDURAL CARE (WHAT YOU WILL NEED TO DO "AFTER THE BIOPSY" TO OPTIMIZE HEALING)    Keep the area clean and dry  Try NOT to remove the bandage or get it wet for the first 24 hours      Gently clean the area and apply surgical ointment (such as Vaseline petrolatum ointment, which is available "over the counter" and not a prescription) to the biopsy site for up to 2 weeks straight  This acts to protect the wound from the outside world  Sutures are not usually placed in this procedure  If any sutures were placed, return for suture removal as instructed (generally 1 week for the face, 2 weeks for the body)  Take Acetaminophen (Tylenol) for discomfort, if no contraindications  Ibuprofen or aspirin could make bleeding worse  Call our office immediately for signs of infection: fever, chills, increased redness, warmth, tenderness, discomfort/pain, or pus or foul smell coming from the wound  WHAT TO DO IF THERE IS ANY BLEEDING? If a small amount of bleeding is noticed, place a clean cloth over the area and apply firm pressure for ten minutes  Check the wound after 10 minutes of direct pressure  If bleeding persists, try one more time for an additional 10 minutes of direct pressure on the area  If the bleeding becomes heavier or does not stop after the second attempt, or if you have any other questions about this procedure, then please call your [facility]'s Dermatologist by calling [phone] (SKIN)  I hereby acknowledge that I have reviewed and verified the site with my Dermatologist and have requested and authorized my Dermatologist to proceed with the procedure

## 2022-12-13 ENCOUNTER — TELEPHONE (OUTPATIENT)
Dept: DERMATOLOGY | Facility: CLINIC | Age: 82
End: 2022-12-13

## 2022-12-13 NOTE — TELEPHONE ENCOUNTER
I spoke to patient and noted that biopsy has shown an early melanoma  We sampled darkest area only and dept was 0 6mm   Overall prognosis should be favorable, but to adequately remove extensive reconstruction will be required  I did ask him to consider options and I will speak to him Friday

## 2022-12-16 ENCOUNTER — TELEPHONE (OUTPATIENT)
Dept: HEMATOLOGY ONCOLOGY | Facility: CLINIC | Age: 82
End: 2022-12-16

## 2022-12-16 DIAGNOSIS — C43.31 MALIGNANT MELANOMA OF NOSE (HCC): Primary | ICD-10-CM

## 2022-12-16 NOTE — TELEPHONE ENCOUNTER
Made attempt to schedule a new patient appointment with Surgical oncology, I spoke with the patient and his wife who would like some time to look over and discuss which doctor that the patient would like to schedule with   The patient request a call back Monday to schedule a appointment  I did advise the patient that I would try calling them back Monday and I also provided them with the [facility] number [phone]

## 2022-12-16 NOTE — RESULT ENCOUNTER NOTE
DERMATOPATHOLOGY RESULT NOTE    Results reviewed by ordering physician  Called patient to personally discuss results  Discussed results with patient  Instructions for Clinical Derm Team:   (remember to route Result Note to appropriate staff):    None    Result & Plan by Specimen:    Specimen A: malignant  Plan: I reviewed and discussed importance of further treatment  Difficulties of surgery in this area noted   Necessity of treatment reviewed  Referral made to surgical oncology [facility]    Final Diagnosis  A  Skin, left ala, shave biopsy:     MELANOMA (thickness: 0 6 mm); extending to the tissue edges (see note)      Note: SOX10 and MART-1 immunostains were performed and reviewed  Multiple levels examined  Please see synoptic report for additional details   The results were emailed to Dr Jj Dobbs on 12/13/2022      Synoptic report for melanoma of the skin  Thickness: 0 6 mm  Ulceration: not seen  Anatomic Magdiel Woodson) level: III  Type: lentigo maligna melanoma  Mitoses: 0/mm2  Microsatellites: not seen  Lymphovascular invasion: not seen  Neurotropism: not seen  Tumor regression: not seen  Margin assessment: invasive melanoma extends to peripheral specimen margin and deep specimen margin focally  Pathologic stage: pT1a  Associated nevus: not seen

## 2022-12-20 ENCOUNTER — DOCUMENTATION (OUTPATIENT)
Dept: HEMATOLOGY ONCOLOGY | Facility: CLINIC | Age: 82
End: 2022-12-20

## 2022-12-20 ENCOUNTER — TELEPHONE (OUTPATIENT)
Dept: SURGICAL ONCOLOGY | Facility: CLINIC | Age: 82
End: 2022-12-20

## 2022-12-20 NOTE — PROGRESS NOTES
Intake received  Pt has active medicare A &B  Pt also has an active supplemental plan I thru [employer]  That plan will pay the 20% that medicare doesn't pay  Pt outreach not needed at this time

## 2022-12-20 NOTE — TELEPHONE ENCOUNTER
Spoke to patient and offered appt for Dr Harjinder Rodriguez tomorrow, but patient declined  He would like to keep his original appt time  Flat affect

## 2022-12-22 ENCOUNTER — DOCUMENTATION (OUTPATIENT)
Dept: HEMATOLOGY ONCOLOGY | Facility: CLINIC | Age: 82
End: 2022-12-22

## 2022-12-22 NOTE — PROGRESS NOTES
Intake received, chart reviewed for need of external records  Pathology completed: 12/6/2022 [id number], 9/14/2022 [id number], 8/8/2022 [id number]  Imaging completed: N/A  All records needed are in patients chart  No further action required of Care Coordination team at this time

## 2023-01-03 PROBLEM — C43.31 MALIGNANT MELANOMA OF NOSE (HCC): Status: ACTIVE | Noted: 2023-01-03

## 2023-01-04 ENCOUNTER — CONSULT (OUTPATIENT)
Dept: SURGICAL ONCOLOGY | Facility: CLINIC | Age: 83
End: 2023-01-04

## 2023-01-04 VITALS
WEIGHT: 179 LBS | HEIGHT: 68 IN | SYSTOLIC BLOOD PRESSURE: 160 MMHG | TEMPERATURE: 97.9 F | DIASTOLIC BLOOD PRESSURE: 82 MMHG | BODY MASS INDEX: 27.13 KG/M2

## 2023-01-04 DIAGNOSIS — C49.0 SARCOMA OF SCALP (HCC): ICD-10-CM

## 2023-01-04 DIAGNOSIS — C43.31 MALIGNANT MELANOMA OF NOSE (HCC): Primary | ICD-10-CM

## 2023-01-04 RX ORDER — TRAMADOL HYDROCHLORIDE 50 MG/1
50 TABLET ORAL EVERY 6 HOURS PRN
Qty: 10 TABLET | Refills: 0 | Status: SHIPPED | OUTPATIENT
Start: 2023-01-04

## 2023-01-04 NOTE — PROGRESS NOTES
Surgical Oncology Consult       [facility] SURGICAL ONCOLOGY [facility] [address]    Catalina Cheung  1940  [id number]  [address] [facility] [place]  [place] [address]    No chief complaint on file  Assessment/Plan:    No problem-specific Assessment & Plan notes found for this encounter  Diagnoses and all orders for this visit:    Malignant melanoma of nose [facility])  -     Ambulatory Referral to Surgical Oncology        Advance Care Planning/Advance Directives:  Discussed disease status, cancer treatment plans and/or cancer treatment goals with the patient  Oncology History   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[address] )   11/8/2017 Surgery    right vertex of scalp - s/p excision and full thickness graft for closure on 11/8/17 for atypical fibroxanthoma  1/29/2018 Biopsy    biopsy from the anterior aspect of the graft showing atypical epithelioid fibro histiocytic tumor, with lesion extending to the deep tissue edge  This is similar to prior biopsy done  Differential includes pleomorphic sarcoma versus an extreme atypical example of atypical fibro histiocytoma  3/2018 Initial Diagnosis    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[address] )     3/28/2018 Surgery    excision of scalp sarcoma with full thickness graft - Dr Mallika Mendoza  Soft Tissue/Skin, Sarcoma of scalp, wide excision:  - Recurrent pleomorphic sarcoma, 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  See Note  -- FNCLCC Histologic Grade 3  (total score: 7 of 8)  * Tumor differentiation score: 3 of 3         * Mitotic count score: 3 of 3; > 19 mitoses/10 HPF (33 mitoses/10 HPF)  * Necrosis score: 1 of 2; present, < 50%  - Tumor extends into deep reticular dermis, subcutis and fascia  - Perineural invasion: Present; intraneural invasion identified (0 4 mm largest nerve diameter)  - Lymphovascular invasion: Focally suspicious     - Bone invasion: Not identified    - Central nervous system extension: Not identified  - Margins: uninvolved by sarcoma but close  -- Sarcoma  0 15 mm from nearest deep margin    - Additional Pathologic Findings: Changes consistent with prior surgical site  -- Focal ulceration with bacterial colonization, acute and chronic inflammation  -- Best representative tumor block: A5       5/29/2018 - 7/5/2018 Radiation    Plan ID Energy Fractions Dose per Fraction (cGy) Dose Correction (cGy) Total Dose Delivered (cGy) Elapsed Days   Vertex Scalp 9E 27 / 27 200 0 5,400 37            Basal cell carcinoma of skin of other parts of face    Initial Diagnosis    Basal cell carcinoma of skin of other parts of face     Malignant melanoma of nose (HCC)   12/6/2022 Biopsy    Skin, left ala, shave biopsy:     MELANOMA (thickness: 0 6 mm); extending to the tissue edges      Ulceration: not seen  Anatomic [place]) level: III  Type: lentigo maligna melanoma  Mitoses: 0/mm2  Margin assessment: invasive melanoma extends to peripheral specimen margin and deep specimen margin focally  Pathologic stage: pT1a         History of Present Illness: Patient is an 24-year-old man with an extensive skin cancer history  He is post prior excision of melanomas as well as a sarcoma on his right scalp  He has had plastics procedures done as well for reconstruction in these areas  At a recent dermatology visit, he was found to have a pigmented lesion on his left nasal ala  Biopsy confirmed a T1 a melanoma  He has been referred for evaluation and treatment  He has extensive sun exposure history  Review of Systems   Constitutional: Negative  HENT: Negative  Eyes: Negative  Respiratory: Negative  Cardiovascular: Negative  Gastrointestinal: Negative  Endocrine: Negative  Genitourinary: Negative  Musculoskeletal: Negative  Skin:        New left alar melanoma   Allergic/Immunologic: Negative  Neurological: Negative  Hematological: Negative  Psychiatric/Behavioral: Negative  All other systems reviewed and are negative          Patient Active Problem List   Diagnosis   • Actinic keratosis   • Stage 3 chronic kidney disease ([address] )   • Gout   • Hyperlipidemia   • Hypertension   • Irritable bowel syndrome   • Macular degeneration   • Urinary incontinence, post-void dribbling   • 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[address] )   • Basal cell carcinoma of skin of other parts of face   • Pulmonary nodules   • Incisional hernia, without obstruction or gangrene   • Status post repair of ventral hernia   • Dizziness   • Urinary frequency   • Lumbar back pain with radiculopathy affecting lower extremity   • Scalp ulceration, with necrosis of bone (HCC)   • 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HCC)   • Malignant melanoma of nose (HCC)     Past Medical History:   Diagnosis Date   • Back pain    • Balance problem    • Basal cell carcinoma (BCC)     in past   • BCC (basal cell carcinoma of skin) 08/17/2021    Right tip of nose   • BCC (basal cell carcinoma of skin) 08/08/2022    Left lip   • BCC (basal cell carcinoma of skin) 08/08/2022    Right cheek   • Gout    • Hard of hearing    • Hearing aid worn     ziyad   • Herniated nucleus pulposus, L4-5    • Hypertension    • Incisional hernia    • Macular degeneration    • Melanoma ([facility] [address] )     left cheek- history   • Melanoma ([facility] [address] ) 08/08/2022    Right neck   • Pulmonary nodules    • Reactive airway disease    • 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[address]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   • Skin cancer 07/01/2020    AFX- scalp   • Squamous cell carcinoma     in past     Past Surgical History:   Procedure Laterality Date   • CATARACT EXTRACTION Bilateral    • COLONOSCOPY     • HERNIA REPAIR     • HYDROCELE EXCISION / REPAIR     • MASTOID SURGERY Left    • MOHS SURGERY     • MOHS SURGERY  09/29/2021    Right tip of nose-Dr Constanza Ace   • MOHS SURGERY  10/06/2022    Left lip-Dr Constanza Ace   • OTHER SURGICAL HISTORY      sarcoma scalp with skin graft   • MN REPAIR FIRST ABDOMINAL WALL HERNIA N/A 2021    Procedure: REPAIR HERNIA INCISIONAL OPEN WITH MESH;  Surgeon: Sal Lowry MD;  Location: [facility];  Service: General   • SCALP EXCISION N/A 2018    Procedure: SCALP SARCOMA EXCISION WITH FULL THICKNESS SKIN GRAFT;  Surgeon: Daniela Siddiqi MD;  Location: [facility];  Service: Plastics   • SKIN BIOPSY     • SKIN CANCER EXCISION     • TONSILLECTOMY AND ADENOIDECTOMY       Family History   Problem Relation Age of Onset   • Colon cancer Father    • Heart failure Father    • Heart disease Mother    • Stroke Mother      Social History     Socioeconomic History   • Marital status: /Civil Union     Spouse name: Not on file   • Number of children: Not on file   • Years of education: Not on file   • Highest education level: Not on file   Occupational History   • Not on file   Tobacco Use   • Smoking status: Former     Packs/day: 0 25     Years: 15 00     Pack years: 3 75     Types: Cigarettes     Quit date: 3/13/1988     Years since quittin 8   • Smokeless tobacco: Never   Vaping Use   • Vaping Use: Never used   Substance and Sexual Activity   • Alcohol use: Yes     Comment: beer   • Drug use: No   • Sexual activity: Yes   Other Topics Concern   • Not on file   Social History Narrative   • Not on file     Social Determinants of Health     Financial Resource Strain: Not on file   Food Insecurity: Not on file   Transportation Needs: Not on file   Physical Activity: Not on file   Stress: Not on file   Social Connections: Not on file   Intimate Partner Violence: Not on file   Housing Stability: Not on file       Current Outpatient Medications:   •  acetaminophen (TYLENOL) 500 mg tablet, Take 500-1,000 mg by mouth every 4 (four) hours as needed for mild pain, Disp: , Rfl:   •  amLODIPine (NORVASC) 10 mg tablet, TAKE 1 TABLET BY MOUTH  DAILY, Disp: 90 tablet, Rfl: 3  •  bimatoprost (LUMIGAN) 0 01 % ophthalmic drops, Administer 1 drop to the right eye daily at bedtime, Disp: , Rfl:   •  erythromycin (ILOTYCIN) ophthalmic ointment, Administer 3 5 inches to the right eye daily at bedtime, Disp: , Rfl:   •  Fluocinolone Acetonide Scalp 0 01 % OIL, Apply a thin layer topically daily at night one hour before bedtime  , Disp: 118 28 mL, Rfl: 5  •  imiquimod (ALDARA) 5 % cream, Apply topically once a day Monday thru Friday for 6 weeks, Disp: 24 each, Rfl: 1  •  lisinopril (ZESTRIL) 20 mg tablet, TAKE 1 TABLET BY MOUTH  DAILY, Disp: 90 tablet, Rfl: 3  •  metoprolol succinate (TOPROL-XL) 50 mg 24 hr tablet, Take 1 tablet (50 mg total) by mouth daily, Disp: 90 tablet, Rfl: 3  •  mometasone (ELOCON) 0 1 % cream, APPLY TO EARS ONCE TO TWICE DAILY AS NEEDED FOR SCALING, Disp: , Rfl:   •  Multiple Vitamins-Minerals (PRESERVISION AREDS 2) CAPS, Take 1 capsule by mouth 2 (two) times a day, Disp: , Rfl:   •  mupirocin (BACTROBAN) 2 % ointment, Apply topically two to three times a day to sore areas, Disp: 22 g, Rfl: 3  •  Naproxen Sodium (ALEVE PO), Take 1 tablet by mouth 2 (two) times a day as needed , Disp: , Rfl:   •  ofloxacin (OCUFLOX) 0 3 % ophthalmic solution, INSTILL 1 DROP IN LEFT EYE FOUR TIMES DAILY, Disp: , Rfl:   •  tamsulosin (FLOMAX) 0 4 mg, Take 1 capsule (0 4 mg total) by mouth daily with dinner, Disp: 30 capsule, Rfl: 0  Allergies   Allergen Reactions   • Erythromycin Other (See Comments)     Thrush      Vitals:    01/04/23 0952   BP: 160/82   Temp: 97 9 °F (36 6 °C)       Physical Exam  Vitals reviewed  Constitutional:       Appearance: Normal appearance  HENT:      Head: Normocephalic and atraumatic  Right Ear: External ear normal       Left Ear: External ear normal       Nose: Nose normal    Eyes:      Extraocular Movements: Extraocular movements intact  Pupils: Pupils are equal, round, and reactive to light  Cardiovascular:      Rate and Rhythm: Normal rate and regular rhythm  Pulses: Normal pulses  Heart sounds: Normal heart sounds  Pulmonary:      Effort: Pulmonary effort is normal       Breath sounds: Normal breath sounds  Abdominal:      General: Abdomen is flat  Palpations: Abdomen is soft  Musculoskeletal:         General: Normal range of motion  Cervical back: Normal range of motion and neck supple  Skin:     General: Skin is warm  Comments: Left nasal ala with Treatment of lesion measuring proxy 5 mm x 9 mm  No satellite lesions  No cervical or parotid adenopathy  Neurological:      General: No focal deficit present  Mental Status: He is alert and oriented to person, place, and time  Psychiatric:         Mood and Affect: Mood normal          Behavior: Behavior normal          Thought Content: Thought content normal          Judgment: Judgment normal          Pathology:  Case Report   Surgical Pathology Report                         Case: [id number]                                    Authorizing Provider: Chau Collins MD          Collected:           12/06/2022 1542               Ordering Location:     [facility]      Received:            12/06/2022 [address] [place] [address]                                                                 Pathologist:           Michelle Marquez MD                                                            Specimen:    Skin, Other, Specimen A; Left ala                                                          Final Diagnosis   A  Skin, left ala, shave biopsy:     MELANOMA (thickness: 0 6 mm); extending to the tissue edges (see note)      Note: SOX10 and MART-1 immunostains were performed and reviewed  Multiple levels examined  Please see synoptic report for additional details   The results were emailed to Dr Helen Christensen on 12/13/2022      Synoptic report for melanoma of the skin  Thickness: 0 6 mm  Ulceration: not seen  Anatomic Ree Guerrero) level: III  Type: lentigo maligna melanoma  Mitoses: 0/mm2  Microsatellites: not seen  Lymphovascular invasion: not seen  Neurotropism: not seen  Tumor regression: not seen  Margin assessment: invasive melanoma extends to peripheral specimen margin and deep specimen margin focally  Pathologic stage: pT1a  Associated nevus: not seen            Electronically signed by Obdulia Andersen MD on 12/13/2022 at 10:17 AM         Labs:  none    Imaging  No results found  I reviewed the above laboratory and imaging data  Discussion/Summary: Stage T1 a melanoma, left nasal ala  We will plan for excision to be done in conjunction with plastic surgery team for anticipated reconstruction  Rationale for this along with benefits of surgery include infection, bleeding, need for possible additional surgery, discussed with all questions answered and consent signed at this visit  We will make appropriate referrals to plastic surgery team and coordinate accordingly

## 2023-01-04 NOTE — LETTER
January 4, 2023     Rachael Munguia, [address]    Patient: Neftali Rivera   YOB: 1940   Date of Visit: 1/4/2023       Dear Dr Fátima Limon: Thank you for referring Rachael Lanier to me for evaluation  Below are my notes for this consultation  If you have questions, please do not hesitate to call me  I look forward to following your patient along with you  Sincerely,        Floridalma Huang MD        CC: DEON Louie MD Theodosia Sers, MD  Ned Friends, DO  Floridalma Huang MD  1/4/2023 10:37 AM  Sign when Signing Visit               Surgical Oncology Consult       [facility] [id number]    Eleazar Dias Skye  1940  [id number]  [facility]  CANCER [facility] SURGICAL ONCOLOGY [address] [address]    No chief complaint on file  Assessment/Plan:    No problem-specific Assessment & Plan notes found for this encounter  Diagnoses and all orders for this visit:    Malignant melanoma of nose [facility])  -     Ambulatory Referral to Surgical Oncology       Advance Care Planning/Advance Directives:  Discussed disease status, cancer treatment plans and/or cancer treatment goals with the patient  Oncology History   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[facility] [address] )   11/8/2017 Surgery    right vertex of scalp - s/p excision and full thickness graft for closure on 11/8/17 for atypical fibroxanthoma  1/29/2018 Biopsy    biopsy from the anterior aspect of the graft showing atypical epithelioid fibro histiocytic tumor, with lesion extending to the deep tissue edge  This is similar to prior biopsy done  Differential includes pleomorphic sarcoma versus an extreme atypical example of atypical fibro histiocytoma       3/2018 Initial Diagnosis    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[facility] [address] [address] )     3/28/2018 Surgery    excision of scalp sarcoma with full thickness graft - Dr Kimani Quick  Soft Tissue/Skin, Sarcoma of scalp, wide excision:  - Recurrent pleomorphic sarcoma, 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  See Note  -- FNCLCC Histologic Grade 3  (total score: 7 of 8)  * Tumor differentiation score: 3 of 3         * Mitotic count score: 3 of 3; > 19 mitoses/10 HPF (33 mitoses/10 HPF)  * Necrosis score: 1 of 2; present, < 50%  - Tumor extends into deep reticular dermis, subcutis and fascia  - Perineural invasion: Present; intraneural invasion identified (0 4 mm largest nerve diameter)  - Lymphovascular invasion: Focally suspicious  - Bone invasion: Not identified    - Central nervous system extension: Not identified  - Margins: uninvolved by sarcoma but close  -- Sarcoma  0 15 mm from nearest deep margin    - Additional Pathologic Findings: Changes consistent with prior surgical site  -- Focal ulceration with bacterial colonization, acute and chronic inflammation  -- Best representative tumor block: A5       5/29/2018 - 7/5/2018 Radiation    Plan ID Energy Fractions Dose per Fraction (cGy) Dose Correction (cGy) Total Dose Delivered (cGy) Elapsed Days   Vertex Scalp 9E 27 / 27 200 0 5,400 37            Basal cell carcinoma of skin of other parts of face    Initial Diagnosis    Basal cell carcinoma of skin of other parts of face     Malignant melanoma of nose (HCC)   12/6/2022 Biopsy    Skin, left ala, shave biopsy:     MELANOMA (thickness: 0 6 mm); extending to the tissue edges      Ulceration: not seen  Anatomic Sherwin Joann) level: III  Type: lentigo maligna melanoma  Mitoses: 0/mm2  Margin assessment: invasive melanoma extends to peripheral specimen margin and deep specimen margin focally  Pathologic stage: pT1a         History of Present Illness: Patient is an 18-year-old man with an extensive skin cancer history  He is post prior excision of melanomas as well as a sarcoma on his right scalp    He has had plastics procedures done as well for reconstruction in these areas  At a recent dermatology visit, he was found to have a pigmented lesion on his left nasal ala  Biopsy confirmed a T1 a melanoma  He has been referred for evaluation and treatment  He has extensive sun exposure history  Review of Systems   Constitutional: Negative  HENT: Negative  Eyes: Negative  Respiratory: Negative  Cardiovascular: Negative  Gastrointestinal: Negative  Endocrine: Negative  Genitourinary: Negative  Musculoskeletal: Negative  Skin:        New left alar melanoma   Allergic/Immunologic: Negative  Neurological: Negative  Hematological: Negative  Psychiatric/Behavioral: Negative  All other systems reviewed and are negative          Patient Active Problem List   Diagnosis   • Actinic keratosis   • Stage 3 chronic kidney disease ([address] )   • Gout   • Hyperlipidemia   • Hypertension   • Irritable bowel syndrome   • Macular degeneration   • Urinary incontinence, post-void dribbling   • 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HCC)   • Basal cell carcinoma of skin of other parts of face   • Pulmonary nodules   • Incisional hernia, without obstruction or gangrene   • Status post repair of ventral hernia   • Dizziness   • Urinary frequency   • Lumbar back pain with radiculopathy affecting lower extremity   • Scalp ulceration, with necrosis of bone (HCC)   • 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HCC)   • Malignant melanoma of nose (HCC)     Past Medical History:   Diagnosis Date   • Back pain    • Balance problem    • Basal cell carcinoma (BCC)     in past   • BCC (basal cell carcinoma of skin) 08/17/2021    Right tip of nose   • BCC (basal cell carcinoma of skin) 08/08/2022    Left lip   • BCC (basal cell carcinoma of skin) 08/08/2022    Right cheek   • Gout    • Hard of hearing    • Hearing aid worn     ziyad   • Herniated nucleus pulposus, L4-5    • Hypertension    • Incisional hernia    • Macular degeneration    • Melanoma ([address] )     left cheek- history   • Melanoma ([facility] ) 2022    Right neck   • Pulmonary nodules    • Reactive airway disease    • 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[facility])     2020 with skin grafting   • Skin cancer 2020    AFX- scalp   • Squamous cell carcinoma     in past     Past Surgical History:   Procedure Laterality Date   • CATARACT EXTRACTION Bilateral    • COLONOSCOPY     • HERNIA REPAIR     • HYDROCELE EXCISION / REPAIR     • MASTOID SURGERY Left    • MOHS SURGERY     • MOHS SURGERY  2021    Right tip of nose-Dr Trell White   • MOHS SURGERY  10/06/2022    Left lip-Dr Trell White   • OTHER SURGICAL HISTORY      sarcoma scalp with skin graft   • NE REPAIR FIRST ABDOMINAL WALL HERNIA N/A 2021    Procedure: REPAIR HERNIA INCISIONAL OPEN WITH MESH;  Surgeon: Ej Ruiz MD;  [facility];  Service: General   • SCALP EXCISION N/A 2018    Procedure: SCALP SARCOMA EXCISION WITH FULL THICKNESS SKIN GRAFT;  Surgeon: Corey Quiroz MD;  [facility];  Service: Plastics   • SKIN BIOPSY     • SKIN CANCER EXCISION     • TONSILLECTOMY AND ADENOIDECTOMY       Family History   Problem Relation Age of Onset   • Colon cancer Father    • Heart failure Father    • Heart disease Mother    • Stroke Mother      Social History     Socioeconomic History   • Marital status: /Civil Union     Spouse name: Not on file   • Number of children: Not on file   • Years of education: Not on file   • Highest education level: Not on file   Occupational History   • Not on file   Tobacco Use   • Smoking status: Former     Packs/day: 0 25     Years: 15 00     Pack years: 3 75     Types: Cigarettes     Quit date: 3/13/1988     Years since quittin 8   • Smokeless tobacco: Never   Vaping Use   • Vaping Use: Never used   Substance and Sexual Activity   • Alcohol use: Yes     Comment: beer   • Drug use: No   • Sexual activity: Yes   Other Topics Concern   • Not on file   Social History Narrative   • Not on file     Social Determinants of Health     Financial Resource Strain: Not on file   Food Insecurity: Not on file   Transportation Needs: Not on file   Physical Activity: Not on file   Stress: Not on file   Social Connections: Not on file   Intimate Partner Violence: Not on file   Housing Stability: Not on file       Current Outpatient Medications:   •  acetaminophen (TYLENOL) 500 mg tablet, Take 500-1,000 mg by mouth every 4 (four) hours as needed for mild pain, Disp: , Rfl:   •  amLODIPine (NORVASC) 10 mg tablet, TAKE 1 TABLET BY MOUTH  DAILY, Disp: 90 tablet, Rfl: 3  •  bimatoprost (LUMIGAN) 0 01 % ophthalmic drops, Administer 1 drop to the right eye daily at bedtime, Disp: , Rfl:   •  erythromycin (ILOTYCIN) ophthalmic ointment, Administer 3 5 inches to the right eye daily at bedtime, Disp: , Rfl:   •  Fluocinolone Acetonide Scalp 0 01 % OIL, Apply a thin layer topically daily at night one hour before bedtime  , Disp: 118 28 mL, Rfl: 5  •  imiquimod (ALDARA) 5 % cream, Apply topically once a day Monday thru Friday for 6 weeks, Disp: 24 each, Rfl: 1  •  lisinopril (ZESTRIL) 20 mg tablet, TAKE 1 TABLET BY MOUTH  DAILY, Disp: 90 tablet, Rfl: 3  •  metoprolol succinate (TOPROL-XL) 50 mg 24 hr tablet, Take 1 tablet (50 mg total) by mouth daily, Disp: 90 tablet, Rfl: 3  •  mometasone (ELOCON) 0 1 % cream, APPLY TO EARS ONCE TO TWICE DAILY AS NEEDED FOR SCALING, Disp: , Rfl:   •  Multiple Vitamins-Minerals (PRESERVISION AREDS 2) CAPS, Take 1 capsule by mouth 2 (two) times a day, Disp: , Rfl:   •  mupirocin (BACTROBAN) 2 % ointment, Apply topically two to three times a day to sore areas, Disp: 22 g, Rfl: 3  •  Naproxen Sodium (ALEVE PO), Take 1 tablet by mouth 2 (two) times a day as needed , Disp: , Rfl:   •  ofloxacin (OCUFLOX) 0 3 % ophthalmic solution, INSTILL 1 DROP IN LEFT EYE FOUR TIMES DAILY, Disp: , Rfl:   •  tamsulosin (FLOMAX) 0 4 mg, Take 1 capsule (0 4 mg total) by mouth daily with dinner, Disp: 30 capsule, Rfl: 0  Allergies   Allergen Reactions   • Erythromycin Other (See Comments)     Thrush      Vitals:    01/04/23 0952   BP: 160/82   Temp: 97 9 °F (36 6 °C)       Physical Exam  Vitals reviewed  Constitutional:       Appearance: Normal appearance  HENT:      Head: Normocephalic and atraumatic  Right Ear: External ear normal       Left Ear: External ear normal       Nose: Nose normal    Eyes:      Extraocular Movements: Extraocular movements intact  Pupils: Pupils are equal, round, and reactive to light  Cardiovascular:      Rate and Rhythm: Normal rate and regular rhythm  Pulses: Normal pulses  Heart sounds: Normal heart sounds  Pulmonary:      Effort: Pulmonary effort is normal       Breath sounds: Normal breath sounds  Abdominal:      General: Abdomen is flat  Palpations: Abdomen is soft  Musculoskeletal:         General: Normal range of motion  Cervical back: Normal range of motion and neck supple  Skin:     General: Skin is warm  Comments: Left nasal ala with Treatment of lesion measuring proxy 5 mm x 9 mm  No satellite lesions  No cervical or parotid adenopathy  Neurological:      General: No focal deficit present  Mental Status: He is alert and oriented to person, place, and time  Psychiatric:         Mood and Affect: Mood normal          Behavior: Behavior normal          Thought Content:  Thought content normal          Judgment: Judgment normal          Pathology:  Case Report   Surgical Pathology Report                         Case: [id number]                                    Authorizing Provider: Sherwin Larios MD          Collected:           12/06/2022 1548               Ordering Location:     [facility]      Received:            12/06/2022 1541                                    [address] [place] [address]                                                                 Pathologist:           Sarika López MD                                                            Specimen:    Skin, Other, Specimen A; Left ala                                                          Final Diagnosis   A  Skin, left ala, shave biopsy:     MELANOMA (thickness: 0 6 mm); extending to the tissue edges (see note)      Note: SOX10 and MART-1 immunostains were performed and reviewed  Multiple levels examined  Please see synoptic report for additional details  The results were emailed to Dr Helen Christensen on 12/13/2022      Synoptic report for melanoma of the skin  Thickness: 0 6 mm  Ulceration: not seen  Anatomic Ree Guerrero) level: III  Type: lentigo maligna melanoma  Mitoses: 0/mm2  Microsatellites: not seen  Lymphovascular invasion: not seen  Neurotropism: not seen  Tumor regression: not seen  Margin assessment: invasive melanoma extends to peripheral specimen margin and deep specimen margin focally  Pathologic stage: pT1a  Associated nevus: not seen            Electronically signed by Michelle Marquez MD on 12/13/2022 at 10:17 AM         Labs:  none    Imaging  No results found  I reviewed the above laboratory and imaging data  Discussion/Summary: Stage T1 a melanoma, left nasal ala  We will plan for excision to be done in conjunction with plastic surgery team for anticipated reconstruction  Rationale for this along with benefits of surgery include infection, bleeding, need for possible additional surgery, discussed with all questions answered and consent signed at this visit  We will make appropriate referrals to plastic surgery team and coordinate accordingly

## 2023-01-06 ENCOUNTER — TELEPHONE (OUTPATIENT)
Dept: ANESTHESIOLOGY | Facility: CLINIC | Age: 83
End: 2023-01-06

## 2023-01-19 ENCOUNTER — CONSULT (OUTPATIENT)
Dept: PLASTIC SURGERY | Facility: CLINIC | Age: 83
End: 2023-01-19

## 2023-01-19 VITALS
HEART RATE: 72 BPM | HEIGHT: 68 IN | BODY MASS INDEX: 27.13 KG/M2 | WEIGHT: 179 LBS | DIASTOLIC BLOOD PRESSURE: 91 MMHG | TEMPERATURE: 97.2 F | SYSTOLIC BLOOD PRESSURE: 152 MMHG

## 2023-01-19 DIAGNOSIS — I10 PRIMARY HYPERTENSION: ICD-10-CM

## 2023-01-19 DIAGNOSIS — C43.31 MALIGNANT MELANOMA OF NOSE (HCC): Primary | ICD-10-CM

## 2023-01-19 DIAGNOSIS — N18.30 STAGE 3 CHRONIC KIDNEY DISEASE, UNSPECIFIED WHETHER STAGE 3A OR 3B CKD (HCC): ICD-10-CM

## 2023-01-19 DIAGNOSIS — H35.30 MACULAR DEGENERATION, UNSPECIFIED LATERALITY, UNSPECIFIED TYPE: ICD-10-CM

## 2023-01-19 NOTE — PROGRESS NOTES
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  A large full-thickness defect of the left lower nose is anticipated  We had a lengthy discussion regarding reconstructive options  Due to size, location, and lack of adjacent soft tissue, one versus two stage skin graft reconstruction was discussed  One stage reconstruction would involve placement of a full thickness skin graft over the defect at the time of excision  However, if the perichondrium is denuded, patient will require application of skin substitute graft at the first stage and then once the wound granulates, at FTSG will be placed at a second stage  We also discussed the remote, but potential need of a compose skin/cartlilage graft from the ear to reconstruct the alar rim  All details of the surgical procedure  were discussed at length with the patient including operative and recovery time, as well as, all potential risks, benefits, and alternatives  Risks discussed included, but were not limited to: infection, bleeding, scarring, hematoma, seroma, delayed, healing, partial or total graft loss, chronic open wound, nasal deformity, nasal obstruction,  need for additional procedures  The patient noted her understanding and had opportunity for questions  All were answered  He desires to proceed  Informed consent obtained  We will work on scheduling with Dr Galo Gallardo  He is welcome to call or return with any additional questions/concerns  Domenica Dunbar is a 79 y o  male who is referred by Rose Hinds in consultation for nasal reconstruction    The patient has an extensive history of multiple previous skin cancers including, sarcoma of scalp, previous history of melanoma, basal cell carcinoma  Patient follows with dermatology  They recently biopsied a longstanding pigmented lesion of the left nose, which revealed lentigo maligna melanoma  Patient will require wide local excision for treatment  Soft tissue reconstruction is warranted  Patient presents today to discuss reconstructive options  He has no current complaints  He denies any other suspicious lesions      Past Medical History:   Diagnosis Date   • Back pain    • Balance problem    • Basal cell carcinoma (BCC)     in past   • BCC (basal cell carcinoma of skin) 08/17/2021    Right tip of nose   • BCC (basal cell carcinoma of skin) 08/08/2022    Left lip   • BCC (basal cell carcinoma of skin) 08/08/2022    Right cheek   • Gout    • Hard of hearing    • Hearing aid worn     ziyad   • Herniated nucleus pulposus, L4-5    • Hypertension    • Incisional hernia    • Macular degeneration    • Melanoma ([address] )     left cheek- history   • Melanoma ([address] ) 08/08/2022    Right neck   • Pulmonary nodules    • Reactive airway disease    • 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   • Skin cancer 07/01/2020    AFX- scalp   • Squamous cell carcinoma     in past     Past Surgical History:   Procedure Laterality Date   • CATARACT EXTRACTION Bilateral    • COLONOSCOPY     • HERNIA REPAIR     • HYDROCELE EXCISION / REPAIR     • MASTOID SURGERY Left    • MOHS SURGERY     • MOHS SURGERY  09/29/2021    Right tip of nose-Dr Jeffrey Sheppard   • MOHS SURGERY  10/06/2022    Left lip-Dr Jeffrey Sheppard   • OTHER SURGICAL HISTORY      sarcoma scalp with skin graft   • WI REPAIR FIRST ABDOMINAL WALL HERNIA N/A 04/22/2021    Procedure: REPAIR HERNIA INCISIONAL OPEN WITH MESH;  Surgeon: Dipti Infante MD;  [facility];  Service: General   • SCALP EXCISION N/A 03/28/2018    Procedure: SCALP SARCOMA EXCISION WITH FULL THICKNESS SKIN GRAFT;  Surgeon: Lakia Grewal MD;  [facility];  Service: Plastics   • SKIN BIOPSY     • SKIN CANCER EXCISION     • TONSILLECTOMY AND ADENOIDECTOMY           Current Outpatient Medications:   •  acetaminophen (TYLENOL) 500 mg tablet, Take 500-1,000 mg by mouth every 4 (four) hours as needed for mild pain, Disp: , Rfl:   •  amLODIPine (NORVASC) 10 mg tablet, TAKE 1 TABLET BY MOUTH  DAILY, Disp: 90 tablet, Rfl: 3  •  bimatoprost (LUMIGAN) 0 01 % ophthalmic drops, Administer 1 drop to the right eye daily at bedtime, Disp: , Rfl:   •  erythromycin (ILOTYCIN) ophthalmic ointment, Administer 3 5 inches to the right eye daily at bedtime, Disp: , Rfl:   •  Fluocinolone Acetonide Scalp 0 01 % OIL, Apply a thin layer topically daily at night one hour before bedtime  , Disp: 118 28 mL, Rfl: 5  •  imiquimod (ALDARA) 5 % cream, Apply topically once a day Monday thru Friday for 6 weeks, Disp: 24 each, Rfl: 1  •  lisinopril (ZESTRIL) 20 mg tablet, TAKE 1 TABLET BY MOUTH  DAILY, Disp: 90 tablet, Rfl: 3  •  metoprolol succinate (TOPROL-XL) 50 mg 24 hr tablet, Take 1 tablet (50 mg total) by mouth daily, Disp: 90 tablet, Rfl: 3  •  mometasone (ELOCON) 0 1 % cream, APPLY TO EARS ONCE TO TWICE DAILY AS NEEDED FOR SCALING, Disp: , Rfl:   •  Multiple Vitamins-Minerals (PRESERVISION AREDS 2) CAPS, Take 1 capsule by mouth 2 (two) times a day, Disp: , Rfl:   •  mupirocin (BACTROBAN) 2 % ointment, Apply topically two to three times a day to sore areas, Disp: 22 g, Rfl: 3  •  Naproxen Sodium (ALEVE PO), Take 1 tablet by mouth 2 (two) times a day as needed , Disp: , Rfl:   •  ofloxacin (OCUFLOX) 0 3 % ophthalmic solution, INSTILL 1 DROP IN LEFT EYE FOUR TIMES DAILY, Disp: , Rfl:   •  traMADol (ULTRAM) 50 mg tablet, Take 1 tablet (50 mg total) by mouth every 6 (six) hours as needed for moderate pain, Disp: 10 tablet, Rfl: 0  •  tamsulosin (FLOMAX) 0 4 mg, Take 1 capsule (0 4 mg total) by mouth daily with dinner (Patient not taking: Reported on 1/19/2023), Disp: 30 capsule, Rfl: 0    Allergies   Allergen Reactions   • Erythromycin Other (See Comments) Thrush        Family History   Problem Relation Age of Onset   • Colon cancer Father    • Heart failure Father    • Heart disease Mother    • Stroke Mother        Social History     Socioeconomic History   • Marital status: /Civil Union     Spouse name: Not on file   • Number of children: Not on file   • Years of education: Not on file   • Highest education level: Not on file   Occupational History   • Not on file   Tobacco Use   • Smoking status: Former     Packs/day: 0 25     Years: 15 00     Pack years: 3 75     Types: Cigarettes     Quit date: 3/13/1988     Years since quittin 8   • Smokeless tobacco: Never   Vaping Use   • Vaping Use: Never used   Substance and Sexual Activity   • Alcohol use: Yes     Comment: beer   • Drug use: No   • Sexual activity: Yes   Other Topics Concern   • Not on file   Social History Narrative   • Not on file     Social Determinants of Health     Financial Resource Strain: Not on file   Food Insecurity: Not on file   Transportation Needs: Not on file   Physical Activity: Not on file   Stress: Not on file   Social Connections: Not on file   Intimate Partner Violence: Not on file   Housing Stability: Not on file       Review of Systems  Pertinent items are noted in HPI  Objective     /91   Pulse 72   Temp (!) 97 2 °F (36 2 °C) (Tympanic)   Ht 5' 8" (1 727 m)   Wt 81 2 kg (179 lb)   BMI 27 22 kg/m²     General:  alert and oriented, in no acute distress   Skin:  normal and no rash or abnormalities, +Large, diffuse area of pigmentation of left lower nose, abutting the alar rim 0 5cm away, extending over alar and nasal sidewall and lateral tip  +Multiple healed surgical scars over face  +Free ALT flap to right vertex scalp- well healed  Eyes: conjunctivae/corneas clear  PERRL, EOM's intact  Fundi benign     Mouth: MMM no lesions   Lymph Nodes:  Cervical, supraclavicular, and axillary nodes normal    Lungs:  clear to auscultation bilaterally   Heart:  regular rate and rhythm, S1, S2 normal, no murmur, click, rub or gallop and regular rate and rhythm   Abdomen: soft, non-tender; bowel sounds normal; no masses,  no organomegaly   CVA:  absent   Genitourinary: defer exam   Extremities:  extremities normal, warm and well-perfused; no cyanosis, clubbing, or edema   Neurologic:  Alert and oriented x3  Gait normal  Reflexes and motor strength normal and symmetric  Cranial nerves 2-12 and sensation grossly intact  Psychiatric:  normal mood, behavior, speech, dress, and thought processes      Pathology:  Case Report   Surgical Pathology Report                         Case: [id number]                                    Authorizing Provider: Clau Chow MD          Collected:           12/06/2022 1541               Ordering Location:     [facility]      Received:            12/06/2022 1541                                    [address] [place] [address]                                                                 Pathologist:           Jaky Villareal MD                                                            Specimen:    Skin, Other, Specimen A; Left ala                                                           Final Diagnosis   A  Skin, left ala, shave biopsy:     MELANOMA (thickness: 0 6 mm); extending to the tissue edges (see note)      Note: SOX10 and MART-1 immunostains were performed and reviewed  Multiple levels examined  Please see synoptic report for additional details   The results were emailed to Dr Rosanna Shrestha on 12/13/2022      Synoptic report for melanoma of the skin  Thickness: 0 6 mm  Ulceration: not seen  Anatomic Gennette Litter) level: III  Type: lentigo maligna melanoma  Mitoses: 0/mm2  Microsatellites: not seen  Lymphovascular invasion: not seen  Neurotropism: not seen  Tumor regression: not seen  Margin assessment: invasive melanoma extends to peripheral specimen margin and deep specimen margin focally  Pathologic stage: pT1a  Associated nevus: not seen          Electronically signed by Janae Thapa MD on 12/13/2022 at 10:17 AM            Labs:  none     Imaging  No results found  I reviewed the above laboratory and imaging data    A total of 65 mins was spent on the encounter, including reviewing the patient's records, documentation, and time spent in counseling coordination of care which represent greater than 50% of the visit  45 mins was spent in counseling and discussion of surgical procedures

## 2023-01-24 ENCOUNTER — APPOINTMENT (OUTPATIENT)
Dept: RADIOLOGY | Facility: CLINIC | Age: 83
End: 2023-01-24

## 2023-01-24 DIAGNOSIS — C43.31 MALIGNANT MELANOMA OF NOSE (HCC): ICD-10-CM

## 2023-01-26 ENCOUNTER — TELEMEDICINE (OUTPATIENT)
Dept: ANESTHESIOLOGY | Facility: CLINIC | Age: 83
End: 2023-01-26

## 2023-01-26 DIAGNOSIS — C43.31 MALIGNANT MELANOMA OF NOSE (HCC): ICD-10-CM

## 2023-01-26 DIAGNOSIS — M87.38 RADIATION NECROSIS OF SKULL (HCC): ICD-10-CM
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
## 2023-01-26 DIAGNOSIS — N18.30 STAGE 3 CHRONIC KIDNEY DISEASE, UNSPECIFIED WHETHER STAGE 3A OR 3B CKD (HCC): ICD-10-CM

## 2023-01-26 DIAGNOSIS — Z01.89 ENCOUNTER FOR GERIATRIC ASSESSMENT: Primary | ICD-10-CM

## 2023-01-26 DIAGNOSIS — L98.494: ICD-10-CM

## 2023-01-26 DIAGNOSIS — C44.319 BASAL CELL CARCINOMA OF SKIN OF OTHER PARTS OF FACE: ICD-10-CM

## 2023-01-26 DIAGNOSIS — I10 PRIMARY HYPERTENSION: ICD-10-CM

## 2023-01-26 NOTE — PROGRESS NOTES
[facility]   Consult: Geriatric Surgery   Telemedicine Regular Visit    Verification of patient location:    Patient is located in the following state in which I hold an active license [place]    Assessment/Plan:  · 19-year-old male referred to [facility] for presurgery geriatric screening secondary to advanced age  · Consult concerns: Advanced age, HTN, HLD, stage 3 CKD  Case: [id number] Date: 02/07/23   Procedures:        WIDE EXCISION OF LEFT NASAL MELANOMA (Left: Nose)       APPLICATION OF SKIN SUBSTITUTE GRAFT TO NOSE, POSSIBLE FTSG, POSSIBLE CARTILAGE GRAFT FROM EAR (Left: Nose)   Anesthesia type: General   Diagnosis: Malignant melanoma of nose ([address] ) [C43 31]   Pre-op diagnosis: Malignant melanoma of nose ([address] ) [C43 31]   [facility]   Surgeons: January Mota MD; Karlyn Cockayne, MD       Problem List Items Addressed This Visit        Cardiovascular and Mediastinum    Hypertension  HLD  · Stable, No concerns  · Continue regimen   · METS 9, active, denies CP & SOB   · WILL HAVE MC 1 31 23       Musculoskeletal and Integument    Basal cell carcinoma of skin of other parts of face    Scalp ulceration, with necrosis of bone ([address] )    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[address] )  · 2 YEARS AGO had surgery @ [facility], RIGHT LEG used for skull FLAP   · STABLE, no needs        Genitourinary    Stage 3 chronic kidney disease ([address] )  · egfr 42/ CR 1 50- stable   · Await new labs - pending   · Outpatient procedure   · If admitted consult nephrology post-op for MAMADOU risk          Other    Malignant melanoma of nose ([address] )  · Seen for surgical optimization   · Seen for geriatric screening   · Needs to complete PATs, EKG - await results   · Family PCP on 1 31 23 for MC   · METS 9, Active, denies chest pain and SOB   At risk for post-op MAMADOU - yes   At risk for post-op SSI - no   BEST   Breathing- instructed to exercise lungs prior to surgery via deep breathing   Eat- discussed increasing protein intake prior to surgery Sleep/stress- encouraged 8-10 sleep @ night, stress reduction, avoid sick contacts and handwashing   · Train- encouraged to remain active       Encounter for geriatric assessment - Primary  There are no geriatric concerns identified today   There are no cognitive concerns identified today   Patient is here at  [facility] on: 01/26/2023   Date of Surgery: 02/07/2023   Geriatric Assessment   ·           Falls (last 6 months): 0 falls   ·           Carlos Total Score: 17   ·           PHQ- 9 Depression Scale: 0   ·           Nutrition Assessment Score: 14   ·           METS: 9 25   3 meals   Breakfast: 2 toast, orange juice, coffee- sugar   Lunch: sandwich, with cheese   Dinner: pork, chicken, fish, beef   Veggies- broccoli, cream corn   Spaghetti   Snacks: ice cream   Sleep: 6 hours      2 days a week, rides for 15 minutes on the stationary bike   5 lbs weights   Health goals:  -What are your overall health goals? Waking up in the morning   -What brings you strength? Friends   -What activities are important to you? Spring, Summer, Fall- taking care of lawn   Sweep the floors, housework for the wife  Outpatient procedure                  Depression Screening and Follow-up Plan: Patient was screened for depression during today's encounter  They screened negative with a PHQ-2 score of 0  Reason for visit is   Chief Complaint   Patient presents with   • Geriatric Evaluation   • Surgical Optimization        Encounter provider SILVANA Galvan    Provider located at [address] [facility] [address] [phone]      Recent Visits  No visits were found meeting these conditions    Showing recent visits within past 7 days and meeting all other requirements  Today's Visits  Date Type Provider Dept   01[facility]26/23 [address] CRNP  [facility]   Showing today's visits and meeting all other requirements  Future Appointments  No visits were found meeting these conditions  Showing future appointments within next 150 days and meeting all other requirements       The patient was identified by name and date of birth  Krista Amaya was informed that this is a telemedicine visit and that the visit is being conducted through Telephone  My office door was closed  No one else was in the room  He acknowledged consent and understanding of privacy and security of the video platform  The patient has agreed to participate and understands they can discontinue the visit at any time  Patient is aware this is a billable service  Subjective  Krista Amaya is a 79 y o  male who was referred to [facility] for pre-surgery geriatric screening 2 2 advanced age  OTHER Consult concerns: Advanced age, HTN, HLD, stage 3 CKD  I met patient over the telephone  He did not want to connect via video  He wishes to complete his appointment over the phone  Very pleasant and a pleasure to care for  There were no geriatric concerns identified today  He is independent with all ADLS  METS is 9, denies chest pain and denies SOB  He needs to complete his PAT's, EKG  He will go for blood work on 1 27 23- await results  He will have EKG and MC on 1 31 23 - await notes    As always we discussed having your BEST surgery, and BEST recovery  Surgery goals reviewed today  Breathing exercises   Patient was encouraged to begin lung exercises today  Eating/nutrition   Encouraged patient to increase oral protein intake prior to surgery  This can be accomplished by consuming chicken, fish, tuna fish, cottage cheese, cheese, eggs, [place] yogurt, and protein shakes as needed  I encouraged use of protein shakes such ENLIVE  I also recommended making your own protein shakes with protein powder  Sleep/Stress management  Patient was encouraged to rest their body prior to surgery    Encouraged attempting to get 8 hours of sleep at night  Avoid stress  Avoid sick contacts  Encouraged to find a relaxing hobby such as reading, meditation, listening to music  Training exercises  Patient was encouraged to remain active as possible  Today bilateral lower extremity generic exercises were taught for muscle strengthening and balance  All exercises to be done sitting down           HPI     Past Medical History:   Diagnosis Date   • Back pain    • Balance problem    • Basal cell carcinoma (BCC)     in past   • BCC (basal cell carcinoma of skin) 08/17/2021    Right tip of nose   • BCC (basal cell carcinoma of skin) 08/08/2022    Left lip   • BCC (basal cell carcinoma of skin) 08/08/2022    Right cheek   • Gout    • Hard of hearing    • Hearing aid worn     ziyad   • Herniated nucleus pulposus, L4-5    • Hypertension    • Incisional hernia    • Macular degeneration    • Melanoma ([address] )     left cheek- history   • Melanoma ([address] ) 08/08/2022    Right neck   • Pulmonary nodules    • Reactive airway disease    • 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   • Skin cancer 07/01/2020    AFX- scalp   • Squamous cell carcinoma     in past       Past Surgical History:   Procedure Laterality Date   • CATARACT EXTRACTION Bilateral    • COLONOSCOPY     • HERNIA REPAIR     • HYDROCELE EXCISION / REPAIR     • MASTOID SURGERY Left    • MOHS SURGERY     • MOHS SURGERY  09/29/2021    Right tip of nose-Dr Cesar Fernandez   • MOHS SURGERY  10/06/2022    Left lip-Dr Cesar Fernandez   • OTHER SURGICAL HISTORY      sarcoma scalp with skin graft   • KS REPAIR FIRST ABDOMINAL WALL HERNIA N/A 04/22/2021    Procedure: REPAIR HERNIA INCISIONAL OPEN WITH MESH;  Surgeon: Mason Segura MD;  Location: [facility];  Service: General   • SCALP EXCISION N/A 03/28/2018    Procedure: SCALP SARCOMA EXCISION WITH FULL THICKNESS SKIN GRAFT;  Surgeon: Darell Streeter MD;  Location: [facility];  Service: Plastics   • SKIN BIOPSY     • SKIN CANCER EXCISION     • TONSILLECTOMY AND ADENOIDECTOMY Current Outpatient Medications   Medication Sig Dispense Refill   • acetaminophen (TYLENOL) 500 mg tablet Take 500-1,000 mg by mouth every 4 (four) hours as needed for mild pain     • amLODIPine (NORVASC) 10 mg tablet TAKE 1 TABLET BY MOUTH  DAILY 90 tablet 3   • bimatoprost (LUMIGAN) 0 01 % ophthalmic drops Administer 1 drop to the right eye daily at bedtime     • erythromycin (ILOTYCIN) ophthalmic ointment Administer 3 5 inches to the right eye daily at bedtime     • Fluocinolone Acetonide Scalp 0 01 % OIL Apply a thin layer topically daily at night one hour before bedtime  118 28 mL 5   • imiquimod (ALDARA) 5 % cream Apply topically once a day Monday thru Friday for 6 weeks 24 each 1   • lisinopril (ZESTRIL) 20 mg tablet TAKE 1 TABLET BY MOUTH  DAILY 90 tablet 3   • metoprolol succinate (TOPROL-XL) 50 mg 24 hr tablet Take 1 tablet (50 mg total) by mouth daily 90 tablet 3   • mometasone (ELOCON) 0 1 % cream APPLY TO EARS ONCE TO TWICE DAILY AS NEEDED FOR SCALING     • Multiple Vitamins-Minerals (PRESERVISION AREDS 2) CAPS Take 1 capsule by mouth 2 (two) times a day     • mupirocin (BACTROBAN) 2 % ointment Apply topically two to three times a day to sore areas 22 g 3   • Naproxen Sodium (ALEVE PO) Take 1 tablet by mouth 2 (two) times a day as needed      • ofloxacin (OCUFLOX) 0 3 % ophthalmic solution INSTILL 1 DROP IN LEFT EYE FOUR TIMES DAILY     • tamsulosin (FLOMAX) 0 4 mg Take 1 capsule (0 4 mg total) by mouth daily with dinner (Patient not taking: Reported on 1/19/2023) 30 capsule 0   • traMADol (ULTRAM) 50 mg tablet Take 1 tablet (50 mg total) by mouth every 6 (six) hours as needed for moderate pain 10 tablet 0     No current facility-administered medications for this visit  Allergies   Allergen Reactions   • Erythromycin Other (See Comments)     Thrush        Review of Systems   Constitutional: Negative for chills and fever  HENT: Negative for sinus pain and sore throat      Respiratory: Negative for shortness of breath and wheezing  Cardiovascular: Negative for chest pain and palpitations  Gastrointestinal: Negative for diarrhea, nausea and vomiting  Genitourinary: Negative for difficulty urinating  Skin: Negative for color change, pallor, rash and wound  LESS THAN A DIME    Neurological: Negative for dizziness  Psychiatric/Behavioral: Negative for hallucinations  Video Exam  We did not connect via video   There were no vitals filed for this visit      Physical Exam     I spent 10 minutes directly with the patient during this visit

## 2023-01-26 NOTE — PROGRESS NOTES
Patient is here at  [facility] [address] [place] [address] on: 01/26/2023   Date of Surgery: 02/07/2023   Geriatric Assessment   · Falls (last 6 months): 0 falls   · Carlos Total Score: 17   · PHQ- 9 Depression Scale: 0   · Nutrition Assessment Score: 14   · METS: 9 25   3 meals   Breakfast: 2 toast, orange juice, coffee- sugar   Lunch: sandwich, with cheese   Dinner: pork, chicken, fish, beef   Veggies- broccoli, cream corn   Spaghetti   Snacks: ice cream   Sleep: 6 hours     2 days a week, rides for 15 minutes on the stationary bike   5 lbs weights   Health goals:  -What are your overall health goals? Waking up in the morning   -What brings you strength? Friends   -What activities are important to you? Spring, Summer, Fall- taking care of lawn   Sweep the floors, housework for the wife  Patient & I talked about [facility]  Surgery     Breathing exercises    Patient was encouraged to begin lung exercises today  Eating/nutrition   Encouraged patient to increase oral protein intake prior to surgery  Sleep/Stress management  Patient was encouraged to rest their body prior to surgery  Training exercises  Patient was encouraged to remain active as possible

## 2023-01-27 ENCOUNTER — APPOINTMENT (OUTPATIENT)
Dept: LAB | Facility: CLINIC | Age: 83
End: 2023-01-27

## 2023-01-27 DIAGNOSIS — C43.31 MALIGNANT MELANOMA OF NOSE (HCC): ICD-10-CM

## 2023-01-27 LAB
ALBUMIN SERPL BCP-MCNC: 3.7 G/DL (ref 3.5–5)
ALP SERPL-CCNC: 134 U/L (ref 46–116)
ALT SERPL W P-5'-P-CCNC: 32 U/L (ref 12–78)
ANION GAP SERPL CALCULATED.3IONS-SCNC: 3 MMOL/L (ref 4–13)
AST SERPL W P-5'-P-CCNC: 19 U/L (ref 5–45)
BASOPHILS # BLD AUTO: 0.04 THOUSANDS/ÂΜL (ref 0–0.1)
BASOPHILS NFR BLD AUTO: 1 % (ref 0–1)
BILIRUB SERPL-MCNC: 1.02 MG/DL (ref 0.2–1)
BUN SERPL-MCNC: 22 MG/DL (ref 5–25)
CALCIUM SERPL-MCNC: 9.8 MG/DL (ref 8.3–10.1)
CHLORIDE SERPL-SCNC: 108 MMOL/L (ref 96–108)
CO2 SERPL-SCNC: 28 MMOL/L (ref 21–32)
CREAT SERPL-MCNC: 1.34 MG/DL (ref 0.6–1.3)
EOSINOPHIL # BLD AUTO: 0.28 THOUSAND/ÂΜL (ref 0–0.61)
EOSINOPHIL NFR BLD AUTO: 4 % (ref 0–6)
ERYTHROCYTE [DISTWIDTH] IN BLOOD BY AUTOMATED COUNT: 13 % (ref 11.6–15.1)
GFR SERPL CREATININE-BSD FRML MDRD: 48 ML/MIN/1.73SQ M
GLUCOSE P FAST SERPL-MCNC: 95 MG/DL (ref 65–99)
HCT VFR BLD AUTO: 41.7 % (ref 36.5–49.3)
HGB BLD-MCNC: 14.2 G/DL (ref 12–17)
IMM GRANULOCYTES # BLD AUTO: 0.01 THOUSAND/UL (ref 0–0.2)
IMM GRANULOCYTES NFR BLD AUTO: 0 % (ref 0–2)
LYMPHOCYTES # BLD AUTO: 1.88 THOUSANDS/ÂΜL (ref 0.6–4.47)
LYMPHOCYTES NFR BLD AUTO: 29 % (ref 14–44)
MCH RBC QN AUTO: 30.7 PG (ref 26.8–34.3)
MCHC RBC AUTO-ENTMCNC: 34.1 G/DL (ref 31.4–37.4)
MCV RBC AUTO: 90 FL (ref 82–98)
MONOCYTES # BLD AUTO: 0.45 THOUSAND/ÂΜL (ref 0.17–1.22)
MONOCYTES NFR BLD AUTO: 7 % (ref 4–12)
NEUTROPHILS # BLD AUTO: 3.93 THOUSANDS/ÂΜL (ref 1.85–7.62)
NEUTS SEG NFR BLD AUTO: 59 % (ref 43–75)
NRBC BLD AUTO-RTO: 0 /100 WBCS
PLATELET # BLD AUTO: 193 THOUSANDS/UL (ref 149–390)
PMV BLD AUTO: 10.9 FL (ref 8.9–12.7)
POTASSIUM SERPL-SCNC: 4 MMOL/L (ref 3.5–5.3)
PROT SERPL-MCNC: 7.6 G/DL (ref 6.4–8.4)
RBC # BLD AUTO: 4.63 MILLION/UL (ref 3.88–5.62)
SODIUM SERPL-SCNC: 139 MMOL/L (ref 135–147)
WBC # BLD AUTO: 6.59 THOUSAND/UL (ref 4.31–10.16)

## 2023-01-31 ENCOUNTER — CONSULT (OUTPATIENT)
Dept: FAMILY MEDICINE CLINIC | Facility: CLINIC | Age: 83
End: 2023-01-31

## 2023-01-31 VITALS
SYSTOLIC BLOOD PRESSURE: 168 MMHG | BODY MASS INDEX: 27.01 KG/M2 | DIASTOLIC BLOOD PRESSURE: 90 MMHG | TEMPERATURE: 97.7 F | OXYGEN SATURATION: 98 % | HEIGHT: 68 IN | HEART RATE: 72 BPM | WEIGHT: 178.25 LBS

## 2023-01-31 DIAGNOSIS — C49.0 SARCOMA OF SCALP (HCC): ICD-10-CM

## 2023-01-31 DIAGNOSIS — Z01.818 PRE-OPERATIVE CLEARANCE: Primary | ICD-10-CM

## 2023-01-31 DIAGNOSIS — C43.31 MALIGNANT MELANOMA OF NOSE (HCC): ICD-10-CM

## 2023-01-31 DIAGNOSIS — I10 PRIMARY HYPERTENSION: ICD-10-CM

## 2023-01-31 DIAGNOSIS — N18.30 STAGE 3 CHRONIC KIDNEY DISEASE, UNSPECIFIED WHETHER STAGE 3A OR 3B CKD (HCC): ICD-10-CM

## 2023-01-31 NOTE — PATIENT INSTRUCTIONS
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

## 2023-01-31 NOTE — PROGRESS NOTES
Name: Rina Muhammad      : 1940      MRN: [id number]  Encounter Provider: Fransisca Kerr PA-C  Encounter Date: 2023   Encounter department: 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  4  Benign essential hypertension-stable on amlodipine, lisinopril, metoprolol, no medication changes  1  Pre-operative clearance  -     POCT ECG    2  Malignant melanoma of nose ([facility] )    3  Primary hypertension    4  Stage 3 chronic kidney disease, unspecified whether stage 3a or 3b CKD ([facility] [facility] )    5  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[facility] )         Subjective      HPI: This is an 69-year-old gentleman that presents to the office for preop consultation for excision of melanoma of the left side of the nose  Procedure is scheduled on Tuesday of next week  He has had history of multiple basal cell skin cancer removal and Mohs surgery of his left upper lip  He has also had sarcoma of the scalp and is status post excision with grafting  He has history of hypertension which has been controlled with regimen of metoprolol, amlodipine, and lisinopril  He states his home readings have typically been around 848-206 systolic  Patient states that his numbers are always elevated when he comes in the office  He has not had any chest pains or shortness of breath  He denies any other signs of infection  Review of Systems   Constitutional: Negative for chills, fatigue and fever  HENT: Negative for congestion, ear pain and sinus pressure  Eyes: Negative for visual disturbance     Respiratory: Negative for cough, chest tightness and shortness of breath  Cardiovascular: Negative for chest pain and palpitations  Gastrointestinal: Negative for diarrhea, nausea and vomiting  Endocrine: Negative for polyuria  Genitourinary: Negative for dysuria and frequency  Musculoskeletal: Negative for arthralgias and myalgias  Skin: Negative for pallor and rash  Skin lesion of the nose  Neurological: Negative for dizziness, weakness, light-headedness, numbness and headaches  Psychiatric/Behavioral: Negative for agitation, behavioral problems and sleep disturbance  All other systems reviewed and are negative  Current Outpatient Medications on File Prior to Visit   Medication Sig   • acetaminophen (TYLENOL) 500 mg tablet Take 500-1,000 mg by mouth every 4 (four) hours as needed for mild pain   • amLODIPine (NORVASC) 10 mg tablet TAKE 1 TABLET BY MOUTH  DAILY   • bimatoprost (LUMIGAN) 0 01 % ophthalmic drops Administer 1 drop to the right eye daily at bedtime   • erythromycin (ILOTYCIN) ophthalmic ointment Administer 3 5 inches to the right eye daily at bedtime   • Fluocinolone Acetonide Scalp 0 01 % OIL Apply a thin layer topically daily at night one hour before bedtime     • imiquimod (ALDARA) 5 % cream Apply topically once a day Monday thru Friday for 6 weeks   • lisinopril (ZESTRIL) 20 mg tablet TAKE 1 TABLET BY MOUTH  DAILY   • metoprolol succinate (TOPROL-XL) 50 mg 24 hr tablet Take 1 tablet (50 mg total) by mouth daily   • mometasone (ELOCON) 0 1 % cream APPLY TO EARS ONCE TO TWICE DAILY AS NEEDED FOR SCALING   • Multiple Vitamins-Minerals (PRESERVISION AREDS 2) CAPS Take 1 capsule by mouth 2 (two) times a day   • mupirocin (BACTROBAN) 2 % ointment Apply topically two to three times a day to sore areas   • traMADol (ULTRAM) 50 mg tablet Take 1 tablet (50 mg total) by mouth every 6 (six) hours as needed for moderate pain   • Naproxen Sodium (ALEVE PO) Take 1 tablet by mouth 2 (two) times a day as needed  (Patient not taking: Reported on 1/31/2023)   • ofloxacin (OCUFLOX) 0 3 % ophthalmic solution INSTILL 1 DROP IN LEFT EYE FOUR TIMES DAILY (Patient not taking: Reported on 1/31/2023)   • tamsulosin (FLOMAX) 0 4 mg Take 1 capsule (0 4 mg total) by mouth daily with dinner (Patient not taking: Reported on 1/19/2023)   • [DISCONTINUED] alclomethasone (ACLOVATE) 0 05 % cream Apply topically 2 (two) times a day as needed        Objective     /90 (BP Location: Right arm, Patient Position: Sitting, Cuff Size: Standard)   Pulse 72   Temp 97 7 °F (36 5 °C) (Temporal)   Ht 5' 8" (1 727 m)   Wt 80 9 kg (178 lb 4 oz)   SpO2 98%   BMI 27 10 kg/m²     Physical Exam  Vitals and nursing note reviewed  Constitutional:       General: He is not in acute distress  Appearance: He is well-developed  HENT:      Head: Normocephalic and atraumatic  Right Ear: External ear normal       Left Ear: External ear normal       Nose: Nose normal       Mouth/Throat:      Pharynx: No oropharyngeal exudate  Eyes:      Conjunctiva/sclera: Conjunctivae normal       Pupils: Pupils are equal, round, and reactive to light  Neck:      Thyroid: No thyromegaly  Trachea: No tracheal deviation  Cardiovascular:      Rate and Rhythm: Normal rate and regular rhythm  Heart sounds: Normal heart sounds  No murmur heard  No friction rub  Pulmonary:      Effort: Pulmonary effort is normal  No respiratory distress  Breath sounds: Normal breath sounds  No wheezing or rales  Abdominal:      General: Bowel sounds are normal  There is no distension  Palpations: Abdomen is soft  Tenderness: There is no abdominal tenderness  There is no guarding or rebound  Musculoskeletal:         General: No tenderness  Normal range of motion  Cervical back: Normal range of motion and neck supple  Lymphadenopathy:      Cervical: No cervical adenopathy  Skin:     General: Skin is warm and dry  Findings: No erythema or rash  Comments: Patient does have a irregular brown pigmented lesion of the left nare externally  This is slightly elevated  Neurological:      Mental Status: He is alert and oriented to person, place, and time  Cranial Nerves: No cranial nerve deficit  Coordination: Coordination normal    Psychiatric:         Behavior: Behavior normal          Thought Content:  Thought content normal        Kassy Schmidt PA-C

## 2023-01-31 NOTE — PROGRESS NOTES
Presurgical Evaluation    Subjective:      Patient ID: Jeff Rosales is a 79 y o  male  Chief Complaint   Patient presents with   • Pre-op Exam     Pt is having surgery to remove Melanoma of nose (left nostril) on 2/7/23   mgb       HPI    {Common ambulatory SmartLinks:89194}    Procedure date: {DATE:23014}    Surgeon:  ***  Planned procedure:  ***  Diagnosis for procedure:  ***    Prior anesthesia: {[facility] ANESTHSIA :0148349648}    CAD [facility]   NOTE: Patient should see Cardiology if time available before surgery, and if appropriate  Pulmonary History: {[facility] PULM HX:6169592020}    Renal history: {[facility] Renal Hx:6141241839}    Diabetes History:  {[facility] DIABETES TYPE:6875142900}     Neurological History: {[facility] neuro:3041572082}     On Immunosuppressant meds/biologics: {[facility] IMMUNOSUPRESSANTS:2177058319}      Review of Systems      Current Outpatient Medications   Medication Sig Dispense Refill   • acetaminophen (TYLENOL) 500 mg tablet Take 500-1,000 mg by mouth every 4 (four) hours as needed for mild pain     • amLODIPine (NORVASC) 10 mg tablet TAKE 1 TABLET BY MOUTH  DAILY 90 tablet 3   • bimatoprost (LUMIGAN) 0 01 % ophthalmic drops Administer 1 drop to the right eye daily at bedtime     • erythromycin (ILOTYCIN) ophthalmic ointment Administer 3 5 inches to the right eye daily at bedtime     • Fluocinolone Acetonide Scalp 0 01 % OIL Apply a thin layer topically daily at night one hour before bedtime   118 28 mL 5   • imiquimod (ALDARA) 5 % cream Apply topically once a day Monday thru Friday for 6 weeks 24 each 1   • lisinopril (ZESTRIL) 20 mg tablet TAKE 1 TABLET BY MOUTH  DAILY 90 tablet 3   • metoprolol succinate (TOPROL-XL) 50 mg 24 hr tablet Take 1 tablet (50 mg total) by mouth daily 90 tablet 3   • mometasone (ELOCON) 0 1 % cream APPLY TO EARS ONCE TO TWICE DAILY AS NEEDED FOR SCALING     • Multiple Vitamins-Minerals (PRESERVISION AREDS 2) CAPS Take 1 capsule by mouth 2 (two) times a day     • mupirocin (BACTROBAN) 2 % ointment Apply topically two to three times a day to sore areas 22 g 3   • traMADol (ULTRAM) 50 mg tablet Take 1 tablet (50 mg total) by mouth every 6 (six) hours as needed for moderate pain 10 tablet 0   • Naproxen Sodium (ALEVE PO) Take 1 tablet by mouth 2 (two) times a day as needed  (Patient not taking: Reported on 1/31/2023)     • ofloxacin (OCUFLOX) 0 3 % ophthalmic solution INSTILL 1 DROP IN LEFT EYE FOUR TIMES DAILY (Patient not taking: Reported on 1/31/2023)     • tamsulosin (FLOMAX) 0 4 mg Take 1 capsule (0 4 mg total) by mouth daily with dinner (Patient not taking: Reported on 1/19/2023) 30 capsule 0     No current facility-administered medications for this visit         Allergies on file:   Erythromycin    Patient Active Problem List   Diagnosis   • Actinic keratosis   • Stage 3 chronic kidney disease ([facility] [address] )   • Gout   • Hyperlipidemia   • Hypertension   • Irritable bowel syndrome   • Macular degeneration   • Urinary incontinence, post-void dribbling   • 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HCC)   • Basal cell carcinoma of skin of other parts of face   • Pulmonary nodules   • Incisional hernia, without obstruction or gangrene   • Status post repair of ventral hernia   • Dizziness   • Urinary frequency   • Lumbar back pain with radiculopathy affecting lower extremity   • Scalp ulceration, with necrosis of bone (HCC)   • 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HCC)   • Malignant melanoma of nose (HCC)   • Encounter for geriatric assessment        Past Medical History:   Diagnosis Date   • Back pain    • Balance problem    • Basal cell carcinoma (BCC)     in past   • BCC (basal cell carcinoma of skin) 08/17/2021    Right tip of nose   • BCC (basal cell carcinoma of skin) 08/08/2022    Left lip   • BCC (basal cell carcinoma of skin) 08/08/2022    Right cheek   • Gout    • Hard of hearing    • Hearing aid worn     ziyad   • Herniated nucleus pulposus, L4-5    • Hypertension    • Incisional hernia    • Macular degeneration    • Melanoma ([facility] [address] )     left cheek- history   • Melanoma ([facility] [address] ) 2022    Right neck   • Pulmonary nodules    • Reactive airway disease    • 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[facility] [address] )     2020 with skin grafting   • Skin cancer 2020    AFX- scalp   • Squamous cell carcinoma     in past       Past Surgical History:   Procedure Laterality Date   • CATARACT EXTRACTION Bilateral    • COLONOSCOPY     • HERNIA REPAIR     • HYDROCELE EXCISION / REPAIR     • MASTOID SURGERY Left    • MOHS SURGERY     • MOHS SURGERY  2021    Right tip of nose-Dr Sonja Cardozo   • MOHS SURGERY  10/06/2022    Left lip-Dr Sonja Cardozo   • OTHER SURGICAL HISTORY      sarcoma scalp with skin graft   • IL REPAIR FIRST ABDOMINAL WALL HERNIA N/A 2021    Procedure: REPAIR HERNIA INCISIONAL OPEN WITH MESH;  Surgeon: Jennifer Wu MD;  Location: [facility];  Service: General   • SCALP EXCISION N/A 2018    Procedure: SCALP SARCOMA EXCISION WITH FULL THICKNESS SKIN GRAFT;  Surgeon: Bessy Pastor MD;  Location: [facility];  Service: Plastics   • SKIN BIOPSY     • SKIN CANCER EXCISION     • TONSILLECTOMY AND ADENOIDECTOMY         Family History   Problem Relation Age of Onset   • Colon cancer Father    • Heart failure Father    • Heart disease Mother    • Stroke Mother        Social History     Tobacco Use   • Smoking status: Former     Packs/day: 0 25     Years: 15 00     Pack years: 3 75     Types: Cigarettes     Quit date: 3/13/1988     Years since quittin 9   • Smokeless tobacco: Never   Vaping Use   • Vaping Use: Never used   Substance Use Topics   • Alcohol use: Yes     Comment: beer   • Drug use: No       Objective:    Vitals:    23 1056   BP: 168/90   BP Location: Right arm   Patient Position: Sitting   Cuff Size: Standard   Pulse: 72   Temp: 97 7 °F (36 5 °C)   TempSrc: Temporal   SpO2: 98%   Weight: 80 9 kg (178 lb 4 oz)   Height: 5' 8" (1 727 m)        Physical Exam      Preop labs/testing available and reviewed: {YES/NO:}    eGFR   Date Value Ref Range Status   2023 48 ml/min/1 73sq m Final     WBC   Date Value Ref Range Status   2023 6 59 4 31 - 10 16 Thousand/uL Final          EKG {YES/NO:}    Echo {YES/NO:}    Stress test/cath {YES/NO:}    PFT/[place] {YES/NO:}    Functional capacity: {sl amb metabolic NSCUDDDVYA:5777619374}   Pick the highest level patient can comfortably perform   4 mets or greater for surgery    RCRI  High Risk surgery? 1 Point  CAD History:         1 Point   MI; Positive Stress Test; CP due to Mi;  Nitrate Usage to control Angina; Pathologic Q wave on EKG  CHF Active:         1 Point   Pulm Edema; Paroxysmal Nocturnal Dyspnea;  Bibasilar Rales (crackles);S3; CHF on CXR  Cerebrovascular Disease (TIA or CVA):     1 Point  DM on Insulin:        1 Point  Serum Creat >2 0 mg/dl:       1 Point          Total Points: {Numbers; 8-2:29032}     Scorin: Class I, Very Low Risk (0 4%)     1: Class II, Low risk (0 9%)     2: Class III Moderate (6 6%)     3: Class IV High (>11%)      MAMADOU Risk:  GFR:   eGFR   Date Value Ref Range Status   2023 48 ml/min/1 73sq m Final         For PCP:  If GFR>60, Hold ACE/ARB/Diuretic on the day of surgery, and NSAIDS 10 days before  If GFR<45, Consider PRE and POST op Nephrology Consult  If 46 <GFR> 59 : Has Patient had MAMADOU in last 6 Months? {YES/NO:}   If YES: Preop Nephrology consult   If No:  [address] Nephrology consult  Assessment/Plan:    Patient {is/is not:24319} medically optimized (cleared) for the planned procedure  Further testing/evaluation {is/is not:} required      Postop concerns: {YES/NO:}    Problem List Items Addressed This Visit        Cardiovascular and Mediastinum    Hypertension       Genitourinary    Stage 3 chronic kidney disease ([address] )       Other    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[facility] [address] )    Malignant melanoma of nose ([facility] [address] )   Other Visit Diagnoses     Pre-operative clearance -  Primary    Relevant Orders    POCT ECG (Completed)           {Assess/PlanSmartLinks:83599}      Pre-Surgery Instructions:   Medication Instructions   • acetaminophen (TYLENOL) 500 mg tablet {OR PAT MED INSTRUCTIONS:33298}   • amLODIPine (NORVASC) 10 mg tablet {OR PAT MED INSTRUCTIONS:54163}   • bimatoprost (LUMIGAN) 0 01 % ophthalmic drops {OR PAT MED INSTRUCTIONS:12875}   • erythromycin (ILOTYCIN) ophthalmic ointment {OR PAT MED INSTRUCTIONS:31809}   • Fluocinolone Acetonide Scalp 0 01 % OIL {OR PAT MED INSTRUCTIONS:33499}   • imiquimod (ALDARA) 5 % cream {OR PAT MED INSTRUCTIONS:17693}   • lisinopril (ZESTRIL) 20 mg tablet {OR PAT MED INSTRUCTIONS:98336}   • metoprolol succinate (TOPROL-XL) 50 mg 24 hr tablet {OR PAT MED INSTRUCTIONS:96405}   • mometasone (ELOCON) 0 1 % cream {OR PAT MED INSTRUCTIONS:40582}   • Multiple Vitamins-Minerals (PRESERVISION AREDS 2) CAPS {OR PAT MED INSTRUCTIONS:63824}   • mupirocin (BACTROBAN) 2 % ointment {OR PAT MED INSTRUCTIONS:20477}   • traMADol (ULTRAM) 50 mg tablet {OR PAT MED INSTRUCTIONS:82869}        NOTE: Please use the above to review important meds for your specialty, the remainder "per anesthesia Guidelines "    NOTE: Please place an Inbasket message for "[facility]" pool for complicated patients

## 2023-02-01 NOTE — PRE-PROCEDURE INSTRUCTIONS
Pre-Surgery Instructions:   Medication Instructions   • acetaminophen (TYLENOL) 500 mg tablet Uses PRN- OK to take day of surgery   • amLODIPine (NORVASC) 10 mg tablet Take day of surgery  • Fluocinolone Acetonide Scalp 0 01 % OIL Hold day of surgery  • imiquimod (ALDARA) 5 % cream Hold day of surgery  • lisinopril (ZESTRIL) 20 mg tablet Hold day of surgery  • metoprolol succinate (TOPROL-XL) 50 mg 24 hr tablet Take day of surgery  • mometasone (ELOCON) 0 1 % cream Hold day of surgery  • Multiple Vitamins-Minerals (PRESERVISION AREDS 2) CAPS Stop taking 7 days prior to surgery  • mupirocin (BACTROBAN) 2 % ointment Hold day of surgery  Pt verbalizes understanding of the following:    - Bathing instructions, has chg, neck down, no genitals  - No lotions, powders, sprays, deodorant, jewelry  - No shaving within 24hrs    - DO NOT EAT OR DRINK ANYTHING after midnight on the evening before your procedure including candy & gum   - ONLY SIPS OF WATER with your medications are allowed on the morning of your procedure  - Avoid OTC non-directed Vit/ Suppl/ Herbals 7 days prior to surgery to ensure no drug interactions with perioperative surgical/ anesthetic meds  - Avoid NSAIDs 3 days prior  - Avoid ASA containing products 5 days prior    - Bring a list of meds you take at home with your last dose noted    - Arrange for someone to drive you home after the procedure & stay with you until the next morning    - Bring insurance cards & photo id      - Leave all valuables such as credit cards, money & jewelry at home    - Notify surgeon if you develop any cold symptoms, change in your health history or develop open wounds     - Did the surgeon's office give you any other special instructions? No  - Did you require any clearances?  PCP 1/31

## 2023-02-06 ENCOUNTER — ANESTHESIA EVENT (OUTPATIENT)
Dept: PERIOP | Facility: HOSPITAL | Age: 83
End: 2023-02-06

## 2023-02-07 ENCOUNTER — HOSPITAL ENCOUNTER (OUTPATIENT)
Facility: HOSPITAL | Age: 83
Setting detail: OUTPATIENT SURGERY
Discharge: HOME | End: 2023-02-07
Attending: SURGERY | Admitting: SURGERY

## 2023-02-07 ENCOUNTER — ANESTHESIA (OUTPATIENT)
Dept: PERIOP | Facility: HOSPITAL | Age: 83
End: 2023-02-07

## 2023-02-07 VITALS
BODY MASS INDEX: 26.98 KG/M2 | SYSTOLIC BLOOD PRESSURE: 147 MMHG | HEART RATE: 86 BPM | OXYGEN SATURATION: 92 % | TEMPERATURE: 97 F | WEIGHT: 178 LBS | RESPIRATION RATE: 14 BRPM | DIASTOLIC BLOOD PRESSURE: 79 MMHG | HEIGHT: 68 IN

## 2023-02-07 DIAGNOSIS — C43.31 MALIGNANT MELANOMA OF NOSE (HCC): ICD-10-CM

## 2023-02-07 DEVICE — INTEGRA® MESHED BILAYER WOUND MATRIX 2 IN*2 IN (5 CM*5 CM)
Type: IMPLANTABLE DEVICE | Site: NOSE | Status: FUNCTIONAL
Brand: INTEGRA®

## 2023-02-07 RX ORDER — HYDROMORPHONE HCL IN WATER/PF 6 MG/30 ML
0.2 PATIENT CONTROLLED ANALGESIA SYRINGE INTRAVENOUS
Status: DISCONTINUED | OUTPATIENT
Start: 2023-02-07 | End: 2023-02-07 | Stop reason: HOSPADM

## 2023-02-07 RX ORDER — LIDOCAINE HYDROCHLORIDE 10 MG/ML
0.5 INJECTION, SOLUTION EPIDURAL; INFILTRATION; INTRACAUDAL; PERINEURAL ONCE AS NEEDED
Status: DISCONTINUED | OUTPATIENT
Start: 2023-02-07 | End: 2023-02-07 | Stop reason: HOSPADM

## 2023-02-07 RX ORDER — FENTANYL CITRATE/PF 50 MCG/ML
50 SYRINGE (ML) INJECTION
Status: DISCONTINUED | OUTPATIENT
Start: 2023-02-07 | End: 2023-02-07 | Stop reason: HOSPADM

## 2023-02-07 RX ORDER — MAGNESIUM HYDROXIDE 1200 MG/15ML
LIQUID ORAL AS NEEDED
Status: DISCONTINUED | OUTPATIENT
Start: 2023-02-07 | End: 2023-02-07 | Stop reason: HOSPADM

## 2023-02-07 RX ORDER — ACETAMINOPHEN 325 MG/1
650 TABLET ORAL ONCE AS NEEDED
Status: COMPLETED | OUTPATIENT
Start: 2023-02-07 | End: 2023-02-07

## 2023-02-07 RX ORDER — DIPHENHYDRAMINE HYDROCHLORIDE 50 MG/ML
12.5 INJECTION INTRAMUSCULAR; INTRAVENOUS ONCE AS NEEDED
Status: DISCONTINUED | OUTPATIENT
Start: 2023-02-07 | End: 2023-02-07 | Stop reason: HOSPADM

## 2023-02-07 RX ORDER — LIDOCAINE HYDROCHLORIDE 10 MG/ML
INJECTION, SOLUTION EPIDURAL; INFILTRATION; INTRACAUDAL; PERINEURAL AS NEEDED
Status: DISCONTINUED | OUTPATIENT
Start: 2023-02-07 | End: 2023-02-07

## 2023-02-07 RX ORDER — LABETALOL HYDROCHLORIDE 5 MG/ML
5 INJECTION, SOLUTION INTRAVENOUS
Status: ACTIVE | OUTPATIENT
Start: 2023-02-07 | End: 2023-02-07

## 2023-02-07 RX ORDER — CEFAZOLIN SODIUM 1 G/3ML
INJECTION, POWDER, FOR SOLUTION INTRAMUSCULAR; INTRAVENOUS AS NEEDED
Status: DISCONTINUED | OUTPATIENT
Start: 2023-02-07 | End: 2023-02-07

## 2023-02-07 RX ORDER — METOCLOPRAMIDE HYDROCHLORIDE 5 MG/ML
10 INJECTION INTRAMUSCULAR; INTRAVENOUS ONCE AS NEEDED
Status: DISCONTINUED | OUTPATIENT
Start: 2023-02-07 | End: 2023-02-07 | Stop reason: HOSPADM

## 2023-02-07 RX ORDER — DEXAMETHASONE SODIUM PHOSPHATE 10 MG/ML
INJECTION, SOLUTION INTRAMUSCULAR; INTRAVENOUS AS NEEDED
Status: DISCONTINUED | OUTPATIENT
Start: 2023-02-07 | End: 2023-02-07

## 2023-02-07 RX ORDER — HYDROMORPHONE HCL/PF 1 MG/ML
0.5 SYRINGE (ML) INJECTION
Status: DISCONTINUED | OUTPATIENT
Start: 2023-02-07 | End: 2023-02-07 | Stop reason: HOSPADM

## 2023-02-07 RX ORDER — SODIUM CHLORIDE, SODIUM LACTATE, POTASSIUM CHLORIDE, CALCIUM CHLORIDE 600; 310; 30; 20 MG/100ML; MG/100ML; MG/100ML; MG/100ML
125 INJECTION, SOLUTION INTRAVENOUS CONTINUOUS
Status: DISCONTINUED | OUTPATIENT
Start: 2023-02-07 | End: 2023-02-07 | Stop reason: HOSPADM

## 2023-02-07 RX ORDER — LIDOCAINE HYDROCHLORIDE AND EPINEPHRINE 10; 10 MG/ML; UG/ML
INJECTION, SOLUTION INFILTRATION; PERINEURAL AS NEEDED
Status: DISCONTINUED | OUTPATIENT
Start: 2023-02-07 | End: 2023-02-07 | Stop reason: HOSPADM

## 2023-02-07 RX ORDER — ONDANSETRON 2 MG/ML
4 INJECTION INTRAMUSCULAR; INTRAVENOUS ONCE AS NEEDED
Status: COMPLETED | OUTPATIENT
Start: 2023-02-07 | End: 2023-02-07

## 2023-02-07 RX ORDER — ROCURONIUM BROMIDE 10 MG/ML
INJECTION, SOLUTION INTRAVENOUS AS NEEDED
Status: DISCONTINUED | OUTPATIENT
Start: 2023-02-07 | End: 2023-02-07

## 2023-02-07 RX ORDER — ONDANSETRON 2 MG/ML
INJECTION INTRAMUSCULAR; INTRAVENOUS AS NEEDED
Status: DISCONTINUED | OUTPATIENT
Start: 2023-02-07 | End: 2023-02-07

## 2023-02-07 RX ORDER — SODIUM CHLORIDE 9 MG/ML
INJECTION, SOLUTION INTRAVENOUS CONTINUOUS PRN
Status: DISCONTINUED | OUTPATIENT
Start: 2023-02-07 | End: 2023-02-07

## 2023-02-07 RX ORDER — EPHEDRINE SULFATE 50 MG/ML
INJECTION INTRAVENOUS AS NEEDED
Status: DISCONTINUED | OUTPATIENT
Start: 2023-02-07 | End: 2023-02-07

## 2023-02-07 RX ORDER — PROPOFOL 10 MG/ML
INJECTION, EMULSION INTRAVENOUS AS NEEDED
Status: DISCONTINUED | OUTPATIENT
Start: 2023-02-07 | End: 2023-02-07

## 2023-02-07 RX ORDER — FENTANYL CITRATE 50 UG/ML
INJECTION, SOLUTION INTRAMUSCULAR; INTRAVENOUS AS NEEDED
Status: DISCONTINUED | OUTPATIENT
Start: 2023-02-07 | End: 2023-02-07

## 2023-02-07 RX ADMIN — CEFAZOLIN 2000 MG: 1 INJECTION, POWDER, FOR SOLUTION INTRAMUSCULAR; INTRAVENOUS at 13:48

## 2023-02-07 RX ADMIN — EPHEDRINE SULFATE 5 MG: 50 INJECTION INTRAVENOUS at 13:44

## 2023-02-07 RX ADMIN — FENTANYL CITRATE 25 MCG: 50 INJECTION INTRAMUSCULAR; INTRAVENOUS at 15:45

## 2023-02-07 RX ADMIN — ONDANSETRON HYDROCHLORIDE 4 MG: 2 INJECTION, SOLUTION INTRAMUSCULAR; INTRAVENOUS at 15:55

## 2023-02-07 RX ADMIN — PHENYLEPHRINE HYDROCHLORIDE 30 MCG/MIN: 10 INJECTION INTRAVENOUS at 13:41

## 2023-02-07 RX ADMIN — EPHEDRINE SULFATE 10 MG: 50 INJECTION INTRAVENOUS at 14:20

## 2023-02-07 RX ADMIN — LIDOCAINE HYDROCHLORIDE 100 MG: 10 INJECTION, SOLUTION EPIDURAL; INFILTRATION; INTRACAUDAL; PERINEURAL at 13:32

## 2023-02-07 RX ADMIN — PROPOFOL 150 MG: 10 INJECTION, EMULSION INTRAVENOUS at 13:32

## 2023-02-07 RX ADMIN — ACETAMINOPHEN 650 MG: 325 TABLET ORAL at 16:37

## 2023-02-07 RX ADMIN — FENTANYL CITRATE 50 MCG: 50 INJECTION INTRAMUSCULAR; INTRAVENOUS at 13:29

## 2023-02-07 RX ADMIN — ONDANSETRON HYDROCHLORIDE 4 MG: 2 INJECTION, SOLUTION INTRAMUSCULAR; INTRAVENOUS at 15:06

## 2023-02-07 RX ADMIN — EPHEDRINE SULFATE 5 MG: 50 INJECTION INTRAVENOUS at 13:56

## 2023-02-07 RX ADMIN — SODIUM CHLORIDE, SODIUM LACTATE, POTASSIUM CHLORIDE, AND CALCIUM CHLORIDE 125 ML/HR: .6; .31; .03; .02 INJECTION, SOLUTION INTRAVENOUS at 12:18

## 2023-02-07 RX ADMIN — SUGAMMADEX 200 MG: 100 INJECTION, SOLUTION INTRAVENOUS at 15:09

## 2023-02-07 RX ADMIN — SODIUM CHLORIDE: 0.9 INJECTION, SOLUTION INTRAVENOUS at 13:37

## 2023-02-07 RX ADMIN — FENTANYL CITRATE 50 MCG: 50 INJECTION INTRAMUSCULAR; INTRAVENOUS at 13:32

## 2023-02-07 RX ADMIN — EPHEDRINE SULFATE 5 MG: 50 INJECTION INTRAVENOUS at 14:14

## 2023-02-07 RX ADMIN — DEXAMETHASONE SODIUM PHOSPHATE 10 MG: 10 INJECTION INTRAMUSCULAR; INTRAVENOUS at 13:32

## 2023-02-07 RX ADMIN — ROCURONIUM BROMIDE 50 MG: 10 INJECTION INTRAVENOUS at 13:32

## 2023-02-07 NOTE — INTERVAL H&P NOTE
H&P reviewed  After examining the patient I find no changes in the patients condition since the H&P had been written      Vitals:    02/07/23 1156   BP: 164/93   Pulse: 80   Resp: 18   Temp: (!) 97 °F (36 1 °C)   SpO2: 97%

## 2023-02-07 NOTE — DISCHARGE INSTR - AVS FIRST PAGE
You may resume your home medications  Please leave dressing in place until you see Dr Marina Mahajan in the office  Please call or return to the ER if you have nausea, vomiting, fevers, chills, chest pain, shortness of breath, redness around your incisions, or discharge from your incisions  Please follow up in one week  Call the office to schedule your appointment

## 2023-02-07 NOTE — ANESTHESIA PREPROCEDURE EVALUATION
Procedure:  WIDE EXCISION OF LEFT NASAL MELANOMA (Left: Nose)  APPLICATION OF SKIN SUBSTITUTE GRAFT TO NOSE, POSSIBLE FTSG, POSSIBLE CARTILAGE GRAFT FROM EAR (Left: Nose)    Relevant Problems   ANESTHESIA (within normal limits)      CARDIO   (+) Hyperlipidemia   (+) Hypertension      ENDO (within normal limits)      GI/HEPATIC (within normal limits)      /RENAL   (+) Stage 3 chronic kidney disease (HCC)      HEMATOLOGY (within normal limits)      MUSCULOSKELETAL   (+) Gout   (+) Lumbar back pain with radiculopathy affecting lower extremity      NEURO/PSYCH (within normal limits)      PULMONARY (within normal limits)      Musculoskeletal and Integument   (+) Basal cell carcinoma of skin of other parts of face   (+) 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HCC)   (+) Scalp ulceration, with necrosis of bone (HCC)      Other   (+) Malignant melanoma of nose (HCC)   (+) 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HCC)   (+) Status post repair of ventral hernia        Physical Exam    Airway    Mallampati score: II  TM Distance: >3 FB  Neck ROM: full     Dental   No notable dental hx     Cardiovascular  Rhythm: regular, Rate: normal, Cardiovascular exam normal    Pulmonary  Pulmonary exam normal Breath sounds clear to auscultation,     Other Findings        Anesthesia Plan  ASA Score- 3     Anesthesia Type- general with ASA Monitors  Additional Monitors:   Airway Plan: ETT  Plan Factors-Exercise tolerance (METS): >4 METS  Chart reviewed  Existing labs reviewed  Patient summary reviewed  Obstructive sleep apnea risk education given perioperatively  Induction- intravenous  Postoperative Plan- Plan for postoperative opioid use  Informed Consent- Anesthetic plan and risks discussed with patient  I personally reviewed this patient with the CRNA  Discussed and agreed on the Anesthesia Plan with the CRNA  Shahriar Cee           Recent labs personally reviewed:  Lab Results   Component Value Date    WBC 6 59 01/27/2023    HGB 14 2 01/27/2023     01/27/2023     Lab Results   Component Value Date     05/05/2015    K 4 0 01/27/2023    BUN 22 01/27/2023    CREATININE 1 34 (H) 01/27/2023    GLUCOSE 91 05/05/2015     No results found for: PTT   Lab Results   Component Value Date    INR 1 05 08/05/2019       I, Deann Peñaloza MD, have personally seen and evaluated the patient prior to anesthetic care  I have reviewed the pre-anesthetic record, and other medical records if appropriate to the anesthetic care  If a CRNA is involved in the case, I have reviewed the CRNA assessment, if present, and agree  Risks/benefits and alternatives discussed with patient including possible PONV, sore throat, and possibility of rare anesthetic and surgical emergencies

## 2023-02-07 NOTE — ANESTHESIA POSTPROCEDURE EVALUATION
Post-Op Assessment Note    CV Status:  Stable  Pain Score: 0    Pain management: adequate     Mental Status:  Arousable and sleepy   Hydration Status:  Stable   PONV Controlled:  None   Airway Patency:  Patent   Two or more mitigation strategies used for obstructive sleep apnea   Post Op Vitals Reviewed: Yes      Staff: CRNA         No notable events documented      BP   180/80   Temp  98 3F   Pulse 88   Resp 12   SpO2 98% 6L FM

## 2023-02-07 NOTE — H&P (VIEW-ONLY)
Surgical Oncology Consult                                        [address]  [facility] [place] [address]     Jessica Hand  1940  [id number]  [address]  [facility] [place] [address]     No chief complaint on file         Assessment/Plan:     No problem-specific Assessment & Plan notes found for this encounter          Diagnoses and all orders for this visit:     Malignant melanoma of nose ([facility] )  -     Ambulatory Referral to Surgical Oncology         Advance Care Planning/Advance Directives:  Discussed disease status, cancer treatment plans and/or cancer treatment goals with the patient           Oncology History   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[facility] [address] )   11/8/2017 Surgery     right vertex of scalp - s/p excision and full thickness graft for closure on 11/8/17 for atypical fibroxanthoma          1/29/2018 Biopsy     biopsy from the anterior aspect of the graft showing atypical epithelioid fibro histiocytic tumor, with lesion extending to the deep tissue edge   This is similar to prior biopsy done   Differential includes pleomorphic sarcoma versus an extreme atypical example of atypical fibro histiocytoma       3/2018 Initial Diagnosis     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      3/28/2018 Surgery     excision of scalp sarcoma with full thickness graft - Dr Puneet Haro  Soft Tissue/Skin, Sarcoma of scalp, wide excision:  - Recurrent pleomorphic sarcoma, 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  See Note      -- FNCLCC Histologic Grade 3  (total score: 7 of 8)       * Tumor differentiation score: 3 of 3         * Mitotic count score: 3 of 3; > 19 mitoses/10 HPF (33 mitoses/10 HPF)       * Necrosis score: 1 of 2; present, < 50%  - Tumor extends into deep reticular dermis, subcutis and fascia  - Perineural invasion: Present; intraneural invasion identified (0 4 mm largest nerve diameter)     - Lymphovascular invasion: Focally suspicious  - Bone invasion: Not identified    - Central nervous system extension: Not identified  - Margins: uninvolved by sarcoma but close     -- Sarcoma  0 15 mm from nearest deep margin    - Additional Pathologic Findings: Changes consistent with prior surgical site      -- Focal ulceration with bacterial colonization, acute and chronic inflammation      -- Best representative tumor block: A5        5/29/2018 - 7/5/2018 Radiation     Plan ID Energy Fractions Dose per Fraction (cGy) Dose Correction (cGy) Total Dose Delivered (cGy) Elapsed Days   Vertex Scalp 9E 27 / 27 200 0 5,400 37                Basal cell carcinoma of skin of other parts of face     Initial Diagnosis     Basal cell carcinoma of skin of other parts of face      Malignant melanoma of nose (HCC)   12/6/2022 Biopsy     Skin, left ala, shave biopsy:     MELANOMA (thickness: 0 6 mm); extending to the tissue edges      Ulceration: not seen  Anatomic Stacy Cagle) level: III  Type: lentigo maligna melanoma  Mitoses: 0/mm2  Margin assessment: invasive melanoma extends to peripheral specimen margin and deep specimen margin focally  Pathologic stage: pT1a            History of Present Illness: Patient is an 48-year-old man with an extensive skin cancer history  He is post prior excision of melanomas as well as a sarcoma on his right scalp  He has had plastics procedures done as well for reconstruction in these areas  At a recent dermatology visit, he was found to have a pigmented lesion on his left nasal ala  Biopsy confirmed a T1 a melanoma  He has been referred for evaluation and treatment  He has extensive sun exposure history      Review of Systems   Constitutional: Negative  HENT: Negative  Eyes: Negative  Respiratory: Negative  Cardiovascular: Negative  Gastrointestinal: Negative  Endocrine: Negative  Genitourinary: Negative  Musculoskeletal: Negative      Skin:        New left alar melanoma Allergic/Immunologic: Negative  Neurological: Negative  Hematological: Negative  Psychiatric/Behavioral: Negative      All other systems reviewed and are negative                Patient Active Problem List   Diagnosis   • Actinic keratosis   • Stage 3 chronic kidney disease ([address] )   • Gout   • Hyperlipidemia   • Hypertension   • Irritable bowel syndrome   • Macular degeneration   • Urinary incontinence, post-void dribbling   • 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HCC)   • Basal cell carcinoma of skin of other parts of face   • Pulmonary nodules   • Incisional hernia, without obstruction or gangrene   • Status post repair of ventral hernia   • Dizziness   • Urinary frequency   • Lumbar back pain with radiculopathy affecting lower extremity   • Scalp ulceration, with necrosis of bone (HCC)   • 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HCC)   • Malignant melanoma of nose (HCC)      Medical History        Past Medical History:   Diagnosis Date   • Back pain     • Balance problem     • Basal cell carcinoma (BCC)       in past   • BCC (basal cell carcinoma of skin) 08/17/2021     Right tip of nose   • BCC (basal cell carcinoma of skin) 08/08/2022     Left lip   • BCC (basal cell carcinoma of skin) 08/08/2022     Right cheek   • Gout     • Hard of hearing     • Hearing aid worn       ziyad   • Herniated nucleus pulposus, L4-5     • Hypertension     • Incisional hernia     • Macular degeneration     • Melanoma ([address] )       left cheek- history   • Melanoma ([address] ) 08/08/2022     Right neck   • Pulmonary nodules     • Reactive airway disease     • 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[facility])       july 2020 with skin grafting   • Skin cancer 07/01/2020     AFX- scalp   • Squamous cell carcinoma       in past         Surgical History         Past Surgical History:   Procedure Laterality Date   • CATARACT EXTRACTION Bilateral     • COLONOSCOPY       • HERNIA REPAIR       • HYDROCELE EXCISION / REPAIR       • MASTOID SURGERY Left     • MOHS SURGERY       • MOHS SURGERY   2021     Right tip of nose-Dr Meryle Orion   • MOHS SURGERY   10/06/2022     Left lip-Dr Meryle Orion   • OTHER SURGICAL HISTORY         sarcoma scalp with skin graft   • IN REPAIR FIRST ABDOMINAL WALL HERNIA N/A 2021     Procedure: REPAIR HERNIA INCISIONAL OPEN WITH MESH;  Surgeon: Hemant Hayden MD;  Location: [facility];  Service: General   • SCALP EXCISION N/A 2018     Procedure: SCALP SARCOMA EXCISION WITH FULL THICKNESS SKIN GRAFT;  Surgeon: Amie Feldman MD;  Location: [facility];  Service: Plastics   • SKIN BIOPSY       • SKIN CANCER EXCISION       • TONSILLECTOMY AND ADENOIDECTOMY             Family History         Family History   Problem Relation Age of Onset   • Colon cancer Father     • Heart failure Father     • Heart disease Mother     • Stroke Mother           Social History               Socioeconomic History   • Marital status: /Civil Union       Spouse name: Not on file   • Number of children: Not on file   • Years of education: Not on file   • Highest education level: Not on file   Occupational History   • Not on file   Tobacco Use   • Smoking status: Former       Packs/day: 0 25       Years: 15 00       Pack years: 3 75       Types: Cigarettes       Quit date: 3/13/1988       Years since quittin 8   • Smokeless tobacco: Never   Vaping Use   • Vaping Use: Never used   Substance and Sexual Activity   • Alcohol use:  Yes       Comment: beer   • Drug use: No   • Sexual activity: Yes   Other Topics Concern   • Not on file   Social History Narrative   • Not on file      Social Determinants of Health      Financial Resource Strain: Not on file   Food Insecurity: Not on file   Transportation Needs: Not on file   Physical Activity: Not on file   Stress: Not on file   Social Connections: Not on file   Intimate Partner Violence: Not on file   Housing Stability: Not on file            Current Outpatient Medications:   •  acetaminophen (TYLENOL) 500 mg tablet, Take 500-1,000 mg by mouth every 4 (four) hours as needed for mild pain, Disp: , Rfl:   •  amLODIPine (NORVASC) 10 mg tablet, TAKE 1 TABLET BY MOUTH  DAILY, Disp: 90 tablet, Rfl: 3  •  bimatoprost (LUMIGAN) 0 01 % ophthalmic drops, Administer 1 drop to the right eye daily at bedtime, Disp: , Rfl:   •  erythromycin (ILOTYCIN) ophthalmic ointment, Administer 3 5 inches to the right eye daily at bedtime, Disp: , Rfl:   •  Fluocinolone Acetonide Scalp 0 01 % OIL, Apply a thin layer topically daily at night one hour before bedtime  , Disp: 118 28 mL, Rfl: 5  •  imiquimod (ALDARA) 5 % cream, Apply topically once a day Monday thru Friday for 6 weeks, Disp: 24 each, Rfl: 1  •  lisinopril (ZESTRIL) 20 mg tablet, TAKE 1 TABLET BY MOUTH  DAILY, Disp: 90 tablet, Rfl: 3  •  metoprolol succinate (TOPROL-XL) 50 mg 24 hr tablet, Take 1 tablet (50 mg total) by mouth daily, Disp: 90 tablet, Rfl: 3  •  mometasone (ELOCON) 0 1 % cream, APPLY TO EARS ONCE TO TWICE DAILY AS NEEDED FOR SCALING, Disp: , Rfl:   •  Multiple Vitamins-Minerals (PRESERVISION AREDS 2) CAPS, Take 1 capsule by mouth 2 (two) times a day, Disp: , Rfl:   •  mupirocin (BACTROBAN) 2 % ointment, Apply topically two to three times a day to sore areas, Disp: 22 g, Rfl: 3  •  Naproxen Sodium (ALEVE PO), Take 1 tablet by mouth 2 (two) times a day as needed , Disp: , Rfl:   •  ofloxacin (OCUFLOX) 0 3 % ophthalmic solution, INSTILL 1 DROP IN LEFT EYE FOUR TIMES DAILY, Disp: , Rfl:   •  tamsulosin (FLOMAX) 0 4 mg, Take 1 capsule (0 4 mg total) by mouth daily with dinner, Disp: 30 capsule, Rfl: 0        Allergies   Allergen Reactions   • Erythromycin Other (See Comments)       Thrush           Vitals:     01/04/23 0952   BP: 160/82   Temp: 97 9 °F (36 6 °C)         Physical Exam  Vitals reviewed  Constitutional:       Appearance: Normal appearance  HENT:      Head: Normocephalic and atraumatic        Right Ear: External ear normal       Left Ear: External ear normal       Nose: Nose normal    Eyes: Extraocular Movements: Extraocular movements intact  Pupils: Pupils are equal, round, and reactive to light  Cardiovascular:      Rate and Rhythm: Normal rate and regular rhythm  Pulses: Normal pulses  Heart sounds: Normal heart sounds  Pulmonary:      Effort: Pulmonary effort is normal       Breath sounds: Normal breath sounds  Abdominal:      General: Abdomen is flat  Palpations: Abdomen is soft  Musculoskeletal:         General: Normal range of motion  Cervical back: Normal range of motion and neck supple  Skin:     General: Skin is warm  Comments: Left nasal ala with Treatment of lesion measuring proxy 5 mm x 9 mm  No satellite lesions  No cervical or parotid adenopathy  Neurological:      General: No focal deficit present  Mental Status: He is alert and oriented to person, place, and time  Psychiatric:         Mood and Affect: Mood normal          Behavior: Behavior normal          Thought Content: Thought content normal          Judgment: Judgment normal             Pathology:  Case Report   Surgical Pathology Report                         Case: [id number]                                    Authorizing Provider: Veronica Blanco MD          Collected:           12/06/2022 1541               Ordering Location:     [facility]      Received:            12/06/2022 [address] [place] [address]                                                                 Pathologist:           Arlester Romberg, MD                                                            Specimen:    Skin, Other, Specimen A; Left ala                                                           Final Diagnosis   A  Skin, left ala, shave biopsy:     MELANOMA (thickness: 0 6 mm); extending to the tissue edges (see note)      Note: SOX10 and MART-1 immunostains were performed and reviewed  Multiple levels examined  Please see synoptic report for additional details  The results were emailed to Dr Yin Zamudio on 12/13/2022      Synoptic report for melanoma of the skin  Thickness: 0 6 mm  Ulceration: not seen  Anatomic Sanford Warner) level: III  Type: lentigo maligna melanoma  Mitoses: 0/mm2  Microsatellites: not seen  Lymphovascular invasion: not seen  Neurotropism: not seen  Tumor regression: not seen  Margin assessment: invasive melanoma extends to peripheral specimen margin and deep specimen margin focally  Pathologic stage: pT1a  Associated nevus: not seen            Electronically signed by Wagner Meyers MD on 12/13/2022 at 10:17 AM            Labs:  none     Imaging  No results found  I reviewed the above laboratory and imaging data      Discussion/Summary: Stage T1 a melanoma, left nasal ala  We will plan for excision to be done in conjunction with plastic surgery team for anticipated reconstruction  Rationale for this along with benefits of surgery include infection, bleeding, need for possible additional surgery, discussed with all questions answered and consent signed at this visit  We will make appropriate referrals to plastic surgery team and coordinate accordingly  % ophthalmic solution, INSTILL 1 DROP IN LEFT EYE FOUR TIMES DAILY, Disp: , Rfl:   •  tamsulosin (FLOMAX) 0 4 mg, Take 1 capsule (0 4 mg total) by mouth daily with dinner, Disp: 30 capsule, Rfl: 0        Allergies   Allergen Reactions   • Erythromycin Other (See Comments)       Thrush           Vitals: There were no vitals taken for this visit  Physical Exam  Vitals reviewed  Constitutional:       Appearance: Normal appearance  HENT:      Head: Normocephalic and atraumatic  Right Ear: External ear normal       Left Ear: External ear normal       Nose: Nose normal    Eyes:      Extraocular Movements: Extraocular movements intact  Pupils: Pupils are equal, round, and reactive to light  Cardiovascular:      Rate and Rhythm: Normal rate and regular rhythm  Pulses: Normal pulses  Heart sounds: Normal heart sounds  Pulmonary:      Effort: Pulmonary effort is normal       Breath sounds: Normal breath sounds  Abdominal:      General: Abdomen is flat  Palpations: Abdomen is soft  Musculoskeletal:         General: Normal range of motion  Cervical back: Normal range of motion and neck supple  Skin:     General: Skin is warm  Comments: Left nasal ala with Treatment of lesion measuring proxy 5 mm x 9 mm  No satellite lesions  No cervical or parotid adenopathy  Neurological:      General: No focal deficit present  Mental Status: He is alert and oriented to person, place, and time  Psychiatric:         Mood and Affect: Mood normal          Behavior: Behavior normal          Thought Content: Thought content normal          Judgment: Judgment normal             Pathology:  Case Report   Surgical Pathology Report                         Case: [id number]                                    Authorizing Provider: Jennifer Mcintyre MD          Collected:           12/06/2022 2622               Ordering Location:     [facility]      Received:            12/06/2022 [address] [place] [address]                                                                 Pathologist:           Crystal Bernal MD                                                            Specimen:    Skin, Other, Specimen A; Left ala                                                           Final Diagnosis   A  Skin, left ala, shave biopsy:     MELANOMA (thickness: 0 6 mm); extending to the tissue edges (see note)      Note: SOX10 and MART-1 immunostains were performed and reviewed  Multiple levels examined  Please see synoptic report for additional details   The results were emailed to Dr Abran Diehl on 12/13/2022      Synoptic report for melanoma of the skin  Thickness: 0 6 mm  Ulceration: not seen  Anatomic Stefanie Rooney) level: III  Type: lentigo maligna melanoma  Mitoses: 0/mm2  Microsatellites: not seen  Lymphovascular invasion: not seen  Neurotropism: not seen  Tumor regression: not seen  Margin assessment: invasive melanoma extends to peripheral specimen margin and deep specimen margin focally  Pathologic stage: pT1a  Associated nevus: not seen            Electronically signed by Bandar Ingram MD on 12/13/2022 at 10:17 AM            Labs:  none     Imaging  No results found  I reviewed the above laboratory and imaging data      Discussion/Summary: Stage T1 a melanoma, left nasal ala  We will plan for excision to be done in conjunction with plastic surgery team for anticipated reconstruction  Rationale for this along with benefits of surgery include infection, bleeding, need for possible additional surgery, discussed with all questions answered and consent signed at this visit    We will make appropriate referrals to plastic surgery team and coordinate accordingly

## 2023-02-08 NOTE — OP NOTE
OPERATIVE REPORT  PATIENT NAME: Rani Allison    :  1940  MRN: [id number]  Pt Location: BE OR ROOM 05    SURGERY DATE: 2023    Surgeon(s) and Role:  Panel 1:     * Harshil Ferraro MD - Primary     * Kaylah Morris DO - Assisting  Panel 2:     * Taylor Falk MD - Primary    Preop Diagnosis:  Malignant melanoma of nose ([facility] ) [C43 31]    Post-Op Diagnosis Codes:     * Malignant melanoma of nose ([address] ) [C43 31]    Procedure(s):  Left - WIDE EXCISION OF LEFT NASAL MELANOMA  Left - APPLICATION OF SKIN SUBSTITUTE GRAFT TO NOSE    Specimen(s):  ID Type Source Tests Collected by Time Destination   1 : left nasal melanoma- 1 clip marks 12 oclock, 2 clips emmanuelle 3 oclock, 3 clips emmanuelle 6 oclock, 4 clips emmanuelle 9 oclock Tissue Nose TISSUE EXAM Harshil Ferraro MD 2023 1407        Estimated Blood Loss:   Minimal    Drains:  * No LDAs found *    Anesthesia Type:   General    Operative Indications:  Malignant melanoma of nose ([address] ) [C43 31]  Complex open wound of nose 3 0 x 2 0 x 1 0cm with exposed cartilage    Operative Findings:      Complications:   None    Procedure and Technique:    1  Local tissue rearrangement with nasalis muscle advancement, 2cm2   2  Application of Integra skin substitute graft, 25cm2  Patient is an 79year-old M with melanoma defect of the left nose  Skin substitute graft is recommended for  reconstruction  All details of surgical procedure were discussed at length the patient including all potential risks, benefits and alternatives  Risks discussed included, not limited to infection, bleeding, scarring, hematoma, or seroma formation partial or total graft loss, delayed healing, wound separation, damage to nerves or blood vessels, permanent numbness or hypersensitivity, asymmetry, poor cosmesis, need for additional procedures  The patient noted his understanding  He had opportunity for questions  All were answered  He desires to proceed  Informed consent was obtained  Patient is brought to the operating room placed supine  Venodyne boots were placed and activated  All pressure points appropriately and extensively padded  After induction general endotracheal anesthesia, the entire left facial and neck areas were sterilely prepped and draped in the usual fashion  First, Dr Trang Stevenson began his portion of the procedure with wide local excision of left nasal melanoma  Once he was finished, I then entered the operating field  The defect was analyzed  There was a full-thickness defect over the  nasal gianni rim and nasal sidewall  and extending onto the cheek measuring 3 0 x 2 0 x 1 0cm in size, full-thickness  The wound was then copiously irrigated with normal saline  Hemostasis was obtained with electrocautery  There was a large, deep crevice at the nasal cheek junction  Therefore, the nasalis muscle was advanced into the wound to obliterate the sulcus and create a flatter contour for the skin substitute graft  The nasalis muscle was elevated and advanced into the base of the wound with 5-0 PDS sutures  Improved and flatter contour was obtained  Next, a perforated Integra bilaminate skin substitute graft, 2 x 2 inch, was sterilely opened on the field and soaked in normal saline per 's recommendations for over 2 minutes  Next, the graft was then placed over the  nasal defect  It was then sewn in place circumferentially with 4-0 chromic sutures in running and interrupted fashion  Excellent approximation was obtained  Total area of skin substitute graft was 25cm2  centimeters squared  A Xeroform  tie-over bolster dressing was then placed with 2-0 silk sutures placed circumferentially  The bolster was dressed with bacitracin ointment around the periphery  There was some collapse of the nasal gianni rim  Therefore, a Xeroform bolster was placed into the left nare   The patient was awakened from general anesthesia and transferred to recovery room awake and in stable condition  The patient tolerated the procedure well  There were no complications  I was present for the entire procedure         Patient Disposition:  PACU     This procedure was not performed to treat primary cutaneous melanoma through wide local excision      SIGNATURE: Virgen Pitts MD  DATE: February 8, 2023  TIME: 3:03 PM

## 2023-02-14 ENCOUNTER — OFFICE VISIT (OUTPATIENT)
Dept: PLASTIC SURGERY | Facility: CLINIC | Age: 83
End: 2023-02-14

## 2023-02-14 DIAGNOSIS — C43.31 MALIGNANT MELANOMA OF NOSE (HCC): ICD-10-CM

## 2023-02-14 DIAGNOSIS — C44.319 BASAL CELL CARCINOMA OF SKIN OF OTHER PARTS OF FACE: Primary | ICD-10-CM

## 2023-02-14 NOTE — PROGRESS NOTES
POST-OP EVALUATION  2/15/2023    Subjective   Venus Chente is a 79 y o  male is here today for routine post-operative follow up     02/07/23 1324         Procedures:        WIDE EXCISION OF LEFT NASAL MELANOMA (Left: Nose)       APPLICATION OF SKIN SUBSTITUTE GRAFT TO NOSE (Left: Nose)   Integra bilaminate skin substitute graft, 2 x 2 inch    Pathology  Skin, nose, left nasal, wide excision:  Residual melanoma, at least in situ, and scar; pending immunohistochemical stains and deeper levels for further characterization of the margin status  Incidental basal cell carcinoma, superficial and nodular type, not extending to the margins       Past Medical History:   Diagnosis Date   • Anxiety    • Arthritis     back   • Back pain    • Balance problem    • Basal cell carcinoma (BCC)     in past   • BCC (basal cell carcinoma of skin) 08/17/2021    Right tip of nose   • BCC (basal cell carcinoma of skin) 08/08/2022    Left lip   • BCC (basal cell carcinoma of skin) 08/08/2022    Right cheek   • Cancer ([address] )    • Gout    • Hard of hearing    • Hearing aid worn     ziyad   • Hearing aid worn    • Herniated nucleus pulposus, L4-5    • [place] (hard of hearing)    • Hypertension    • Incisional hernia    • Kidney stone     CKD3   • Macular degeneration    • Melanoma ([address] )     left cheek- history   • Melanoma ([address] ) 08/08/2022    Right neck   • Nocturia    • Pulmonary nodules    • Reactive airway disease    • 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   • Skin cancer 07/01/2020    AFX- scalp   • Squamous cell carcinoma     in past     Past Surgical History:   Procedure Laterality Date   • CATARACT EXTRACTION Bilateral    • COLONOSCOPY     • HERNIA REPAIR     • HYDROCELE EXCISION / REPAIR     • MASTOID SURGERY Left    • MOHS SURGERY     • MOHS SURGERY  09/29/2021    Right tip of nose-Dr Sonja Cardozo   • MOHS SURGERY  10/06/2022    Left lip-Dr Sonja Cardozo   • OTHER SURGICAL HISTORY      sarcoma scalp with skin graft   • MT EXCISION MALIGNANT LESION F/E/E/N/L 0 5 CM/< Left 2/7/2023    Procedure: WIDE EXCISION OF LEFT NASAL MELANOMA;  Surgeon: Ada Cleaning MD;  [facility];  Service: Surgical Oncology   • MD REPAIR FIRST ABDOMINAL WALL HERNIA N/A 04/22/2021    Procedure: REPAIR HERNIA INCISIONAL OPEN WITH MESH;  Surgeon: Ayala Ye MD;  [facility];  Service: General   • SCALP EXCISION N/A 03/28/2018    Procedure: SCALP SARCOMA EXCISION WITH FULL THICKNESS SKIN GRAFT;  Surgeon: Carissa Short MD;  [facility];  Service: Plastics   • SKIN BIOPSY     • SKIN CANCER EXCISION     • SPLIT THICKNESS SKIN GRAFT Left 2/7/2023    Procedure: APPLICATION OF SKIN SUBSTITUTE GRAFT TO NOSE;  Surgeon: Anderson Gamez MD;  [facility];  Service: Plastics   • TONSILLECTOMY AND ADENOIDECTOMY          Current Outpatient Medications:   •  acetaminophen (TYLENOL) 500 mg tablet, Take 500-1,000 mg by mouth every 4 (four) hours as needed for mild pain, Disp: , Rfl:   •  amLODIPine (NORVASC) 10 mg tablet, TAKE 1 TABLET BY MOUTH  DAILY, Disp: 90 tablet, Rfl: 3  •  Fluocinolone Acetonide Scalp 0 01 % OIL, Apply a thin layer topically daily at night one hour before bedtime   (Patient taking differently: Apply topically if needed Apply a thin layer topically daily at night one hour before bedtime ), Disp: 118 28 mL, Rfl: 5  •  imiquimod (ALDARA) 5 % cream, Apply topically once a day Monday thru Friday for 6 weeks (Patient taking differently: Apply 1 packet topically if needed For dryness around the ears), Disp: 24 each, Rfl: 1  •  lisinopril (ZESTRIL) 20 mg tablet, TAKE 1 TABLET BY MOUTH  DAILY, Disp: 90 tablet, Rfl: 3  •  metoprolol succinate (TOPROL-XL) 50 mg 24 hr tablet, Take 1 tablet (50 mg total) by mouth daily, Disp: 90 tablet, Rfl: 3  •  mometasone (ELOCON) 0 1 % cream, APPLY TO EARS ONCE TO TWICE DAILY AS NEEDED FOR SCALING, Disp: , Rfl:   •  Multiple Vitamins-Minerals (PRESERVISION AREDS 2) CAPS, Take 1 capsule by mouth 2 (two) times a day, Disp: , Rfl:   •  mupirocin (BACTROBAN) 2 % ointment, Apply topically two to three times a day to sore areas (Patient taking differently: Apply topically if needed Apply topically two to three times a day to sore areas), Disp: 22 g, Rfl: 3  •  traMADol (ULTRAM) 50 mg tablet, Take 1 tablet (50 mg total) by mouth every 6 (six) hours as needed for moderate pain, Disp: 10 tablet, Rfl: 0  Allergies: Erythromycin    Objective      Bolster and nasal packing removed  Integra in place  Assessment/Plan   Diagnoses and all orders for this visit:    Basal cell carcinoma of skin of other parts of face    Malignant melanoma of nose ([facility] )    Dr Jacky Marino also saw the patient today  Wound Care:  Bacitracin to graft  Xeroform on the graft  Xeroform nasal packing replaced for support  Gauze and Hypafix tape  Followup in one week  Pt can change dressing but leave packing in place        Mahendra Robles PA-C

## 2023-02-16 DIAGNOSIS — I10 PRIMARY HYPERTENSION: ICD-10-CM

## 2023-02-16 RX ORDER — METOPROLOL SUCCINATE 50 MG/1
TABLET, EXTENDED RELEASE ORAL
Qty: 90 TABLET | Refills: 3 | Status: SHIPPED | OUTPATIENT
Start: 2023-02-16

## 2023-02-21 ENCOUNTER — OFFICE VISIT (OUTPATIENT)
Dept: PLASTIC SURGERY | Facility: CLINIC | Age: 83
End: 2023-02-21

## 2023-02-21 DIAGNOSIS — C43.31 MALIGNANT MELANOMA OF NOSE (HCC): Primary | ICD-10-CM

## 2023-02-21 NOTE — PROGRESS NOTES
POST-OP EVALUATION  2/21/2023    Domenica Banks is a 79 y o  male is here today for routine post-operative follow up     02/07/23 1324               Procedures:        WIDE EXCISION OF LEFT NASAL MELANOMA (Left: Nose)       APPLICATION OF SKIN SUBSTITUTE GRAFT TO NOSE (Left: Nose)   Integra bilaminate skin substitute graft, 2 x 2 inch        Past Medical History:   Diagnosis Date   • Anxiety    • Arthritis     back   • Back pain    • Balance problem    • Basal cell carcinoma (BCC)     in past   • BCC (basal cell carcinoma of skin) 08/17/2021    Right tip of nose   • BCC (basal cell carcinoma of skin) 08/08/2022    Left lip   • BCC (basal cell carcinoma of skin) 08/08/2022    Right cheek   • Cancer (HCC)    • Gout    • Hard of hearing    • Hearing aid worn     ziyad   • Hearing aid worn    • Herniated nucleus pulposus, L4-5    • [place] (hard of hearing)    • Hypertension    • Incisional hernia    • Kidney stone     CKD3   • Macular degeneration    • Melanoma ([address] )     left cheek- history   • Melanoma ([address] ) 08/08/2022    Right neck   • Nocturia    • Pulmonary nodules    • Reactive airway disease    • 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   • Skin cancer 07/01/2020    AFX- scalp   • Squamous cell carcinoma     in past     Past Surgical History:   Procedure Laterality Date   • CATARACT EXTRACTION Bilateral    • COLONOSCOPY     • HERNIA REPAIR     • HYDROCELE EXCISION / REPAIR     • MASTOID SURGERY Left    • MOHS SURGERY     • MOHS SURGERY  09/29/2021    Right tip of nose-Dr Tiara Alamo   • MOHS SURGERY  10/06/2022    Left lip-Dr Tiaar Alamo   • OTHER SURGICAL HISTORY      sarcoma scalp with skin graft   • DC EXCISION MALIGNANT LESION F/E/E/N/L 0 5 CM/< Left 2/7/2023    Procedure: WIDE EXCISION OF LEFT NASAL MELANOMA;  Surgeon: Sylvie Rasheed MD;  [facility];  Service: Surgical Oncology   • DC REPAIR FIRST ABDOMINAL WALL HERNIA N/A 04/22/2021    Procedure: REPAIR HERNIA INCISIONAL OPEN WITH MESH;  Surgeon: Katharine Garcia MD;  [facility];  Service: General   • SCALP EXCISION N/A 03/28/2018    Procedure: SCALP SARCOMA EXCISION WITH FULL THICKNESS SKIN GRAFT;  Surgeon: Enrique Jauregui MD;  [facility];  Service: Plastics   • SKIN BIOPSY     • SKIN CANCER EXCISION     • SPLIT THICKNESS SKIN GRAFT Left 2/7/2023    Procedure: APPLICATION OF SKIN SUBSTITUTE GRAFT TO NOSE;  Surgeon: Eliza Ferreira MD;  [facility];  Service: Plastics   • TONSILLECTOMY AND ADENOIDECTOMY          Current Outpatient Medications:   •  acetaminophen (TYLENOL) 500 mg tablet, Take 500-1,000 mg by mouth every 4 (four) hours as needed for mild pain, Disp: , Rfl:   •  amLODIPine (NORVASC) 10 mg tablet, TAKE 1 TABLET BY MOUTH  DAILY, Disp: 90 tablet, Rfl: 3  •  Fluocinolone Acetonide Scalp 0 01 % OIL, Apply a thin layer topically daily at night one hour before bedtime   (Patient taking differently: Apply topically if needed Apply a thin layer topically daily at night one hour before bedtime ), Disp: 118 28 mL, Rfl: 5  •  imiquimod (ALDARA) 5 % cream, Apply topically once a day Monday thru Friday for 6 weeks (Patient taking differently: Apply 1 packet topically if needed For dryness around the ears), Disp: 24 each, Rfl: 1  •  lisinopril (ZESTRIL) 20 mg tablet, TAKE 1 TABLET BY MOUTH  DAILY, Disp: 90 tablet, Rfl: 3  •  metoprolol succinate (TOPROL-XL) 50 mg 24 hr tablet, TAKE 1 TABLET BY MOUTH  DAILY, Disp: 90 tablet, Rfl: 3  •  mometasone (ELOCON) 0 1 % cream, APPLY TO EARS ONCE TO TWICE DAILY AS NEEDED FOR SCALING, Disp: , Rfl:   •  Multiple Vitamins-Minerals (PRESERVISION AREDS 2) CAPS, Take 1 capsule by mouth 2 (two) times a day, Disp: , Rfl:   •  mupirocin (BACTROBAN) 2 % ointment, Apply topically two to three times a day to sore areas (Patient taking differently: Apply topically if needed Apply topically two to three times a day to sore areas), Disp: 22 g, Rfl: 3  •  traMADol (ULTRAM) 50 mg tablet, Take 1 tablet (50 mg total) by mouth every 6 (six) hours as needed for moderate pain, Disp: 10 tablet, Rfl: 0  Allergies: Erythromycin    Objective    Integra in place, healing well  Assessment/Plan   Diagnoses and all orders for this visit:    Malignant melanoma of nose ([facility] [address] )    Continue with Xeroform splint  Aquaphor to Integra graft  Gauze and Silicone tape  Followup in one week  Call with any concerns      Silvia Maynard PA-C

## 2023-03-01 ENCOUNTER — OFFICE VISIT (OUTPATIENT)
Dept: SURGICAL ONCOLOGY | Facility: CLINIC | Age: 83
End: 2023-03-01

## 2023-03-01 ENCOUNTER — OFFICE VISIT (OUTPATIENT)
Dept: PLASTIC SURGERY | Facility: CLINIC | Age: 83
End: 2023-03-01

## 2023-03-01 VITALS
HEART RATE: 69 BPM | DIASTOLIC BLOOD PRESSURE: 80 MMHG | HEIGHT: 68 IN | WEIGHT: 175 LBS | RESPIRATION RATE: 16 BRPM | BODY MASS INDEX: 26.52 KG/M2 | TEMPERATURE: 98.5 F | SYSTOLIC BLOOD PRESSURE: 150 MMHG | OXYGEN SATURATION: 96 %

## 2023-03-01 DIAGNOSIS — C43.31 MALIGNANT MELANOMA OF NOSE (HCC): Primary | ICD-10-CM

## 2023-03-01 NOTE — PROGRESS NOTES
Surgical Oncology Follow Up       [facility] SURGICAL ONCOLOGY [facility] [address]    Giuseppe Cheung  1940  [id number]  [facility] SURGICAL ONCOLOGY [place]  [place] [id number]    Chief Complaint   Patient presents with   • Post-op       Assessment/Plan:    No problem-specific Assessment & Plan notes found for this encounter  Diagnoses and all orders for this visit:    Malignant melanoma of nose ([facility] [address] )  -     US head neck lymph node mapping; Future  -     US head neck lymph node mapping; Future  -     XR chest pa & lateral; Future        Advance Care Planning/Advance Directives:  Discussed disease status, cancer treatment plans and/or cancer treatment goals with the patient  Oncology History   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[facility] [address] )   11/8/2017 Surgery    right vertex of scalp - s/p excision and full thickness graft for closure on 11/8/17 for atypical fibroxanthoma  1/29/2018 Biopsy    biopsy from the anterior aspect of the graft showing atypical epithelioid fibro histiocytic tumor, with lesion extending to the deep tissue edge  This is similar to prior biopsy done  Differential includes pleomorphic sarcoma versus an extreme atypical example of atypical fibro histiocytoma  3/2018 Initial Diagnosis    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[facility] [address] )     3/28/2018 Surgery    excision of scalp sarcoma with full thickness graft - Dr Rigo Beebe  Soft Tissue/Skin, Sarcoma of scalp, wide excision:  - Recurrent pleomorphic sarcoma, 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  See Note  -- FNCLCC Histologic Grade 3  (total score: 7 of 8)  * Tumor differentiation score: 3 of 3         * Mitotic count score: 3 of 3; > 19 mitoses/10 HPF (33 mitoses/10 HPF)  * Necrosis score: 1 of 2; present, < 50%  - Tumor extends into deep reticular dermis, subcutis and fascia     - Perineural invasion: Present; intraneural invasion identified (0 4 mm largest nerve diameter)  - Lymphovascular invasion: Focally suspicious  - Bone invasion: Not identified    - Central nervous system extension: Not identified  - Margins: uninvolved by sarcoma but close  -- Sarcoma  0 15 mm from nearest deep margin    - Additional Pathologic Findings: Changes consistent with prior surgical site  -- Focal ulceration with bacterial colonization, acute and chronic inflammation  -- Best representative tumor block: A5       5/29/2018 - 7/5/2018 Radiation    Plan ID Energy Fractions Dose per Fraction (cGy) Dose Correction (cGy) Total Dose Delivered (cGy) Elapsed Days   Vertex Scalp 9E 27 / 27 200 0 5,400 37            Basal cell carcinoma of skin of other parts of face    Initial Diagnosis    Basal cell carcinoma of skin of other parts of face     Malignant melanoma of nose (HCC)   12/6/2022 Biopsy    Skin, left ala, shave biopsy:     MELANOMA (thickness: 0 6 mm); extending to the tissue edges      Ulceration: not seen  Anatomic Leidy Marquez) level: III  Type: lentigo maligna melanoma  Mitoses: 0/mm2  Margin assessment: invasive melanoma extends to peripheral specimen margin and deep specimen margin focally  Pathologic stage: pT1a     2/7/2023 Surgery    Skin, nose, left nasal, wide excision:  Residual melanoma, invasive, desmoplastic type, and scar, margins free  Incidental basal cell carcinoma, superficial and nodular type, not extending to the margins  pT2a         History of Present Illness: Status post excision of left nasal melanoma with reconstruction   -Interval History: For surveillance with no major complaints to report  He is following closely with the plastic surgery team given his reconstruction  He had no major complaints since the operation  He saw the plastic surgery team this morning  Review of Systems:  Review of Systems   Constitutional: Negative for chills and fever  Skin: Positive for wound     All other systems reviewed and are negative        Patient Active Problem List   Diagnosis   • Actinic keratosis   • Stage 3 chronic kidney disease ([address] )   • Gout   • Hyperlipidemia   • Hypertension   • Irritable bowel syndrome   • Macular degeneration   • Urinary incontinence, post-void dribbling   • 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[address] )   • Basal cell carcinoma of skin of other parts of face   • Pulmonary nodules   • Incisional hernia, without obstruction or gangrene   • Status post repair of ventral hernia   • Dizziness   • Urinary frequency   • Lumbar back pain with radiculopathy affecting lower extremity   • Scalp ulceration, with necrosis of bone (HCC)   • 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[address] )   • Malignant melanoma of nose ([address] )   • Encounter for geriatric assessment     Past Medical History:   Diagnosis Date   • Anxiety    • Arthritis     back   • Back pain    • Balance problem    • Basal cell carcinoma (BCC)     in past   • BCC (basal cell carcinoma of skin) 08/17/2021    Right tip of nose   • BCC (basal cell carcinoma of skin) 08/08/2022    Left lip   • BCC (basal cell carcinoma of skin) 08/08/2022    Right cheek   • Cancer ([address] )    • Gout    • Hard of hearing    • Hearing aid worn     ziyad   • Hearing aid worn    • Herniated nucleus pulposus, L4-5    • [place] (hard of hearing)    • Hypertension    • Incisional hernia    • Kidney stone     CKD3   • Macular degeneration    • Melanoma ([address] )     left cheek- history   • Melanoma ([address] ) 08/08/2022    Right neck   • Nocturia    • Pulmonary nodules    • Reactive airway disease    • 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[facility] [address]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   • Skin cancer 07/01/2020    AFX- scalp   • Squamous cell carcinoma     in past     Past Surgical History:   Procedure Laterality Date   • CATARACT EXTRACTION Bilateral    • COLONOSCOPY     • HERNIA REPAIR     • HYDROCELE EXCISION / REPAIR     • MASTOID SURGERY Left    • MOHS SURGERY     • MOHS SURGERY  09/29/2021    Right tip of nose-Dr Regina Larkin   • MOHS SURGERY  10/06/2022    Left lip-  Senft   • OTHER SURGICAL HISTORY      sarcoma scalp with skin graft   • [place] EXCISION MALIGNANT LESION F/E/E/N/L 0 5 CM/< Left 2023    Procedure: WIDE EXCISION OF LEFT NASAL MELANOMA;  Surgeon: Zulay Christianson MD;  [facility];  Service: Surgical Oncology   • [place] REPAIR FIRST ABDOMINAL WALL HERNIA N/A 2021    Procedure: REPAIR HERNIA INCISIONAL OPEN WITH MESH;  Surgeon: Drew Lemon MD;  [facility];  Service: General   • SCALP EXCISION N/A 2018    Procedure: SCALP SARCOMA EXCISION WITH FULL THICKNESS SKIN GRAFT;  Surgeon: Aquilino Ervin MD;  [facility];  Service: Plastics   • SKIN BIOPSY     • SKIN CANCER EXCISION     • SPLIT THICKNESS SKIN GRAFT Left 2023    Procedure: APPLICATION OF SKIN SUBSTITUTE GRAFT TO NOSE;  Surgeon: Virgen Pitts MD;  [facility];  Service: Plastics   • TONSILLECTOMY AND ADENOIDECTOMY       Family History   Problem Relation Age of Onset   • Colon cancer Father    • Heart failure Father    • Heart disease Mother    • Stroke Mother      Social History     Socioeconomic History   • Marital status: /Civil Union     Spouse name: Not on file   • Number of children: Not on file   • Years of education: Not on file   • Highest education level: Not on file   Occupational History   • Not on file   Tobacco Use   • Smoking status: Former     Packs/day: 0 25     Years: 15 00     Pack years: 3 75     Types: Cigarettes     Quit date: 3/13/1988     Years since quittin 9   • Smokeless tobacco: Never   Vaping Use   • Vaping Use: Never used   Substance and Sexual Activity   • Alcohol use: Not Currently     Comment: beer once in awhile   • Drug use: No   • Sexual activity: Yes   Other Topics Concern   • Not on file   Social History Narrative   • Not on file     Social Determinants of Health     Financial Resource Strain: Not on file   Food Insecurity: Not on file   Transportation Needs: Not on file   Physical Activity: Not on file Stress: Not on file   Social Connections: Not on file   Intimate Partner Violence: Not on file   Housing Stability: Not on file       Current Outpatient Medications:   •  acetaminophen (TYLENOL) 500 mg tablet, Take 500-1,000 mg by mouth every 4 (four) hours as needed for mild pain, Disp: , Rfl:   •  amLODIPine (NORVASC) 10 mg tablet, TAKE 1 TABLET BY MOUTH  DAILY, Disp: 90 tablet, Rfl: 3  •  Fluocinolone Acetonide Scalp 0 01 % OIL, Apply a thin layer topically daily at night one hour before bedtime  (Patient taking differently: Apply topically if needed Apply a thin layer topically daily at night one hour before bedtime ), Disp: 118 28 mL, Rfl: 5  •  imiquimod (ALDARA) 5 % cream, Apply topically once a day Monday thru Friday for 6 weeks (Patient taking differently: Apply 1 packet topically if needed For dryness around the ears), Disp: 24 each, Rfl: 1  •  lisinopril (ZESTRIL) 20 mg tablet, TAKE 1 TABLET BY MOUTH  DAILY, Disp: 90 tablet, Rfl: 3  •  metoprolol succinate (TOPROL-XL) 50 mg 24 hr tablet, TAKE 1 TABLET BY MOUTH  DAILY, Disp: 90 tablet, Rfl: 3  •  mometasone (ELOCON) 0 1 % cream, APPLY TO EARS ONCE TO TWICE DAILY AS NEEDED FOR SCALING, Disp: , Rfl:   •  Multiple Vitamins-Minerals (PRESERVISION AREDS 2) CAPS, Take 1 capsule by mouth 2 (two) times a day, Disp: , Rfl:   •  mupirocin (BACTROBAN) 2 % ointment, Apply topically two to three times a day to sore areas (Patient taking differently: Apply topically if needed Apply topically two to three times a day to sore areas), Disp: 22 g, Rfl: 3  •  traMADol (ULTRAM) 50 mg tablet, Take 1 tablet (50 mg total) by mouth every 6 (six) hours as needed for moderate pain, Disp: 10 tablet, Rfl: 0  Allergies   Allergen Reactions   • Erythromycin Other (See Comments)     Thrush      Vitals:    03/01/23 1122   BP: 150/80   Pulse: 69   Resp: 16   Temp: 98 5 °F (36 9 °C)   SpO2: 96%       Physical Exam  Vitals reviewed     Skin:     Comments: Left nose graft site looks good, clean, sutures intact           Results:  Labs:  Case Report   Surgical Pathology Report                         Case: [id number]                                    Authorizing Provider: Essence East MD        Collected:           02/07/2023 1407               Ordering Location:     [address]      Received:            02/07/2023 [facility]                                      Hospital Operating Room                                                       Pathologist:           Aline Mendez MD                                                              Specimen:    Nose, left nasal melanoma- 1 clip marks 12 oclock, 2 clips emmanuelle 3 oclock, 3 clips                    emmanuelle 6 oclock, 4 clips emmanuelle 9 oclock                                                       Final Diagnosis   A  Skin, nose, left nasal, wide excision:  Residual melanoma, invasive, desmoplastic type, and scar, margins free  See note and synoptic report for further tumor characteristics  Incidental basal cell carcinoma, superficial and nodular type, not extending to the margins          Note:  Extensive foci of invasive melanoma cells with spindle cell morphology, invading into the dermis are seen  The tumor cells are scattered in the papillary dermis and in between hair follicles with variable depth, the Breslow thickness is best measured at 1 5 mm  Invasive tumor cells seen at 0 3 mm from the 6-9 o'clock peripheral margin  Electronically signed by Aline Mendez MD on 2/15/2023 at  6:20 PM   Comments: This is an appended report  These results have been appended to a previously preliminary verified report        Additional Information    All reported additional testing was performed with appropriately reactive controls   These tests were developed and their performance characteristics determined by [facility] or appropriate performing facility, though some tests may be performed on tissues which have not been validated for performance characteristics (such as staining performed on alcohol exposed cell blocks and decalcified tissues)   Results should be interpreted with caution and in the context of the patients’ clinical condition  These tests may not be cleared or approved by the [place]  Food and Drug Administration, though the FDA has determined that such clearance or approval is not necessary  These tests are used for clinical purposes and they should not be regarded as investigational or for research  This laboratory has been approved by [place] [id number], designated as a high-complexity laboratory and is qualified to perform these tests        Synoptic Checklist   MELANOMA OF THE SKIN: Excision, Re-Excision  8th Edition - Protocol posted: 11/10/2021  MELANOMA OF THE SKIN: EXCISION, RE-EXCISION  - All Specimens  SPECIMEN   Procedure  Excision    Specimen Laterality  Left    TUMOR   Tumor Site  Skin of other and unspecified parts of face: left nasal ala          Histologic Type  Desmoplastic melanoma      Pure desmoplastic melanoma    Maximum Tumor (Breslow) Thickness (Millimeters)  Cannot be determined: see note    Macroscopic Satellite Nodule(s)  Not identified    Ulceration  Not identified    Anatomic (Hal) Level  IV (Melanoma invades reticular dermis)    Mitotic Rate  None identified    Microsatellite(s)  Not identified    Lymphovascular Invasion  Not identified    Neurotropism  Not identified    Tumor Regression  Not identified    MARGINS     Margin Status for Invasive Melanoma  All margins negative for invasive melanoma    Distance from Invasive Melanoma to Deep Margin  Less than:  0 3 mm from 6-9 o'clock peripheral margin   mm   Margin Status for Melanoma in situ  All margins negative for melanoma in situ    REGIONAL LYMPH NODES     Regional Lymph Node Status  Not applicable (no regional lymph nodes submitted or found)    PATHOLOGIC STAGE CLASSIFICATION (pTNM, AJCC 8th Edition)     pT Category  pT2a    pN Category  pN not assigned (no nodes submitted or found)    ADDITIONAL FINDINGS   Additional Findings  An incidernal predominantly dermal melanocytic nevus is noted,     Comment(s)  Immunohistochemical stains for SOX10, Melan-A, and HMB45 performed with adequate controls support the findings  Imaging  No results found  I reviewed the above laboratory and imaging data  Discussion/Summary: T2N0 melanoma left nose post excision  Original path showing 0 6 mm depth  Final path confirming 1 5 mm maximum depth  Margins clear  Given depth, we will set him up for ultrasound mapping of cervical nodes  He has multiple extensive skin surgery and flap rearrangement of left cheek and neck, which I believe would have precluded sentinel biopsy even if we had been aware of 1 5 mm depth at time of recent excision, though operation was done with 0 6 mm depth on biopsy  He will follow closely with plastic surgery team for additional wound and graft care  I will see him in 6 months with ultrasound and chest x-ray  However given recent final pathology, will order cervical node mapping at this time as well

## 2023-03-01 NOTE — PROGRESS NOTES
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 by Dr Nida Campa in conjunction with Dr Genesis Horta on 2/7/2023  Please see HPI  Patient returns to the office today for graft check  Graft is well adhered and viable  Discussed with Dr Nida Campa, the patient will continue with Xeroform nasal packing  He will continue to apply bacitracin to the Integra  He is aware that silicone top layer may come dislodged on its own  Patient will return to the office in 1 week for a graft check  There are no diagnoses linked to this encounter  Subjective:     Patient ID: Hung Poe is a 79 y o  male  HPI     Patient presents to the office today with his wife  They have no questions or concerns today  Review of Systems    See HPI    Objective:     Physical Exam      Graft is well adhered and viable  Please see photo in media

## 2023-03-06 ENCOUNTER — HOSPITAL ENCOUNTER (OUTPATIENT)
Dept: ULTRASOUND IMAGING | Facility: HOSPITAL | Age: 83
Discharge: HOME | End: 2023-03-06
Attending: SURGERY

## 2023-03-06 DIAGNOSIS — C43.31 MALIGNANT MELANOMA OF NOSE (HCC): ICD-10-CM

## 2023-03-07 ENCOUNTER — OFFICE VISIT (OUTPATIENT)
Dept: PLASTIC SURGERY | Facility: CLINIC | Age: 83
End: 2023-03-07

## 2023-03-07 DIAGNOSIS — C44.319 BASAL CELL CARCINOMA OF SKIN OF OTHER PARTS OF FACE: Primary | ICD-10-CM

## 2023-03-07 NOTE — PROGRESS NOTES
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 by Dr Barabara Klinefelter in conjunction with Dr Rufus Renner on 2/7/2023  Please see HPI  Patient returns to the office today for graft check  Silicone layer of Integra has become dislodged  Graft site is well adhered and viable  Please see photo in media  The patient will continue to use Xeroform packing/splint in his left nostril  He should continue to apply bacitracin and Xeroform to the Integra site  He will return to our office in 2 weeks for a graft check and for surgical planning for skin graft  Diagnoses and all orders for this visit:    Basal cell carcinoma of skin of other parts of face          Subjective:     Patient ID: Christian Cardoso is a 79 y o  male  HPI    Patient presents to the office today with his wife  They have no questions or concerns today  Review of Systems    See HPI    Objective:     Physical Exam      Graft is well adhered and viable  Please see photo in media

## 2023-03-21 ENCOUNTER — OFFICE VISIT (OUTPATIENT)
Dept: PLASTIC SURGERY | Facility: CLINIC | Age: 83
End: 2023-03-21

## 2023-03-21 DIAGNOSIS — C43.31 MALIGNANT MELANOMA OF NOSE (HCC): Primary | ICD-10-CM

## 2023-03-21 NOTE — PROGRESS NOTES
[facility]astic [facility]  [facility], [address], [address]  [phone]  Patient Identification: Amie Bermudez is a 79 y o  male       History of Present Illness: The patient is a 79y o   year-old male  who presents to the office for a 6 week post-op visit  Patient is 6 weeks s/p Wide Excision Of Left Nasal Melanoma - Left and Application Of Skin Substitute Graft To Nose - Left  on 2/7/2023 by Dr Kimberly Collier  Patient is applying xeroform and bacitracin to the outer nasal ala and inner left nostril daily  He denies any fevers, chills, edema, drainage or any other complaints at this time  He is overall satisfied with results         Past Medical History:   Diagnosis Date   • Anxiety    • Arthritis     back   • Back pain    • Balance problem    • Basal cell carcinoma (BCC)     in past   • BCC (basal cell carcinoma of skin) 08/17/2021    Right tip of nose   • BCC (basal cell carcinoma of skin) 08/08/2022    Left lip   • BCC (basal cell carcinoma of skin) 08/08/2022    Right cheek   • Cancer ([address] )    • Gout    • Hard of hearing    • Hearing aid worn     ziyad   • Hearing aid worn    • Herniated nucleus pulposus, L4-5    • [place] (hard of hearing)    • Hypertension    • Incisional hernia    • Kidney stone     CKD3   • Macular degeneration    • Melanoma ([address] )     left cheek- history   • Melanoma ([address] ) 08/08/2022    Right neck   • Nocturia    • Pulmonary nodules    • Reactive airway disease    • 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   • Skin cancer 07/01/2020    AFX- scalp   • Squamous cell carcinoma     in past        Patient Active Problem List   Diagnosis   • Actinic keratosis   • Stage 3 chronic kidney disease ([address] )   • Gout   • Hyperlipidemia   • Hypertension   • Irritable bowel syndrome   • Macular degeneration   • Urinary incontinence, post-void dribbling   • 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HCC)   • Basal cell carcinoma of skin of other parts of face   • Pulmonary nodules   • Incisional hernia, without obstruction or gangrene   • Status post repair of ventral hernia   • Dizziness   • Urinary frequency   • Lumbar back pain with radiculopathy affecting lower extremity   • Scalp ulceration, with necrosis of bone (HCC)   • 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HCC)   • Malignant melanoma of nose (HCC)   • Encounter for geriatric assessment        Past Surgical History:   Procedure Laterality Date   • CATARACT EXTRACTION Bilateral    • COLONOSCOPY     • HERNIA REPAIR     • HYDROCELE EXCISION / REPAIR     • MASTOID SURGERY Left    • MOHS SURGERY     • MOHS SURGERY  09/29/2021    Right tip of nose-Dr Hernandez Clamp   • MOHS SURGERY  10/06/2022    Left lip-Dr Hernandez Clamp   • OTHER SURGICAL HISTORY      sarcoma scalp with skin graft   • [place] EXCISION MALIGNANT LESION F/E/E/N/L 0 5 CM/< Left 2/7/2023    Procedure: WIDE EXCISION OF LEFT NASAL MELANOMA;  Surgeon: Zaynab Davis MD;  [facility];  Service: Surgical Oncology   • [place] REPAIR FIRST ABDOMINAL WALL HERNIA N/A 04/22/2021    Procedure: REPAIR HERNIA INCISIONAL OPEN WITH MESH;  Surgeon: Allen Richards MD;  [facility];  Service: General   • SCALP EXCISION N/A 03/28/2018    Procedure: SCALP SARCOMA EXCISION WITH FULL THICKNESS SKIN GRAFT;  Surgeon: Maddison Metcalf MD;  [facility];  Service: Plastics   • SKIN BIOPSY     • SKIN CANCER EXCISION     • SPLIT THICKNESS SKIN GRAFT Left 2/7/2023    Procedure: APPLICATION OF SKIN SUBSTITUTE GRAFT TO NOSE;  Surgeon: Bisi Kelly MD;  [facility];  Service: Plastics   • TONSILLECTOMY AND ADENOIDECTOMY          Allergies   Allergen Reactions   • Erythromycin Other (See Comments)     Thrush         Current Outpatient Medications on File Prior to Visit   Medication Sig Dispense Refill   • acetaminophen (TYLENOL) 500 mg tablet Take 500-1,000 mg by mouth every 4 (four) hours as needed for mild pain     • amLODIPine (NORVASC) 10 mg tablet TAKE 1 TABLET BY MOUTH  DAILY 90 tablet 3   • Fluocinolone Acetonide Scalp 0 01 % OIL Apply a thin layer topically daily at night one hour before bedtime  (Patient taking differently: Apply topically if needed Apply a thin layer topically daily at night one hour before bedtime ) 118 28 mL 5   • imiquimod (ALDARA) 5 % cream Apply topically once a day Monday thru Friday for 6 weeks (Patient taking differently: Apply 1 packet topically if needed For dryness around the ears) 24 each 1   • lisinopril (ZESTRIL) 20 mg tablet TAKE 1 TABLET BY MOUTH  DAILY 90 tablet 3   • metoprolol succinate (TOPROL-XL) 50 mg 24 hr tablet TAKE 1 TABLET BY MOUTH  DAILY 90 tablet 3   • mometasone (ELOCON) 0 1 % cream APPLY TO EARS ONCE TO TWICE DAILY AS NEEDED FOR SCALING     • Multiple Vitamins-Minerals (PRESERVISION AREDS 2) CAPS Take 1 capsule by mouth 2 (two) times a day     • mupirocin (BACTROBAN) 2 % ointment Apply topically two to three times a day to sore areas (Patient taking differently: Apply topically if needed Apply topically two to three times a day to sore areas) 22 g 3   • traMADol (ULTRAM) 50 mg tablet Take 1 tablet (50 mg total) by mouth every 6 (six) hours as needed for moderate pain 10 tablet 0   • [DISCONTINUED] alclomethasone (ACLOVATE) 0 05 % cream Apply topically 2 (two) times a day as needed        No current facility-administered medications on file prior to visit  Tobacco Use: Medium Risk   • Smoking Tobacco Use: Former   • Smokeless Tobacco Use: Never   • Passive Exposure: Not on file          Review of Systems  Constitutional: Denies fevers, chills  Skin: Denies any warmth, erythema, edema, or mucopurulent drainage      Physical Exam   Constitutional  He appears well-developed and well-nourished  Skin    Nose: Left nasal ala shows significant healing  No signs of infection or wound breakdown  Graft has taken well  See attached media  Psychiatric  He has a normal mood and affect   His behavior is normal        Assessment and Plan:    The patient is an 79y o   year-old male who presents to the office for 6 week post-op visit s/p  Wide Excision Of Left Nasal Melanoma - Left and Application Of Skin Substitute Graft To Nose - Left  on 2/7/2023 by Dr Rubin Weber      -At today's visit we discussed the success of the graft and healing process so far  Patient may potentially not need a skin graft to the area  He is to return in 2 weeks to assess progress  He is to apply Aquaphor or Vaseline to the area multiple times per day and no longer needs Xeroform to the area or packing the left nostril   - The patient is to call the office with any questions or concerns  All of the patient's questions were answered at this time and they agree with the plan of care      DE[facility]

## 2023-04-04 ENCOUNTER — OFFICE VISIT (OUTPATIENT)
Dept: PLASTIC SURGERY | Facility: CLINIC | Age: 83
End: 2023-04-04

## 2023-04-04 DIAGNOSIS — C43.31 MALIGNANT MELANOMA OF NOSE (HCC): Primary | ICD-10-CM

## 2023-04-04 NOTE — PROGRESS NOTES
[facility]  [facility], [facility] [address], [place], [address]  [phone]    Patient Identification: Agusto Gomez is a 79 y o  male     History of Present Illness: The patient is a 79y o   year-old male  who presents to the office for an 8 week post-op visit  Patient is 56 days s/p Wide Excision Of Left Nasal Melanoma - Left and Application Of Skin Substitute Graft To Nose - Left  on 2/7/2023 by Dr Mishel Bravo  Patient has no complaints at this time      Past Medical History:   Diagnosis Date   • Anxiety    • Arthritis     back   • Back pain    • Balance problem    • Basal cell carcinoma (BCC)     in past   • BCC (basal cell carcinoma of skin) 08/17/2021    Right tip of nose   • BCC (basal cell carcinoma of skin) 08/08/2022    Left lip   • BCC (basal cell carcinoma of skin) 08/08/2022    Right cheek   • Cancer ([address] )    • Gout    • Hard of hearing    • Hearing aid worn     ziyad   • Hearing aid worn    • Herniated nucleus pulposus, L4-5    • [place] (hard of hearing)    • Hypertension    • Incisional hernia    • Kidney stone     CKD3   • Macular degeneration    • Melanoma ([address] )     left cheek- history   • Melanoma ([address] ) 08/08/2022    Right neck   • Nocturia    • Pulmonary nodules    • Reactive airway disease    • 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   • Skin cancer 07/01/2020    AFX- scalp   • Squamous cell carcinoma     in past        Patient Active Problem List   Diagnosis   • Actinic keratosis   • Stage 3 chronic kidney disease ([address] )   • Gout   • Hyperlipidemia   • Hypertension   • Irritable bowel syndrome   • Macular degeneration   • Urinary incontinence, post-void dribbling   • 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HCC)   • Basal cell carcinoma of skin of other parts of face   • Pulmonary nodules   • Incisional hernia, without obstruction or gangrene   • Status post repair of ventral hernia   • Dizziness   • Urinary frequency   • Lumbar back pain with radiculopathy affecting lower extremity   • Scalp ulceration, with necrosis of bone ([address] )   • 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HCC)   • Malignant melanoma of nose (HCC)   • Encounter for geriatric assessment        Past Surgical History:   Procedure Laterality Date   • CATARACT EXTRACTION Bilateral    • COLONOSCOPY     • HERNIA REPAIR     • HYDROCELE EXCISION / REPAIR     • MASTOID SURGERY Left    • MOHS SURGERY     • MOHS SURGERY  09/29/2021    Right tip of nose-Dr Annmarie Gallegos   • MOHS SURGERY  10/06/2022    Left lip-Dr Annmarie Gallegos   • OTHER SURGICAL HISTORY      sarcoma scalp with skin graft   • NM EXCISION MALIGNANT LESION F/E/E/N/L 0 5 CM/< Left 2/7/2023    Procedure: WIDE EXCISION OF LEFT NASAL MELANOMA;  Surgeon: Leah Gerber MD;  [facility];  Service: Surgical Oncology   • NM REPAIR FIRST ABDOMINAL WALL HERNIA N/A 04/22/2021    Procedure: REPAIR HERNIA INCISIONAL OPEN WITH MESH;  Surgeon: Aide Villeda MD;  [facility];  Service: General   • SCALP EXCISION N/A 03/28/2018    Procedure: SCALP SARCOMA EXCISION WITH FULL THICKNESS SKIN GRAFT;  Surgeon: Geovanna Inman MD;  [facility];  Service: Plastics   • SKIN BIOPSY     • SKIN CANCER EXCISION     • SPLIT THICKNESS SKIN GRAFT Left 2/7/2023    Procedure: APPLICATION OF SKIN SUBSTITUTE GRAFT TO NOSE;  Surgeon: Fidelina Lucero MD;  [facility];  Service: Plastics   • TONSILLECTOMY AND ADENOIDECTOMY          Allergies   Allergen Reactions   • Erythromycin Other (See Comments)     Thrush         Current Outpatient Medications on File Prior to Visit   Medication Sig Dispense Refill   • acetaminophen (TYLENOL) 500 mg tablet Take 500-1,000 mg by mouth every 4 (four) hours as needed for mild pain     • amLODIPine (NORVASC) 10 mg tablet TAKE 1 TABLET BY MOUTH  DAILY 90 tablet 3   • Fluocinolone Acetonide Scalp 0 01 % OIL Apply a thin layer topically daily at night one hour before bedtime   (Patient taking differently: Apply topically if needed Apply a thin layer topically daily at night one hour before bedtime ) 118 28 mL 5   • imiquimod (ALDARA) 5 % cream Apply topically once a day Monday thru Friday for 6 weeks (Patient taking differently: Apply 1 packet topically if needed For dryness around the ears) 24 each 1   • lisinopril (ZESTRIL) 20 mg tablet TAKE 1 TABLET BY MOUTH  DAILY 90 tablet 3   • metoprolol succinate (TOPROL-XL) 50 mg 24 hr tablet TAKE 1 TABLET BY MOUTH  DAILY 90 tablet 3   • mometasone (ELOCON) 0 1 % cream APPLY TO EARS ONCE TO TWICE DAILY AS NEEDED FOR SCALING     • Multiple Vitamins-Minerals (PRESERVISION AREDS 2) CAPS Take 1 capsule by mouth 2 (two) times a day     • mupirocin (BACTROBAN) 2 % ointment Apply topically two to three times a day to sore areas (Patient taking differently: Apply topically if needed Apply topically two to three times a day to sore areas) 22 g 3   • traMADol (ULTRAM) 50 mg tablet Take 1 tablet (50 mg total) by mouth every 6 (six) hours as needed for moderate pain 10 tablet 0   • [DISCONTINUED] alclomethasone (ACLOVATE) 0 05 % cream Apply topically 2 (two) times a day as needed        No current facility-administered medications on file prior to visit  Tobacco Use: Medium Risk   • Smoking Tobacco Use: Former   • Smokeless Tobacco Use: Never   • Passive Exposure: Not on file          Review of Systems  Constitutional: Denies fevers, chills and pain  Skin: Denies any warmth, erythema, edema, or mucopurulent drainage  Physical Exam     Skin    Nose: Left nasal ala shows significant healing  No signs of infection or wound breakdown  Graft has taken well  Healing progression since last visit  See attached media  Assessment and Plan:  The patient is an 79y o   year-old male who presents to the office for an 8 week post-op visit   Patient is 56 days s/p Wide Excision Of Left Nasal Melanoma - Left and Application Of Skin Substitute Graft To Nose - Left  on 2/7/2023 by Dr Lincoln Haynes      -At today's visit the patient's healing progress was discussed  Both he and his wife are satisfied with the results so far  He is to continue moisturizing the area daily    -Educated on scar care  At today's visit we discussed continuing daily scar massage and use of silicone scar gel to promote scar softening and flattening  She was educated on the scar care and that it can take up to 1 year for full scar maturation Pt is to wear sunscreen when outdoors and protect from sun exposure   -The patient is to return in 3 weeks  Can discuss the need for skin graft and assessed by Dr Alvina Barahona next visit  - The patient is to call the office with any questions or concerns  All of the patient's questions were answered at this time and they agree with the plan of care        Patient encounter was supervised by MD Suze Varela PA-C  [facility]

## 2023-04-07 ENCOUNTER — TELEPHONE (OUTPATIENT)
Dept: DERMATOLOGY | Facility: CLINIC | Age: 83
End: 2023-04-07

## 2023-04-07 ENCOUNTER — APPOINTMENT (OUTPATIENT)
Dept: LAB | Facility: CLINIC | Age: 83
End: 2023-04-07

## 2023-04-07 DIAGNOSIS — N18.30 STAGE 3 CHRONIC KIDNEY DISEASE, UNSPECIFIED WHETHER STAGE 3A OR 3B CKD (HCC): ICD-10-CM

## 2023-04-07 DIAGNOSIS — C49.0 SARCOMA OF SCALP (HCC): ICD-10-CM

## 2023-04-07 DIAGNOSIS — E78.5 HYPERLIPIDEMIA, UNSPECIFIED HYPERLIPIDEMIA TYPE: ICD-10-CM

## 2023-04-07 DIAGNOSIS — I10 PRIMARY HYPERTENSION: ICD-10-CM

## 2023-04-07 DIAGNOSIS — Z12.5 ENCOUNTER FOR SCREENING FOR MALIGNANT NEOPLASM OF PROSTATE: ICD-10-CM

## 2023-04-07 LAB
25(OH)D3 SERPL-MCNC: 13.8 NG/ML (ref 30–100)
ALBUMIN SERPL BCP-MCNC: 3.7 G/DL (ref 3.5–5)
ALP SERPL-CCNC: 130 U/L (ref 46–116)
ALT SERPL W P-5'-P-CCNC: 28 U/L (ref 12–78)
ANION GAP SERPL CALCULATED.3IONS-SCNC: 3 MMOL/L (ref 4–13)
AST SERPL W P-5'-P-CCNC: 17 U/L (ref 5–45)
BASOPHILS # BLD AUTO: 0.06 THOUSANDS/ÂΜL (ref 0–0.1)
BASOPHILS NFR BLD AUTO: 1 % (ref 0–1)
BILIRUB SERPL-MCNC: 0.96 MG/DL (ref 0.2–1)
BUN SERPL-MCNC: 17 MG/DL (ref 5–25)
CALCIUM SERPL-MCNC: 9.4 MG/DL (ref 8.3–10.1)
CHLORIDE SERPL-SCNC: 110 MMOL/L (ref 96–108)
CHOLEST SERPL-MCNC: 172 MG/DL
CO2 SERPL-SCNC: 26 MMOL/L (ref 21–32)
CREAT SERPL-MCNC: 1.26 MG/DL (ref 0.6–1.3)
EOSINOPHIL # BLD AUTO: 0.3 THOUSAND/ÂΜL (ref 0–0.61)
EOSINOPHIL NFR BLD AUTO: 5 % (ref 0–6)
ERYTHROCYTE [DISTWIDTH] IN BLOOD BY AUTOMATED COUNT: 13.1 % (ref 11.6–15.1)
GFR SERPL CREATININE-BSD FRML MDRD: 52 ML/MIN/1.73SQ M
GLUCOSE P FAST SERPL-MCNC: 92 MG/DL (ref 65–99)
HCT VFR BLD AUTO: 42.4 % (ref 36.5–49.3)
HDLC SERPL-MCNC: 38 MG/DL
HGB BLD-MCNC: 14 G/DL (ref 12–17)
IMM GRANULOCYTES # BLD AUTO: 0.02 THOUSAND/UL (ref 0–0.2)
IMM GRANULOCYTES NFR BLD AUTO: 0 % (ref 0–2)
LDLC SERPL CALC-MCNC: 114 MG/DL (ref 0–100)
LYMPHOCYTES # BLD AUTO: 1.94 THOUSANDS/ÂΜL (ref 0.6–4.47)
LYMPHOCYTES NFR BLD AUTO: 31 % (ref 14–44)
MCH RBC QN AUTO: 30.1 PG (ref 26.8–34.3)
MCHC RBC AUTO-ENTMCNC: 33 G/DL (ref 31.4–37.4)
MCV RBC AUTO: 91 FL (ref 82–98)
MONOCYTES # BLD AUTO: 0.44 THOUSAND/ÂΜL (ref 0.17–1.22)
MONOCYTES NFR BLD AUTO: 7 % (ref 4–12)
NEUTROPHILS # BLD AUTO: 3.59 THOUSANDS/ÂΜL (ref 1.85–7.62)
NEUTS SEG NFR BLD AUTO: 56 % (ref 43–75)
NRBC BLD AUTO-RTO: 0 /100 WBCS
PLATELET # BLD AUTO: 187 THOUSANDS/UL (ref 149–390)
PMV BLD AUTO: 11.1 FL (ref 8.9–12.7)
POTASSIUM SERPL-SCNC: 3.7 MMOL/L (ref 3.5–5.3)
PROT SERPL-MCNC: 7.3 G/DL (ref 6.4–8.4)
PSA SERPL-MCNC: 3.9 NG/ML (ref 0–4)
RBC # BLD AUTO: 4.65 MILLION/UL (ref 3.88–5.62)
SODIUM SERPL-SCNC: 139 MMOL/L (ref 135–147)
TRIGL SERPL-MCNC: 99 MG/DL
TSH SERPL DL<=0.05 MIU/L-ACNC: 2.88 UIU/ML (ref 0.45–4.5)
WBC # BLD AUTO: 6.35 THOUSAND/UL (ref 4.31–10.16)

## 2023-04-07 NOTE — TELEPHONE ENCOUNTER
Pt left vm, requesting a followup with Dr Yamila Ortega on some biopsies that were done in December on he thinks his cheeks and temple  Please advise if he needs a derm or mohs appt

## 2023-04-24 ENCOUNTER — PATIENT OUTREACH (OUTPATIENT)
Dept: FAMILY MEDICINE CLINIC | Facility: CLINIC | Age: 83
End: 2023-04-24

## 2023-04-24 NOTE — PROGRESS NOTES
OP CM called to pt and LM in regards to [facility] And sent letter with OP CM contact info

## 2023-04-24 NOTE — LETTER
April 24, 2023     [address] [id number]    Patient: Alexa Wheatley   YOB: 1940   Date of Visit: 4/24/2023       Dear Mr Kendra Hook:    I am the  that covers [facility]  I was reaching out to you to see if you need assistance with any community resources such as transportation, medication cost, mental health services, etc   Please feel free to contact me at [phone]    I work Monday through Friday from 8am to 430pm          Sincerely,        OSITO Arriaga        CC: No Recipients

## 2023-04-25 ENCOUNTER — OFFICE VISIT (OUTPATIENT)
Dept: PLASTIC SURGERY | Facility: CLINIC | Age: 83
End: 2023-04-25

## 2023-04-25 DIAGNOSIS — C43.31 MALIGNANT MELANOMA OF NOSE (HCC): Primary | ICD-10-CM

## 2023-04-27 NOTE — PROGRESS NOTES
[facility]  [address], [facility] [address], [place], [address]  [phone]    Patient Identification: Len Gipson is a 79 y o  male     History of Present Illness: The patient is a 79y o   year-old male  who presents to the office for a post-op visit  Patient is 68 days s/p Wide Excision Of Left Nasal Melanoma - Left and Application Of Skin Substitute Graft To Nose - Left  on 2/7/2023 by Dr Annalisa Wood  Overall, patient and wife are satisfied with the results and healing process so far  Pt is moisturizing the area daily  They express concern with lesions upon the patient's right cheek and right scalp, stating these were biopsied but were not followed up due to melanoma diagnosis and surgery upon the right nostril  Patient has no complaints at this time      Past Medical History:   Diagnosis Date   • Anxiety    • Arthritis     back   • Back pain    • Balance problem    • Basal cell carcinoma (BCC)     in past   • BCC (basal cell carcinoma of skin) 08/17/2021    Right tip of nose   • BCC (basal cell carcinoma of skin) 08/08/2022    Left lip   • BCC (basal cell carcinoma of skin) 08/08/2022    Right cheek   • Cancer ([address] )    • Gout    • Hard of hearing    • Hearing aid worn     ziyad   • Hearing aid worn    • Herniated nucleus pulposus, L4-5    • [place] (hard of hearing)    • Hypertension    • Incisional hernia    • Kidney stone     CKD3   • Macular degeneration    • Melanoma ([address] )     left cheek- history   • Melanoma ([address] ) 08/08/2022    Right neck   • Nocturia    • Pulmonary nodules    • Reactive airway disease    • 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   • Skin cancer 07/01/2020    AFX- scalp   • Squamous cell carcinoma     in past        Patient Active Problem List   Diagnosis   • Actinic keratosis   • Stage 3 chronic kidney disease ([address] )   • Gout   • Hyperlipidemia   • Hypertension   • Irritable bowel syndrome   • Macular degeneration   • Urinary incontinence, post-void dribbling   • 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HCC)   • Basal cell carcinoma of skin of other parts of face   • Pulmonary nodules   • Incisional hernia, without obstruction or gangrene   • Status post repair of ventral hernia   • Dizziness   • Urinary frequency   • Lumbar back pain with radiculopathy affecting lower extremity   • Scalp ulceration, with necrosis of bone (HCC)   • 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HCC)   • Malignant melanoma of nose (HCC)   • Encounter for geriatric assessment        Past Surgical History:   Procedure Laterality Date   • CATARACT EXTRACTION Bilateral    • COLONOSCOPY     • HERNIA REPAIR     • HYDROCELE EXCISION / REPAIR     • MASTOID SURGERY Left    • MOHS SURGERY     • MOHS SURGERY  09/29/2021    Right tip of nose-Dr Last Liu   • MOHS SURGERY  10/06/2022    Left lip-Dr Last Liu   • OTHER SURGICAL HISTORY      sarcoma scalp with skin graft   • OH EXCISION MALIGNANT LESION F/E/E/N/L 0 5 CM/< Left 2/7/2023    Procedure: WIDE EXCISION OF LEFT NASAL MELANOMA;  Surgeon: Mariaelena Johnson MD;  [facility];  Service: Surgical Oncology   • OH REPAIR FIRST ABDOMINAL WALL HERNIA N/A 04/22/2021    Procedure: REPAIR HERNIA INCISIONAL OPEN WITH MESH;  Surgeon: Mckenzie Gutierrez MD;  [facility];  Service: General   • SCALP EXCISION N/A 03/28/2018    Procedure: SCALP SARCOMA EXCISION WITH FULL THICKNESS SKIN GRAFT;  Surgeon: Denis Parra MD;  [facility];  Service: Plastics   • SKIN BIOPSY     • SKIN CANCER EXCISION     • SPLIT THICKNESS SKIN GRAFT Left 2/7/2023    Procedure: APPLICATION OF SKIN SUBSTITUTE GRAFT TO NOSE;  Surgeon: Ermelinda Sultana MD;  [facility];  Service: Plastics   • TONSILLECTOMY AND ADENOIDECTOMY          Allergies   Allergen Reactions   • Erythromycin Other (See Comments)     Thrush         Current Outpatient Medications on File Prior to Visit   Medication Sig Dispense Refill   • acetaminophen (TYLENOL) 500 mg tablet Take 500-1,000 mg by mouth every 4 (four) hours as needed for mild pain     • amLODIPine (NORVASC) 10 mg tablet TAKE 1 TABLET BY MOUTH  DAILY 90 tablet 3   • Fluocinolone Acetonide Scalp 0 01 % OIL Apply a thin layer topically daily at night one hour before bedtime  (Patient taking differently: Apply topically if needed Apply a thin layer topically daily at night one hour before bedtime ) 118 28 mL 5   • imiquimod (ALDARA) 5 % cream Apply topically once a day Monday thru Friday for 6 weeks (Patient taking differently: Apply 1 packet topically if needed For dryness around the ears) 24 each 1   • lisinopril (ZESTRIL) 20 mg tablet TAKE 1 TABLET BY MOUTH  DAILY 90 tablet 3   • metoprolol succinate (TOPROL-XL) 50 mg 24 hr tablet TAKE 1 TABLET BY MOUTH  DAILY 90 tablet 3   • mometasone (ELOCON) 0 1 % cream APPLY TO EARS ONCE TO TWICE DAILY AS NEEDED FOR SCALING     • Multiple Vitamins-Minerals (PRESERVISION AREDS 2) CAPS Take 1 capsule by mouth 2 (two) times a day     • mupirocin (BACTROBAN) 2 % ointment Apply topically two to three times a day to sore areas (Patient taking differently: Apply topically if needed Apply topically two to three times a day to sore areas) 22 g 3   • traMADol (ULTRAM) 50 mg tablet Take 1 tablet (50 mg total) by mouth every 6 (six) hours as needed for moderate pain (Patient not taking: Reported on 4/19/2023) 10 tablet 0   • [DISCONTINUED] alclomethasone (ACLOVATE) 0 05 % cream Apply topically 2 (two) times a day as needed        No current facility-administered medications on file prior to visit  Tobacco Use: Medium Risk   • Smoking Tobacco Use: Former   • Smokeless Tobacco Use: Never   • Passive Exposure: Not on file        Review of Systems  Constitutional: Denies fevers, chills and pain  Skin: Denies any warmth, erythema, edema, or mucopurulent drainage  Physical Exam     Skin    Nose: Left nasal ala shows complete healing  No signs of infection or wound breakdown  Graft has taken well        Assessment and Plan:  The patient is an 79y o   year-old male who presents to the office for an 8 week post-op visit  Patient is 68 days s/p Wide Excision Of Left Nasal Melanoma - Left and Application Of Skin Substitute Graft To Nose - Left  on 2/7/2023 by Dr Lashawn Guerrero      -At today's visit the patient's healing progress was discussed  Both he and his wife are satisfied with the results so far  He is to continue moisturizing the area daily   -Educated on scar care  At today's visit we discussed continuing daily scar massage and use of silicone scar gel to promote scar softening and flattening  She was educated on the scar care and that it can take up to 1 year for full scar maturation Pt is to wear sunscreen when outdoors and protect from sun exposure   -The patient may return on an as needed basis for discussion of scar care/ desire of revision in the future   -Will reach out to patient's dermatologist to discuss plan for right cheek and right scalp lesions  - The patient is to call the office with any questions or concerns  All of the patient's questions were answered at this time and they agree with the plan of care        Patient encounter was supervised by MD Brenda Euceda PA-C  [facility] and Re[facility] Surgery

## 2023-05-16 ENCOUNTER — PROCEDURE VISIT (OUTPATIENT)
Dept: DERMATOLOGY | Facility: CLINIC | Age: 83
End: 2023-05-16

## 2023-05-16 VITALS
HEIGHT: 68 IN | DIASTOLIC BLOOD PRESSURE: 85 MMHG | OXYGEN SATURATION: 95 % | WEIGHT: 177 LBS | HEART RATE: 65 BPM | SYSTOLIC BLOOD PRESSURE: 191 MMHG | BODY MASS INDEX: 26.83 KG/M2

## 2023-05-16 DIAGNOSIS — C44.319 BASAL CELL CARCINOMA (BCC) OF SKIN OF OTHER PART OF FACE: ICD-10-CM

## 2023-05-16 DIAGNOSIS — D48.9 NEOPLASM OF UNCERTAIN BEHAVIOR: Primary | ICD-10-CM

## 2023-05-16 DIAGNOSIS — C43.9 MELANOMA OF SKIN (HCC): ICD-10-CM

## 2023-05-16 NOTE — PROGRESS NOTES
MOHS Procedure Note    Patient: April Field  : 1940  MRN: [id number]  Date: 2023    History of Present Illness: The patient is a 79 y o  male who presents with complaints of Basal Cell Carcinoma on the Right Religious       Past Medical History:   Diagnosis Date   • Anxiety    • Arthritis     back   • Back pain    • Balance problem    • Basal cell carcinoma (BCC)     in past   • Basal cell carcinoma (BCC) 10/04/2022    Right Temple   • BCC (basal cell carcinoma of skin) 2021    Right tip of nose   • BCC (basal cell carcinoma of skin) 2022    Left lip   • BCC (basal cell carcinoma of skin) 2022    Right cheek   • Cancer (HCC)    • Gout    • Hard of hearing    • Hearing aid worn     ziyad   • Hearing aid worn    • Herniated nucleus pulposus, L4-5    • [place] (hard of hearing)    • Hypertension    • Incisional hernia    • Kidney stone     CKD3   • Macular degeneration    • Melanoma ([address] )     left cheek- history   • Melanoma ([address] ) 2022    Right neck   • Nocturia    • Pulmonary nodules    • Reactive airway disease    • 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   • Skin cancer 2020    AFX- scalp   • Squamous cell carcinoma     in past       Past Surgical History:   Procedure Laterality Date   • CATARACT EXTRACTION Bilateral    • COLONOSCOPY     • HERNIA REPAIR     • HYDROCELE EXCISION / REPAIR     • MASTOID SURGERY Left    • MOHS SURGERY     • MOHS SURGERY  2021    Right tip of nose-Dr Karen Munson   • MOHS SURGERY  10/06/2022    Left lip-Dr Karen Munson   • MOHS SURGERY  2023    Right [facility] - Dr Lolita Camara   • OTHER SURGICAL HISTORY      sarcoma scalp with skin graft   • SC EXCISION MALIGNANT LESION F/E/E/N/L 0 5 CM/< Left 2023    Procedure: WIDE EXCISION OF LEFT NASAL MELANOMA;  Surgeon: Eileen Mcintyre MD;  [facility];  Service: Surgical Oncology   • SC REPAIR FIRST ABDOMINAL WALL HERNIA N/A 2021    Procedure: REPAIR HERNIA INCISIONAL OPEN WITH MESH;  Surgeon: Jac Bhatia MD;  [facility];  Service: General   • SCALP EXCISION N/A 03/28/2018    Procedure: SCALP SARCOMA EXCISION WITH FULL THICKNESS SKIN GRAFT;  Surgeon: Carl Garcia MD;  [facility];  Service: Plastics   • SKIN BIOPSY     • SKIN CANCER EXCISION     • SPLIT THICKNESS SKIN GRAFT Left 02/07/2023    Procedure: APPLICATION OF SKIN SUBSTITUTE GRAFT TO NOSE;  Surgeon: Raymond Wong MD;  [facility]: [facility];  Service: Plastics   • TONSILLECTOMY AND ADENOIDECTOMY           Current Outpatient Medications:   •  acetaminophen (TYLENOL) 500 mg tablet, Take 500-1,000 mg by mouth every 4 (four) hours as needed for mild pain, Disp: , Rfl:   •  amLODIPine (NORVASC) 10 mg tablet, TAKE 1 TABLET BY MOUTH  DAILY, Disp: 90 tablet, Rfl: 3  •  Fluocinolone Acetonide Scalp 0 01 % OIL, Apply a thin layer topically daily at night one hour before bedtime   (Patient taking differently: Apply topically if needed Apply a thin layer topically daily at night one hour before bedtime ), Disp: 118 28 mL, Rfl: 5  •  imiquimod (ALDARA) 5 % cream, Apply topically once a day Monday thru Friday for 6 weeks (Patient taking differently: Apply 1 packet topically if needed For dryness around the ears), Disp: 24 each, Rfl: 1  •  lisinopril (ZESTRIL) 20 mg tablet, TAKE 1 TABLET BY MOUTH  DAILY, Disp: 90 tablet, Rfl: 3  •  metoprolol succinate (TOPROL-XL) 50 mg 24 hr tablet, TAKE 1 TABLET BY MOUTH  DAILY, Disp: 90 tablet, Rfl: 3  •  mometasone (ELOCON) 0 1 % cream, APPLY TO EARS ONCE TO TWICE DAILY AS NEEDED FOR SCALING, Disp: , Rfl:   •  Multiple Vitamins-Minerals (PRESERVISION AREDS 2) CAPS, Take 1 capsule by mouth 2 (two) times a day, Disp: , Rfl:   •  mupirocin (BACTROBAN) 2 % ointment, Apply topically two to three times a day to sore areas (Patient taking differently: Apply topically if needed Apply topically two to three times a day to sore areas), Disp: 22 g, Rfl: 3  •  traMADol (ULTRAM) 50 mg tablet, Take 1 tablet (50 mg total) by mouth every 6 (six) hours as needed for moderate pain (Patient not taking: Reported on 4/19/2023), Disp: 10 tablet, Rfl: 0    Allergies   Allergen Reactions   • Erythromycin Other (See Comments)     Thrush        Physical Exam:   Vitals:    05/16/23 1207   BP: (!) 191/85   Pulse: 65   SpO2: 95%     General: Awake, Alert, Oriented x 3, Mood and affect appropriate  Respiratory: Respirations even and unlabored  Cardiovascular: Peripheral pulses intact; no edema  Musculoskeletal Exam: n/a    Skin: 1 9 x 1 7 cm pink crusted papule  Assessment: Biopsy proven to be Basal Cell Carcinoma on the Right temple  Plan: MOHS    Time of H & P completion: 12:30 pm    MOHS Procedure Timeout    Flowsheet Row Most Recent Value   Timeout: 3506   Patient Identity Verified: Yes   Correct Site Verified: Yes   Correct Procedure Verified: Yes          MOHS Diagnosis/Indication/Location/ID    Flowsheet Row Most Recent Value   Pathology Type Basal cell carcinoma   Anatomic Site --  [Right [facility]]   Indications for MOHS tumor location   MOHS ID [id number]          MOHS Site/Accession/Pre-Post    Flowsheet Row Most Recent Value   Original Site Identified (as submitted by referring clinician) Referral, Photo   Biopsy Accession/Specimen # (as submitted by referring clincian) [id number]   Pre-MOHS Size Length (cm) 1 9   Pre-MOHS Size Width (cm) 1 7   Post-MOHS Size-Length (cm) 2 3   Post MOHS Size-Width (cm) 2 2   Repair Type Intermediate layered closure   Suture Type Fast absorbing gut, Vicryl   Fast Absorbing Suture Size 5   Vicryl Suture Size 4   Final repair length (cm): 5   Anesthetic Used 1% Lidocaine with epinephrine          MOHS Tumor Stage 1 Information    Flowsheet Row Most Recent Value   Tissue Sections (blocks) 2   Microscopic Exam Section 1: No tumor identified in section     Microscopic Exam Section 2: Arising from the epidermis and superficial follicles are small, superficial, multicentric buds of basaloid keratinocytes associated with a fibromyxoid stroma and clefting  Nuclei at the periphery of the islands have a palisaded arrangement , Irregularly shaped islands of basaloid keratinocytes with peripheral palisading and retraction artifact consistent with basal cell carcinoma were noted on microscopic analysis  The cells have scant cytoplasm and round dark nuclei  Tumor Clear After Stage I? No          MOHS Tumor Stage 2 Information    Flowsheet Row Most Recent Value   Tissue Sections (blocks) 1   Microscopic Exam Section 1: No tumor identified in section  Tumor Clear After Stage II? Yes                  Patient identified, procedure verified, site identified and verified  Time out completed  Surgical removal of the lesion discussed with the patient (risks and benefits, including possibility of scarring, infection, recurrence or potential for further treatment)  I have specifically identified the site with the patient  I have discussed the fact that the patient will have a scar after the procedure regardless of granulation or repair with sutures  I have discussed that the repair options can range from granulation in some cases to linear or curvilinear closures to larger flaps or grafts  There are sometimes flaps or grafts used that require multiples stages of surgery and will not be completed today, rather be completed over a series of appointments  I have discussed that occasionally due to location, size or depth of the lesion I may recommend consultation with and transfer of care for further removal or the reconstruction to another provider such as ophthalmology surgery, plastic surgery, ENT surgery, or surgical oncology   There are cases in which other testing such as imaging with MRI or CT scan or testing of lymph nodes is recommended because of the nature/depth/location of tumor seen during the removal  There is a risk of injury to nerves causing temporary or permanent numbness or the inability to move muscles full such as the inability to lift eyebrows  Questions answered and verbal and written consent was obtained  The tumor qualifies for Mohs based on AUC criteria  Dr Edson Christensen served as the surgeon and pathologist during the procedure  With the patient in the supine position and under adequate local anesthesia with 1% lidocaine with epinephrine 1:100,000, the defect was scrubbed with Hibiclens  Sterile drapes were placed from the sterile tray  Because of the location of the surgical defect, an intermediate closure was judged to give the best possible cosmetic and functional result  The edges of the defect were carefully debrided removing any dead or coagulated tissue  Hemostasis was obtained by pinpoint electrocoagulation  Careful planning of removal of redundant tissue at either end of the defect was drawn out so that the suture lines would fall in the optimal orientation with regard to the relaxed skin tension lines  These were then removed with a #15 blade scalpel  The wound was then approximated by a deep layer of buried vertical mattress sutures and the cutaneous margins were approximated and closed by superficial sutures as noted above  Estimated blood loss was less than 5 mL  The patient tolerated the procedure well  The wound was dressed with petrolatum, a non-stick pad, and a compression dressing  Gael Ware MD served as the surgeon and pathologist during the procedure  Postoperative care: Wound care discussed at length  I urged the patient to call us if any problems or question should arise  Complications: none  Post-op medications: none  Patient condition after procedure: stable  Discharge plans: Plan for f/up at next appt        NEOPLASM OF UNCERTAIN BEHAVIOR OF SKIN    Physical Exam:  • (Anatomic Location); (Size and Morphological Description); (Differential Diagnosis):  o Specimen A: Right mid cheek; skin; shave; 79year old male with 1 8 x 0 7 cm pink "papule; Diff Dx: Squamous cell carcinoma   o Specimen B: Right lateral Cheek; skin; shave; 79year old male with 0 7 x 0 8 cm crusted papule; Diff Dx: Squamous cell carcinoma   • Pertinent Positives:  • Pertinent Negatives:    Assessment and Plan:  • I have discussed with the patient that a sample of skin via a \"skin biopsy” would be potentially helpful to further make a specific diagnosis under the microscope  • Based on a thorough discussion of this condition and the management approach to it (including a comprehensive discussion of the known risks, side effects and potential benefits of treatment), the patient (family) agrees to implement the following specific plan:    o Procedure:  Skin Biopsy  After a thorough discussion of treatment options and risk/benefits/alternatives (including but not limited to local pain, scarring, dyspigmentation, blistering, possible superinfection, and inability to confirm a diagnosis via histopathology), verbal and written consent were obtained and portion of the rash was biopsied for tissue sample  See below for consent that was obtained from patient and subsequent Procedure Note            PROCEDURE TANGENTIAL (SHAVE) BIOPSY NOTE:    • Performing Physician: Lex Sarabia  • Anatomic Location; Clinical Description with size (cm); Pre-Op Diagnosis:   Specimen A: Right mid cheek; skin; shave; 79year old male with 1 8 x 0 7 cm pink papule; Diff Dx: Squamous cell carcinoma  • Post-op diagnosis: Same     • Local anesthesia: 1% xylocaine with epi      • Topical anesthesia: None    • Hemostasis: Aluminum chloride       PROCEDURE TANGENTIAL (SHAVE) BIOPSY NOTE:    • Performing Physician: Dr Matthews  • Anatomic Location; Clinical Description with size (cm); Pre-Op Diagnosis:   Specimen B: Right lateral Cheek; skin; shave; 79year old male with 0 7 x 0 8 cm crusted papule; Diff Dx: Squamous cell carcinoma   • Post-op diagnosis: Same     • Local anesthesia: 1% xylocaine with epi      • Topical " "anesthesia: None    • Hemostasis: Aluminum chloride       After obtaining informed consent  at which time there was a discussion about the purpose of biopsy  and low risks of infection and bleeding  The area was prepped and draped in the usual fashion  Anesthesia was obtained with 1% lidocaine with epinephrine  A shave biopsy to an appropriate sampling depth was obtained by Shave (Dermablade or 15 blade) The resulting wound was covered with surgical ointment and bandaged appropriately  The patient tolerated the procedure well without complications and was without signs of functional compromise  Specimen has been sent for review by Dermatopathology  Standard post-procedure care has been explained and has been included in written form within the patient's copy of Informed Consent  INFORMED CONSENT DISCUSSION AND POST-OPERATIVE INSTRUCTIONS FOR PATIENT    I   RATIONALE FOR PROCEDURE  I understand that a skin biopsy allows the Dermatologist to test a lesion or rash under the microscope to obtain a diagnosis  It usually involves numbing the area with numbing medication and removing a small piece of skin; sometimes the area will be closed with sutures  In this specific procedure, sutures are not usually needed  If any sutures are placed, then they are usually need to be removed in 2 weeks or less  I understand that my Dermatologist recommends that a skin \"shave\" biopsy be performed today  A local anesthetic, similar to the kind that a dentist uses when filling a cavity, will be injected with a very small needle into the skin area to be sampled  The injected skin and tissue underneath \"will go to sleep” and become numb so no pain should be felt afterwards  An instrument shaped like a tiny \"razor blade\" (shave biopsy instrument) will be used to cut a small piece of tissue and skin from the area so that a sample of tissue can be taken and examined more closely under the microscope    A slight amount of " "bleeding will occur, but it will be stopped with direct pressure and a pressure bandage and any other appropriate methods  I understands that a scar will form where the wound was created  Surgical ointment will be applied to help protect the wound  Sutures are not usually needed  II   RISKS AND POTENTIAL COMPLICATIONS   I understand the risks and potential complications of a skin biopsy include but are not limited to the following:  • Bleeding  • Infection  • Pain  • Scar/keloid  • Skin discoloration  • Incomplete Removal  • Recurrence  • Nerve Damage/Numbness/Loss of Function  • Allergic Reaction to Anesthesia  • Biopsies are diagnostic procedures and based on findings additional treatment or evaluation may be required  • Loss or destruction of specimen resulting in no additional findings    My Dermatologist has explained to me the nature of the condition, the nature of the procedure, and the benefits to be reasonably expected compared with alternative approaches  My Dermatologist has discussed the likelihood of major risks or complications of this procedure including the specific risks listed above, such as bleeding, infection, and scarring/keloid  I understand that a scar is expected after this procedure  I understand that my physician cannot predict if the scar will form a \"keloid,\" which extends beyond the borders of the wound that is created  A keloid is a thick, painful, and bumpy scar  A keloid can be difficult to treat, as it does not always respond well to therapy, which includes injecting cortisone directly into the keloid every few weeks  While this usually reduces the pain and size of the scar, it does not eliminate it  I understand that photographs may be taken before and after the procedure  These will be maintained as part of the medical providers confidential records and may not be made available to me    I further authorize the medical provider to use the photographs for teaching " "purposes or to illustrate scientific papers, books, or lectures if in his/her judgment, medical research, education, or science may benefit from its use  I have had an opportunity to fully inquire about the risks and benefits of this procedure and its alternatives  I have been given ample time and opportunity to ask questions and to seek a second opinion if I wished to do so  I acknowledge that there have specifically been no guarantees as to the cosmetic results from the procedure  I am aware that with any procedure there is always the possibility of an unexpected complication  III  POST-PROCEDURAL CARE (WHAT YOU WILL NEED TO DO \"AFTER THE BIOPSY\" TO OPTIMIZE HEALING)    • Keep the area clean and dry  Try NOT to remove the bandage or get it wet for the first 24 hours  • Gently clean the area and apply surgical ointment (such as Vaseline petrolatum ointment, which is available \"over the counter\" and not a prescription) to the biopsy site for up to 2 weeks straight  This acts to protect the wound from the outside world  • Sutures are not usually placed in this procedure  If any sutures were placed, return for suture removal as instructed (generally 1 week for the face, 2 weeks for the body)  • Take Acetaminophen (Tylenol) for discomfort, if no contraindications  Ibuprofen or aspirin could make bleeding worse  • Call our office immediately for signs of infection: fever, chills, increased redness, warmth, tenderness, discomfort/pain, or pus or foul smell coming from the wound  WHAT TO DO IF THERE IS ANY BLEEDING? If a small amount of bleeding is noticed, place a clean cloth over the area and apply firm pressure for ten minutes  Check the wound after 10 minutes of direct pressure  If bleeding persists, try one more time for an additional 10 minutes of direct pressure on the area    If the bleeding becomes heavier or does not stop after the second attempt, or if you have any other " questions about this procedure, then please call your [facility]'s Dermatologist by calling [phone] (SKIN)  I hereby acknowledge that I have reviewed and verified the site with my Dermatologist and have requested and authorized my Dermatologist to proceed with the procedure      Scribe Attestation    I,:  Kelly Daly MA am acting as a scribe while in the presence of the attending physician :       I,:  Roselia Barron MD personally performed the services described in this documentation    as scribed in my presence :

## 2023-05-16 NOTE — PATIENT INSTRUCTIONS
"Mohs Microscopic Surgery After Care    WOUND CARE AFTER SURGERY:    Do NOT to remove the pressure bandage for 48 hours  Keep the area clean and dry while this bandage is on  After removing the bandage for the first time, gently clean the area with soap and water  If the bandage is difficult to remove, getting the bandage wet in the shower will sometimes help soften the adhesive and allow it to be removed more easily  You will now need to cleanse this area daily in the shower with gentle soap  There is no need to scrub the area  You will need to apply plain Vaseline ointment (this is over the counter and not a prescription) to the site for up to 2 weeks followed by a clean appropriately sized bandage to area  Non stick dressing and paper tape (or Hypafix) are recommended for sensitive skin but a bandaid is fine if it covers the area well  All your stitches will dissolve over the next two weeks  You will need to keep these moist with Vaseline and covered with a bandage over the next 2 weeks for them to dissolve appropriately  RESTRICTIONS:     For two DAYS:   - You will need to take it very easy as this time is highest risk for bleeding  Being a \"couch potato\" during these two days is generally recommended  - For surgeries on the face/neck/scalp: Avoid leaning down to pick things up off the floor as this brings blood up to your head  Instead, squat down to pick things up  For two WEEKS:   - No heavy lifting (anything greater than 10 pounds)   - You can start to do slow, gentle activities such as slow walking but nothing to increase your heart rate and blood pressure too much (such as cardiovascular exercise)  It is important to take it easy as there is still a risk for bleeding and a high risk popping of stitches open during this time  MANAGING YOUR PAIN AFTER SURGERY     You can expect to have some pain after surgery   This is normal  The pain is typically worse the first two days after " surgery, and quickly begins to get better  The best strategy for controlling your pain after surgery is around the clock pain control  You can take over the counter Acetaminophen (Tylenol) for discomfort, if no contraindications  If you are taking this at the maximum dose, you can alternate this with Motrin (ibuprofen or Advil) as well  Alternating these medications with each other allows you to maximize your pain control  In addition to Tylenol and Motrin, you can use heating pads or ice packs on your incisions to help reduce your pain  How will I alternate your regular strength over-the-counter pain medication? You will take a dose of pain medication every three hours  Start by taking 650 mg of Tylenol (2 pills of 325 mg)   3 hours later take 600 mg of Motrin (3 pills of 200 mg)   3 hours after taking the Motrin take 650 mg of Tylenol   3 hours after that take 600 mg of Motrin  See example - if your first dose of Tylenol is at 12:00 PM     12:00 PM  Tylenol 650 mg (2 pills of 325 mg)    3:00 PM  Motrin 600 mg (3 pills of 200 mg)    6:00 PM  Tylenol 650 mg (2 pills of 325 mg)    9:00 PM  Motrin 600 mg (3 pills of 200 mg)    Continue alternating every 3 hours      Important:   Do not take more than 4000mg of Tylenol or 3200mg of Motrin in a 24-hour period  What if I still have pain? If you have pain that is not controlled with the over-the-counter pain medications (Tylenol and Motrin or Advil), don't hesitate to call our staff using the number provided  We will help make sure you are managing your pain in the best way possible, and if necessary, we can provide a prescription for additional pain medication  CALL OUR OFFICE IMMEDIATELY FOR ANY SIGNS OF INFECTION:    This includes fever, chills, increased redness, warmth, tenderness, severe discomfort/pain, or pus or foul smell coming from the wound   If you are experiencing any of the above, please call   [facility] directly at [phone] (SKIN)    IF BLEEDING IS NOTICED:    Place a clean cloth over the area and apply firm pressure for thirty minutes  Check the wound ONLY after 30 minutes of direct pressure; do not cheat and sneak a peak, as that does not count  If bleeding persists after 30 minutes of legitimate direct pressure, then try one more round of direct pressure to the area  Should the bleeding become heavier or not stop after the second attempt, call [facility] directly at [phone] (SKIN)  Your call will get routed to the dermatology surgeon on call even after hours

## 2023-05-21 DIAGNOSIS — I10 ESSENTIAL HYPERTENSION: ICD-10-CM

## 2023-05-22 ENCOUNTER — DOCUMENTATION (OUTPATIENT)
Dept: HEMATOLOGY ONCOLOGY | Facility: CLINIC | Age: 83
End: 2023-05-22

## 2023-05-22 ENCOUNTER — TELEPHONE (OUTPATIENT)
Dept: DERMATOLOGY | Facility: CLINIC | Age: 83
End: 2023-05-22

## 2023-05-22 RX ORDER — LISINOPRIL 20 MG/1
TABLET ORAL
Qty: 90 TABLET | Refills: 3 | Status: SHIPPED | OUTPATIENT
Start: 2023-05-22

## 2023-05-22 NOTE — PROGRESS NOTES
Intake received/ Chart reviewed: 5/22/23    Pathology completed:  - 5/16/23  - 2/7/23  - 12/6/22  - 10/4/22  - 9/14/22  - 8/8/22    Imaging completed:  - 3/6/23 US H/N    All records needed are in patients chart  No records retrieval needed at this time

## 2023-05-25 ENCOUNTER — CONSULT (OUTPATIENT)
Dept: HEMATOLOGY ONCOLOGY | Facility: CLINIC | Age: 83
End: 2023-05-25

## 2023-05-25 VITALS
RESPIRATION RATE: 16 BRPM | BODY MASS INDEX: 26.83 KG/M2 | HEART RATE: 81 BPM | DIASTOLIC BLOOD PRESSURE: 80 MMHG | WEIGHT: 177 LBS | OXYGEN SATURATION: 97 % | HEIGHT: 68 IN | TEMPERATURE: 98 F | SYSTOLIC BLOOD PRESSURE: 160 MMHG

## 2023-05-25 DIAGNOSIS — C44.319 BASAL CELL CARCINOMA OF SKIN OF OTHER PARTS OF FACE: ICD-10-CM

## 2023-05-25 DIAGNOSIS — C43.31 MALIGNANT MELANOMA OF NOSE (HCC): ICD-10-CM

## 2023-05-25 DIAGNOSIS — C49.0 SARCOMA OF SCALP (HCC): ICD-10-CM

## 2023-05-25 DIAGNOSIS — C43.30 MALIGNANT MELANOMA OF FACE EXCLUDING EYELID, NOSE, LIP, AND EAR (HCC): Primary | ICD-10-CM

## 2023-05-25 DIAGNOSIS — C43.9 MELANOMA OF SKIN (HCC): ICD-10-CM

## 2023-05-25 DIAGNOSIS — C44.91 BASAL CELL CARCINOMA (BCC) OF MULTIPLE SITES: ICD-10-CM

## 2023-05-25 NOTE — PROGRESS NOTES
[facility] HEMATOLOGY ONCOLOGY SPECIALISTS [address]  [place] [id number] [phone]     Date of Visit: 5/25/2023  Name: Cristian Singh   YOB: 1940        Subjective    VISIT DIAGNOSIS:  Diagnoses and all orders for this visit:    Malignant melanoma of face excluding eyelid, nose, lip, and ear ([facility] [address] )  -     Ambulatory referral to Surgical Oncology; Future  -     CBC and differential; Future  -     Comprehensive metabolic panel; Future  -     LD,Blood; Future    Melanoma of skin ([facility] [address] )  -     Ambulatory referral to Hematology / Oncology  -     Ambulatory referral to Surgical Oncology; Future  -     CBC and differential; Future  -     Comprehensive metabolic panel; Future  -     LD,Blood; Future    Basal cell carcinoma (BCC) of multiple sites    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[facility] )    Basal cell carcinoma of skin of other parts of face    Malignant melanoma of nose [facility])        Oncology History   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[facility] )   11/8/2017 Surgery    right vertex of scalp - s/p excision and full thickness graft for closure on 11/8/17 for atypical fibroxanthoma  1/29/2018 Biopsy    biopsy from the anterior aspect of the graft showing atypical epithelioid fibro histiocytic tumor, with lesion extending to the deep tissue edge  This is similar to prior biopsy done  Differential includes pleomorphic sarcoma versus an extreme atypical example of atypical fibro histiocytoma  3/2018 Initial Diagnosis    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[facility] )     3/28/2018 Surgery    excision of scalp sarcoma with full thickness graft - Dr Damir Castorena  Soft Tissue/Skin, Sarcoma of scalp, wide excision:  - Recurrent pleomorphic sarcoma, 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  See Note  -- FNCLCC Histologic Grade 3  (total score: 7 of 8)  * Tumor differentiation score: 3 of 3         * Mitotic count score: 3 of 3; > 19 mitoses/10 HPF (33 mitoses/10 HPF)  * Necrosis score: 1 of 2; present, < 50%    - Tumor extends into deep reticular dermis, subcutis and fascia  - Perineural invasion: Present; intraneural invasion identified (0 4 mm largest nerve diameter)  - Lymphovascular invasion: Focally suspicious  - Bone invasion: Not identified    - Central nervous system extension: Not identified  - Margins: uninvolved by sarcoma but close  -- Sarcoma  0 15 mm from nearest deep margin    - Additional Pathologic Findings: Changes consistent with prior surgical site  -- Focal ulceration with bacterial colonization, acute and chronic inflammation  -- Best representative tumor block: A5       5/29/2018 - 7/5/2018 Radiation    Plan ID Energy Fractions Dose per Fraction (cGy) Dose Correction (cGy) Total Dose Delivered (cGy) Elapsed Days   Vertex Scalp 9E 27 / 27 200 0 5,400 37            Basal cell carcinoma of skin of other parts of face    Initial Diagnosis    Basal cell carcinoma of skin of other parts of face     Malignant melanoma of nose (HCC)   12/6/2022 Biopsy    Skin, left ala, shave biopsy:     MELANOMA (thickness: 0 6 mm); extending to the tissue edges      Ulceration: not seen  Anatomic Nisha Grater) level: III  Type: lentigo maligna melanoma  Mitoses: 0/mm2  Margin assessment: invasive melanoma extends to peripheral specimen margin and deep specimen margin focally  Pathologic stage: pT1a     2/7/2023 Surgery    Skin, nose, left nasal, wide excision:  Residual melanoma, invasive, desmoplastic type, and scar, margins free  Incidental basal cell carcinoma, superficial and nodular type, not extending to the margins  pT2a     Malignant melanoma of face excluding eyelid, nose, lip, and ear ([address] )   5/16/2023 Initial Diagnosis    Malignant melanoma of face excluding eyelid, nose, lip, and ear (HCC)  Right mid cheek    A  Skin, right mid cheek, shave biopsy:     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; transected (see note)       Note: There are foci of basal cell carcinoma and melanoma "in which the components are separate from each other (as can be seen in a collision tumor), but there are also multiple foci in which the melanoma and basal cell carcinoma are closely intermingled (\"basomelanocytic tumor\" or \"combined melanoma and basal cell carcinoma of the intermingled type\"), which is a rare occurrence  1 SOX10, MART-1, HMB45, p40, and Tarun-EP4 immunostains were reviewed and support the diagnosis  Please see synoptic report for additional details  The results were emailed to Dr Babs Garnica on 5/19/2023  Synoptic report for melanoma of the skin  Thickness: at least 1 5 mm  Ulceration: present  Anatomic Keyanna Gideon) level: at least IV  Type: superficial spreading melanoma  Mitoses: 1/mm2  Microsatellites: cannot be determined  Lymphovascular invasion: not seen  Neurotropism: not seen  Tumor regression: not seen  Tumor-infiltrating lymphocytes (TIL): present, non-brisk  Margin assessment: invasive melanoma extends to deep specimen margin; melanoma in situ extends to peripheral specimen margin  Pathologic stage: at least pT2b  Associated nevus: not seen            Cancer Staging   Stage IB melanoma - left nose  T2b melanoma - right mid cheek       HISTORY OF PRESENT ILLNESS: Ayden Wiseman is a 79 y o  male with a history of melanoma, sarcoma, and cutaneous BCCs who presents with newly diagnosed combined melanoma and BCC (Breslow thickness 1 5mm)  He is presenting for initial consultation  He was first diagnosed with melanoma on the back 10 years ago treated with surgical resection  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, treated surgically and repaired with flap from the right upper thigh  He also received radiation treatment to the scalp  In 2022, he had a melanoma 0 4 mm on right neck (excised by Dr Esperanza Tamayo) and a left nasal ala melanoma with initial Breslow deptch of 0 6 mm which was upstaged to a Breslow thickness of 1 5mm after resection by surgical oncology (Dr Frenandez Hernandez)   " "[place] lymph node mapping/biopsy was not performed given the melanoma was upstaged after resection  Ultrasound of lymph node without evidence of recurrence or metastasis and CXR was clear  Most recently, in May 2023 he was diagnosed with combination melanoma and BCC with Breslow thickness of melanoma of 1 5 mm, pending further workup and treatment  Pathology results below, pathology stage at least pT2b  Final Diagnosis   A  Skin, right mid cheek, shave biopsy:     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; transected (see note)      Note: There are foci of basal cell carcinoma and melanoma in which the components are separate from each other (as can be seen in a collision tumor), but there are also multiple foci in which the melanoma and basal cell carcinoma are closely intermingled (\"basomelanocytic tumor\" or \"combined melanoma and basal cell carcinoma of the intermingled type\"), which is a rare occurrence  1 SOX10, MART-1, HMB45, p40, and Tarun-EP4 immunostains were reviewed and support the diagnosis  Please see synoptic report for additional details  The results were emailed to Dr Estiven Smith on 5/19/2023      Synoptic report for melanoma of the skin  Thickness: at least 1 5 mm  Ulceration: present  Anatomic Yuniel Inoue) level: at least IV  Type: superficial spreading melanoma  Mitoses: 1/mm2  Microsatellites: cannot be determined  Lymphovascular invasion: not seen  Neurotropism: not seen  Tumor regression: not seen  Tumor-infiltrating lymphocytes (TIL): present, non-brisk  Margin assessment: invasive melanoma extends to deep specimen margin; melanoma in situ extends to peripheral specimen margin  Pathologic stage: at least pT2b  Associated nevus: not seen     References:  1  Earnest Garcia Combined malignant melanoma and basal cell carcinoma tumor of the intermingled type  J Cutan Pathol  2007 Sep;34(9):731-5   PMID: [id number]           He has a history of multiple blistering " sunburns  No history of tanning bed use  No family history of melanoma, breast cancer, ovarian, and pancreatic cancer  He has blue eyes and brown hair and is of [place] heritage  He endorses smoking 10 cigarettes daily for about 7 years  No history of steroid use or immunosuppression  No history of autoimmune disease  He has 2 children, aged 62 and 54 who see a dermatologist regularly and use sun-protection  He feels okay today  Reports a normal appetite, no fatigue, cough, SOB, vomiting, diarrhea, dysuria  He is pretty active and has no difficulty performing activities of daily living  REVIEW OF SYSTEMS:  Review of Systems   Constitutional: Negative for appetite change, fatigue and fever  Respiratory: Negative for chest tightness, cough and shortness of breath  Cardiovascular: Negative for chest pain  Gastrointestinal: Negative for abdominal pain, diarrhea and vomiting  Genitourinary: Negative for dysuria  Skin: Negative for rash  Hematological: Negative for adenopathy  MEDICATIONS:    Current Outpatient Medications:   •  acetaminophen (TYLENOL) 500 mg tablet, Take 500-1,000 mg by mouth every 4 (four) hours as needed for mild pain, Disp: , Rfl:   •  amLODIPine (NORVASC) 10 mg tablet, TAKE 1 TABLET BY MOUTH  DAILY, Disp: 90 tablet, Rfl: 3  •  Fluocinolone Acetonide Scalp 0 01 % OIL, Apply a thin layer topically daily at night one hour before bedtime   (Patient taking differently: Apply topically if needed Apply a thin layer topically daily at night one hour before bedtime ), Disp: 118 28 mL, Rfl: 5  •  lisinopril (ZESTRIL) 20 mg tablet, TAKE 1 TABLET BY MOUTH  DAILY, Disp: 90 tablet, Rfl: 3  •  metoprolol succinate (TOPROL-XL) 50 mg 24 hr tablet, TAKE 1 TABLET BY MOUTH  DAILY, Disp: 90 tablet, Rfl: 3  •  mometasone (ELOCON) 0 1 % cream, APPLY TO EARS ONCE TO TWICE DAILY AS NEEDED FOR SCALING, Disp: , Rfl:   •  Multiple Vitamins-Minerals (PRESERVISION AREDS 2) CAPS, Take 1 capsule by mouth 2 (two) times a day, Disp: , Rfl:   •  mupirocin (BACTROBAN) 2 % ointment, Apply topically two to three times a day to sore areas (Patient taking differently: Apply topically if needed Apply topically two to three times a day to sore areas), Disp: 22 g, Rfl: 3  •  imiquimod (ALDARA) 5 % cream, Apply topically once a day Monday thru Friday for 6 weeks (Patient not taking: Reported on 5/25/2023), Disp: 24 each, Rfl: 1  •  traMADol (ULTRAM) 50 mg tablet, Take 1 tablet (50 mg total) by mouth every 6 (six) hours as needed for moderate pain (Patient not taking: Reported on 4/19/2023), Disp: 10 tablet, Rfl: 0     ALLERGIES:  Allergies   Allergen Reactions   • Erythromycin Other (See Comments)     Thrush         ACTIVE PROBLEMS:  Patient Active Problem List   Diagnosis   • Actinic keratosis   • Stage 3 chronic kidney disease ([address] )   • Gout   • Hyperlipidemia   • Hypertension   • Irritable bowel syndrome   • Macular degeneration   • Urinary incontinence, post-void dribbling   • 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HCC)   • Basal cell carcinoma of skin of other parts of face   • Pulmonary nodules   • Incisional hernia, without obstruction or gangrene   • Status post repair of ventral hernia   • Dizziness   • Urinary frequency   • Lumbar back pain with radiculopathy affecting lower extremity   • Scalp ulceration, with necrosis of bone (HCC)   • 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HCC)   • Malignant melanoma of nose ([address] )   • Encounter for geriatric assessment   • Malignant melanoma of face excluding eyelid, nose, lip, and ear ([address] )          PAST MEDICAL HISTORY:   Past Medical History:   Diagnosis Date   • Anxiety    • Arthritis     back   • Back pain    • Balance problem    • Basal cell carcinoma (BCC)     in past   • Basal cell carcinoma (BCC) 10/04/2022    Right [place]   • BCC (basal cell carcinoma of skin) 08/17/2021    Right tip of nose   • BCC (basal cell carcinoma of skin) 08/08/2022    Left lip   • BCC (basal cell carcinoma of skin) 08/08/2022    Right cheek   • Cancer ([facility] [address] )    • Gout    • Hard of hearing    • Hearing aid worn     ziyad   • Hearing aid worn    • Herniated nucleus pulposus, L4-5    • [place] (hard of hearing)    • Hypertension    • Incisional hernia    • Kidney stone     CKD3   • Macular degeneration    • Melanoma ([address] )     left cheek- history   • Melanoma ([address] ) 08/08/2022    Right neck   • Nocturia    • Pulmonary nodules    • Reactive airway disease    • 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[facility] [address]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   • Skin cancer 07/01/2020    AFX- scalp   • Squamous cell carcinoma     in past        PAST SURGICAL HISTORY:  Past Surgical History:   Procedure Laterality Date   • CATARACT EXTRACTION Bilateral    • COLONOSCOPY     • HERNIA REPAIR     • HYDROCELE EXCISION / REPAIR     • MASTOID SURGERY Left    • MOHS SURGERY     • MOHS SURGERY  09/29/2021    Right tip of nose-Dr Mala Giron   • MOHS SURGERY  10/06/2022    Left lip-Dr Mala Giron   • MOHS SURGERY  05/16/2023    Right [place] - Dr Danette Victor   • OTHER SURGICAL HISTORY      sarcoma scalp with skin graft   • [place] EXCISION MALIGNANT LESION F/E/E/N/L 0 5 CM/< Left 02/07/2023    Procedure: WIDE EXCISION OF LEFT NASAL MELANOMA;  Surgeon: Delia Cash MD;  Location: [facility];  Service: Surgical Oncology   • [place] REPAIR FIRST ABDOMINAL WALL HERNIA N/A 04/22/2021    Procedure: REPAIR HERNIA INCISIONAL OPEN WITH MESH;  Surgeon: Gianfranco Ahmadi MD;  Location: [facility];  Service: General   • SCALP EXCISION N/A 03/28/2018    Procedure: SCALP SARCOMA EXCISION WITH FULL THICKNESS SKIN GRAFT;  Surgeon: Lilli Topete MD;  Location: [facility];  Service: Plastics   • SKIN BIOPSY     • SKIN CANCER EXCISION     • SPLIT THICKNESS SKIN GRAFT Left 02/07/2023    Procedure: APPLICATION OF SKIN SUBSTITUTE GRAFT TO NOSE;  Surgeon: Vance Gerber MD;  Location: [facility];  Service: Plastics   • TONSILLECTOMY AND ADENOIDECTOMY          SOCIAL HISTORY:  Social History     Socioeconomic History   • Marital status: "/Civil [employer]     Spouse name: None   • Number of children: None   • Years of education: None   • Highest education level: None   Occupational History   • None   Tobacco Use   • Smoking status: Former     Packs/day: 0 25     Years: 15 00     Total pack years: 3 75     Types: Cigarettes     Quit date: 3/13/1988     Years since quittin 2   • Smokeless tobacco: Never   Vaping Use   • Vaping Use: Never used   Substance and Sexual Activity   • Alcohol use: Not Currently     Comment: beer once in awhile   • Drug use: No   • Sexual activity: Yes   Other Topics Concern   • None   Social History Narrative   • None     Social Determinants of Health     Financial Resource Strain: Medium Risk (2023)    Overall Financial Resource Strain (CARDIA)    • Difficulty of Paying Living Expenses: Somewhat hard   Food Insecurity: Not on file   Transportation Needs: No Transportation Needs (2023)    PRAPARE - Transportation    • Lack of Transportation (Medical): No    • Lack of Transportation (Non-Medical): No   Physical Activity: Not on file   Stress: Not on file   Social Connections: Not on file   Intimate Partner Violence: Not on file   Housing Stability: Not on file        FAMILY HISTORY:  Family History   Problem Relation Age of Onset   • Colon cancer Father    • Heart failure Father    • Heart disease Mother    • Stroke Mother            Objective    PHYSICAL EXAMINATION:   Blood pressure 160/80, pulse 81, temperature 98 °F (36 7 °C), temperature source Temporal, resp  rate 16, height 5' 8\" (1 727 m), weight 80 3 kg (177 lb), SpO2 97 %  Pain Score: 0-No pain     ECOG Performance Status    Flowsheet Row Most Recent Value   ECOG Performance Status 0 - Fully active, able to carry on all pre-disease performance without restriction             Physical Exam  Constitutional:       Appearance: Normal appearance  Cardiovascular:      Rate and Rhythm: Normal rate and regular rhythm  Pulses: Normal pulses        " Heart sounds: Normal heart sounds  Pulmonary:      Effort: Pulmonary effort is normal       Breath sounds: Normal breath sounds  Abdominal:      General: Abdomen is flat  Bowel sounds are normal  There is no distension  Palpations: Abdomen is soft  Tenderness: There is no abdominal tenderness  Musculoskeletal:         General: No swelling or tenderness  Lymphadenopathy:      Cervical: No cervical adenopathy  Right cervical: No superficial or posterior cervical adenopathy  Left cervical: No superficial or posterior cervical adenopathy  Upper Body:      Right upper body: No supraclavicular or axillary adenopathy  Left upper body: No supraclavicular or axillary adenopathy  Lower Body: No right inguinal adenopathy  No left inguinal adenopathy  Skin:     General: Skin is warm and dry  Neurological:      Mental Status: He is alert and oriented to person, place, and time  Mental status is at baseline  Psychiatric:         Mood and Affect: Mood normal          Behavior: Behavior normal          I reviewed lab data in the chart      Hemoglobin   Date Value Ref Range Status   04/07/2023 14 0 12 0 - 17 0 g/dL Final   01/27/2023 14 2 12 0 - 17 0 g/dL Final   10/05/2022 14 5 12 0 - 17 0 g/dL Final     MCV   Date Value Ref Range Status   04/07/2023 91 82 - 98 fL Final   01/27/2023 90 82 - 98 fL Final   10/05/2022 91 82 - 98 fL Final     Platelets   Date Value Ref Range Status   04/07/2023 187 149 - 390 Thousands/uL Final   01/27/2023 193 149 - 390 Thousands/uL Final   10/05/2022 206 149 - 390 Thousands/uL Final     WBC   Date Value Ref Range Status   04/07/2023 6 35 4 31 - 10 16 Thousand/uL Final   01/27/2023 6 59 4 31 - 10 16 Thousand/uL Final   10/05/2022 6 47 4 31 - 10 16 Thousand/uL Final      Albumin   Date Value Ref Range Status   04/07/2023 3 7 3 5 - 5 0 g/dL Final   01/27/2023 3 7 3 5 - 5 0 g/dL Final   10/05/2022 3 8 3 5 - 5 0 g/dL Final   05/05/2015 3 8 3 5 - 5 0 g/dL Final 04/11/2014 3 6 3 5 - 5 0 g/dL Final     Alkaline Phosphatase   Date Value Ref Range Status   04/07/2023 130 (H) 46 - 116 U/L Final   01/27/2023 134 (H) 46 - 116 U/L Final   10/05/2022 114 46 - 116 U/L Final   05/05/2015 119 (H) 46 - 116 U/L Final   04/11/2014 123 50 - 136 U/L Final     ALT   Date Value Ref Range Status   04/07/2023 28 12 - 78 U/L Final     Comment:     Specimen collection should occur prior to Sulfasalazine and/or Sulfapyridine administration due to the potential for falsely depressed results  01/27/2023 32 12 - 78 U/L Final     Comment:     Specimen collection should occur prior to Sulfasalazine and/or Sulfapyridine administration due to the potential for falsely depressed results  10/05/2022 39 12 - 78 U/L Final     Comment:     Specimen collection should occur prior to Sulfasalazine and/or Sulfapyridine administration due to the potential for falsely depressed results  05/05/2015 53 16 - 63 U/L Final   04/11/2014 48 6 - 78 U/L Final     AST   Date Value Ref Range Status   04/07/2023 17 5 - 45 U/L Final     Comment:     Specimen collection should occur prior to Sulfasalazine administration due to the potential for falsely depressed results  01/27/2023 19 5 - 45 U/L Final     Comment:     Specimen collection should occur prior to Sulfasalazine administration due to the potential for falsely depressed results  10/05/2022 21 5 - 45 U/L Final     Comment:     Specimen collection should occur prior to Sulfasalazine administration due to the potential for falsely depressed results      05/05/2015 27 0 - 45 U/L Final   04/11/2014 19 0 - 45 U/L Final     BUN   Date Value Ref Range Status   04/07/2023 17 5 - 25 mg/dL Final   01/27/2023 22 5 - 25 mg/dL Final   10/05/2022 20 5 - 25 mg/dL Final   05/05/2015 28 (H) 5 - 25 mg/dL Final   10/28/2014 32 (H) 5 - 25 mg/dL Final   04/11/2014 33 (H) 5 - 25 mg/dL Final     Calcium   Date Value Ref Range Status   04/07/2023 9 4 8 3 - 10 1 mg/dL Final 01/27/2023 9 8 8 3 - 10 1 mg/dL Final   10/05/2022 9 6 8 3 - 10 1 mg/dL Final   05/05/2015 9 3 8 3 - 10 1 mg/dL Final   10/28/2014 9 8 8 3 - 10 1 mg/dL Final   04/11/2014 9 3 8 3 - 10 1 mg/dL Final     Chloride   Date Value Ref Range Status   04/07/2023 110 (H) 96 - 108 mmol/L Final   01/27/2023 108 96 - 108 mmol/L Final   10/05/2022 108 96 - 108 mmol/L Final   05/05/2015 105 100 - 108 mmol/L Final   10/28/2014 103 100 - 108 mmol/L Final   04/11/2014 104 100 - 108 mmol/L Final     CO2   Date Value Ref Range Status   04/07/2023 26 21 - 32 mmol/L Final   01/27/2023 28 21 - 32 mmol/L Final   10/05/2022 27 21 - 32 mmol/L Final   05/05/2015 27 23 - 33 mmol/L Final   10/28/2014 28 23 - 33 mmol/L Final   04/11/2014 31 23 - 33 mmol/L Final     Creatinine   Date Value Ref Range Status   04/07/2023 1 26 0 60 - 1 30 mg/dL Final     Comment:     Standardized to IDMS reference method   01/27/2023 1 34 (H) 0 60 - 1 30 mg/dL Final     Comment:     Standardized to IDMS reference method   10/05/2022 1 50 (H) 0 60 - 1 30 mg/dL Final     Comment:     Standardized to IDMS reference method   05/05/2015 1 44 (H) 0 60 - 1 30 mg/dL Final     Comment:     Standardized to IDMS reference method   10/28/2014 1 52 (H) 0 60 - 1 30 mg/dL Final     Comment:     Standardized to IDMS reference method   04/11/2014 1 58 (H) 0 60 - 1 30 mg/dL Final     Comment:     Standardized to IDMS reference method     Glucose   Date Value Ref Range Status   06/10/2021 90 65 - 140 mg/dL Final     Comment:     If the patient is fasting, the ADA then defines impaired fasting glucose as > 100 mg/dL and diabetes as > or equal to 123 mg/dL  Specimen collection should occur prior to Sulfasalazine administration due to the potential for falsely depressed results  Specimen collection should occur prior to Sulfapyridine administration due to the potential for falsely elevated results     06/09/2021 88 65 - 140 mg/dL Final     Comment:     If the patient is fasting, the ADA then defines impaired fasting glucose as > 100 mg/dL and diabetes as > or equal to 123 mg/dL  Specimen collection should occur prior to Sulfasalazine administration due to the potential for falsely depressed results  Specimen collection should occur prior to Sulfapyridine administration due to the potential for falsely elevated results  06/08/2021 100 65 - 140 mg/dL Final     Comment:     If the patient is fasting, the ADA then defines impaired fasting glucose as > 100 mg/dL and diabetes as > or equal to 123 mg/dL  Specimen collection should occur prior to Sulfasalazine administration due to the potential for falsely depressed results  Specimen collection should occur prior to Sulfapyridine administration due to the potential for falsely elevated results  Potassium   Date Value Ref Range Status   04/07/2023 3 7 3 5 - 5 3 mmol/L Final   01/27/2023 4 0 3 5 - 5 3 mmol/L Final   10/05/2022 4 0 3 5 - 5 3 mmol/L Final   05/05/2015 3 5 3 5 - 5 3 mmol/L Final   10/28/2014 3 6 3 5 - 5 3 mmol/L Final   04/11/2014 3 6 3 5 - 5 3 mmol/L Final     Sodium   Date Value Ref Range Status   05/05/2015 142 136 - 145 mmol/L Final   10/28/2014 138 136 - 145 mmol/L Final   04/11/2014 141 136 - 145 mmol/L Final     Total Protein   Date Value Ref Range Status   05/05/2015 7 6 6 4 - 8 2 g/dL Final   04/11/2014 6 8 6 4 - 8 2 g/dL Final     Total Bilirubin   Date Value Ref Range Status   04/07/2023 0 96 0 20 - 1 00 mg/dL Final     Comment:     Use of this assay is not recommended for patients undergoing treatment with eltrombopag due to the potential for falsely elevated results  01/27/2023 1 02 (H) 0 20 - 1 00 mg/dL Final     Comment:     Use of this assay is not recommended for patients undergoing treatment with eltrombopag due to the potential for falsely elevated results     10/05/2022 0 91 0 20 - 1 00 mg/dL Final     Comment:     Use of this assay is not recommended for patients undergoing treatment with eltrombopag due to "the potential for falsely elevated results  No results found for: \"LDH\"  No results found for: \"TSH\"  No results found for: \"V4KUSWT\"   No results found for: \"FREET4\"      RECENT IMAGING:  No results found  Assessment    Assessment/Plan  No problem-specific Assessment & Plan notes found for this encounter  1  Melanoma of right cheek  Discussed this is a new melanoma on the right cheek different than the recently treated melanoma of the nose  Per guideline recommendations, melanoma of Breslow thickness 1 5mm is treated with surgical resection and sentinel lymph node biopsy  At this time, staging is incomplete pending further workup  We discussed possible of immunotherapy only if metastases are present  Discussed that he does not qualify for a PET/CT scan at this time  I recommend a consultation with Dr Ivory Fitch to discuss resection and SLNB  Will plan on following up in 6 months with blood work at that time  - CBC  - CMP  - LDH    2  Multiple BCCs  Discussed nicotinamide 500 mg twice a day for prevention of SCC and BCC  Return in about 6 months (around 11/25/2023) for Office Visit, labs       "

## 2023-05-25 NOTE — LETTER
May 26, 2023     Kavita Estevez, [address] [place] [address]    Patient: Cipriano Reyes   YOB: 1940   Date of Visit: 5/25/2023       Dear Dr Kely Freeman: Thank you for referring Martell Smith to me for evaluation  Below are my notes for this consultation  If you have questions, please do not hesitate to call me  I look forward to following your patient along with you  Sincerely,        Carlos Eduardo Holden MD        CC: MD Renata Howell DO Sadie Manas, MD Valere People, MD Gwendolyn Dys, MD  5/25/2023 12:31 PM  Attested  [facility] HEMATOLOGY ONCOLOGY [address]  [address] [facility] [address]  [phone]  [phone]     Date of Visit: 5/25/2023  Name: Cipriano Reyes   YOB: 1940        Subjective    VISIT DIAGNOSIS:  Diagnoses and all orders for this visit:    Malignant melanoma of face excluding eyelid, nose, lip, and ear ([facility] [address] )  -     Ambulatory referral to Surgical Oncology; Future  -     CBC and differential; Future  -     Comprehensive metabolic panel; Future  -     LD,Blood; Future    Melanoma of skin ([facility] [address] )  -     Ambulatory referral to Hematology / Oncology  -     Ambulatory referral to Surgical Oncology; Future  -     CBC and differential; Future  -     Comprehensive metabolic panel; Future  -     LD,Blood; Future    Basal cell carcinoma (BCC) of multiple sites    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[facility] )    Basal cell carcinoma of skin of other parts of face    Malignant melanoma of nose [facility])        Oncology History   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[facility] [address] )   11/8/2017 Surgery    right vertex of scalp - s/p excision and full thickness graft for closure on 11/8/17 for atypical fibroxanthoma  1/29/2018 Biopsy    biopsy from the anterior aspect of the graft showing atypical epithelioid fibro histiocytic tumor, with lesion extending to the deep tissue edge  This is similar to prior biopsy done    Differential includes pleomorphic sarcoma versus an extreme atypical example of atypical fibro histiocytoma  3/2018 Initial Diagnosis    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[address] )     3/28/2018 Surgery    excision of scalp sarcoma with full thickness graft - Dr Astrid Lamas  Soft Tissue/Skin, Sarcoma of scalp, wide excision:  - Recurrent pleomorphic sarcoma, 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  See Note  -- FNCLCC Histologic Grade 3  (total score: 7 of 8)  * Tumor differentiation score: 3 of 3         * Mitotic count score: 3 of 3; > 19 mitoses/10 HPF (33 mitoses/10 HPF)  * Necrosis score: 1 of 2; present, < 50%  - Tumor extends into deep reticular dermis, subcutis and fascia  - Perineural invasion: Present; intraneural invasion identified (0 4 mm largest nerve diameter)  - Lymphovascular invasion: Focally suspicious  - Bone invasion: Not identified    - Central nervous system extension: Not identified  - Margins: uninvolved by sarcoma but close  -- Sarcoma  0 15 mm from nearest deep margin    - Additional Pathologic Findings: Changes consistent with prior surgical site  -- Focal ulceration with bacterial colonization, acute and chronic inflammation       -- Best representative tumor block: A5       5/29/2018 - 7/5/2018 Radiation    Plan ID Energy Fractions Dose per Fraction (cGy) Dose Correction (cGy) Total Dose Delivered (cGy) Elapsed Days   Vertex Scalp 9E 27 / 27 200 0 5,400 37            Basal cell carcinoma of skin of other parts of face    Initial Diagnosis    Basal cell carcinoma of skin of other parts of face     Malignant melanoma of nose (HCC)   12/6/2022 Biopsy    Skin, left ala, shave biopsy:     MELANOMA (thickness: 0 6 mm); extending to the tissue edges      Ulceration: not seen  Anatomic Windell Neighbor) level: III  Type: lentigo maligna melanoma  Mitoses: 0/mm2  Margin assessment: invasive melanoma extends to peripheral specimen margin and deep specimen margin focally  Pathologic stage: pT1a     2/7/2023 Surgery    Skin, nose, left "nasal, wide excision:  Residual melanoma, invasive, desmoplastic type, and scar, margins free  Incidental basal cell carcinoma, superficial and nodular type, not extending to the margins  pT2a     Malignant melanoma of face excluding eyelid, nose, lip, and ear ([address] )   5/16/2023 Initial Diagnosis    Malignant melanoma of face excluding eyelid, nose, lip, and ear (HCC)  Right mid cheek    A  Skin, right mid cheek, shave biopsy:     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; transected (see note)  Note: There are foci of basal cell carcinoma and melanoma in which the components are separate from each other (as can be seen in a collision tumor), but there are also multiple foci in which the melanoma and basal cell carcinoma are closely intermingled (\"basomelanocytic tumor\" or \"combined melanoma and basal cell carcinoma of the intermingled type\"), which is a rare occurrence  1 SOX10, MART-1, HMB45, p40, and Tarun-EP4 immunostains were reviewed and support the diagnosis  Please see synoptic report for additional details  The results were emailed to Dr Ambrosio Stephenson on 5/19/2023       Synoptic report for melanoma of the skin  Thickness: at least 1 5 mm  Ulceration: present  Anatomic Bharathi Cunningham) level: at least IV  Type: superficial spreading melanoma  Mitoses: 1/mm2  Microsatellites: cannot be determined  Lymphovascular invasion: not seen  Neurotropism: not seen  Tumor regression: not seen  Tumor-infiltrating lymphocytes (TIL): present, non-brisk  Margin assessment: invasive melanoma extends to deep specimen margin; melanoma in situ extends to peripheral specimen margin  Pathologic stage: at least pT2b  Associated nevus: not seen            Cancer Staging   Stage IB melanoma - left nose  T2b melanoma - right mid cheek       HISTORY OF PRESENT ILLNESS: Dona Moss is a 79 y o  male with a history of melanoma, sarcoma, and cutaneous BCCs who presents with newly diagnosed combined melanoma " "and BCC (Breslow thickness 1 5mm)  He is presenting for initial consultation  He was first diagnosed with melanoma on the back 10 years ago treated with surgical resection  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, treated surgically and repaired with flap from the right upper thigh  He also received radiation treatment to the scalp  In 2022, he had a melanoma 0 4 mm on right neck (excised by Dr Nida Zamarripa) and a left nasal ala melanoma with initial Breslow deptch of 0 6 mm which was upstaged to a Breslow thickness of 1 5mm after resection by surgical oncology (Dr Anastasia Meléndez)  Fraser lymph node mapping/biopsy was not performed given the melanoma was upstaged after resection  Ultrasound of lymph node without evidence of recurrence or metastasis and CXR was clear  Most recently, in May 2023 he was diagnosed with combination melanoma and BCC with Breslow thickness of melanoma of 1 5 mm, pending further workup and treatment  Pathology results below, pathology stage at least pT2b  Final Diagnosis   A  Skin, right mid cheek, shave biopsy:     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; transected (see note)  Note: There are foci of basal cell carcinoma and melanoma in which the components are separate from each other (as can be seen in a collision tumor), but there are also multiple foci in which the melanoma and basal cell carcinoma are closely intermingled (\"basomelanocytic tumor\" or \"combined melanoma and basal cell carcinoma of the intermingled type\"), which is a rare occurrence  1 SOX10, MART-1, HMB45, p40, and Tarun-EP4 immunostains were reviewed and support the diagnosis  Please see synoptic report for additional details  The results were emailed to Dr Efrain Hdz on 5/19/2023       Synoptic report for melanoma of the skin  Thickness: at least 1 5 mm  Ulceration: present  Anatomic Kendrick Rios) level: at least IV  Type: superficial spreading " melanoma  Mitoses: 1/mm2  Microsatellites: cannot be determined  Lymphovascular invasion: not seen  Neurotropism: not seen  Tumor regression: not seen  Tumor-infiltrating lymphocytes (TIL): present, non-brisk  Margin assessment: invasive melanoma extends to deep specimen margin; melanoma in situ extends to peripheral specimen margin  Pathologic stage: at least pT2b  Associated nevus: not seen     References:  1  January Newman Combined malignant melanoma and basal cell carcinoma tumor of the intermingled type  J Cutan Pathol  2007 Sep;34(9):731-5  PMID: [id number]  He has a history of multiple blistering sunburns  No history of tanning bed use  No family history of melanoma, breast cancer, ovarian, and pancreatic cancer  He has blue eyes and brown hair and is of [place] heritage  He endorses smoking 10 cigarettes daily for about 7 years  No history of steroid use or immunosuppression  No history of autoimmune disease  He has 2 children, aged 62 and 54 who see a dermatologist regularly and use sun-protection  He feels okay today  Reports a normal appetite, no fatigue, cough, SOB, vomiting, diarrhea, dysuria  He is pretty active and has no difficulty performing activities of daily living  REVIEW OF SYSTEMS:  Review of Systems   Constitutional: Negative for appetite change, fatigue and fever  Respiratory: Negative for chest tightness, cough and shortness of breath  Cardiovascular: Negative for chest pain  Gastrointestinal: Negative for abdominal pain, diarrhea and vomiting  Genitourinary: Negative for dysuria  Skin: Negative for rash  Hematological: Negative for adenopathy          MEDICATIONS:    Current Outpatient Medications:   •  acetaminophen (TYLENOL) 500 mg tablet, Take 500-1,000 mg by mouth every 4 (four) hours as needed for mild pain, Disp: , Rfl:   •  amLODIPine (NORVASC) 10 mg tablet, TAKE 1 TABLET BY MOUTH  DAILY, Disp: 90 tablet, Rfl: 3  •  Fluocinolone Acetonide Scalp 0 01 % OIL, Apply a thin layer topically daily at night one hour before bedtime   (Patient taking differently: Apply topically if needed Apply a thin layer topically daily at night one hour before bedtime ), Disp: 118 28 mL, Rfl: 5  •  lisinopril (ZESTRIL) 20 mg tablet, TAKE 1 TABLET BY MOUTH  DAILY, Disp: 90 tablet, Rfl: 3  •  metoprolol succinate (TOPROL-XL) 50 mg 24 hr tablet, TAKE 1 TABLET BY MOUTH  DAILY, Disp: 90 tablet, Rfl: 3  •  mometasone (ELOCON) 0 1 % cream, APPLY TO EARS ONCE TO TWICE DAILY AS NEEDED FOR SCALING, Disp: , Rfl:   •  Multiple Vitamins-Minerals (PRESERVISION AREDS 2) CAPS, Take 1 capsule by mouth 2 (two) times a day, Disp: , Rfl:   •  mupirocin (BACTROBAN) 2 % ointment, Apply topically two to three times a day to sore areas (Patient taking differently: Apply topically if needed Apply topically two to three times a day to sore areas), Disp: 22 g, Rfl: 3  •  imiquimod (ALDARA) 5 % cream, Apply topically once a day Monday thru Friday for 6 weeks (Patient not taking: Reported on 5/25/2023), Disp: 24 each, Rfl: 1  •  traMADol (ULTRAM) 50 mg tablet, Take 1 tablet (50 mg total) by mouth every 6 (six) hours as needed for moderate pain (Patient not taking: Reported on 4/19/2023), Disp: 10 tablet, Rfl: 0     ALLERGIES:  Allergies   Allergen Reactions   • Erythromycin Other (See Comments)     Thrush         ACTIVE PROBLEMS:  Patient Active Problem List   Diagnosis   • Actinic keratosis   • Stage 3 chronic kidney disease (HCC)   • Gout   • Hyperlipidemia   • Hypertension   • Irritable bowel syndrome   • Macular degeneration   • Urinary incontinence, post-void dribbling   • 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HCC)   • Basal cell carcinoma of skin of other parts of face   • Pulmonary nodules   • Incisional hernia, without obstruction or gangrene   • Status post repair of ventral hernia   • Dizziness   • Urinary frequency   • Lumbar back pain with radiculopathy affecting lower extremity   • Scalp ulceration, with necrosis of bone ([facility] [address] )   • 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[facility] [address] )   • Malignant melanoma of nose ([facility] [address] )   • Encounter for geriatric assessment   • Malignant melanoma of face excluding eyelid, nose, lip, and ear ([facility] [address] )          PAST MEDICAL HISTORY:   Past Medical History:   Diagnosis Date   • Anxiety    • Arthritis     back   • Back pain    • Balance problem    • Basal cell carcinoma (BCC)     in past   • Basal cell carcinoma (BCC) 10/04/2022    Right [place]   • BCC (basal cell carcinoma of skin) 08/17/2021    Right tip of nose   • BCC (basal cell carcinoma of skin) 08/08/2022    Left lip   • BCC (basal cell carcinoma of skin) 08/08/2022    Right cheek   • Cancer ([facility] [address] )    • Gout    • Hard of hearing    • Hearing aid worn     ziyad   • Hearing aid worn    • Herniated nucleus pulposus, L4-5    • [place] (hard of hearing)    • Hypertension    • Incisional hernia    • Kidney stone     CKD3   • Macular degeneration    • Melanoma ([facility] [address] )     left cheek- history   • Melanoma ([facility] [address] ) 08/08/2022    Right neck   • Nocturia    • Pulmonary nodules    • Reactive airway disease    • 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[facility] [address]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   • Skin cancer 07/01/2020    AFX- scalp   • Squamous cell carcinoma     in past        PAST SURGICAL HISTORY:  Past Surgical History:   Procedure Laterality Date   • CATARACT EXTRACTION Bilateral    • COLONOSCOPY     • HERNIA REPAIR     • HYDROCELE EXCISION / REPAIR     • MASTOID SURGERY Left    • MOHS SURGERY     • MOHS SURGERY  09/29/2021    Right tip of nose-Dr Stephenie Lewis   • MOHS SURGERY  10/06/2022    Left lip-Dr Stephenie Lewis   • MOHS SURGERY  05/16/2023    Right [facility] - Dr Lida Bhatia   • OTHER SURGICAL HISTORY      sarcoma scalp with skin graft   • NJ EXCISION MALIGNANT LESION F/E/E/N/L 0 5 CM/< Left 02/07/2023    Procedure: WIDE EXCISION OF LEFT NASAL MELANOMA;  Surgeon: Ольга Villa MD;  Location: [facility];  Service: Surgical Oncology   • NJ REPAIR FIRST ABDOMINAL WALL HERNIA N/A 04/22/2021    Procedure: REPAIR HERNIA INCISIONAL OPEN WITH MESH;  Surgeon: Gianfranco Ahmadi MD;  Location: [facility];  Service: General   • SCALP EXCISION N/A 2018    Procedure: SCALP SARCOMA EXCISION WITH FULL THICKNESS SKIN GRAFT;  Surgeon: Lilli Topete MD;  Location: [facility];  Service: Plastics   • SKIN BIOPSY     • SKIN CANCER EXCISION     • SPLIT THICKNESS SKIN GRAFT Left 2023    Procedure: APPLICATION OF SKIN SUBSTITUTE GRAFT TO NOSE;  Surgeon: Vance Gerber MD;  Location: [facility];  Service: Plastics   • TONSILLECTOMY AND ADENOIDECTOMY          SOCIAL HISTORY:  Social History     Socioeconomic History   • Marital status: /Civil Union     Spouse name: None   • Number of children: None   • Years of education: None   • Highest education level: None   Occupational History   • None   Tobacco Use   • Smoking status: Former     Packs/day: 0 25     Years: 15 00     Total pack years: 3 75     Types: Cigarettes     Quit date: 3/13/1988     Years since quittin 2   • Smokeless tobacco: Never   Vaping Use   • Vaping Use: Never used   Substance and Sexual Activity   • Alcohol use: Not Currently     Comment: beer once in awhile   • Drug use: No   • Sexual activity: Yes   Other Topics Concern   • None   Social History Narrative   • None     Social Determinants of Health     Financial Resource Strain: Medium Risk (2023)    Overall Financial Resource Strain (CARDIA)    • Difficulty of Paying Living Expenses: Somewhat hard   Food Insecurity: Not on file   Transportation Needs: No Transportation Needs (2023)    PRAPARE - Transportation    • Lack of Transportation (Medical): No    • Lack of Transportation (Non-Medical):  No   Physical Activity: Not on file   Stress: Not on file   Social Connections: Not on file   Intimate Partner Violence: Not on file   Housing Stability: Not on file        FAMILY HISTORY:  Family History   Problem Relation Age of Onset   • Colon cancer Father    • Heart failure Father    • Heart disease "Mother    • Stroke Mother            Objective    PHYSICAL EXAMINATION:   Blood pressure 160/80, pulse 81, temperature 98 °F (36 7 °C), temperature source Temporal, resp  rate 16, height 5' 8\" (1 727 m), weight 80 3 kg (177 lb), SpO2 97 %  Pain Score: 0-No pain     ECOG Performance Status      Flowsheet Row Most Recent Value   ECOG Performance Status 0 - Fully active, able to carry on all pre-disease performance without restriction               Physical Exam  Constitutional:       Appearance: Normal appearance  Cardiovascular:      Rate and Rhythm: Normal rate and regular rhythm  Pulses: Normal pulses  Heart sounds: Normal heart sounds  Pulmonary:      Effort: Pulmonary effort is normal       Breath sounds: Normal breath sounds  Abdominal:      General: Abdomen is flat  Bowel sounds are normal  There is no distension  Palpations: Abdomen is soft  Tenderness: There is no abdominal tenderness  Musculoskeletal:         General: No swelling or tenderness  Lymphadenopathy:      Cervical: No cervical adenopathy  Right cervical: No superficial or posterior cervical adenopathy  Left cervical: No superficial or posterior cervical adenopathy  Upper Body:      Right upper body: No supraclavicular or axillary adenopathy  Left upper body: No supraclavicular or axillary adenopathy  Lower Body: No right inguinal adenopathy  No left inguinal adenopathy  Skin:     General: Skin is warm and dry  Neurological:      Mental Status: He is alert and oriented to person, place, and time  Mental status is at baseline  Psychiatric:         Mood and Affect: Mood normal          Behavior: Behavior normal          I reviewed lab data in the chart      Hemoglobin   Date Value Ref Range Status   04/07/2023 14 0 12 0 - 17 0 g/dL Final   01/27/2023 14 2 12 0 - 17 0 g/dL Final   10/05/2022 14 5 12 0 - 17 0 g/dL Final     MCV   Date Value Ref Range Status   04/07/2023 91 82 - 98 fL Final " 01/27/2023 90 82 - 98 fL Final   10/05/2022 91 82 - 98 fL Final     Platelets   Date Value Ref Range Status   04/07/2023 187 149 - 390 Thousands/uL Final   01/27/2023 193 149 - 390 Thousands/uL Final   10/05/2022 206 149 - 390 Thousands/uL Final     WBC   Date Value Ref Range Status   04/07/2023 6 35 4 31 - 10 16 Thousand/uL Final   01/27/2023 6 59 4 31 - 10 16 Thousand/uL Final   10/05/2022 6 47 4 31 - 10 16 Thousand/uL Final      Albumin   Date Value Ref Range Status   04/07/2023 3 7 3 5 - 5 0 g/dL Final   01/27/2023 3 7 3 5 - 5 0 g/dL Final   10/05/2022 3 8 3 5 - 5 0 g/dL Final   05/05/2015 3 8 3 5 - 5 0 g/dL Final   04/11/2014 3 6 3 5 - 5 0 g/dL Final     Alkaline Phosphatase   Date Value Ref Range Status   04/07/2023 130 (H) 46 - 116 U/L Final   01/27/2023 134 (H) 46 - 116 U/L Final   10/05/2022 114 46 - 116 U/L Final   05/05/2015 119 (H) 46 - 116 U/L Final   04/11/2014 123 50 - 136 U/L Final     ALT   Date Value Ref Range Status   04/07/2023 28 12 - 78 U/L Final     Comment:     Specimen collection should occur prior to Sulfasalazine and/or Sulfapyridine administration due to the potential for falsely depressed results  01/27/2023 32 12 - 78 U/L Final     Comment:     Specimen collection should occur prior to Sulfasalazine and/or Sulfapyridine administration due to the potential for falsely depressed results  10/05/2022 39 12 - 78 U/L Final     Comment:     Specimen collection should occur prior to Sulfasalazine and/or Sulfapyridine administration due to the potential for falsely depressed results  05/05/2015 53 16 - 63 U/L Final   04/11/2014 48 6 - 78 U/L Final     AST   Date Value Ref Range Status   04/07/2023 17 5 - 45 U/L Final     Comment:     Specimen collection should occur prior to Sulfasalazine administration due to the potential for falsely depressed results      01/27/2023 19 5 - 45 U/L Final     Comment:     Specimen collection should occur prior to Sulfasalazine administration due to the potential for falsely depressed results  10/05/2022 21 5 - 45 U/L Final     Comment:     Specimen collection should occur prior to Sulfasalazine administration due to the potential for falsely depressed results      05/05/2015 27 0 - 45 U/L Final   04/11/2014 19 0 - 45 U/L Final     BUN   Date Value Ref Range Status   04/07/2023 17 5 - 25 mg/dL Final   01/27/2023 22 5 - 25 mg/dL Final   10/05/2022 20 5 - 25 mg/dL Final   05/05/2015 28 (H) 5 - 25 mg/dL Final   10/28/2014 32 (H) 5 - 25 mg/dL Final   04/11/2014 33 (H) 5 - 25 mg/dL Final     Calcium   Date Value Ref Range Status   04/07/2023 9 4 8 3 - 10 1 mg/dL Final   01/27/2023 9 8 8 3 - 10 1 mg/dL Final   10/05/2022 9 6 8 3 - 10 1 mg/dL Final   05/05/2015 9 3 8 3 - 10 1 mg/dL Final   10/28/2014 9 8 8 3 - 10 1 mg/dL Final   04/11/2014 9 3 8 3 - 10 1 mg/dL Final     Chloride   Date Value Ref Range Status   04/07/2023 110 (H) 96 - 108 mmol/L Final   01/27/2023 108 96 - 108 mmol/L Final   10/05/2022 108 96 - 108 mmol/L Final   05/05/2015 105 100 - 108 mmol/L Final   10/28/2014 103 100 - 108 mmol/L Final   04/11/2014 104 100 - 108 mmol/L Final     CO2   Date Value Ref Range Status   04/07/2023 26 21 - 32 mmol/L Final   01/27/2023 28 21 - 32 mmol/L Final   10/05/2022 27 21 - 32 mmol/L Final   05/05/2015 27 23 - 33 mmol/L Final   10/28/2014 28 23 - 33 mmol/L Final   04/11/2014 31 23 - 33 mmol/L Final     Creatinine   Date Value Ref Range Status   04/07/2023 1 26 0 60 - 1 30 mg/dL Final     Comment:     Standardized to IDMS reference method   01/27/2023 1 34 (H) 0 60 - 1 30 mg/dL Final     Comment:     Standardized to IDMS reference method   10/05/2022 1 50 (H) 0 60 - 1 30 mg/dL Final     Comment:     Standardized to IDMS reference method   05/05/2015 1 44 (H) 0 60 - 1 30 mg/dL Final     Comment:     Standardized to IDMS reference method   10/28/2014 1 52 (H) 0 60 - 1 30 mg/dL Final     Comment:     Standardized to IDMS reference method   04/11/2014 1 58 (H) 0 60 - 1 30 mg/dL Final     Comment:     Standardized to IDMS reference method     Glucose   Date Value Ref Range Status   06/10/2021 90 65 - 140 mg/dL Final     Comment:     If the patient is fasting, the ADA then defines impaired fasting glucose as > 100 mg/dL and diabetes as > or equal to 123 mg/dL  Specimen collection should occur prior to Sulfasalazine administration due to the potential for falsely depressed results  Specimen collection should occur prior to Sulfapyridine administration due to the potential for falsely elevated results  06/09/2021 88 65 - 140 mg/dL Final     Comment:     If the patient is fasting, the ADA then defines impaired fasting glucose as > 100 mg/dL and diabetes as > or equal to 123 mg/dL  Specimen collection should occur prior to Sulfasalazine administration due to the potential for falsely depressed results  Specimen collection should occur prior to Sulfapyridine administration due to the potential for falsely elevated results  06/08/2021 100 65 - 140 mg/dL Final     Comment:     If the patient is fasting, the ADA then defines impaired fasting glucose as > 100 mg/dL and diabetes as > or equal to 123 mg/dL  Specimen collection should occur prior to Sulfasalazine administration due to the potential for falsely depressed results  Specimen collection should occur prior to Sulfapyridine administration due to the potential for falsely elevated results       Potassium   Date Value Ref Range Status   04/07/2023 3 7 3 5 - 5 3 mmol/L Final   01/27/2023 4 0 3 5 - 5 3 mmol/L Final   10/05/2022 4 0 3 5 - 5 3 mmol/L Final   05/05/2015 3 5 3 5 - 5 3 mmol/L Final   10/28/2014 3 6 3 5 - 5 3 mmol/L Final   04/11/2014 3 6 3 5 - 5 3 mmol/L Final     Sodium   Date Value Ref Range Status   05/05/2015 142 136 - 145 mmol/L Final   10/28/2014 138 136 - 145 mmol/L Final   04/11/2014 141 136 - 145 mmol/L Final     Total Protein   Date Value Ref Range Status   05/05/2015 7 6 6 4 - 8 2 g/dL Final   04/11/2014 6 8 6 4 "- 8 2 g/dL Final     Total Bilirubin   Date Value Ref Range Status   04/07/2023 0 96 0 20 - 1 00 mg/dL Final     Comment:     Use of this assay is not recommended for patients undergoing treatment with eltrombopag due to the potential for falsely elevated results  01/27/2023 1 02 (H) 0 20 - 1 00 mg/dL Final     Comment:     Use of this assay is not recommended for patients undergoing treatment with eltrombopag due to the potential for falsely elevated results  10/05/2022 0 91 0 20 - 1 00 mg/dL Final     Comment:     Use of this assay is not recommended for patients undergoing treatment with eltrombopag due to the potential for falsely elevated results  No results found for: \"LDH\"  No results found for: \"TSH\"  No results found for: \"D3BOGRR\"   No results found for: \"FREET4\"      RECENT IMAGING:  No results found  Assessment    Assessment/Plan  No problem-specific Assessment & Plan notes found for this encounter  1  Melanoma of right cheek  Discussed this is a new melanoma on the right cheek different than the recently treated melanoma of the nose  Per guideline recommendations, melanoma of Breslow thickness 1 5mm is treated with surgical resection and sentinel lymph node biopsy  At this time, staging is incomplete pending further workup  We discussed possible of immunotherapy only if metastases are present  Discussed that he does not qualify for a PET/CT scan at this time  I recommend a consultation with Dr Eric Honeycutt to discuss resection and SLNB  Will plan on following up in 6 months with blood work at that time  - CBC  - CMP  - LDH    2  Multiple BCCs  Discussed nicotinamide 500 mg twice a day for prevention of SCC and BCC  Return in about 6 months (around 11/25/2023) for Office Visit, labs  Attestation with edits by Codey Jarvis MD at 5/26/2023  2:34 PM:  I interviewed, took the history and examined the patient    I discussed the case with the Resident and reviewed the Resident’s note " , prescribed medications, and orders placed  I supervised the Resident and I agree with the Resident management plan as it was presented to me  I was present in the clinic and examined the patient  Mr Gabi Villaloobs is a 79 yr male with history of previous melanoma's 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 and multiple basal cell carcinomas here for consultation for a new melanoma on the mid right cheek  He has significant sun exposure blistering sunburns  He states that he sees dermatology quite frequently and has multiple things removed from his skin each time  Most recently after the surgery on the left nose, he noted a hive-like/blisterlike lesion on the right mid cheek  He had it evaluated and ultimately was biopsied when he was having Mohs procedure on his right temple/upper scalp lesion  Biopsy of a right mid cheek lesion and a right lateral cheek lesion were also performed  Pathology demonstrated basal cell carcinoma in addition to melanoma in the right mid cheek lesion with a Breslow thickness of 1 5 mm, positive ulceration, 1 mitoses per milimeter squared  He otherwise feels well and has no problems with activities of daily living  He is very active  On physical exam ECOG PS 0  Multiple sites on his skin with activity fair image as well as scars and surgical/Mohs procedure sites  Healing biopsy sites on his right cheek more in the middle and one more lateral and posterior  No lymphadenopathy  I had an extensive discussion with the patient and his wife regarding his  diagnosis, prognosis, and recommendations for further management  We reviewed his melanoma history, current findings, pathology and imaging tests  We discussed that he has had multiple melanomas with the most recent one in February 2023 being stage Ib  He has a new melanoma now on his right cheek that is 1 5 mm Breslow thickness with ulceration    I explained that he should undergo a wide local excision and attempt at a sentinel lymph node biopsy for his melanoma for complete definitive management and staging  I discussed that further recommendations will be based on final staging which is based on final pathology from that procedure  I discussed that I will watch out for pathology and call him sooner if we need any additional interventions  We discussed that he will require ongoing monitoring and surveillance, both for disease recurrence as well as a new primary melanoma as well as other nonmelanoma skin cancers  This includes physical exams and labs, including a comprehensive metabolic panel, CBC with differential and LDH  He will continue with periodic ultrasounds of the lymph node basin for his left nasal melanoma and that we were unable to perform the sentinel lymph node biopsy, and we will monitor his neck conservatively for concerns for recurrence  We discussed the importance of regular cutaneous self examinations and reviewed the features of lesions that could be concerning for skin cancer  I did explain that he  is at risk for developing another primary melanoma as well  We also discussed avoidance of unnecessary sun exposure and use of sun protective clothing and sunscreen  I also recommended routine follow up and skin checks with dermatology  Family Screening: his  first-degree relatives, namely his siblings, parents, and children, have an increased risk of developing a melanoma over the general population given  his  diagnosis of melanoma, and should have annual dermatologic screening  He and his wife were discussing how he has multiple skin cancers, follow-up every time he is at the dermatologist office  I did discuss the data demonstrating decreased squamous cell carcinoma development, and more moderately decreased basal cell carcinoma, and continued use of nicotinamide 500 mg p o  twice daily    I also discussed that there is very minimal by early data suggesting that nicotinamide might decrease development of melanoma as well  They are contemplating taking the medication  He and his wife know to call with issues or concerns prior to the next visit  I will see him back in 6 months with blood work  I also asked him to get baseline blood work at this time as well  All orders placed and prescription provided  Mr Michael Wilson had all his questions and concerns addressed and he knows to call with any additional issues  Thank you for allowing me to participate in Mr Cheung's care            Vicky Hernandez MD 05/26/23

## 2023-05-26 PROBLEM — C43.30 MALIGNANT MELANOMA OF FACE EXCLUDING EYELID, NOSE, LIP, AND EAR (HCC): Status: ACTIVE | Noted: 2023-05-26

## 2023-05-27 ENCOUNTER — HOSPITAL ENCOUNTER (EMERGENCY)
Facility: HOSPITAL | Age: 83
Discharge: HOME | End: 2023-05-27
Attending: EMERGENCY MEDICINE

## 2023-05-27 ENCOUNTER — APPOINTMENT (EMERGENCY)
Dept: CT IMAGING | Facility: HOSPITAL | Age: 83
End: 2023-05-27

## 2023-05-27 VITALS
RESPIRATION RATE: 18 BRPM | SYSTOLIC BLOOD PRESSURE: 142 MMHG | OXYGEN SATURATION: 95 % | TEMPERATURE: 98.2 F | DIASTOLIC BLOOD PRESSURE: 75 MMHG | HEART RATE: 65 BPM

## 2023-05-27 DIAGNOSIS — E86.0 DEHYDRATION: ICD-10-CM

## 2023-05-27 DIAGNOSIS — R53.83 FATIGUE: ICD-10-CM

## 2023-05-27 DIAGNOSIS — R19.7 DIARRHEA: Primary | ICD-10-CM

## 2023-05-27 DIAGNOSIS — R42 EPISODIC LIGHTHEADEDNESS: ICD-10-CM

## 2023-05-27 DIAGNOSIS — R11.0 NAUSEA: ICD-10-CM

## 2023-05-27 LAB
ALBUMIN SERPL BCP-MCNC: 4 G/DL (ref 3.5–5)
ALP SERPL-CCNC: 120 U/L (ref 34–104)
ALT SERPL W P-5'-P-CCNC: 16 U/L (ref 7–52)
ANION GAP SERPL CALCULATED.3IONS-SCNC: 6 MMOL/L (ref 4–13)
APTT PPP: 24 SECONDS (ref 23–37)
AST SERPL W P-5'-P-CCNC: 15 U/L (ref 13–39)
ATRIAL RATE: 71 BPM
BACTERIA UR QL AUTO: ABNORMAL /HPF
BASOPHILS # BLD AUTO: 0.05 THOUSANDS/ÂΜL (ref 0–0.1)
BASOPHILS NFR BLD AUTO: 1 % (ref 0–1)
BILIRUB SERPL-MCNC: 0.81 MG/DL (ref 0.2–1)
BILIRUB UR QL STRIP: NEGATIVE
BUN SERPL-MCNC: 17 MG/DL (ref 5–25)
CALCIUM SERPL-MCNC: 9.3 MG/DL (ref 8.4–10.2)
CHLORIDE SERPL-SCNC: 106 MMOL/L (ref 96–108)
CLARITY UR: CLEAR
CO2 SERPL-SCNC: 25 MMOL/L (ref 21–32)
COLOR UR: YELLOW
CREAT SERPL-MCNC: 1.14 MG/DL (ref 0.6–1.3)
EOSINOPHIL # BLD AUTO: 0.22 THOUSAND/ÂΜL (ref 0–0.61)
EOSINOPHIL NFR BLD AUTO: 2 % (ref 0–6)
ERYTHROCYTE [DISTWIDTH] IN BLOOD BY AUTOMATED COUNT: 12.9 % (ref 11.6–15.1)
GFR SERPL CREATININE-BSD FRML MDRD: 59 ML/MIN/1.73SQ M
GLUCOSE SERPL-MCNC: 133 MG/DL (ref 65–140)
GLUCOSE UR STRIP-MCNC: NEGATIVE MG/DL
HCT VFR BLD AUTO: 42.4 % (ref 36.5–49.3)
HGB BLD-MCNC: 14.4 G/DL (ref 12–17)
HGB UR QL STRIP.AUTO: NEGATIVE
IMM GRANULOCYTES # BLD AUTO: 0.04 THOUSAND/UL (ref 0–0.2)
IMM GRANULOCYTES NFR BLD AUTO: 0 % (ref 0–2)
INR PPP: 0.95 (ref 0.84–1.19)
KETONES UR STRIP-MCNC: NEGATIVE MG/DL
LEUKOCYTE ESTERASE UR QL STRIP: NEGATIVE
LYMPHOCYTES # BLD AUTO: 2.25 THOUSANDS/ÂΜL (ref 0.6–4.47)
LYMPHOCYTES NFR BLD AUTO: 23 % (ref 14–44)
MCH RBC QN AUTO: 30.6 PG (ref 26.8–34.3)
MCHC RBC AUTO-ENTMCNC: 34 G/DL (ref 31.4–37.4)
MCV RBC AUTO: 90 FL (ref 82–98)
MONOCYTES # BLD AUTO: 0.57 THOUSAND/ÂΜL (ref 0.17–1.22)
MONOCYTES NFR BLD AUTO: 6 % (ref 4–12)
MUCOUS THREADS UR QL AUTO: ABNORMAL
NEUTROPHILS # BLD AUTO: 6.56 THOUSANDS/ÂΜL (ref 1.85–7.62)
NEUTS SEG NFR BLD AUTO: 68 % (ref 43–75)
NITRITE UR QL STRIP: NEGATIVE
NON-SQ EPI CELLS URNS QL MICRO: ABNORMAL /HPF
NRBC BLD AUTO-RTO: 0 /100 WBCS
P AXIS: 73 DEGREES
PH UR STRIP.AUTO: 7 [PH] (ref 4.5–8)
PLATELET # BLD AUTO: 201 THOUSANDS/UL (ref 149–390)
PMV BLD AUTO: 10.2 FL (ref 8.9–12.7)
POTASSIUM SERPL-SCNC: 3.8 MMOL/L (ref 3.5–5.3)
PR INTERVAL: 160 MS
PROT SERPL-MCNC: 7.2 G/DL (ref 6.4–8.4)
PROT UR STRIP-MCNC: ABNORMAL MG/DL
PROTHROMBIN TIME: 12.6 SECONDS (ref 11.6–14.5)
QRS AXIS: -7 DEGREES
QRSD INTERVAL: 144 MS
QT INTERVAL: 438 MS
QTC INTERVAL: 475 MS
RBC # BLD AUTO: 4.7 MILLION/UL (ref 3.88–5.62)
RBC #/AREA URNS AUTO: ABNORMAL /HPF
SODIUM SERPL-SCNC: 137 MMOL/L (ref 135–147)
SP GR UR STRIP.AUTO: >=1.03 (ref 1–1.03)
T WAVE AXIS: 35 DEGREES
UROBILINOGEN UR QL STRIP.AUTO: 0.2 E.U./DL
VENTRICULAR RATE: 71 BPM
WBC # BLD AUTO: 9.69 THOUSAND/UL (ref 4.31–10.16)
WBC #/AREA URNS AUTO: ABNORMAL /HPF

## 2023-05-27 RX ORDER — ONDANSETRON 2 MG/ML
4 INJECTION INTRAMUSCULAR; INTRAVENOUS ONCE
Status: COMPLETED | OUTPATIENT
Start: 2023-05-27 | End: 2023-05-27

## 2023-05-27 RX ORDER — ONDANSETRON HYDROCHLORIDE 4 MG/5ML
4 SOLUTION ORAL ONCE
Status: DISCONTINUED | OUTPATIENT
Start: 2023-05-27 | End: 2023-05-27

## 2023-05-27 RX ADMIN — SODIUM CHLORIDE 500 ML: 0.9 INJECTION, SOLUTION INTRAVENOUS at 11:31

## 2023-05-27 RX ADMIN — ONDANSETRON 4 MG: 2 INJECTION INTRAMUSCULAR; INTRAVENOUS at 11:32

## 2023-05-27 NOTE — ED NOTES
Pt ambulated to and from restroom without incident  Dr Jody Giraldo advised of same              Yoselin Summers RN  05/27/23 [id number]

## 2023-05-27 NOTE — ED PROVIDER NOTES
"History  Chief Complaint   Patient presents with   • Diarrhea     Pt c/o of 3 episodes of diarrhea since 7:00 this morning  Also reporting nausea and feels like he needs to vomit  Feeling fatigued and \"washed out  \"       80 yo male who states he woke up at 0600 feeling fine, ate his half a banana and took his BP meds  Shortly thereafter around 0900 went to walk and felt off balance, had to hold walls  Has had nml BM after shower but then after eating had an episode of diarrhea and a short time later a second with some nausea and feeling of being \"washed out\" and when he stood he felt lightheaded and off balance \"like I needed to hold the walls\"  He denies fever, chills, headache, cough, SOB, CP  Tells me he was just at derm last week and had biopsies done on R temple - tested positive for melanoma and basal cell and will need further surgical removal   Has hx of melenoma s/p Mohs on L nare and is also 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/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  History provided by:  Patient   used: No    Diarrhea  Quality:  Watery  Severity:  Moderate  Onset quality:  Gradual  Number of episodes:  2  Duration:  2 hours  Relieved by:  Nothing  Worsened by:  Nothing  Ineffective treatments:  None tried  Associated symptoms: no abdominal pain, no arthralgias, no chills, no fever, no myalgias and no vomiting    Associated symptoms comment:  Fatigue, lightheaded, nausea  Risk factors: no recent antibiotic use, no sick contacts, no suspicious food intake and no travel to endemic areas        Prior to Admission Medications   Prescriptions Last Dose Informant Patient Reported? Taking? Fluocinolone Acetonide Scalp 0 01 % OIL  Self No No   Sig: Apply a thin layer topically daily at night one hour before bedtime  Patient taking differently: Apply topically if needed Apply a thin layer topically daily at night one hour before bedtime     Multiple Vitamins-Minerals " (PRESERVISION AREDS 2) CAPS  Self Yes No   Sig: Take 1 capsule by mouth 2 (two) times a day   acetaminophen (TYLENOL) 500 mg tablet  Self Yes No   Sig: Take 500-1,000 mg by mouth every 4 (four) hours as needed for mild pain   amLODIPine (NORVASC) 10 mg tablet  Self No No   Sig: TAKE 1 TABLET BY MOUTH  DAILY   imiquimod (ALDARA) 5 % cream  Self No No   Sig: Apply topically once a day Monday thru Friday for 6 weeks   Patient not taking: Reported on 5/25/2023   lisinopril (ZESTRIL) 20 mg tablet   No No   Sig: TAKE 1 TABLET BY MOUTH  DAILY   metoprolol succinate (TOPROL-XL) 50 mg 24 hr tablet   No No   Sig: TAKE 1 TABLET BY MOUTH  DAILY   mometasone (ELOCON) 0 1 % cream  Self Yes No   Sig: APPLY TO EARS ONCE TO TWICE DAILY AS NEEDED FOR SCALING   mupirocin (BACTROBAN) 2 % ointment  Self No No   Sig: Apply topically two to three times a day to sore areas   Patient taking differently: Apply topically if needed Apply topically two to three times a day to sore areas   traMADol (ULTRAM) 50 mg tablet   No No   Sig: Take 1 tablet (50 mg total) by mouth every 6 (six) hours as needed for moderate pain   Patient not taking: Reported on 4/19/2023      Facility-Administered Medications: None       Past Medical History:   Diagnosis Date   • Anxiety    • Arthritis     back   • Back pain    • Balance problem    • Basal cell carcinoma (BCC)     in past   • Basal cell carcinoma (BCC) 10/04/2022    Right Temple   • BCC (basal cell carcinoma of skin) 08/17/2021    Right tip of nose   • BCC (basal cell carcinoma of skin) 08/08/2022    Left lip   • BCC (basal cell carcinoma of skin) 08/08/2022    Right cheek   • Cancer (HCC)    • Gout    • Hard of hearing    • Hearing aid worn     ziyad   • Hearing aid worn    • Herniated nucleus pulposus, L4-5    • [place] (hard of hearing)    • Hypertension    • Incisional hernia    • Kidney stone     CKD3   • Macular degeneration    • Melanoma ([address] )     left cheek- history   • Melanoma ([address] ) 08/08/2022 Right neck   • Nocturia    • Pulmonary nodules    • Reactive airway disease    • 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   • Skin cancer 07/01/2020    AFX- scalp   • Squamous cell carcinoma     in past       Past Surgical History:   Procedure Laterality Date   • CATARACT EXTRACTION Bilateral    • COLONOSCOPY     • HERNIA REPAIR     • HYDROCELE EXCISION / REPAIR     • MASTOID SURGERY Left    • MOHS SURGERY     • MOHS SURGERY  09/29/2021    Right tip of nose-Dr Myah Mcdonald   • MOHS SURGERY  10/06/2022    Left lip-Dr Myah Mcdonald   • MOHS SURGERY  05/16/2023    Right [place] - Dr Addie Tamez   • OTHER SURGICAL HISTORY      sarcoma scalp with skin graft   • IA EXCISION MALIGNANT LESION F/E/E/N/L 0 5 CM/< Left 02/07/2023    Procedure: WIDE EXCISION OF LEFT NASAL MELANOMA;  Surgeon: Angela Juarez MD;  [facility];  Service: Surgical Oncology   • IA REPAIR FIRST ABDOMINAL WALL HERNIA N/A 04/22/2021    Procedure: REPAIR HERNIA INCISIONAL OPEN WITH MESH;  Surgeon: Satnam Mckeon MD;  Location[facility];  Service: General   • SCALP EXCISION N/A 03/28/2018    Procedure: SCALP SARCOMA EXCISION WITH FULL THICKNESS SKIN GRAFT;  Surgeon: Carol Rodgers MD;  Location[facility];  Service: Plastics   • SKIN BIOPSY     • SKIN CANCER EXCISION     • SPLIT THICKNESS SKIN GRAFT Left 02/07/2023    Procedure: APPLICATION OF SKIN SUBSTITUTE GRAFT TO NOSE;  Surgeon: Merlinda Gauze, MD;  Location: [facility];  Service: Plastics   • TONSILLECTOMY AND ADENOIDECTOMY         Family History   Problem Relation Age of Onset   • Colon cancer Father    • Heart failure Father    • Heart disease Mother    • Stroke Mother      I have reviewed and agree with the history as documented      E-Cigarette/Vaping   • E-Cigarette Use Never User      E-Cigarette/Vaping Substances   • Nicotine No    • THC No    • CBD No    • Flavoring No    • Other No    • Unknown No      Social History     Tobacco Use   • Smoking status: Former     Packs/day: 0 25 Years: 15 00     Total pack years: 3 75     Types: Cigarettes     Quit date: 3/13/1988     Years since quittin 2   • Smokeless tobacco: Never   Vaping Use   • Vaping Use: Never used   Substance Use Topics   • Alcohol use: Not Currently     Comment: beer once in awhile   • Drug use: No       Review of Systems   Constitutional: Positive for fatigue  Negative for chills and fever  HENT: Negative for congestion, ear pain and sore throat  Eyes: Negative for pain and visual disturbance  Respiratory: Negative for cough and shortness of breath  Cardiovascular: Negative for chest pain and palpitations  Gastrointestinal: Positive for diarrhea and nausea  Negative for abdominal pain and vomiting  Genitourinary: Negative for dysuria and hematuria  Musculoskeletal: Negative for arthralgias, back pain and myalgias  Skin: Negative for color change and rash  Neurological: Positive for light-headedness  Negative for seizures and syncope  All other systems reviewed and are negative  Physical Exam  Physical Exam  Vitals and nursing note reviewed  Constitutional:       General: He is not in acute distress  Appearance: He is well-developed  Comments: Skin graft noted R vertex, R temple sutures - no s/s infection noted   HENT:      Head: Normocephalic and atraumatic  Right Ear: Tympanic membrane normal       Left Ear: Tympanic membrane normal       Ears:      Comments: Hearing aids     Nose:      Comments: muliiple scars from numerous derm procedures      Mouth/Throat:      Mouth: Mucous membranes are moist    Eyes:      Extraocular Movements: Extraocular movements intact  Conjunctiva/sclera: Conjunctivae normal    Cardiovascular:      Rate and Rhythm: Normal rate and regular rhythm  Heart sounds: No murmur heard  Pulmonary:      Effort: Pulmonary effort is normal  No respiratory distress  Breath sounds: Normal breath sounds     Abdominal:      Palpations: Abdomen is soft       Tenderness: There is no abdominal tenderness  Musculoskeletal:         General: No swelling  Cervical back: Neck supple  Skin:     General: Skin is warm and dry  Capillary Refill: Capillary refill takes less than 2 seconds  Neurological:      General: No focal deficit present  Mental Status: He is alert and oriented to person, place, and time  Mental status is at baseline  Sensory: No sensory deficit  Motor: No weakness     Psychiatric:         Mood and Affect: Mood normal          Behavior: Behavior normal          Vital Signs  ED Triage Vitals   Temperature Pulse Respirations Blood Pressure SpO2   05/27/23 1057 05/27/23 1056 05/27/23 1056 05/27/23 1056 05/27/23 1056   98 2 °F (36 8 °C) 70 14 159/79 98 %      Temp Source Heart Rate Source Patient Position - Orthostatic VS BP Location FiO2 (%)   05/27/23 1057 05/27/23 1056 05/27/23 1056 05/27/23 1056 --   Oral Monitor Lying Right arm       Pain Score       05/27/23 1056       No Pain           Vitals:    05/27/23 1056 05/27/23 1130 05/27/23 1200 05/27/23 1255   BP: 159/79 (!) 171/77 166/78 142/75   Pulse: 70 72 67 65   Patient Position - Orthostatic VS: Lying Lying Lying Lying         Visual Acuity      ED Medications  Medications    EMS REPLENISHMENT MED ( Does not apply Given to EMS 5/27/23 1052)   ondansetron (ZOFRAN) injection 4 mg (4 mg Intravenous Given 5/27/23 1132)   sodium chloride 0 9 % bolus 500 mL (0 mL Intravenous Stopped 5/27/23 1255)       Diagnostic Studies  Results Reviewed     Procedure Component Value Units Date/Time    Urine Microscopic [[id number]]  (Abnormal) Collected: 05/27/23 1223    Lab Status: Final result Specimen: Urine, Other Updated: 05/27/23 1313     RBC, UA 1-2 /hpf      WBC, UA 1-2 /hpf      Epithelial Cells None Seen /hpf      Bacteria, UA Occasional /hpf      MUCUS THREADS Occasional    Comprehensive metabolic panel [[id number]]  (Abnormal) Collected: 05/27/23 1218    Lab Status: Final result Specimen: Blood from Hand, Left Updated: 05/27/23 1253     Sodium 137 mmol/L      Potassium 3 8 mmol/L      Chloride 106 mmol/L      CO2 25 mmol/L      ANION GAP 6 mmol/L      BUN 17 mg/dL      Creatinine 1 14 mg/dL      Glucose 133 mg/dL      Calcium 9 3 mg/dL      AST 15 U/L      ALT 16 U/L      Alkaline Phosphatase 120 U/L      Total Protein 7 2 g/dL      Albumin 4 0 g/dL      Total Bilirubin 0 81 mg/dL      eGFR 59 ml/min/1 73sq m     Narrative:      [place] guidelines for Chronic Kidney Disease (CKD):   •  Stage 1 with normal or high GFR (GFR > 90 mL/min/1 73 square meters)  •  Stage 2 Mild CKD (GFR = 60-89 mL/min/1 73 square meters)  •  Stage 3A Moderate CKD (GFR = 45-59 mL/min/1 73 square meters)  •  Stage 3B Moderate CKD (GFR = 30-44 mL/min/1 73 square meters)  •  Stage 4 Severe CKD (GFR = 15-29 mL/min/1 73 square meters)  •  Stage 5 End Stage CKD (GFR <15 mL/min/1 73 square meters)  Note: GFR calculation is accurate only with a steady state creatinine    Urine Macroscopic, POC [[id number]]  (Abnormal) Collected: 05/27/23 1223    Lab Status: Final result Specimen: Urine Updated: 05/27/23 1224     Color, UA Yellow     Clarity, UA Clear     pH, UA 7 0     Leukocytes, UA Negative     Nitrite, UA Negative     Protein, UA 30 (1+) mg/dl      Glucose, UA Negative mg/dl      Ketones, UA Negative mg/dl      Urobilinogen, UA 0 2 E U /dl      Bilirubin, UA Negative     Occult Blood, UA Negative     Specific Gravity, UA >=1 030    Narrative:      CLINITEK RESULT    Protime-INR [[id number]]  (Normal) Collected: 05/27/23 1128    Lab Status: Final result Specimen: Blood from Arm, Left Updated: 05/27/23 1204     Protime 12 6 seconds      INR 0 95    APTT [[id number]]  (Normal) Collected: 05/27/23 1128    Lab Status: Final result Specimen: Blood from Arm, Left Updated: 05/27/23 1204     PTT 24 seconds     CBC and differential [[id number]] Collected: 05/27/23 1128    Lab Status: Final result Specimen: Blood from Hand, Left Updated: 05/27/23 1144     WBC 9 69 Thousand/uL      RBC 4 70 Million/uL      Hemoglobin 14 4 g/dL      Hematocrit 42 4 %      MCV 90 fL      MCH 30 6 pg      MCHC 34 0 g/dL      RDW 12 9 %      MPV 10 2 fL      Platelets 943 Thousands/uL      nRBC 0 /100 WBCs      Neutrophils Relative 68 %      Immat GRANS % 0 %      Lymphocytes Relative 23 %      Monocytes Relative 6 %      Eosinophils Relative 2 %      Basophils Relative 1 %      Neutrophils Absolute 6 56 Thousands/µL      Immature Grans Absolute 0 04 Thousand/uL      Lymphocytes Absolute 2 25 Thousands/µL      Monocytes Absolute 0 57 Thousand/µL      Eosinophils Absolute 0 22 Thousand/µL      Basophils Absolute 0 05 Thousands/µL                  CT head without contrast   Final Result by Andreas Valadez MD (05/27 1252)      Stable microangiopathic change  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, status post resection of sarcoma with flap  Consider evaluation with MRI brain with contrast to assess if there is residual or recurrent disease  The study was marked in [facility] for immediate notification  Workstation performed: [id number]                    Procedures  ECG 12 Lead Documentation Only    Date/Time: 5/27/2023 11:29 AM    Performed by: Lucinda Resendiz DO  Authorized by: Lucinda Resendiz DO    Interpretation:     Interpretation: normal    Rate:     ECG rate assessment: normal    Rhythm:     Rhythm: sinus rhythm    Ectopy:     Ectopy: none    QRS:     QRS axis:  Normal    QRS intervals: Wide (RBBB)  Conduction:     Conduction: normal    ST segments:     ST segments:  Normal  T waves:     T waves: normal               ED Course  ED Course as of 05/27/23 1447   Sat May 27, 2023   1055 Pt seen and examined  78 yo male who states he woke up at 0600 feeling fine, ate his half a banana and took his BP meds    Shortly thereafter around 0900 went to walk and felt off balance, had to "hold walls  Has had nml BM after shower but then after eating had an episode of diarrhea and a short time later a second with some nausea and feeling of being \"washed out\" and when he stood he felt lightheaded and off balance \"like I needed to hold the walls\"  He denies fever, chills, headache, cough, SOB, CP  Tells me he was just at derm last week and had biopsies done on R temple - tested positive for melanoma and basal cell and will need further surgical removal   Has hx of melenoma s/p Mohs on L nare and is also 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/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  Will give 500cc IVF bolus (stage 3 CKD), give zofran and check labs, urine, CT head  1238 Pt feeling much better after IVF, zofran  No further episodes of diarrhea since 3 at home  Reviewed labs and urine with pt and wife  Aware of need to repeat CMP due to lab issue and CT head results pending  1254 CMP ok, CT head - Stable microangiopathic change  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, status post resection of sarcoma with flap  Consider evaluation with MRI brain with contrast to assess if there is residual or recurrent disease  200 Pt feels great, RN to have pt ambulate  He and wife feel he was dehydrated as this feels similar to previous bout of dehydration  1338 Pt ambulated well, absolutely no complaints  Feels back to baseline  He and wife both feel comfortable with him going home and f/u with PCP  SBIRT 20yo+    Flowsheet Row Most Recent Value   Initial Alcohol Screen: US AUDIT-C     1  How often do you have a drink containing alcohol? 0 Filed at: 05/27/2023 1055   2  How many drinks containing alcohol do you have on a typical day you are drinking? 0 Filed at: 05/27/2023 1055   3a  Male UNDER 65: How often do you have five or more drinks on one occasion? 0 Filed at: 05/27/2023 1055   3b  FEMALE Any Age, or MALE 65+:  How often do you " have 4 or more drinks on one occassion? 0 Filed at: 05/27/2023 1055   Audit-C Score 0 Filed at: 05/27/2023 1055   KEI: How many times in the past year have you    Used an illegal drug or used a prescription medication for non-medical reasons? Never Filed at: 05/27/2023 1055                    MDM    Disposition  Final diagnoses:   Diarrhea   Fatigue   Episodic lightheadedness   Nausea   Dehydration     Time reflects when diagnosis was documented in both MDM as applicable and the Disposition within this note     Time User Action Codes Description Comment    5/27/2023  1:40 PM Johnella Beck Add [R19 7] Diarrhea     5/27/2023  1:40 PM Johnella Beck Add [R53 83] Fatigue     5/27/2023  1:40 PM Johnella Beck Add [R42] Episodic lightheadedness     5/27/2023  1:40 PM Johnella Beck Add [R11 0] Nausea     5/27/2023  1:40 PM Johnella Beck Add [E86 0] Dehydration       ED Disposition     ED Disposition   Discharge    Condition   Stable    Date/Time   Sat May 27, 2023  1:39 PM    Comment   Vince Cheung discharge to home/self care                 Follow-up Information     Follow up With Specialties Details Why Contact Info    Maulik Phan PA-C Family Medicine, Physician Assistant In 1 week  [address] [place] [id number] [phone]            Discharge Medication List as of 5/27/2023  1:41 PM      CONTINUE these medications which have NOT CHANGED    Details   acetaminophen (TYLENOL) 500 mg tablet Take 500-1,000 mg by mouth every 4 (four) hours as needed for mild pain, Historical Med      amLODIPine (NORVASC) 10 mg tablet TAKE 1 TABLET BY MOUTH  DAILY, Normal      Fluocinolone Acetonide Scalp 0 01 % OIL Apply a thin layer topically daily at night one hour before bedtime , Normal      imiquimod (ALDARA) 5 % cream Apply topically once a day Monday thru Friday for 6 weeks, Normal      lisinopril (ZESTRIL) 20 mg tablet TAKE 1 TABLET BY MOUTH  DAILY, Normal      metoprolol succinate (TOPROL-XL) 50 mg 24 hr tablet TAKE 1 TABLET BY MOUTH  DAILY, Normal      mometasone (ELOCON) 0 1 % cream APPLY TO EARS ONCE TO TWICE DAILY AS NEEDED FOR SCALING, Historical Med      Multiple Vitamins-Minerals (PRESERVISION AREDS 2) CAPS Take 1 capsule by mouth 2 (two) times a day, Historical Med      mupirocin (BACTROBAN) 2 % ointment Apply topically two to three times a day to sore areas, Normal      traMADol (ULTRAM) 50 mg tablet Take 1 tablet (50 mg total) by mouth every 6 (six) hours as needed for moderate pain, Starting Wed 1/4/2023, Normal             No discharge procedures on file      PDMP Review       Value Time User    PDMP Reviewed  Yes 1/4/2023 10:36 AM Rodriguez Marroquin MD          ED Provider  Electronically Signed by           Sang Lakhani DO  05/27/23 7567

## 2023-05-27 NOTE — ED NOTES
Patient reports feeling improvement in symptoms at this time  Denies nausea, pain  Patient is resting comfortably on stretcher, awaiting CT results        Ryan Billingsley RN  05/27/23 1397

## 2023-06-01 ENCOUNTER — TELEPHONE (OUTPATIENT)
Dept: HEMATOLOGY ONCOLOGY | Facility: CLINIC | Age: 83
End: 2023-06-01

## 2023-06-01 NOTE — TELEPHONE ENCOUNTER
Appointment Schedule   Who are you speaking with? Patient   If it is not the patient, are they listed on an active communication consent form? N/A   Which provider is the appointment scheduled with? Dr Karly Agrawal   At which location is the appointment scheduled for? [facility]   When is the appointment scheduled? Please list date and time 6/2/23 9:45AM   What is the reason for this appointment? F/U- melanoma on right cheek   Did patient voice understanding of the details of this appointment? Yes   Was the no show policy reviewed with patient?  Yes

## 2023-06-02 ENCOUNTER — OFFICE VISIT (OUTPATIENT)
Dept: SURGICAL ONCOLOGY | Facility: CLINIC | Age: 83
End: 2023-06-02

## 2023-06-02 VITALS
OXYGEN SATURATION: 99 % | WEIGHT: 177 LBS | RESPIRATION RATE: 16 BRPM | DIASTOLIC BLOOD PRESSURE: 82 MMHG | SYSTOLIC BLOOD PRESSURE: 150 MMHG | HEIGHT: 68 IN | HEART RATE: 73 BPM | BODY MASS INDEX: 26.83 KG/M2 | TEMPERATURE: 97.4 F

## 2023-06-02 DIAGNOSIS — C43.30 MALIGNANT MELANOMA OF FACE EXCLUDING EYELID, NOSE, LIP, AND EAR (HCC): Primary | ICD-10-CM

## 2023-06-02 NOTE — PROGRESS NOTES
Surgical Oncology Follow Up       [facility]  [facility] [id number]    Adriel Cheung  1940  [id number]  [facility] SURGICAL ONCOLOGY [place]  [place] [id number]    Chief Complaint   Patient presents with   • Follow-up       Assessment/Plan:    No problem-specific Assessment & Plan notes found for this encounter  Diagnoses and all orders for this visit:    Malignant melanoma of face excluding eyelid, nose, lip, and ear [facility])        Advance Care Planning/Advance Directives:  Discussed disease status, cancer treatment plans and/or cancer treatment goals with the patient  Oncology History   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[facility] [address] )   11/8/2017 Surgery    right vertex of scalp - s/p excision and full thickness graft for closure on 11/8/17 for atypical fibroxanthoma  1/29/2018 Biopsy    biopsy from the anterior aspect of the graft showing atypical epithelioid fibro histiocytic tumor, with lesion extending to the deep tissue edge  This is similar to prior biopsy done  Differential includes pleomorphic sarcoma versus an extreme atypical example of atypical fibro histiocytoma  3/2018 Initial Diagnosis    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[facility] [address] )     3/28/2018 Surgery    excision of scalp sarcoma with full thickness graft - Dr Albert Hodge  Soft Tissue/Skin, Sarcoma of scalp, wide excision:  - Recurrent pleomorphic sarcoma, 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  See Note  -- FNCLCC Histologic Grade 3  (total score: 7 of 8)  * Tumor differentiation score: 3 of 3         * Mitotic count score: 3 of 3; > 19 mitoses/10 HPF (33 mitoses/10 HPF)  * Necrosis score: 1 of 2; present, < 50%  - Tumor extends into deep reticular dermis, subcutis and fascia  - Perineural invasion: Present; intraneural invasion identified (0 4 mm largest nerve diameter)  - Lymphovascular invasion: Focally suspicious     - Bone invasion: Not identified    - Central nervous system extension: Not identified  - Margins: uninvolved by sarcoma but close  -- Sarcoma  0 15 mm from nearest deep margin    - Additional Pathologic Findings: Changes consistent with prior surgical site  -- Focal ulceration with bacterial colonization, acute and chronic inflammation  -- Best representative tumor block: A5       5/29/2018 - 7/5/2018 Radiation    Plan ID Energy Fractions Dose per Fraction (cGy) Dose Correction (cGy) Total Dose Delivered (cGy) Elapsed Days   Vertex Scalp 9E 27 / 27 200 0 5,400 37            Basal cell carcinoma of skin of other parts of face    Initial Diagnosis    Basal cell carcinoma of skin of other parts of face     Malignant melanoma of nose (HCC)   12/6/2022 Biopsy    Skin, left ala, shave biopsy:     MELANOMA (thickness: 0 6 mm); extending to the tissue edges      Ulceration: not seen  Anatomic Yelena Veronica) level: III  Type: lentigo maligna melanoma  Mitoses: 0/mm2  Margin assessment: invasive melanoma extends to peripheral specimen margin and deep specimen margin focally  Pathologic stage: pT1a     2/7/2023 Surgery    Skin, nose, left nasal, wide excision:  Residual melanoma, invasive, desmoplastic type, and scar, margins free  Incidental basal cell carcinoma, superficial and nodular type, not extending to the margins  pT2a     2/7/2023 -  Cancer Staged    Staging form: Melanoma of the Skin, AJCC 8th Edition  - Clinical stage from 2/7/2023: Stage IB (cT2a, cN0, cM0) - Signed by Dillon Peacock MD on 5/26/2023       Malignant melanoma of face excluding eyelid, nose, lip, and ear ([facility] [address] )   5/16/2023 Initial Diagnosis    Malignant melanoma of face excluding eyelid, nose, lip, and ear ([address] )  Right mid cheek    A  Skin, right mid cheek, shave biopsy:     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; transected (see note)       Note: There are foci of basal cell carcinoma and melanoma in "which the components are separate from each other (as can be seen in a collision tumor), but there are also multiple foci in which the melanoma and basal cell carcinoma are closely intermingled (\"basomelanocytic tumor\" or \"combined melanoma and basal cell carcinoma of the intermingled type\"), which is a rare occurrence  1 SOX10, MART-1, HMB45, p40, and Tarun-EP4 immunostains were reviewed and support the diagnosis  Please see synoptic report for additional details  The results were emailed to Dr Sarai Pereyra on 5/19/2023  Synoptic report for melanoma of the skin  Thickness: at least 1 5 mm  Ulceration: present  Anatomic Teresa Mare) level: at least IV  Type: superficial spreading melanoma  Mitoses: 1/mm2  Microsatellites: cannot be determined  Lymphovascular invasion: not seen  Neurotropism: not seen  Tumor regression: not seen  Tumor-infiltrating lymphocytes (TIL): present, non-brisk  Margin assessment: invasive melanoma extends to deep specimen margin; melanoma in situ extends to peripheral specimen margin  Pathologic stage: at least pT2b  Associated nevus: not seen         5/16/2023 -  Cancer Staged    Staging form: Melanoma of the Skin, AJCC 8th Edition  - Clinical stage from 5/16/2023: Stage IIA (cT2b, cN0, cM0) - Signed by Lashawn Rodriguez MD on 5/26/2023           History of Present Illness: Patient is an 71-year-old man with longstanding history of fair skin cancers  He comes with a new melanoma and basal cancer from the right cheek   -Interval History: He had a basal cell cancer excised on his right temple via Mohs procedure within the last 2 weeks  Review of Systems:  Review of Systems   Constitutional: Negative  HENT: Negative  Eyes: Negative  Respiratory: Negative  Cardiovascular: Negative  Gastrointestinal: Negative  Endocrine: Negative  Genitourinary: Negative  Musculoskeletal: Negative  Skin: Negative  Skin cancers   Allergic/Immunologic: Negative      Neurological: " Negative  Hematological: Negative  Psychiatric/Behavioral: Negative  All other systems reviewed and are negative        Patient Active Problem List   Diagnosis   • Actinic keratosis   • Stage 3 chronic kidney disease ([address] )   • Gout   • Hyperlipidemia   • Hypertension   • Irritable bowel syndrome   • Macular degeneration   • Urinary incontinence, post-void dribbling   • 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[address] )   • Basal cell carcinoma of skin of other parts of face   • Pulmonary nodules   • Incisional hernia, without obstruction or gangrene   • Status post repair of ventral hernia   • Dizziness   • Urinary frequency   • Lumbar back pain with radiculopathy affecting lower extremity   • Scalp ulceration, with necrosis of bone (HCC)   • 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HCC)   • Malignant melanoma of nose (HCC)   • Encounter for geriatric assessment   • Malignant melanoma of face excluding eyelid, nose, lip, and ear ([address] )     Past Medical History:   Diagnosis Date   • Anxiety    • Arthritis     back   • Back pain    • Balance problem    • Basal cell carcinoma (BCC)     in past   • Basal cell carcinoma (BCC) 10/04/2022    Right Temple   • BCC (basal cell carcinoma of skin) 08/17/2021    Right tip of nose   • BCC (basal cell carcinoma of skin) 08/08/2022    Left lip   • BCC (basal cell carcinoma of skin) 08/08/2022    Right cheek   • Cancer ([address] )    • Gout    • Hard of hearing    • Hearing aid worn     ziyad   • Hearing aid worn    • Herniated nucleus pulposus, L4-5    • [place] (hard of hearing)    • Hypertension    • Incisional hernia    • Kidney stone     CKD3   • Macular degeneration    • Melanoma ([address] )     left cheek- history   • Melanoma ([address] ) 08/08/2022    Right neck   • Nocturia    • Pulmonary nodules    • Reactive airway disease    • 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   • Skin cancer 07/01/2020    AFX- scalp   • Squamous cell carcinoma     in past     Past Surgical History:   Procedure Laterality Date   • CATARACT EXTRACTION Bilateral    • COLONOSCOPY     • HERNIA REPAIR     • HYDROCELE EXCISION / REPAIR     • MASTOID SURGERY Left    • MOHS SURGERY     • MOHS SURGERY  2021    Right tip of nose-Dr Tadeo Clifton   • MOHS SURGERY  10/06/2022    Left lip-Dr Tadeo Clifton   • MOHS SURGERY  2023    Right \[facility]\"" - Dr Prince Villa   • OTHER SURGICAL HISTORY      sarcoma scalp with skin graft   • WY EXCISION MALIGNANT LESION F/E/E/N/L 0 5 CM/< Left 2023    Procedure: WIDE EXCISION OF LEFT NASAL MELANOMA;  Surgeon: Harsha Ball MD;  [facility];  Service: Surgical Oncology   • WY REPAIR FIRST ABDOMINAL WALL HERNIA N/A 2021    Procedure: REPAIR HERNIA INCISIONAL OPEN WITH MESH;  Surgeon: Myles Haynes MD;  Location: [facility];  Service: General   • SCALP EXCISION N/A 2018    Procedure: SCALP SARCOMA EXCISION WITH FULL THICKNESS SKIN GRAFT;  Surgeon: Hung Cuellar MD;  Location: [facility];  Service: Plastics   • SKIN BIOPSY     • SKIN CANCER EXCISION     • SPLIT THICKNESS SKIN GRAFT Left 2023    Procedure: APPLICATION OF SKIN SUBSTITUTE GRAFT TO NOSE;  Surgeon: Maite Allen MD;  Location: [facility];  Service: Plastics   • TONSILLECTOMY AND ADENOIDECTOMY       Family History   Problem Relation Age of Onset   • Colon cancer Father    • Heart failure Father    • Heart disease Mother    • Stroke Mother      Social History     Socioeconomic History   • Marital status: /Civil Union     Spouse name: Not on file   • Number of children: Not on file   • Years of education: Not on file   • Highest education level: Not on file   Occupational History   • Not on file   Tobacco Use   • Smoking status: Former     Packs/day: 0 25     Years: 15 00     Total pack years: 3 75     Types: Cigarettes     Quit date: 3/13/1988     Years since quittin 2   • Smokeless tobacco: Never   Vaping Use   • Vaping Use: Never used   Substance and Sexual Activity   • Alcohol use: Not Currently     Comment: beer once in awhile   • Drug use: No   • Sexual activity: Yes   Other Topics Concern   • Not on file   Social History Narrative   • Not on file     Social Determinants of Health     Financial Resource Strain: Medium Risk (4/13/2023)    Overall Financial Resource Strain (CARDIA)    • Difficulty of Paying Living Expenses: Somewhat hard   Food Insecurity: Not on file   Transportation Needs: No Transportation Needs (4/13/2023)    PRAPARE - Transportation    • Lack of Transportation (Medical): No    • Lack of Transportation (Non-Medical): No   Physical Activity: Not on file   Stress: Not on file   Social Connections: Not on file   Intimate Partner Violence: Not on file   Housing Stability: Not on file       Current Outpatient Medications:   •  acetaminophen (TYLENOL) 500 mg tablet, Take 500-1,000 mg by mouth every 4 (four) hours as needed for mild pain, Disp: , Rfl:   •  amLODIPine (NORVASC) 10 mg tablet, TAKE 1 TABLET BY MOUTH  DAILY, Disp: 90 tablet, Rfl: 3  •  Fluocinolone Acetonide Scalp 0 01 % OIL, Apply a thin layer topically daily at night one hour before bedtime   (Patient taking differently: Apply topically if needed Apply a thin layer topically daily at night one hour before bedtime ), Disp: 118 28 mL, Rfl: 5  •  imiquimod (ALDARA) 5 % cream, Apply topically once a day Monday thru Friday for 6 weeks, Disp: 24 each, Rfl: 1  •  lisinopril (ZESTRIL) 20 mg tablet, TAKE 1 TABLET BY MOUTH  DAILY, Disp: 90 tablet, Rfl: 3  •  metoprolol succinate (TOPROL-XL) 50 mg 24 hr tablet, TAKE 1 TABLET BY MOUTH  DAILY, Disp: 90 tablet, Rfl: 3  •  mometasone (ELOCON) 0 1 % cream, APPLY TO EARS ONCE TO TWICE DAILY AS NEEDED FOR SCALING, Disp: , Rfl:   •  Multiple Vitamins-Minerals (PRESERVISION AREDS 2) CAPS, Take 1 capsule by mouth 2 (two) times a day, Disp: , Rfl:   •  mupirocin (BACTROBAN) 2 % ointment, Apply topically two to three times a day to sore areas (Patient taking differently: Apply topically if needed Apply topically two to three times a day to sore areas), Disp: 22 g, Rfl: 3  •  traMADol (ULTRAM) 50 mg tablet, Take 1 tablet (50 mg total) by mouth every 6 (six) hours as needed for moderate pain, Disp: 10 tablet, Rfl: 0  Allergies   Allergen Reactions   • Erythromycin Other (See Comments)     Thrush      Vitals:    06/02/23 0949   BP: 150/82   Pulse: 73   Resp: 16   Temp: (!) 97 4 °F (36 3 °C)   SpO2: 99%       Physical Exam  Vitals reviewed  Constitutional:       Appearance: Normal appearance  HENT:      Head: Normocephalic and atraumatic  Right Ear: External ear normal       Left Ear: External ear normal       Mouth/Throat:      Mouth: Mucous membranes are dry  Eyes:      Pupils: Pupils are equal, round, and reactive to light  Cardiovascular:      Rate and Rhythm: Normal rate and regular rhythm  Heart sounds: Normal heart sounds  Pulmonary:      Effort: Pulmonary effort is normal       Breath sounds: Normal breath sounds  Abdominal:      General: Abdomen is flat  Palpations: Abdomen is soft  Musculoskeletal:         General: Normal range of motion  Cervical back: Normal range of motion and neck supple  Skin:     General: Skin is warm and dry  Comments: Well-healed right cheek scabs x2  No satellite lesions  No obvious adenopathy  Neurological:      General: No focal deficit present  Mental Status: He is alert and oriented to person, place, and time  Psychiatric:         Mood and Affect: Mood normal          Behavior: Behavior normal          Thought Content:  Thought content normal          Judgment: Judgment normal            Results:  Labs:  Case Report   Surgical Pathology Report                         Case: [id number]                                    Authorizing Provider: Christine Leahy MD          Collected:           05/16/2023 1708               Ordering Location:     [facility]             Received:            05/16/2023 1707                                      "Micrographic Surgery                                                          Pathologist:           Zachary Coleman MD                                                            Specimens:   A) - Skin, Other, Specimen A: Right mid cheek                                                        B) - Skin, Other, Specimen B: Right lateral cheek                                          Final Diagnosis   A  Skin, right mid cheek, shave biopsy:     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; transected (see note)      Note: There are foci of basal cell carcinoma and melanoma in which the components are separate from each other (as can be seen in a collision tumor), but there are also multiple foci in which the melanoma and basal cell carcinoma are closely intermingled (\"basomelanocytic tumor\" or \"combined melanoma and basal cell carcinoma of the intermingled type\"), which is a rare occurrence  1 SOX10, MART-1, HMB45, p40, and Tarun-EP4 immunostains were reviewed and support the diagnosis  Please see synoptic report for additional details  The results were emailed to Dr Bhumika Milan on 5/19/2023      Synoptic report for melanoma of the skin  Thickness: at least 1 5 mm  Ulceration: present  Anatomic Patito Reaper) level: at least IV  Type: superficial spreading melanoma  Mitoses: 1/mm2  Microsatellites: cannot be determined  Lymphovascular invasion: not seen  Neurotropism: not seen  Tumor regression: not seen  Tumor-infiltrating lymphocytes (TIL): present, non-brisk  Margin assessment: invasive melanoma extends to deep specimen margin; melanoma in situ extends to peripheral specimen margin  Pathologic stage: at least pT2b  Associated nevus: not seen     References:  1  Virginia Mckenzie Combined malignant melanoma and basal cell carcinoma tumor of the intermingled type  J Cutan Pathol  2007 Sep;34(9):731-5  PMID: [id number]            B   Skin, right lateral cheek, shave biopsy:     BASAL CELL CARCINOMA " (NODULAR AND INFILTRATIVE TYPE); transected  Electronically signed by Dahiana Zhu MD on 5/19/2023 at  3:18 PM         Imaging  CT head without contrast    Result Date: 5/27/2023  Narrative: CT BRAIN - WITHOUT CONTRAST INDICATION:   off balance, fatigue  Sarcoma of the right scalp, status post resection, radiation/craniotomy and flap COMPARISON: CT brain June 2021, MRI brain June 2021 TECHNIQUE:  CT examination of the brain was performed  Multiplanar 2D reformatted images were created from the source data  Radiation dose length product (DLP) for this visit:  829 mGy-cm   This examination, like all CT scans performed in the [facility], was performed utilizing techniques to minimize radiation dose exposure, including the use of iterative reconstruction and automated exposure control  IMAGE QUALITY:  Diagnostic  FINDINGS: PARENCHYMA: Decreased attenuation is noted in periventricular and subcortical white matter demonstrating an appearance that is statistically most likely to represent mild microangiopathic change  No CT signs of acute infarction  No intracranial mass, mass effect or midline shift  No acute parenchymal hemorrhage  Atherosclerotic vascular calcifications in carotid and vertebral arteries  VENTRICLES AND EXTRA-AXIAL SPACES:  Normal for the patient's age  VISUALIZED ORBITS: Normal visualized orbits  PARANASAL SINUSES: Bilateral maxillary sinus mucus retention cysts  CALVARIUM AND EXTRACRANIAL SOFT TISSUES: Right temporal parietal skull postsurgical changes are demonstrated status post resection of tumor  Bone defect at the level of the parietal calvarium is again seen as well as flap containing fat  Impression: Stable microangiopathic change  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, status post resection of sarcoma with flap  Consider evaluation with MRI brain with contrast to assess if there is residual or recurrent disease   The study was marked in EPIC for immediate notification  Workstation performed: [id number]     I reviewed the above laboratory and imaging data  Discussion/Summary: Basal cancer of cheek along with melanoma and basal cell cancer adjacent  Wide excision with sentinel node biopsy mandated  Rationale for this along the risk and benefits of surgery including infection, bleeding, need for possible additional surgery, failure of dye to migrate, discussed but all questions answered and consent signed at this visit  We will do this in conjunction with plastic surgery team given size and location of anticipated defect post resection

## 2023-06-07 NOTE — TELEPHONE ENCOUNTER
Spoke to patient about treatment plan for most recently skin cancers found  Treatment Plan:     Surg-Onc will treat the Melanoma (right mid cheek)  Pt will discuss them potentially treating both sites  He would like them done at the same time for convenience  If Surg-Onc only treats the Melanoma, we will schedule treatment for the [facility] (right lateral cheek)  Will defer referral until August to verify if Surg-Onc treated both and will move forward accordingly at that time  Pt agrees with this treatment plan

## 2023-06-09 ENCOUNTER — TELEPHONE (OUTPATIENT)
Dept: SURGICAL ONCOLOGY | Facility: CLINIC | Age: 83
End: 2023-06-09

## 2023-06-09 NOTE — TELEPHONE ENCOUNTER
Spoke with Lena Cagle to discuss surgery date of July 10, 58 [facility] with Dr Sakshi Mosquera and Dr Houston Day  He is aware that the [address] will call him on Friday 7/7 with his arrival time for Monday 7/10  He has an appointment with Dr Houston Day on 6/13  He does not need any Pre Admission testing  Chest Xray (1/24/23), EKG (5/27/23) and labs (5/27/23) all valid for surgery date  No clearances  Post Op made for 7/28

## 2023-06-13 ENCOUNTER — CONSULT (OUTPATIENT)
Dept: PLASTIC SURGERY | Facility: CLINIC | Age: 83
End: 2023-06-13
Payer: MEDICARE

## 2023-06-13 VITALS
WEIGHT: 177 LBS | TEMPERATURE: 96.7 F | HEART RATE: 66 BPM | DIASTOLIC BLOOD PRESSURE: 98 MMHG | BODY MASS INDEX: 26.83 KG/M2 | SYSTOLIC BLOOD PRESSURE: 178 MMHG | HEIGHT: 68 IN

## 2023-06-13 DIAGNOSIS — C43.30 MALIGNANT MELANOMA OF FACE EXCLUDING EYELID, NOSE, LIP, AND EAR (HCC): Primary | ICD-10-CM

## 2023-06-13 DIAGNOSIS — N18.30 STAGE 3 CHRONIC KIDNEY DISEASE, UNSPECIFIED WHETHER STAGE 3A OR 3B CKD (HCC): ICD-10-CM

## 2023-06-13 DIAGNOSIS — C44.319 BASAL CELL CARCINOMA OF SKIN OF OTHER PARTS OF FACE: ICD-10-CM

## 2023-06-13 DIAGNOSIS — I10 PRIMARY HYPERTENSION: ICD-10-CM

## 2023-06-13 PROCEDURE — 99205 OFFICE O/P NEW HI 60 MIN: CPT | Performed by: PLASTIC SURGERY

## 2023-06-13 PROCEDURE — 88305 TISSUE EXAM BY PATHOLOGIST: CPT | Performed by: STUDENT IN AN ORGANIZED HEALTH CARE EDUCATION/TRAINING PROGRAM

## 2023-06-13 PROCEDURE — 11104 PUNCH BX SKIN SINGLE LESION: CPT | Performed by: PLASTIC SURGERY

## 2023-06-13 NOTE — PROGRESS NOTES
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  3  Suspicious lesion of right pre-auricular area  4  Extensive hx of skin malignancies    Plan:  1  Punch biopsy performed of right pre-auricular area due to hyperemia and scabbing present  2  Two synchronous skin cancers of the right cheek in close proximity- patient requires wide local excision and sentinel lymph node biopsy  A large defect of the right cheek is anticipated  We had a lengthy discussion regarding reconstructive options  I will attempt to use a cervicofacial flap to close or at least minimize the defect  A full or split thickness skin graft may be necessary  All details of the procedure were discussed at length with the patient including operative and recovery time, as well as, all potential risks, benefits, and alternatives  Risks discussed included, but were not limited to: infection, bleeding, scarring, hematoma, seroma, delayed, healing, partial or total graft/flap loss,  open wound, asymmetry, loss of function, nerve damage, numbness, ectropion, facial nerve injury/paralysis, need for additional procedures  The patient noted his understanding and had opportunity for questions  All were answered  He desires to proceed  Informed consent obtained  Patient is scheduled for surgery 7/10/23  I will follow up with him regarding the patho report from the biopsy today  He is welcome to call or return with any additional questions/concerns  Domenica      Jorden Michaud is a 79 y o  male who is referred by Dr Liana Donnelly in consultation for evaluation for cheek reconstruction due to: 1  Basomelanocytic carcinoma of right mid cheek, 2  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   No other changes in health except for multiple "continued skin malignancies  The following portions of the patient's history were reviewed and updated as appropriate: allergies, current medications, past family history, past medical history, past social history, past surgical history and problem list     Review of Systems  Pertinent items are noted in HPI  Objective     BP (!) 178/98   Pulse 66   Temp (!) 96 7 °F (35 9 °C) (Tympanic)   Ht 5' 8\" (1 727 m)   Wt 80 3 kg (177 lb)   BMI 26 91 kg/m²     General:  alert and oriented, in no acute distress   Skin:  normal and healing biopsy sites of right mid and lateral cheek  +Scabbed and hyperemic area of pre-auricular skin  Eyes: conjunctivae/corneas clear  PERRL, EOM's intact  Fundi benign  Mouth: MMM no lesions   Lymph Nodes:  Cervical, supraclavicular, and axillary nodes normal    Lungs:  clear to auscultation bilaterally   Heart:  regular rate and rhythm, S1, S2 normal, no murmur, click, rub or gallop and regular rate and rhythm   Abdomen: soft, non-tender; bowel sounds normal; no masses,  no organomegaly   CVA:  absent   Genitourinary: defer exam   Extremities:  extremities normal, warm and well-perfused; no cyanosis, clubbing, or edema   Neurologic:  Alert and oriented x3  Gait normal  Reflexes and motor strength normal and symmetric  Cranial nerves 2-12 and sensation grossly intact  Psychiatric:  normal mood, behavior, speech, dress, and thought processes      Pathology:  Final Diagnosis   A   Skin, right mid cheek, shave biopsy:     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; transected (see note)      Note: There are foci of basal cell carcinoma and melanoma in which the components are separate from each other (as can be seen in a collision tumor), but there are also multiple foci in which the melanoma and basal cell carcinoma are closely intermingled (\"basomelanocytic tumor\" or \"combined melanoma and basal cell carcinoma of the intermingled type\"), " which is a rare occurrence  1 SOX10, MART-1, HMB45, p40, and Tarun-EP4 immunostains were reviewed and support the diagnosis  Please see synoptic report for additional details  The results were emailed to Dr Kenneth Mccartney on 5/19/2023      Synoptic report for melanoma of the skin  Thickness: at least 1 5 mm  Ulceration: present  Anatomic [place]) level: at least IV  Type: superficial spreading melanoma  Mitoses: 1/mm2  Microsatellites: cannot be determined  Lymphovascular invasion: not seen  Neurotropism: not seen  Tumor regression: not seen  Tumor-infiltrating lymphocytes (TIL): present, non-brisk  Margin assessment: invasive melanoma extends to deep specimen margin; melanoma in situ extends to peripheral specimen margin  Pathologic stage: at least pT2b  Associated nevus: not seen     References:  Patricia Soto  Combined malignant melanoma and basal cell carcinoma tumor of the intermingled type  J Cutan Pathol  2007 Sep;34(9):731-5  PMID: [id number]            B  Skin, right lateral cheek, shave biopsy:     BASAL CELL CARCINOMA (NODULAR AND INFILTRATIVE TYPE); transected  Electronically signed by Tom Skelton MD on 5/19/2023 at [phone]     Procedure:  Biopsy    Date/Time: 6/13/2023 10:45 AM    Performed by: Scott Harper MD  Authorized by: Scott Harper MD  Universal Protocol:  Consent: Verbal consent obtained  Risks and benefits: risks, benefits and alternatives were discussed  Consent given by: patient  Patient identity confirmed: verbally with patient      Procedure Details - Lesion Biopsy: Body area:  [address]    Head/neck location:  R cheek    Biopsy method: punch biopsy      Biopsy tissue type: skin and subcutaneous    Initial size (mm):  3    Final defect size (mm):  3    Malignancy: malignancy unknown       The right pre-auricular area was prepped with alcohol  The area was infiltrated with local anesthesia with 1%lidocaine with epi, 2cc  The skin was re-prepped with betadine   A 3mm punch biopsy was performed over the right pre-auricular skin area  The specimen was sent to pathology  Hemostasis was obtained with silver nitrate  Bacitracin and band-aid applied  Patient tolerated the procedure well  No complications  A total of 60 mins was spent on the encounter, including reviewing the patient's records, documentation, and time spent in counseling coordination of care which represent greater than 50% of the visit  45 mins was spent in counseling and discussion of surgical procedures

## 2023-06-20 PROCEDURE — 88305 TISSUE EXAM BY PATHOLOGIST: CPT | Performed by: STUDENT IN AN ORGANIZED HEALTH CARE EDUCATION/TRAINING PROGRAM

## 2023-06-27 ENCOUNTER — TELEPHONE (OUTPATIENT)
Dept: OTHER | Facility: HOSPITAL | Age: 83
End: 2023-06-27

## 2023-06-27 ENCOUNTER — APPOINTMENT (OUTPATIENT)
Dept: LAB | Facility: CLINIC | Age: 83
End: 2023-06-27
Payer: MEDICARE

## 2023-06-27 DIAGNOSIS — C43.30 MALIGNANT MELANOMA OF FACE EXCLUDING EYELID, NOSE, LIP, AND EAR (HCC): ICD-10-CM

## 2023-06-27 LAB
ALBUMIN SERPL BCP-MCNC: 3.8 G/DL (ref 3.5–5)
ALP SERPL-CCNC: 129 U/L (ref 46–116)
ALT SERPL W P-5'-P-CCNC: 37 U/L (ref 12–78)
ANION GAP SERPL CALCULATED.3IONS-SCNC: 3 MMOL/L
AST SERPL W P-5'-P-CCNC: 20 U/L (ref 5–45)
BASOPHILS # BLD AUTO: 0.05 THOUSANDS/ÂΜL (ref 0–0.1)
BASOPHILS NFR BLD AUTO: 1 % (ref 0–1)
BILIRUB SERPL-MCNC: 0.9 MG/DL (ref 0.2–1)
BUN SERPL-MCNC: 22 MG/DL (ref 5–25)
CALCIUM SERPL-MCNC: 9.4 MG/DL (ref 8.3–10.1)
CHLORIDE SERPL-SCNC: 109 MMOL/L (ref 96–108)
CO2 SERPL-SCNC: 28 MMOL/L (ref 21–32)
CREAT SERPL-MCNC: 1.4 MG/DL (ref 0.6–1.3)
EOSINOPHIL # BLD AUTO: 0.27 THOUSAND/ÂΜL (ref 0–0.61)
EOSINOPHIL NFR BLD AUTO: 4 % (ref 0–6)
ERYTHROCYTE [DISTWIDTH] IN BLOOD BY AUTOMATED COUNT: 13.1 % (ref 11.6–15.1)
GFR SERPL CREATININE-BSD FRML MDRD: 45 ML/MIN/1.73SQ M
GLUCOSE P FAST SERPL-MCNC: 99 MG/DL (ref 65–99)
HCT VFR BLD AUTO: 41.2 % (ref 36.5–49.3)
HGB BLD-MCNC: 14.2 G/DL (ref 12–17)
IMM GRANULOCYTES # BLD AUTO: 0.03 THOUSAND/UL (ref 0–0.2)
IMM GRANULOCYTES NFR BLD AUTO: 0 % (ref 0–2)
LYMPHOCYTES # BLD AUTO: 2.24 THOUSANDS/ÂΜL (ref 0.6–4.47)
LYMPHOCYTES NFR BLD AUTO: 34 % (ref 14–44)
MCH RBC QN AUTO: 31.3 PG (ref 26.8–34.3)
MCHC RBC AUTO-ENTMCNC: 34.5 G/DL (ref 31.4–37.4)
MCV RBC AUTO: 91 FL (ref 82–98)
MONOCYTES # BLD AUTO: 0.45 THOUSAND/ÂΜL (ref 0.17–1.22)
MONOCYTES NFR BLD AUTO: 7 % (ref 4–12)
NEUTROPHILS # BLD AUTO: 3.65 THOUSANDS/ÂΜL (ref 1.85–7.62)
NEUTS SEG NFR BLD AUTO: 54 % (ref 43–75)
NRBC BLD AUTO-RTO: 0 /100 WBCS
PLATELET # BLD AUTO: 204 THOUSANDS/UL (ref 149–390)
PMV BLD AUTO: 10.9 FL (ref 8.9–12.7)
POTASSIUM SERPL-SCNC: 3.9 MMOL/L (ref 3.5–5.3)
PROT SERPL-MCNC: 7.4 G/DL (ref 6.4–8.4)
RBC # BLD AUTO: 4.54 MILLION/UL (ref 3.88–5.62)
SODIUM SERPL-SCNC: 140 MMOL/L (ref 135–147)
WBC # BLD AUTO: 6.69 THOUSAND/UL (ref 4.31–10.16)

## 2023-06-27 PROCEDURE — 85025 COMPLETE CBC W/AUTO DIFF WBC: CPT

## 2023-06-27 PROCEDURE — 80053 COMPREHEN METABOLIC PANEL: CPT

## 2023-06-27 PROCEDURE — 36415 COLL VENOUS BLD VENIPUNCTURE: CPT

## 2023-06-27 NOTE — TELEPHONE ENCOUNTER
Called patient and spokw with wife, Sheral Solders, about biopsy result of pre-auricular lesion- BCCA  Discussed treatment options  Discussed it could be excised in conjunction with the melanoma/scc lesion  She inquired about treating it with topical treatment instead  I will discuss with Dr Yajaira Son and get back with them

## 2023-06-28 ENCOUNTER — OFFICE VISIT (OUTPATIENT)
Dept: PODIATRY | Facility: CLINIC | Age: 83
End: 2023-06-28
Payer: MEDICARE

## 2023-06-28 VITALS
SYSTOLIC BLOOD PRESSURE: 143 MMHG | WEIGHT: 174 LBS | HEART RATE: 60 BPM | RESPIRATION RATE: 18 BRPM | DIASTOLIC BLOOD PRESSURE: 83 MMHG | BODY MASS INDEX: 26.37 KG/M2 | HEIGHT: 68 IN

## 2023-06-28 DIAGNOSIS — I73.9 PERIPHERAL VASCULAR DISEASE, UNSPECIFIED (HCC): Primary | ICD-10-CM

## 2023-06-28 PROCEDURE — G0127 TRIM NAIL(S): HCPCS | Performed by: PODIATRIST

## 2023-06-28 NOTE — PROGRESS NOTES
Established patient with class findings presents for nail care  Vascular exam:  DP  0/4 bilateral; PT  0 4 bilateral   Dermatological exam:  Each toenail is thick and  dystrophic  Diagnosis:  PVD  Treatment: Trimmed multiple dystrophic toenails      Nail removal    Date/Time: 6/28/2023 1:30 PM    Performed by: Matt Tamayo DPM  Authorized by: Matt Tamayo DPM    Nails trimmed:     Number of nails trimmed:  10

## 2023-06-30 NOTE — PRE-PROCEDURE INSTRUCTIONS
Pre-Surgery Instructions:   Medication Instructions   • acetaminophen (TYLENOL) 500 mg tablet Uses PRN- OK to take day of surgery   • amLODIPine (NORVASC) 10 mg tablet Take day of surgery. • Fluocinolone Acetonide Scalp 0.01 % OIL Hold day of surgery. • imiquimod (ALDARA) 5 % cream Hold day of surgery. • lisinopril (ZESTRIL) 20 mg tablet Hold day of surgery. • metoprolol succinate (TOPROL-XL) 50 mg 24 hr tablet Take day of surgery. • mometasone (ELOCON) 0.1 % cream Hold day of surgery. • Multiple Vitamins-Minerals (PRESERVISION AREDS 2) CAPS Stop taking 7 days prior to surgery. • mupirocin (BACTROBAN) 2 % ointment Hold day of surgery. •     • Niacinamide-Zn-Cu-Qxchzu-Ga-Vx (NICOTINAMIDE PO) Hold day of surgery. •       Medication instructions for day surgery reviewed. Please use only a sip of water to take your instructed medications. Avoid all over the counter vitamins, supplements and NSAIDS for one week prior to surgery per anesthesia guidelines. Tylenol is ok to take as needed. You will receive a call one business day prior to surgery with an arrival time and hospital directions. If your surgery is scheduled on a Monday, the hospital will be calling you on the Friday prior to your surgery. If you have not heard from anyone by 8pm, please call the hospital supervisor through the hospital  at [phone]. Davina Crawford [phone]). Do not eat or drink anything after midnight the night before your surgery, including candy, mints, lifesavers, or chewing gum. Do not drink alcohol 24hrs before your surgery. Try not to smoke at least 24hrs before your surgery. Follow the pre surgery showering instructions as listed in the [facility] Surgical Experience Booklet” or otherwise provided by your surgeon's office. Do not shave the surgical area 24 hours before surgery. Do not apply any lotions, creams, including makeup, cologne, deodorant, or perfumes after showering on the day of your surgery. No contact lenses, eye make-up, or artificial eyelashes. Remove nail polish, including gel polish, and any artificial, gel, or acrylic nails if possible. Remove all jewelry including rings and body piercing jewelry. Wear causal clothing that is easy to take on and off. Consider your type of surgery. Keep any valuables, jewelry, piercings at home. Please bring any specially ordered equipment (sling, braces) if indicated. Arrange for a responsible person to drive you to and from the hospital on the day of your surgery. Visitor Guidelines discussed. Call the surgeon's office with any new illnesses, exposures, or additional questions prior to surgery. Please reference your [facility] Surgical Experience Booklet” for additional information to prepare for your upcoming surgery.

## 2023-07-05 ENCOUNTER — TELEPHONE (OUTPATIENT)
Dept: PLASTIC SURGERY | Facility: CLINIC | Age: 83
End: 2023-07-05

## 2023-07-05 NOTE — TELEPHONE ENCOUNTER
Called and spoke with patient and his wife regarding treatment plan for pre-auricular basal cell ca. He desires to treat this lesion with topical treatment. I discussed this with Dr. Virginia Pascual, who is in agreement. I discussed that treatment would not be initiated until he is healed from his upcoming surgery. They noted their understanding. We went over the surgical plan for his surgery next Monday- local flap with possible skin graft. He was in agreement.

## 2023-07-07 ENCOUNTER — ANESTHESIA EVENT (OUTPATIENT)
Dept: PERIOP | Facility: HOSPITAL | Age: 83
End: 2023-07-07
Payer: MEDICARE

## 2023-07-10 ENCOUNTER — HOSPITAL ENCOUNTER (OUTPATIENT)
Dept: NUCLEAR MEDICINE | Facility: HOSPITAL | Age: 83
Discharge: HOME | End: 2023-07-10
Attending: SURGERY
Payer: MEDICARE

## 2023-07-10 ENCOUNTER — ANESTHESIA (OUTPATIENT)
Dept: PERIOP | Facility: HOSPITAL | Age: 83
End: 2023-07-10
Payer: MEDICARE

## 2023-07-10 ENCOUNTER — HOSPITAL ENCOUNTER (OUTPATIENT)
Facility: HOSPITAL | Age: 83
Setting detail: OUTPATIENT SURGERY
Discharge: HOME | End: 2023-07-10
Attending: SURGERY | Admitting: SURGERY
Payer: MEDICARE

## 2023-07-10 VITALS
SYSTOLIC BLOOD PRESSURE: 121 MMHG | WEIGHT: 173.72 LBS | TEMPERATURE: 97.2 F | HEART RATE: 74 BPM | BODY MASS INDEX: 26.33 KG/M2 | DIASTOLIC BLOOD PRESSURE: 62 MMHG | RESPIRATION RATE: 12 BRPM | OXYGEN SATURATION: 94 % | HEIGHT: 68 IN

## 2023-07-10 DIAGNOSIS — C43.30 MALIGNANT MELANOMA OF FACE EXCLUDING EYELID, NOSE, LIP, AND EAR (HCC): Primary | ICD-10-CM

## 2023-07-10 DIAGNOSIS — C43.30 MALIGNANT MELANOMA OF FACE EXCLUDING EYELID, NOSE, LIP, AND EAR (HCC): ICD-10-CM

## 2023-07-10 PROCEDURE — 14041 TIS TRNFR F/C/C/M/N/A/G/H/F: CPT | Performed by: PLASTIC SURGERY

## 2023-07-10 PROCEDURE — 78195 LYMPH SYSTEM IMAGING: CPT

## 2023-07-10 PROCEDURE — G1004 CDSM NDSC: HCPCS

## 2023-07-10 PROCEDURE — NC001 PR NO CHARGE: Performed by: SURGERY

## 2023-07-10 PROCEDURE — 15240 FTH/GFT F/C/C/M/N/AX/G/H/F20: CPT | Performed by: PLASTIC SURGERY

## 2023-07-10 PROCEDURE — 88342 IMHCHEM/IMCYTCHM 1ST ANTB: CPT | Performed by: PATHOLOGY

## 2023-07-10 PROCEDURE — 15240 FTH/GFT F/C/C/M/N/AX/G/H/F20: CPT

## 2023-07-10 PROCEDURE — A9541 TC99M SULFUR COLLOID: HCPCS

## 2023-07-10 PROCEDURE — 88341 IMHCHEM/IMCYTCHM EA ADD ANTB: CPT | Performed by: PATHOLOGY

## 2023-07-10 PROCEDURE — 11643 EXC F/E/E/N/L MAL+MRG 2.1-3: CPT | Performed by: SURGERY

## 2023-07-10 PROCEDURE — 88305 TISSUE EXAM BY PATHOLOGIST: CPT | Performed by: PATHOLOGY

## 2023-07-10 PROCEDURE — 11646 EXC F/E/E/N/L MAL+MRG >4 CM: CPT | Performed by: SURGERY

## 2023-07-10 PROCEDURE — 14041 TIS TRNFR F/C/C/M/N/A/G/H/F: CPT

## 2023-07-10 RX ORDER — SODIUM CHLORIDE 9 MG/ML
125 INJECTION, SOLUTION INTRAVENOUS CONTINUOUS
Status: DISCONTINUED | OUTPATIENT
Start: 2023-07-10 | End: 2023-07-10 | Stop reason: HOSPADM

## 2023-07-10 RX ORDER — ACETAMINOPHEN 500 MG
1000 TABLET ORAL EVERY 6 HOURS
Qty: 80 TABLET | Refills: 0 | Status: SHIPPED | OUTPATIENT
Start: 2023-07-10 | End: 2023-07-20

## 2023-07-10 RX ORDER — ROCURONIUM BROMIDE 10 MG/ML
INJECTION, SOLUTION INTRAVENOUS AS NEEDED
Status: DISCONTINUED | OUTPATIENT
Start: 2023-07-10 | End: 2023-07-10

## 2023-07-10 RX ORDER — BACITRACIN ZINC 500 [USP'U]/G
OINTMENT TOPICAL DAILY
Qty: 30 G | Refills: 0 | Status: SHIPPED | OUTPATIENT
Start: 2023-07-10 | End: 2023-07-17

## 2023-07-10 RX ORDER — CEFAZOLIN SODIUM 1 G/3ML
INJECTION, POWDER, FOR SOLUTION INTRAMUSCULAR; INTRAVENOUS AS NEEDED
Status: DISCONTINUED | OUTPATIENT
Start: 2023-07-10 | End: 2023-07-10

## 2023-07-10 RX ORDER — TRAMADOL HYDROCHLORIDE 50 MG/1
50 TABLET ORAL ONCE AS NEEDED
Status: DISCONTINUED | OUTPATIENT
Start: 2023-07-10 | End: 2023-07-10 | Stop reason: HOSPADM

## 2023-07-10 RX ORDER — HYDROMORPHONE HCL/PF 1 MG/ML
0.5 SYRINGE (ML) INJECTION
Status: DISCONTINUED | OUTPATIENT
Start: 2023-07-10 | End: 2023-07-10 | Stop reason: HOSPADM

## 2023-07-10 RX ORDER — EPHEDRINE SULFATE 50 MG/ML
INJECTION INTRAVENOUS AS NEEDED
Status: DISCONTINUED | OUTPATIENT
Start: 2023-07-10 | End: 2023-07-10

## 2023-07-10 RX ORDER — PROMETHAZINE HYDROCHLORIDE 25 MG/ML
6.25 INJECTION, SOLUTION INTRAMUSCULAR; INTRAVENOUS ONCE AS NEEDED
Status: DISCONTINUED | OUTPATIENT
Start: 2023-07-10 | End: 2023-07-10 | Stop reason: HOSPADM

## 2023-07-10 RX ORDER — TRANEXAMIC ACID 10 MG/ML
INJECTION, SOLUTION INTRAVENOUS AS NEEDED
Status: DISCONTINUED | OUTPATIENT
Start: 2023-07-10 | End: 2023-07-10

## 2023-07-10 RX ORDER — ONDANSETRON 2 MG/ML
INJECTION INTRAMUSCULAR; INTRAVENOUS AS NEEDED
Status: DISCONTINUED | OUTPATIENT
Start: 2023-07-10 | End: 2023-07-10

## 2023-07-10 RX ORDER — LIDOCAINE HYDROCHLORIDE 10 MG/ML
INJECTION, SOLUTION EPIDURAL; INFILTRATION; INTRACAUDAL; PERINEURAL AS NEEDED
Status: DISCONTINUED | OUTPATIENT
Start: 2023-07-10 | End: 2023-07-10

## 2023-07-10 RX ORDER — TRAMADOL HYDROCHLORIDE 50 MG/1
50 TABLET ORAL EVERY 6 HOURS PRN
Qty: 15 TABLET | Refills: 0 | Status: SHIPPED | OUTPATIENT
Start: 2023-07-10

## 2023-07-10 RX ORDER — ONDANSETRON 2 MG/ML
4 INJECTION INTRAMUSCULAR; INTRAVENOUS ONCE AS NEEDED
Status: DISCONTINUED | OUTPATIENT
Start: 2023-07-10 | End: 2023-07-10 | Stop reason: HOSPADM

## 2023-07-10 RX ORDER — FENTANYL CITRATE 50 UG/ML
INJECTION, SOLUTION INTRAMUSCULAR; INTRAVENOUS AS NEEDED
Status: DISCONTINUED | OUTPATIENT
Start: 2023-07-10 | End: 2023-07-10

## 2023-07-10 RX ORDER — FENTANYL CITRATE/PF 50 MCG/ML
50 SYRINGE (ML) INJECTION
Status: DISCONTINUED | OUTPATIENT
Start: 2023-07-10 | End: 2023-07-10 | Stop reason: HOSPADM

## 2023-07-10 RX ORDER — ACETAMINOPHEN 325 MG/1
975 TABLET ORAL ONCE AS NEEDED
Status: DISCONTINUED | OUTPATIENT
Start: 2023-07-10 | End: 2023-07-10 | Stop reason: HOSPADM

## 2023-07-10 RX ORDER — GINSENG 100 MG
CAPSULE ORAL AS NEEDED
Status: DISCONTINUED | OUTPATIENT
Start: 2023-07-10 | End: 2023-07-10 | Stop reason: HOSPADM

## 2023-07-10 RX ORDER — CEFAZOLIN SODIUM 2 G/50ML
2000 SOLUTION INTRAVENOUS EVERY 8 HOURS
Status: DISCONTINUED | OUTPATIENT
Start: 2023-07-10 | End: 2023-07-10 | Stop reason: HOSPADM

## 2023-07-10 RX ORDER — MEPERIDINE HYDROCHLORIDE 25 MG/ML
12.5 INJECTION INTRAMUSCULAR; INTRAVENOUS; SUBCUTANEOUS ONCE AS NEEDED
Status: DISCONTINUED | OUTPATIENT
Start: 2023-07-10 | End: 2023-07-10 | Stop reason: HOSPADM

## 2023-07-10 RX ORDER — PROPOFOL 10 MG/ML
INJECTION, EMULSION INTRAVENOUS AS NEEDED
Status: DISCONTINUED | OUTPATIENT
Start: 2023-07-10 | End: 2023-07-10

## 2023-07-10 RX ADMIN — ROCURONIUM BROMIDE 35 MG: 10 INJECTION, SOLUTION INTRAVENOUS at 14:07

## 2023-07-10 RX ADMIN — PROPOFOL 150 MG: 10 INJECTION, EMULSION INTRAVENOUS at 14:07

## 2023-07-10 RX ADMIN — TRANEXAMIC ACID 1000 MG: 10 INJECTION, SOLUTION INTRAVENOUS at 15:59

## 2023-07-10 RX ADMIN — EPHEDRINE SULFATE 5 MG: 50 INJECTION, SOLUTION INTRAVENOUS at 14:57

## 2023-07-10 RX ADMIN — LIDOCAINE HYDROCHLORIDE 60 MG: 10 INJECTION, SOLUTION EPIDURAL; INFILTRATION; INTRACAUDAL; PERINEURAL at 14:07

## 2023-07-10 RX ADMIN — SODIUM CHLORIDE 125 ML/HR: 0.9 INJECTION, SOLUTION INTRAVENOUS at 11:49

## 2023-07-10 RX ADMIN — PROPOFOL 30 MG: 10 INJECTION, EMULSION INTRAVENOUS at 16:28

## 2023-07-10 RX ADMIN — ROCURONIUM BROMIDE 5 MG: 10 INJECTION, SOLUTION INTRAVENOUS at 14:22

## 2023-07-10 RX ADMIN — ONDANSETRON 4 MG: 2 INJECTION INTRAMUSCULAR; INTRAVENOUS at 16:15

## 2023-07-10 RX ADMIN — EPHEDRINE SULFATE 5 MG: 50 INJECTION, SOLUTION INTRAVENOUS at 14:38

## 2023-07-10 RX ADMIN — FENTANYL CITRATE 50 MCG: 50 INJECTION INTRAMUSCULAR; INTRAVENOUS at 14:07

## 2023-07-10 RX ADMIN — FENTANYL CITRATE 50 MCG: 50 INJECTION INTRAMUSCULAR; INTRAVENOUS at 14:14

## 2023-07-10 RX ADMIN — SUGAMMADEX 180 MG: 100 INJECTION, SOLUTION INTRAVENOUS at 16:43

## 2023-07-10 RX ADMIN — PHENYLEPHRINE HYDROCHLORIDE 20 MCG/MIN: 10 INJECTION INTRAVENOUS at 15:00

## 2023-07-10 RX ADMIN — SODIUM CHLORIDE: 0.9 INJECTION, SOLUTION INTRAVENOUS at 16:08

## 2023-07-10 RX ADMIN — CEFAZOLIN 1000 MG: 1 INJECTION, POWDER, FOR SOLUTION INTRAMUSCULAR; INTRAVENOUS at 14:17

## 2023-07-10 NOTE — OP NOTE
OPERATIVE REPORT  PATIENT NAME: Honey Jerez    :  1940  MRN: [id number]  Pt Location: [facility] OR ROOM 05    SURGERY DATE: 7/10/2023    Surgeon(s) and Role:  Panel 1:     * Elva Cueto MD - Primary     * Elsie Cordoba MD - Assisting  Panel 2:     * Liz Ray MD - Primary     * Christine Chan PA-C - Assisting    Preop Diagnosis:  Malignant melanoma of face excluding eyelid, nose, lip, and ear ([address]) [C43.30]    Post-Op Diagnosis Codes:     * Malignant melanoma of face excluding eyelid, nose, lip, and ear ([address]) [C43.30]    Procedure(s):  Right - WIDE EXCISION MELANOMA SCAR. RIGHT CHEEK and basa cell carcinoma excision right cheek;  Right - RIGHT CHEEK RECON W/ LOCAL FLAP. PSB SKIN GRAFT FULL VS. SPLIT    Specimen(s):  ID Type Source Tests Collected by Time Destination   1 : right cheek basal cell carcinoma, Short stitch at 12 and Long stitch at 6 Tissue Soft Tissue, Other TISSUE EXAM Elva Cueto MD 7/10/2023 1433    2 : right cheek melanoma Tissue Soft Tissue, Other TISSUE EXAM Elva Cueto MD 7/10/2023 1443        Estimated Blood Loss:   Minimal    Drains:  * No LDAs found *    Anesthesia Type:   General    Operative Indications:  Malignant melanoma of face excluding eyelid, nose, lip, and ear (HCC) [C43.30]  1.5 mm deep, 1 cm in diameter, right cheek. Right cheek basal cell cancer 5 mm in diameter       Operative Findings:  Dye did not migrate with injection of technetium. [place] node biopsy not done. Basal cell cancer defect, right cheek, measuring 1.5 cm in diameter  Melanoma defect, right cheek, measuring 4.0 x 3.5 cm in diameter    Complications:   None    Procedure and Technique:  Patient is an 78-year-old man with history of various skin cancers post multiple excisions, flaps, and grafts. He comes in with a new melanoma on his right cheek measured 1.5 mm in depth. He also has a basal cell cancer approximately a centimeter and a half away.   He comes in for wide excision. Given the depth of the melanoma, he comes in for sentinel biopsy as well. He was injected with technetium in the nuclear medicine suite. Dye did not migrate and therefore lymphoscintigraphy could not be completed. We therefore opted to proceed with resection. Taken to the operating room and identified by proper timeout. Following this he was intubated by the anesthesia team.  He then prepped draped the right face exposed. Both the basal cell and melanoma were identified. 5 mm margins were drawn around the basal cell cancer, and 1.2 cm margins were drawn around the melanoma. Both areas were thoroughly anesthetized. The basal cell cancer was excised first sharply. Cautery was used to obtain a full-thickness excision. The specimen was then resected and sent with sutures to emmanuelle. The field was hemostatic. The resulting defect measured 1.5 cm in diameter, and was a circular defect. We then incised the 1.2 cm margins around the right cheek melanoma. This was done sharply. Cautery was used to dissect through the dermis to obtain a full-thickness excision. The specimen was oriented with sutures and clips in the usual fashion. Resulting defect measured 4.0 x 3.5 cm in size. Once we are sure that all sites are hemostatic, Dr. Laron Strong came in for reconstruction portion of the operation. I was present for the entire procedure (resection).     Patient Disposition:  PACU     Wide Local Excision for Primary Cutaneous Melanoma - Excision 1 (Face)  Operation performed with curative intent Yes   Original B[place]low thickness of the lesion 1.5 mm (to the tenth of a millimeter)   Clinical margin width   (measured from the edge of the lesion or the prior excision scar) Other: 1.2 cm due to cosmetic/anatomic concerns   Depth of excision Full-thickness skin/subcutaneous tissue down to fascia (melanoma)            SIGNATURE: Moy Paez MD  DATE: July 10, 2023  TIME: 3:00 PM

## 2023-07-10 NOTE — DISCHARGE INSTR - AVS FIRST PAGE
Post-Op Discharge Instructions  [facility]  [address], [address] [facility],[place]  [phone]    Keep head elevated/inclined using pillows or chair recliner. Please sleep on back. 2. Bolster dressing upon right cheek is to be kept clean and dry. Please apply light layer of bacitracin ointment around the base of the bolster every day. 3. Apply bacitracin to incision site upon right neck daily. Keep this area clean as well. Please do not apply ice to any of the surgical sites. 4. No strenuous activity or lifting over 10 lbs for a minimum of 4 weeks. 5. Your first post-op appointment is scheduled for 9:00AM, 7/17/23 at the [facility]. 6. Take following medications with a checkmark ([x]) as prescribed, and do not take any pain medication on an empty stomach. [x]Tylenol 1000mg, every 6 hours for pain control. [x]Tramadol, 50mg, 1 tablet every 6 hours as needed for pain. [x]Bacitracin ointment, apply light layer daily to right neck incision and bolster daily. 7. Resume any prior medications at home unless otherwise instructed by Dr. Claudell Fort    8. You must be off all pain medications and have a clear field of vision before driving. 9. For the next 24 hours:  a. Do not sign any legal documents or operate machinery. b. Have a responsible adult help you.  c. Take it easy & rest.    10. Call Dr. Claudell Fort at the office [phone] if any:   a. Obvious bleeding, excessive swelling, or warmth at the site  b. Fever over 101.0°  c. Shortness of breath, severe calf or thigh pain. d. Redness, odor, or pus at the wound. (Some oozing is normal from incision sites and may continue for several days)  e. Persistent vomiting or pain that is not relieved by your medication.

## 2023-07-10 NOTE — ANESTHESIA PREPROCEDURE EVALUATION
Procedure:  WIDE EXCISION MELANOMA SCAR, RIGHT CHEEK; (Right: Face)  LYMPHOSCINTIGRAPHY, INTRAOPERATIVE LYMPHATIC MAPPING, SENTINEL LYMPH NODE BIOPSY (Right: Neck)  RIGHT CHEEK RECON W/ LOCAL FLAP, PSB SKIN GRAFT FULL VS. SPLIT (Right: Face)    Relevant Problems   CARDIO   (+) Hyperlipidemia   (+) Hypertension      /RENAL   (+) Stage 3 chronic kidney disease (HCC)      MUSCULOSKELETAL   (+) Gout   (+) Lumbar back pain with radiculopathy affecting lower extremity      NEURO/PSYCH   (+) Anxiety      Respiratory   (+) Pulmonary nodules      Digestive   (+) Irritable bowel syndrome      Musculoskeletal and Integument   (+) 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HCC)   (+) Scalp ulceration, with necrosis of bone (HCC)      Other   (+) Encounter for geriatric assessment   (+) Incisional hernia, without obstruction or gangrene   (+) Macular degeneration   (+) Malignant melanoma of face excluding eyelid, nose, lip, and ear (HCC)   (+) Malignant melanoma of nose (HCC)   (+) 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 Ejection fraction was estimated to be 60 %. Mildly abnormal global longitudinal strain -17.5%, with abnormal strain noted in the basal inferolateral, lateral and anterolateral wall. There were no regional wall motion abnormalities. Doppler parameters were consistent with abnormal left ventricular relaxation (grade 1 diastolic dysfunction).     MITRAL VALVE:  There was trace regurgitation.     TRICUSPID VALVE:  There was mild regurgitation. Physical Exam    Airway    Mallampati score: II         Dental       Cardiovascular  Cardiovascular exam normal    Pulmonary  Pulmonary exam normal     Other Findings  B/ L hearing aids in      Anesthesia Plan  ASA Score- 3     Anesthesia Type- general with ASA Monitors. Additional Monitors:   Airway Plan: ETT. Comment: Severe hearing loss. B/L hearing aids to stay in.        Plan Factors-Exercise tolerance (METS): >4 METS. Chart reviewed. Patient is not a current smoker. Obstructive sleep apnea risk education given perioperatively. Induction- intravenous. Postoperative Plan-     Informed Consent- Anesthetic plan and risks discussed with patient.

## 2023-07-10 NOTE — OP NOTE
OPERATIVE REPORT  PATIENT NAME: Waldemar Rivers    :  1940  MRN: [id number]  Pt Location: [facility] ROOM 05    SURGERY DATE: 7/10/2023    Surgeon(s) and Role:  Panel 1:     * Johanny Beltran MD - Primary     * Zoe Gutierrez MD - Assisting  Panel 2:     * Jojo Connolly MD - Primary     * Jillian Morrison PA-C - Assisting    Preop Diagnosis:  Malignant melanoma of face excluding eyelid, nose, lip, and ear ([address]) [C43.30]    Post-Op Diagnosis Codes:     * Malignant melanoma of face excluding eyelid, nose, lip, and ear ([address]) [C43.30]    Procedure(s):  Right - WIDE EXCISION MELANOMA SCAR. RIGHT CHEEK  Right - RIGHT CHEEK RECON W/ LOCAL FLAP & SKIN GRAFT  Right - EXCISION BASAL CELL CARCINOMA  RIGHT CHEEK    Specimen(s):  ID Type Source Tests Collected by Time Destination   1 : right cheek basal cell carcinoma, Short stitch at 12 and Long stitch at 6 Tissue Soft Tissue, Other TISSUE EXAM Johanny Beltran MD 7/10/2023 1433    2 : right cheek melanoma Tissue Soft Tissue, Other TISSUE EXAM Johanny Beltran MD 7/10/2023 1443        Estimated Blood Loss:   10 mL    Drains:  * No LDAs found *    Anesthesia Type:   General    Operative Indications:  Malignant melanoma of face excluding eyelid, nose, lip, and ear (HCC) [C43.30]  Complex open wounds of right cheek, 4.0 x 3.5cm and 1.5 x 1.5cm    Operative Findings:  Scar tissue of right temple area with minimal skin laxity over upper cheek. Complications:   None    Procedure and Technique:  1. Local advancement flap for partial wound closure, 30cm2  2. FTSG closure of right cheek, 10cm2. The patient was identified in the preoperative holding area. Preoperative markings were made. Patient was then brought to the operating room. And placed supine after table. Venodyne boots were placed and activated. All pressure points were appropriately and extensively padded. Patient's arms were tucked at his sides. A shoulder roll was placed for neck extension.   The entire facial and neck area were then sterilely prepped and draped with Betadine in usual fashion. Dr. Rossy Hess then began his portion of the procedure with wide local excision of the melanoma and BCCA lesion of the right cheek. After he completed his portion of the procedure, I then entered the operating field. There was a large full-thickness defect over the right mid cheek, measuring 4.0 x 3.5cm and 1.5cm in size. The areas of skin tension and laxity were then examined over the remaining areas of the cheek skin. Based on these, an inferiorly based cervical- facial flap was planned. The entire area of the right cheek was then infiltrated with local anesthesia using 1% lidocaine and 0.5% Marcaine diluted with 30cc of NS.  20 cc was injected. After allowing time for hemostasis and analgesia, the flap was then incised with a 15 blade scalpel. The cervical- facial flap was then elevated at the level of the SMAS, releasing all retaining ligaments of the cheek measuring 5x6cm in size. .  Dissection proceeded to towards the mandibular border allowing for advancement of the flap into the defect. The flap was then inset into the defect. However, there was significant tension on the lower eyelid and it was pulled inferiorly. Due to this a decision was made to proceed with partial closure and FTSG. First the flap was elevated, advanced into the inferior portion of the defect. It was secured down to the periosteum of the zygoma with 3-0 monocryl sutures to support the flap and help prevent migration. Total area of advancement flap closure was 30 cm². The remaining defect measured 5x2cm in size. A FTSG was then planned out along the right neck area. An ellipse was marked out and infiltrated with local anesthesia using 1%lidocaine with Epi, 10cc. A FTSG was then harvested from the right neck with a 15 blade scalpel. The graft was defatted with curved iris scissors.  The graft was then sewn into the remaining defect using 5-0 Monocryl running suture. Total area of FTSG closure was 10cm2. Next, a xeroform and cotton ball bolster was then placed over the graft and sewn in place with 3-0 silk sutures. Bacitracin was placed around the bolster. The submandibular neck incision site was closed with 4-0 Monocryl Stratafix suture in a running deep dermal fashion followed by 6-0 nylon sutures in running fashion to close the skin. The incision sites were dressed with bacitracin. The patient tolerated the procedure well. There were no complications. I was present for the entire procedure   A physician assistant was required during the procedure for retraction, tissue handling, dissection and suturing. No qualified resident was available for the case.      Patient Disposition:  PACU     This procedure was not performed to treat cancer         SIGNATURE: Liz Ray MD  DATE: July 10, 2023  TIME: 5:02 PM

## 2023-07-10 NOTE — ANESTHESIA POSTPROCEDURE EVALUATION
Post-Op Assessment Note    CV Status:  Stable    Pain management: adequate     Mental Status:  Alert and awake   Hydration Status:  Euvolemic   PONV Controlled:  Controlled   Airway Patency:  Patent      Post Op Vitals Reviewed: Yes      Staff: Anesthesiologist         No notable events documented.     /63 (07/10/23 1732)    Temp 98.2 °F (36.8 °C) (07/10/23 1732)    Pulse 76 (07/10/23 1732)   Resp 16 (07/10/23 1732)    SpO2 94 % (07/10/23 1732)

## 2023-07-10 NOTE — H&P
Surgical Oncology Follow Up                                        [facility] SURGICAL ONCOLOGY [facility] [address]     Molly Mccallum  1940  [id number]  [address]  [facility] SURGICAL ONCOLOGY [place] [id number]         Chief Complaint   Patient presents with   • Follow-up         Assessment/Plan:     No problem-specific Assessment & Plan notes found for this encounter.         Diagnoses and all orders for this visit:     Malignant melanoma of face excluding eyelid, nose, lip, and ear ([address])         Advance Care Planning/Advance Directives:  Discussed disease status, cancer treatment plans and/or cancer treatment goals with the patient.          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         1/29/2018 Biopsy     biopsy from the anterior aspect of the graft showing atypical epithelioid fibro histiocytic tumor, with lesion extending to the deep tissue edge.  This is similar to prior biopsy done.  Differential includes pleomorphic sarcoma versus an extreme atypical example of atypical fibro histiocytoma.      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 See Note.     -- FNCLCC Histologic Grade 3  (total score: 7 of 8).      * Tumor differentiation score: 3 of 3.        * Mitotic count score: 3 of 3; > 19 mitoses/10 HPF (33 mitoses/10 HPF).      * Necrosis score: 1 of 2; present, < 50%. - Tumor extends into deep reticular dermis, subcutis and fascia. - Perineural invasion: Present; intraneural invasion identified (0.4 mm largest nerve diameter).    - Lymphovascular invasion: Focally suspicious. - Bone invasion: Not identified.   - Central nervous system extension: Not identified. - Margins: uninvolved by sarcoma but close.    -- Sarcoma  0.15 mm from nearest deep margin.   - Additional Pathologic Findings: Changes consistent with prior surgical site.     -- Focal ulceration with bacterial colonization, acute and chronic inflammation.     -- Best representative tumor block: A5.       5/29/2018 - 7/5/2018 Radiation     Plan ID Energy Fractions Dose per Fraction (cGy) Dose Correction (cGy) Total Dose Delivered (cGy) Elapsed Days   Vertex Scalp 9E 27 / 27 200 0 5,400 37                Basal cell carcinoma of skin of other parts of face     Initial Diagnosis     Basal cell carcinoma of skin of other parts of face      Malignant melanoma of nose (HCC)   12/6/2022 Biopsy     Skin, left ala, shave biopsy:     MELANOMA (thickness: 0.6 mm); extending to the tissue edges      Ulceration: not seen  Anatomic [facility] [place]) level: III  Type: lentigo maligna melanoma  Mitoses: 0/mm2  Margin assessment: invasive melanoma extends to peripheral specimen margin and deep specimen margin focally  Pathologic stage: pT1a      2/7/2023 Surgery     Skin, nose, left nasal, wide excision:  Residual melanoma, invasive, desmoplastic type, and scar, margins free. Incidental basal cell carcinoma, superficial and nodular type, not extending to the margins.    pT2a      2/7/2023 -  Cancer Staged     Staging form: Melanoma of the Skin, AJCC 8th Edition  - Clinical stage from 2/7/2023: Stage IB (cT2a, cN0, cM0) - Signed by Ava Alvarez MD on 5/26/2023         Malignant melanoma of face excluding eyelid, nose, lip, and ear ([address])   5/16/2023 Initial Diagnosis     Malignant melanoma of face excluding eyelid, nose, lip, and ear ([address])  Right mid cheek     A.  Skin, right mid cheek, shave biopsy:     Combined ulcerated MELANOMA (thickness: at least 1.5 mm) and BASAL CELL CARCINOMA; transected (see note).     Note: There are foci of basal cell carcinoma and melanoma in which the components are separate from each other (as can be seen in a collision tumor), but there are also multiple foci in which the melanoma and basal cell carcinoma are closely intermingled ("basomelanocytic tumor" or "combined melanoma and basal cell carcinoma of the intermingled type"), which is a rare occurrence. 1 SOX10, MART-1, HMB45, p40, and Tarun-EP4 immunostains were reviewed and support the diagnosis. Please see synoptic report for additional details. The results were emailed to Dr. Johnny Gupta on 5/19/2023.     Synoptic report for melanoma of the skin  Thickness: at least 1.5 mm  Ulceration: present  Anatomic Jourdan Arya) level: at least IV  Type: superficial spreading melanoma  Mitoses: 1/mm2  Microsatellites: cannot be determined  Lymphovascular invasion: not seen  Neurotropism: not seen  Tumor regression: not seen  Tumor-infiltrating lymphocytes (TIL): present, non-brisk  Margin assessment: invasive melanoma extends to deep specimen margin; melanoma in situ extends to peripheral specimen margin  Pathologic stage: at least pT2b  Associated nevus: not seen            5/16/2023 -  Cancer Staged     Staging form: Melanoma of the Skin, AJCC 8th Edition  - Clinical stage from 5/16/2023: Stage IIA (cT2b, cN0, cM0) - Signed by Nav Mittal MD on 5/26/2023               History of Present Illness: Patient is an 48-year-old man with longstanding history of fair skin cancers. He comes with a new melanoma and basal cancer from the right cheek.  -Interval History: He had a basal cell cancer excised on his right temple via Mohs procedure within the last 2 weeks.     Review of Systems:  Review of Systems   Constitutional: Negative. HENT: Negative. Eyes: Negative. Respiratory: Negative. Cardiovascular: Negative. Gastrointestinal: Negative. Endocrine: Negative. Genitourinary: Negative. Musculoskeletal: Negative.     Skin: Negative. Skin cancers   Allergic/Immunologic: Negative. Neurological: Negative. Hematological: Negative. Psychiatric/Behavioral: Negative.     All other systems reviewed and are negative.            Patient Active Problem List   Diagnosis   • Actinic keratosis   • Stage 3 chronic kidney disease ([address])   • Gout   • Hyperlipidemia   • Hypertension   • Irritable bowel syndrome   • Macular degeneration   • Urinary incontinence, post-void dribbling   • 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HCC)   • Basal cell carcinoma of skin of other parts of face   • Pulmonary nodules   • Incisional hernia, without obstruction or gangrene   • Status post repair of ventral hernia   • Dizziness   • Urinary frequency   • Lumbar back pain with radiculopathy affecting lower extremity   • Scalp ulceration, with necrosis of bone (HCC)   • 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HCC)   • Malignant melanoma of nose (HCC)   • Encounter for geriatric assessment   • Malignant melanoma of face excluding eyelid, nose, lip, and ear ([address])      Medical History        Past Medical History:   Diagnosis Date   • Anxiety     • Arthritis       back   • Back pain     • Balance problem     • Basal cell carcinoma (BCC)       in past   • Basal cell carcinoma (BCC) 10/04/2022     Right Temple   • BCC (basal cell carcinoma of skin) 08/17/2021     Right tip of nose   • BCC (basal cell carcinoma of skin) 08/08/2022     Left lip   • BCC (basal cell carcinoma of skin) 08/08/2022     Right cheek   • Cancer (HCC)     • Gout     • Hard of hearing     • Hearing aid worn       ziyad   • Hearing aid worn     • Herniated nucleus pulposus, L4-5     • [place] (hard of hearing)     • Hypertension     • Incisional hernia     • Kidney stone       CKD3   • Macular degeneration     • Melanoma ([address])       left cheek- history   • Melanoma ([address]) 08/08/2022     Right neck   • Nocturia     • Pulmonary nodules     • Reactive airway disease     • 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[facility])       july 2020 with skin grafting   • Skin cancer 07/01/2020     AFX- scalp   • Squamous cell carcinoma       in past         Surgical History   Past Surgical History:   Procedure Laterality Date   • CATARACT EXTRACTION Bilateral     • COLONOSCOPY       • HERNIA REPAIR       • HYDROCELE EXCISION / REPAIR       • MASTOID SURGERY Left     • MOHS SURGERY       • MOHS SURGERY   09/29/2021     Right tip of nose-Dr. Diana Jorgensen   • MOHS SURGERY   10/06/2022     Left lip-Dr. Diana Jorgensen   • MOHS SURGERY   05/16/2023     Right [facility] - Dr. Patricia Mckoy   • OTHER SURGICAL HISTORY         sarcoma scalp with skin graft   • LA EXCISION MALIGNANT LESION F/E/E/N/L 0.5 CM/< Left 02/07/2023     Procedure: WIDE EXCISION OF LEFT NASAL MELANOMA;  Surgeon: Antonia Agustin MD;  [facility]: [facility];  Service: Surgical Oncology   • LA REPAIR FIRST ABDOMINAL WALL HERNIA N/A 04/22/2021     Procedure: REPAIR HERNIA INCISIONAL OPEN WITH MESH;  Surgeon: Ximena Tamayo MD;  Location: [facility];  Service: General   • SCALP EXCISION N/A 03/28/2018     Procedure: SCALP SARCOMA EXCISION WITH FULL THICKNESS SKIN GRAFT;  Surgeon: Qiana Atwood MD;  Location: [facility];  Service: Plastics   • SKIN BIOPSY       • SKIN CANCER EXCISION       • SPLIT THICKNESS SKIN GRAFT Left 02/07/2023     Procedure: APPLICATION OF SKIN SUBSTITUTE GRAFT TO NOSE;  Surgeon: Essence Corona MD;  Location: [facility];  Service: Plastics   • TONSILLECTOMY AND ADENOIDECTOMY             Family History         Family History   Problem Relation Age of Onset   • Colon cancer Father     • Heart failure Father     • Heart disease Mother     • Stroke Mother           Social History               Socioeconomic History   • Marital status: /Civil Union       Spouse name: Not on file   • Number of children: Not on file   • Years of education: Not on file   • Highest education level: Not on file   Occupational History   • Not on file   Tobacco Use   • Smoking status: Former       Packs/day: 0.25       Years: 15.00       Total pack years: 3.75       Types: Cigarettes       Quit date: 3/13/1988       Years since quittin.2   • Smokeless tobacco: Never   Vaping Use   • Vaping Use: Never used   Substance and Sexual Activity   • Alcohol use: Not Currently       Comment: beer once in awhile   • Drug use: No   • Sexual activity: Yes   Other Topics Concern   • Not on file   Social History Narrative   • Not on file      Social Determinants of Health           Financial Resource Strain: Medium Risk (2023)     Overall Financial Resource Strain (CARDIA)     • Difficulty of Paying Living Expenses: Somewhat hard   Food Insecurity: Not on file   Transportation Needs: No Transportation Needs (2023)     PRAPARE - Transportation     • Lack of Transportation (Medical): No     • Lack of Transportation (Non-Medical): No   Physical Activity: Not on file   Stress: Not on file   Social Connections: Not on file   Intimate Partner Violence: Not on file   Housing Stability: Not on file            Current Outpatient Medications:   •  acetaminophen (TYLENOL) 500 mg tablet, Take 500-1,000 mg by mouth every 4 (four) hours as needed for mild pain, Disp: , Rfl:   •  amLODIPine (NORVASC) 10 mg tablet, TAKE 1 TABLET BY MOUTH  DAILY, Disp: 90 tablet, Rfl: 3  •  Fluocinolone Acetonide Scalp 0.01 % OIL, Apply a thin layer topically daily at night one hour before bedtime.  (Patient taking differently: Apply topically if needed Apply a thin layer topically daily at night one hour before bedtime.), Disp: 118.28 mL, Rfl: 5  •  imiquimod (ALDARA) 5 % cream, Apply topically once a day Monday thru Friday for 6 weeks, Disp: 24 each, Rfl: 1  •  lisinopril (ZESTRIL) 20 mg tablet, TAKE 1 TABLET BY MOUTH  DAILY, Disp: 90 tablet, Rfl: 3  •  metoprolol succinate (TOPROL-XL) 50 mg 24 hr tablet, TAKE 1 TABLET BY MOUTH  DAILY, Disp: 90 tablet, Rfl: 3  •  mometasone (ELOCON) 0.1 % cream, APPLY TO EARS ONCE TO TWICE DAILY AS NEEDED FOR SCALING, Disp: , Rfl:   •  Multiple Vitamins-Minerals (PRESERVISION AREDS 2) CAPS, Take 1 capsule by mouth 2 (two) times a day, Disp: , Rfl:   •  mupirocin (BACTROBAN) 2 % ointment, Apply topically two to three times a day to sore areas (Patient taking differently: Apply topically if needed Apply topically two to three times a day to sore areas), Disp: 22 g, Rfl: 3  •  traMADol (ULTRAM) 50 mg tablet, Take 1 tablet (50 mg total) by mouth every 6 (six) hours as needed for moderate pain, Disp: 10 tablet, Rfl: 0        Allergies   Allergen Reactions   • Erythromycin Other (See Comments)       Thrush           Vitals:     06/02/23 0949   BP: 150/82   Pulse: 73   Resp: 16   Temp: (!) 97.4 °F (36.3 °C)   SpO2: 99%         Physical Exam  Vitals reviewed. Constitutional:       Appearance: Normal appearance. HENT:      Head: Normocephalic and atraumatic. Right Ear: External ear normal.      Left Ear: External ear normal.      Mouth/Throat:      Mouth: Mucous membranes are dry. Eyes:      Pupils: Pupils are equal, round, and reactive to light. Cardiovascular:      Rate and Rhythm: Normal rate and regular rhythm. Heart sounds: Normal heart sounds. Pulmonary:      Effort: Pulmonary effort is normal.      Breath sounds: Normal breath sounds. Abdominal:      General: Abdomen is flat. Palpations: Abdomen is soft. Musculoskeletal:         General: Normal range of motion. Cervical back: Normal range of motion and neck supple. Skin:     General: Skin is warm and dry. Comments: Well-healed right cheek scabs x2. No satellite lesions. No obvious adenopathy. Neurological:      General: No focal deficit present. Mental Status: He is alert and oriented to person, place, and time. Psychiatric:         Mood and Affect: Mood normal.         Behavior: Behavior normal.         Thought Content:  Thought content normal.         Judgment: Judgment normal.               Results:  Labs:  Case Report   Surgical Pathology Report                         Case: [id number]                                    Authorizing Provider: Kavita Laguerre MD          Collected:           05/16/2023 1708               Ordering Location:     [facility]             Received:            05/16/2023 1708                                      Micrographic Surgery                                                          Pathologist:           Estuardo Patel MD                                                            Specimens:   A) - Skin, Other, Specimen A: Right mid cheek                                                        B) - Skin, Other, Specimen B: Right lateral cheek                                           Final Diagnosis   A. Skin, right mid cheek, shave biopsy:     Combined ulcerated MELANOMA (thickness: at least 1.5 mm) and BASAL CELL CARCINOMA; transected (see note).     Note: There are foci of basal cell carcinoma and melanoma in which the components are separate from each other (as can be seen in a collision tumor), but there are also multiple foci in which the melanoma and basal cell carcinoma are closely intermingled ("basomelanocytic tumor" or "combined melanoma and basal cell carcinoma of the intermingled type"), which is a rare occurrence. 1 SOX10, MART-1, HMB45, p40, and Tarun-EP4 immunostains were reviewed and support the diagnosis. Please see synoptic report for additional details.  The results were emailed to Dr. Danica Tinajero on 5/19/2023.     Synoptic report for melanoma of the skin  Thickness: at least 1.5 mm  Ulceration: present  Anatomic [facility]) level: at least IV  Type: superficial spreading melanoma  Mitoses: 1/mm2  Microsatellites: cannot be determined  Lymphovascular invasion: not seen  Neurotropism: not seen  Tumor regression: not seen  Tumor-infiltrating lymphocytes (TIL): present, non-brisk  Margin assessment: invasive melanoma extends to deep specimen margin; melanoma in situ extends to peripheral specimen margin  Pathologic stage: at least pT2b  Associated nevus: not seen     References:  1. Sherron Or Combined malignant melanoma and basal cell carcinoma tumor of the intermingled type. J Cutan Pathol. 2007 Sep;34(9):731-5. PMID: [id number].           B. Skin, right lateral cheek, shave biopsy:     BASAL CELL CARCINOMA (NODULAR AND INFILTRATIVE TYPE); transected. Electronically signed by Ольга Loyd MD on 5/19/2023 at  3:18 PM            Imaging  CT head without contrast     Result Date: 5/27/2023  Narrative: CT BRAIN - WITHOUT CONTRAST INDICATION:   off balance, fatigue. Sarcoma of the right scalp, status post resection, radiation/craniotomy and flap COMPARISON: CT brain June 2021, MRI brain June 2021 TECHNIQUE:  CT examination of the brain was performed. Multiplanar 2D reformatted images were created from the source data. Radiation dose length product (DLP) for this visit:  829 mGy-cm . This examination, like all CT scans performed in the [facility], was performed utilizing techniques to minimize radiation dose exposure, including the use of iterative reconstruction and automated exposure control. IMAGE QUALITY:  Diagnostic. FINDINGS: PARENCHYMA: Decreased attenuation is noted in periventricular and subcortical white matter demonstrating an appearance that is statistically most likely to represent mild microangiopathic change. No CT signs of acute infarction. No intracranial mass, mass effect or midline shift. No acute parenchymal hemorrhage. Atherosclerotic vascular calcifications in carotid and vertebral arteries. VENTRICLES AND EXTRA-AXIAL SPACES:  Normal for the patient's age. VISUALIZED ORBITS: Normal visualized orbits. PARANASAL SINUSES: Bilateral maxillary sinus mucus retention cysts. CALVARIUM AND EXTRACRANIAL SOFT TISSUES: Right temporal parietal skull postsurgical changes are demonstrated status post resection of tumor.  Bone defect at the level of the parietal calvarium is again seen as well as flap containing fat.      Impression: Stable microangiopathic change. 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, status post resection of sarcoma with flap. Consider evaluation with MRI brain with contrast to assess if there is residual or recurrent disease. The study was marked in [facility] for immediate notification. Workstation performed: [id number]      I reviewed the above laboratory and imaging data.     Discussion/Summary: Basal cancer of cheek along with melanoma and basal cell cancer adjacent. Wide excision with sentinel node biopsy mandated. Rationale for this along the risk and benefits of surgery including infection, bleeding, need for possible additional surgery, failure of dye to migrate, discussed but all questions answered and consent signed at this visit. We will do this in conjunction with plastic surgery team given size and location of anticipated defect post resection.

## 2023-07-11 ENCOUNTER — TELEPHONE (OUTPATIENT)
Dept: PLASTIC SURGERY | Facility: CLINIC | Age: 83
End: 2023-07-11

## 2023-07-11 NOTE — TELEPHONE ENCOUNTER
Called and spoke with patient and his wife. He is doing well this am with no significant pain, he slept well and ate breakfast. Post op instructions reviewed. Instructed to call with any questions.

## 2023-07-17 ENCOUNTER — OFFICE VISIT (OUTPATIENT)
Dept: PLASTIC SURGERY | Facility: CLINIC | Age: 83
End: 2023-07-17

## 2023-07-17 DIAGNOSIS — C43.31 MALIGNANT MELANOMA OF NOSE (HCC): Primary | ICD-10-CM

## 2023-07-17 DIAGNOSIS — C43.30 MALIGNANT MELANOMA OF FACE EXCLUDING EYELID, NOSE, LIP, AND EAR (HCC): ICD-10-CM

## 2023-07-17 PROCEDURE — 99024 POSTOP FOLLOW-UP VISIT: CPT | Performed by: PHYSICIAN ASSISTANT

## 2023-07-17 NOTE — PROGRESS NOTES
Assessment/Plan:     Patient is an 65-year-old male who is status post wide excision of melanoma scar of the right cheek, right cheek recon with local flap and skin graft and excision of basal cell carcinoma of the right cheek by Dr. Bong Banks on 7/10/2023. Please see HPI. Patient returns to the office today for first postoperative visit and bolster removal.  Graft is well adhered and viable. See photo in media. Patient will apply Aquaphor and Xeroform to skin graft site. He should not get the graft wet. He will return to our office in 1 week for graft check and suture removal from the right neck donor site. Diagnoses and all orders for this visit:    Malignant melanoma of nose ([address])    Malignant melanoma of face excluding eyelid, nose, lip, and ear (HCC)          Subjective:     Patient ID: Honey Jerez is a 79 y.o. male. HPI    Patient presents to the office today with his wife for first postoperative visit. No questions or concerns today. No issues postoperatively. Review of Systems    See HPI    Objective:     Physical Exam      Skin graft is well adhered and viable. Please see photo in media.   Right neck donor site incision and sutures are clean, dry and intact

## 2023-07-20 ENCOUNTER — TELEPHONE (OUTPATIENT)
Age: 83
End: 2023-07-20

## 2023-07-20 NOTE — TELEPHONE ENCOUNTER
Pt left vm, canceling appt on 7/25. He just had surgery at the beginning of the month and wants to see Dr Azucena Bell when it is healed more. Dr. Azucena Bell please let me know if this patient can be scheduled in Sept for followup.

## 2023-07-24 NOTE — TELEPHONE ENCOUNTER
Telephone call to patient to schedule follow up with Dr. Tello Correa. patient is scheduled for 10/3/2023 at 4:20 with Dr. Tello Correa in [facility]

## 2023-07-25 ENCOUNTER — OFFICE VISIT (OUTPATIENT)
Dept: PLASTIC SURGERY | Facility: CLINIC | Age: 83
End: 2023-07-25

## 2023-07-25 DIAGNOSIS — C44.310 BASAL CELL CARCINOMA (BCC) OF FACE: Primary | ICD-10-CM

## 2023-07-25 DIAGNOSIS — C43.30 MALIGNANT MELANOMA OF FACE EXCLUDING EYELID, NOSE, LIP, AND EAR (HCC): ICD-10-CM

## 2023-07-26 PROCEDURE — 88305 TISSUE EXAM BY PATHOLOGIST: CPT | Performed by: PATHOLOGY

## 2023-07-26 PROCEDURE — 88342 IMHCHEM/IMCYTCHM 1ST ANTB: CPT | Performed by: PATHOLOGY

## 2023-07-26 PROCEDURE — 88341 IMHCHEM/IMCYTCHM EA ADD ANTB: CPT | Performed by: PATHOLOGY

## 2023-07-26 NOTE — PROGRESS NOTES
[facility] Plastic and Reconstructive Surgery  [address] [place] [address] REYNALDO Garza [place]  [phone]    Patient Identification: Placido Gaucher is a 79 y.o. male     History of Present Illness: The patient is a 79y.o.  year-old male  who presents to the office for post op visit. Patient is 15 days s/p Wide Excision Melanoma Scar, Right Cheek - Right, Excision Basal Cell Carcinoma  Right Cheek - Right, and Right Cheek Recon W/ Local Flap & Skin Graft - Right  on 7/10/2023 by Dr. Stepan Dominguez and Dr. Eyal Rodriguez. Patient has no pain or discomfort at today's visit. He states his wife applies Xeroform and Aquaphor to the skin graft site daily. He refrains from getting the graft wet. Keeping the area covered when outdoors. He has no trouble talking or chewing food. He denies any fevers, chills or signs of infection. Patient has no complaints at this time.     Past Medical History:   Diagnosis Date   • Anxiety    • Arthritis     back   • Back pain    • Balance problem    • Basal cell carcinoma (BCC)     in past   • Basal cell carcinoma (BCC) 10/04/2022    Right Temple   • BCC (basal cell carcinoma of skin) 08/17/2021    Right tip of nose   • BCC (basal cell carcinoma of skin) 08/08/2022    Left lip   • BCC (basal cell carcinoma of skin) 08/08/2022    Right cheek   • Cancer ([address])    • Gout    • Hard of hearing    • Hearing aid worn     ziyad   • Hearing aid worn    • Herniated nucleus pulposus, L4-5    • [place] (hard of hearing)    • Hypertension    • Incisional hernia    • Incontinence    • Macular degeneration    • Melanoma ([address])     left cheek- history   • Melanoma ([address]) 08/08/2022    Right neck   • Nocturia    • Pulmonary nodules    • Reactive airway disease    • 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   • Skin cancer 07/01/2020    AFX- scalp   • Squamous cell carcinoma     in past        Patient Active Problem List   Diagnosis   • Actinic keratosis   • Stage 3 chronic kidney disease ([address])   • Gout   • Hyperlipidemia   • Hypertension   • Irritable bowel syndrome   • Macular degeneration   • Urinary incontinence, post-void dribbling   • 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HCC)   • Basal cell carcinoma of skin of other parts of face   • Pulmonary nodules   • Incisional hernia, without obstruction or gangrene   • Status post repair of ventral hernia   • Dizziness   • Urinary frequency   • Lumbar back pain with radiculopathy affecting lower extremity   • Scalp ulceration, with necrosis of bone (HCC)   • 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HCC)   • Malignant melanoma of nose (HCC)   • Encounter for geriatric assessment   • Malignant melanoma of face excluding eyelid, nose, lip, and ear ([address])   • Anxiety   • Reactive airway disease        Past Surgical History:   Procedure Laterality Date   • BASAL CELL CARCINOMA EXCISION Right 7/10/2023    Procedure: EXCISION BASAL CELL CARCINOMA  RIGHT CHEEK;  Surgeon: Ester Gamez MD;  [facility];  Service: Surgical Oncology   • CATARACT EXTRACTION Bilateral    • COLONOSCOPY     • FLAP LOCAL HEAD / NECK Right 7/10/2023    Procedure: RIGHT CHEEK RECON W/ LOCAL FLAP & SKIN GRAFT;  Surgeon: Nuno Hernandez MD;  [facility];  Service: Plastics   • HERNIA REPAIR     • HYDROCELE EXCISION / REPAIR     • MASTOID SURGERY Left    • MOHS SURGERY     • MOHS SURGERY  09/29/2021    Right tip of nose-Dr. Brittanie Kaur   • MOHS SURGERY  10/06/2022    Left lip-Dr. Brittanie Kaur   • MOHS SURGERY  05/16/2023    Right [facility] - Dr. Kellen Brooks   • OTHER SURGICAL HISTORY      sarcoma scalp with skin graft   • MT EXCISION MALIGNANT LESION F/E/E/N/L 0.5 CM/< Left 02/07/2023    Procedure: WIDE EXCISION OF LEFT NASAL MELANOMA;  Surgeon: Ester Gamez MD;  [facility];  Service: Surgical Oncology   • MT REPAIR FIRST ABDOMINAL WALL HERNIA N/A 04/22/2021    Procedure: REPAIR HERNIA INCISIONAL OPEN WITH MESH;  Surgeon: Amrita Jack MD;  [facility];  Service: General   • SCALP EXCISION N/A 03/28/2018 Procedure: SCALP SARCOMA EXCISION WITH FULL THICKNESS SKIN GRAFT;  Surgeon: Ruddy Blanchard MD;  [facility];  Service: Plastics   • SKIN BIOPSY     • SKIN CANCER EXCISION     • SKIN LESION EXCISION Right 7/10/2023    Procedure: WIDE EXCISION MELANOMA SCAR, RIGHT CHEEK;  Surgeon: Db Grider MD;  [facility];  Service: Surgical Oncology   • SPLIT THICKNESS SKIN GRAFT Left 02/07/2023    Procedure: APPLICATION OF SKIN SUBSTITUTE GRAFT TO NOSE;  Surgeon: Sheila Hall MD;  [facility];  Service: Plastics   • TONSILLECTOMY AND ADENOIDECTOMY          Allergies   Allergen Reactions   • Erythromycin Other (See Comments)     Thrush         Current Outpatient Medications on File Prior to Visit   Medication Sig Dispense Refill   • amLODIPine (NORVASC) 10 mg tablet TAKE 1 TABLET BY MOUTH  DAILY 90 tablet 3   • bacitracin ointment Apply topically daily for 7 days Apply to bolster site daily 30 g 0   • Fluocinolone Acetonide Scalp 0.01 % OIL Apply a thin layer topically daily at night one hour before bedtime.  (Patient taking differently: Apply topically if needed Apply a thin layer topically daily at night one hour before bedtime.) 118.28 mL 5   • imiquimod (ALDARA) 5 % cream Apply topically once a day Monday thru Friday for 6 weeks 24 each 1   • lisinopril (ZESTRIL) 20 mg tablet TAKE 1 TABLET BY MOUTH  DAILY 90 tablet 3   • metoprolol succinate (TOPROL-XL) 50 mg 24 hr tablet TAKE 1 TABLET BY MOUTH  DAILY 90 tablet 3   • mometasone (ELOCON) 0.1 % cream APPLY TO EARS ONCE TO TWICE DAILY AS NEEDED FOR SCALING     • Multiple Vitamins-Minerals (PRESERVISION AREDS 2) CAPS Take 1 capsule by mouth 2 (two) times a day     • mupirocin (BACTROBAN) 2 % ointment Apply topically two to three times a day to sore areas (Patient taking differently: Apply topically if needed Apply topically two to three times a day to sore areas) 22 g 3   • Niacin (VITAMIN B-3 PO) Take by mouth     • Niacinamide-Zn-Cu-Gwkyzg-Mj-Wu (NICOTINAMIDE PO) Take 500 mg by mouth 2 (two) times a day     • Pyridoxine HCl (VITAMIN B-6 PO) Take by mouth     • traMADol (Ultram) 50 mg tablet Take 1 tablet (50 mg total) by mouth every 6 (six) hours as needed for moderate pain for up to 15 doses 15 tablet 0   • [DISCONTINUED] alclomethasone (ACLOVATE) 0.05 % cream Apply topically 2 (two) times a day as needed        No current facility-administered medications on file prior to visit. Tobacco Use: Medium Risk (7/11/2023)    Patient History    • Smoking Tobacco Use: Former    • Smokeless Tobacco Use: Never    • Passive Exposure: Not on file        Review of Systems  Constitutional: Denies fevers, chills. Reports minimal pain  Skin: Denies any warmth, erythema, or mucopurulent drainage. Reports minimal edema    Physical Exam   Right Cheek: Skin graft is adhered to wound base. Facial hair beginning to grow through. Graft is viable, no signs of failure or poor perfusion. Surrounding skin has no signs of infection or fluid collection. Minimal swelling and bruising seen to the inferior portion of the right face and jaw. Right Neck: skin graft donor site is clean, dry and intact. Nylon sutures in place. No sign of infection. Assessment and Plan:  The patient is an 79y.o.  year-old male who presents to the office for post-op visit. Patient is 15 days s/p Wide Excision Melanoma Scar, Right Cheek - Right, Excision Basal Cell Carcinoma  Right Cheek - Right, and Right Cheek Recon W/ Local Flap & Skin Graft - Right  on 7/10/2023 by Dr. Teresa Stevens and Dr. Claudell Fort    -At today's visit donor site nylon sutures were removed from right neck. Skin graft upon right cheek was cleaned and assessed. No signs of failure, facial hair beginning to grow through. -Patient is to continue moisturizing the graft with Aquaphor daily. Keeping it dry and covered, especially when outdoors.  He may apply Aquaphor to right neck donor site daily as well.  -Continue with activity restrictions such as vigorous exercise, no bending upside, etc.   -The patient is to return in 1 week for graft check. - The patient is to call the office with any questions or concerns. All of the patient's questions were answered at this time and they agree with the plan of care.       Gautam Morales PA-C  [facility] Plastic and Reconstructive Surgery

## 2023-07-28 ENCOUNTER — OFFICE VISIT (OUTPATIENT)
Dept: SURGICAL ONCOLOGY | Facility: CLINIC | Age: 83
End: 2023-07-28

## 2023-07-28 VITALS
SYSTOLIC BLOOD PRESSURE: 160 MMHG | HEIGHT: 68 IN | HEART RATE: 76 BPM | OXYGEN SATURATION: 97 % | DIASTOLIC BLOOD PRESSURE: 78 MMHG | TEMPERATURE: 97.6 F | BODY MASS INDEX: 26.89 KG/M2 | WEIGHT: 177.4 LBS

## 2023-07-28 DIAGNOSIS — C44.319 BASAL CELL CARCINOMA OF SKIN OF OTHER PARTS OF FACE: ICD-10-CM

## 2023-07-28 DIAGNOSIS — C43.30 MALIGNANT MELANOMA OF FACE EXCLUDING EYELID, NOSE, LIP, AND EAR (HCC): Primary | ICD-10-CM

## 2023-07-28 PROCEDURE — 99024 POSTOP FOLLOW-UP VISIT: CPT | Performed by: SURGERY

## 2023-07-28 NOTE — PROGRESS NOTES
Surgical Oncology Follow Up       [facility] SURGICAL ONCOLOGY [place] [address]    Mick Cheung  1940  [id number]  [facility] SURGICAL ONCOLOGY [place] [id number]    Chief Complaint   Patient presents with   • Post-op       Assessment/Plan:    No problem-specific Assessment & Plan notes found for this encounter. Diagnoses and all orders for this visit:    Malignant melanoma of face excluding eyelid, nose, lip, and ear ([address])  -     US head neck lymph node mapping; Future    Basal cell carcinoma of skin of other parts of face        Advance Care Planning/Advance Directives:  Discussed disease status, cancer treatment plans and/or cancer treatment goals with the patient. 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 1/29/2018 Biopsy    biopsy from the anterior aspect of the graft showing atypical epithelioid fibro histiocytic tumor, with lesion extending to the deep tissue edge. This is similar to prior biopsy done. Differential includes pleomorphic sarcoma versus an extreme atypical example of atypical fibro histiocytoma. 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 See Note. -- FNCLCC Histologic Grade 3  (total score: 7 of 8). * Tumor differentiation score: 3 of 3.        * Mitotic count score: 3 of 3; > 19 mitoses/10 HPF (33 mitoses/10 HPF). * Necrosis score: 1 of 2; present, < 50%. - Tumor extends into deep reticular dermis, subcutis and fascia.    - Perineural invasion: Present; intraneural invasion identified (0.4 mm largest nerve diameter). - Lymphovascular invasion: Focally suspicious. - Bone invasion: Not identified.   - Central nervous system extension: Not identified. - Margins: uninvolved by sarcoma but close. -- Sarcoma  0.15 mm from nearest deep margin.   - Additional Pathologic Findings: Changes consistent with prior surgical site. -- Focal ulceration with bacterial colonization, acute and chronic inflammation. -- Best representative tumor block: A5.      5/29/2018 - 7/5/2018 Radiation    Plan ID Energy Fractions Dose per Fraction (cGy) Dose Correction (cGy) Total Dose Delivered (cGy) Elapsed Days   Vertex Scalp 9E 27 / 27 200 0 5,400 37            Basal cell carcinoma of skin of other parts of face   7/10/2023 Surgery    Skin, right cheek, "basal cell carcinoma", excision:  Scar. No residual basal cell carcinoma identified in the examined sections. Malignant melanoma of nose ([address])   12/6/2022 Biopsy    Skin, left ala, shave biopsy:     MELANOMA (thickness: 0.6 mm); extending to the tissue edges      Ulceration: not seen  Anatomic [place] Burgess) level: III  Type: lentigo maligna melanoma  Mitoses: 0/mm2  Margin assessment: invasive melanoma extends to peripheral specimen margin and deep specimen margin focally  Pathologic stage: pT1a     2/7/2023 Surgery    Skin, nose, left nasal, wide excision:  Residual melanoma, invasive, desmoplastic type, and scar, margins free. Incidental basal cell carcinoma, superficial and nodular type, not extending to the margins. pT2a     2/7/2023 -  Cancer Staged    Staging form: Melanoma of the Skin, AJCC 8th Edition  - Clinical stage from 2/7/2023: Stage IB (cT2a, cN0, cM0) - Signed by Mayte Blackman MD on 5/26/2023       Malignant melanoma of face excluding eyelid, nose, lip, and ear ([address])   5/16/2023 Initial Diagnosis    Malignant melanoma of face excluding eyelid, nose, lip, and ear ([address])  Right mid cheek    A.  Skin, right mid cheek, shave biopsy:     Combined ulcerated MELANOMA (thickness: at least 1.5 mm) and BASAL CELL CARCINOMA; transected (see note). Note: There are foci of basal cell carcinoma and melanoma in which the components are separate from each other (as can be seen in a collision tumor), but there are also multiple foci in which the melanoma and basal cell carcinoma are closely intermingled ("basomelanocytic tumor" or "combined melanoma and basal cell carcinoma of the intermingled type"), which is a rare occurrence. 1 SOX10, MART-1, HMB45, p40, and Tarun-EP4 immunostains were reviewed and support the diagnosis. Please see synoptic report for additional details. The results were emailed to Dr. Luzmaria Vidales on 5/19/2023. Synoptic report for melanoma of the skin  Thickness: at least 1.5 mm  Ulceration: present  Anatomic Juan C Bri) level: at least IV  Type: superficial spreading melanoma  Mitoses: 1/mm2  Microsatellites: cannot be determined  Lymphovascular invasion: not seen  Neurotropism: not seen  Tumor regression: not seen  Tumor-infiltrating lymphocytes (TIL): present, non-brisk  Margin assessment: invasive melanoma extends to deep specimen margin; melanoma in situ extends to peripheral specimen margin  Pathologic stage: at least pT2b  Associated nevus: not seen         5/16/2023 -  Cancer Staged    Staging form: Melanoma of the Skin, AJCC 8th Edition  - Clinical stage from 5/16/2023: Stage IIA (cT2b, cN0, cM0) - Signed by Romy Terrell MD on 5/26/2023       7/10/2023 Surgery    Skin, right cheek "melanoma", wide excision:  Residual combined melanoma (Breslow thickness: 2.3 mm) and basal cell carcinoma (malignant basomelanocytic tumor); margins free.    pT3a         History of Present Illness: Patient is 34-year-old man here for postop check status post excision of a intermediate thickness melanoma from his right cheek with reconstruction plastic surgery team.  -Interval History: He has done well since his operation has no major complaints to report. Review of Systems:  Review of Systems   Constitutional: Negative. HENT: Negative. Eyes: Negative. Respiratory: Negative. Cardiovascular: Negative. Gastrointestinal: Negative. Endocrine: Negative. Genitourinary: Negative. Musculoskeletal: Negative. Skin: Positive for wound. Allergic/Immunologic: Negative. Neurological: Negative. Hematological: Negative. Psychiatric/Behavioral: Negative. All other systems reviewed and are negative.       Patient Active Problem List   Diagnosis   • Actinic keratosis   • Stage 3 chronic kidney disease ([address])   • Gout   • Hyperlipidemia   • Hypertension   • Irritable bowel syndrome   • Macular degeneration   • Urinary incontinence, post-void dribbling   • 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HCC)   • Basal cell carcinoma of skin of other parts of face   • Pulmonary nodules   • Incisional hernia, without obstruction or gangrene   • Status post repair of ventral hernia   • Dizziness   • Urinary frequency   • Lumbar back pain with radiculopathy affecting lower extremity   • Scalp ulceration, with necrosis of bone (HCC)   • 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HCC)   • Malignant melanoma of nose (HCC)   • Encounter for geriatric assessment   • Malignant melanoma of face excluding eyelid, nose, lip, and ear ([address])   • Anxiety   • Reactive airway disease     Past Medical History:   Diagnosis Date   • Anxiety    • Arthritis     back   • Back pain    • Balance problem    • Basal cell carcinoma (BCC)     in past   • Basal cell carcinoma (BCC) 10/04/2022    Right [place]   • BCC (basal cell carcinoma of skin) 08/17/2021    Right tip of nose   • BCC (basal cell carcinoma of skin) 08/08/2022    Left lip   • BCC (basal cell carcinoma of skin) 08/08/2022    Right cheek   • Cancer ([address])    • Gout    • Hard of hearing    • Hearing aid worn     ziyad   • Hearing aid worn    • Herniated nucleus pulposus, L4-5    • [place] (hard of hearing)    • Hypertension    • Incisional hernia    • Incontinence    • Macular degeneration    • Melanoma ([address])     left cheek- history   • Melanoma ([address]) 08/08/2022    Right neck   • Nocturia    • Pulmonary nodules    • Reactive airway disease    • 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[facility])     july 2020 with skin grafting   • Skin cancer 07/01/2020    AFX- scalp   • Squamous cell carcinoma     in past     Past Surgical History:   Procedure Laterality Date   • BASAL CELL CARCINOMA EXCISION Right 7/10/2023    Procedure: EXCISION BASAL CELL CARCINOMA  RIGHT CHEEK;  Surgeon: Schuyler Hu MD;  [facility];  Service: Surgical Oncology   • CATARACT EXTRACTION Bilateral    • COLONOSCOPY     • FLAP LOCAL HEAD / NECK Right 7/10/2023    Procedure: RIGHT CHEEK RECON W/ LOCAL FLAP & SKIN GRAFT;  Surgeon: Ashwin Liu MD;  [facility];  Service: Plastics   • HERNIA REPAIR     • HYDROCELE EXCISION / REPAIR     • MASTOID SURGERY Left    • MOHS SURGERY     • MOHS SURGERY  09/29/2021    Right tip of nose-Dr. Selena Lagunas   • MOHS SURGERY  10/06/2022    Left lip-Dr. Selena Lagunas   • MOHS SURGERY  05/16/2023    Right [place] - Dr. Tonja Schuster   • OTHER SURGICAL HISTORY      sarcoma scalp with skin graft   • NV EXCISION MALIGNANT LESION F/E/E/N/L 0.5 CM/< Left 02/07/2023    Procedure: WIDE EXCISION OF LEFT NASAL MELANOMA;  Surgeon: Schuyler Hu MD;  [facility];  Service: Surgical Oncology   • NV REPAIR FIRST ABDOMINAL WALL HERNIA N/A 04/22/2021    Procedure: REPAIR HERNIA INCISIONAL OPEN WITH MESH;  Surgeon: Brian العراقي MD;  [facility];  Service: General   • SCALP EXCISION N/A 03/28/2018    Procedure: SCALP SARCOMA EXCISION WITH FULL THICKNESS SKIN GRAFT;  Surgeon: Domenick Sandhoff, MD;  [facility];  Service: Plastics   • SKIN BIOPSY     • SKIN CANCER EXCISION     • SKIN LESION EXCISION Right 7/10/2023    Procedure: WIDE EXCISION MELANOMA SCAR, RIGHT CHEEK;  Surgeon: Schuyler Hu MD;  [facility];  Service: Surgical Oncology   • SPLIT THICKNESS SKIN GRAFT Left 2023    Procedure: APPLICATION OF SKIN SUBSTITUTE GRAFT TO NOSE;  Surgeon: Ana Lilia Gilbert MD;  [facility];  Service: Plastics   • TONSILLECTOMY AND ADENOIDECTOMY       Family History   Problem Relation Age of Onset   • Colon cancer Father    • Heart failure Father    • Heart disease Mother    • Stroke Mother      Social History     Socioeconomic History   • Marital status: /Civil Union     Spouse name: Not on file   • Number of children: Not on file   • Years of education: Not on file   • Highest education level: Not on file   Occupational History   • Not on file   Tobacco Use   • Smoking status: Former     Packs/day: 0.25     Years: 15.00     Total pack years: 3.75     Types: Cigarettes     Quit date: 3/13/1988     Years since quittin.3   • Smokeless tobacco: Never   Vaping Use   • Vaping Use: Never used   Substance and Sexual Activity   • Alcohol use: Not Currently     Comment: beer once in awhile   • Drug use: No   • Sexual activity: Yes   Other Topics Concern   • Not on file   Social History Narrative   • Not on file     Social Determinants of Health     Financial Resource Strain: Medium Risk (2023)    Overall Financial Resource Strain (CARDIA)    • Difficulty of Paying Living Expenses: Somewhat hard   Food Insecurity: Not on file   Transportation Needs: No Transportation Needs (2023)    PRAPARE - Transportation    • Lack of Transportation (Medical): No    • Lack of Transportation (Non-Medical): No   Physical Activity: Not on file   Stress: Not on file   Social Connections: Not on file   Intimate Partner Violence: Not on file   Housing Stability: Not on file       Current Outpatient Medications:   •  amLODIPine (NORVASC) 10 mg tablet, TAKE 1 TABLET BY MOUTH  DAILY, Disp: 90 tablet, Rfl: 3  •  Fluocinolone Acetonide Scalp 0.01 % OIL, Apply a thin layer topically daily at night one hour before bedtime.  (Patient taking differently: Apply topically if needed Apply a thin layer topically daily at night one hour before bedtime.), Disp: 118.28 mL, Rfl: 5  •  imiquimod (ALDARA) 5 % cream, Apply topically once a day Monday thru Friday for 6 weeks, Disp: 24 each, Rfl: 1  •  lisinopril (ZESTRIL) 20 mg tablet, TAKE 1 TABLET BY MOUTH  DAILY, Disp: 90 tablet, Rfl: 3  •  metoprolol succinate (TOPROL-XL) 50 mg 24 hr tablet, TAKE 1 TABLET BY MOUTH  DAILY, Disp: 90 tablet, Rfl: 3  •  mometasone (ELOCON) 0.1 % cream, APPLY TO EARS ONCE TO TWICE DAILY AS NEEDED FOR SCALING, Disp: , Rfl:   •  Multiple Vitamins-Minerals (PRESERVISION AREDS 2) CAPS, Take 1 capsule by mouth 2 (two) times a day, Disp: , Rfl:   •  mupirocin (BACTROBAN) 2 % ointment, Apply topically two to three times a day to sore areas (Patient taking differently: Apply topically if needed Apply topically two to three times a day to sore areas), Disp: 22 g, Rfl: 3  •  Niacin (VITAMIN B-3 PO), Take by mouth, Disp: , Rfl:   •  Niacinamide-Zn-Cu-Frlhfi-Vb-Ia (NICOTINAMIDE PO), Take 500 mg by mouth 2 (two) times a day, Disp: , Rfl:   •  Pyridoxine HCl (VITAMIN B-6 PO), Take by mouth, Disp: , Rfl:   •  traMADol (Ultram) 50 mg tablet, Take 1 tablet (50 mg total) by mouth every 6 (six) hours as needed for moderate pain for up to 15 doses, Disp: 15 tablet, Rfl: 0  •  bacitracin ointment, Apply topically daily for 7 days Apply to bolster site daily, Disp: 30 g, Rfl: 0  Allergies   Allergen Reactions   • Erythromycin Other (See Comments)     Thrush      Vitals:    07/28/23 1017   BP: 160/78   Pulse: 76   Temp: 97.6 °F (36.4 °C)   SpO2: 97%       Physical Exam  Vitals reviewed. Constitutional:       Appearance: Normal appearance. Skin:     Comments: Right cheek rotational flap/graft and right neck incisions healing nicely. Neurological:      Mental Status: He is alert.            Results:  Labs:    Imaging  NM lymphatic melanoma    Result Date: 7/10/2023  Narrative: SENTINEL NODE LYMPHOSCINTIGRAPHY INDICATION:   Right cheek melanoma. FINDINGS: 0.496 mCi Tc-99m sulfur colloid (0.6 cc volume) was administered intradermally in divided doses by Dr. Kirsten Sinclair in the region of the patients right cheek melanoma. Scintigraphic images were obtained over the neck and upper chest in multiple projections. A  sentinel node could not be identified in multiple projections even on 30-minute delay. The patient was transferred to the operating room in satisfactory condition. Impression: Unsuccessful lymphoscintigraphy. Workstation performed: [id number]     I reviewed the above laboratory and imaging data. Discussion/Summary: 79-year-old man with intermediate thickness melanoma. Thyroid migrate during lymphoscintigraphy. Will bear close follow-up. Plan a 3-month follow-up with surveillance ultrasound at that time along with exam of primary site. All questions answered and copy of path report given him for his records. He will continue to follow-up with plastic surgery team as well given reconstruction.

## 2023-08-01 ENCOUNTER — OFFICE VISIT (OUTPATIENT)
Dept: PLASTIC SURGERY | Facility: CLINIC | Age: 83
End: 2023-08-01

## 2023-08-01 DIAGNOSIS — C43.30 MALIGNANT MELANOMA OF FACE EXCLUDING EYELID, NOSE, LIP, AND EAR (HCC): ICD-10-CM

## 2023-08-01 DIAGNOSIS — C44.310 BASAL CELL CARCINOMA (BCC) OF FACE: Primary | ICD-10-CM

## 2023-08-01 PROCEDURE — 99024 POSTOP FOLLOW-UP VISIT: CPT

## 2023-08-01 NOTE — PROGRESS NOTES
[facility] Plastic and Reconstructive Surgery  [address] [place] [address] REYNALDO Garza Huntstad  [phone]    Patient Identification: Placido Gaucher is a 79 y.o. male     History of Present Illness: The patient is a 79y.o.  year-old male  who presents to the office for post op visit. Patient is 22 days s/p Wide Excision Melanoma Scar, Right Cheek - Right, Excision Basal Cell Carcinoma  Right Cheek - Right, and Right Cheek Recon W/ Local Flap & Skin Graft - Right  on 7/10/2023 by Dr. Stepan Dominguez and Dr. Eyal Rodriguez. Patient has no pain or discomfort at today's visit. He states his wife applies Xeroform and Aquaphor to the skin graft site daily. He refrains from getting the graft wet. Keeping the area covered when outdoors. He has no trouble talking or chewing food. He denies any fevers, chills or signs of infection. Pt saw Dr. Stepan Dominguez on 7/28/23 and visit went well, free margins obtained. Patient has no complaints at this time.     Past Medical History:   Diagnosis Date   • Anxiety    • Arthritis     back   • Back pain    • Balance problem    • Basal cell carcinoma (BCC)     in past   • Basal cell carcinoma (BCC) 10/04/2022    Right Temple   • BCC (basal cell carcinoma of skin) 08/17/2021    Right tip of nose   • BCC (basal cell carcinoma of skin) 08/08/2022    Left lip   • BCC (basal cell carcinoma of skin) 08/08/2022    Right cheek   • Cancer ([address])    • Gout    • Hard of hearing    • Hearing aid worn     ziyad   • Hearing aid worn    • Herniated nucleus pulposus, L4-5    • [place] (hard of hearing)    • Hypertension    • Incisional hernia    • Incontinence    • Macular degeneration    • Melanoma ([address])     left cheek- history   • Melanoma ([address]) 08/08/2022    Right neck   • Nocturia    • Pulmonary nodules    • Reactive airway disease    • 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   • Skin cancer 07/01/2020    AFX- scalp   • Squamous cell carcinoma     in past        Patient Active Problem List   Diagnosis   • Actinic keratosis   • Stage 3 chronic kidney disease (HCC)   • Gout   • Hyperlipidemia   • Hypertension   • Irritable bowel syndrome   • Macular degeneration   • Urinary incontinence, post-void dribbling   • 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HCC)   • Basal cell carcinoma of skin of other parts of face   • Pulmonary nodules   • Incisional hernia, without obstruction or gangrene   • Status post repair of ventral hernia   • Dizziness   • Urinary frequency   • Lumbar back pain with radiculopathy affecting lower extremity   • Scalp ulceration, with necrosis of bone (HCC)   • 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HCC)   • Malignant melanoma of nose (HCC)   • Encounter for geriatric assessment   • Malignant melanoma of face excluding eyelid, nose, lip, and ear ([address])   • Anxiety   • Reactive airway disease        Past Surgical History:   Procedure Laterality Date   • BASAL CELL CARCINOMA EXCISION Right 7/10/2023    Procedure: EXCISION BASAL CELL CARCINOMA  RIGHT CHEEK;  Surgeon: Maki Carlson MD;  [facility];  Service: Surgical Oncology   • CATARACT EXTRACTION Bilateral    • COLONOSCOPY     • FLAP LOCAL HEAD / NECK Right 7/10/2023    Procedure: RIGHT CHEEK RECON W/ LOCAL FLAP & SKIN GRAFT;  Surgeon: Arnol Cantor MD;  [facility];  Service: Plastics   • HERNIA REPAIR     • HYDROCELE EXCISION / REPAIR     • MASTOID SURGERY Left    • MOHS SURGERY     • MOHS SURGERY  09/29/2021    Right tip of nose-Dr. Janet Walker   • MOHS SURGERY  10/06/2022    Left lip-Dr. Janet Walker   • MOHS SURGERY  05/16/2023    Right [facility] - Dr. Danica Tinajero   • OTHER SURGICAL HISTORY      sarcoma scalp with skin graft   • [place] EXCISION MALIGNANT LESION F/E/E/N/L 0.5 CM/< Left 02/07/2023    Procedure: WIDE EXCISION OF LEFT NASAL MELANOMA;  Surgeon: Maki Carlson MD;  [facility];  Service: Surgical Oncology   • [place] REPAIR FIRST ABDOMINAL WALL HERNIA N/A 04/22/2021    Procedure: REPAIR HERNIA INCISIONAL OPEN WITH MESH;  Surgeon: Ranjana Rodriguez MD;  [facility];  Service: General   • SCALP EXCISION N/A 03/28/2018    Procedure: SCALP SARCOMA EXCISION WITH FULL THICKNESS SKIN GRAFT;  Surgeon: Qiana Atwood MD;  [facility];  Service: Plastics   • SKIN BIOPSY     • SKIN CANCER EXCISION     • SKIN LESION EXCISION Right 7/10/2023    Procedure: WIDE EXCISION MELANOMA SCAR, RIGHT CHEEK;  Surgeon: Antonia Agustin MD;  [facility];  Service: Surgical Oncology   • SPLIT THICKNESS SKIN GRAFT Left 02/07/2023    Procedure: APPLICATION OF SKIN SUBSTITUTE GRAFT TO NOSE;  Surgeon: Essence Corona MD;  [facility];  Service: Plastics   • TONSILLECTOMY AND ADENOIDECTOMY          Allergies   Allergen Reactions   • Erythromycin Other (See Comments)     Thrush         Current Outpatient Medications on File Prior to Visit   Medication Sig Dispense Refill   • amLODIPine (NORVASC) 10 mg tablet TAKE 1 TABLET BY MOUTH  DAILY 90 tablet 3   • bacitracin ointment Apply topically daily for 7 days Apply to bolster site daily 30 g 0   • Fluocinolone Acetonide Scalp 0.01 % OIL Apply a thin layer topically daily at night one hour before bedtime.  (Patient taking differently: Apply topically if needed Apply a thin layer topically daily at night one hour before bedtime.) 118.28 mL 5   • imiquimod (ALDARA) 5 % cream Apply topically once a day Monday thru Friday for 6 weeks 24 each 1   • lisinopril (ZESTRIL) 20 mg tablet TAKE 1 TABLET BY MOUTH  DAILY 90 tablet 3   • metoprolol succinate (TOPROL-XL) 50 mg 24 hr tablet TAKE 1 TABLET BY MOUTH  DAILY 90 tablet 3   • mometasone (ELOCON) 0.1 % cream APPLY TO EARS ONCE TO TWICE DAILY AS NEEDED FOR SCALING     • Multiple Vitamins-Minerals (PRESERVISION AREDS 2) CAPS Take 1 capsule by mouth 2 (two) times a day     • mupirocin (BACTROBAN) 2 % ointment Apply topically two to three times a day to sore areas (Patient taking differently: Apply topically if needed Apply topically two to three times a day to sore areas) 22 g 3   • Niacin (VITAMIN B-3 PO) Take by mouth     • Niacinamide-Zn-Cu-Mqybci-La-Bk (NICOTINAMIDE PO) Take 500 mg by mouth 2 (two) times a day     • Pyridoxine HCl (VITAMIN B-6 PO) Take by mouth     • traMADol (Ultram) 50 mg tablet Take 1 tablet (50 mg total) by mouth every 6 (six) hours as needed for moderate pain for up to 15 doses 15 tablet 0   • [DISCONTINUED] alclomethasone (ACLOVATE) 0.05 % cream Apply topically 2 (two) times a day as needed        No current facility-administered medications on file prior to visit. Tobacco Use: Medium Risk (7/28/2023)    Patient History    • Smoking Tobacco Use: Former    • Smokeless Tobacco Use: Never    • Passive Exposure: Not on file        Review of Systems  Constitutional: Denies fevers, chills. Reports minimal pain  Skin: Denies any warmth, erythema, or mucopurulent drainage. Reports minimal edema    Physical Exam   Right Cheek: Skin graft is adhered to wound base. Facial hair growing through. Graft is viable, no signs of failure or poor perfusion. Surrounding skin has no signs of infection or fluid collection. Post-op edema resolved    Right Neck: Skin graft donor site is clean, dry and intact. Healing appropriately. No sign of infection. Assessment and Plan:  The patient is an 79y.o.  year-old male who presents to the office for post-op visit. Patient is 22 days s/p Wide Excision Melanoma Scar, Right Cheek - Right, Excision Basal Cell Carcinoma  Right Cheek - Right, and Right Cheek Recon W/ Local Flap & Skin Graft - Right  on 7/10/2023 by Dr. Linda Holder and Dr. Sivakumar Doherty    -Skin graft upon right cheek was cleaned and assessed. No signs of failure, facial hair growing through. -Patient is to continue moisturizing the graft with Aquaphor daily. Keeping it dry and covered, especially when outdoors. He may apply Aquaphor to right neck donor site daily as well.  -Continue to avoid shaving the graft site and donor site.  -The patient is to return in 1 week for graft check.  Per pt, he would prefer to return in 2 weeks to avoid "event traffic" in [facility]. - The patient is to call the office with any questions or concerns. All of the patient's questions were answered at this time and they agree with the plan of care.       Anita Stevenson PA-C  [facility] Plastic and Reconstructive Surgery

## 2023-08-17 ENCOUNTER — OFFICE VISIT (OUTPATIENT)
Dept: PLASTIC SURGERY | Facility: CLINIC | Age: 83
End: 2023-08-17

## 2023-08-17 DIAGNOSIS — C44.310 BASAL CELL CARCINOMA (BCC) OF FACE: Primary | ICD-10-CM

## 2023-08-17 DIAGNOSIS — C43.30 MALIGNANT MELANOMA OF FACE EXCLUDING EYELID, NOSE, LIP, AND EAR (HCC): ICD-10-CM

## 2023-08-17 PROCEDURE — 99024 POSTOP FOLLOW-UP VISIT: CPT

## 2023-08-17 NOTE — PROGRESS NOTES
[facility] Plastic and Reconstructive Surgery  [address] [place] [address] REYNALDO Garza Huntstad  [phone]    Patient Identification: Molly Mccallum is a 79 y.o. male     History of Present Illness: The patient is a 79y.o.  year-old male  who presents to the office for post op visit. Patient is 38 days s/p Wide Excision Melanoma Scar, Right Cheek - Right, Excision Basal Cell Carcinoma  Right Cheek - Right, and Right Cheek Recon W/ Local Flap & Skin Graft - Right  on 7/10/2023 by Dr. Rosetta Hopson and Dr. Le Cunningham. Patient has no pain or discomfort at today's visit. He states his wife applies  Aquaphor to the skin graft site daily. He refrains from getting the graft wet. Keeping the area covered when outdoors. He has no trouble talking or chewing food. Refrains from shaving over the graft. He denies any fevers, chills or signs of infection. Pt saw Dr. Rosetta Hopson on 7/28/23 and visit went well, free margins obtained. Patient has no complaints at this time.     Past Medical History:   Diagnosis Date   • Anxiety    • Arthritis     back   • Back pain    • Balance problem    • Basal cell carcinoma (BCC)     in past   • Basal cell carcinoma (BCC) 10/04/2022    Right Temple   • BCC (basal cell carcinoma of skin) 08/17/2021    Right tip of nose   • BCC (basal cell carcinoma of skin) 08/08/2022    Left lip   • BCC (basal cell carcinoma of skin) 08/08/2022    Right cheek   • Cancer ([address])    • Gout    • Hard of hearing    • Hearing aid worn     ziyad   • Hearing aid worn    • Herniated nucleus pulposus, L4-5    • [place] (hard of hearing)    • Hypertension    • Incisional hernia    • Incontinence    • Macular degeneration    • Melanoma ([address])     left cheek- history   • Melanoma ([address]) 08/08/2022    Right neck   • Nocturia    • Pulmonary nodules    • Reactive airway disease    • 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   • Skin cancer 07/01/2020    AFX- scalp   • Squamous cell carcinoma     in past        Patient Active Problem List   Diagnosis   • Actinic keratosis   • Stage 3 chronic kidney disease (HCC)   • Gout   • Hyperlipidemia   • Hypertension   • Irritable bowel syndrome   • Macular degeneration   • Urinary incontinence, post-void dribbling   • 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HCC)   • Basal cell carcinoma of skin of other parts of face   • Pulmonary nodules   • Incisional hernia, without obstruction or gangrene   • Status post repair of ventral hernia   • Dizziness   • Urinary frequency   • Lumbar back pain with radiculopathy affecting lower extremity   • Scalp ulceration, with necrosis of bone (HCC)   • 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HCC)   • Malignant melanoma of nose (HCC)   • Encounter for geriatric assessment   • Malignant melanoma of face excluding eyelid, nose, lip, and ear ([address])   • Anxiety   • Reactive airway disease        Past Surgical History:   Procedure Laterality Date   • BASAL CELL CARCINOMA EXCISION Right 7/10/2023    Procedure: EXCISION BASAL CELL CARCINOMA  RIGHT CHEEK;  Surgeon: Daren Issa MD;  [facility];  Service: Surgical Oncology   • CATARACT EXTRACTION Bilateral    • COLONOSCOPY     • FLAP LOCAL HEAD / NECK Right 7/10/2023    Procedure: RIGHT CHEEK RECON W/ LOCAL FLAP & SKIN GRAFT;  Surgeon: Brian Perry MD;  [facility];  Service: Plastics   • HERNIA REPAIR     • HYDROCELE EXCISION / REPAIR     • MASTOID SURGERY Left    • MOHS SURGERY     • MOHS SURGERY  09/29/2021    Right tip of nose-Dr. Hari Arredondo   • MOHS SURGERY  10/06/2022    Left lip-Dr. Hari Arredondo   • MOHS SURGERY  05/16/2023    Right [facility] - Dr. Mely Baumann   • OTHER SURGICAL HISTORY      sarcoma scalp with skin graft   • [place] EXCISION MALIGNANT LESION F/E/E/N/L 0.5 CM/< Left 02/07/2023    Procedure: WIDE EXCISION OF LEFT NASAL MELANOMA;  Surgeon: Daren Issa MD;  [facility];  Service: Surgical Oncology   • [place] REPAIR FIRST ABDOMINAL WALL HERNIA N/A 04/22/2021    Procedure: REPAIR HERNIA INCISIONAL OPEN WITH MESH;  Surgeon: Dolores Pratt MD;  [facility];  Service: General   • SCALP EXCISION N/A 03/28/2018    Procedure: SCALP SARCOMA EXCISION WITH FULL THICKNESS SKIN GRAFT;  Surgeon: Jasmyn Drake MD;  [facility];  Service: Plastics   • SKIN BIOPSY     • SKIN CANCER EXCISION     • SKIN LESION EXCISION Right 7/10/2023    Procedure: WIDE EXCISION MELANOMA SCAR, RIGHT CHEEK;  Surgeon: Le West MD;  [facility];  Service: Surgical Oncology   • SPLIT THICKNESS SKIN GRAFT Left 02/07/2023    Procedure: APPLICATION OF SKIN SUBSTITUTE GRAFT TO NOSE;  Surgeon: Aziza Pineda MD;  [facility];  Service: Plastics   • TONSILLECTOMY AND ADENOIDECTOMY          Allergies   Allergen Reactions   • Erythromycin Other (See Comments)     Thrush         Current Outpatient Medications on File Prior to Visit   Medication Sig Dispense Refill   • amLODIPine (NORVASC) 10 mg tablet TAKE 1 TABLET BY MOUTH  DAILY 90 tablet 3   • bacitracin ointment Apply topically daily for 7 days Apply to bolster site daily 30 g 0   • Fluocinolone Acetonide Scalp 0.01 % OIL Apply a thin layer topically daily at night one hour before bedtime.  (Patient taking differently: Apply topically if needed Apply a thin layer topically daily at night one hour before bedtime.) 118.28 mL 5   • imiquimod (ALDARA) 5 % cream Apply topically once a day Monday thru Friday for 6 weeks 24 each 1   • lisinopril (ZESTRIL) 20 mg tablet TAKE 1 TABLET BY MOUTH  DAILY 90 tablet 3   • metoprolol succinate (TOPROL-XL) 50 mg 24 hr tablet TAKE 1 TABLET BY MOUTH  DAILY 90 tablet 3   • mometasone (ELOCON) 0.1 % cream APPLY TO EARS ONCE TO TWICE DAILY AS NEEDED FOR SCALING     • Multiple Vitamins-Minerals (PRESERVISION AREDS 2) CAPS Take 1 capsule by mouth 2 (two) times a day     • mupirocin (BACTROBAN) 2 % ointment Apply topically two to three times a day to sore areas (Patient taking differently: Apply topically if needed Apply topically two to three times a day to sore areas) 22 g 3   • Niacin (VITAMIN B-3 PO) Take by mouth     • Niacinamide-Zn-Cu-Xohxzc-Jr-Al (NICOTINAMIDE PO) Take 500 mg by mouth 2 (two) times a day     • Pyridoxine HCl (VITAMIN B-6 PO) Take by mouth     • traMADol (Ultram) 50 mg tablet Take 1 tablet (50 mg total) by mouth every 6 (six) hours as needed for moderate pain for up to 15 doses 15 tablet 0   • [DISCONTINUED] alclomethasone (ACLOVATE) 0.05 % cream Apply topically 2 (two) times a day as needed        No current facility-administered medications on file prior to visit. Tobacco Use: Medium Risk (8/1/2023)    Patient History    • Smoking Tobacco Use: Former    • Smokeless Tobacco Use: Never    • Passive Exposure: Not on file        Review of Systems  Constitutional: Denies fevers, chills or pain. Skin: Denies any warmth, erythema, acute edema or mucopurulent drainage. Physical Exam   Right Cheek: Skin graft is adhered to wound base. Facial hair growing through. Graft is viable, no signs of failure or poor perfusion. Surrounding skin has no signs of infection or fluid collection. Right Neck: Skin graft donor site is clean, dry and intact. Healing appropriately. Raised scarring at lateral ends. Assessment and Plan:  The patient is an 79y.o.  year-old male who presents to the office for post-op visit. Patient is 38 days s/p Wide Excision Melanoma Scar, Right Cheek - Right, Excision Basal Cell Carcinoma  Right Cheek - Right, and Right Cheek Recon W/ Local Flap & Skin Graft - Right  on 7/10/2023 by Dr. Rossy Hess and Dr. Mireya Rizo    -Skin graft upon right cheek was cleaned and assessed. No signs of failure, facial hair growing through. -Patient is to continue moisturizing the graft with Aquaphor daily. Pt was instructed to apply a light layer to avoid maceration of tissue.  He may begin gentley cleansing the area daily with soap and water.  -At today's visit we discussed beginning daily scar massage and use of silicone scar gel to promote scar softening and flattening upon neck scar site.  -Continue to avoid shaving the graft site.  -The patient is to return in 1 month to assess graft healing.  - The patient is to call the office with any questions or concerns. All of the patient's questions were answered at this time and they agree with the plan of care.       Swati Acosta PA-C  [facility] Plastic and Reconstructive Surgery

## 2023-08-27 DIAGNOSIS — I10 ESSENTIAL HYPERTENSION: ICD-10-CM

## 2023-08-28 RX ORDER — AMLODIPINE BESYLATE 10 MG/1
TABLET ORAL
Qty: 90 TABLET | Refills: 3 | Status: SHIPPED | OUTPATIENT
Start: 2023-08-28

## 2023-09-06 ENCOUNTER — OFFICE VISIT (OUTPATIENT)
Dept: PODIATRY | Facility: CLINIC | Age: 83
End: 2023-09-06
Payer: MEDICARE

## 2023-09-06 VITALS
BODY MASS INDEX: 26.37 KG/M2 | HEIGHT: 68 IN | HEART RATE: 57 BPM | SYSTOLIC BLOOD PRESSURE: 173 MMHG | RESPIRATION RATE: 18 BRPM | WEIGHT: 174 LBS | DIASTOLIC BLOOD PRESSURE: 79 MMHG

## 2023-09-06 DIAGNOSIS — I73.9 PERIPHERAL VASCULAR DISEASE, UNSPECIFIED (HCC): Primary | ICD-10-CM

## 2023-09-06 PROCEDURE — G0127 TRIM NAIL(S): HCPCS | Performed by: PODIATRIST

## 2023-09-06 NOTE — PROGRESS NOTES
Established patient with class findings presents for nail care. Vascular exam:  DP  0/4 bilateral; PT  0 4 bilateral   Dermatological exam:  Each toenail is thick and  dystrophic. Diagnosis:  PVD  Treatment: Trimmed multiple dystrophic toenails.     Nail removal    Date/Time: 9/6/2023 10:45 AM    Performed by: Laney Palmer DPM  Authorized by: Laney Palmer DPM    Nails trimmed:     Number of nails trimmed:  10

## 2023-09-21 ENCOUNTER — OFFICE VISIT (OUTPATIENT)
Dept: PLASTIC SURGERY | Facility: CLINIC | Age: 83
End: 2023-09-21

## 2023-09-21 DIAGNOSIS — C44.310 BASAL CELL CARCINOMA (BCC) OF FACE: Primary | ICD-10-CM

## 2023-09-22 NOTE — PROGRESS NOTES
[facility] Plastic and Reconstructive Surgery  [address] [place] [address] REYNALDO Garza [place]  [phone]    Patient Identification: Compa Siddiqui is a 79 y.o. male     History of Present Illness: The patient is a 79y.o.  year-old male  who presents to the office for post op visit. Patient is 68 days s/p Wide Excision Melanoma Scar, Right Cheek - Right, Excision Basal Cell Carcinoma  Right Cheek - Right, and Right Cheek Recon W/ Local Flap & Skin Graft - Right  on 7/10/2023 by Dr. Adela Good and Dr. Facundo Lee. Patient states he's doing well at this time. He is moisturizing the graft site and neck daily. He has no complaints and is happy with his results. He is scheduled to see Dr. Dhaval Stafford of Dermatology on 10/3/23.     Past Medical History:   Diagnosis Date   • Anxiety    • Arthritis     back   • Back pain    • Balance problem    • Basal cell carcinoma (BCC)     in past   • Basal cell carcinoma (BCC) 10/04/2022    Right Temple   • BCC (basal cell carcinoma of skin) 08/17/2021    Right tip of nose   • BCC (basal cell carcinoma of skin) 08/08/2022    Left lip   • BCC (basal cell carcinoma of skin) 08/08/2022    Right cheek   • Cancer ([address])    • Gout    • Hard of hearing    • Hearing aid worn     ziyad   • Hearing aid worn    • Herniated nucleus pulposus, L4-5    • [place] (hard of hearing)    • Hypertension    • Incisional hernia    • Incontinence    • Macular degeneration    • Melanoma ([address])     left cheek- history   • Melanoma ([address]) 08/08/2022    Right neck   • Nocturia    • Pulmonary nodules    • Reactive airway disease    • 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   • Skin cancer 07/01/2020    AFX- scalp   • Squamous cell carcinoma     in past        Patient Active Problem List   Diagnosis   • Actinic keratosis   • Stage 3 chronic kidney disease ([address])   • Gout   • Hyperlipidemia   • Hypertension   • Irritable bowel syndrome   • Macular degeneration   • Urinary incontinence, post-void dribbling   • 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[address])   • Basal cell carcinoma of skin of other parts of face   • Pulmonary nodules   • Incisional hernia, without obstruction or gangrene   • Status post repair of ventral hernia   • Dizziness   • Urinary frequency   • Lumbar back pain with radiculopathy affecting lower extremity   • Scalp ulceration, with necrosis of bone (HCC)   • 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HCC)   • Malignant melanoma of nose (HCC)   • Encounter for geriatric assessment   • Malignant melanoma of face excluding eyelid, nose, lip, and ear ([address])   • Anxiety   • Reactive airway disease        Past Surgical History:   Procedure Laterality Date   • BASAL CELL CARCINOMA EXCISION Right 7/10/2023    Procedure: EXCISION BASAL CELL CARCINOMA  RIGHT CHEEK;  Surgeon: Betzaida Moore MD;  Location: [facility];  Service: Surgical Oncology   • CATARACT EXTRACTION Bilateral    • COLONOSCOPY     • FLAP LOCAL HEAD / NECK Right 7/10/2023    Procedure: RIGHT CHEEK RECON W/ LOCAL FLAP & SKIN GRAFT;  Surgeon: Allyson Avina MD;  Location: [facility];  Service: Plastics   • HERNIA REPAIR     • HYDROCELE EXCISION / REPAIR     • MASTOID SURGERY Left    • MOHS SURGERY     • MOHS SURGERY  09/29/2021    Right tip of nose-Dr. Abril Johnston   • MOHS SURGERY  10/06/2022    Left lip-Dr. Abril Johnston   • MOHS SURGERY  05/16/2023    Right Trula Pacheco - Dr. Bettina Wadsworth   • OTHER SURGICAL HISTORY      sarcoma scalp with skin graft   • UT EXCISION MALIGNANT LESION F/E/E/N/L 0.5 CM/< Left 02/07/2023    Procedure: WIDE EXCISION OF LEFT NASAL MELANOMA;  Surgeon: Betzaida Moore MD;  [facility]: [facility];  Service: Surgical Oncology   • UT REPAIR FIRST ABDOMINAL WALL HERNIA N/A 04/22/2021    Procedure: REPAIR HERNIA INCISIONAL OPEN WITH MESH;  Surgeon: Chiquita Escobedo MD;  [facility];  Service: General   • SCALP EXCISION N/A 03/28/2018    Procedure: SCALP SARCOMA EXCISION WITH FULL THICKNESS SKIN GRAFT;  Surgeon: Ashleigh Treadwell MD;  [facility];  Service: Plastics   • SKIN BIOPSY     • SKIN CANCER EXCISION     • SKIN LESION EXCISION Right 7/10/2023    Procedure: WIDE EXCISION MELANOMA SCAR, RIGHT CHEEK;  Surgeon: Toi James MD;  [facility];  Service: Surgical Oncology   • SPLIT THICKNESS SKIN GRAFT Left 02/07/2023    Procedure: APPLICATION OF SKIN SUBSTITUTE GRAFT TO NOSE;  Surgeon: Martha Dickerson MD;  Location: [facility];  Service: Plastics   • TONSILLECTOMY AND ADENOIDECTOMY          Allergies   Allergen Reactions   • Erythromycin Other (See Comments)     Thrush         Current Outpatient Medications on File Prior to Visit   Medication Sig Dispense Refill   • amLODIPine (NORVASC) 10 mg tablet TAKE 1 TABLET BY MOUTH  DAILY 90 tablet 3   • bacitracin ointment Apply topically daily for 7 days Apply to bolster site daily 30 g 0   • Fluocinolone Acetonide Scalp 0.01 % OIL Apply a thin layer topically daily at night one hour before bedtime.  (Patient taking differently: Apply topically if needed Apply a thin layer topically daily at night one hour before bedtime.) 118.28 mL 5   • imiquimod (ALDARA) 5 % cream Apply topically once a day Monday thru Friday for 6 weeks 24 each 1   • lisinopril (ZESTRIL) 20 mg tablet TAKE 1 TABLET BY MOUTH  DAILY 90 tablet 3   • metoprolol succinate (TOPROL-XL) 50 mg 24 hr tablet TAKE 1 TABLET BY MOUTH  DAILY 90 tablet 3   • mometasone (ELOCON) 0.1 % cream APPLY TO EARS ONCE TO TWICE DAILY AS NEEDED FOR SCALING     • Multiple Vitamins-Minerals (PRESERVISION AREDS 2) CAPS Take 1 capsule by mouth 2 (two) times a day     • mupirocin (BACTROBAN) 2 % ointment Apply topically two to three times a day to sore areas (Patient taking differently: Apply topically if needed Apply topically two to three times a day to sore areas) 22 g 3   • Niacin (VITAMIN B-3 PO) Take by mouth     • Niacinamide-Zn-Cu-Dltuys-Fl-Kb (NICOTINAMIDE PO) Take 500 mg by mouth 2 (two) times a day     • Pyridoxine HCl (VITAMIN B-6 PO) Take by mouth     • traMADol (Ultram) 50 mg tablet Take 1 tablet (50 mg total) by mouth every 6 (six) hours as needed for moderate pain for up to 15 doses 15 tablet 0   • [DISCONTINUED] alclomethasone (ACLOVATE) 0.05 % cream Apply topically 2 (two) times a day as needed        No current facility-administered medications on file prior to visit. Tobacco Use: Medium Risk (9/6/2023)    Patient History    • Smoking Tobacco Use: Former    • Smokeless Tobacco Use: Never    • Passive Exposure: Not on file        Review of Systems  Constitutional: Denies fevers, chills or pain. Skin: Denies any warmth, erythema, acute edema or mucopurulent drainage. Physical Exam   Right Cheek: Graft is fully healed, scar is pink and flat. Right Neck: Skin graft donor site is clean, dry and intact. Healing appropriately. Assessment and Plan:  The patient is an 79y.o.  year-old male who presents to the office for post-op visit. Patient is 68 days s/p Wide Excision Melanoma Scar, Right Cheek - Right, Excision Basal Cell Carcinoma  Right Cheek - Right, and Right Cheek Recon W/ Local Flap & Skin Graft - Right  on 7/10/2023 by Dr. Tate Molina and Dr. Ella Salvador    -Right cheek skin graft is well-healed. Scar is healing appropriately. Pt is to continue moisturizing and using silicone scar cream daily upon the right cheek and neck scars.   -Patient has no further concerns and wishes to follow up as needed. He may call with any future concerns. - The patient is to call the office with any questions or concerns. All of the patient's questions were answered at this time and they agree with the plan of care.       Mel Tobar PA-C  [facility] Plastic and Reconstructive Surgery

## 2023-10-03 ENCOUNTER — OFFICE VISIT (OUTPATIENT)
Dept: DERMATOLOGY | Facility: CLINIC | Age: 83
End: 2023-10-03
Payer: MEDICARE

## 2023-10-03 VITALS — BODY MASS INDEX: 26.48 KG/M2 | TEMPERATURE: 98 F | HEIGHT: 68 IN | WEIGHT: 174.7 LBS

## 2023-10-03 DIAGNOSIS — C49.9 PLEOMORPHIC CELL SARCOMA (HCC): ICD-10-CM

## 2023-10-03 DIAGNOSIS — Z85.89 HISTORY OF SQUAMOUS CELL CARCINOMA: ICD-10-CM

## 2023-10-03 DIAGNOSIS — Z85.828 HISTORY OF BASAL CELL CARCINOMA: Primary | ICD-10-CM

## 2023-10-03 DIAGNOSIS — D48.5 NEOPLASM OF UNCERTAIN BEHAVIOR OF SKIN: ICD-10-CM

## 2023-10-03 DIAGNOSIS — C44.319 BASAL CELL CARCINOMA (BCC) OF RIGHT PREAURICULAR REGION: ICD-10-CM

## 2023-10-03 DIAGNOSIS — Z85.820 HISTORY OF MELANOMA: ICD-10-CM

## 2023-10-03 PROCEDURE — 99214 OFFICE O/P EST MOD 30 MIN: CPT | Performed by: DERMATOLOGY

## 2023-10-03 PROCEDURE — 11102 TANGNTL BX SKIN SINGLE LES: CPT | Performed by: DERMATOLOGY

## 2023-10-03 PROCEDURE — 11103 TANGNTL BX SKIN EA SEP/ADDL: CPT | Performed by: DERMATOLOGY

## 2023-10-03 PROCEDURE — 88305 TISSUE EXAM BY PATHOLOGIST: CPT | Performed by: STUDENT IN AN ORGANIZED HEALTH CARE EDUCATION/TRAINING PROGRAM

## 2023-10-03 NOTE — PROGRESS NOTES
[facility] Note     Patient Name: Anival Marsh  Encounter Date: 10/03/2023     Have you been cared for by a [facility]ologist in the last 3 years and, if so, which description applies to you? Yes. I have been here within the last 3 years, and my medical history has NOT changed since that time. I am MALE/not capable of bearing children. REVIEW OF SYSTEMS:  Have you recently had or currently have any of the following? No changes in my recent health. PAST MEDICAL HISTORY:  Have you personally ever had or currently have any of the following? If "YES," then please provide more detail. No changes in my medical history. FAMILY HISTORY:  Any "first degree relatives" (parent, brother, sister, or child) with the following? No changes in my family's known health. PATIENT EXPERIENCE:    Do you want the Dermatologist to perform a COMPLETE skin exam today including a clinical examination under the "bra and underwear" areas? Yes  If necessary, do we have your permission to call and leave a detailed message on your Preferred Phone number that includes your specific medical information? Yes      Allergies   Allergen Reactions   • Erythromycin Other (See Comments)     Thrush       Current Outpatient Medications:   •  amLODIPine (NORVASC) 10 mg tablet, TAKE 1 TABLET BY MOUTH  DAILY, Disp: 90 tablet, Rfl: 3  •  bacitracin ointment, Apply topically daily for 7 days Apply to bolster site daily, Disp: 30 g, Rfl: 0  •  Fluocinolone Acetonide Scalp 0.01 % OIL, Apply a thin layer topically daily at night one hour before bedtime.  (Patient taking differently: Apply topically if needed Apply a thin layer topically daily at night one hour before bedtime.), Disp: 118.28 mL, Rfl: 5  •  imiquimod (ALDARA) 5 % cream, Apply topically once a day Monday thru Friday for 6 weeks, Disp: 24 each, Rfl: 1  •  lisinopril (ZESTRIL) 20 mg tablet, TAKE 1 TABLET BY MOUTH  DAILY, Disp: 90 tablet, Rfl: 3  • metoprolol succinate (TOPROL-XL) 50 mg 24 hr tablet, TAKE 1 TABLET BY MOUTH  DAILY, Disp: 90 tablet, Rfl: 3  •  mometasone (ELOCON) 0.1 % cream, APPLY TO EARS ONCE TO TWICE DAILY AS NEEDED FOR SCALING, Disp: , Rfl:   •  Multiple Vitamins-Minerals (PRESERVISION AREDS 2) CAPS, Take 1 capsule by mouth 2 (two) times a day, Disp: , Rfl:   •  mupirocin (BACTROBAN) 2 % ointment, Apply topically two to three times a day to sore areas (Patient taking differently: Apply topically if needed Apply topically two to three times a day to sore areas), Disp: 22 g, Rfl: 3  •  Niacin (VITAMIN B-3 PO), Take by mouth, Disp: , Rfl:   •  Niacinamide-Zn-Cu-Apxkdr-Ux-Ej (NICOTINAMIDE PO), Take 500 mg by mouth 2 (two) times a day, Disp: , Rfl:   •  Pyridoxine HCl (VITAMIN B-6 PO), Take by mouth, Disp: , Rfl:   •  traMADol (Ultram) 50 mg tablet, Take 1 tablet (50 mg total) by mouth every 6 (six) hours as needed for moderate pain for up to 15 doses, Disp: 15 tablet, Rfl: 0          Whom besides the patient is providing clinical information about today's encounter? NO ADDITIONAL HISTORIAN (patient alone provided history)    Physical Exam and Assessment/Plan by Diagnosis:    1. HISTORY OF BASAL CELL CARCINOMA    Physical Exam:  Anatomic Location Affected:  Right temple, right cheek, left lip, right tip of nose  Morphological Description of scar:  Well healed  Suspected Recurrence: No  Pertinent Positives:  Pertinent Negatives: Additional History of Present Condition:  History of basal cell carcinoma with no sign of recurrence. MOHS done 05/16/2023, Accession #[id number]. MOHS done 10/13/2022, Accession # [id number]. MOHS done 10/06/22, Accession # [id number]. MOHS done 09/29/21, Accession # [id number].     Assessment and Plan:  Based on a thorough discussion of this condition and the management approach to it (including a comprehensive discussion of the known risks, side effects and potential benefits of treatment), the patient (family) agrees to implement the following specific plan:  Monitor for changes or recurrence  Routine skin checks    How can basal cell carcinoma be prevented? The most important way to prevent BCC is to avoid sunburn. This is especially important in childhood and early life. Fair skinned individuals and those with a personal or family history of BCC should protect their skin from sun exposure daily, year-round and lifelong. Stay indoors or under the shade in the middle of the day   Wear covering clothing   Apply high protection factor SPF50+ broad-spectrum sunscreens generously to exposed skin if outdoors   Avoid indoor tanning (sun beds, solaria)  Oral nicotinamide (vitamin B3) in a dose of 500 mg twice daily may reduce the number and severity of BCCs. What is the outlook for basal cell carcinoma? Most BCCs are cured by treatment. Cure is most likely if treatment is undertaken when the lesion is small. About 50% of people with BCC develop a second one within 3 years of the first. They are also at increased risk of other skin cancers, especially melanoma. Regular self-skin examinations and long-term annual skin checks by an experienced health professional are recommended. 2. HISTORY OF MELANOMA    Physical Exam:  Anatomic Location Affected:  Right neck, Left cheek and left deltoid( 0.9 mm excised 06/19/2019 by Dr. Robyn Kapoor. Right check Emmy ( 2.3mm breslow), desmoplastic left ala  Morphological Description of Scar:  Well healed  Year Treated: 09/14/2022, Left cheek-2007, left deltoid 2019, right cheek 7.10.23, left ala 2.7.23    Suspected Recurrence: no  Regional adenopathy: no    Additional History of Present Condition:  History of melanoma with no signs of recurrence. Excision done 09/14/2022, Accession # [id number].     Assessment and Plan:  Based on a thorough discussion of this condition and the management approach to it (including a comprehensive discussion of the known risks, side effects and potential benefits of treatment), the patient (family) agrees to implement the following specific plan:  Monitor for changes or recurrence  Routine skin checks    What happens at follow-up? The main purpose of follow-up is to detect recurrences early (metastatic melanoma), but it also offers an opportunity to diagnose a new primary melanoma at the first possible opportunity. A second invasive melanoma occurs in 5-10% of melanoma patients and a new melanoma in situ is diagnosed in more than 20% of melanoma patients. Our practice makes the following recommendations for follow-up for patients with invasive melanoma. At-least "monthly" self-skin examinations   Routine skin checks by a board certified dermatologist  Follow-up intervals are "every 3 months" within 2 years of a new melanoma diagnosis; "every 6 months" between 2-4 years of a new melanoma diagnosis; and "annually" after 4 years of a new melanoma diagnosis  Individual patient's needs should be considered before an appropriate follow-up is offered  Provide education and support to help the patient adjust to their illness    Follow-up appointments should include:  A check of the scar where the primary melanoma was removed  Checking the regional lymph nodes  A general skin examination  A full physical examination at least annually by your primary care physician    In those with more advanced primary disease, follow-up may include:  Blood tests  Imaging: ultrasound, X-ray, CT, MRI and PET scan. Most tests are not worthwhile for patients with stage 1 or 2 melanoma unless there are signs or symptoms of disease recurrence or metastasis. No tests are necessary for healthy patients who have remained well for five years or longer after removal of their melanoma. What is the outlook for patients with melanoma? Melanoma in situ is cured by excision because it has no potential to spread around the body.   The risk of spread and ultimate death from invasive melanoma depends on several factors, but the main one is the Breslow thickness of the melanoma at the time it was surgically removed. Metastases are rare for melanomas < 0.75 mm and the risk for tumours 0.75-1 mm thick is about 5%. The risk steadily increases with thickness so that melanomas > 4 mm have a risk of metastasis of about 40%. Melanoma is a potentially serious type of skin cancer, in which there is uncontrolled growth of melanocytes (pigment cells). Melanoma is sometimes called malignant melanoma. Normal melanocytes are found in the basal layer of the epidermis (the outer layer of skin). Melanocytes produce a protein called melanin, which protects skin cells by absorbing ultraviolet (UV) radiation. Melanocytes are found in equal numbers in black and white skin, but melanocytes in black skin produce much more melanin. People with dark brown or black skin are very much less likely to be damaged by UV radiation than those with white skin. 3. HISTORY OF PLEOMORPHIC SARCOMA     Physical Exam:  Anatomic Location Affected:  Scalp  Morphological Description:  100% take of free flap, no signs of recurrence , free graft 20cm right scalp with full take   Pertinent Positives:  Pertinent Negatives: Additional History of Present Condition:  Patient has had multiple procedures in right paretal scalp area where a flap was done. Patient also had 27 treatents of radiation to treat cancer. 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 By muniz jose and temple     Assessment and Plan:  Based on a thorough discussion of this condition and the management approach to it (including a comprehensive discussion of the known risks, side effects and potential benefits of treatment), the patient (family) agrees to implement the following specific plan:  Continue to follow up at [facility]    4.  HISTORY OF SQUAMOUS CELL CARCINOMA     Physical Exam:  Anatomic Location Affected:  Multiple sites  Morphological Description of Scar:  Well healed  Suspected Recurrence: no  Regional adenopathy: no    Additional History of Present Condition:  History of squamous cell carcinoma with no signs of recurrence. Assessment and Plan:  Based on a thorough discussion of this condition and the management approach to it (including a comprehensive discussion of the known risks, side effects and potential benefits of treatment), the patient (family) agrees to implement the following specific plan:  Monitor for changes or recurrence  Routine skin checks    How can SCC be prevented? The most important way to prevent SCC is to avoid sunburn. This is especially important in childhood and early life. Fair skinned individuals and those with a personal or family history of BCC should protect their skin from sun exposure daily, year-round and lifelong. Stay indoors or under the shade in the middle of the day   Wear covering clothing   Apply high protection factor SPF50+ broad-spectrum sunscreens generously to exposed skin if outdoors   Avoid indoor tanning (sun beds, solaria)      What is the outlook for SCC? Most SCC are cured by treatment. Cure is most likely if treatment is undertaken when the lesion is small. A small percent of SCC's can spread to lymph  nodes and long term monitoring is indicated. They are also at increased risk of other skin cancers, especially melanoma. Regular self-skin examinations and long-term annual skin checks by an experienced health professional are recommended. 5. NEOPLASM OF UNCERTAIN BEHAVIOR OF SKIN    Physical Exam:  (Anatomic Location); (Size and Morphological Description); (Differential Diagnosis):  A: Right lip; 0.5 cm lucent nodule; probable basal cell carcinoma near old scar  B: Left wrist; 1.4 cm nodule; probable basal cell carcinoma  Pertinent Positives:  Pertinent Negatives:     Additional History of Present Condition:  Noted on exam.    Assessment and Plan:  I have discussed with the patient that a sample of skin via a "skin biopsy” would be potentially helpful to further make a specific diagnosis under the microscope. Based on a thorough discussion of this condition and the management approach to it (including a comprehensive discussion of the known risks, side effects and potential benefits of treatment), the patient (family) agrees to implement the following specific plan:    Procedure:  Skin Biopsy. After a thorough discussion of treatment options and risk/benefits/alternatives (including but not limited to local pain, scarring, dyspigmentation, blistering, possible superinfection, and inability to confirm a diagnosis via histopathology), verbal and written consent were obtained and portion of the rash was biopsied for tissue sample. See below for consent that was obtained from patient and subsequent Procedure Note. PROCEDURE TANGENTIAL (SHAVE) BIOPSY NOTE:    Performing Physician:   Anatomic Location; Clinical Description with size (cm); Pre-Op Diagnosis:   A: Right lip; 0.5 cm lucent nodule; probable basal cell carcinoma near old scar  B: Left wrist; 1.4 cm nodule; probable basal cell carcinoma  Post-op diagnosis: Same     Local anesthesia: 1% xylocaine with epi      Topical anesthesia: None    Hemostasis: Aluminum chloride       After obtaining informed consent  at which time there was a discussion about the purpose of biopsy  and low risks of infection and bleeding. The area was prepped and draped in the usual fashion. Anesthesia was obtained with 1% lidocaine with epinephrine. A shave biopsy to an appropriate sampling depth was obtained by Shave (Dermablade or 15 blade) The resulting wound was covered with surgical ointment and bandaged appropriately. The patient tolerated the procedure well without complications and was without signs of functional compromise.       Specimen has been sent for review by Dermatopathology. Standard post-procedure care has been explained and has been included in written form within the patient's copy of Informed Consent. INFORMED CONSENT DISCUSSION AND POST-OPERATIVE INSTRUCTIONS FOR PATIENT    I.  RATIONALE FOR PROCEDURE  I understand that a skin biopsy allows the Dermatologist to test a lesion or rash under the microscope to obtain a diagnosis. It usually involves numbing the area with numbing medication and removing a small piece of skin; sometimes the area will be closed with sutures. In this specific procedure, sutures are not usually needed. If any sutures are placed, then they are usually need to be removed in 2 weeks or less. I understand that my Dermatologist recommends that a skin "shave" biopsy be performed today. A local anesthetic, similar to the kind that a dentist uses when filling a cavity, will be injected with a very small needle into the skin area to be sampled. The injected skin and tissue underneath "will go to sleep” and become numb so no pain should be felt afterwards. An instrument shaped like a tiny "razor blade" (shave biopsy instrument) will be used to cut a small piece of tissue and skin from the area so that a sample of tissue can be taken and examined more closely under the microscope. A slight amount of bleeding will occur, but it will be stopped with direct pressure and a pressure bandage and any other appropriate methods. I understands that a scar will form where the wound was created. Surgical ointment will be applied to help protect the wound. Sutures are not usually needed.     II.  RISKS AND POTENTIAL COMPLICATIONS   I understand the risks and potential complications of a skin biopsy include but are not limited to the following:  Bleeding  Infection  Pain  Scar/keloid  Skin discoloration  Incomplete Removal  Recurrence  Nerve Damage/Numbness/Loss of Function  Allergic Reaction to Anesthesia  Biopsies are diagnostic procedures and based on findings additional treatment or evaluation may be required  Loss or destruction of specimen resulting in no additional findings    My Dermatologist has explained to me the nature of the condition, the nature of the procedure, and the benefits to be reasonably expected compared with alternative approaches. My Dermatologist has discussed the likelihood of major risks or complications of this procedure including the specific risks listed above, such as bleeding, infection, and scarring/keloid. I understand that a scar is expected after this procedure. I understand that my physician cannot predict if the scar will form a "keloid," which extends beyond the borders of the wound that is created. A keloid is a thick, painful, and bumpy scar. A keloid can be difficult to treat, as it does not always respond well to therapy, which includes injecting cortisone directly into the keloid every few weeks. While this usually reduces the pain and size of the scar, it does not eliminate it. I understand that photographs may be taken before and after the procedure. These will be maintained as part of the medical providers confidential records and may not be made available to me. I further authorize the medical provider to use the photographs for teaching purposes or to illustrate scientific papers, books, or lectures if in his/her judgment, medical research, education, or science may benefit from its use. I have had an opportunity to fully inquire about the risks and benefits of this procedure and its alternatives. I have been given ample time and opportunity to ask questions and to seek a second opinion if I wished to do so. I acknowledge that there have specifically been no guarantees as to the cosmetic results from the procedure. I am aware that with any procedure there is always the possibility of an unexpected complication. III.  POST-PROCEDURAL CARE (WHAT YOU WILL NEED TO DO "AFTER THE BIOPSY" TO OPTIMIZE HEALING)    Keep the area clean and dry. Try NOT to remove the bandage or get it wet for the first 24 hours. Gently clean the area and apply surgical ointment (such as Vaseline petrolatum ointment, which is available "over the counter" and not a prescription) to the biopsy site for up to 2 weeks straight. This acts to protect the wound from the outside world. Sutures are not usually placed in this procedure. If any sutures were placed, return for suture removal as instructed (generally 1 week for the face, 2 weeks for the body). Take Acetaminophen (Tylenol) for discomfort, if no contraindications. Ibuprofen or aspirin could make bleeding worse. Call our office immediately for signs of infection: fever, chills, increased redness, warmth, tenderness, discomfort/pain, or pus or foul smell coming from the wound. WHAT TO DO IF THERE IS ANY BLEEDING? If a small amount of bleeding is noticed, place a clean cloth over the area and apply firm pressure for ten minutes. Check the wound after 10 minutes of direct pressure. If bleeding persists, try one more time for an additional 10 minutes of direct pressure on the area. If the bleeding becomes heavier or does not stop after the second attempt, or if you have any other questions about this procedure, then please call your [facility] by calling [phone] (SKIN). I hereby acknowledge that I have reviewed and verified the site with my Dermatologist and have requested and authorized my Dermatologist to proceed with the procedure. 6. BASAL CELL CARCINOMA  Physical Exam:  Anatomic Location Affected:  Right preauricular  Morphological Description:  1.1 cm flat  Pertinent Positives:  Pertinent Negatives: Additional History of Present Condition:  Previously biopsied 06/13/2023, Accession # [id number].     Assessment and Plan:  Based on a thorough discussion of this condition and the management approach to it (including a comprehensive discussion of the known risks, side effects and potential benefits of treatment), the patient (family) agrees to implement the following specific plan:  Patient wishes to observe      WE DISCUSSED THAT HE HAS HIGH RISK MELANOMA AND DOES REQUIRE ONGOING ONCOLOGY MONITORING. I NOTED ALSO THAT THERE ARE MANY INCIPIENT AREAS OF NONMELOMA PRECANCERS AND CANCERS. I DISCUSSED PRIORITIZING TREATMENT POST BIOPSY. THE LESION OF WRIST IS GROWING AND SHOULD BE OF CONCERN FIRST. THE PROBABLE BASAL CELL OF LIP IS IN AREA OF COSMETIC CONERN AND NEAR SCAR  AND SHOULD BE ADDRESSED AT SOME POINT. THE BIOPSY PROVEN PREAURICULAR LESION IS SMALL AND NOT PROGRESSING AND IS OF LOW PRIORITY.     Scribe Attestation    I,:  Tulio Stauffer am acting as a scribe while in the presence of the attending physician.:       I,:  Og Higuera MD personally performed the services described in this documentation    as scribed in my presence.:

## 2023-10-03 NOTE — PATIENT INSTRUCTIONS
INFORMED CONSENT DISCUSSION AND POST-OPERATIVE INSTRUCTIONS FOR PATIENT    I.  RATIONALE FOR PROCEDURE  I understand that a skin biopsy allows the Dermatologist to test a lesion or rash under the microscope to obtain a diagnosis. It usually involves numbing the area with numbing medication and removing a small piece of skin; sometimes the area will be closed with sutures. In this specific procedure, sutures are not usually needed. If any sutures are placed, then they are usually need to be removed in 2 weeks or less. I understand that my Dermatologist recommends that a skin "shave" biopsy be performed today. A local anesthetic, similar to the kind that a dentist uses when filling a cavity, will be injected with a very small needle into the skin area to be sampled. The injected skin and tissue underneath "will go to sleep” and become numb so no pain should be felt afterwards. An instrument shaped like a tiny "razor blade" (shave biopsy instrument) will be used to cut a small piece of tissue and skin from the area so that a sample of tissue can be taken and examined more closely under the microscope. A slight amount of bleeding will occur, but it will be stopped with direct pressure and a pressure bandage and any other appropriate methods. I understands that a scar will form where the wound was created. Surgical ointment will be applied to help protect the wound. Sutures are not usually needed.     II.  RISKS AND POTENTIAL COMPLICATIONS   I understand the risks and potential complications of a skin biopsy include but are not limited to the following:  Bleeding  Infection  Pain  Scar/keloid  Skin discoloration  Incomplete Removal  Recurrence  Nerve Damage/Numbness/Loss of Function  Allergic Reaction to Anesthesia  Biopsies are diagnostic procedures and based on findings additional treatment or evaluation may be required  Loss or destruction of specimen resulting in no additional findings    My Dermatologist has explained to me the nature of the condition, the nature of the procedure, and the benefits to be reasonably expected compared with alternative approaches. My Dermatologist has discussed the likelihood of major risks or complications of this procedure including the specific risks listed above, such as bleeding, infection, and scarring/keloid. I understand that a scar is expected after this procedure. I understand that my physician cannot predict if the scar will form a "keloid," which extends beyond the borders of the wound that is created. A keloid is a thick, painful, and bumpy scar. A keloid can be difficult to treat, as it does not always respond well to therapy, which includes injecting cortisone directly into the keloid every few weeks. While this usually reduces the pain and size of the scar, it does not eliminate it. I understand that photographs may be taken before and after the procedure. These will be maintained as part of the medical providers confidential records and may not be made available to me. I further authorize the medical provider to use the photographs for teaching purposes or to illustrate scientific papers, books, or lectures if in his/her judgment, medical research, education, or science may benefit from its use. I have had an opportunity to fully inquire about the risks and benefits of this procedure and its alternatives. I have been given ample time and opportunity to ask questions and to seek a second opinion if I wished to do so. I acknowledge that there have specifically been no guarantees as to the cosmetic results from the procedure. I am aware that with any procedure there is always the possibility of an unexpected complication. III. POST-PROCEDURAL CARE (WHAT YOU WILL NEED TO DO "AFTER THE BIOPSY" TO OPTIMIZE HEALING)    Keep the area clean and dry. Try NOT to remove the bandage or get it wet for the first 24 hours.     Gently clean the area and apply surgical ointment (such as Vaseline petrolatum ointment, which is available "over the counter" and not a prescription) to the biopsy site for up to 2 weeks straight. This acts to protect the wound from the outside world. Sutures are not usually placed in this procedure. If any sutures were placed, return for suture removal as instructed (generally 1 week for the face, 2 weeks for the body). Take Acetaminophen (Tylenol) for discomfort, if no contraindications. Ibuprofen or aspirin could make bleeding worse. Call our office immediately for signs of infection: fever, chills, increased redness, warmth, tenderness, discomfort/pain, or pus or foul smell coming from the wound. WHAT TO DO IF THERE IS ANY BLEEDING? If a small amount of bleeding is noticed, place a clean cloth over the area and apply firm pressure for ten minutes. Check the wound after 10 minutes of direct pressure. If bleeding persists, try one more time for an additional 10 minutes of direct pressure on the area. If the bleeding becomes heavier or does not stop after the second attempt, or if you have any other questions about this procedure, then please call your [facility] D[facility]at[facility] by calling [phone] (SKIN). I hereby acknowledge that I have reviewed and verified the site with my Dermatologist and have requested and authorized my Dermatologist to proceed with the procedure.

## 2023-10-09 DIAGNOSIS — C44.619 BASAL CELL CARCINOMA (BCC) OF SKIN OF LEFT WRIST: ICD-10-CM

## 2023-10-09 DIAGNOSIS — C44.01 BASAL CELL CARCINOMA (BCC) OF LOWER LIP: Primary | ICD-10-CM

## 2023-10-09 PROCEDURE — 88305 TISSUE EXAM BY PATHOLOGIST: CPT | Performed by: STUDENT IN AN ORGANIZED HEALTH CARE EDUCATION/TRAINING PROGRAM

## 2023-10-09 NOTE — RESULT ENCOUNTER NOTE
DERMATOPATHOLOGY RESULT NOTE    Results reviewed by ordering physician. Called patient to personally discuss results. No answer, left voicemail with result. I left message that mohs is indicated for both lesions due to location. He has had MOHS on past and is familiar with MOHS protocol      Instructions for Clinical Derm Team:   (remember to route Result Note to appropriate staff):    Call patient and schedule for MOHS    Result & Plan by Specimen:    Specimen A: malignant  Plan: MOHs      Specimen B: malignant  Plan: [facility]    Final Diagnosis  A. Skin, right lip, shave biopsy:    BASAL CELL CARCINOMA (NODULAR TYPE); transected. B. Skin, left wrist, shave biopsy:    BASAL CELL CARCINOMA (NODULAR TYPE); transected.

## 2023-10-10 ENCOUNTER — TELEPHONE (OUTPATIENT)
Age: 83
End: 2023-10-10

## 2023-10-10 NOTE — TELEPHONE ENCOUNTER
Patient would like a call back to review biopsy results. He also said that Dr. Ashok Leiva left him a VM stating he needed to schedule for mohs.

## 2023-10-10 NOTE — LETTER
Amie Allen Skye     1940    [address] [place] [id number]    Dear Echo Bryan,    You are scheduled to have the MOHS procedure on 11/30/23 at 2 pm for left wrist and 12/14/23 at 1 pm for right lip with Dr.Stephen Caraballo. Our office is located in The [address] [place] [address] at the [facility] our address is [address] [place] [address]), [address] [facility]. Once you arrive please check in with our front staff in [address] and they will escort you to the [address] [place] [address] waiting room. If you have someone bringing you to your appointment they may wait in the waiting room or accompany you in your visit. Below you will find some pre-op instructions along with some information regarding the MOHS procedure. If you have any questions please call our office at [phone]. Thank you,    [facility] MOHS Department         PRE-OPERATIVE INSTRUCTIONS - MOHS    Before your scheduled surgery, there are a number of important precautions and positive steps you should take to help prepare yourself for a successful treatment and speedy recovery. Some of the steps, which are listed below, may seem unnecessary and inconvenient, but they are important. For example, when you stop smoking, you increase your ability to heal. Occasionally, there may be valid reasons, personal or medical, why you can't comply. In such cases, please call the office so we can discuss possible ways to overcome any obstacles you may be encountering. If you have any questions about the surgery, or remember additional medical information that you forgot to mention to our staff, please contact the office prior to your surgery. GENERAL INFORMATION REGARDING MOHS MICROGRAPHIC SURGERY    Mohs surgery is a specialized technique for the removal of skin cancer developed by Dr. Marleen Sharps Mohs over 50 years ago to improve the cure rates of skin cancer.  Traditionally, skin cancers are treated by destructive methods (radiation, freezing, scraping, and burning) or excision (cutting out the tissue with standards margins and sending it to an outside laboratory for testing). These methods all yield cure rates between 65%-94%. However, for cancers located in cosmetically sensitive areas, large tumors, or tumors unsuccessfully treated by other means, Mohs surgery offers a higher cure rate. In most cases, Mohs surgery provides you with a 99% cure rate for primary (previously untreated) basal cell cancer and a 95% cure rate for primary squamous cell cancer. In Mohs surgery, tissue is removed and processed in a way that we are able to check 100% of the margins, giving the highest cure rate for any method of treating skin cancers while providing maximal preservation of normal skin. This allows the surgeon to produce an optimal cosmetic result for the patient by maximizing the amount of tissue removed yielding as small a scar as possible    On the day of surgery, you will be given local anesthesia only (similar to what was given to you during your initial biopsy). You will remain awake. You will verify the location of the skin cancer prior to the onset of the surgery. Once the area is numb, the tissue containing the skin cancer will be removed, taking a small safety margin. This margin is usually smaller than what would be taken with a standard excision. Once the tissue is removed, it is marked and oriented. The first layer (“Stage I”) will be processed in our laboratory. The wound will be treated for bleeding and a bandage will be placed to keep you comfortable while you wait an approximate 45 minutes-1 hour (for the processing of the tissue) in your room. Your Mohs surgeon will examine the pathology in the lab, checking all the margins. If any tumor remains, you will need to take a second layer of skin (“Stage 2”). The area will be re-anesthetized and your Mohs surgeon will remove more skin only in the area where the tumor exists.  This process will continue until all the skin cancer is removed. Unfortunately, there is no method to predict how many layers or stages will be taken. Once the tumor has been removed completely, we will discuss the best ways to close the defect. Most wounds may be closed with stitches. A larger wound may require a skin graft or a flap. In rare instances, especially for cancers around the eye or for larger cancers, we may work with another surgeon (oculoplastic, ENT, plastics) with special skills to assist with reconstruction. Medications: Please take all your normal medications the morning of your surgery. If you are a diabetic, please bring your insulin or medications with you, as well as a snack to avoid having low blood sugar during your day with us. Blood Thinners    VERY IMPORTANT: We do NOT stop or hold prescribed blood thinners (such as Coumadin/Warfarin, Plavix, Eliquis, Pradaxa, Brilinta, Apixaban, Xarelto, Lovonox, Rivaroxaban, or Aggrenox) before Mohs surgery. Additionally, If you take aspirin because you have had a stroke, heart attack, heart disease, other condition, or your physician has prescribed you to take it, please continue your aspirin. Although there is a risk of increased bruising and bleeding, we are still able to safely perform surgery while continuing these medications. Please NEVER stop your prescribed blood thinner without the managing doctor's (the doctor that prescribed the medication) permission or knowledge. If you have any concerns about not holding your blood thinner, please address these with your Mohs surgeon. Most people should stop all non-steroidal anti-inflammatory medications (Motrin, Naproxen, Advil, Midol, Aleve, etc.) for 7 days prior to your scheduled surgery and 2 days after (unless instructed otherwise after surgery). You may take Tylenol for pain.      Antibiotics  If you usually require antibiotics prior to dental work, please let the office know at least 24 hours prior to your surgery. Medical conditions that sometimes require preoperative antibiotics include artificial heart valves, heart murmurs, artificial joints, and related problems. We may give you a different medication than the dentist, so please contact us for the correct antibiotic. If you were prescribed pre-operative antibiotics by our office, please take the medication 2 hours prior to your procedure. Vitamins and Supplements  Avoid taking any supplements with Vitamin E, Fish Oil, Gingko, Ginseng, and Garlic for 2 weeks before and 2 days after your surgery. These thin your blood. Alcohol: Avoid drinking alcohol for 2 days prior to your surgery, and for 2 days afterwards (it thins the blood and causes more bruising and swelling). Smoking: Try to STOP or reduce smoking significantly the week before your surgery, and especially the week afterwards (it greatly improves how well you heal). Tobacco smoke deprives the blood of oxygen, which is urgently needed by the wound during the healing process. Contact Lenses: Do not wear them on the day of the surgery. Instead, wear glasses and bring your case, in case we need to remove them. Clothing: Do not wear your nicest clothing on your surgery day. We recommend wearing a button down shirt that will not disrupt your post-operative dressing when changing later that night. Bathing: On the morning of your surgery, you may bathe or shower normally. If you get your hair done on a weekly basis, remember to get your hair washed the day before surgery.   - You will need to keep your surgical site dry for a minimum of 48 hours after surgery. Makeup: If your surgery is on the face, please do not wear any makeup on the day of the surgery. Jewelry: Please try to avoid wearing jewelry on the day of surgery. Food: On the morning of surgery, have breakfast but limit your intake of caffeinated beverages. They are diuretic and may inconvenience you during surgery.  If you are following up with another surgeon the same day as your Mohs surgery, you must receive permission to eat breakfast from that surgeon. What to bring with you on the day of your surgery:  Bring snacks - Since you could be at the office long, you may bring snacks and/or lunch with you. Some snacks and drinks are available at the office as well. Bring a sweater - Bring a sweater or jacket that buttons or zips down the front and will not disturb your wound dressing during removal.  Bring something to do - You will be spending much of the day in our office. There will be 45-60 minute waiting periods  between layers/stages, and while there is a television with cable in every room, it is nice to have something to keep you occupied such as books, magazines, knitting, music, or work. Planning Ahead:  Other Appointments - It is important to realize that no matter how small the skin cancer appears to be, looks can be deceiving. Since your surgery may last the entire day, you should not schedule any other appointments that day. Special Occasions - Surgery often creates swelling and bruising. Also, the post-op dressing may be rather large and obvious. Keep this in mind as you arrange your social/work schedule. If an important event is already planned, please check with your referring physician or your Mohs surgeon to see if the surgery can be postponed. Activity Limits after Surgery - If surgery was performed on your face, we recommend that you keep your activity level to a minimum for 2-3 days (the blood pressure elevation related to exercise can lead to bleeding). If you have stitches in an area that will be under tension or significant movement (neck, back, arms, legs), you will need to avoid heavy lifting (anything over 5 lbs) or exercise for at least 2 weeks and possibly longer. We also advise that you limit out of town travel for the first 7 days after surgery.  You should also wait at least 7 days before going into a pool or the ocean. Housework - Since you will need to minimize activity after surgery, plan to do your groceries, laundry, gardening, and other heavy household chores prior to your surgery. Please make arrangements for assistance during the post-op period. If surgery is around your mouth area, you may need to eat soft foods, such as soup, milkshakes, or yogurt for 48 hours. Purchasing bandage supplies: Prior to surgery, please purchase the following items to care for your surgical wound properly. Cotton swabs (Q-tips)  Vaseline or Aquaphor  Telfa pads (or any non-stick dressing)  Paper tape or Hypafix tape  Gauze pads (3x3)      We will provide you with some bandage supplies after surgery to get you started. Transportation: It is often reassuring and comforting to have a  drive you to and from the surgery. He or she is welcome to wait in the office during the surgery. Please note that for safety reasons, only the patient is allowed in the procedure room during surgery. Thank you for your cooperation. Rescheduling: If you need to reschedule your surgery, please notify the office as soon as possible.

## 2023-10-10 NOTE — TELEPHONE ENCOUNTER
So I have called patient several times and left message. He has had mohs several times. It reaslly    makes no sense for me to spend all this time calling patient again and again if has had mohs many time in past and has no questions. I have made 5 phone calls already.

## 2023-10-12 DIAGNOSIS — C44.619 BASAL CELL CARCINOMA (BCC) OF SKIN OF LEFT WRIST: Primary | ICD-10-CM

## 2023-10-12 DIAGNOSIS — C44.01 BASAL CELL CARCINOMA (BCC) OF LOWER LIP: ICD-10-CM

## 2023-10-12 NOTE — TELEPHONE ENCOUNTER
Pre- operative Mohs Telephone Scheduling Note    Do you have a pacemaker, defibrillator, spinal or brain stimulator? no    Do you take antibiotics before skin or dental procedures? no  If yes, will likely require pre-operative antibiotics. Ask  the patient why they take the antibiotics (usually because of joint replacement). Do you have a history of a joint replacements within the past 2 years? no   If yes, will likely require pre-operative antibiotics. Ask if orthopaedic surgeon has prescribed pre-operative antibiotics to take before procedures/dental work? Do you take any OTC medications that thin your blood (Aspirin, Aleve, Ibuprofen) or supplements that thin your blood (fish oil, garlic, vitamin E, Ginko Biloba)? no    Do you take any prescribed medications that thin your blood (Coumadin, Plavix, Xarelto, Eliquis or another prescribed blood thinner)? no    Do you have an allergy to lidocaine or epinephrine? no    Do you have allergies to Iodine? no    Do you wear a lidocaine patch? no    Have you ever been diagnosed with HIV, AIDS, Hep B and Hep C? no    Do you use a cane, walker or wheelchair? no    Is the patient from a nursing home? no If yes, Is there any special accommodations that is needed for patient n/a    Do you smoke? no      If yes,  patient to try and stop 2 days before surgery and 7 days after the surgery. Minimizing smoking as much as possible during this time will improve healing and the cosmetic result after surgery. Do you use supplemental oxygen? If so, how many liters and can you be off it for a short period of time? N/a    Date scheduled: 11/30/23 at 2 pm with Dr. Aman Billingsley for left wrist, 12/14/23 at 1 pm for right lip    Coordination of Care with other provider (LAMBERT Nobles) required? no   IF YES, PLEASE FORWARD TO APPROPRIATE PERSONNEL TO HELP COORDINATE. Are there remaining tumors to be scheduled? no    Was Prior Authorization obtained?  No (please use [email or web address] to document prior auth)    Patient has been scheduled for both locations. He stated he will schedule for the lip but would like to discuss with Dr. Jonathan Michel before actually going through with the procedure since he has had surgery on the lip before. Please be advised.

## 2023-10-16 ENCOUNTER — HOSPITAL ENCOUNTER (OUTPATIENT)
Dept: ULTRASOUND IMAGING | Facility: HOSPITAL | Age: 83
Discharge: HOME | End: 2023-10-16
Attending: SURGERY
Payer: MEDICARE

## 2023-10-16 DIAGNOSIS — C43.30 MALIGNANT MELANOMA OF FACE EXCLUDING EYELID, NOSE, LIP, AND EAR (HCC): ICD-10-CM

## 2023-10-16 PROCEDURE — 76536 US EXAM OF HEAD AND NECK: CPT

## 2023-10-20 ENCOUNTER — APPOINTMENT (OUTPATIENT)
Dept: LAB | Facility: CLINIC | Age: 83
End: 2023-10-20
Payer: MEDICARE

## 2023-10-20 DIAGNOSIS — I10 PRIMARY HYPERTENSION: ICD-10-CM

## 2023-10-20 DIAGNOSIS — E55.9 VITAMIN D DEFICIENCY: ICD-10-CM

## 2023-10-20 DIAGNOSIS — E78.5 HYPERLIPIDEMIA, UNSPECIFIED HYPERLIPIDEMIA TYPE: ICD-10-CM

## 2023-10-20 DIAGNOSIS — L98.494: ICD-10-CM

## 2023-10-20 LAB
25(OH)D3 SERPL-MCNC: 32.4 NG/ML (ref 30–100)
ALBUMIN SERPL BCP-MCNC: 4.2 G/DL (ref 3.5–5)
ALP SERPL-CCNC: 104 U/L (ref 34–104)
ALT SERPL W P-5'-P-CCNC: 21 U/L (ref 7–52)
ANION GAP SERPL CALCULATED.3IONS-SCNC: 6 MMOL/L
AST SERPL W P-5'-P-CCNC: 19 U/L (ref 13–39)
BASOPHILS # BLD AUTO: 0.04 THOUSANDS/ÂΜL (ref 0–0.1)
BASOPHILS NFR BLD AUTO: 1 % (ref 0–1)
BILIRUB SERPL-MCNC: 1.14 MG/DL (ref 0.2–1)
BUN SERPL-MCNC: 21 MG/DL (ref 5–25)
CALCIUM SERPL-MCNC: 10.1 MG/DL (ref 8.4–10.2)
CHLORIDE SERPL-SCNC: 105 MMOL/L (ref 96–108)
CHOLEST SERPL-MCNC: 181 MG/DL
CO2 SERPL-SCNC: 28 MMOL/L (ref 21–32)
CREAT SERPL-MCNC: 1.22 MG/DL (ref 0.6–1.3)
EOSINOPHIL # BLD AUTO: 0.28 THOUSAND/ÂΜL (ref 0–0.61)
EOSINOPHIL NFR BLD AUTO: 4 % (ref 0–6)
ERYTHROCYTE [DISTWIDTH] IN BLOOD BY AUTOMATED COUNT: 13.2 % (ref 11.6–15.1)
GFR SERPL CREATININE-BSD FRML MDRD: 54 ML/MIN/1.73SQ M
GLUCOSE P FAST SERPL-MCNC: 86 MG/DL (ref 65–99)
HCT VFR BLD AUTO: 43.6 % (ref 36.5–49.3)
HDLC SERPL-MCNC: 40 MG/DL
HGB BLD-MCNC: 14.7 G/DL (ref 12–17)
IMM GRANULOCYTES # BLD AUTO: 0.02 THOUSAND/UL (ref 0–0.2)
IMM GRANULOCYTES NFR BLD AUTO: 0 % (ref 0–2)
LDLC SERPL CALC-MCNC: 114 MG/DL (ref 0–100)
LYMPHOCYTES # BLD AUTO: 1.9 THOUSANDS/ÂΜL (ref 0.6–4.47)
LYMPHOCYTES NFR BLD AUTO: 29 % (ref 14–44)
MCH RBC QN AUTO: 31.5 PG (ref 26.8–34.3)
MCHC RBC AUTO-ENTMCNC: 33.7 G/DL (ref 31.4–37.4)
MCV RBC AUTO: 93 FL (ref 82–98)
MONOCYTES # BLD AUTO: 0.49 THOUSAND/ÂΜL (ref 0.17–1.22)
MONOCYTES NFR BLD AUTO: 8 % (ref 4–12)
NEUTROPHILS # BLD AUTO: 3.84 THOUSANDS/ÂΜL (ref 1.85–7.62)
NEUTS SEG NFR BLD AUTO: 58 % (ref 43–75)
NRBC BLD AUTO-RTO: 0 /100 WBCS
PLATELET # BLD AUTO: 217 THOUSANDS/UL (ref 149–390)
PMV BLD AUTO: 10.9 FL (ref 8.9–12.7)
POTASSIUM SERPL-SCNC: 4.3 MMOL/L (ref 3.5–5.3)
PROT SERPL-MCNC: 7.6 G/DL (ref 6.4–8.4)
RBC # BLD AUTO: 4.67 MILLION/UL (ref 3.88–5.62)
SODIUM SERPL-SCNC: 139 MMOL/L (ref 135–147)
TRIGL SERPL-MCNC: 136 MG/DL
TSH SERPL DL<=0.05 MIU/L-ACNC: 3.19 UIU/ML (ref 0.45–4.5)
WBC # BLD AUTO: 6.57 THOUSAND/UL (ref 4.31–10.16)

## 2023-10-20 PROCEDURE — 80061 LIPID PANEL: CPT

## 2023-10-20 PROCEDURE — 36415 COLL VENOUS BLD VENIPUNCTURE: CPT

## 2023-10-20 PROCEDURE — 82306 VITAMIN D 25 HYDROXY: CPT

## 2023-10-20 PROCEDURE — 80053 COMPREHEN METABOLIC PANEL: CPT

## 2023-10-20 PROCEDURE — 84443 ASSAY THYROID STIM HORMONE: CPT

## 2023-10-20 PROCEDURE — 85025 COMPLETE CBC W/AUTO DIFF WBC: CPT

## 2023-10-25 ENCOUNTER — OFFICE VISIT (OUTPATIENT)
Dept: FAMILY MEDICINE CLINIC | Facility: CLINIC | Age: 83
End: 2023-10-25
Payer: MEDICARE

## 2023-10-25 VITALS
OXYGEN SATURATION: 98 % | TEMPERATURE: 97.8 F | WEIGHT: 179 LBS | SYSTOLIC BLOOD PRESSURE: 154 MMHG | HEIGHT: 68 IN | HEART RATE: 64 BPM | BODY MASS INDEX: 27.13 KG/M2 | DIASTOLIC BLOOD PRESSURE: 80 MMHG

## 2023-10-25 DIAGNOSIS — E55.9 VITAMIN D DEFICIENCY: ICD-10-CM

## 2023-10-25 DIAGNOSIS — Z12.5 SCREENING FOR MALIGNANT NEOPLASM OF PROSTATE: ICD-10-CM

## 2023-10-25 DIAGNOSIS — N18.30 STAGE 3 CHRONIC KIDNEY DISEASE, UNSPECIFIED WHETHER STAGE 3A OR 3B CKD (HCC): ICD-10-CM

## 2023-10-25 DIAGNOSIS — I10 PRIMARY HYPERTENSION: Primary | ICD-10-CM

## 2023-10-25 DIAGNOSIS — C43.30 MALIGNANT MELANOMA OF FACE EXCLUDING EYELID, NOSE, LIP, AND EAR (HCC): ICD-10-CM

## 2023-10-25 DIAGNOSIS — C49.0 SARCOMA OF SCALP (HCC): ICD-10-CM

## 2023-10-25 PROCEDURE — 99214 OFFICE O/P EST MOD 30 MIN: CPT | Performed by: PHYSICIAN ASSISTANT

## 2023-10-25 NOTE — PROGRESS NOTES
Name: Beatriz Connors      : 1940      MRN: [id number]  Encounter Provider: Marcio Squires PA-C  Encounter Date: 10/25/2023   Encounter department: [facility]    Assessment & Plan     Patient Instructions   Assessment/plan:  1. Benign essential hypertension-presently stable with lisinopril, metoprolol, and amlodipine. No medication changes. 2.  Stage III chronic kidney disease-stable on lisinopril. Stable with GFR of 54.  3.  Malignant melanoma of the face-status post excision, following with dermatology. 4.  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 Follows with plastic surgery, radiation oncology, oncology. 5.  Vitamin D deficiency-improving with over-the-counter supplementation. Continue. 1. Primary hypertension  -     CBC and differential; Future; Expected date: 2024  -     Comprehensive metabolic panel; Future; Expected date: 2024  -     Lipid Panel with Direct LDL reflex; Future; Expected date: 2024  -     TSH, 3rd generation; Future; Expected date: 2024    2. Stage 3 chronic kidney disease, unspecified whether stage 3a or 3b CKD (HCC)  -     CBC and differential; Future; Expected date: 2024  -     Comprehensive metabolic panel; Future; Expected date: 2024  -     Lipid Panel with Direct LDL reflex; Future; Expected date: 2024  -     TSH, 3rd generation; Future; Expected date: 2024    3. Malignant melanoma of face excluding eyelid, nose, lip, and ear (HCC)  -     CBC and differential; Future; Expected date: 2024  -     Comprehensive metabolic panel; Future; Expected date: 2024  -     Lipid Panel with Direct LDL reflex; Future; Expected date: 2024  -     TSH, 3rd generation; Future; Expected date: 2024    4. 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[address])  -     CBC and differential; Future; Expected date: 2024  -     Comprehensive metabolic panel;  Future; Expected date: 2024  - Lipid Panel with Direct LDL reflex; Future; Expected date: 04/25/2024  -     TSH, 3rd generation; Future; Expected date: 04/25/2024    5. Vitamin D deficiency  -     CBC and differential; Future; Expected date: 04/25/2024  -     Comprehensive metabolic panel; Future; Expected date: 04/25/2024  -     Lipid Panel with Direct LDL reflex; Future; Expected date: 04/25/2024  -     TSH, 3rd generation; Future; Expected date: 04/25/2024  -     Vitamin D 25 hydroxy; Future; Expected date: 04/25/2024    6. Screening for malignant neoplasm of prostate  -     PSA, Total Screen; Future; Expected date: 04/25/2024        Depression Screening and Follow-up Plan: Patient was screened for depression during today's encounter. They screened negative with a PHQ-2 score of 0. Subjective      HPI: This is an 69-year-old gentleman that presents to the office for follow-up of chronic health conditions including hypertension, malignant melanoma of the face, sarcoma of the scalp, and vitamin D deficiency. He has been doing well without any acute complaints. He does continue to follow with plastic surgery, dermatology, and oncology. He has had recent lymph node mapping which was normal and did not show any metastasis. He does continue some vitamin D supplementation over-the-counter. Review of Systems   Constitutional:  Negative for chills, fatigue and fever. HENT:  Negative for congestion, ear pain and sinus pressure. Eyes:  Negative for visual disturbance. Respiratory:  Negative for cough, chest tightness and shortness of breath. Cardiovascular:  Negative for chest pain and palpitations. Gastrointestinal:  Negative for diarrhea, nausea and vomiting. Endocrine: Negative for polyuria. Genitourinary:  Negative for dysuria and frequency. Musculoskeletal:  Negative for arthralgias and myalgias. Skin:  Negative for pallor and rash.    Neurological:  Negative for dizziness, weakness, light-headedness, numbness and headaches. Psychiatric/Behavioral:  Negative for agitation, behavioral problems and sleep disturbance. All other systems reviewed and are negative. Current Outpatient Medications on File Prior to Visit   Medication Sig   • amLODIPine (NORVASC) 10 mg tablet TAKE 1 TABLET BY MOUTH  DAILY   • Fluocinolone Acetonide Scalp 0.01 % OIL Apply a thin layer topically daily at night one hour before bedtime.  (Patient taking differently: Apply topically if needed Apply a thin layer topically daily at night one hour before bedtime.)   • imiquimod (ALDARA) 5 % cream Apply topically once a day Monday thru Friday for 6 weeks   • lisinopril (ZESTRIL) 20 mg tablet TAKE 1 TABLET BY MOUTH  DAILY   • metoprolol succinate (TOPROL-XL) 50 mg 24 hr tablet TAKE 1 TABLET BY MOUTH  DAILY   • mometasone (ELOCON) 0.1 % cream APPLY TO EARS ONCE TO TWICE DAILY AS NEEDED FOR SCALING   • Multiple Vitamins-Minerals (PRESERVISION AREDS 2) CAPS Take 1 capsule by mouth 2 (two) times a day   • mupirocin (BACTROBAN) 2 % ointment Apply topically two to three times a day to sore areas (Patient taking differently: Apply topically if needed Apply topically two to three times a day to sore areas)   • Niacin (VITAMIN B-3 PO) Take by mouth   • Niacinamide-Zn-Cu-Zgnfrt-Iw-Hs (NICOTINAMIDE PO) Take 500 mg by mouth 2 (two) times a day   • Pyridoxine HCl (VITAMIN B-6 PO) Take by mouth   • traMADol (Ultram) 50 mg tablet Take 1 tablet (50 mg total) by mouth every 6 (six) hours as needed for moderate pain for up to 15 doses   • bacitracin ointment Apply topically daily for 7 days Apply to bolster site daily   • [DISCONTINUED] alclomethasone (ACLOVATE) 0.05 % cream Apply topically 2 (two) times a day as needed        Objective     /80 (BP Location: Left arm, Patient Position: Sitting, Cuff Size: Large)   Pulse 64   Temp 97.8 °F (36.6 °C) (Temporal)   Ht 5' 8" (1.727 m)   Wt 81.2 kg (179 lb)   SpO2 98%   BMI 27.22 kg/m²     Physical Exam  Vitals and nursing note reviewed. Constitutional:       General: He is not in acute distress. Appearance: He is well-developed. HENT:      Head: Normocephalic and atraumatic. Right Ear: External ear normal.      Left Ear: External ear normal.      Nose: Nose normal.      Mouth/Throat:      Pharynx: No oropharyngeal exudate. Eyes:      Conjunctiva/sclera: Conjunctivae normal.      Pupils: Pupils are equal, round, and reactive to light. Neck:      Thyroid: No thyromegaly. Trachea: No tracheal deviation. Cardiovascular:      Rate and Rhythm: Normal rate and regular rhythm. Heart sounds: Normal heart sounds. No murmur heard. No friction rub. Pulmonary:      Effort: Pulmonary effort is normal. No respiratory distress. Breath sounds: Normal breath sounds. No wheezing or rales. Abdominal:      General: Bowel sounds are normal. There is no distension. Palpations: Abdomen is soft. Tenderness: There is no abdominal tenderness. There is no guarding or rebound. Musculoskeletal:         General: No tenderness. Normal range of motion. Cervical back: Normal range of motion and neck supple. Lymphadenopathy:      Cervical: No cervical adenopathy. Skin:     General: Skin is warm and dry. Findings: No erythema or rash. Neurological:      Mental Status: He is alert and oriented to person, place, and time. Cranial Nerves: No cranial nerve deficit. Coordination: Coordination normal.   Psychiatric:         Behavior: Behavior normal.         Thought Content:  Thought content normal.       Miriam Palacios PA-C

## 2023-10-25 NOTE — PATIENT INSTRUCTIONS
Assessment/plan:  1. Benign essential hypertension-presently stable with lisinopril, metoprolol, and amlodipine. No medication changes. 2.  Stage III chronic kidney disease-stable on lisinopril. Stable with GFR of 54.  3.  Malignant melanoma of the face-status post excision, following with dermatology. 4.  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 Follows with plastic surgery, radiation oncology, oncology. 5.  Vitamin D deficiency-improving with over-the-counter supplementation. Continue.

## 2023-10-27 ENCOUNTER — OFFICE VISIT (OUTPATIENT)
Dept: SURGICAL ONCOLOGY | Facility: CLINIC | Age: 83
End: 2023-10-27
Payer: MEDICARE

## 2023-10-27 VITALS
WEIGHT: 177.6 LBS | HEIGHT: 68 IN | HEART RATE: 72 BPM | DIASTOLIC BLOOD PRESSURE: 92 MMHG | OXYGEN SATURATION: 98 % | BODY MASS INDEX: 26.92 KG/M2 | SYSTOLIC BLOOD PRESSURE: 162 MMHG | TEMPERATURE: 98 F | RESPIRATION RATE: 18 BRPM

## 2023-10-27 DIAGNOSIS — C43.31 MALIGNANT MELANOMA OF NOSE (HCC): ICD-10-CM

## 2023-10-27 DIAGNOSIS — C43.30 MALIGNANT MELANOMA OF FACE EXCLUDING EYELID, NOSE, LIP, AND EAR (HCC): Primary | ICD-10-CM

## 2023-10-27 PROCEDURE — 99213 OFFICE O/P EST LOW 20 MIN: CPT

## 2023-10-27 NOTE — PROGRESS NOTES
Surgical Oncology Follow Up       [address] [facility] MECCA Ramirez  [facility] [id number]    Dougie Cheung  1940  [id number]  [address] [facility] [place] [id number]    Chief Complaint   Patient presents with    Follow-up     Patient being seen for f/u for melanoma. Assessment/Plan:  1. Malignant melanoma of face excluding eyelid, nose, lip, and ear (HCC)  - 6 mo follow up    2. Malignant melanoma of nose (HCC)  - US head neck lymph node mapping; Future       Discussion/Summary: Patient is an 79-year-old male presenting today for 6-month follow-up for melanoma. He has a hx of sarcoma and basal cell carcinoma of the skin as well. In February 2022 he had a wide excision for melanoma on the nose and in July 2023 he had a wide excision of the right cheek for another melanoma. He is currently on observation. He had an ultrasound of the head neck with lymph node mapping on 10/16/2023 which showed no suspicious adenopathy to suggest metastasis. There were no concerns on his clinical exam.  He informs me that he has a new basal cell carcinoma on the left hand. He is seeing dermatology frequently. I will see the patient back in 6 months or sooner should the need arise. She was instructed to call with any questions or concerns prior to this time. All questions were answered today. History of Present Illness:     Oncology History   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[address])   11/8/2017 Surgery    right vertex of scalp - s/p excision and full thickness graft for closure on 11/8/17 for atypical fibroxanthoma. 1/29/2018 Biopsy    biopsy from the anterior aspect of the graft showing atypical epithelioid fibro histiocytic tumor, with lesion extending to the deep tissue edge. This is similar to prior biopsy done.   Differential includes pleomorphic sarcoma versus an extreme atypical example of atypical fibro histiocytoma. 3/2018 Initial Diagnosis    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[address])     3/28/2018 Surgery    excision of scalp sarcoma with full thickness graft - Dr Oneyda Parra. 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 See Note. -- FNCLCC Histologic Grade 3  (total score: 7 of 8). * Tumor differentiation score: 3 of 3.        * Mitotic count score: 3 of 3; > 19 mitoses/10 HPF (33 mitoses/10 HPF). * Necrosis score: 1 of 2; present, < 50%. - Tumor extends into deep reticular dermis, subcutis and fascia. - Perineural invasion: Present; intraneural invasion identified (0.4 mm largest nerve diameter). - Lymphovascular invasion: Focally suspicious. - Bone invasion: Not identified.   - Central nervous system extension: Not identified. - Margins: uninvolved by sarcoma but close. -- Sarcoma  0.15 mm from nearest deep margin.   - Additional Pathologic Findings: Changes consistent with prior surgical site. -- Focal ulceration with bacterial colonization, acute and chronic inflammation. -- Best representative tumor block: A5.      5/29/2018 - 7/5/2018 Radiation    Plan ID Energy Fractions Dose per Fraction (cGy) Dose Correction (cGy) Total Dose Delivered (cGy) Elapsed Days   Vertex Scalp 9E 27 / 27 200 0 5,400 37            Basal cell carcinoma of skin of other parts of face   7/10/2023 Surgery    Skin, right cheek, "basal cell carcinoma", excision:  Scar. No residual basal cell carcinoma identified in the examined sections.         Malignant melanoma of nose ([address])   12/6/2022 Biopsy    Skin, left ala, shave biopsy:     MELANOMA (thickness: 0.6 mm); extending to the tissue edges      Ulceration: not seen  Anatomic Troy Mello) level: III  Type: lentigo maligna melanoma  Mitoses: 0/mm2  Margin assessment: invasive melanoma extends to peripheral specimen margin and deep specimen margin focally  Pathologic stage: pT1a     2/7/2023 Surgery    Skin, nose, left nasal, wide excision:  Residual melanoma, invasive, desmoplastic type, and scar, margins free. Incidental basal cell carcinoma, superficial and nodular type, not extending to the margins. pT2a     2/7/2023 -  Cancer Staged    Staging form: Melanoma of the Skin, AJCC 8th Edition  - Clinical stage from 2/7/2023: Stage IB (cT2a, cN0, cM0) - Signed by Oswaldo Cagle MD on 5/26/2023       Malignant melanoma of face excluding eyelid, nose, lip, and ear ([address])   5/16/2023 Initial Diagnosis    Malignant melanoma of face excluding eyelid, nose, lip, and ear ([address])  Right mid cheek    A. Skin, right mid cheek, shave biopsy:     Combined ulcerated MELANOMA (thickness: at least 1.5 mm) and BASAL CELL CARCINOMA; transected (see note). Note: There are foci of basal cell carcinoma and melanoma in which the components are separate from each other (as can be seen in a collision tumor), but there are also multiple foci in which the melanoma and basal cell carcinoma are closely intermingled ("basomelanocytic tumor" or "combined melanoma and basal cell carcinoma of the intermingled type"), which is a rare occurrence. 1 SOX10, MART-1, HMB45, p40, and Tarun-EP4 immunostains were reviewed and support the diagnosis. Please see synoptic report for additional details. The results were emailed to Dr. Iwona Hollingsworth on 5/19/2023.      Synoptic report for melanoma of the skin  Thickness: at least 1.5 mm  Ulceration: present  Anatomic Veronique Sandhu) level: at least IV  Type: superficial spreading melanoma  Mitoses: 1/mm2  Microsatellites: cannot be determined  Lymphovascular invasion: not seen  Neurotropism: not seen  Tumor regression: not seen  Tumor-infiltrating lymphocytes (TIL): present, non-brisk  Margin assessment: invasive melanoma extends to deep specimen margin; melanoma in situ extends to peripheral specimen margin  Pathologic stage: at least pT2b  Associated nevus: not seen         5/16/2023 -  Cancer Staged Staging form: Melanoma of the Skin, AJCC 8th Edition  - Clinical stage from 5/16/2023: Stage IIA (cT2b, cN0, cM0) - Signed by Rula Do MD on 5/26/2023       7/10/2023 Surgery    Skin, right cheek "melanoma", wide excision:  Residual combined melanoma (B[place]low thickness: 2.3 mm) and basal cell carcinoma (malignant basomelanocytic tumor); margins free. pT3a          -Interval History: Patient is an 78-year-old male presenting today for 6-month follow-up for melanoma. He is currently on observation. He had an ultrasound of the head neck with lymph node mapping on 10/16/2023 which showed no suspicious adenopathy to suggest metastasis. He denies persistent headaches, bone pain, back pain, shortness of breath, or abdominal pain. Review of Systems:  Review of Systems   Constitutional:  Negative for activity change, appetite change, fatigue and unexpected weight change. Respiratory:  Negative for cough and shortness of breath. Cardiovascular:  Negative for chest pain. Gastrointestinal:  Negative for abdominal pain, diarrhea, nausea and vomiting. Endocrine: Negative for heat intolerance. Musculoskeletal:  Negative for arthralgias, back pain and myalgias. Skin:  Negative for rash. Neurological:  Negative for weakness and headaches. Hematological:  Negative for adenopathy.        Patient Active Problem List   Diagnosis    Actinic keratosis    Stage 3 chronic kidney disease ([address])    Gout    Hyperlipidemia    Hypertension    Irritable bowel syndrome    Macular degeneration    Urinary incontinence, post-void dribbling    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[address])    Basal cell carcinoma of skin of other parts of face    Pulmonary nodules    Incisional hernia, without obstruction or gangrene    Status post repair of ventral hernia    Dizziness    Urinary frequency    Lumbar back pain with radiculopathy affecting lower extremity    Scalp ulceration, with necrosis of bone (HCC)    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 (HCC)    Encounter for geriatric assessment    Malignant melanoma of face excluding eyelid, nose, lip, and ear ([address])    Anxiety    Reactive airway disease     Past Medical History:   Diagnosis Date    Anxiety     Arthritis     back    Back pain     Balance problem     Basal cell carcinoma (BCC)     in past    Basal cell carcinoma (BCC) 10/04/2022    Right [place]    BCC (basal cell carcinoma of skin) 08/17/2021    Right tip of nose    BCC (basal cell carcinoma of skin) 08/08/2022    Left lip    BCC (basal cell carcinoma of skin) 08/08/2022    Right cheek    Cancer ([address])     Gout     Hard of hearing     Hearing aid worn     ziyad    Hearing aid worn     Herniated nucleus pulposus, L4-5     [place] (hard of hearing)     Hypertension     Incisional hernia     Incontinence     Macular degeneration     Melanoma ([address])     left cheek- history    Melanoma ([address]) 08/08/2022    Right neck    Nocturia     Pulmonary nodules     Reactive airway disease     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    Skin cancer 07/01/2020    AFX- scalp    Squamous cell carcinoma     in past    Squamous cell skin cancer      Past Surgical History:   Procedure Laterality Date    BASAL CELL CARCINOMA EXCISION Right 7/10/2023    Procedure: EXCISION BASAL CELL CARCINOMA  RIGHT CHEEK;  Surgeon: Douglas Salazar MD;  Location: [facility];  Service: Surgical Oncology    CATARACT EXTRACTION Bilateral     COLONOSCOPY      FLAP LOCAL HEAD / NECK Right 7/10/2023    Procedure: RIGHT CHEEK RECON W/ LOCAL FLAP & SKIN GRAFT;  Surgeon: Ortega Lowe MD;  Location: [facility];  Service: [address] [place] [address] /  [address] SURGERY  09/29/2021    Right tip of nose-Dr. Minerva Garcia    MOHS SURGERY  10/06/2022    Left lip-Dr. Minerva Garcia    MOHS SURGERY  05/16/2023    Right [place] - Dr. Renetta Stack    OTHER SURGICAL HISTORY      sarcoma scalp with skin graft    WA EXCISION MALIGNANT LESION F/E/E/N/L 0.5 CM/< Left 2023    Procedure: WIDE EXCISION OF LEFT NASAL MELANOMA;  Surgeon: Yamil Lou MD;  [facility]: [facility];  Service: Surgical Oncology    GA REPAIR FIRST ABDOMINAL WALL HERNIA N/A 2021    Procedure: REPAIR HERNIA INCISIONAL OPEN WITH MESH;  Surgeon: Katherine Plascencia MD;  [facility];  Service: General    SCALP EXCISION N/A 2018    Procedure: SCALP SARCOMA EXCISION WITH FULL THICKNESS SKIN GRAFT;  Surgeon: Lorretta Severe, MD;  [facility]: [facility];  Service: Plastics    SKIN BIOPSY      SKIN CANCER EXCISION      SKIN LESION EXCISION Right 7/10/2023    Procedure: WIDE EXCISION MELANOMA SCAR, RIGHT CHEEK;  Surgeon: Yamil Lou MD;  [facility];  Service: Surgical Oncology    SPLIT THICKNESS SKIN GRAFT Left 2023    Procedure: APPLICATION OF SKIN SUBSTITUTE GRAFT TO NOSE;  Surgeon: Pura Romero MD;  Location: [facility];  Service: Plastics    TONSILLECTOMY AND ADENOIDECTOMY       Family History   Problem Relation Age of Onset    Colon cancer Father     Heart failure Father     Heart disease Mother     Stroke Mother      Social History     Socioeconomic History    Marital status: /Civil Union     Spouse name: Not on file    Number of children: Not on file    Years of education: Not on file    Highest education level: Not on file   Occupational History    Not on file   Tobacco Use    Smoking status: Former     Packs/day: 0.25     Years: 15.00     Total pack years: 3.75     Types: Cigarettes     Quit date: 3/13/1988     Years since quittin.6    Smokeless tobacco: Never   Vaping Use    Vaping Use: Never used   Substance and Sexual Activity    Alcohol use: Not Currently     Comment: beer once in awhile    Drug use: No    Sexual activity: Yes   Other Topics Concern    Not on file   Social History Narrative    Not on file     Social Determinants of Health     Financial Resource Strain: Medium Risk (2023)    Overall Financial Resource Strain (CARDIA)     Difficulty of Paying Living Expenses: Somewhat hard   Food Insecurity: Not on file   Transportation Needs: No Transportation Needs (4/13/2023)    PRAPARE - Transportation     Lack of Transportation (Medical): No     Lack of Transportation (Non-Medical): No   Physical Activity: Not on file   Stress: Not on file   Social Connections: Not on file   Intimate Partner Violence: Not on file   Housing Stability: Not on file       Current Outpatient Medications:     amLODIPine (NORVASC) 10 mg tablet, TAKE 1 TABLET BY MOUTH  DAILY, Disp: 90 tablet, Rfl: 3    bacitracin ointment, Apply topically daily for 7 days Apply to bolster site daily (Patient taking differently: Apply topically as needed Apply to bolster site daily), Disp: 30 g, Rfl: 0    Fluocinolone Acetonide Scalp 0.01 % OIL, Apply a thin layer topically daily at night one hour before bedtime.  (Patient taking differently: Apply topically if needed Apply a thin layer topically daily at night one hour before bedtime.), Disp: 118.28 mL, Rfl: 5    imiquimod (ALDARA) 5 % cream, Apply topically once a day Monday thru Friday for 6 weeks (Patient taking differently: if needed), Disp: 24 each, Rfl: 1    lisinopril (ZESTRIL) 20 mg tablet, TAKE 1 TABLET BY MOUTH  DAILY, Disp: 90 tablet, Rfl: 3    metoprolol succinate (TOPROL-XL) 50 mg 24 hr tablet, TAKE 1 TABLET BY MOUTH  DAILY, Disp: 90 tablet, Rfl: 3    mometasone (ELOCON) 0.1 % cream, APPLY TO EARS ONCE TO TWICE DAILY AS NEEDED FOR SCALING, Disp: , Rfl:     Multiple Vitamins-Minerals (PRESERVISION AREDS 2) CAPS, Take 1 capsule by mouth 2 (two) times a day, Disp: , Rfl:     mupirocin (BACTROBAN) 2 % ointment, Apply topically two to three times a day to sore areas (Patient taking differently: Apply topically if needed Apply topically two to three times a day to sore areas), Disp: 22 g, Rfl: 3    Niacin (VITAMIN B-3 PO), Take by mouth, Disp: , Rfl:     Niacinamide-Zn-Cu-Upasac-Lr-Le (NICOTINAMIDE PO), Take 500 mg by mouth 2 (two) times a day, Disp: , Rfl:     Pyridoxine HCl (VITAMIN B-6 PO), Take by mouth (Patient not taking: Reported on 10/27/2023), Disp: , Rfl:     traMADol (Ultram) 50 mg tablet, Take 1 tablet (50 mg total) by mouth every 6 (six) hours as needed for moderate pain for up to 15 doses (Patient not taking: Reported on 10/27/2023), Disp: 15 tablet, Rfl: 0  Allergies   Allergen Reactions    Erythromycin Other (See Comments)     Thrush      Vitals:    10/27/23 1442   BP: 162/92   Pulse: 72   Resp: 18   Temp: 98 °F (36.7 °C)   SpO2: 98%       Physical Exam  Vitals reviewed. Constitutional:       General: He is not in acute distress. Appearance: Normal appearance. He is well-developed. HENT:      Head: Normocephalic and atraumatic. Cardiovascular:      Rate and Rhythm: Normal rate and regular rhythm. Heart sounds: Normal heart sounds. Pulmonary:      Effort: Pulmonary effort is normal.      Breath sounds: Normal breath sounds. Abdominal:      General: There is no distension. Palpations: Abdomen is soft. There is no mass. Tenderness: There is no abdominal tenderness. Musculoskeletal:         General: Normal range of motion. Cervical back: Normal range of motion. Lymphadenopathy:      Upper Body:      Right upper body: No supraclavicular adenopathy. Left upper body: No supraclavicular adenopathy. Skin:     General: Skin is warm and dry. Findings: No rash. Neurological:      Mental Status: He is alert and oriented to person, place, and time. Results:    Imaging  US head neck lymph node mapping    Result Date: 10/24/2023  Narrative: NECK ULTRASOUND INDICATION:   C43.30: Malignant melanoma of unspecified part of face. COMPARISON: Ultrasound 3/6/2023 FINDINGS: Ultrasound of the neck was performed with a high frequency linear transducer. There is no suspicion mass identified.  Lymph nodes maintain normal morphologic contour, echogenicity and short axis dimensions of less than 0.7 cm. No evidence for microcalcification or focal nodularity. Impression: No suspicious adenopathy to suggest metastases. Workstation performed: [id number]       I reviewed the above imaging data. Advance Care Planning/Advance Directives:  Discussed disease status, cancer treatment plans and/or cancer treatment goals with the patient.

## 2023-11-15 ENCOUNTER — OFFICE VISIT (OUTPATIENT)
Dept: PODIATRY | Facility: CLINIC | Age: 83
End: 2023-11-15
Payer: MEDICARE

## 2023-11-15 VITALS — BODY MASS INDEX: 26.98 KG/M2 | HEIGHT: 68 IN | RESPIRATION RATE: 18 BRPM | WEIGHT: 178 LBS

## 2023-11-15 DIAGNOSIS — I73.9 PERIPHERAL VASCULAR DISEASE, UNSPECIFIED (HCC): Primary | ICD-10-CM

## 2023-11-15 PROCEDURE — G0127 TRIM NAIL(S): HCPCS | Performed by: PODIATRIST

## 2023-11-15 NOTE — PROGRESS NOTES
Established patient with class findings presents for nail care. Vascular exam:  DP  0/4 bilateral; PT  0 4 bilateral   Dermatological exam:  Each toenail is thick and  dystrophic. Diagnosis: PVD  Treatment: Trimmed multiple dystrophic toenails.      Nail removal    Date/Time: 11/15/2023 10:45 AM    Performed by: Lonny Hagan DPM  Authorized by: Lonny aHgan DPM    Nails trimmed:     Number of nails trimmed:  10

## 2023-11-27 ENCOUNTER — OFFICE VISIT (OUTPATIENT)
Dept: HEMATOLOGY ONCOLOGY | Facility: CLINIC | Age: 83
End: 2023-11-27
Payer: MEDICARE

## 2023-11-27 VITALS
SYSTOLIC BLOOD PRESSURE: 138 MMHG | TEMPERATURE: 98.3 F | HEIGHT: 68 IN | RESPIRATION RATE: 18 BRPM | OXYGEN SATURATION: 97 % | DIASTOLIC BLOOD PRESSURE: 80 MMHG | BODY MASS INDEX: 26.98 KG/M2 | WEIGHT: 178 LBS | HEART RATE: 74 BPM

## 2023-11-27 DIAGNOSIS — C43.9 MELANOMA OF SKIN (HCC): Primary | ICD-10-CM

## 2023-11-27 DIAGNOSIS — C44.319 BASAL CELL CARCINOMA OF SKIN OF OTHER PARTS OF FACE: ICD-10-CM

## 2023-11-27 DIAGNOSIS — C43.30 MALIGNANT MELANOMA OF FACE EXCLUDING EYELID, NOSE, LIP, AND EAR (HCC): ICD-10-CM

## 2023-11-27 DIAGNOSIS — C43.31 MALIGNANT MELANOMA OF NOSE (HCC): ICD-10-CM

## 2023-11-27 DIAGNOSIS — C49.0 SARCOMA OF SCALP (HCC): ICD-10-CM

## 2023-11-27 PROCEDURE — 99214 OFFICE O/P EST MOD 30 MIN: CPT | Performed by: INTERNAL MEDICINE

## 2023-11-27 NOTE — LETTER
November 28, 2023     Lyudmila Sethi MD  [address]  [address] [facility]    Patient: Ab Love   YOB: 1940   Date of Visit: 11/27/2023       Dear Dr. Zachary Delvalle: Thank you for referring Earnest Ford to me for evaluation. Below are my notes for this consultation. If you have questions, please do not hesitate to call me. I look forward to following your patient along with you. Sincerely,        Guillermo Real MD        CC: MD Henna Jimenez PA-C Jeannett Richardson, MD Jesse Grego, MD Brenda Mcgregor, MD  11/28/2023 11:48 PM  Sign when Signing Visit  [address] [facility] [address]  [address] [place] [address]  [phone]  [phone]     Date of Visit: 11/27/2023  Name: Ab Love   YOB: 1940        Subjective    VISIT DIAGNOSIS:  Diagnoses and all orders for this visit:    Melanoma of skin ([address])  -     CT neck chest abdomen pelvis w contrast; Future  -     CBC and differential; Future  -     Comprehensive metabolic panel; Future  -     LD,Blood; Future    Malignant melanoma of face excluding eyelid, nose, lip, and ear (HCC)  -     CT neck chest abdomen pelvis w contrast; Future  -     CBC and differential; Future  -     Comprehensive metabolic panel; Future  -     LD,Blood; Future    Malignant melanoma of nose (HCC)  -     CT neck chest abdomen pelvis w contrast; Future  -     CBC and differential; Future  -     Comprehensive metabolic panel; Future  -     LD,Blood; Future    Basal cell carcinoma of skin of other parts of face    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        1/29/2018 Biopsy    biopsy from the anterior aspect of the graft showing atypical epithelioid fibro histiocytic tumor, with lesion extending to the deep tissue edge. This is similar to prior biopsy done. Differential includes pleomorphic sarcoma versus an extreme atypical example of atypical fibro histiocytoma. 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 See Note. -- FNCLCC Histologic Grade 3  (total score: 7 of 8). * Tumor differentiation score: 3 of 3.        * Mitotic count score: 3 of 3; > 19 mitoses/10 HPF (33 mitoses/10 HPF). * Necrosis score: 1 of 2; present, < 50%. - Tumor extends into deep reticular dermis, subcutis and fascia. - Perineural invasion: Present; intraneural invasion identified (0.4 mm largest nerve diameter). - Lymphovascular invasion: Focally suspicious. - Bone invasion: Not identified.   - Central nervous system extension: Not identified. - Margins: uninvolved by sarcoma but close. -- Sarcoma  0.15 mm from nearest deep margin.   - Additional Pathologic Findings: Changes consistent with prior surgical site. -- Focal ulceration with bacterial colonization, acute and chronic inflammation. -- Best representative tumor block: A5.      5/29/2018 - 7/5/2018 Radiation    Plan ID Energy Fractions Dose per Fraction (cGy) Dose Correction (cGy) Total Dose Delivered (cGy) Elapsed Days   Vertex Scalp 9E 27 / 27 200 0 5,400 37            Basal cell carcinoma of skin of other parts of face   7/10/2023 Surgery    Skin, right cheek, "basal cell carcinoma", excision:  Scar. No residual basal cell carcinoma identified in the examined sections.         Malignant melanoma of nose ([address])   12/6/2022 Biopsy    Skin, left ala, shave biopsy:     MELANOMA (thickness: 0.6 mm); extending to the tissue edges      Ulceration: not seen  Anatomic Marguerite Rodriguez) level: III  Type: lentigo maligna melanoma  Mitoses: 0/mm2  Margin assessment: invasive melanoma extends to peripheral specimen margin and deep specimen margin focally  Pathologic stage: pT1a     2/7/2023 Surgery    Skin, nose, left nasal, wide excision:  Residual melanoma, invasive, desmoplastic type, and scar, margins free. Incidental basal cell carcinoma, superficial and nodular type, not extending to the margins. pT2a     2/7/2023 -  Cancer Staged    Staging form: Melanoma of the Skin, AJCC 8th Edition  - Clinical stage from 2/7/2023: Stage IB (cT2a, cN0, cM0) - Signed by Lonny Alexander MD on 11/27/2023 5/16/2023 -  Cancer Staged    Staging form: Melanoma of the Skin, AJCC 8th Edition  - Pathologic stage from 5/16/2023: Stage IIA (pT2b, pN0, cM0) - Signed by Lonny Alexander MD on 11/27/2023       7/10/2023 -  Cancer Staged    Staging form: Melanoma of the Skin, AJCC 8th Edition  - Pathologic stage from 7/10/2023: Stage IIB (pT3b, pN0, cM0) - Signed by Lonny Alexander MD on 11/27/2023       Malignant melanoma of face excluding eyelid, nose, lip, and ear ([address])   5/16/2023 Initial Diagnosis    Malignant melanoma of face excluding eyelid, nose, lip, and ear ([address])  Right mid cheek    A. Skin, right mid cheek, shave biopsy:     Combined ulcerated MELANOMA (thickness: at least 1.5 mm) and BASAL CELL CARCINOMA; transected (see note). Note: There are foci of basal cell carcinoma and melanoma in which the components are separate from each other (as can be seen in a collision tumor), but there are also multiple foci in which the melanoma and basal cell carcinoma are closely intermingled ("basomelanocytic tumor" or "combined melanoma and basal cell carcinoma of the intermingled type"), which is a rare occurrence. 1 SOX10, MART-1, HMB45, p40, and Tarun-EP4 immunostains were reviewed and support the diagnosis. Please see synoptic report for additional details. The results were emailed to Dr. Parvez Olsno on 5/19/2023.      Synoptic report for melanoma of the skin  Thickness: at least 1.5 mm  Ulceration: present  Anatomic Claven Hopes) level: at least IV  Type: superficial spreading melanoma  Mitoses: 1/mm2  Microsatellites: cannot be determined  Lymphovascular invasion: not seen  Neurotropism: not seen  Tumor regression: not seen  Tumor-infiltrating lymphocytes (TIL): present, non-brisk  Margin assessment: invasive melanoma extends to deep specimen margin; melanoma in situ extends to peripheral specimen margin  Pathologic stage: at least pT2b  Associated nevus: not seen         5/16/2023 -  Cancer Staged    Staging form: Melanoma of the Skin, AJCC 8th Edition  - Clinical stage from 5/16/2023: Stage IIA (cT2b, cN0, cM0) - Signed by Jose Armando Turner MD on 5/26/2023       7/10/2023 Surgery    Skin, right cheek "melanoma", wide excision:  Residual combined melanoma (Breslow thickness: 2.3 mm) and basal cell carcinoma (malignant basomelanocytic tumor); margins free. pT3a        Cancer Staging   Malignant melanoma of face excluding eyelid, nose, lip, and ear ([address])  Staging form: Melanoma of the Skin, AJCC 8th Edition  - Clinical stage from 5/16/2023: Stage IIA (cT2b, cN0, cM0) - Signed by Jose Armando Turner MD on 5/26/2023  - Pathologic stage from 7/10/2023: Stage IIB (pT3b, pN0, cM0) - Signed by Jose Armando Turner MD on 11/27/2023      Malignant melanoma of nose [facility])  Staging form: Melanoma of the Skin, AJCC 8th Edition  - Clinical stage from 2/7/2023: Stage IB (cT2a, cN0, cM0) - Signed by Jose Armando Turner MD on 11/27/2023            HISTORY OF PRESENT ILLNESS: Carey Perez is a 79 y.o. male  who had recent resection of melanoma his nose in February and May 2023 here for continued monitoring, follow-up and surveillance. He is doing well and denies any new, changing, concerning skin lesions. He feels okay. He did start taking 500 mg of nicotinamide twice a day.   He still has some basal cells but does think the rate of new basal cell carcinomas have decreased. Continues to follow very closely with dermatology. Denies any lymphadenopathy. REVIEW OF SYSTEMS:  Review of Systems   Constitutional:  Negative for appetite change, fatigue, fever and unexpected weight change. HENT:   Negative for lump/mass, trouble swallowing and voice change. Eyes:  Negative for icterus. Respiratory:  Negative for cough, shortness of breath and wheezing. Cardiovascular:  Negative for leg swelling. Gastrointestinal:  Negative for abdominal pain, constipation, diarrhea, nausea and vomiting. Genitourinary:  Negative for difficulty urinating and hematuria. Musculoskeletal:  Negative for arthralgias, gait problem and myalgias. Skin:  Negative for itching and rash. Known BCC. No additional new, changing, or concerning lesions. Neurological:  Negative for extremity weakness, gait problem, headaches, light-headedness and numbness. Hematological:  Negative for adenopathy. MEDICATIONS:    Current Outpatient Medications:   •  amLODIPine (NORVASC) 10 mg tablet, TAKE 1 TABLET BY MOUTH  DAILY, Disp: 90 tablet, Rfl: 3  •  lisinopril (ZESTRIL) 20 mg tablet, TAKE 1 TABLET BY MOUTH  DAILY, Disp: 90 tablet, Rfl: 3  •  metoprolol succinate (TOPROL-XL) 50 mg 24 hr tablet, TAKE 1 TABLET BY MOUTH  DAILY, Disp: 90 tablet, Rfl: 3  •  Niacin (VITAMIN B-3 PO), Take by mouth, Disp: , Rfl:   •  Niacinamide-Zn-Cu-Lynsza-Et-Ub (NICOTINAMIDE PO), Take 500 mg by mouth 2 (two) times a day, Disp: , Rfl:   •  bacitracin ointment, Apply topically daily for 7 days Apply to bolster site daily (Patient taking differently: Apply topically as needed Apply to bolster site daily), Disp: 30 g, Rfl: 0  •  Fluocinolone Acetonide Scalp 0.01 % OIL, Apply a thin layer topically daily at night one hour before bedtime.  (Patient taking differently: Apply topically if needed Apply a thin layer topically daily at night one hour before bedtime.), Disp: 118.28 mL, Rfl: 5  •  imiquimod (ALDARA) 5 % cream, Apply topically once a day Monday thru Friday for 6 weeks (Patient taking differently: if needed), Disp: 24 each, Rfl: 1  •  mometasone (ELOCON) 0.1 % cream, APPLY TO EARS ONCE TO TWICE DAILY AS NEEDED FOR SCALING, Disp: , Rfl:   •  Multiple Vitamins-Minerals (PRESERVISION AREDS 2) CAPS, Take 1 capsule by mouth 2 (two) times a day (Patient not taking: Reported on 11/27/2023), Disp: , Rfl:   •  mupirocin (BACTROBAN) 2 % ointment, Apply topically two to three times a day to sore areas (Patient taking differently: Apply topically if needed Apply topically two to three times a day to sore areas), Disp: 22 g, Rfl: 3  •  Pyridoxine HCl (VITAMIN B-6 PO), Take by mouth (Patient not taking: Reported on 10/27/2023), Disp: , Rfl:   •  traMADol (Ultram) 50 mg tablet, Take 1 tablet (50 mg total) by mouth every 6 (six) hours as needed for moderate pain for up to 15 doses (Patient not taking: Reported on 10/27/2023), Disp: 15 tablet, Rfl: 0     ALLERGIES:  Allergies   Allergen Reactions   • Erythromycin Other (See Comments)     Thrush         ACTIVE PROBLEMS:  Patient Active Problem List   Diagnosis   • Actinic keratosis   • Stage 3 chronic kidney disease ([address])   • Gout   • Hyperlipidemia   • Hypertension   • Irritable bowel syndrome   • Macular degeneration   • Urinary incontinence, post-void dribbling   • 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HCC)   • Basal cell carcinoma of skin of other parts of face   • Pulmonary nodules   • Incisional hernia, without obstruction or gangrene   • Status post repair of ventral hernia   • Dizziness   • Urinary frequency   • Lumbar back pain with radiculopathy affecting lower extremity   • Scalp ulceration, with necrosis of bone (HCC)   • Radiation necrosis of skull    • Malignant melanoma of nose ([address])   • Encounter for geriatric assessment   • Malignant melanoma of face excluding eyelid, nose, lip, and ear ([address])   • Anxiety   • Reactive airway disease          PAST MEDICAL HISTORY:   Past Medical History:   Diagnosis Date   • Anxiety    • Arthritis     back   • Back pain    • Balance problem    • Basal cell carcinoma (BCC)     in past   • Basal cell carcinoma (BCC) 10/04/2022    Right [place]   • BCC (basal cell carcinoma of skin) 08/17/2021    Right tip of nose   • BCC (basal cell carcinoma of skin) 08/08/2022    Left lip   • BCC (basal cell carcinoma of skin) 08/08/2022    Right cheek   • Cancer ([address])    • Gout    • Hard of hearing    • Hearing aid worn     ziyad   • Hearing aid worn    • Herniated nucleus pulposus, L4-5    • [place] (hard of hearing)    • Hypertension    • Incisional hernia    • Incontinence    • Macular degeneration    • Melanoma ([address])     left cheek- history   • Melanoma ([address]) 08/08/2022    Right neck   • Nocturia    • Pulmonary nodules    • Reactive airway disease    • 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   • Skin cancer 07/01/2020    AFX- scalp   • Squamous cell carcinoma     in past   • Squamous cell skin cancer         PAST SURGICAL HISTORY:  Past Surgical History:   Procedure Laterality Date   • BASAL CELL CARCINOMA EXCISION Right 7/10/2023    Procedure: EXCISION BASAL CELL CARCINOMA  RIGHT CHEEK;  Surgeon: Viraj Houston MD;  [facility]: [facility];  Service: Surgical Oncology   • CATARACT EXTRACTION Bilateral    • COLONOSCOPY     • FLAP LOCAL HEAD / NECK Right 7/10/2023    Procedure: RIGHT CHEEK RECON W/ LOCAL FLAP & SKIN GRAFT;  Surgeon: Laure Giordano MD;  Location: [facility];  Service: Plastics   • HERNIA REPAIR     • HYDROCELE EXCISION / REPAIR     • MASTOID SURGERY Left    • MOHS SURGERY     • MOHS SURGERY  09/29/2021    Right tip of nose-Dr. Kristie Cornejo   • MOHS SURGERY  10/06/2022    Left lip-Dr. Kristie Cornejo   • MOHS SURGERY  05/16/2023    Right Laura Rosales - Dr. Andrew Richards   • OTHER SURGICAL HISTORY      sarcoma scalp with skin graft   • IA EXCISION MALIGNANT LESION F/E/E/N/L 0.5 CM/< Left 02/07/2023    Procedure: WIDE EXCISION OF LEFT NASAL MELANOMA;  Surgeon: Viraj Houston MD;  [facility]: [facility];  Service: Surgical Oncology   • VT REPAIR FIRST ABDOMINAL WALL HERNIA N/A 2021    Procedure: REPAIR HERNIA INCISIONAL OPEN WITH MESH;  Surgeon: Nicole Hillman MD;  [facility];  Service: General   • SCALP EXCISION N/A 2018    Procedure: SCALP SARCOMA EXCISION WITH FULL THICKNESS SKIN GRAFT;  Surgeon: April Rodgers MD;  [facility];  Service: Plastics   • SKIN BIOPSY     • SKIN CANCER EXCISION     • SKIN LESION EXCISION Right 7/10/2023    Procedure: WIDE EXCISION MELANOMA SCAR, RIGHT CHEEK;  Surgeon: Magalie Kwong MD;  [facility];  Service: Surgical Oncology   • SPLIT THICKNESS SKIN GRAFT Left 2023    Procedure: APPLICATION OF SKIN SUBSTITUTE GRAFT TO NOSE;  Surgeon: Alexa Chen MD;  [facility];  Service: Plastics   • TONSILLECTOMY AND ADENOIDECTOMY          SOCIAL HISTORY:  Social History     Socioeconomic History   • Marital status: /Civil Union     Spouse name: Not on file   • Number of children: Not on file   • Years of education: Not on file   • Highest education level: Not on file   Occupational History   • Not on file   Tobacco Use   • Smoking status: Former     Packs/day: 0.25     Years: 15.00     Total pack years: 3.75     Types: Cigarettes     Quit date: 3/13/1988     Years since quittin.7   • Smokeless tobacco: Never   Vaping Use   • Vaping Use: Never used   Substance and Sexual Activity   • Alcohol use: Not Currently     Comment: beer once in awhile   • Drug use: No   • Sexual activity: Yes   Other Topics Concern   • Not on file   Social History Narrative   • Not on file     Social Determinants of Health     Financial Resource Strain: Medium Risk (2023)    Overall Financial Resource Strain (CARDIA)    • Difficulty of Paying Living Expenses: Somewhat hard   Food Insecurity: Not on file   Transportation Needs: No Transportation Needs (2023)    PRAPARE - Transportation    • Lack of Transportation (Medical): No    • Lack of Transportation (Non-Medical): No   Physical Activity: Not on file   Stress: Not on file   Social Connections: Not on file   Intimate Partner Violence: Not on file   Housing Stability: Not on file        FAMILY HISTORY:  Family History   Problem Relation Age of Onset   • Colon cancer Father    • Heart failure Father    • Heart disease Mother    • Stroke Mother            Objective    PHYSICAL EXAMINATION:   Blood pressure 138/80, pulse 74, temperature 98.3 °F (36.8 °C), temperature source Temporal, resp. rate 18, height 5' 8" (1.727 m), weight 80.7 kg (178 lb), SpO2 97 %. Pain Score: 0-No pain     ECOG Performance Status      Flowsheet Row Most Recent Value   ECOG Performance Status 1 - Restricted in physically strenuous activity but ambulatory and able to carry out work of a light or sedentary nature, e.g., light house work, office work               Physical Exam  Constitutional:       General: He is not in acute distress. Appearance: Normal appearance. He is not ill-appearing or toxic-appearing. HENT:      Head: Normocephalic and atraumatic. Right Ear: External ear normal.      Left Ear: External ear normal.      Nose: Nose normal.      Comments: Areas of healed scars/resections     Mouth/Throat:      Mouth: Mucous membranes are moist.      Pharynx: Oropharynx is clear. Eyes:      General: No scleral icterus. Right eye: No discharge. Left eye: No discharge. Conjunctiva/sclera: Conjunctivae normal.   Cardiovascular:      Rate and Rhythm: Normal rate and regular rhythm. Pulses: Normal pulses. Heart sounds: No murmur heard. No friction rub. No gallop. Pulmonary:      Effort: Pulmonary effort is normal. No respiratory distress. Breath sounds: Normal breath sounds. No wheezing or rales. Abdominal:      General: Bowel sounds are normal. There is no distension. Palpations: There is no mass. Tenderness:  There is no abdominal tenderness. There is no rebound. Musculoskeletal:         General: No swelling or tenderness. Cervical back: Normal range of motion. No rigidity. Right lower leg: No edema. Left lower leg: No edema. Lymphadenopathy:      Head:      Right side of head: No submandibular, preauricular or posterior auricular adenopathy. Left side of head: No submandibular, preauricular or posterior auricular adenopathy. Cervical: No cervical adenopathy. Right cervical: No superficial or posterior cervical adenopathy. Left cervical: No superficial or posterior cervical adenopathy. Upper Body:      Right upper body: No supraclavicular or axillary adenopathy. Left upper body: No supraclavicular or axillary adenopathy. Skin:     General: Skin is warm. Coloration: Skin is not jaundiced. Findings: No lesion or rash. Comments: Well healed surgical scars. No evidence of recurrence at primary sites. Neurological:      General: No focal deficit present. Mental Status: He is alert and oriented to person, place, and time. Cranial Nerves: No cranial nerve deficit. Motor: No weakness. Gait: Gait normal.   Psychiatric:         Mood and Affect: Mood normal.         Behavior: Behavior normal.         Thought Content: Thought content normal.         Judgment: Judgment normal.         I reviewed lab data in the chart.     WBC   Date Value Ref Range Status   10/20/2023 6.57 4.31 - 10.16 Thousand/uL Final   06/27/2023 6.69 4.31 - 10.16 Thousand/uL Final   05/27/2023 9.69 4.31 - 10.16 Thousand/uL Final     Hemoglobin   Date Value Ref Range Status   10/20/2023 14.7 12.0 - 17.0 g/dL Final   06/27/2023 14.2 12.0 - 17.0 g/dL Final   05/27/2023 14.4 12.0 - 17.0 g/dL Final     Platelets   Date Value Ref Range Status   10/20/2023 217 149 - 390 Thousands/uL Final   06/27/2023 204 149 - 390 Thousands/uL Final   05/27/2023 201 149 - 390 Thousands/uL Final     MCV   Date Value Ref Range Status   10/20/2023 93 82 - 98 fL Final   06/27/2023 91 82 - 98 fL Final   05/27/2023 90 82 - 98 fL Final      Sodium   Date Value Ref Range Status   05/05/2015 142 136 - 145 mmol/L Final   10/28/2014 138 136 - 145 mmol/L Final   04/11/2014 141 136 - 145 mmol/L Final     Potassium   Date Value Ref Range Status   10/20/2023 4.3 3.5 - 5.3 mmol/L Final   06/27/2023 3.9 3.5 - 5.3 mmol/L Final   05/27/2023 3.8 3.5 - 5.3 mmol/L Final   05/05/2015 3.5 3.5 - 5.3 mmol/L Final   10/28/2014 3.6 3.5 - 5.3 mmol/L Final   04/11/2014 3.6 3.5 - 5.3 mmol/L Final     Chloride   Date Value Ref Range Status   10/20/2023 105 96 - 108 mmol/L Final   06/27/2023 109 (H) 96 - 108 mmol/L Final   05/27/2023 106 96 - 108 mmol/L Final   05/05/2015 105 100 - 108 mmol/L Final   10/28/2014 103 100 - 108 mmol/L Final   04/11/2014 104 100 - 108 mmol/L Final     CO2   Date Value Ref Range Status   10/20/2023 28 21 - 32 mmol/L Final   06/27/2023 28 21 - 32 mmol/L Final   05/27/2023 25 21 - 32 mmol/L Final   05/05/2015 27 23 - 33 mmol/L Final   10/28/2014 28 23 - 33 mmol/L Final   04/11/2014 31 23 - 33 mmol/L Final     BUN   Date Value Ref Range Status   10/20/2023 21 5 - 25 mg/dL Final   06/27/2023 22 5 - 25 mg/dL Final   05/27/2023 17 5 - 25 mg/dL Final   05/05/2015 28 (H) 5 - 25 mg/dL Final   10/28/2014 32 (H) 5 - 25 mg/dL Final   04/11/2014 33 (H) 5 - 25 mg/dL Final     Creatinine   Date Value Ref Range Status   10/20/2023 1.22 0.60 - 1.30 mg/dL Final     Comment:     Standardized to IDMS reference method   06/27/2023 1.40 (H) 0.60 - 1.30 mg/dL Final     Comment:     Standardized to IDMS reference method   05/27/2023 1.14 0.60 - 1.30 mg/dL Final     Comment:     Standardized to IDMS reference method   05/05/2015 1.44 (H) 0.60 - 1.30 mg/dL Final     Comment:     Standardized to IDMS reference method   10/28/2014 1.52 (H) 0.60 - 1.30 mg/dL Final     Comment:     Standardized to IDMS reference method   04/11/2014 1.58 (H) 0.60 - 1.30 mg/dL Final     Comment:     Standardized to IDMS reference method     Glucose   Date Value Ref Range Status   05/27/2023 133 65 - 140 mg/dL Final     Comment:     If the patient is fasting, the ADA then defines impaired fasting glucose as > 100 mg/dL and diabetes as > or equal to 123 mg/dL. 06/10/2021 90 65 - 140 mg/dL Final     Comment:     If the patient is fasting, the ADA then defines impaired fasting glucose as > 100 mg/dL and diabetes as > or equal to 123 mg/dL. Specimen collection should occur prior to Sulfasalazine administration due to the potential for falsely depressed results. Specimen collection should occur prior to Sulfapyridine administration due to the potential for falsely elevated results. 06/09/2021 88 65 - 140 mg/dL Final     Comment:     If the patient is fasting, the ADA then defines impaired fasting glucose as > 100 mg/dL and diabetes as > or equal to 123 mg/dL. Specimen collection should occur prior to Sulfasalazine administration due to the potential for falsely depressed results. Specimen collection should occur prior to Sulfapyridine administration due to the potential for falsely elevated results.      Calcium   Date Value Ref Range Status   10/20/2023 10.1 8.4 - 10.2 mg/dL Final   06/27/2023 9.4 8.3 - 10.1 mg/dL Final   05/27/2023 9.3 8.4 - 10.2 mg/dL Final   05/05/2015 9.3 8.3 - 10.1 mg/dL Final   10/28/2014 9.8 8.3 - 10.1 mg/dL Final   04/11/2014 9.3 8.3 - 10.1 mg/dL Final     Total Protein   Date Value Ref Range Status   05/05/2015 7.6 6.4 - 8.2 g/dL Final   04/11/2014 6.8 6.4 - 8.2 g/dL Final     Albumin   Date Value Ref Range Status   10/20/2023 4.2 3.5 - 5.0 g/dL Final   06/27/2023 3.8 3.5 - 5.0 g/dL Final   05/27/2023 4.0 3.5 - 5.0 g/dL Final   05/05/2015 3.8 3.5 - 5.0 g/dL Final   04/11/2014 3.6 3.5 - 5.0 g/dL Final     Total Bilirubin   Date Value Ref Range Status   10/20/2023 1.14 (H) 0.20 - 1.00 mg/dL Final     Comment:     Use of this assay is not recommended for patients undergoing treatment with eltrombopag due to the potential for falsely elevated results. N-acetyl-p-benzoquinone imine (metabolite of Acetaminophen) will generate erroneously low results in samples for patients that have taken an overdose of Acetaminophen.   06/27/2023 0.90 0.20 - 1.00 mg/dL Final     Comment:     Use of this assay is not recommended for patients undergoing treatment with eltrombopag due to the potential for falsely elevated results. 05/27/2023 0.81 0.20 - 1.00 mg/dL Final     Comment:     Use of this assay is not recommended for patients undergoing treatment with eltrombopag due to the potential for falsely elevated results. N-acetyl-p-benzoquinone imine (metabolite of Acetaminophen) will generate erroneously low results in samples for patients that have taken an overdose of Acetaminophen. Alkaline Phosphatase   Date Value Ref Range Status   10/20/2023 104 34 - 104 U/L Final   06/27/2023 129 (H) 46 - 116 U/L Final   05/27/2023 120 (H) 34 - 104 U/L Final   05/05/2015 119 (H) 46 - 116 U/L Final   04/11/2014 123 50 - 136 U/L Final     AST   Date Value Ref Range Status   10/20/2023 19 13 - 39 U/L Final   06/27/2023 20 5 - 45 U/L Final     Comment:     Specimen collection should occur prior to Sulfasalazine administration due to the potential for falsely depressed results. 05/27/2023 15 13 - 39 U/L Final   05/05/2015 27 0 - 45 U/L Final   04/11/2014 19 0 - 45 U/L Final     ALT   Date Value Ref Range Status   10/20/2023 21 7 - 52 U/L Final     Comment:     Specimen collection should occur prior to Sulfasalazine administration due to the potential for falsely depressed results. 06/27/2023 37 12 - 78 U/L Final     Comment:     Specimen collection should occur prior to Sulfasalazine and/or Sulfapyridine administration due to the potential for falsely depressed results.     05/27/2023 16 7 - 52 U/L Final     Comment:     Specimen collection should occur prior to Sulfasalazine administration due to the potential for falsely depressed results. 05/05/2015 53 16 - 63 U/L Final   04/11/2014 48 6 - 78 U/L Final      No results found for: "LDH"  No results found for: "TSH"  No results found for: "I2ADDCZ"   No results found for: "FREET4"      RECENT IMAGING:  No results found. Assessment/Plan  Mr. Nga Aguilar is a 69-year-old gentleman with stage IIb melanoma and multiple basal carcinomas here for continued monitoring, follow-up, surveillance. 1. Melanoma of skin ([address])  2. Malignant melanoma of face excluding eyelid, nose, lip, and ear ([address])  3. Malignant melanoma of nose (HCC)  Originally lesion on the right lateral cheek was stage IIa melanoma. However on wide local excision depth of melanoma increased to 2.3 mm with ulceration. This places him as a stage IIb melanoma. I discussed with he and his wife that this does have an increased risk of disease recurrence. We discussed that there is treatment in the adjuvant setting for patients with stage IIb melanoma to decrease the risk of recurrence. We discussed the principle and reasoning behind adjuvant treatment. We discussed the data in Stage IIB/C melanoma and the overall reduced RFS and improved PFS in Stage IIB/C melanomas treated with immunotherapy/PD-1 inhibitor in the adjuvant setting. We discussed the benefits, side effects, and risks of PD1 inhibitor which include but are not limited to rash and autoimmune related adverse events including colitis; dermatitis; endocrinopathy including diabetes, hypophysitis, and thyroid function abnormalities; hepatitis; myocarditis; neuropathy; nephritis; pneumonitis; uveitis; and fatigue. At this time does not wish to undergo adjuvant treatment with immunotherapy. I discussed that this is reasonable we will continue to monitor him closely. I discussed that with stage IIb melanoma we do recommend imaging every 6 months to evaluate for distant recurrence.   He has not had any type of imaging at this time and we will order CT chest, abdomen and pelvis for initial melanoma staging. Discussed that he will follow-up in 3 months for close monitoring and surveillance. Orders placed in prescription divided for imaging to be done as well as blood work to be done prior to his next visit. He and his wife will continue to follow closely with dermatology. They know to call with issues or concerns prior to their next visit. - CT neck chest abdomen pelvis w contrast; Future  - CBC and differential; Future  - Comprehensive metabolic panel; Future  - LD,Blood; Future    4. Basal cell carcinoma of skin of other parts of face  Continues to follow with dermatology closely. Resection/excisions as per dermatology. Currently no new concerning lesions at this time. Continuing on 500 mg nicotinamide twice a day. 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 No evidence of recurrence. Continues to monitor the area. Return in about 3 months (around 2/27/2024) for Office Visit, Imaging - See orders, labs.      Syl Hinojosa MD, PhD

## 2023-11-29 NOTE — PROGRESS NOTES
[facility]  [address] [place] [address]  [phone]     Date of Visit: 11/27/2023  Name: Lynn Nielsen   YOB: 1940        Subjective    VISIT DIAGNOSIS:  Diagnoses and all orders for this visit:    Melanoma of skin ([address])  -     CT neck chest abdomen pelvis w contrast; Future  -     CBC and differential; Future  -     Comprehensive metabolic panel; Future  -     LD,Blood; Future    Malignant melanoma of face excluding eyelid, nose, lip, and ear (HCC)  -     CT neck chest abdomen pelvis w contrast; Future  -     CBC and differential; Future  -     Comprehensive metabolic panel; Future  -     LD,Blood; Future    Malignant melanoma of nose (HCC)  -     CT neck chest abdomen pelvis w contrast; Future  -     CBC and differential; Future  -     Comprehensive metabolic panel; Future  -     LD,Blood; Future    Basal cell carcinoma of skin of other parts of face    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 1/29/2018 Biopsy    biopsy from the anterior aspect of the graft showing atypical epithelioid fibro histiocytic tumor, with lesion extending to the deep tissue edge. This is similar to prior biopsy done. Differential includes pleomorphic sarcoma versus an extreme atypical example of atypical fibro histiocytoma. 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 See Note. -- FNCLCC Histologic Grade 3  (total score: 7 of 8). * Tumor differentiation score: 3 of 3.        * Mitotic count score: 3 of 3; > 19 mitoses/10 HPF (33 mitoses/10 HPF).        * Necrosis score: 1 of 2; present, < 50%. - Tumor extends into deep reticular dermis, subcutis and fascia. - Perineural invasion: Present; intraneural invasion identified (0.4 mm largest nerve diameter). - Lymphovascular invasion: Focally suspicious. - Bone invasion: Not identified.   - Central nervous system extension: Not identified. - Margins: uninvolved by sarcoma but close. -- Sarcoma  0.15 mm from nearest deep margin.   - Additional Pathologic Findings: Changes consistent with prior surgical site. -- Focal ulceration with bacterial colonization, acute and chronic inflammation. -- Best representative tumor block: A5.      5/29/2018 - 7/5/2018 Radiation    Plan ID Energy Fractions Dose per Fraction (cGy) Dose Correction (cGy) Total Dose Delivered (cGy) Elapsed Days   Vertex Scalp 9E 27 / 27 200 0 5,400 37            Basal cell carcinoma of skin of other parts of face   7/10/2023 Surgery    Skin, right cheek, "basal cell carcinoma", excision:  Scar. No residual basal cell carcinoma identified in the examined sections. Malignant melanoma of nose ([address])   12/6/2022 Biopsy    Skin, left ala, shave biopsy:     MELANOMA (thickness: 0.6 mm); extending to the tissue edges      Ulceration: not seen  Anatomic Master Miranda) level: III  Type: lentigo maligna melanoma  Mitoses: 0/mm2  Margin assessment: invasive melanoma extends to peripheral specimen margin and deep specimen margin focally  Pathologic stage: pT1a     2/7/2023 Surgery    Skin, nose, left nasal, wide excision:  Residual melanoma, invasive, desmoplastic type, and scar, margins free. Incidental basal cell carcinoma, superficial and nodular type, not extending to the margins.     pT2a     2/7/2023 -  Cancer Staged    Staging form: Melanoma of the Skin, AJCC 8th Edition  - Clinical stage from 2/7/2023: Stage IB (cT2a, cN0, cM0) - Signed by Veronica Chatman MD on 11/27/2023 5/16/2023 -  Cancer Staged    Staging form: Melanoma of the Skin, AJCC 8th Edition  - Pathologic stage from 5/16/2023: Stage IIA (pT2b, pN0, cM0) - Signed by Shoshana Guy MD on 11/27/2023       7/10/2023 -  Cancer Staged    Staging form: Melanoma of the Skin, AJCC 8th Edition  - Pathologic stage from 7/10/2023: Stage IIB (pT3b, pN0, cM0) - Signed by Shoshana Guy MD on 11/27/2023       Malignant melanoma of face excluding eyelid, nose, lip, and ear ([address])   5/16/2023 Initial Diagnosis    Malignant melanoma of face excluding eyelid, nose, lip, and ear ([address])  Right mid cheek    A. Skin, right mid cheek, shave biopsy:     Combined ulcerated MELANOMA (thickness: at least 1.5 mm) and BASAL CELL CARCINOMA; transected (see note). Note: There are foci of basal cell carcinoma and melanoma in which the components are separate from each other (as can be seen in a collision tumor), but there are also multiple foci in which the melanoma and basal cell carcinoma are closely intermingled ("basomelanocytic tumor" or "combined melanoma and basal cell carcinoma of the intermingled type"), which is a rare occurrence. 1 SOX10, MART-1, HMB45, p40, and Tarun-EP4 immunostains were reviewed and support the diagnosis. Please see synoptic report for additional details. The results were emailed to Dr. sEter Whitt on 5/19/2023.      Synoptic report for melanoma of the skin  Thickness: at least 1.5 mm  Ulceration: present  [facility] level: at least IV  Type: superficial spreading melanoma  Mitoses: 1/mm2  Microsatellites: cannot be determined  Lymphovascular invasion: not seen  Neurotropism: not seen  Tumor regression: not seen  Tumor-infiltrating lymphocytes (TIL): present, non-brisk  Margin assessment: invasive melanoma extends to deep specimen margin; melanoma in situ extends to peripheral specimen margin  Pathologic stage: at least pT2b  Associated nevus: not seen         5/16/2023 -  Cancer Staged    Staging form: Melanoma of the Skin, AJCC 8th Edition  - Clinical stage from 5/16/2023: Stage IIA (cT2b, cN0, cM0) - Signed by Milli Davis MD on 5/26/2023       7/10/2023 Surgery    Skin, right cheek "melanoma", wide excision:  Residual combined melanoma (Breslow thickness: 2.3 mm) and basal cell carcinoma (malignant basomelanocytic tumor); margins free. pT3a        Cancer Staging   Malignant melanoma of face excluding eyelid, nose, lip, and ear ([address])  Staging form: Melanoma of the Skin, AJCC 8th Edition  - Clinical stage from 5/16/2023: Stage IIA (cT2b, cN0, cM0) - Signed by Milli Davis MD on 5/26/2023  - Pathologic stage from 7/10/2023: Stage IIB (pT3b, pN0, cM0) - Signed by Milli Davis MD on 11/27/2023      Malignant melanoma of nose [facility])  Staging form: Melanoma of the Skin, AJCC 8th Edition  - Clinical stage from 2/7/2023: Stage IB (cT2a, cN0, cM0) - Signed by Milli Davis MD on 11/27/2023            HISTORY OF PRESENT ILLNESS: Dick Middleton is a 79 y.o. male  who had recent resection of melanoma his nose in February and May 2023 here for continued monitoring, follow-up and surveillance. He is doing well and denies any new, changing, concerning skin lesions. He feels okay. He did start taking 500 mg of nicotinamide twice a day. He still has some basal cells but does think the rate of new basal cell carcinomas have decreased. Continues to follow very closely with dermatology. Denies any lymphadenopathy. REVIEW OF SYSTEMS:  Review of Systems   Constitutional:  Negative for appetite change, fatigue, fever and unexpected weight change. HENT:   Negative for lump/mass, trouble swallowing and voice change. Eyes:  Negative for icterus. Respiratory:  Negative for cough, shortness of breath and wheezing. Cardiovascular:  Negative for leg swelling. Gastrointestinal:  Negative for abdominal pain, constipation, diarrhea, nausea and vomiting. Genitourinary:  Negative for difficulty urinating and hematuria.     Musculoskeletal:  Negative for arthralgias, gait problem and myalgias. Skin:  Negative for itching and rash. Known BCC. No additional new, changing, or concerning lesions. Neurological:  Negative for extremity weakness, gait problem, headaches, light-headedness and numbness. Hematological:  Negative for adenopathy. MEDICATIONS:    Current Outpatient Medications:     amLODIPine (NORVASC) 10 mg tablet, TAKE 1 TABLET BY MOUTH  DAILY, Disp: 90 tablet, Rfl: 3    lisinopril (ZESTRIL) 20 mg tablet, TAKE 1 TABLET BY MOUTH  DAILY, Disp: 90 tablet, Rfl: 3    metoprolol succinate (TOPROL-XL) 50 mg 24 hr tablet, TAKE 1 TABLET BY MOUTH  DAILY, Disp: 90 tablet, Rfl: 3    Niacin (VITAMIN B-3 PO), Take by mouth, Disp: , Rfl:     Niacinamide-Zn-Cu-Kmaohc-Uz-Mx (NICOTINAMIDE PO), Take 500 mg by mouth 2 (two) times a day, Disp: , Rfl:     bacitracin ointment, Apply topically daily for 7 days Apply to bolster site daily (Patient taking differently: Apply topically as needed Apply to bolster site daily), Disp: 30 g, Rfl: 0    Fluocinolone Acetonide Scalp 0.01 % OIL, Apply a thin layer topically daily at night one hour before bedtime.  (Patient taking differently: Apply topically if needed Apply a thin layer topically daily at night one hour before bedtime.), Disp: 118.28 mL, Rfl: 5    imiquimod (ALDARA) 5 % cream, Apply topically once a day Monday thru Friday for 6 weeks (Patient taking differently: if needed), Disp: 24 each, Rfl: 1    mometasone (ELOCON) 0.1 % cream, APPLY TO EARS ONCE TO TWICE DAILY AS NEEDED FOR SCALING, Disp: , Rfl:     Multiple Vitamins-Minerals (PRESERVISION AREDS 2) CAPS, Take 1 capsule by mouth 2 (two) times a day (Patient not taking: Reported on 11/27/2023), Disp: , Rfl:     mupirocin (BACTROBAN) 2 % ointment, Apply topically two to three times a day to sore areas (Patient taking differently: Apply topically if needed Apply topically two to three times a day to sore areas), Disp: 22 g, Rfl: 3    Pyridoxine HCl (VITAMIN B-6 PO), Take by mouth (Patient not taking: Reported on 10/27/2023), Disp: , Rfl:     traMADol (Ultram) 50 mg tablet, Take 1 tablet (50 mg total) by mouth every 6 (six) hours as needed for moderate pain for up to 15 doses (Patient not taking: Reported on 10/27/2023), Disp: 15 tablet, Rfl: 0     ALLERGIES:  Allergies   Allergen Reactions    Erythromycin Other (See Comments)     Thrush         ACTIVE PROBLEMS:  Patient Active Problem List   Diagnosis    Actinic keratosis    Stage 3 chronic kidney disease ([address])    Gout    Hyperlipidemia    Hypertension    Irritable bowel syndrome    Macular degeneration    Urinary incontinence, post-void dribbling    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HCC)    Basal cell carcinoma of skin of other parts of face    Pulmonary nodules    Incisional hernia, without obstruction or gangrene    Status post repair of ventral hernia    Dizziness    Urinary frequency    Lumbar back pain with radiculopathy affecting lower extremity    Scalp ulceration, with necrosis of bone (HCC)    Radiation necrosis of skull     Malignant melanoma of nose ([address])    Encounter for geriatric assessment    Malignant melanoma of face excluding eyelid, nose, lip, and ear ([address])    Anxiety    Reactive airway disease          PAST MEDICAL HISTORY:   Past Medical History:   Diagnosis Date    Anxiety     Arthritis     back    Back pain     Balance problem     Basal cell carcinoma (BCC)     in past    Basal cell carcinoma (BCC) 10/04/2022    Right Alevism    BCC (basal cell carcinoma of skin) 08/17/2021    Right tip of nose    BCC (basal cell carcinoma of skin) 08/08/2022    Left lip    BCC (basal cell carcinoma of skin) 08/08/2022    Right cheek    Cancer ([address])     Gout     Hard of hearing     Hearing aid worn     ziyad    Hearing aid worn     Herniated nucleus pulposus, L4-5     [place] (hard of hearing)     Hypertension     Incisional hernia     Incontinence     Macular degeneration     Melanoma ([address])     left cheek- history    Melanoma ([address]) 08/08/2022    Right neck Nocturia     Pulmonary nodules     Reactive airway disease     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[facility])     july 2020 with skin grafting    Skin cancer 07/01/2020    AFX- scalp    Squamous cell carcinoma     in past    Squamous cell skin cancer         PAST SURGICAL HISTORY:  Past Surgical History:   Procedure Laterality Date    BASAL CELL CARCINOMA EXCISION Right 7/10/2023    Procedure: EXCISION BASAL CELL CARCINOMA  RIGHT CHEEK;  Surgeon: Jeri Rodriguez MD;  [facility];  Service: Surgical Oncology    CATARACT EXTRACTION Bilateral     COLONOSCOPY      FLAP LOCAL HEAD / NECK Right 7/10/2023    Procedure: RIGHT CHEEK RECON W/ LOCAL FLAP & SKIN GRAFT;  Surgeon: Christian Gonzalez MD;  [facility];  Service: [address] [place] [address] / REPAIR      MASTOID SURGERY Left     MOHS SURGERY      MOHS SURGERY  09/29/2021    Right tip of nose-Dr. Denise Osuna    MOHS SURGERY  10/06/2022    Left lip-Dr. Denise Osuna    MOHS SURGERY  05/16/2023    Right [facility] - Dr. Dougie Rosenthal    OTHER SURGICAL HISTORY      sarcoma scalp with skin graft    KS EXCISION MALIGNANT LESION F/E/E/N/L 0.5 CM/< Left 02/07/2023    Procedure: WIDE EXCISION OF LEFT NASAL MELANOMA;  Surgeon: Jeri Rodriguez MD;  [facility];  Service: Surgical Oncology    KS REPAIR FIRST ABDOMINAL WALL HERNIA N/A 04/22/2021    Procedure: REPAIR HERNIA INCISIONAL OPEN WITH MESH;  Surgeon: Laura Covarrubias MD;  [facility];  Service: General    SCALP EXCISION N/A 03/28/2018    Procedure: SCALP SARCOMA EXCISION WITH FULL THICKNESS SKIN GRAFT;  Surgeon: Ericka Mistry MD;  [facility];  Service: Plastics    SKIN BIOPSY      SKIN CANCER EXCISION      SKIN LESION EXCISION Right 7/10/2023    Procedure: WIDE EXCISION MELANOMA SCAR, RIGHT CHEEK;  Surgeon: Jeri Rodriguez MD;  [facility];  Service: Surgical Oncology    SPLIT THICKNESS SKIN GRAFT Left 02/07/2023    Procedure: APPLICATION OF SKIN SUBSTITUTE GRAFT TO NOSE;  Surgeon: Debbie Augustin Dionna Velasquez MD;  [facility];  Service: Plastics    TONSILLECTOMY AND ADENOIDECTOMY          SOCIAL HISTORY:  Social History     Socioeconomic History    Marital status: /Civil Union     Spouse name: Not on file    Number of children: Not on file    Years of education: Not on file    Highest education level: Not on file   Occupational History    Not on file   Tobacco Use    Smoking status: Former     Packs/day: 0.25     Years: 15.00     Total pack years: 3.75     Types: Cigarettes     Quit date: 3/13/1988     Years since quittin.7    Smokeless tobacco: Never   Vaping Use    Vaping Use: Never used   Substance and Sexual Activity    Alcohol use: Not Currently     Comment: beer once in awhile    Drug use: No    Sexual activity: Yes   Other Topics Concern    Not on file   Social History Narrative    Not on file     Social Determinants of Health     Financial Resource Strain: Medium Risk (2023)    Overall Financial Resource Strain (CARDIA)     Difficulty of Paying Living Expenses: Somewhat hard   Food Insecurity: Not on file   Transportation Needs: No Transportation Needs (2023)    PRAPARE - Transportation     Lack of Transportation (Medical): No     Lack of Transportation (Non-Medical): No   Physical Activity: Not on file   Stress: Not on file   Social Connections: Not on file   Intimate Partner Violence: Not on file   Housing Stability: Not on file        FAMILY HISTORY:  Family History   Problem Relation Age of Onset    Colon cancer Father     Heart failure Father     Heart disease Mother     Stroke Mother            Objective    PHYSICAL EXAMINATION:   Blood pressure 138/80, pulse 74, temperature 98.3 °F (36.8 °C), temperature source Temporal, resp. rate 18, height 5' 8" (1.727 m), weight 80.7 kg (178 lb), SpO2 97 %.      Pain Score: 0-No pain     ECOG Performance Status      Flowsheet Row Most Recent Value   ECOG Performance Status 1 - Restricted in physically strenuous activity but ambulatory and able to carry out work of a light or sedentary nature, e.g., light house work, office work               Physical Exam  Constitutional:       General: He is not in acute distress. Appearance: Normal appearance. He is not ill-appearing or toxic-appearing. HENT:      Head: Normocephalic and atraumatic. Right Ear: External ear normal.      Left Ear: External ear normal.      Nose: Nose normal.      Comments: Areas of healed scars/resections     Mouth/Throat:      Mouth: Mucous membranes are moist.      Pharynx: Oropharynx is clear. Eyes:      General: No scleral icterus. Right eye: No discharge. Left eye: No discharge. Conjunctiva/sclera: Conjunctivae normal.   Cardiovascular:      Rate and Rhythm: Normal rate and regular rhythm. Pulses: Normal pulses. Heart sounds: No murmur heard. No friction rub. No gallop. Pulmonary:      Effort: Pulmonary effort is normal. No respiratory distress. Breath sounds: Normal breath sounds. No wheezing or rales. Abdominal:      General: Bowel sounds are normal. There is no distension. Palpations: There is no mass. Tenderness: There is no abdominal tenderness. There is no rebound. Musculoskeletal:         General: No swelling or tenderness. Cervical back: Normal range of motion. No rigidity. Right lower leg: No edema. Left lower leg: No edema. Lymphadenopathy:      Head:      Right side of head: No submandibular, preauricular or posterior auricular adenopathy. Left side of head: No submandibular, preauricular or posterior auricular adenopathy. Cervical: No cervical adenopathy. Right cervical: No superficial or posterior cervical adenopathy. Left cervical: No superficial or posterior cervical adenopathy. Upper Body:      Right upper body: No supraclavicular or axillary adenopathy. Left upper body: No supraclavicular or axillary adenopathy.    Skin:     General: Skin is warm. Coloration: Skin is not jaundiced. Findings: No lesion or rash. Comments: Well healed surgical scars. No evidence of recurrence at primary sites. Neurological:      General: No focal deficit present. Mental Status: He is alert and oriented to person, place, and time. Cranial Nerves: No cranial nerve deficit. Motor: No weakness. Gait: Gait normal.   Psychiatric:         Mood and Affect: Mood normal.         Behavior: Behavior normal.         Thought Content: Thought content normal.         Judgment: Judgment normal.         I reviewed lab data in the chart.     WBC   Date Value Ref Range Status   10/20/2023 6.57 4.31 - 10.16 Thousand/uL Final   06/27/2023 6.69 4.31 - 10.16 Thousand/uL Final   05/27/2023 9.69 4.31 - 10.16 Thousand/uL Final     Hemoglobin   Date Value Ref Range Status   10/20/2023 14.7 12.0 - 17.0 g/dL Final   06/27/2023 14.2 12.0 - 17.0 g/dL Final   05/27/2023 14.4 12.0 - 17.0 g/dL Final     Platelets   Date Value Ref Range Status   10/20/2023 217 149 - 390 Thousands/uL Final   06/27/2023 204 149 - 390 Thousands/uL Final   05/27/2023 201 149 - 390 Thousands/uL Final     MCV   Date Value Ref Range Status   10/20/2023 93 82 - 98 fL Final   06/27/2023 91 82 - 98 fL Final   05/27/2023 90 82 - 98 fL Final      Sodium   Date Value Ref Range Status   05/05/2015 142 136 - 145 mmol/L Final   10/28/2014 138 136 - 145 mmol/L Final   04/11/2014 141 136 - 145 mmol/L Final     Potassium   Date Value Ref Range Status   10/20/2023 4.3 3.5 - 5.3 mmol/L Final   06/27/2023 3.9 3.5 - 5.3 mmol/L Final   05/27/2023 3.8 3.5 - 5.3 mmol/L Final   05/05/2015 3.5 3.5 - 5.3 mmol/L Final   10/28/2014 3.6 3.5 - 5.3 mmol/L Final   04/11/2014 3.6 3.5 - 5.3 mmol/L Final     Chloride   Date Value Ref Range Status   10/20/2023 105 96 - 108 mmol/L Final   06/27/2023 109 (H) 96 - 108 mmol/L Final   05/27/2023 106 96 - 108 mmol/L Final   05/05/2015 105 100 - 108 mmol/L Final   10/28/2014 103 100 - 108 mmol/L Final   04/11/2014 104 100 - 108 mmol/L Final     CO2   Date Value Ref Range Status   10/20/2023 28 21 - 32 mmol/L Final   06/27/2023 28 21 - 32 mmol/L Final   05/27/2023 25 21 - 32 mmol/L Final   05/05/2015 27 23 - 33 mmol/L Final   10/28/2014 28 23 - 33 mmol/L Final   04/11/2014 31 23 - 33 mmol/L Final     BUN   Date Value Ref Range Status   10/20/2023 21 5 - 25 mg/dL Final   06/27/2023 22 5 - 25 mg/dL Final   05/27/2023 17 5 - 25 mg/dL Final   05/05/2015 28 (H) 5 - 25 mg/dL Final   10/28/2014 32 (H) 5 - 25 mg/dL Final   04/11/2014 33 (H) 5 - 25 mg/dL Final     Creatinine   Date Value Ref Range Status   10/20/2023 1.22 0.60 - 1.30 mg/dL Final     Comment:     Standardized to IDMS reference method   06/27/2023 1.40 (H) 0.60 - 1.30 mg/dL Final     Comment:     Standardized to IDMS reference method   05/27/2023 1.14 0.60 - 1.30 mg/dL Final     Comment:     Standardized to IDMS reference method   05/05/2015 1.44 (H) 0.60 - 1.30 mg/dL Final     Comment:     Standardized to IDMS reference method   10/28/2014 1.52 (H) 0.60 - 1.30 mg/dL Final     Comment:     Standardized to IDMS reference method   04/11/2014 1.58 (H) 0.60 - 1.30 mg/dL Final     Comment:     Standardized to IDMS reference method     Glucose   Date Value Ref Range Status   05/27/2023 133 65 - 140 mg/dL Final     Comment:     If the patient is fasting, the ADA then defines impaired fasting glucose as > 100 mg/dL and diabetes as > or equal to 123 mg/dL. 06/10/2021 90 65 - 140 mg/dL Final     Comment:     If the patient is fasting, the ADA then defines impaired fasting glucose as > 100 mg/dL and diabetes as > or equal to 123 mg/dL. Specimen collection should occur prior to Sulfasalazine administration due to the potential for falsely depressed results. Specimen collection should occur prior to Sulfapyridine administration due to the potential for falsely elevated results.    06/09/2021 88 65 - 140 mg/dL Final     Comment:     If the patient is fasting, the ADA then defines impaired fasting glucose as > 100 mg/dL and diabetes as > or equal to 123 mg/dL. Specimen collection should occur prior to Sulfasalazine administration due to the potential for falsely depressed results. Specimen collection should occur prior to Sulfapyridine administration due to the potential for falsely elevated results. Calcium   Date Value Ref Range Status   10/20/2023 10.1 8.4 - 10.2 mg/dL Final   06/27/2023 9.4 8.3 - 10.1 mg/dL Final   05/27/2023 9.3 8.4 - 10.2 mg/dL Final   05/05/2015 9.3 8.3 - 10.1 mg/dL Final   10/28/2014 9.8 8.3 - 10.1 mg/dL Final   04/11/2014 9.3 8.3 - 10.1 mg/dL Final     Total Protein   Date Value Ref Range Status   05/05/2015 7.6 6.4 - 8.2 g/dL Final   04/11/2014 6.8 6.4 - 8.2 g/dL Final     Albumin   Date Value Ref Range Status   10/20/2023 4.2 3.5 - 5.0 g/dL Final   06/27/2023 3.8 3.5 - 5.0 g/dL Final   05/27/2023 4.0 3.5 - 5.0 g/dL Final   05/05/2015 3.8 3.5 - 5.0 g/dL Final   04/11/2014 3.6 3.5 - 5.0 g/dL Final     Total Bilirubin   Date Value Ref Range Status   10/20/2023 1.14 (H) 0.20 - 1.00 mg/dL Final     Comment:     Use of this assay is not recommended for patients undergoing treatment with eltrombopag due to the potential for falsely elevated results. N-acetyl-p-benzoquinone imine (metabolite of Acetaminophen) will generate erroneously low results in samples for patients that have taken an overdose of Acetaminophen.   06/27/2023 0.90 0.20 - 1.00 mg/dL Final     Comment:     Use of this assay is not recommended for patients undergoing treatment with eltrombopag due to the potential for falsely elevated results. 05/27/2023 0.81 0.20 - 1.00 mg/dL Final     Comment:     Use of this assay is not recommended for patients undergoing treatment with eltrombopag due to the potential for falsely elevated results.   N-acetyl-p-benzoquinone imine (metabolite of Acetaminophen) will generate erroneously low results in samples for patients that have taken an overdose of Acetaminophen. Alkaline Phosphatase   Date Value Ref Range Status   10/20/2023 104 34 - 104 U/L Final   06/27/2023 129 (H) 46 - 116 U/L Final   05/27/2023 120 (H) 34 - 104 U/L Final   05/05/2015 119 (H) 46 - 116 U/L Final   04/11/2014 123 50 - 136 U/L Final     AST   Date Value Ref Range Status   10/20/2023 19 13 - 39 U/L Final   06/27/2023 20 5 - 45 U/L Final     Comment:     Specimen collection should occur prior to Sulfasalazine administration due to the potential for falsely depressed results. 05/27/2023 15 13 - 39 U/L Final   05/05/2015 27 0 - 45 U/L Final   04/11/2014 19 0 - 45 U/L Final     [facility]   Date Value Ref Range Status   10/20/2023 21 7 - 52 U/L Final     Comment:     Specimen collection should occur prior to Sulfasalazine administration due to the potential for falsely depressed results. 06/27/2023 37 12 - 78 U/L Final     Comment:     Specimen collection should occur prior to Sulfasalazine and/or Sulfapyridine administration due to the potential for falsely depressed results. 05/27/2023 16 7 - 52 U/L Final     Comment:     Specimen collection should occur prior to Sulfasalazine administration due to the potential for falsely depressed results. 05/05/2015 53 16 - 63 U/L Final   04/11/2014 48 6 - 78 U/L Final      No results found for: "LDH"  No results found for: "TSH"  No results found for: "H9TFMLW"   No results found for: "FREET4"      RECENT IMAGING:  No results found. Assessment/Plan  Mr. Manny Chaidez is a 21-year-old gentleman with stage IIb melanoma and multiple basal carcinomas here for continued monitoring, follow-up, surveillance. 1. Melanoma of skin ([address])  2. Malignant melanoma of face excluding eyelid, nose, lip, and ear ([address])  3. Malignant melanoma of nose (HCC)  Originally lesion on the right lateral cheek was stage IIa melanoma. However on wide local excision depth of melanoma increased to 2.3 mm with ulceration.   This places him as a stage IIb melanoma. I discussed with he and his wife that this does have an increased risk of disease recurrence. We discussed that there is treatment in the adjuvant setting for patients with stage IIb melanoma to decrease the risk of recurrence. We discussed the principle and reasoning behind adjuvant treatment. We discussed the data in Stage IIB/C melanoma and the overall reduced RFS and improved PFS in Stage IIB/C melanomas treated with immunotherapy/PD-1 inhibitor in the adjuvant setting. We discussed the benefits, side effects, and risks of PD1 inhibitor which include but are not limited to rash and autoimmune related adverse events including colitis; dermatitis; endocrinopathy including diabetes, hypophysitis, and thyroid function abnormalities; hepatitis; myocarditis; neuropathy; nephritis; pneumonitis; uveitis; and fatigue. At this time does not wish to undergo adjuvant treatment with immunotherapy. I discussed that this is reasonable we will continue to monitor him closely. I discussed that with stage IIb melanoma we do recommend imaging every 6 months to evaluate for distant recurrence. He has not had any type of imaging at this time and we will order CT chest, abdomen and pelvis for initial melanoma staging. Discussed that he will follow-up in 3 months for close monitoring and surveillance. Orders placed in prescription divided for imaging to be done as well as blood work to be done prior to his next visit. He and his wife will continue to follow closely with dermatology. They know to call with issues or concerns prior to their next visit. - CT neck chest abdomen pelvis w contrast; Future  - CBC and differential; Future  - Comprehensive metabolic panel; Future  - LD,Blood; Future    4. Basal cell carcinoma of skin of other parts of face  Continues to follow with dermatology closely. Resection/excisions as per dermatology.   Currently no new concerning lesions at this time.    Continuing on 500 mg nicotinamide twice a day. 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 No evidence of recurrence. Continues to monitor the area. Return in about 3 months (around 2/27/2024) for Office Visit, Imaging - See orders, labs.      Apoorva Vaz MD, PhD

## 2023-11-30 ENCOUNTER — PROCEDURE VISIT (OUTPATIENT)
Dept: DERMATOLOGY | Facility: CLINIC | Age: 83
End: 2023-11-30
Payer: MEDICARE

## 2023-11-30 DIAGNOSIS — C44.619 BASAL CELL CARCINOMA (BCC) OF SKIN OF LEFT WRIST: ICD-10-CM

## 2023-11-30 DIAGNOSIS — C44.01 BASAL CELL CARCINOMA (BCC) OF LOWER LIP: ICD-10-CM

## 2023-11-30 PROCEDURE — 17313 MOHS 1 STAGE T/A/L: CPT | Performed by: DERMATOLOGY

## 2023-11-30 PROCEDURE — 14021 TIS TRNFR S/A/L 10.1-30 SQCM: CPT | Performed by: DERMATOLOGY

## 2023-11-30 NOTE — PROGRESS NOTES
[facility] Procedure Note    Patient: Brody Carnes  : 1940  MRN: [id number]  Date: 2023    History of Present Illness:  The patient is a 79 y.o. male who presents with complaints of Basal Cell Carcinoma on the Left Wrist.     Past Medical History:   Diagnosis Date   • Anxiety    • Arthritis     back   • Back pain    • Balance problem    • Basal cell carcinoma (BCC)     in past   • Basal cell carcinoma (BCC) 10/04/2022    Right [place]   • BCC (basal cell carcinoma of skin) 2021    Right tip of nose   • BCC (basal cell carcinoma of skin) 2022    Left lip   • BCC (basal cell carcinoma of skin) 2022    Right cheek   • Cancer ([address])    • Gout    • Hard of hearing    • Hearing aid worn     ziyad   • Hearing aid worn    • Herniated nucleus pulposus, L4-5    • [place] (hard of hearing)    • Hypertension    • Incisional hernia    • Incontinence    • Macular degeneration    • Melanoma ([address])     left cheek- history   • Melanoma ([address]) 2022    Right neck   • Nocturia    • Pulmonary nodules    • Reactive airway disease    • 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[address])     2020 with skin grafting   • Skin cancer 2020    AFX- scalp   • Squamous cell carcinoma     in past   • Squamous cell skin cancer        Past Surgical History:   Procedure Laterality Date   • BASAL CELL CARCINOMA EXCISION Right 7/10/2023    Procedure: EXCISION BASAL CELL CARCINOMA  RIGHT CHEEK;  Surgeon: Bubba Barreto MD;  [facility];  Service: Surgical Oncology   • CATARACT EXTRACTION Bilateral    • COLONOSCOPY     • FLAP LOCAL HEAD / NECK Right 7/10/2023    Procedure: RIGHT CHEEK RECON W/ LOCAL FLAP & SKIN GRAFT;  Surgeon: Elvira Blanco MD;  [facility];  Service: Plastics   • HERNIA REPAIR     • HYDROCELE EXCISION / REPAIR     • MASTOID SURGERY Left    • MOHS SURGERY     • MOHS SURGERY  2021    Right tip of nose-Dr. Sharyn Park   • MOHS SURGERY  10/06/2022    Left lip-Dr. Sharyn Park   • MOHS SURGERY  2023    Right Jillian Wadsworth   • OTHER SURGICAL HISTORY      sarcoma scalp with skin graft   • AZ EXCISION MALIGNANT LESION F/E/E/N/L 0.5 CM/< Left 02/07/2023    Procedure: WIDE EXCISION OF LEFT NASAL MELANOMA;  Surgeon: Betzaida Moore MD;  [facility];  Service: Surgical Oncology   • AZ REPAIR FIRST ABDOMINAL WALL HERNIA N/A 04/22/2021    Procedure: REPAIR HERNIA INCISIONAL OPEN WITH MESH;  Surgeon: Chiquita Escobedo MD;  [facility];  Service: General   • SCALP EXCISION N/A 03/28/2018    Procedure: SCALP SARCOMA EXCISION WITH FULL THICKNESS SKIN GRAFT;  Surgeon: Ashleigh Treadwell MD;  [facility];  Service: Plastics   • SKIN BIOPSY     • SKIN CANCER EXCISION     • SKIN LESION EXCISION Right 7/10/2023    Procedure: WIDE EXCISION MELANOMA SCAR, RIGHT CHEEK;  Surgeon: Betzaida Moore MD;  [facility];  Service: Surgical Oncology   • SPLIT THICKNESS SKIN GRAFT Left 02/07/2023    Procedure: APPLICATION OF SKIN SUBSTITUTE GRAFT TO NOSE;  Surgeon: Allyson Avina MD;  [facility];  Service: Plastics   • TONSILLECTOMY AND ADENOIDECTOMY           Current Outpatient Medications:   •  amLODIPine (NORVASC) 10 mg tablet, TAKE 1 TABLET BY MOUTH  DAILY, Disp: 90 tablet, Rfl: 3  •  bacitracin ointment, Apply topically daily for 7 days Apply to bolster site daily (Patient taking differently: Apply topically as needed Apply to bolster site daily), Disp: 30 g, Rfl: 0  •  Fluocinolone Acetonide Scalp 0.01 % OIL, Apply a thin layer topically daily at night one hour before bedtime.  (Patient taking differently: Apply topically if needed Apply a thin layer topically daily at night one hour before bedtime.), Disp: 118.28 mL, Rfl: 5  •  imiquimod (ALDARA) 5 % cream, Apply topically once a day Monday thru Friday for 6 weeks (Patient taking differently: if needed), Disp: 24 each, Rfl: 1  •  lisinopril (ZESTRIL) 20 mg tablet, TAKE 1 TABLET BY MOUTH  DAILY, Disp: 90 tablet, Rfl: 3  •  metoprolol succinate (TOPROL-XL) 50 mg 24 hr tablet, TAKE 1 TABLET BY MOUTH  DAILY, Disp: 90 tablet, Rfl: 3  •  mometasone (ELOCON) 0.1 % cream, APPLY TO EARS ONCE TO TWICE DAILY AS NEEDED FOR SCALING, Disp: , Rfl:   •  Multiple Vitamins-Minerals (PRESERVISION AREDS 2) CAPS, Take 1 capsule by mouth 2 (two) times a day (Patient not taking: Reported on 11/27/2023), Disp: , Rfl:   •  mupirocin (BACTROBAN) 2 % ointment, Apply topically two to three times a day to sore areas (Patient taking differently: Apply topically if needed Apply topically two to three times a day to sore areas), Disp: 22 g, Rfl: 3  •  Niacin (VITAMIN B-3 PO), Take by mouth, Disp: , Rfl:   •  Niacinamide-Zn-Cu-Grmsua-Qm-Qd (NICOTINAMIDE PO), Take 500 mg by mouth 2 (two) times a day, Disp: , Rfl:   •  Pyridoxine HCl (VITAMIN B-6 PO), Take by mouth (Patient not taking: Reported on 10/27/2023), Disp: , Rfl:   •  traMADol (Ultram) 50 mg tablet, Take 1 tablet (50 mg total) by mouth every 6 (six) hours as needed for moderate pain for up to 15 doses (Patient not taking: Reported on 10/27/2023), Disp: 15 tablet, Rfl: 0    Allergies   Allergen Reactions   • Erythromycin Other (See Comments)     Thrush        Physical Exam: There were no vitals filed for this visit. General: Awake, Alert, Oriented x 3, Mood and affect appropriate  Respiratory: Respirations even and unlabored  Cardiovascular: Peripheral pulses intact; no edema  Musculoskeletal Exam: n/a    Assessment: 1.6 x 1.6 cm pink ulcerated nodule.  Biopsy proven to be Basal cell carcinoma on the Left wrist.     Plan: MOHS    Time of H&P Completion: 2:00 pm     [facility] Procedure Timeout    Flowsheet Row Most Recent Value   Timeout: 0207   Patient Identity Verified: Yes   Correct Site Verified: Yes   Correct Procedure Verified: Yes          [facility] Diagnosis/Indication/Location/ID    Flowsheet Row Most Recent Value   Pathology Type Basal cell carcinoma   Anatomic Site --  [Left wrist]   Indications for [facility] tumor location   [facility]HS ID [id number]          [facility] Site/Accession/Pre-Post    Flowsheet Row Most Recent Value   Original Site Identified (as submitted by referring clinician) Referral, Photo   Biopsy Accession/Specimen # (as submitted by referring clincian) [id number]   Pre-MOHS Size Length (cm) 1.6   Pre-MOHS Size Width (cm) 1.6   Post-MOHS Size-Length (cm) 2.2   Post MOHS Size-Width (cm) 2.2   Repair Type Advancement flap   Suture Type Ethilon, Vicryl   Ethilon Suture Size 4   Vicryl Suture Size 4   Final repair length (cm): 6   Flap/Graft area (cm2) 12   Anesthetic Used 1% Lidocaine with epinephrine          [facility] Tumor Stage 1 Information    Flowsheet Row Most Recent Value   Tissue Sections (blocks) 2   Microscopic Exam Section 1: No tumor identified in section. [no residual tumor post curettage]   Microscopic Exam Section 2: No tumor identified in section. [no residual tumor post curettage]   Tumor Clear After Stage I? Yes                    Patient identified, procedure verified, site identified and verified. Time out completed. Surgical removal of the lesion discussed with the patient (risks and benefits, including possibility of scarring, infection, recurrence or potential for further treatment)  I have specifically identified the site with the patient. I have discussed the fact that the patient will have a scar after the procedure regardless of granulation or repair with sutures. I have discussed that the repair options can range from granulation in some cases to linear or curvilinear closures to larger flaps or grafts. There are sometimes flaps or grafts used that require multiples stages of surgery and will not be completed today, rather be completed over a series of appointments.  I have discussed that occasionally due to location, size or depth of the lesion I may recommend consultation with and transfer of care for further removal or the reconstruction to another provider such as ophthalmology surgery, plastic surgery, ENT surgery, or surgical oncology. There are cases in which other testing such as imaging with MRI or CT scan or testing of lymph nodes is recommended because of the nature/depth/location of tumor seen during the removal. There is a risk of injury to nerves causing temporary or permanent numbness or the inability to move muscles full such as the inability to lift eyebrows. Questions answered and verbal and written consent was obtained. With the patient in the supine position and under adequate local anesthesia with 1% lidocaine with epinephrine 1:100,000, the defect was scrubbed with Chlorhexidine. Sterile drapes were placed from the sterile tray. Because of the location of the surgical defect,  bilateral advancement flap in form of o to z was judged to give the best possible method of reducing excessive wound tesion with linear closure. The edges of the defect were carefully debrided removing any dead or coagulated tissue. The edges of the defect and the area to be used for advancement of tissue at the base of the flap were minimally undermined. The advancement flap was freed up and draped over the defect. The flap was secured in place by a dermal layer of sutures and the cutaneous margins were approximated and closed by superficial sutures, as noted above. The standing cones of tissue at the end of the excision were excised with a #15 scalpel blade and closed in two layers as described previously. The patient tolerated the procedure well. The wound was dressed with petrolatum, a non-stick pad, and a compression dressing. Jeff Alanis MD served as the surgeon and pathologist during the procedure. Postoperative care: Wound care discussed at length. I urged the patient to call us if any problems or question should arise.      Complications: none  Post-op medications: none  Patient condition after procedure: stable  Discharge plans: Plan for return to [facility] for suture removal, as scheduled in 14 days.        The tumor qualifies for [facility] based on AUC criteria. Dr. Dariel Snowden served as the surgeon and pathologist during the procedure. Postoperative care: No would care should be necessary prior to the next visit but I urged the patient to call us if any problems or question should arise prior to that time. If circumstances should change, we will contact the patient to make other arrangements.    Scribe Attestation    I,:  Rolan Bautista MA am acting as a scribe while in the presence of the attending physician.:       I,:  Jon Genao MD personally performed the services described in this documentation    as scribed in my presence.:

## 2023-11-30 NOTE — PATIENT INSTRUCTIONS
[facility] Microscopic Surgery After Care    WOUND CARE AFTER SURGERY:    Do NOT to remove the pressure bandage for 48 hours. Keep the area clean and dry while this bandage is on. After removing the bandage for the first time, gently clean the area with soap and water. If the bandage is difficult to remove, getting the bandage wet in the shower will sometimes help soften the adhesive and allow it to be removed more easily. You will now need to cleanse this area daily in the shower with gentle soap. There is no need to scrub the area. Apply plain Vaseline ointment (this is over the counter and not a prescription) to the site followed by a clean appropriately sized bandage to area. Non stick dressing and paper tape (or Hypafix) are recommended for sensitive skin but a bandaid is fine if it covers the area well. You will need to continue the above daily wound care until you return for suture removal in 14 days (generally 7 days for the face, 10-14 days off the face)      RESTRICTIONS:     For two DAYS:   - You will need to take it very easy as this time is highest risk for bleeding. Being a "couch potato" during these two days is generally recommended. - For surgeries on the face/neck/scalp: Avoid leaning down to pick things up off the floor as this brings blood up to your head. Instead, squat down to pick things up. For two WEEKS:   - No heavy lifting (anything greater than 10 pounds)   - You can start to do slow, gentle activities such as slow walking but nothing to increase your heart rate and blood pressure too much (such as cardiovascular exercise). It is important to take it easy as there is still a risk for bleeding and a high risk popping of stitches open during this time. MANAGING YOUR PAIN AFTER SURGERY     You can expect to have some pain after surgery. This is normal. The pain is typically worse the first two days after surgery, and quickly begins to get better.      The best strategy for controlling your pain after surgery is around the clock pain control. You can take over the counter Acetaminophen (Tylenol) for discomfort, if no contraindications. If you are taking this at the maximum dose, you can alternate this with Motrin (ibuprofen or Advil) as well. Alternating these medications with each other allows you to maximize your pain control. In addition to Tylenol and Motrin, you can use heating pads or ice packs on your incisions to help reduce your pain. How will I alternate your regular strength over-the-counter pain medication? You will take a dose of pain medication every three hours. Start by taking 650 mg of Tylenol (2 pills of 325 mg)   3 hours later take 600 mg of Motrin (3 pills of 200 mg)   3 hours after taking the Motrin take 650 mg of Tylenol   3 hours after that take 600 mg of Motrin. See example - if your first dose of Tylenol is at 12:00 PM     12:00 PM  Tylenol 650 mg (2 pills of 325 mg)    3:00 PM  Motrin 600 mg (3 pills of 200 mg)    6:00 PM  Tylenol 650 mg (2 pills of 325 mg)    9:00 PM  Motrin 600 mg (3 pills of 200 mg)    Continue alternating every 3 hours      Important:   Do not take more than 4000mg of Tylenol or 3200mg of Motrin in a 24-hour period. What if I still have pain? If you have pain that is not controlled with the over-the-counter pain medications (Tylenol and Motrin or Advil), don't hesitate to call our staff using the number provided. We will help make sure you are managing your pain in the best way possible, and if necessary, we can provide a prescription for additional pain medication. CALL OUR OFFICE IMMEDIATELY FOR ANY SIGNS OF INFECTION:    This includes fever, chills, increased redness, warmth, tenderness, severe discomfort/pain, or pus or foul smell coming from the wound. If you are experiencing any of the above, please call [facility] directly at [phone].     IF BLEEDING IS NOTICED:    Place a clean cloth over the area and apply firm pressure for thirty minutes. Check the wound ONLY after 30 minutes of direct pressure; do not cheat and sneak a peak, as that does not count. If bleeding persists after 30 minutes of legitimate direct pressure, then try one more round of direct pressure to the area. Should the bleeding become heavier or not stop after the second attempt, call [facility] directly at [phone]. Your call will get routed to the dermatology surgeon on call even after hours.

## 2023-12-12 ENCOUNTER — OFFICE VISIT (OUTPATIENT)
Dept: DERMATOLOGY | Facility: CLINIC | Age: 83
End: 2023-12-12

## 2023-12-12 DIAGNOSIS — C44.619 BASAL CELL CARCINOMA (BCC) OF SKIN OF LEFT WRIST: Primary | ICD-10-CM

## 2023-12-12 PROCEDURE — 99024 POSTOP FOLLOW-UP VISIT: CPT | Performed by: DERMATOLOGY

## 2023-12-12 NOTE — PROGRESS NOTES
Suture removal    Date/Time: 12/12/2023 1:35 PM    Performed by: Clarisa Rubio  Authorized by: Amy Escalante MD  Universal Protocol:  Consent: Verbal consent obtained. Written consent not obtained. Consent given by: patient  Time out: Immediately prior to procedure a "time out" was called to verify the correct patient, procedure, equipment, support staff and site/side marked as required. Timeout called at: 12/12/2023 1:35 PM.  Patient understanding: patient states understanding of the procedure being performed  Patient consent: the patient's understanding of the procedure matches consent given  Procedure consent: procedure consent matches procedure scheduled  Relevant documents: relevant documents not present or verified  Test results: test results not available  Site marked: the operative site was not marked  Radiology Images displayed and confirmed. If images not available, report reviewed: imaging studies not available  Patient identity confirmed: verbally with patient      Patient location:  Clinic  Location:     Laterality:  Left    Location:  Upper extremity    Upper extremity location:  Wrist    Wrist location:  L wrist  Procedure details: Tools used:  Suture removal kit    Wound appearance:  No sign(s) of infection    Number of sutures removed:  12  Post-procedure details:     Post-removal:  Antibiotic ointment applied and Band-Aid applied    Patient tolerance of procedure:   Tolerated well, no immediate complications

## 2024-01-24 ENCOUNTER — OFFICE VISIT (OUTPATIENT)
Dept: PODIATRY | Facility: CLINIC | Age: 84
End: 2024-01-24
Payer: MEDICARE

## 2024-01-24 VITALS
HEIGHT: 68 IN | BODY MASS INDEX: 26.64 KG/M2 | WEIGHT: 175.8 LBS | DIASTOLIC BLOOD PRESSURE: 80 MMHG | HEART RATE: 57 BPM | SYSTOLIC BLOOD PRESSURE: 153 MMHG

## 2024-01-24 DIAGNOSIS — I10 PRIMARY HYPERTENSION: ICD-10-CM

## 2024-01-24 DIAGNOSIS — I73.9 PERIPHERAL VASCULAR DISEASE, UNSPECIFIED (HCC): Primary | ICD-10-CM

## 2024-01-24 PROCEDURE — G0127 TRIM NAIL(S): HCPCS | Performed by: PODIATRIST

## 2024-01-24 NOTE — PROGRESS NOTES
Established patient with class findings presents for nail care.  Vascular exam:  DP  0/4 bilateral; PT  0 4 bilateral   Dermatological exam:  Each toenail is thick and  dystrophic.  Diagnosis:  PVD  Treatment: Trimmed multiple dystrophic toenails.     Nail removal    Date/Time: 1/24/2024 10:45 AM    Performed by: Jake Cabezas DPM  Authorized by: Jake Cabezas DPM    Nails trimmed:     Number of nails trimmed:  10

## 2024-01-25 RX ORDER — METOPROLOL SUCCINATE 50 MG/1
TABLET, EXTENDED RELEASE ORAL
Qty: 90 TABLET | Refills: 1 | Status: SHIPPED | OUTPATIENT
Start: 2024-01-25

## 2024-02-01 ENCOUNTER — TELEPHONE (OUTPATIENT)
Dept: HEMATOLOGY ONCOLOGY | Facility: CLINIC | Age: 84
End: 2024-02-01

## 2024-02-01 DIAGNOSIS — C43.31 MALIGNANT MELANOMA OF NOSE (HCC): ICD-10-CM

## 2024-02-01 DIAGNOSIS — C43.9 MELANOMA OF SKIN (HCC): Primary | ICD-10-CM

## 2024-02-01 DIAGNOSIS — C43.30 MALIGNANT MELANOMA OF FACE EXCLUDING EYELID, NOSE, LIP, AND EAR (HCC): ICD-10-CM

## 2024-02-01 NOTE — TELEPHONE ENCOUNTER
Patient Call    Who are you speaking with? Patient    If it is not the patient, are they listed on an active communication consent form? Yes   What is the reason for this call? Instead of CT scan, patient would like PET scan at [place].  Please advise   Does this require a call back? Yes   If a call back is required, please list best call back number [phone]    If a call back is required, advise that a message will be forwarded to their care team and someone will return their call as soon as possible.   Did you relay this information to the patient? Yes

## 2024-02-20 ENCOUNTER — HOSPITAL ENCOUNTER (OUTPATIENT)
Dept: NUCLEAR MEDICINE | Facility: HOSPITAL | Age: 84
Discharge: HOME | End: 2024-02-20
Attending: INTERNAL MEDICINE
Payer: MEDICARE

## 2024-02-20 DIAGNOSIS — C43.31 MALIGNANT MELANOMA OF NOSE (HCC): ICD-10-CM

## 2024-02-20 DIAGNOSIS — C43.30 MALIGNANT MELANOMA OF FACE EXCLUDING EYELID, NOSE, LIP, AND EAR (HCC): ICD-10-CM

## 2024-02-20 LAB — GLUCOSE SERPL-MCNC: 94 MG/DL (ref 65–140)

## 2024-02-20 PROCEDURE — A9552 F18 FDG: HCPCS

## 2024-02-20 PROCEDURE — 78816 PET IMAGE W/CT FULL BODY: CPT

## 2024-02-20 PROCEDURE — 82948 REAGENT STRIP/BLOOD GLUCOSE: CPT

## 2024-03-04 ENCOUNTER — OFFICE VISIT (OUTPATIENT)
Dept: HEMATOLOGY ONCOLOGY | Facility: CLINIC | Age: 84
End: 2024-03-04
Payer: MEDICARE

## 2024-03-04 VITALS
WEIGHT: 178 LBS | HEART RATE: 78 BPM | HEIGHT: 68 IN | RESPIRATION RATE: 18 BRPM | TEMPERATURE: 98.1 F | SYSTOLIC BLOOD PRESSURE: 148 MMHG | BODY MASS INDEX: 26.98 KG/M2 | DIASTOLIC BLOOD PRESSURE: 82 MMHG | OXYGEN SATURATION: 99 %

## 2024-03-04 DIAGNOSIS — Z85.820 ENCOUNTER FOR FOLLOW-UP SURVEILLANCE OF MELANOMA: ICD-10-CM

## 2024-03-04 DIAGNOSIS — Z08 ENCOUNTER FOR FOLLOW-UP SURVEILLANCE OF MELANOMA: ICD-10-CM

## 2024-03-04 DIAGNOSIS — C43.31 MALIGNANT MELANOMA OF NOSE (HCC): Primary | ICD-10-CM

## 2024-03-04 DIAGNOSIS — C44.319 BASAL CELL CARCINOMA OF SKIN OF OTHER PARTS OF FACE: ICD-10-CM

## 2024-03-04 DIAGNOSIS — C43.30 MALIGNANT MELANOMA OF FACE EXCLUDING EYELID, NOSE, LIP, AND EAR (HCC): ICD-10-CM

## 2024-03-04 PROCEDURE — G2211 COMPLEX E/M VISIT ADD ON: HCPCS | Performed by: INTERNAL MEDICINE

## 2024-03-04 PROCEDURE — 99214 OFFICE O/P EST MOD 30 MIN: CPT | Performed by: INTERNAL MEDICINE

## 2024-03-04 NOTE — ASSESSMENT & PLAN NOTE
He is doing well. Clinically, he does have a lesion on the right side of his nose that is concerning for a skin cancer. We did discuss that his imaging, which was a PET scan, is negative for distant disease. There is no evidence of metastatic melanoma. However, there is FDG activity at the right side of his nose, which corresponds clinically with the lesion you can see on imaging or on physical exam. He was not able to get blood work done prior to today's visit. He will get blood work when he gets his next set of blood work for his primary care doctor. He does have a followup with dermatology, Dr. Caraballo tomorrow. He will show the lesion on his right nose at that time. I do suspect he will need a biopsy as it is concerning for a skin cancer. He continues on nicotinamide for basal cell carcinoma suppression. He will continue to follow closely with dermatology. He will return for a followup with me in 3 months. He will be due for blood work at that time. All orders placed and scripts provided. He knows to call with issues or concerns prior to his next visit.

## 2024-03-04 NOTE — LETTER
March 5, 2024     Fortunato Booth MD  [address] [facility]  [address] [facility]  [place] [address]    Patient: Alex Cheung   YOB: 1940   Date of Visit: 3/4/2024       Dear Dr. Booth:    Thank you for referring Alex Cheung to me for evaluation. Below are my notes for this consultation.    If you have questions, please do not hesitate to call me. I look forward to following your patient along with you.         Sincerely,        Sona Call MD        CC: MD Memo Ortega PA-C Nicholas Michael Cardiges, MD Johnny Chung, MD Melissa A Wilson, MD  3/5/2024  6:49 PM  Sign when Signing Visit  [facility] [place]  [address] [facility] [place] [address]  [phone]  [phone]     Date of Visit: 3/4/2024  Name: Alex Cheung   YOB: 1940       Subjective    VISIT DIAGNOSIS:  Diagnoses and all orders for this visit:    Malignant melanoma of nose (HCC)  -     CBC and differential; Future  -     Comprehensive metabolic panel; Future  -     LD,Blood; Future    Encounter for follow-up surveillance of melanoma  -     CBC and differential; Future  -     Comprehensive metabolic panel; Future  -     LD,Blood; Future    Malignant melanoma of face excluding eyelid, nose, lip, and ear (HCC)  -     CBC and differential; Future  -     Comprehensive metabolic panel; Future  -     LD,Blood; Future    Basal cell carcinoma of skin of other parts of face  -     CBC and differential; Future  -     Comprehensive metabolic panel; Future  -     LD,Blood; Future        Oncology History   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HCC)   11/8/2017 Surgery    right vertex of scalp - s/p excision and full thickness graft for closure on 11/8/17 for atypical fibroxanthoma.       1/29/2018 Biopsy    biopsy from the anterior aspect of the graft showing atypical epithelioid fibro histiocytic tumor, with lesion extending to the deep tissue edge.  This is similar to prior biopsy done.  Differential  "includes pleomorphic sarcoma versus an extreme atypical example of atypical fibro histiocytoma.     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 See Note.     -- FNCLCC Histologic Grade 3  (total score: 7 of 8).       * Tumor differentiation score: 3 of 3.        * Mitotic count score: 3 of 3; > 19 mitoses/10 HPF (33 mitoses/10 HPF).       * Necrosis score: 1 of 2; present, < 50%.  - Tumor extends into deep reticular dermis, subcutis and fascia.   - Perineural invasion: Present; intraneural invasion identified (0.4 mm largest nerve diameter).   - Lymphovascular invasion: Focally suspicious.   - Bone invasion: Not identified.   - Central nervous system extension: Not identified.   - Margins: uninvolved by sarcoma but close.    -- Sarcoma  0.15 mm from nearest deep margin.   - Additional Pathologic Findings: Changes consistent with prior surgical site.     -- Focal ulceration with bacterial colonization, acute and chronic inflammation.     -- Best representative tumor block: A5.      5/29/2018 - 7/5/2018 Radiation    Plan ID Energy Fractions Dose per Fraction (cGy) Dose Correction (cGy) Total Dose Delivered (cGy) Elapsed Days   Vertex Scalp 9E 27 / 27 200 0 5,400 37            Basal cell carcinoma of skin of other parts of face   7/10/2023 Surgery    Skin, right cheek, \"basal cell carcinoma\", excision:  Scar. No residual basal cell carcinoma identified in the examined sections.        Malignant melanoma of nose (HCC)   12/6/2022 Biopsy    Skin, left ala, shave biopsy:     MELANOMA (thickness: 0.6 mm); extending to the tissue edges      Ulceration: not seen  Anatomic (Hal) level: III  Type: lentigo maligna melanoma  Mitoses: 0/mm2  Margin assessment: invasive melanoma extends to peripheral specimen margin " "and deep specimen margin focally  Pathologic stage: pT1a     2/7/2023 Surgery    Skin, nose, left nasal, wide excision:  Residual melanoma, invasive, desmoplastic type, and scar, margins free.   Incidental basal cell carcinoma, superficial and nodular type, not extending to the margins.    pT2a     2/7/2023 -  Cancer Staged    Staging form: Melanoma of the Skin, AJCC 8th Edition  - Clinical stage from 2/7/2023: Stage IB (cT2a, cN0, cM0) - Signed by Sona Call MD on 11/27/2023       Malignant melanoma of face excluding eyelid, nose, lip, and ear (HCC)   5/16/2023 Initial Diagnosis    Malignant melanoma of face excluding eyelid, nose, lip, and ear (HCC)  Right mid cheek    A. Skin, right mid cheek, shave biopsy:     Combined ulcerated MELANOMA (thickness: at least 1.5 mm) and BASAL CELL CARCINOMA; transected (see note).     Note: There are foci of basal cell carcinoma and melanoma in which the components are separate from each other (as can be seen in a collision tumor), but there are also multiple foci in which the melanoma and basal cell carcinoma are closely intermingled (\"basomelanocytic tumor\" or \"combined melanoma and basal cell carcinoma of the intermingled type\"), which is a rare occurrence.1 SOX10, MART-1, HMB45, p40, and Tarun-EP4 immunostains were reviewed and support the diagnosis. Please see synoptic report for additional details. The results were emailed to Dr. Matthews on 5/19/2023.     Synoptic report for melanoma of the skin  Thickness: at least 1.5 mm  Ulceration: present  Anatomic (Hal) level: at least IV  Type: superficial spreading melanoma  Mitoses: 1/mm2  Microsatellites: cannot be determined  Lymphovascular invasion: not seen  Neurotropism: not seen  Tumor regression: not seen  Tumor-infiltrating lymphocytes (TIL): present, non-brisk  Margin assessment: invasive melanoma extends to deep specimen margin; melanoma in situ extends to peripheral specimen margin  Pathologic stage: at least " "pT2b  Associated nevus: not seen         5/16/2023 -  Cancer Staged    Staging form: Melanoma of the Skin, AJCC 8th Edition  - Clinical stage from 5/16/2023: Stage IIA (cT2b, cN0, cM0) - Signed by Sona Call MD on 5/26/2023       7/10/2023 Surgery    Skin, right cheek \"melanoma\", wide excision:  Residual combined melanoma (Breslow thickness: 2.3 mm) and basal cell carcinoma (malignant basomelanocytic tumor); margins free.   pT3a     7/10/2023 -  Cancer Staged    Staging form: Melanoma of the Skin, AJCC 8th Edition  - Pathologic stage from 7/10/2023: Stage IIB (pT3b, pN0, cM0) - Signed by Sona Call MD on 11/28/2023          Cancer Staging   Malignant melanoma of face excluding eyelid, nose, lip, and ear (HCC)  Staging form: Melanoma of the Skin, AJCC 8th Edition  - Clinical stage from 5/16/2023: Stage IIA (cT2b, cN0, cM0) - Signed by Sona Call MD on 5/26/2023  - Pathologic stage from 7/10/2023: Stage IIB (pT3b, pN0, cM0) - Signed by Sona Call MD on 11/28/2023    Malignant melanoma of nose (HCC)  Staging form: Melanoma of the Skin, AJCC 8th Edition  - Clinical stage from 2/7/2023: Stage IB (cT2a, cN0, cM0) - Signed by Sona Call MD on 11/27/2023          HISTORY OF PRESENT ILLNESS: Alex Cheung is a 83 y.o. male  who is here for a followup for melanoma as well as basal cell carcinoma. He is accompanied by his wife.    The patient is doing well overall.    He does have a lesion on the right side of his nose. He has a scheduled appointment with Dr. Caraballo, dermatologist tomorrow. He denies any other concerning lesions or spots other than on the right side of his nose. He denies any lumps or bumps. The patient is interested in using the cream, which he has used for 5 weeks, which was effective in resolving his lesions.      He had excision of melanoma and a basal cell carcinoma from his cheek in the past. They spotted another lesion around his ear, but they could not see anything " "with regards to basal cell carcinoma. He reports he is \"tired of getting cut up.\"    His wife thinks the vitamins he is taking are beneficial for his skin and it is not as red as compared to before. He is using the nicotinamide twice a day and is tolerating it well.    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    He has not had a blood work in the recent past. He is scheduled for a 6-month followup visit with his primary care doctor in 1 month and would like to undergo blood work done at that time.    His wife notes the patient is expected to undergo an ultrasound of his neck. She does not think it is necessary as he had a PET scan and would like to know if it is the case. She also would like to know if the patient is getting too much radiation. She enquires about niacin.      We discussed the results he did have from a PET scan on 2/20/2024 that demonstrates no evidence of lymphadenopathy or distant metastatic disease.  It does note the mild focal uptake along the skin surface of the anterior right nose which is consistent with the lesion he showed me today in the office.  He does have a small hiatal hernia and left inguinal hernia as well as prostate enlargement.        Review of Systems   Constitutional:  Negative for appetite change, fatigue, fever and unexpected weight change.   HENT:   Negative for lump/mass, trouble swallowing and voice change.    Eyes:  Negative for icterus.   Respiratory:  Negative for cough, shortness of breath and wheezing.    Cardiovascular:  Negative for leg swelling.   Gastrointestinal:  Negative for abdominal pain, constipation, diarrhea, nausea and vomiting.   Genitourinary:  Negative for difficulty urinating and hematuria.    Musculoskeletal:  Negative for arthralgias, gait problem and myalgias.   Skin:  Negative for itching and rash.        Positive for a new right-sided nasal lesion.   Neurological:  Negative for extremity " weakness, gait problem, headaches, light-headedness and numbness.   Hematological:  Negative for adenopathy.        MEDICATIONS:    Current Outpatient Medications:   •  amLODIPine (NORVASC) 10 mg tablet, TAKE 1 TABLET BY MOUTH  DAILY, Disp: 90 tablet, Rfl: 3  •  lisinopril (ZESTRIL) 20 mg tablet, TAKE 1 TABLET BY MOUTH  DAILY, Disp: 90 tablet, Rfl: 3  •  metoprolol succinate (TOPROL-XL) 50 mg 24 hr tablet, TAKE 1 TABLET BY MOUTH DAILY, Disp: 90 tablet, Rfl: 1  •  Multiple Vitamins-Minerals (PRESERVISION AREDS 2) CAPS, Take 1 capsule by mouth 2 (two) times a day, Disp: , Rfl:   •  Niacin (VITAMIN B-3 PO), Take by mouth, Disp: , Rfl:   •  Niacinamide-Zn-Cu-Ybdyoy-Ui-Rx (NICOTINAMIDE PO), Take 500 mg by mouth 2 (two) times a day, Disp: , Rfl:   •  bacitracin ointment, Apply topically daily for 7 days Apply to bolster site daily (Patient taking differently: Apply topically as needed Apply to bolster site daily), Disp: 30 g, Rfl: 0  •  Fluocinolone Acetonide Scalp 0.01 % OIL, Apply a thin layer topically daily at night one hour before bedtime. (Patient taking differently: Apply topically if needed Apply a thin layer topically daily at night one hour before bedtime.), Disp: 118.28 mL, Rfl: 5  •  imiquimod (ALDARA) 5 % cream, Apply topically once a day Monday thru Friday for 6 weeks (Patient taking differently: if needed), Disp: 24 each, Rfl: 1  •  mometasone (ELOCON) 0.1 % cream, APPLY TO EARS ONCE TO TWICE DAILY AS NEEDED FOR SCALING, Disp: , Rfl:   •  mupirocin (BACTROBAN) 2 % ointment, Apply topically two to three times a day to sore areas (Patient taking differently: Apply topically if needed Apply topically two to three times a day to sore areas), Disp: 22 g, Rfl: 3  •  Pyridoxine HCl (VITAMIN B-6 PO), Take by mouth (Patient not taking: Reported on 3/4/2024), Disp: , Rfl:   •  traMADol (Ultram) 50 mg tablet, Take 1 tablet (50 mg total) by mouth every 6 (six) hours as needed for moderate pain for up to 15 doses  (Patient not taking: Reported on 10/27/2023), Disp: 15 tablet, Rfl: 0     ALLERGIES:  Allergies   Allergen Reactions   • Erythromycin Other (See Comments)     Thrush         ACTIVE PROBLEMS:  Patient Active Problem List   Diagnosis   • Actinic keratosis   • Stage 3 chronic kidney disease (HCC)   • Gout   • Hyperlipidemia   • Hypertension   • Irritable bowel syndrome   • Macular degeneration   • Urinary incontinence, post-void dribbling   • 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HCC)   • Basal cell carcinoma of skin of other parts of face   • Pulmonary nodules   • Incisional hernia, without obstruction or gangrene   • Status post repair of ventral hernia   • Dizziness   • Urinary frequency   • Lumbar back pain with radiculopathy affecting lower extremity   • Scalp ulceration, with necrosis of bone (HCC)   • Radiation necrosis of skull    • Malignant melanoma of nose (HCC)   • Encounter for geriatric assessment   • Malignant melanoma of face excluding eyelid, nose, lip, and ear (HCC)   • Anxiety   • Reactive airway disease          PAST MEDICAL HISTORY:   Past Medical History:   Diagnosis Date   • Anxiety    • Arthritis     back   • Back pain    • Balance problem    • Basal cell carcinoma (BCC)     in past   • Basal cell carcinoma (BCC) 10/04/2022    Right [place]   • BCC (basal cell carcinoma of skin) 08/17/2021    Right tip of nose   • BCC (basal cell carcinoma of skin) 08/08/2022    Left lip   • BCC (basal cell carcinoma of skin) 08/08/2022    Right cheek   • Cancer (HCC)    • Gout    • Hard of hearing    • Hearing aid worn     ziyad   • Hearing aid worn    • Herniated nucleus pulposus, L4-5    • [place] (hard of hearing)    • Hypertension    • Incisional hernia    • Incontinence    • Macular degeneration    • Melanoma (HCC)     left cheek- history   • Melanoma (HCC) 08/08/2022    Right neck   • Nocturia    • Pulmonary nodules    • Reactive airway disease    • 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   • Skin cancer 07/01/2020     AFX- scalp   • Squamous cell carcinoma     in past   • Squamous cell skin cancer         PAST SURGICAL HISTORY:  Past Surgical History:   Procedure Laterality Date   • BASAL CELL CARCINOMA EXCISION Right 7/10/2023    Procedure: EXCISION BASAL CELL CARCINOMA  RIGHT CHEEK;  Surgeon: Fortunato Booth MD;  [facility];  Service: Surgical Oncology   • CATARACT EXTRACTION Bilateral    • COLONOSCOPY     • FLAP LOCAL HEAD / NECK Right 7/10/2023    Procedure: RIGHT CHEEK RECON W/ LOCAL FLAP & SKIN GRAFT;  Surgeon: Leola Chatman MD;  Location: [facility];  Service: Plastics   • HERNIA REPAIR     • HYDROCELE EXCISION / REPAIR     • MASTOID SURGERY Left    • MOHS SURGERY     • MOHS SURGERY  09/29/2021    Right tip of nose-Dr. Caraballo   • MOHS SURGERY  10/06/2022    Left lip-Dr. Caraballo   • MOHS SURGERY  05/16/2023    Right [employer] - Dr. Matthews   • OTHER SURGICAL HISTORY      sarcoma scalp with skin graft   • TX EXCISION MALIGNANT LESION F/E/E/N/L 0.5 CM/< Left 02/07/2023    Procedure: WIDE EXCISION OF LEFT NASAL MELANOMA;  Surgeon: Fortunato Booth MD;  [facility];  Service: Surgical Oncology   • TX REPAIR FIRST ABDOMINAL WALL HERNIA N/A 04/22/2021    Procedure: REPAIR HERNIA INCISIONAL OPEN WITH MESH;  Surgeon: Darryl Landry MD;  [facility];  Service: General   • SCALP EXCISION N/A 03/28/2018    Procedure: SCALP SARCOMA EXCISION WITH FULL THICKNESS SKIN GRAFT;  Surgeon: Jani Pagan MD;  [facility];  Service: Plastics   • SKIN BIOPSY     • SKIN CANCER EXCISION     • SKIN LESION EXCISION Right 7/10/2023    Procedure: WIDE EXCISION MELANOMA SCAR, RIGHT CHEEK;  Surgeon: Fortunato Booth MD;  [facility];  Service: Surgical Oncology   • SPLIT THICKNESS SKIN GRAFT Left 02/07/2023    Procedure: APPLICATION OF SKIN SUBSTITUTE GRAFT TO NOSE;  Surgeon: Leola Chatman MD;  [facility];  Service: Plastics   • TONSILLECTOMY AND ADENOIDECTOMY          SOCIAL HISTORY:  Social  "History     Socioeconomic History   • Marital status: /Civil Union     Spouse name: Not on file   • Number of children: Not on file   • Years of education: Not on file   • Highest education level: Not on file   Occupational History   • Not on file   Tobacco Use   • Smoking status: Former     Current packs/day: 0.00     Average packs/day: 0.3 packs/day for 15.0 years (3.8 ttl pk-yrs)     Types: Cigarettes     Start date: 3/13/1973     Quit date: 3/13/1988     Years since quittin.0   • Smokeless tobacco: Never   Vaping Use   • Vaping status: Never Used   Substance and Sexual Activity   • Alcohol use: Not Currently     Comment: beer once in awhile   • Drug use: No   • Sexual activity: Yes   Other Topics Concern   • Not on file   Social History Narrative   • Not on file     Social Determinants of Health     Financial Resource Strain: Medium Risk (2023)    Overall Financial Resource Strain (CARDIA)    • Difficulty of Paying Living Expenses: Somewhat hard   Food Insecurity: Not on file   Transportation Needs: No Transportation Needs (2023)    PRAPARE - Transportation    • Lack of Transportation (Medical): No    • Lack of Transportation (Non-Medical): No   Physical Activity: Not on file   Stress: Not on file   Social Connections: Not on file   Intimate Partner Violence: Not on file   Housing Stability: Not on file        FAMILY HISTORY:  Family History   Problem Relation Age of Onset   • Colon cancer Father    • Heart failure Father    • Heart disease Mother    • Stroke Mother           Objective    PHYSICAL EXAMINATION:   Blood pressure 148/82, pulse 78, temperature 98.1 °F (36.7 °C), temperature source Temporal, resp. rate 18, height 5' 8\", weight 80.7 kg (178 lb), SpO2 99%.     Pain Score: 0-No pain     ECOG Performance Status      Flowsheet Row Most Recent Value   ECOG Performance Status 1 - Restricted in physically strenuous activity but ambulatory and able to carry out work of a light or " sedentary nature, e.g., light house work, office work               Physical Exam  Constitutional:       General: He is not in acute distress.     Appearance: Normal appearance. He is not ill-appearing or toxic-appearing.   HENT:      Head: Normocephalic and atraumatic.      Right Ear: External ear normal.      Left Ear: External ear normal.      Nose: Nose normal.      Mouth/Throat:      Mouth: Mucous membranes are moist.      Pharynx: Oropharynx is clear.   Eyes:      General: No scleral icterus.        Right eye: No discharge.         Left eye: No discharge.      Conjunctiva/sclera: Conjunctivae normal.   Cardiovascular:      Rate and Rhythm: Normal rate and regular rhythm.      Pulses: Normal pulses.      Heart sounds: No murmur heard.     No friction rub. No gallop.   Pulmonary:      Effort: Pulmonary effort is normal. No respiratory distress.      Breath sounds: Normal breath sounds. No wheezing or rales.   Abdominal:      General: Bowel sounds are normal. There is no distension.      Palpations: There is no mass.      Tenderness: There is no abdominal tenderness. There is no rebound.   Musculoskeletal:         General: No swelling or tenderness.      Cervical back: Normal range of motion. No rigidity.      Right lower leg: No edema.      Left lower leg: No edema.   Lymphadenopathy:      Head:      Right side of head: No submandibular, preauricular or posterior auricular adenopathy.      Left side of head: No submandibular, preauricular or posterior auricular adenopathy.      Cervical: No cervical adenopathy.      Right cervical: No superficial or posterior cervical adenopathy.     Left cervical: No superficial or posterior cervical adenopathy.      Upper Body:      Right upper body: No supraclavicular or axillary adenopathy.      Left upper body: No supraclavicular or axillary adenopathy.      Lower Body: No right inguinal adenopathy. No left inguinal adenopathy.   Skin:     General: Skin is warm.       Coloration: Skin is not jaundiced.      Findings: No lesion or rash.      Comments: Well healed surgical scar.  No evidence of recurrence at primary site.  Right-sided nasal lesion, erythematous, and slightly elevated.   Neurological:      General: No focal deficit present.      Mental Status: He is alert and oriented to person, place, and time.      Cranial Nerves: No cranial nerve deficit.      Motor: No weakness.      Gait: Gait normal.   Psychiatric:         Mood and Affect: Mood normal.         Behavior: Behavior normal.         Thought Content: Thought content normal.         Judgment: Judgment normal.         I reviewed lab data in the chart.    WBC   Date Value Ref Range Status   10/20/2023 6.57 4.31 - 10.16 Thousand/uL Final   06/27/2023 6.69 4.31 - 10.16 Thousand/uL Final   05/27/2023 9.69 4.31 - 10.16 Thousand/uL Final     Hemoglobin   Date Value Ref Range Status   10/20/2023 14.7 12.0 - 17.0 g/dL Final   06/27/2023 14.2 12.0 - 17.0 g/dL Final   05/27/2023 14.4 12.0 - 17.0 g/dL Final     Platelets   Date Value Ref Range Status   10/20/2023 217 149 - 390 Thousands/uL Final   06/27/2023 204 149 - 390 Thousands/uL Final   05/27/2023 201 149 - 390 Thousands/uL Final     MCV   Date Value Ref Range Status   10/20/2023 93 82 - 98 fL Final   06/27/2023 91 82 - 98 fL Final   05/27/2023 90 82 - 98 fL Final      Sodium   Date Value Ref Range Status   05/05/2015 142 136 - 145 mmol/L Final   10/28/2014 138 136 - 145 mmol/L Final   04/11/2014 141 136 - 145 mmol/L Final     Potassium   Date Value Ref Range Status   10/20/2023 4.3 3.5 - 5.3 mmol/L Final   06/27/2023 3.9 3.5 - 5.3 mmol/L Final   05/27/2023 3.8 3.5 - 5.3 mmol/L Final   07/04/2020 4.1 3.5 - 5.2 mmol/L Final   07/03/2020 3.8 3.5 - 5.2 mmol/L Final   07/02/2020 3.8 3.5 - 5.2 mmol/L Final     Chloride   Date Value Ref Range Status   10/20/2023 105 96 - 108 mmol/L Final   06/27/2023 109 (H) 96 - 108 mmol/L Final   05/27/2023 106 96 - 108 mmol/L Final    07/04/2020 111 101 - 111 mmol/L Final   07/03/2020 106 101 - 111 mmol/L Final   07/02/2020 110 101 - 111 mmol/L Final     Carbon Dioxide   Date Value Ref Range Status   07/04/2020 27 22 - 32 mmol/L Final   07/03/2020 26 22 - 32 mmol/L Final   07/02/2020 26 22 - 32 mmol/L Final     CO2   Date Value Ref Range Status   10/20/2023 28 21 - 32 mmol/L Final   06/27/2023 28 21 - 32 mmol/L Final   05/27/2023 25 21 - 32 mmol/L Final     BUN   Date Value Ref Range Status   10/20/2023 21 5 - 25 mg/dL Final   06/27/2023 22 5 - 25 mg/dL Final   05/27/2023 17 5 - 25 mg/dL Final   07/04/2020 25 (H) 8 - 20 mg/dL Final   07/03/2020 25 (H) 8 - 20 mg/dL Final   07/02/2020 17 8 - 20 mg/dL Final     Creatinine   Date Value Ref Range Status   10/20/2023 1.22 0.60 - 1.30 mg/dL Final     Comment:     Standardized to IDMS reference method   06/27/2023 1.40 (H) 0.60 - 1.30 mg/dL Final     Comment:     Standardized to IDMS reference method   05/27/2023 1.14 0.60 - 1.30 mg/dL Final     Comment:     Standardized to IDMS reference method   07/04/2020 1.24 0.80 - 1.30 mg/dL Final   07/03/2020 1.56 (H) 0.80 - 1.30 mg/dL Final   07/02/2020 1.18 0.80 - 1.30 mg/dL Final     Glucose   Date Value Ref Range Status   05/27/2023 133 65 - 140 mg/dL Final     Comment:     If the patient is fasting, the ADA then defines impaired fasting glucose as > 100 mg/dL and diabetes as > or equal to 123 mg/dL.   06/10/2021 90 65 - 140 mg/dL Final     Comment:     If the patient is fasting, the ADA then defines impaired fasting glucose as > 100 mg/dL and diabetes as > or equal to 123 mg/dL.  Specimen collection should occur prior to Sulfasalazine administration due to the potential for falsely depressed results. Specimen collection should occur prior to Sulfapyridine administration due to the potential for falsely elevated results.   06/09/2021 88 65 - 140 mg/dL Final     Comment:     If the patient is fasting, the ADA then defines impaired fasting glucose as > 100  mg/dL and diabetes as > or equal to 123 mg/dL.  Specimen collection should occur prior to Sulfasalazine administration due to the potential for falsely depressed results. Specimen collection should occur prior to Sulfapyridine administration due to the potential for falsely elevated results.   07/04/2020 85 82 - 115 mg/dL Final     Comment:            American Diabetes Association Reference Ranges:        -------------------------------------------------         Normal Adult:             65-99 mg/dL         Impaired Fasting Glucose: 100-125 mg/dL         Diabetes:                 >126 mg/dL         Recommend repeat testing for confirmatory diagnosis of diabetes.   07/03/2020 105 82 - 115 mg/dL Final     Comment:            American Diabetes Association Reference Ranges:        -------------------------------------------------         Normal Adult:             65-99 mg/dL         Impaired Fasting Glucose: 100-125 mg/dL         Diabetes:                 >126 mg/dL         Recommend repeat testing for confirmatory diagnosis of diabetes.   07/02/2020 133 (H) 82 - 115 mg/dL Final     Comment:            American Diabetes Association Reference Ranges:        -------------------------------------------------         Normal Adult:             65-99 mg/dL         Impaired Fasting Glucose: 100-125 mg/dL         Diabetes:                 >126 mg/dL         Recommend repeat testing for confirmatory diagnosis of diabetes.     eGFR, Non-   Date Value Ref Range Status   07/04/2020 56 (L) >60 mL/min/1.73m2 Final     Comment:            If patient is pregnant,has restricted blood flow         or age >75 reference NKDEP guidelines.          07/03/2020 43 (L) >60 mL/min/1.73m2 Final     Comment:            If patient is pregnant,has restricted blood flow         or age >75 reference NKDEP guidelines.          07/02/2020 59 (L) >60 mL/min/1.73m2 Final     Comment:            If patient is pregnant,has restricted blood  flow         or age >75 reference NKDEP guidelines.            Calcium   Date Value Ref Range Status   10/20/2023 10.1 8.4 - 10.2 mg/dL Final   06/27/2023 9.4 8.3 - 10.1 mg/dL Final   05/27/2023 9.3 8.4 - 10.2 mg/dL Final   07/04/2020 8.7 (L) 8.8 - 10.2 mg/dL Final   07/03/2020 8.1 (L) 8.8 - 10.2 mg/dL Final   07/02/2020 8.1 (L) 8.8 - 10.2 mg/dL Final     Total Protein   Date Value Ref Range Status   05/05/2015 7.6 6.4 - 8.2 g/dL Final   04/11/2014 6.8 6.4 - 8.2 g/dL Final     Albumin   Date Value Ref Range Status   10/20/2023 4.2 3.5 - 5.0 g/dL Final   06/27/2023 3.8 3.5 - 5.0 g/dL Final   05/27/2023 4.0 3.5 - 5.0 g/dL Final   05/05/2015 3.8 3.5 - 5.0 g/dL Final   04/11/2014 3.6 3.5 - 5.0 g/dL Final     Total Bilirubin   Date Value Ref Range Status   10/20/2023 1.14 (H) 0.20 - 1.00 mg/dL Final     Comment:     Use of this assay is not recommended for patients undergoing treatment with eltrombopag due to the potential for falsely elevated results.  N-acetyl-p-benzoquinone imine (metabolite of Acetaminophen) will generate erroneously low results in samples for patients that have taken an overdose of Acetaminophen.   06/27/2023 0.90 0.20 - 1.00 mg/dL Final     Comment:     Use of this assay is not recommended for patients undergoing treatment with eltrombopag due to the potential for falsely elevated results.   05/27/2023 0.81 0.20 - 1.00 mg/dL Final     Comment:     Use of this assay is not recommended for patients undergoing treatment with eltrombopag due to the potential for falsely elevated results.  N-acetyl-p-benzoquinone imine (metabolite of Acetaminophen) will generate erroneously low results in samples for patients that have taken an overdose of Acetaminophen.     Alkaline Phosphatase   Date Value Ref Range Status   10/20/2023 104 34 - 104 U/L Final   06/27/2023 129 (H) 46 - 116 U/L Final   05/27/2023 120 (H) 34 - 104 U/L Final   05/05/2015 119 (H) 46 - 116 U/L Final   04/11/2014 123 50 - 136 U/L Final  "    AST   Date Value Ref Range Status   10/20/2023 19 13 - 39 U/L Final   06/27/2023 20 5 - 45 U/L Final     Comment:     Specimen collection should occur prior to Sulfasalazine administration due to the potential for falsely depressed results.    05/27/2023 15 13 - 39 U/L Final   05/05/2015 27 0 - 45 U/L Final   04/11/2014 19 0 - 45 U/L Final     ALT   Date Value Ref Range Status   10/20/2023 21 7 - 52 U/L Final     Comment:     Specimen collection should occur prior to Sulfasalazine administration due to the potential for falsely depressed results.    06/27/2023 37 12 - 78 U/L Final     Comment:     Specimen collection should occur prior to Sulfasalazine and/or Sulfapyridine administration due to the potential for falsely depressed results.    05/27/2023 16 7 - 52 U/L Final     Comment:     Specimen collection should occur prior to Sulfasalazine administration due to the potential for falsely depressed results.    05/05/2015 53 16 - 63 U/L Final   04/11/2014 48 6 - 78 U/L Final      No results found for: \"LDH\"  No results found for: \"TSH\"  No results found for: \"M2VGNLU\"   No results found for: \"FREET4\"      RECENT IMAGING:  I have personally reviewed results with the patient.    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 CT chest from October 4, 2021. CELL TYPE: Melanoma. Biopsy in February 2023 of the left nose demonstrated residual melanoma. In May 2023, \"Skin, right mid cheek, shave biopsy: Combined ulcerated MELANOMA.\" Right neck biopsies in 2022 demonstrated melanoma. Right neck TECHNIQUE:   8.0 mCi F-18-FDG administered IV. Multiplanar attenuation corrected and non attenuation corrected PET images were acquired 60 minutes post injection. Contiguous, low dose, axial CT sections were " obtained from the vertex through the feet.  Intravenous contrast material was not utilized.  This examination, like all CT scans performed in the [facility] at the [facility], was performed utilizing techniques to minimize radiation dose exposure, including the use of iterative reconstruction and automated exposure control. Fasting serum glucose: 94 mg/dl FINDINGS: VISUALIZED BRAIN: No acute abnormalities are seen. Postprocedural changes in the right temporal-parietal skull along the high convexity as well as flap reconstruction changes in the right scalp temporal region with adjacent surgical clips. HEAD/NECK: No hypermetabolic lymphadenopathy. Mild focal uptake along the skin surface of the nose (SUV max 4.5). CT images: Mucosal thickening of the maxillary sinuses. CHEST: No FDG avid soft tissue lesions are seen. CT images: Coronary artery calcifications. Mild cardiomegaly. Atherosclerotic changes of the aorta and branching vessels. Multiple stable pulmonary nodules from many priors including from 10 2/2020, considered benign: Stable 5 mm nodule in the right lower lobe (series 4, image 90). Stable 3 mm left upper lobe nodule (series 4, image 76). Stable 4 mm left lower lobe subpleural nodule (series 4, image 84). Biapical pleural-parenchymal scarring. ABDOMEN: No FDG avid soft tissue lesions are seen. CT images: Small hiatal hernia. Cholelithiasis. 2 mm nonobstructing calculus in the lower pole of the left kidney. Diverticulum in the descending duodenum. Atherosclerotic changes in the aorta and branching vessels. PELVIS: No suspicious FDG avid soft tissue lesions are seen. Linear bowel uptake is within the range of physiologic variation. CT images: Enlargement of the prostate gland. Mild concentric thickening of the urinary bladder, likely related to chronically increased outlet pressures. Small fat-containing left inguinal hernia. Circumferential thickening of the transverse colon and sigmoid colon may be due to  under distention and partially identified on a prior CT chest from 10/2/2020. OSSEOUS STRUCTURES/EXTREMITIES: No FDG avid lesions are seen. CT images: Levocurvature of the lumbar spine. Degenerative changes of the imaged spine. Bone island at L5.     Impression: 1. Mild focal uptake along the skin surface of the anterior nose, which may be physiologic but for which direct physical examination is recommended given history of melanoma. 2. No hypermetabolic lymphadenopathy or distant metastatic disease. Workstation performed: [id number]         Assessment/Plan  The patient is an 83-year-old gentleman with an extensive history of skin cancer including multiple melanomas on the face, one on the left side of nose stage 1B and one on the side of his face/right sheek as a stage 2B. In addition, he has also had basal cell carcinoma. He is here for follow-up, monitoring, and surveillance.    1. Malignant melanoma of nose (HCC)  -     CBC and differential; Future; Expected date: 06/04/2024  -     Comprehensive metabolic panel; Future; Expected date: 06/04/2024  -     LD,Blood; Future; Expected date: 06/04/2024    2. Encounter for follow-up surveillance of melanoma  -     CBC and differential; Future; Expected date: 06/04/2024  -     Comprehensive metabolic panel; Future; Expected date: 06/04/2024  -     LD,Blood; Future; Expected date: 06/04/2024    3. Malignant melanoma of face excluding eyelid, nose, lip, and ear (HCC)  Assessment & Plan:  He is doing well. Clinically, he does have a lesion on the right side of his nose that is concerning for a skin cancer. We did discuss that his imaging, which was a PET scan, is negative for distant disease. There is no evidence of metastatic melanoma. However, there is FDG activity at the right side of his nose, which corresponds clinically with the lesion you can see on imaging or on physical exam. He was not able to get blood work done prior to today's visit. He will get blood work when  he gets his next set of blood work for his primary care doctor. He does have a followup with dermatology, Dr. Caraballo tomorrow. He will show the lesion on his right nose at that time. I do suspect he will need a biopsy as it is concerning for a skin cancer. He continues on nicotinamide for basal cell carcinoma suppression. He will continue to follow closely with dermatology. He will return for a followup with me in 3 months. He will be due for blood work at that time. All orders placed and scripts provided. He knows to call with issues or concerns prior to his next visit.    Orders:  -     CBC and differential; Future; Expected date: 06/04/2024  -     Comprehensive metabolic panel; Future; Expected date: 06/04/2024  -     LD,Blood; Future; Expected date: 06/04/2024    4. Basal cell carcinoma of skin of other parts of face  Assessment & Plan:  He continues to follow closely with dermatology. I do not see any additional skin lesions that are concerned today other than the right side of his nose. He continues on nicotinamide 500 mg twice a day for continued suppression of basal cell carcinoma. He knows to call with issues or concerns prior to his next visit.    Orders:  -     CBC and differential; Future; Expected date: 06/04/2024  -     Comprehensive metabolic panel; Future; Expected date: 06/04/2024  -     LD,Blood; Future; Expected date: 06/04/2024         Return in about 3 months (around 6/4/2024) for Office Visit, labs.       Sona Call MD, PhD    Transcribed for Sona Call MD, by Barbra lorenzo/[facility] on 03/04/24 at 4:13 PM. Powered by Dragon [employer].

## 2024-03-04 NOTE — ASSESSMENT & PLAN NOTE
He continues to follow closely with dermatology. I do not see any additional skin lesions that are concerned today other than the right side of his nose. He continues on nicotinamide 500 mg twice a day for continued suppression of basal cell carcinoma. He knows to call with issues or concerns prior to his next visit.

## 2024-03-04 NOTE — PROGRESS NOTES
[facility] [place]  [address] [facility] [address]  [place] [address]  [phone]     Date of Visit: 3/4/2024  Name: Alex Cheung   YOB: 1940       Subjective    VISIT DIAGNOSIS:  Diagnoses and all orders for this visit:    Malignant melanoma of nose (HCC)  -     CBC and differential; Future  -     Comprehensive metabolic panel; Future  -     LD,Blood; Future    Encounter for follow-up surveillance of melanoma  -     CBC and differential; Future  -     Comprehensive metabolic panel; Future  -     LD,Blood; Future    Malignant melanoma of face excluding eyelid, nose, lip, and ear (HCC)  -     CBC and differential; Future  -     Comprehensive metabolic panel; Future  -     LD,Blood; Future    Basal cell carcinoma of skin of other parts of face  -     CBC and differential; Future  -     Comprehensive metabolic panel; Future  -     LD,Blood; Future        Oncology History   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HCC)   11/8/2017 Surgery    right vertex of scalp - s/p excision and full thickness graft for closure on 11/8/17 for atypical fibroxanthoma.       1/29/2018 Biopsy    biopsy from the anterior aspect of the graft showing atypical epithelioid fibro histiocytic tumor, with lesion extending to the deep tissue edge.  This is similar to prior biopsy done.  Differential includes pleomorphic sarcoma versus an extreme atypical example of atypical fibro histiocytoma.     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 See Note.     -- FNCLCC Histologic Grade 3  (total score: 7 of 8).       * Tumor differentiation score: 3 of 3.        * Mitotic count score: 3 of 3; > 19 mitoses/10 HPF (33 mitoses/10 HPF).       * Necrosis score: 1 of 2; present, < 50%.  - Tumor extends into deep  "reticular dermis, subcutis and fascia.   - Perineural invasion: Present; intraneural invasion identified (0.4 mm largest nerve diameter).   - Lymphovascular invasion: Focally suspicious.   - Bone invasion: Not identified.   - Central nervous system extension: Not identified.   - Margins: uninvolved by sarcoma but close.    -- Sarcoma  0.15 mm from nearest deep margin.   - Additional Pathologic Findings: Changes consistent with prior surgical site.     -- Focal ulceration with bacterial colonization, acute and chronic inflammation.     -- Best representative tumor block: A5.      5/29/2018 - 7/5/2018 Radiation    Plan ID Energy Fractions Dose per Fraction (cGy) Dose Correction (cGy) Total Dose Delivered (cGy) Elapsed Days   Vertex Scalp 9E 27 / 27 200 0 5,400 37            Basal cell carcinoma of skin of other parts of face   7/10/2023 Surgery    Skin, right cheek, \"basal cell carcinoma\", excision:  Scar. No residual basal cell carcinoma identified in the examined sections.        Malignant melanoma of nose ([facility])   12/6/2022 Biopsy    Skin, left ala, shave biopsy:     MELANOMA (thickness: 0.6 mm); extending to the tissue edges      Ulceration: not seen  Anatomic (Hal) level: III  Type: lentigo maligna melanoma  Mitoses: 0/mm2  Margin assessment: invasive melanoma extends to peripheral specimen margin and deep specimen margin focally  Pathologic stage: pT1a     2/7/2023 Surgery    Skin, nose, left nasal, wide excision:  Residual melanoma, invasive, desmoplastic type, and scar, margins free.   Incidental basal cell carcinoma, superficial and nodular type, not extending to the margins.    pT2a     2/7/2023 -  Cancer Staged    Staging form: Melanoma of the Skin, AJCC 8th Edition  - Clinical stage from 2/7/2023: Stage IB (cT2a, cN0, cM0) - Signed by Sona Call MD on 11/27/2023       Malignant melanoma of face excluding eyelid, nose, lip, and ear (HCC)   5/16/2023 Initial Diagnosis    Malignant melanoma of face " "excluding eyelid, nose, lip, and ear (HCC)  Right mid cheek    A. Skin, right mid cheek, shave biopsy:     Combined ulcerated MELANOMA (thickness: at least 1.5 mm) and BASAL CELL CARCINOMA; transected (see note).     Note: There are foci of basal cell carcinoma and melanoma in which the components are separate from each other (as can be seen in a collision tumor), but there are also multiple foci in which the melanoma and basal cell carcinoma are closely intermingled (\"basomelanocytic tumor\" or \"combined melanoma and basal cell carcinoma of the intermingled type\"), which is a rare occurrence.1 SOX10, MART-1, HMB45, p40, and Tarun-EP4 immunostains were reviewed and support the diagnosis. Please see synoptic report for additional details. The results were emailed to Dr. Matthews on 5/19/2023.     Synoptic report for melanoma of the skin  Thickness: at least 1.5 mm  Ulceration: present  Anatomic (Hal) level: at least IV  Type: superficial spreading melanoma  Mitoses: 1/mm2  Microsatellites: cannot be determined  Lymphovascular invasion: not seen  Neurotropism: not seen  Tumor regression: not seen  Tumor-infiltrating lymphocytes (TIL): present, non-brisk  Margin assessment: invasive melanoma extends to deep specimen margin; melanoma in situ extends to peripheral specimen margin  Pathologic stage: at least pT2b  Associated nevus: not seen         5/16/2023 -  Cancer Staged    Staging form: Melanoma of the Skin, AJCC 8th Edition  - Clinical stage from 5/16/2023: Stage IIA (cT2b, cN0, cM0) - Signed by Sona Call MD on 5/26/2023       7/10/2023 Surgery    Skin, right cheek \"melanoma\", wide excision:  Residual combined melanoma (Breslow thickness: 2.3 mm) and basal cell carcinoma (malignant basomelanocytic tumor); margins free.   pT3a     7/10/2023 -  Cancer Staged    Staging form: Melanoma of the Skin, AJCC 8th Edition  - Pathologic stage from 7/10/2023: Stage IIB (pT3b, pN0, cM0) - Signed by Sona Call MD on " "11/28/2023          Cancer Staging   Malignant melanoma of face excluding eyelid, nose, lip, and ear (HCC)  Staging form: Melanoma of the Skin, AJCC 8th Edition  - Clinical stage from 5/16/2023: Stage IIA (cT2b, cN0, cM0) - Signed by Sona Call MD on 5/26/2023  - Pathologic stage from 7/10/2023: Stage IIB (pT3b, pN0, cM0) - Signed by Sona Call MD on 11/28/2023    Malignant melanoma of nose (HCC)  Staging form: Melanoma of the Skin, AJCC 8th Edition  - Clinical stage from 2/7/2023: Stage IB (cT2a, cN0, cM0) - Signed by Sona Call MD on 11/27/2023          HISTORY OF PRESENT ILLNESS: Alex Cheung is a 83 y.o. male  who is here for a followup for melanoma as well as basal cell carcinoma. He is accompanied by his wife.    The patient is doing well overall.    He does have a lesion on the right side of his nose. He has a scheduled appointment with Dr. Caraballo, dermatologist tomorrow. He denies any other concerning lesions or spots other than on the right side of his nose. He denies any lumps or bumps. The patient is interested in using the cream, which he has used for 5 weeks, which was effective in resolving his lesions.      He had excision of melanoma and a basal cell carcinoma from his cheek in the past. They spotted another lesion around his ear, but they could not see anything with regards to basal cell carcinoma. He reports he is \"tired of getting cut up.\"    His wife thinks the vitamins he is taking are beneficial for his skin and it is not as red as compared to before. He is using the nicotinamide twice a day and is tolerating it well.    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    He has not had a blood work in the recent past. He is scheduled for a 6-month followup visit with his primary care doctor in 1 month and would like to undergo blood work done at that time.    His wife notes the patient is expected to undergo an " ultrasound of his neck. She does not think it is necessary as he had a PET scan and would like to know if it is the case. She also would like to know if the patient is getting too much radiation. She enquires about niacin.      We discussed the results he did have from a PET scan on 2/20/2024 that demonstrates no evidence of lymphadenopathy or distant metastatic disease.  It does note the mild focal uptake along the skin surface of the anterior right nose which is consistent with the lesion he showed me today in the office.  He does have a small hiatal hernia and left inguinal hernia as well as prostate enlargement.        Review of Systems   Constitutional:  Negative for appetite change, fatigue, fever and unexpected weight change.   HENT:   Negative for lump/mass, trouble swallowing and voice change.    Eyes:  Negative for icterus.   Respiratory:  Negative for cough, shortness of breath and wheezing.    Cardiovascular:  Negative for leg swelling.   Gastrointestinal:  Negative for abdominal pain, constipation, diarrhea, nausea and vomiting.   Genitourinary:  Negative for difficulty urinating and hematuria.    Musculoskeletal:  Negative for arthralgias, gait problem and myalgias.   Skin:  Negative for itching and rash.        Positive for a new right-sided nasal lesion.   Neurological:  Negative for extremity weakness, gait problem, headaches, light-headedness and numbness.   Hematological:  Negative for adenopathy.        MEDICATIONS:    Current Outpatient Medications:     amLODIPine (NORVASC) 10 mg tablet, TAKE 1 TABLET BY MOUTH  DAILY, Disp: 90 tablet, Rfl: 3    lisinopril (ZESTRIL) 20 mg tablet, TAKE 1 TABLET BY MOUTH  DAILY, Disp: 90 tablet, Rfl: 3    metoprolol succinate (TOPROL-XL) 50 mg 24 hr tablet, TAKE 1 TABLET BY MOUTH DAILY, Disp: 90 tablet, Rfl: 1    Multiple Vitamins-Minerals (PRESERVISION AREDS 2) CAPS, Take 1 capsule by mouth 2 (two) times a day, Disp: , Rfl:     Niacin (VITAMIN B-3 PO), Take by  mouth, Disp: , Rfl:     Niacinamide-Zn-Cu-Mrklwd-Vi-Jh (NICOTINAMIDE PO), Take 500 mg by mouth 2 (two) times a day, Disp: , Rfl:     bacitracin ointment, Apply topically daily for 7 days Apply to bolster site daily (Patient taking differently: Apply topically as needed Apply to bolster site daily), Disp: 30 g, Rfl: 0    Fluocinolone Acetonide Scalp 0.01 % OIL, Apply a thin layer topically daily at night one hour before bedtime. (Patient taking differently: Apply topically if needed Apply a thin layer topically daily at night one hour before bedtime.), Disp: 118.28 mL, Rfl: 5    imiquimod (ALDARA) 5 % cream, Apply topically once a day Monday thru Friday for 6 weeks (Patient taking differently: if needed), Disp: 24 each, Rfl: 1    mometasone (ELOCON) 0.1 % cream, APPLY TO EARS ONCE TO TWICE DAILY AS NEEDED FOR SCALING, Disp: , Rfl:     mupirocin (BACTROBAN) 2 % ointment, Apply topically two to three times a day to sore areas (Patient taking differently: Apply topically if needed Apply topically two to three times a day to sore areas), Disp: 22 g, Rfl: 3    Pyridoxine HCl (VITAMIN B-6 PO), Take by mouth (Patient not taking: Reported on 3/4/2024), Disp: , Rfl:     traMADol (Ultram) 50 mg tablet, Take 1 tablet (50 mg total) by mouth every 6 (six) hours as needed for moderate pain for up to 15 doses (Patient not taking: Reported on 10/27/2023), Disp: 15 tablet, Rfl: 0     ALLERGIES:  Allergies   Allergen Reactions    Erythromycin Other (See Comments)     Thrush         ACTIVE PROBLEMS:  Patient Active Problem List   Diagnosis    Actinic keratosis    Stage 3 chronic kidney disease (HCC)    Gout    Hyperlipidemia    Hypertension    Irritable bowel syndrome    Macular degeneration    Urinary incontinence, post-void dribbling    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HCC)    Basal cell carcinoma of skin of other parts of face    Pulmonary nodules    Incisional hernia, without obstruction or gangrene    Status post repair of ventral hernia     Dizziness    Urinary frequency    Lumbar back pain with radiculopathy affecting lower extremity    Scalp ulceration, with necrosis of bone (HCC)    Radiation necrosis of skull     Malignant melanoma of nose (HCC)    Encounter for geriatric assessment    Malignant melanoma of face excluding eyelid, nose, lip, and ear (HCC)    Anxiety    Reactive airway disease          PAST MEDICAL HISTORY:   Past Medical History:   Diagnosis Date    Anxiety     Arthritis     back    Back pain     Balance problem     Basal cell carcinoma (BCC)     in past    Basal cell carcinoma (BCC) 10/04/2022    Right [place]    BCC (basal cell carcinoma of skin) 08/17/2021    Right tip of nose    BCC (basal cell carcinoma of skin) 08/08/2022    Left lip    BCC (basal cell carcinoma of skin) 08/08/2022    Right cheek    Cancer (HCC)     Gout     Hard of hearing     Hearing aid worn     ziyad    Hearing aid worn     Herniated nucleus pulposus, L4-5     [place] (hard of hearing)     Hypertension     Incisional hernia     Incontinence     Macular degeneration     Melanoma (HCC)     left cheek- history    Melanoma (HCC) 08/08/2022    Right neck    Nocturia     Pulmonary nodules     Reactive airway disease     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    Skin cancer 07/01/2020    AFX- scalp    Squamous cell carcinoma     in past    Squamous cell skin cancer         PAST SURGICAL HISTORY:  Past Surgical History:   Procedure Laterality Date    BASAL CELL CARCINOMA EXCISION Right 7/10/2023    Procedure: EXCISION BASAL CELL CARCINOMA  RIGHT CHEEK;  Surgeon: Fortunato Booth MD;  Location: [facility];  Service: Surgical Oncology    CATARACT EXTRACTION Bilateral     COLONOSCOPY      FLAP LOCAL HEAD / NECK Right 7/10/2023    Procedure: RIGHT CHEEK RECON W/ LOCAL FLAP & SKIN GRAFT;  Surgeon: Leola Chatman MD;  Location: [facility];  Service: Plastics    HERNIA REPAIR      HYDROCELE EXCISION / REPAIR      MASTOID SURGERY Left     MOHS SURGERY       MOHS SURGERY  2021    Right tip of nose-Dr. Caraballo    MOHS SURGERY  10/06/2022    Left lip-Dr. Caraballo    MOHS SURGERY  2023    Right [place] - Dr. Matthews    OTHER SURGICAL HISTORY      sarcoma scalp with skin graft    ME EXCISION MALIGNANT LESION F/E/E/N/L 0.5 CM/< Left 2023    Procedure: WIDE EXCISION OF LEFT NASAL MELANOMA;  Surgeon: Fortunato Booth MD;  Location: [facility];  Service: Surgical Oncology    ME REPAIR FIRST ABDOMINAL WALL HERNIA N/A 2021    Procedure: REPAIR HERNIA INCISIONAL OPEN WITH MESH;  Surgeon: Darryl Landry MD;  Location: [facility];  Service: General    SCALP EXCISION N/A 2018    Procedure: SCALP SARCOMA EXCISION WITH FULL THICKNESS SKIN GRAFT;  Surgeon: Jani Pagan MD;  Location: [facility];  Service: Plastics    SKIN BIOPSY      SKIN CANCER EXCISION      SKIN LESION EXCISION Right 7/10/2023    Procedure: WIDE EXCISION MELANOMA SCAR, RIGHT CHEEK;  Surgeon: Fortunato Booth MD;  [facility];  Service: Surgical Oncology    SPLIT THICKNESS SKIN GRAFT Left 2023    Procedure: APPLICATION OF SKIN SUBSTITUTE GRAFT TO NOSE;  Surgeon: Leola Chatman MD;  Location: [facility];  Service: Plastics    TONSILLECTOMY AND ADENOIDECTOMY          SOCIAL HISTORY:  Social History     Socioeconomic History    Marital status: /Civil Union     Spouse name: Not on file    Number of children: Not on file    Years of education: Not on file    Highest education level: Not on file   Occupational History    Not on file   Tobacco Use    Smoking status: Former     Current packs/day: 0.00     Average packs/day: 0.3 packs/day for 15.0 years (3.8 ttl pk-yrs)     Types: Cigarettes     Start date: 3/13/1973     Quit date: 3/13/1988     Years since quittin.0    Smokeless tobacco: Never   Vaping Use    Vaping status: Never Used   Substance and Sexual Activity    Alcohol use: Not Currently     Comment: beer once in awhile    Drug use: No    Sexual activity: Yes  "  Other Topics Concern    Not on file   Social History Narrative    Not on file     Social Determinants of Health     Financial Resource Strain: Medium Risk (4/13/2023)    Overall Financial Resource Strain (CARDIA)     Difficulty of Paying Living Expenses: Somewhat hard   Food Insecurity: Not on file   Transportation Needs: No Transportation Needs (4/13/2023)    PRAPARE - Transportation     Lack of Transportation (Medical): No     Lack of Transportation (Non-Medical): No   Physical Activity: Not on file   Stress: Not on file   Social Connections: Not on file   Intimate Partner Violence: Not on file   Housing Stability: Not on file        FAMILY HISTORY:  Family History   Problem Relation Age of Onset    Colon cancer Father     Heart failure Father     Heart disease Mother     Stroke Mother           Objective    PHYSICAL EXAMINATION:   Blood pressure 148/82, pulse 78, temperature 98.1 °F (36.7 °C), temperature source Temporal, resp. rate 18, height 5' 8\", weight 80.7 kg (178 lb), SpO2 99%.     Pain Score: 0-No pain     ECOG Performance Status      Flowsheet Row Most Recent Value   ECOG Performance Status 1 - Restricted in physically strenuous activity but ambulatory and able to carry out work of a light or sedentary nature, e.g., light house work, office work               Physical Exam  Constitutional:       General: He is not in acute distress.     Appearance: Normal appearance. He is not ill-appearing or toxic-appearing.   HENT:      Head: Normocephalic and atraumatic.      Right Ear: External ear normal.      Left Ear: External ear normal.      Nose: Nose normal.      Mouth/Throat:      Mouth: Mucous membranes are moist.      Pharynx: Oropharynx is clear.   Eyes:      General: No scleral icterus.        Right eye: No discharge.         Left eye: No discharge.      Conjunctiva/sclera: Conjunctivae normal.   Cardiovascular:      Rate and Rhythm: Normal rate and regular rhythm.      Pulses: Normal pulses.      " Heart sounds: No murmur heard.     No friction rub. No gallop.   Pulmonary:      Effort: Pulmonary effort is normal. No respiratory distress.      Breath sounds: Normal breath sounds. No wheezing or rales.   Abdominal:      General: Bowel sounds are normal. There is no distension.      Palpations: There is no mass.      Tenderness: There is no abdominal tenderness. There is no rebound.   Musculoskeletal:         General: No swelling or tenderness.      Cervical back: Normal range of motion. No rigidity.      Right lower leg: No edema.      Left lower leg: No edema.   Lymphadenopathy:      Head:      Right side of head: No submandibular, preauricular or posterior auricular adenopathy.      Left side of head: No submandibular, preauricular or posterior auricular adenopathy.      Cervical: No cervical adenopathy.      Right cervical: No superficial or posterior cervical adenopathy.     Left cervical: No superficial or posterior cervical adenopathy.      Upper Body:      Right upper body: No supraclavicular or axillary adenopathy.      Left upper body: No supraclavicular or axillary adenopathy.      Lower Body: No right inguinal adenopathy. No left inguinal adenopathy.   Skin:     General: Skin is warm.      Coloration: Skin is not jaundiced.      Findings: No lesion or rash.      Comments: Well healed surgical scar.  No evidence of recurrence at primary site.  Right-sided nasal lesion, erythematous, and slightly elevated.   Neurological:      General: No focal deficit present.      Mental Status: He is alert and oriented to person, place, and time.      Cranial Nerves: No cranial nerve deficit.      Motor: No weakness.      Gait: Gait normal.   Psychiatric:         Mood and Affect: Mood normal.         Behavior: Behavior normal.         Thought Content: Thought content normal.         Judgment: Judgment normal.         I reviewed lab data in the chart.    WBC   Date Value Ref Range Status   10/20/2023 6.57 4.31 - 10.16  Thousand/uL Final   06/27/2023 6.69 4.31 - 10.16 Thousand/uL Final   05/27/2023 9.69 4.31 - 10.16 Thousand/uL Final     Hemoglobin   Date Value Ref Range Status   10/20/2023 14.7 12.0 - 17.0 g/dL Final   06/27/2023 14.2 12.0 - 17.0 g/dL Final   05/27/2023 14.4 12.0 - 17.0 g/dL Final     Platelets   Date Value Ref Range Status   10/20/2023 217 149 - 390 Thousands/uL Final   06/27/2023 204 149 - 390 Thousands/uL Final   05/27/2023 201 149 - 390 Thousands/uL Final     MCV   Date Value Ref Range Status   10/20/2023 93 82 - 98 fL Final   06/27/2023 91 82 - 98 fL Final   05/27/2023 90 82 - 98 fL Final      Sodium   Date Value Ref Range Status   05/05/2015 142 136 - 145 mmol/L Final   10/28/2014 138 136 - 145 mmol/L Final   04/11/2014 141 136 - 145 mmol/L Final     Potassium   Date Value Ref Range Status   10/20/2023 4.3 3.5 - 5.3 mmol/L Final   06/27/2023 3.9 3.5 - 5.3 mmol/L Final   05/27/2023 3.8 3.5 - 5.3 mmol/L Final   07/04/2020 4.1 3.5 - 5.2 mmol/L Final   07/03/2020 3.8 3.5 - 5.2 mmol/L Final   07/02/2020 3.8 3.5 - 5.2 mmol/L Final     Chloride   Date Value Ref Range Status   10/20/2023 105 96 - 108 mmol/L Final   06/27/2023 109 (H) 96 - 108 mmol/L Final   05/27/2023 106 96 - 108 mmol/L Final   07/04/2020 111 101 - 111 mmol/L Final   07/03/2020 106 101 - 111 mmol/L Final   07/02/2020 110 101 - 111 mmol/L Final     Carbon Dioxide   Date Value Ref Range Status   07/04/2020 27 22 - 32 mmol/L Final   07/03/2020 26 22 - 32 mmol/L Final   07/02/2020 26 22 - 32 mmol/L Final     CO2   Date Value Ref Range Status   10/20/2023 28 21 - 32 mmol/L Final   06/27/2023 28 21 - 32 mmol/L Final   05/27/2023 25 21 - 32 mmol/L Final     BUN   Date Value Ref Range Status   10/20/2023 21 5 - 25 mg/dL Final   06/27/2023 22 5 - 25 mg/dL Final   05/27/2023 17 5 - 25 mg/dL Final   07/04/2020 25 (H) 8 - 20 mg/dL Final   07/03/2020 25 (H) 8 - 20 mg/dL Final   07/02/2020 17 8 - 20 mg/dL Final     Creatinine   Date Value Ref Range Status    10/20/2023 1.22 0.60 - 1.30 mg/dL Final     Comment:     Standardized to IDMS reference method   06/27/2023 1.40 (H) 0.60 - 1.30 mg/dL Final     Comment:     Standardized to IDMS reference method   05/27/2023 1.14 0.60 - 1.30 mg/dL Final     Comment:     Standardized to IDMS reference method   07/04/2020 1.24 0.80 - 1.30 mg/dL Final   07/03/2020 1.56 (H) 0.80 - 1.30 mg/dL Final   07/02/2020 1.18 0.80 - 1.30 mg/dL Final     Glucose   Date Value Ref Range Status   05/27/2023 133 65 - 140 mg/dL Final     Comment:     If the patient is fasting, the ADA then defines impaired fasting glucose as > 100 mg/dL and diabetes as > or equal to 123 mg/dL.   06/10/2021 90 65 - 140 mg/dL Final     Comment:     If the patient is fasting, the ADA then defines impaired fasting glucose as > 100 mg/dL and diabetes as > or equal to 123 mg/dL.  Specimen collection should occur prior to Sulfasalazine administration due to the potential for falsely depressed results. Specimen collection should occur prior to Sulfapyridine administration due to the potential for falsely elevated results.   06/09/2021 88 65 - 140 mg/dL Final     Comment:     If the patient is fasting, the ADA then defines impaired fasting glucose as > 100 mg/dL and diabetes as > or equal to 123 mg/dL.  Specimen collection should occur prior to Sulfasalazine administration due to the potential for falsely depressed results. Specimen collection should occur prior to Sulfapyridine administration due to the potential for falsely elevated results.   07/04/2020 85 82 - 115 mg/dL Final     Comment:            American Diabetes Association Reference Ranges:        -------------------------------------------------         Normal Adult:             65-99 mg/dL         Impaired Fasting Glucose: 100-125 mg/dL         Diabetes:                 >126 mg/dL         Recommend repeat testing for confirmatory diagnosis of diabetes.   07/03/2020 105 82 - 115 mg/dL Final     Comment:             American Diabetes Association Reference Ranges:        -------------------------------------------------         Normal Adult:             65-99 mg/dL         Impaired Fasting Glucose: 100-125 mg/dL         Diabetes:                 >126 mg/dL         Recommend repeat testing for confirmatory diagnosis of diabetes.   07/02/2020 133 (H) 82 - 115 mg/dL Final     Comment:            American Diabetes Association Reference Ranges:        -------------------------------------------------         Normal Adult:             65-99 mg/dL         Impaired Fasting Glucose: 100-125 mg/dL         Diabetes:                 >126 mg/dL         Recommend repeat testing for confirmatory diagnosis of diabetes.     eGFR, Non-   Date Value Ref Range Status   07/04/2020 56 (L) >60 mL/min/1.73m2 Final     Comment:            If patient is pregnant,has restricted blood flow         or age >75 reference NKDEP guidelines.          07/03/2020 43 (L) >60 mL/min/1.73m2 Final     Comment:            If patient is pregnant,has restricted blood flow         or age >75 reference NKDEP guidelines.          07/02/2020 59 (L) >60 mL/min/1.73m2 Final     Comment:            If patient is pregnant,has restricted blood flow         or age >75 reference NKDEP guidelines.            Calcium   Date Value Ref Range Status   10/20/2023 10.1 8.4 - 10.2 mg/dL Final   06/27/2023 9.4 8.3 - 10.1 mg/dL Final   05/27/2023 9.3 8.4 - 10.2 mg/dL Final   07/04/2020 8.7 (L) 8.8 - 10.2 mg/dL Final   07/03/2020 8.1 (L) 8.8 - 10.2 mg/dL Final   07/02/2020 8.1 (L) 8.8 - 10.2 mg/dL Final     Total Protein   Date Value Ref Range Status   05/05/2015 7.6 6.4 - 8.2 g/dL Final   04/11/2014 6.8 6.4 - 8.2 g/dL Final     Albumin   Date Value Ref Range Status   10/20/2023 4.2 3.5 - 5.0 g/dL Final   06/27/2023 3.8 3.5 - 5.0 g/dL Final   05/27/2023 4.0 3.5 - 5.0 g/dL Final   05/05/2015 3.8 3.5 - 5.0 g/dL Final   04/11/2014 3.6 3.5 - 5.0 g/dL Final     Total Bilirubin    Date Value Ref Range Status   10/20/2023 1.14 (H) 0.20 - 1.00 mg/dL Final     Comment:     Use of this assay is not recommended for patients undergoing treatment with eltrombopag due to the potential for falsely elevated results.  N-acetyl-p-benzoquinone imine (metabolite of Acetaminophen) will generate erroneously low results in samples for patients that have taken an overdose of Acetaminophen.   06/27/2023 0.90 0.20 - 1.00 mg/dL Final     Comment:     Use of this assay is not recommended for patients undergoing treatment with eltrombopag due to the potential for falsely elevated results.   05/27/2023 0.81 0.20 - 1.00 mg/dL Final     Comment:     Use of this assay is not recommended for patients undergoing treatment with eltrombopag due to the potential for falsely elevated results.  N-acetyl-p-benzoquinone imine (metabolite of Acetaminophen) will generate erroneously low results in samples for patients that have taken an overdose of Acetaminophen.     Alkaline Phosphatase   Date Value Ref Range Status   10/20/2023 104 34 - 104 U/L Final   06/27/2023 129 (H) 46 - 116 U/L Final   05/27/2023 120 (H) 34 - 104 U/L Final   05/05/2015 119 (H) 46 - 116 U/L Final   04/11/2014 123 50 - 136 U/L Final     AST   Date Value Ref Range Status   10/20/2023 19 13 - 39 U/L Final   06/27/2023 20 5 - 45 U/L Final     Comment:     Specimen collection should occur prior to Sulfasalazine administration due to the potential for falsely depressed results.    05/27/2023 15 13 - 39 U/L Final   05/05/2015 27 0 - 45 U/L Final   04/11/2014 19 0 - 45 U/L Final     ALT   Date Value Ref Range Status   10/20/2023 21 7 - 52 U/L Final     Comment:     Specimen collection should occur prior to Sulfasalazine administration due to the potential for falsely depressed results.    06/27/2023 37 12 - 78 U/L Final     Comment:     Specimen collection should occur prior to Sulfasalazine and/or Sulfapyridine administration due to the potential for falsely  "depressed results.    05/27/2023 16 7 - 52 U/L Final     Comment:     Specimen collection should occur prior to Sulfasalazine administration due to the potential for falsely depressed results.    05/05/2015 53 16 - 63 U/L Final   04/11/2014 48 6 - 78 U/L Final      No results found for: \"LDH\"  No results found for: \"TSH\"  No results found for: \"L5NPCCQ\"   No results found for: \"FREET4\"      RECENT IMAGING:  I have personally reviewed results with the patient.    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 CT chest from October 4, 2021. CELL TYPE: Melanoma. Biopsy in February 2023 of the left nose demonstrated residual melanoma. In May 2023, \"Skin, right mid cheek, shave biopsy: Combined ulcerated MELANOMA.\" Right neck biopsies in 2022 demonstrated melanoma. Right neck TECHNIQUE:   8.0 mCi F-18-FDG administered IV. Multiplanar attenuation corrected and non attenuation corrected PET images were acquired 60 minutes post injection. Contiguous, low dose, axial CT sections were obtained from the vertex through the feet.  Intravenous contrast material was not utilized.  This examination, like all CT scans performed in the [facility], was performed utilizing techniques to minimize radiation dose exposure, including the use of iterative reconstruction and automated exposure control. Fasting serum glucose: 94 mg/dl FINDINGS: VISUALIZED BRAIN: No acute abnormalities are seen. Postprocedural changes in the right temporal-parietal skull along the high convexity as well as flap reconstruction changes in the right scalp temporal region with adjacent surgical clips. HEAD/NECK: No hypermetabolic lymphadenopathy. Mild focal uptake along the skin surface of the nose (SUV max 4.5). CT images: " Mucosal thickening of the maxillary sinuses. CHEST: No FDG avid soft tissue lesions are seen. CT images: Coronary artery calcifications. Mild cardiomegaly. Atherosclerotic changes of the aorta and branching vessels. Multiple stable pulmonary nodules from many priors including from 10 2/2020, considered benign: Stable 5 mm nodule in the right lower lobe (series 4, image 90). Stable 3 mm left upper lobe nodule (series 4, image 76). Stable 4 mm left lower lobe subpleural nodule (series 4, image 84). Biapical pleural-parenchymal scarring. ABDOMEN: No FDG avid soft tissue lesions are seen. CT images: Small hiatal hernia. Cholelithiasis. 2 mm nonobstructing calculus in the lower pole of the left kidney. Diverticulum in the descending duodenum. Atherosclerotic changes in the aorta and branching vessels. PELVIS: No suspicious FDG avid soft tissue lesions are seen. Linear bowel uptake is within the range of physiologic variation. CT images: Enlargement of the prostate gland. Mild concentric thickening of the urinary bladder, likely related to chronically increased outlet pressures. Small fat-containing left inguinal hernia. Circumferential thickening of the transverse colon and sigmoid colon may be due to under distention and partially identified on a prior CT chest from 10/2/2020. OSSEOUS STRUCTURES/EXTREMITIES: No FDG avid lesions are seen. CT images: Levocurvature of the lumbar spine. Degenerative changes of the imaged spine. Bone island at L5.     Impression: 1. Mild focal uptake along the skin surface of the anterior nose, which may be physiologic but for which direct physical examination is recommended given history of melanoma. 2. No hypermetabolic lymphadenopathy or distant metastatic disease. Workstation performed: [id number]         Assessment/Plan  The patient is an 83-year-old gentleman with an extensive history of skin cancer including multiple melanomas on the face, one on the left side of nose stage 1B and one  on the side of his face/right sheek as a stage 2B. In addition, he has also had basal cell carcinoma. He is here for follow-up, monitoring, and surveillance.    1. Malignant melanoma of nose (HCC)  -     CBC and differential; Future; Expected date: 06/04/2024  -     Comprehensive metabolic panel; Future; Expected date: 06/04/2024  -     LD,Blood; Future; Expected date: 06/04/2024    2. Encounter for follow-up surveillance of melanoma  -     CBC and differential; Future; Expected date: 06/04/2024  -     Comprehensive metabolic panel; Future; Expected date: 06/04/2024  -     LD,Blood; Future; Expected date: 06/04/2024    3. Malignant melanoma of face excluding eyelid, nose, lip, and ear (HCC)  Assessment & Plan:  He is doing well. Clinically, he does have a lesion on the right side of his nose that is concerning for a skin cancer. We did discuss that his imaging, which was a PET scan, is negative for distant disease. There is no evidence of metastatic melanoma. However, there is FDG activity at the right side of his nose, which corresponds clinically with the lesion you can see on imaging or on physical exam. He was not able to get blood work done prior to today's visit. He will get blood work when he gets his next set of blood work for his primary care doctor. He does have a followup with dermatology, Dr. Caraballo tomorrow. He will show the lesion on his right nose at that time. I do suspect he will need a biopsy as it is concerning for a skin cancer. He continues on nicotinamide for basal cell carcinoma suppression. He will continue to follow closely with dermatology. He will return for a followup with me in 3 months. He will be due for blood work at that time. All orders placed and scripts provided. He knows to call with issues or concerns prior to his next visit.    Orders:  -     CBC and differential; Future; Expected date: 06/04/2024  -     Comprehensive metabolic panel; Future; Expected date: 06/04/2024  -      LD,Blood; Future; Expected date: 06/04/2024    4. Basal cell carcinoma of skin of other parts of face  Assessment & Plan:  He continues to follow closely with dermatology. I do not see any additional skin lesions that are concerned today other than the right side of his nose. He continues on nicotinamide 500 mg twice a day for continued suppression of basal cell carcinoma. He knows to call with issues or concerns prior to his next visit.    Orders:  -     CBC and differential; Future; Expected date: 06/04/2024  -     Comprehensive metabolic panel; Future; Expected date: 06/04/2024  -     LD,Blood; Future; Expected date: 06/04/2024         Return in about 3 months (around 6/4/2024) for Office Visit, labs.       Sona Call MD, PhD    Transcribed for Sona Call MD, by Barbra lorenzo/[facility] on 03/04/24 at 4:13 PM. Powered by [employer].

## 2024-03-05 ENCOUNTER — OFFICE VISIT (OUTPATIENT)
Dept: DERMATOLOGY | Facility: CLINIC | Age: 84
End: 2024-03-05
Payer: MEDICARE

## 2024-03-05 DIAGNOSIS — D48.5 NEOPLASM OF UNCERTAIN BEHAVIOR OF SKIN: Primary | ICD-10-CM

## 2024-03-05 PROCEDURE — 88305 TISSUE EXAM BY PATHOLOGIST: CPT | Performed by: STUDENT IN AN ORGANIZED HEALTH CARE EDUCATION/TRAINING PROGRAM

## 2024-03-05 PROCEDURE — 11102 TANGNTL BX SKIN SINGLE LES: CPT | Performed by: DERMATOLOGY

## 2024-03-05 PROCEDURE — 11103 TANGNTL BX SKIN EA SEP/ADDL: CPT | Performed by: DERMATOLOGY

## 2024-03-05 PROCEDURE — 88341 IMHCHEM/IMCYTCHM EA ADD ANTB: CPT | Performed by: STUDENT IN AN ORGANIZED HEALTH CARE EDUCATION/TRAINING PROGRAM

## 2024-03-05 PROCEDURE — 99214 OFFICE O/P EST MOD 30 MIN: CPT | Performed by: DERMATOLOGY

## 2024-03-05 PROCEDURE — 88342 IMHCHEM/IMCYTCHM 1ST ANTB: CPT | Performed by: STUDENT IN AN ORGANIZED HEALTH CARE EDUCATION/TRAINING PROGRAM

## 2024-03-05 NOTE — PROGRESS NOTES
"[facility] Dermatology Clinic Note     Patient Name: Alex Cheung  Encounter Date: 3/5/24     Have you been cared for by a [facility] Dermatologist in the last 3 years and, if so, which description applies to you?    Yes.  I have been here within the last 3 years, and my medical history has NOT changed since that time.  I am MALE/not capable of bearing children.    REVIEW OF SYSTEMS:  Have you recently had or currently have any of the following? No changes in my recent health.   PAST MEDICAL HISTORY:  Have you personally ever had or currently have any of the following?  If \"YES,\" then please provide more detail. No changes in my medical history.   HISTORY OF IMMUNOSUPPRESSION: Do you have a history of any of the following:  Systemic Immunosuppression such as Diabetes, Biologic or Immunotherapy, Chemotherapy, Organ Transplantation, Bone Marrow Transplantation?  YES     Answering \"YES\" requires the addition of the dotphrase \"IMMUNOSUPPRESSED\" as the first diagnosis of the patient's visit.   FAMILY HISTORY:  Any \"first degree relatives\" (parent, brother, sister, or child) with the following?    No changes in my family's known health.   PATIENT EXPERIENCE:    Do you want the Dermatologist to perform a COMPLETE skin exam today including a clinical examination under the \"bra and underwear\" areas?  Yes  If necessary, do we have your permission to call and leave a detailed message on your Preferred Phone number that includes your specific medical information?  Yes      Allergies   Allergen Reactions   • Erythromycin Other (See Comments)     Thrush       Current Outpatient Medications:   •  amLODIPine (NORVASC) 10 mg tablet, TAKE 1 TABLET BY MOUTH  DAILY, Disp: 90 tablet, Rfl: 3  •  Fluocinolone Acetonide Scalp 0.01 % OIL, Apply a thin layer topically daily at night one hour before bedtime. (Patient taking differently: Apply topically if needed Apply a thin layer topically daily at night one hour before bedtime.), Disp: " 118.28 mL, Rfl: 5  •  imiquimod (ALDARA) 5 % cream, Apply topically once a day Monday thru Friday for 6 weeks (Patient taking differently: if needed), Disp: 24 each, Rfl: 1  •  lisinopril (ZESTRIL) 20 mg tablet, TAKE 1 TABLET BY MOUTH  DAILY, Disp: 90 tablet, Rfl: 3  •  metoprolol succinate (TOPROL-XL) 50 mg 24 hr tablet, TAKE 1 TABLET BY MOUTH DAILY, Disp: 90 tablet, Rfl: 1  •  mometasone (ELOCON) 0.1 % cream, APPLY TO EARS ONCE TO TWICE DAILY AS NEEDED FOR SCALING, Disp: , Rfl:   •  Multiple Vitamins-Minerals (PRESERVISION AREDS 2) CAPS, Take 1 capsule by mouth 2 (two) times a day, Disp: , Rfl:   •  mupirocin (BACTROBAN) 2 % ointment, Apply topically two to three times a day to sore areas (Patient taking differently: Apply topically if needed Apply topically two to three times a day to sore areas), Disp: 22 g, Rfl: 3  •  Niacin (VITAMIN B-3 PO), Take by mouth, Disp: , Rfl:   •  Niacinamide-Zn-Cu-Yumpuz-Rq-Sw (NICOTINAMIDE PO), Take 500 mg by mouth 2 (two) times a day, Disp: , Rfl:   •  bacitracin ointment, Apply topically daily for 7 days Apply to bolster site daily (Patient taking differently: Apply topically as needed Apply to bolster site daily), Disp: 30 g, Rfl: 0  •  Pyridoxine HCl (VITAMIN B-6 PO), Take by mouth (Patient not taking: Reported on 3/4/2024), Disp: , Rfl:   •  traMADol (Ultram) 50 mg tablet, Take 1 tablet (50 mg total) by mouth every 6 (six) hours as needed for moderate pain for up to 15 doses (Patient not taking: Reported on 10/27/2023), Disp: 15 tablet, Rfl: 0          Whom besides the patient is providing clinical information about today's encounter?   NO ADDITIONAL HISTORIAN (patient alone provided history)    Physical Exam and Assessment/Plan by Diagnosis:    HISTORY OF BASAL CELL CARCINOMA    Physical Exam:  Anatomic Location Affected:  Right temple, right cheek, left lip, right tip of nose, left wrist  Morphological Description of scar:  Well healed  Suspected Recurrence: No  Pertinent  Positives:  Pertinent Negatives:      Additional History of Present Condition:  History of basal cell carcinoma with no sign of recurrence. MOHS done 05/16/2023, Accession #[id number]. MOHS done 10/13/2022, Accession # [id number]. MOHS done 10/06/22, Accession # [id number]. MOHS done 09/29/21, Accession # [id number]. MOHS done 11/30/23 Accession # [id number]     Assessment and Plan:  Based on a thorough discussion of this condition and the management approach to it (including a comprehensive discussion of the known risks, side effects and potential benefits of treatment), the patient (family) agrees to implement the following specific plan:  Monitor for changes or recurrence  Routine skin checks    How can basal cell carcinoma be prevented?  The most important way to prevent BCC is to avoid sunburn. This is especially important in childhood and early life. Fair skinned individuals and those with a personal or family history of BCC should protect their skin from sun exposure daily, year-round and lifelong.  Stay indoors or under the shade in the middle of the day   Wear covering clothing   Apply high protection factor SPF50+ broad-spectrum sunscreens generously to exposed skin if outdoors   Avoid indoor tanning (sun beds, solaria)  Oral nicotinamide (vitamin B3) in a dose of 500 mg twice daily may reduce the number and severity of BCCs.    What is the outlook for basal cell carcinoma?  Most BCCs are cured by treatment. Cure is most likely if treatment is undertaken when the lesion is small.  About 50% of people with BCC develop a second one within 3 years of the first. They are also at increased risk of other skin cancers, especially melanoma. Regular self-skin examinations and long-term annual skin checks by an experienced health professional are recommended.    HISTORY OF MELANOMA    Physical Exam:  Anatomic Location Affected:  Right neck, Left cheek and left deltoid( 0.9 mm excised 06/19/2019 by Dr. Lewis. Right check  "Emmy ( 2.3mm breslow), desmoplastic left ala   Morphological Description of Scar:  Well healed  Year Treated:  09/14/2022, Left cheek-2007, left deltoid 2019, right cheek 7.10.23, left ala 2.7.23   Suspected Recurrence: no  Regional adenopathy: no    Additional History of Present Condition:  History of melanoma with no signs of recurrence. Excision done 09/14/2022, Accession # [id number].     Assessment and Plan:  Based on a thorough discussion of this condition and the management approach to it (including a comprehensive discussion of the known risks, side effects and potential benefits of treatment), the patient (family) agrees to implement the following specific plan:  Monitor for changes or recurrence  Routine skin checks    What happens at follow-up?  The main purpose of follow-up is to detect recurrences early (metastatic melanoma), but it also offers an opportunity to diagnose a new primary melanoma at the first possible opportunity. A second invasive melanoma occurs in 5-10% of melanoma patients and a new melanoma in situ is diagnosed in more than 20% of melanoma patients.    Our practice makes the following recommendations for follow-up for patients with invasive melanoma.  At-least \"monthly\" self-skin examinations   Routine skin checks by a board certified dermatologist  Follow-up intervals are \"every 3 months\" within 2 years of a new melanoma diagnosis; \"every 6 months\" between 2-4 years of a new melanoma diagnosis; and \"annually\" after 4 years of a new melanoma diagnosis  Individual patient's needs should be considered before an appropriate follow-up is offered  Provide education and support to help the patient adjust to their illness    Follow-up appointments should include:  A check of the scar where the primary melanoma was removed  Checking the regional lymph nodes  A general skin examination  A full physical examination at least annually by your primary care physician    In those with more advanced " primary disease, follow-up may include:  Blood tests  Imaging: ultrasound, X-ray, CT, MRI and PET scan.    Most tests are not worthwhile for patients with stage 1 or 2 melanoma unless there are signs or symptoms of disease recurrence or metastasis. No tests are necessary for healthy patients who have remained well for five years or longer after removal of their melanoma.    What is the outlook for patients with melanoma?  Melanoma in situ is cured by excision because it has no potential to spread around the body.  The risk of spread and ultimate death from invasive melanoma depends on several factors, but the main one is the Breslow thickness of the melanoma at the time it was surgically removed.  Metastases are rare for melanomas < 0.75 mm and the risk for tumours 0.75-1 mm thick is about 5%. The risk steadily increases with thickness so that melanomas > 4 mm have a risk of metastasis of about 40%.    Melanoma is a potentially serious type of skin cancer, in which there is uncontrolled growth of melanocytes (pigment cells). Melanoma is sometimes called malignant melanoma.  Normal melanocytes are found in the basal layer of the epidermis (the outer layer of skin). Melanocytes produce a protein called melanin, which protects skin cells by absorbing ultraviolet (UV) radiation. Melanocytes are found in equal numbers in black and white skin, but melanocytes in black skin produce much more melanin. People with dark brown or black skin are very much less likely to be damaged by UV radiation than those with white skin.    HISTORY OF SQUAMOUS CELL CARCINOMA     Physical Exam:  Anatomic Location Affected:  Multiple Sites  Morphological Description of Scar:  Well healed  Suspected Recurrence: no  Regional adenopathy: no    Additional History of Present Condition:  History of SCC with no signs of reurrence    Assessment and Plan:  Based on a thorough discussion of this condition and the management approach to it (including a  comprehensive discussion of the known risks, side effects and potential benefits of treatment), the patient (family) agrees to implement the following specific plan:  Monitor for changes or recurrence  Routine skin checks    How can SCC be prevented?  The most important way to prevent SCC is to avoid sunburn. This is especially important in childhood and early life. Fair skinned individuals and those with a personal or family history of BCC should protect their skin from sun exposure daily, year-round and lifelong.  Stay indoors or under the shade in the middle of the day   Wear covering clothing   Apply high protection factor SPF50+ broad-spectrum sunscreens generously to exposed skin if outdoors   Avoid indoor tanning (sun beds, solaria)      What is the outlook for SCC?  Most SCC are cured by treatment. Cure is most likely if treatment is undertaken when the lesion is small. A small percent of SCC's can spread to lymph  nodes and long term monitoring is indicated.   They are also at increased risk of other skin cancers, especially melanoma. Regular self-skin examinations and long-term annual skin checks by an experienced health professional are recommended.    HISTORY OF PLEOMORPHIC SARCOMA     Physical Exam:  Anatomic Location Affected:  Scalp  Morphological Description:  100% take of free flap, no signs of recurrence , free graft 20cm right scalp with full take   Pertinent Positives:  Pertinent Negatives:         Additional History of Present Condition:  Patient has had multiple procedures in right paretal scalp area where a flap was done. Patient also had 27 treatents of radiation to treat cancer.  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 By muniz jose and temple     Assessment and Plan:  Based on a thorough discussion of this condition and the management approach to it (including a comprehensive discussion of the known  "risks, side effects and potential benefits of treatment), the patient (family) agrees to implement the following specific plan:  Continue to follow up at [place]      NEOPLASM OF UNCERTAIN BEHAVIOR OF SKIN    Physical Exam:  (Anatomic Location); (Size and Morphological Description); (Differential Diagnosis):  Specimen A:Right tip of the Nose 0.8 cm slightly light gray macule ddx: SCC vs AK vs MM  Specimen B: Right upper back 6 mm irregularly pigmented lesion  DDX: MM vs. Pigmented Constantin K  Pertinent Positives:  Pertinent Negatives:    Additional History of Present Condition:  Present on Exam    Assessment and Plan:  I have discussed with the patient that a sample of skin via a \"skin biopsy” would be potentially helpful to further make a specific diagnosis under the microscope.  Based on a thorough discussion of this condition and the management approach to it (including a comprehensive discussion of the known risks, side effects and potential benefits of treatment), the patient (family) agrees to implement the following specific plan:    Procedure:  Skin Biopsy.  After a thorough discussion of treatment options and risk/benefits/alternatives (including but not limited to local pain, scarring, dyspigmentation, blistering, possible superinfection, and inability to confirm a diagnosis via histopathology), verbal and written consent were obtained and portion of the rash was biopsied for tissue sample.  See below for consent that was obtained from patient and subsequent Procedure Note.    PROCEDURE TANGENTIAL (SHAVE) BIOPSY NOTE:    Performing Physician:   Anatomic Location; Clinical Description with size (cm); Pre-Op Diagnosis:   Specimen A: Right tip of the Nose 0.8 cm slightly light gray macule ddx: SCC vs AK vs MM  Specimen B: Right upper back 6 mm irregularly pigmented lesion  DDX: MM vs. Pigmented Seb K  Post-op diagnosis: Same     Local anesthesia: 1% xylocaine with epi      Topical anesthesia: " "None    Hemostasis: Aluminum chloride       After obtaining informed consent  at which time there was a discussion about the purpose of biopsy  and low risks of infection and bleeding.  The area was prepped and draped in the usual fashion. Anesthesia was obtained with 1% lidocaine with epinephrine. A shave biopsy to an appropriate sampling depth was obtained by Shave (Dermablade or 15 blade) The resulting wound was covered with surgical ointment and bandaged appropriately.     The patient tolerated the procedure well without complications and was without signs of functional compromise.      Specimen has been sent for review by Dermatopathology.    Standard post-procedure care has been explained and has been included in written form within the patient's copy of Informed Consent.    INFORMED CONSENT DISCUSSION AND POST-OPERATIVE INSTRUCTIONS FOR PATIENT    I.  RATIONALE FOR PROCEDURE  I understand that a skin biopsy allows the Dermatologist to test a lesion or rash under the microscope to obtain a diagnosis.  It usually involves numbing the area with numbing medication and removing a small piece of skin; sometimes the area will be closed with sutures. In this specific procedure, sutures are not usually needed.  If any sutures are placed, then they are usually need to be removed in 2 weeks or less.    I understand that my Dermatologist recommends that a skin \"shave\" biopsy be performed today.  A local anesthetic, similar to the kind that a dentist uses when filling a cavity, will be injected with a very small needle into the skin area to be sampled.  The injected skin and tissue underneath \"will go to sleep” and become numb so no pain should be felt afterwards.  An instrument shaped like a tiny \"razor blade\" (shave biopsy instrument) will be used to cut a small piece of tissue and skin from the area so that a sample of tissue can be taken and examined more closely under the microscope.  A slight amount of bleeding will " "occur, but it will be stopped with direct pressure and a pressure bandage and any other appropriate methods.  I understands that a scar will form where the wound was created.  Surgical ointment will be applied to help protect the wound.  Sutures are not usually needed.    II.  RISKS AND POTENTIAL COMPLICATIONS   I understand the risks and potential complications of a skin biopsy include but are not limited to the following:  Bleeding  Infection  Pain  Scar/keloid  Skin discoloration  Incomplete Removal  Recurrence  Nerve Damage/Numbness/Loss of Function  Allergic Reaction to Anesthesia  Biopsies are diagnostic procedures and based on findings additional treatment or evaluation may be required  Loss or destruction of specimen resulting in no additional findings    My Dermatologist has explained to me the nature of the condition, the nature of the procedure, and the benefits to be reasonably expected compared with alternative approaches.  My Dermatologist has discussed the likelihood of major risks or complications of this procedure including the specific risks listed above, such as bleeding, infection, and scarring/keloid.  I understand that a scar is expected after this procedure.  I understand that my physician cannot predict if the scar will form a \"keloid,\" which extends beyond the borders of the wound that is created.  A keloid is a thick, painful, and bumpy scar.  A keloid can be difficult to treat, as it does not always respond well to therapy, which includes injecting cortisone directly into the keloid every few weeks.  While this usually reduces the pain and size of the scar, it does not eliminate it.      I understand that photographs may be taken before and after the procedure.  These will be maintained as part of the medical providers confidential records and may not be made available to me.  I further authorize the medical provider to use the photographs for teaching purposes or to illustrate scientific " "papers, books, or lectures if in his/her judgment, medical research, education, or science may benefit from its use.    I have had an opportunity to fully inquire about the risks and benefits of this procedure and its alternatives.   I have been given ample time and opportunity to ask questions and to seek a second opinion if I wished to do so.  I acknowledge that there have specifically been no guarantees as to the cosmetic results from the procedure.  I am aware that with any procedure there is always the possibility of an unexpected complication.    III. POST-PROCEDURAL CARE (WHAT YOU WILL NEED TO DO \"AFTER THE BIOPSY\" TO OPTIMIZE HEALING)    Keep the area clean and dry.  Try NOT to remove the bandage or get it wet for the first 24 hours.    Gently clean the area and apply surgical ointment (such as Vaseline petrolatum ointment, which is available \"over the counter\" and not a prescription) to the biopsy site for up to 2 weeks straight.  This acts to protect the wound from the outside world.      Sutures are not usually placed in this procedure.  If any sutures were placed, return for suture removal as instructed (generally 1 week for the face, 2 weeks for the body).      Take Acetaminophen (Tylenol) for discomfort, if no contraindications.  Ibuprofen or aspirin could make bleeding worse.    Call our office immediately for signs of infection: fever, chills, increased redness, warmth, tenderness, discomfort/pain, or pus or foul smell coming from the wound.    WHAT TO DO IF THERE IS ANY BLEEDING?  If a small amount of bleeding is noticed, place a clean cloth over the area and apply firm pressure for ten minutes.  Check the wound after 10 minutes of direct pressure.  If bleeding persists, try one more time for an additional 10 minutes of direct pressure on the area.  If the bleeding becomes heavier or does not stop after the second attempt, or if you have any other questions about this procedure, then please call " your [facility] Dermatologist by calling [phone] (SKIN).     I hereby acknowledge that I have reviewed and verified the site with my Dermatologist and have requested and authorized my Dermatologist to proceed with the procedure.              Scribe Attestation    I,:  Roc Olsen am acting as a scribe while in the presence of the attending physician.:       I,:  Earnest Caraballo MD personally performed the services described in this documentation    as scribed in my presence.:

## 2024-03-05 NOTE — PATIENT INSTRUCTIONS
"POST-PROCEDURAL CARE (WHAT YOU WILL NEED TO DO \"AFTER THE BIOPSY\" TO OPTIMIZE HEALING)    Keep the area clean and dry.  Try NOT to remove the bandage or get it wet for the first 24 hours.    Gently clean the area and apply surgical ointment (such as Vaseline petrolatum ointment, which is available \"over the counter\" and not a prescription) to the biopsy site for up to 2 weeks straight.  This acts to protect the wound from the outside world.      Sutures are not usually placed in this procedure.  If any sutures were placed, return for suture removal as instructed (generally 1 week for the face, 2 weeks for the body).      Take Acetaminophen (Tylenol) for discomfort, if no contraindications.  Ibuprofen or aspirin could make bleeding worse.    Call our office immediately for signs of infection: fever, chills, increased redness, warmth, tenderness, discomfort/pain, or pus or foul smell coming from the wound.    WHAT TO DO IF THERE IS ANY BLEEDING?  If a small amount of bleeding is noticed, place a clean cloth over the area and apply firm pressure for ten minutes.  Check the wound after 10 minutes of direct pressure.  If bleeding persists, try one more time for an additional 10 minutes of direct pressure on the area.  If the bleeding becomes heavier or does not stop after the second attempt, or if you have any other questions about this procedure, then please call your [facility] Dermatologist by calling [phone] (SKIN).     I hereby acknowledge that I have reviewed and verified the site with my Dermatologist and have requested and authorized my Dermatologist to proceed with the procedure.  "

## 2024-03-11 PROCEDURE — 88342 IMHCHEM/IMCYTCHM 1ST ANTB: CPT | Performed by: STUDENT IN AN ORGANIZED HEALTH CARE EDUCATION/TRAINING PROGRAM

## 2024-03-11 PROCEDURE — 88341 IMHCHEM/IMCYTCHM EA ADD ANTB: CPT | Performed by: STUDENT IN AN ORGANIZED HEALTH CARE EDUCATION/TRAINING PROGRAM

## 2024-03-11 PROCEDURE — 88305 TISSUE EXAM BY PATHOLOGIST: CPT | Performed by: STUDENT IN AN ORGANIZED HEALTH CARE EDUCATION/TRAINING PROGRAM

## 2024-03-12 ENCOUNTER — TELEPHONE (OUTPATIENT)
Age: 84
End: 2024-03-12

## 2024-03-12 DIAGNOSIS — C44.311 BASAL CELL CARCINOMA OF RIGHT SIDE OF NOSE: Primary | ICD-10-CM

## 2024-03-12 NOTE — RESULT ENCOUNTER NOTE
DERMATOPATHOLOGY RESULT NOTE    Results reviewed by ordering physician.  Called patient to personally discuss results. Discussed results with patient.       Instructions for Clinical Derm Team:   (remember to route Result Note to appropriate staff):    Call patient and schedule for mohs    Result & Plan by Specimen:    Specimen A: malignant  Plan: MOHs      Specimen B: malignant  Plan: recheck post shave biopsy and access need for further treatment.    inal Diagnosis  A. Skin, right tip of the nose, shave biopsy:    BASAL CELL CARCINOMA (SUPERFICIAL AND NODULAR TYPE); transected.         B. Skin, right upper back, shave biopsy:    SQUAMOUS CELL CARCINOMA IN SITU arising in association with a seborrheic keratosis.   Electroni

## 2024-03-13 NOTE — TELEPHONE ENCOUNTER
Patient called and left message on voicemail for Mohs to call back regarding scheduling. Returned call and patient stated he spoke with Dr. Caraballo yesterday and is going to wait until he comes in on 3/27 to discuss scheduling the next procedure at that time. Does not wish to schedule now.

## 2024-03-27 ENCOUNTER — PROCEDURE VISIT (OUTPATIENT)
Dept: DERMATOLOGY | Facility: CLINIC | Age: 84
End: 2024-03-27
Payer: MEDICARE

## 2024-03-27 VITALS
TEMPERATURE: 98.7 F | SYSTOLIC BLOOD PRESSURE: 150 MMHG | HEART RATE: 76 BPM | OXYGEN SATURATION: 96 % | DIASTOLIC BLOOD PRESSURE: 78 MMHG | WEIGHT: 176.4 LBS | BODY MASS INDEX: 26.73 KG/M2 | HEIGHT: 68 IN

## 2024-03-27 DIAGNOSIS — C44.311 BASAL CELL CARCINOMA OF RIGHT SIDE OF NOSE: ICD-10-CM

## 2024-03-27 DIAGNOSIS — C44.01 BASAL CELL CARCINOMA (BCC) OF SKIN OF RIGHT UPPER LIP: Primary | ICD-10-CM

## 2024-03-27 PROCEDURE — 17311 MOHS 1 STAGE H/N/HF/G: CPT | Performed by: DERMATOLOGY

## 2024-03-27 PROCEDURE — 17312 MOHS ADDL STAGE: CPT | Performed by: DERMATOLOGY

## 2024-03-27 PROCEDURE — 12051 INTMD RPR FACE/MM 2.5 CM/<: CPT | Performed by: DERMATOLOGY

## 2024-03-27 NOTE — PATIENT INSTRUCTIONS
"Mohs Microscopic Surgery After Care    WOUND CARE AFTER SURGERY:    Do NOT to remove the pressure bandage for 48 hours. Keep the area clean and dry while this bandage is on.    After removing the bandage for the first time, gently clean the area with soap and water. If the bandage is difficult to remove, getting the bandage wet in the shower will sometimes help soften the adhesive and allow it to be removed more easily.     You will now need to cleanse this area daily in the shower with gentle soap. There is no need to scrub the area. Apply plain Vaseline ointment (this is over the counter and not a prescription) to the site followed by a clean appropriately sized bandage to area.  Non stick dressing and paper tape (or Hypafix) are recommended for sensitive skin but a bandaid is fine if it covers the area well.    You will need to continue the above daily wound care until you return for suture removal in 7-10 days (generally 7 days for the face, 10-14 days off the face)      RESTRICTIONS:     For two DAYS:   - You will need to take it very easy as this time is highest risk for bleeding. Being a \"couch potato\" during these two days is generally recommended.   - For surgeries on the face/neck/scalp: Avoid leaning down to pick things up off the floor as this brings blood up to your head. Instead, squat down to pick things up.     For two WEEKS:   - No heavy lifting (anything greater than 10 pounds)   - You can start to do slow, gentle activities such as slow walking but nothing to increase your heart rate and blood pressure too much (such as cardiovascular exercise). It is important to take it easy as there is still a risk for bleeding and a high risk popping of stitches open during this time.     If we did surgery on the skin above or bellow your lip or your lip itself: No sipping from straws as this uses a lot of the muscles around your mouth and increases the risk of popping stitches during this time.    MANAGING " YOUR PAIN AFTER SURGERY     You can expect to have some pain after surgery. This is normal. The pain is typically worse the first two days after surgery, and quickly begins to get better.     The best strategy for controlling your pain after surgery is around the clock pain control. You can take over the counter Acetaminophen (Tylenol) for discomfort, if no contraindications.     If you are taking this at the maximum dose, you can alternate this with Motrin (ibuprofen or Advil) as well. Alternating these medications with each other allows you to maximize your pain control. In addition to Tylenol and Motrin, you can use heating pads or ice packs on your incisions to help reduce your pain.     How will I alternate your regular strength over-the-counter pain medication?  You will take a dose of pain medication every three hours.   Start by taking 650 mg of Tylenol (2 pills of 325 mg)   3 hours later take 600 mg of Motrin (3 pills of 200 mg)   3 hours after taking the Motrin take 650 mg of Tylenol   3 hours after that take 600 mg of Motrin.    See example - if your first dose of Tylenol is at 12:00 PM     12:00 PM  Tylenol 650 mg (2 pills of 325 mg)    3:00 PM  Motrin 600 mg (3 pills of 200 mg)    6:00 PM  Tylenol 650 mg (2 pills of 325 mg)    9:00 PM  Motrin 600 mg (3 pills of 200 mg)    Continue alternating every 3 hours      Important:   Do not take more than 4000mg of Tylenol or 3200mg of Motrin in a 24-hour period.     What if I still have pain?   If you have pain that is not controlled with the over-the-counter pain medications (Tylenol and Motrin or Advil), don't hesitate to call our staff using the number provided. We will help make sure you are managing your pain in the best way possible, and if necessary, we can provide a prescription for additional pain medication.     CALL OUR OFFICE IMMEDIATELY FOR ANY SIGNS OF INFECTION:    This includes fever, chills, increased redness, warmth, tenderness, severe  discomfort/pain, or pus or foul smell coming from the wound. If you are experiencing any of the above, please call [facility] Mohs Department directly at [phone].    IF BLEEDING IS NOTICED:    Place a clean cloth over the area and apply firm pressure for thirty minutes.  Check the wound ONLY after 30 minutes of direct pressure; do not cheat and sneak a peak, as that does not count.  If bleeding persists after 30 minutes of legitimate direct pressure, then try one more round of direct pressure to the area.  Should the bleeding become heavier or not stop after the second attempt, call [facility] Dermatology directly at [phone]. Your call will get routed to the dermatology surgeon on call even after hours.

## 2024-03-27 NOTE — PROGRESS NOTES
MOHS Procedure Note    Patient: Alex Cheung  : 1940  MRN: [id number]  Date: 3/27/2024    History of Present Illness: The patient is a 83 y.o. 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     Past Medical History:   Diagnosis Date   • Anxiety    • Arthritis     back   • Back pain    • Balance problem    • Basal cell carcinoma (BCC)     in past   • Basal cell carcinoma (BCC) 10/04/2022    Right [place]   • Basal cell carcinoma (BCC) 10/03/2023    right lip, mohs   • Basal cell carcinoma (BCC) 2024    right tip of nose   • BCC (basal cell carcinoma of skin) 2021    Right tip of nose   • BCC (basal cell carcinoma of skin) 2022    Left lip   • BCC (basal cell carcinoma of skin) 2022    Right cheek   • Cancer (HCC)    • Gout    • Hard of hearing    • Hearing aid worn     ziyad   • Hearing aid worn    • Herniated nucleus pulposus, L4-5    • [place] (hard of hearing)    • Hypertension    • Incisional hernia    • Incontinence    • Macular degeneration    • Melanoma (HCC)     left cheek- history   • Melanoma (HCC) 2022    Right neck   • Nocturia    • Pulmonary nodules    • Reactive airway disease    • 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   • Skin cancer 2020    AFX- scalp   • Squamous cell carcinoma     in past   • Squamous cell skin cancer        Past Surgical History:   Procedure Laterality Date   • BASAL CELL CARCINOMA EXCISION Right 07/10/2023    Procedure: EXCISION BASAL CELL CARCINOMA  RIGHT CHEEK;  Surgeon: Fortunato Booth MD;  Location: [facility];  Service: Surgical Oncology   • CATARACT EXTRACTION Bilateral    • COLONOSCOPY     • FLAP LOCAL HEAD / NECK Right 07/10/2023    Procedure: RIGHT CHEEK RECON W/ LOCAL FLAP & SKIN GRAFT;  Surgeon: Leola Chatman MD;  Location: [facility];  Service: Plastics   • HERNIA REPAIR     • HYDROCELE EXCISION / REPAIR     • MASTOID SURGERY Left    • MOHS SURGERY     • MOHS SURGERY   09/29/2021    Right tip of nose-Dr. Caraballo   • MOHS SURGERY  10/06/2022    Left lip-Dr. Caraballo   • MOHS SURGERY  05/16/2023    Right [place] - Dr. Matthews   • MOHS SURGERY Right 03/27/2024    BCC right lip, Dr Caraballo   • OTHER SURGICAL HISTORY      sarcoma scalp with skin graft   • DE EXCISION MALIGNANT LESION F/E/E/N/L 0.5 CM/< Left 02/07/2023    Procedure: WIDE EXCISION OF LEFT NASAL MELANOMA;  Surgeon: Fortunato Booth MD;  [facility];  Service: Surgical Oncology   • DE REPAIR FIRST ABDOMINAL WALL HERNIA N/A 04/22/2021    Procedure: REPAIR HERNIA INCISIONAL OPEN WITH MESH;  Surgeon: Darryl Landry MD;  [facility];  Service: General   • SCALP EXCISION N/A 03/28/2018    Procedure: SCALP SARCOMA EXCISION WITH FULL THICKNESS SKIN GRAFT;  Surgeon: Jani Pagan MD;  [facility];  Service: Plastics   • SKIN BIOPSY     • SKIN CANCER EXCISION     • SKIN LESION EXCISION Right 07/10/2023    Procedure: WIDE EXCISION MELANOMA SCAR, RIGHT CHEEK;  Surgeon: Fortunato Booth MD;  [facility];  Service: Surgical Oncology   • SPLIT THICKNESS SKIN GRAFT Left 02/07/2023    Procedure: APPLICATION OF SKIN SUBSTITUTE GRAFT TO NOSE;  Surgeon: Leola Chatman MD;  [facility];  Service: Plastics   • TONSILLECTOMY AND ADENOIDECTOMY           Current Outpatient Medications:   •  amLODIPine (NORVASC) 10 mg tablet, TAKE 1 TABLET BY MOUTH  DAILY, Disp: 90 tablet, Rfl: 3  •  Fluocinolone Acetonide Scalp 0.01 % OIL, Apply a thin layer topically daily at night one hour before bedtime. (Patient taking differently: Apply topically if needed Apply a thin layer topically daily at night one hour before bedtime.), Disp: 118.28 mL, Rfl: 5  •  imiquimod (ALDARA) 5 % cream, Apply topically once a day Monday thru Friday for 6 weeks (Patient taking differently: if needed), Disp: 24 each, Rfl: 1  •  lisinopril (ZESTRIL) 20 mg tablet, TAKE 1 TABLET BY MOUTH  DAILY, Disp: 90 tablet, Rfl: 3  •  metoprolol succinate  (TOPROL-XL) 50 mg 24 hr tablet, TAKE 1 TABLET BY MOUTH DAILY, Disp: 90 tablet, Rfl: 1  •  mometasone (ELOCON) 0.1 % cream, APPLY TO EARS ONCE TO TWICE DAILY AS NEEDED FOR SCALING, Disp: , Rfl:   •  Multiple Vitamins-Minerals (PRESERVISION AREDS 2) CAPS, Take 1 capsule by mouth 2 (two) times a day, Disp: , Rfl:   •  mupirocin (BACTROBAN) 2 % ointment, Apply topically two to three times a day to sore areas (Patient taking differently: Apply topically if needed Apply topically two to three times a day to sore areas), Disp: 22 g, Rfl: 3  •  Niacin (VITAMIN B-3 PO), Take by mouth, Disp: , Rfl:   •  Niacinamide-Zn-Cu-Xrbgnu-Iu-Na (NICOTINAMIDE PO), Take 500 mg by mouth 2 (two) times a day, Disp: , Rfl:   •  bacitracin ointment, Apply topically daily for 7 days Apply to bolster site daily (Patient taking differently: Apply topically as needed Apply to bolster site daily), Disp: 30 g, Rfl: 0  •  Pyridoxine HCl (VITAMIN B-6 PO), Take by mouth (Patient not taking: Reported on 3/4/2024), Disp: , Rfl:   •  traMADol (Ultram) 50 mg tablet, Take 1 tablet (50 mg total) by mouth every 6 (six) hours as needed for moderate pain for up to 15 doses (Patient not taking: Reported on 10/27/2023), Disp: 15 tablet, Rfl: 0    Allergies   Allergen Reactions   • Erythromycin Other (See Comments)     Thrush        Physical Exam:   Vitals:    03/27/24 1256   BP: 150/78   Pulse: 76   Temp: 98.7 °F (37.1 °C)   SpO2: 96%     General: Awake, Alert, Oriented x 3, Mood and affect appropriate  Respiratory: Respirations even and unlabored  Cardiovascular: Peripheral pulses intact; no edema  Musculoskeletal Exam: n/a    Skin: 0.4 cm x 0.4 cm pink papule    Assessment: Biopsy confirmed basal cell carcinoma of the right lip.     Plan: Mohs    Time of H&P Completion:1259    MOHS Procedure Timeout    Flowsheet Row Most Recent Value   Timeout: 7612   Patient Identity Verified: Yes   Correct Site Verified: Yes   Correct Procedure Verified: Yes          MOHS  Diagnosis/Indication/Location/ID    Flowsheet Row Most Recent Value   Pathology Type Basal cell carcinoma   Anatomic Site --  [right lip]   Indications for MOHS tumor location   MOHS ID [id number]          MOHS Site/Accession/Pre-Post    Flowsheet Row Most Recent Value   Original Site Identified (as submitted by referring clinician) Photo, Referral   Biopsy Accession/Specimen # (as submitted by referring clincian) [id number]  [A]   Pre-MOHS Size Length (cm) 0.4   Pre-MOHS Size Width (cm) 0.4   Post-MOHS Size-Length (cm) 1.1   Post MOHS Size-Width (cm) 1   Repair Type Intermediate layered closure   Suture Type Vicryl, Ethilon   Ethilon Suture Size 5   Vicryl Suture Size 5   Final repair length (cm): 2.4   Anesthetic Used 1% Lidocaine with epinephrine  [3]          MOHS Tumor Stage 1 Information    Flowsheet Row Most Recent Value   Tissue Sections (blocks) 2   Microscopic Exam Section 1: No tumor identified in section.  [no tumor post curettage]   Microscopic Exam Section 2: Arising from the epidermis and superficial follicles are small, superficial, multicentric buds of basaloid keratinocytes associated with a fibromyxoid stroma and clefting. Nuclei at the periphery of the islands have a palisaded arrangement.  [3:00 margon positive]   Tumor Clear After Stage I? No          MOHS Tumor Stage 2 Information    Flowsheet Row Most Recent Value   Tissue Sections (blocks) 1   Microscopic Exam Section 1: Irregularly shaped islands of basaloid keratinocytes with peripheral palisading and retraction artifact consistent with basal cell carcinoma were noted on microscopic analysis. The cells have scant cytoplasm and round dark nuclei.  [3, 00 margin positive]   Tumor Clear After Stage II? No          MOHS Tumor Stage 3 Information    Flowsheet Row Most Recent Value   Tissue Sections (blocks) 1   Microscopic Exam Section 1: No tumor identified in section.   Tumor Clear After Stage III? Yes                      Patient identified,  procedure verified, site identified and verified. Time out completed. Surgical removal of the lesion discussed with the patient (risks and benefits, including possibility of scarring, infection, recurrence or potential for further treatment)  I have specifically identified the site with the patient. I have discussed the fact that the patient will have a scar after the procedure regardless of granulation or repair with sutures. I have discussed that the repair options can range from granulation in some cases to linear or curvilinear closures to larger flaps or grafts.  There are sometimes flaps or grafts used that require multiples stages of surgery and will not be completed today, rather be completed over a series of appointments. I have discussed that occasionally due to location, size or depth of the lesion I may recommend consultation with and transfer of care for further removal or the reconstruction to another provider such as ophthalmology surgery, plastic surgery, ENT surgery, or surgical oncology. There are cases in which other testing such as imaging with MRI or CT scan or testing of lymph nodes is recommended because of the nature/depth/location of tumor seen during the removal. There is a risk of injury to nerves causing temporary or permanent numbness or the inability to move muscles full such as the inability to lift eyebrows. Questions answered and verbal and written consent was obtained.    With the patient in the supine position and under adequate local anesthesia with 1% lidocaine with epinephrine 1:100,000, the defect was scrubbed with Iodine. Sterile drapes were placed from the sterile tray.  Because of the location of the surgical defect, an intermediate closure was judged to give the best possible cosmetic and functional result.  The edges of the defect were carefully debrided removing any dead or coagulated tissue.  Layered repair 2.4cm    Hemostasis was obtained by pinpoint electrocoagulation.   Careful planning of removal of redundant tissue at either end of the defect was drawn out so that the suture lines would fall in the optimal orientation with regard to the relaxed skin tension lines.  These were then removed with a #15 blade scalpel.  The wound was then approximated by a deep layer of buried vertical mattress sutures and the cutaneous margins were approximated and closed by superficial sutures as noted above.  Estimated blood loss was less than 5 mL.      The patient tolerated the procedure well.  The wound was dressed with petrolatum, a non-stick pad, and a compression dressing.     Earnest Caraballo MD served as the surgeon and pathologist during the procedure.    Postoperative care: Wound care discussed at length.  I urged the patient to call us if any problems or question should arise.     Complications: none  Post-op medications: none  Patient condition after procedure: stable  Discharge plans: Plan for return to Mohs for suture removal, as scheduled in 10 days.          The tumor qualifies for Mohs based on AUC criteria. Dr. Caraballo served as the surgeon and pathologist during the procedure.    Postoperative care: No would care should be necessary prior to the next visit but I urged the patient to call us if any problems or question should arise prior to that time. If circumstances should change, we will contact the patient to make other arrangements.     Scribe Attestation    I,:  Jing James am acting as a scribe while in the presence of the attending physician.:       I,:  Earnest Caraballo MD personally performed the services described in this documentation    as scribed in my presence.:

## 2024-04-03 ENCOUNTER — OFFICE VISIT (OUTPATIENT)
Dept: PODIATRY | Facility: CLINIC | Age: 84
End: 2024-04-03
Payer: MEDICARE

## 2024-04-03 VITALS — HEART RATE: 65 BPM | SYSTOLIC BLOOD PRESSURE: 156 MMHG | DIASTOLIC BLOOD PRESSURE: 87 MMHG

## 2024-04-03 DIAGNOSIS — I73.9 PERIPHERAL VASCULAR DISEASE, UNSPECIFIED (HCC): Primary | ICD-10-CM

## 2024-04-03 PROCEDURE — 11719 TRIM NAIL(S) ANY NUMBER: CPT | Performed by: PODIATRIST

## 2024-04-03 NOTE — PROGRESS NOTES
Established patient with class findings presents for nail care.  Vascular exam:  DP  0/4 bilateral; PT  0 4 bilateral   Dermatological exam:  Each toenail is thick and  dystrophic.  Diagnosis:  PVD  Treatment: Trimmed multiple dystrophic toenails.     Nail removal    Date/Time: 4/3/2024 10:45 AM    Performed by: Jake Cabezas DPM  Authorized by: Jake Cabezas DPM    Nails trimmed:     Number of nails trimmed:  10

## 2024-04-04 ENCOUNTER — OFFICE VISIT (OUTPATIENT)
Dept: DERMATOLOGY | Facility: CLINIC | Age: 84
End: 2024-04-04

## 2024-04-04 DIAGNOSIS — Z48.02 ENCOUNTER FOR REMOVAL OF SUTURES: Primary | ICD-10-CM

## 2024-04-04 DIAGNOSIS — L21.9 SEBORRHEIC DERMATITIS: ICD-10-CM

## 2024-04-04 DIAGNOSIS — L57.0 KERATOSIS, ACTINIC: ICD-10-CM

## 2024-04-04 PROCEDURE — RECHECK: Performed by: DERMATOLOGY

## 2024-04-04 RX ORDER — FLUOCINOLONE ACETONIDE 0.11 MG/ML
OIL TOPICAL
Qty: 118.28 ML | Refills: 5 | Status: SHIPPED | OUTPATIENT
Start: 2024-04-04

## 2024-04-04 RX ORDER — FLUOROURACIL 50 MG/G
CREAM TOPICAL 2 TIMES DAILY
Qty: 40 G | Refills: 0 | Status: SHIPPED | OUTPATIENT
Start: 2024-04-04

## 2024-04-04 NOTE — PROGRESS NOTES
"Suture removal    Date/Time: 4/4/2024 8:30 AM    Performed by: Chiquita Mejia RN  Authorized by: Earnest Caraballo MD  Universal Protocol:  Consent: Verbal consent obtained. Written consent not obtained.  Risks and benefits: risks, benefits and alternatives were discussed  Consent given by: patient  Time out: Immediately prior to procedure a \"time out\" was called to verify the correct patient, procedure, equipment, support staff and site/side marked as required.  Timeout called at: 4/4/2024 9:30 AM.  Patient understanding: patient states understanding of the procedure being performed  Patient consent: the patient's understanding of the procedure matches consent given  Procedure consent: procedure consent matches procedure scheduled  Relevant documents: relevant documents not present or verified  Test results: test results not available  Site marked: the operative site was not marked  Radiology Images displayed and confirmed. If images not available, report reviewed: imaging studies not available  Patient identity confirmed: verbally with patient      Patient location:  Clinic  Location:     Laterality:  Right    Location:  Head/neck    Head/neck location: Lip.  Procedure details:     Tools used:  Suture removal kit    Wound appearance:  No sign(s) of infection, good wound healing, clean, moist and pink    Number of sutures removed:  4  Post-procedure details:     Post-removal:  Band-Aid applied (Vaseline applied)    Patient tolerance of procedure:  Tolerated well, no immediate complications  Comments:      Patient presents today for suture removal on the right lip. This was a Mohs procedure so Dr Caraballo was pulled to examine the wound. Dr Caraballo said the wound looks good and sutures can come out. While removing the stitches, one of the sutures appears embedded on the distal end of the wound. I tried to remove the suture but because of the location and it being embedded I was unable to remove it. Dr Caraballo came in to try " to remove the suture as well but was also unsuccessful. The patient is coming back in June for another procedure and Dr Caraballo advised the patient we will look at the site then and that the suture will be ok in the skin. Patient advised to call us if he has any concerns and is ok with this plan.        Before suture removal     After suture removal

## 2024-04-16 ENCOUNTER — HOSPITAL ENCOUNTER (OUTPATIENT)
Dept: ULTRASOUND IMAGING | Facility: HOSPITAL | Age: 84
Discharge: HOME | End: 2024-04-16
Payer: MEDICARE

## 2024-04-16 DIAGNOSIS — C43.31 MALIGNANT MELANOMA OF NOSE (HCC): ICD-10-CM

## 2024-04-16 PROCEDURE — 76536 US EXAM OF HEAD AND NECK: CPT

## 2024-04-18 DIAGNOSIS — I10 ESSENTIAL HYPERTENSION: ICD-10-CM

## 2024-04-19 RX ORDER — LISINOPRIL 20 MG/1
TABLET ORAL
Qty: 90 TABLET | Refills: 1 | Status: SHIPPED | OUTPATIENT
Start: 2024-04-19

## 2024-04-25 ENCOUNTER — APPOINTMENT (OUTPATIENT)
Dept: LAB | Facility: CLINIC | Age: 84
End: 2024-04-25
Payer: MEDICARE

## 2024-04-25 DIAGNOSIS — N18.30 STAGE 3 CHRONIC KIDNEY DISEASE, UNSPECIFIED WHETHER STAGE 3A OR 3B CKD (HCC): ICD-10-CM

## 2024-04-25 DIAGNOSIS — C49.0 SARCOMA OF SCALP (HCC): ICD-10-CM

## 2024-04-25 DIAGNOSIS — I10 PRIMARY HYPERTENSION: ICD-10-CM

## 2024-04-25 DIAGNOSIS — C43.9 MELANOMA OF SKIN (HCC): ICD-10-CM

## 2024-04-25 DIAGNOSIS — C43.30 MALIGNANT MELANOMA OF FACE EXCLUDING EYELID, NOSE, LIP, AND EAR (HCC): ICD-10-CM

## 2024-04-25 DIAGNOSIS — Z12.5 SCREENING FOR MALIGNANT NEOPLASM OF PROSTATE: ICD-10-CM

## 2024-04-25 DIAGNOSIS — E55.9 VITAMIN D DEFICIENCY: ICD-10-CM

## 2024-04-25 LAB
25(OH)D3 SERPL-MCNC: 19.1 NG/ML (ref 30–100)
ALBUMIN SERPL BCP-MCNC: 4.3 G/DL (ref 3.5–5)
ALP SERPL-CCNC: 114 U/L (ref 34–104)
ALT SERPL W P-5'-P-CCNC: 21 U/L (ref 7–52)
ANION GAP SERPL CALCULATED.3IONS-SCNC: 9 MMOL/L (ref 4–13)
AST SERPL W P-5'-P-CCNC: 18 U/L (ref 13–39)
BASOPHILS # BLD AUTO: 0.04 THOUSANDS/ÂΜL (ref 0–0.1)
BASOPHILS NFR BLD AUTO: 1 % (ref 0–1)
BILIRUB SERPL-MCNC: 0.87 MG/DL (ref 0.2–1)
BUN SERPL-MCNC: 22 MG/DL (ref 5–25)
CALCIUM SERPL-MCNC: 10.2 MG/DL (ref 8.4–10.2)
CHLORIDE SERPL-SCNC: 104 MMOL/L (ref 96–108)
CHOLEST SERPL-MCNC: 197 MG/DL
CO2 SERPL-SCNC: 27 MMOL/L (ref 21–32)
CREAT SERPL-MCNC: 1.35 MG/DL (ref 0.6–1.3)
EOSINOPHIL # BLD AUTO: 0.25 THOUSAND/ÂΜL (ref 0–0.61)
EOSINOPHIL NFR BLD AUTO: 4 % (ref 0–6)
ERYTHROCYTE [DISTWIDTH] IN BLOOD BY AUTOMATED COUNT: 13.2 % (ref 11.6–15.1)
GFR SERPL CREATININE-BSD FRML MDRD: 47 ML/MIN/1.73SQ M
GLUCOSE P FAST SERPL-MCNC: 94 MG/DL (ref 65–99)
HCT VFR BLD AUTO: 44.7 % (ref 36.5–49.3)
HDLC SERPL-MCNC: 45 MG/DL
HGB BLD-MCNC: 15.1 G/DL (ref 12–17)
IMM GRANULOCYTES # BLD AUTO: 0.02 THOUSAND/UL (ref 0–0.2)
IMM GRANULOCYTES NFR BLD AUTO: 0 % (ref 0–2)
LDH SERPL-CCNC: 165 U/L (ref 140–271)
LDLC SERPL CALC-MCNC: 129 MG/DL (ref 0–100)
LYMPHOCYTES # BLD AUTO: 1.99 THOUSANDS/ÂΜL (ref 0.6–4.47)
LYMPHOCYTES NFR BLD AUTO: 32 % (ref 14–44)
MCH RBC QN AUTO: 30.8 PG (ref 26.8–34.3)
MCHC RBC AUTO-ENTMCNC: 33.8 G/DL (ref 31.4–37.4)
MCV RBC AUTO: 91 FL (ref 82–98)
MONOCYTES # BLD AUTO: 0.42 THOUSAND/ÂΜL (ref 0.17–1.22)
MONOCYTES NFR BLD AUTO: 7 % (ref 4–12)
NEUTROPHILS # BLD AUTO: 3.51 THOUSANDS/ÂΜL (ref 1.85–7.62)
NEUTS SEG NFR BLD AUTO: 56 % (ref 43–75)
NRBC BLD AUTO-RTO: 0 /100 WBCS
PLATELET # BLD AUTO: 190 THOUSANDS/UL (ref 149–390)
PMV BLD AUTO: 11.1 FL (ref 8.9–12.7)
POTASSIUM SERPL-SCNC: 4.6 MMOL/L (ref 3.5–5.3)
PROT SERPL-MCNC: 7.3 G/DL (ref 6.4–8.4)
PSA SERPL-MCNC: 5.12 NG/ML (ref 0–4)
RBC # BLD AUTO: 4.9 MILLION/UL (ref 3.88–5.62)
SODIUM SERPL-SCNC: 140 MMOL/L (ref 135–147)
TRIGL SERPL-MCNC: 115 MG/DL
TSH SERPL DL<=0.05 MIU/L-ACNC: 3.68 UIU/ML (ref 0.45–4.5)
WBC # BLD AUTO: 6.23 THOUSAND/UL (ref 4.31–10.16)

## 2024-04-25 PROCEDURE — 83615 LACTATE (LD) (LDH) ENZYME: CPT

## 2024-04-25 PROCEDURE — 84443 ASSAY THYROID STIM HORMONE: CPT

## 2024-04-25 PROCEDURE — 36415 COLL VENOUS BLD VENIPUNCTURE: CPT

## 2024-04-25 PROCEDURE — 82306 VITAMIN D 25 HYDROXY: CPT

## 2024-04-25 PROCEDURE — 80061 LIPID PANEL: CPT

## 2024-04-25 PROCEDURE — G0103 PSA SCREENING: HCPCS

## 2024-04-25 PROCEDURE — 80053 COMPREHEN METABOLIC PANEL: CPT

## 2024-04-25 PROCEDURE — 85025 COMPLETE CBC W/AUTO DIFF WBC: CPT

## 2024-04-29 NOTE — PATIENT INSTRUCTIONS
Assessment/plan:  1.  Primary hypertension-presently stable with amlodipine 10 mg, lisinopril 20 mg, and metoprolol 50 mg daily.  2.  Hyperlipidemia-stable with diet control  3.  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  Stage III chronic kidney disease  6.  Melanoma of the nose-stable per dermatology  7.  Elevated PSA-level is at 5.1.  This has fluctuated from 3.9-5.3 since 2018.  Discussed further management with urology however patient is not interested in any aggressive measures and would be happy to continue monitoring at this point.  8.  Vitamin D deficiency-level of 19.  Would recommend 5000 units of vitamin D over-the-counter daily.

## 2024-04-30 ENCOUNTER — OFFICE VISIT (OUTPATIENT)
Dept: FAMILY MEDICINE CLINIC | Facility: CLINIC | Age: 84
End: 2024-04-30
Payer: MEDICARE

## 2024-04-30 ENCOUNTER — TELEPHONE (OUTPATIENT)
Age: 84
End: 2024-04-30

## 2024-04-30 VITALS
HEIGHT: 69 IN | HEART RATE: 78 BPM | BODY MASS INDEX: 26.36 KG/M2 | WEIGHT: 178 LBS | DIASTOLIC BLOOD PRESSURE: 80 MMHG | OXYGEN SATURATION: 98 % | SYSTOLIC BLOOD PRESSURE: 140 MMHG

## 2024-04-30 DIAGNOSIS — H35.3290 EXUDATIVE AGE-RELATED MACULAR DEGENERATION, UNSPECIFIED LATERALITY, UNSPECIFIED STAGE (HCC): ICD-10-CM

## 2024-04-30 DIAGNOSIS — E78.5 HYPERLIPIDEMIA, UNSPECIFIED HYPERLIPIDEMIA TYPE: ICD-10-CM
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
## 2024-04-30 DIAGNOSIS — C43.31 MALIGNANT MELANOMA OF NOSE (HCC): ICD-10-CM

## 2024-04-30 DIAGNOSIS — I10 PRIMARY HYPERTENSION: Primary | ICD-10-CM

## 2024-04-30 DIAGNOSIS — R97.20 ELEVATED PSA: ICD-10-CM

## 2024-04-30 DIAGNOSIS — N18.30 STAGE 3 CHRONIC KIDNEY DISEASE, UNSPECIFIED WHETHER STAGE 3A OR 3B CKD (HCC): ICD-10-CM

## 2024-04-30 DIAGNOSIS — C49.0 SARCOMA OF SCALP (HCC): ICD-10-CM

## 2024-04-30 DIAGNOSIS — E55.9 VITAMIN D DEFICIENCY: ICD-10-CM

## 2024-04-30 DIAGNOSIS — M87.38: ICD-10-CM

## 2024-04-30 DIAGNOSIS — L98.494: ICD-10-CM

## 2024-04-30 PROCEDURE — G2211 COMPLEX E/M VISIT ADD ON: HCPCS | Performed by: PHYSICIAN ASSISTANT

## 2024-04-30 PROCEDURE — 99214 OFFICE O/P EST MOD 30 MIN: CPT | Performed by: PHYSICIAN ASSISTANT

## 2024-04-30 NOTE — PROGRESS NOTES
Name: Alex Cheung      : 1940      MRN: [id number]  Encounter Provider: Memo Law PA-C  Encounter Date: 2024   Encounter department: [facility]    Assessment & Plan     Patient Instructions   Assessment/plan:  1.  Primary hypertension-presently stable with amlodipine 10 mg, lisinopril 20 mg, and metoprolol 50 mg daily.  2.  Hyperlipidemia-stable with diet control  3.  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  Stage III chronic kidney disease  6.  Melanoma of the nose-stable per dermatology  7.  Elevated PSA-level is at 5.1.  This has fluctuated from 3.9-5.3 since 2018.  Discussed further management with urology however patient is not interested in any aggressive measures and would be happy to continue monitoring at this point.  8.  Vitamin D deficiency-level of 19.  Would recommend 5000 units of vitamin D over-the-counter daily.    1. Primary hypertension  -     CBC and differential; Future; Expected date: 10/29/2024  -     Comprehensive metabolic panel; Future; Expected date: 10/29/2024  -     Vitamin D 25 hydroxy; Future; Expected date: 10/29/2024  -     TSH, 3rd generation; Future; Expected date: 10/29/2024  -     Lipid Panel with Direct LDL reflex; Future; Expected date: 10/29/2024    2. Hyperlipidemia, unspecified hyperlipidemia type  -     CBC and differential; Future; Expected date: 10/29/2024  -     Comprehensive metabolic panel; Future; Expected date: 10/29/2024  -     Vitamin D 25 hydroxy; Future; Expected date: 10/29/2024  -     TSH, 3rd generation; Future; Expected date: 10/29/2024  -     Lipid Panel with Direct LDL reflex; Future; Expected date: 10/29/2024    3. 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HCC)  -     CBC and differential; Future; Expected date: 10/29/2024  -     Comprehensive metabolic panel; Future; Expected date: 10/29/2024  -     Vitamin D 25 hydroxy; Future;  Expected date: 10/29/2024  -     TSH, 3rd generation; Future; Expected date: 10/29/2024  -     Lipid Panel with Direct LDL reflex; Future; Expected date: 10/29/2024    4. Radiation necrosis of skull  (HCC)  -     CBC and differential; Future; Expected date: 10/29/2024  -     Comprehensive metabolic panel; Future; Expected date: 10/29/2024  -     Vitamin D 25 hydroxy; Future; Expected date: 10/29/2024  -     TSH, 3rd generation; Future; Expected date: 10/29/2024  -     Lipid Panel with Direct LDL reflex; Future; Expected date: 10/29/2024    5. Stage 3 chronic kidney disease, unspecified whether stage 3a or 3b CKD (HCC)  -     CBC and differential; Future; Expected date: 10/29/2024  -     Comprehensive metabolic panel; Future; Expected date: 10/29/2024  -     Vitamin D 25 hydroxy; Future; Expected date: 10/29/2024  -     TSH, 3rd generation; Future; Expected date: 10/29/2024  -     Lipid Panel with Direct LDL reflex; Future; Expected date: 10/29/2024    6. Malignant melanoma of nose (HCC)    7. Elevated PSA  -     PSA Total, Diagnostic; Future; Expected date: 10/30/2024    8. Vitamin D deficiency  -     CBC and differential; Future; Expected date: 10/29/2024  -     Comprehensive metabolic panel; Future; Expected date: 10/29/2024  -     Vitamin D 25 hydroxy; Future; Expected date: 10/29/2024  -     TSH, 3rd generation; Future; Expected date: 10/29/2024  -     Lipid Panel with Direct LDL reflex; Future; Expected date: 10/29/2024    9. Exudative age-related macular degeneration, unspecified laterality, unspecified stage (HCC)    10. Scalp ulceration, with necrosis of bone (HCC)        Depression Screening and Follow-up Plan: Patient was screened for depression during today's encounter. They screened negative with a PHQ-2 score of 0.        Subjective      HPI: This is an 83-year-old gentleman that presents to the office for follow-up of chronic health conditions including sarcoma of the scalp status post resection and  grafting.  He also has history of hypertension which has been stable with amlodipine, lisinopril, and metoprolol.  Patient has been feeling well and he does continue to follow with dermatology regularly for multiple skin cancers of the face including melanoma and basal cell cancers.  He has also had an elevated PSA over the past 6 years.  He is not interested in further aggressive management and would be happy to continue monitoring this.      Review of Systems   Constitutional:  Negative for chills, fatigue and fever.   HENT:  Negative for congestion, ear pain and sinus pressure.    Eyes:  Negative for visual disturbance.   Respiratory:  Negative for cough, chest tightness and shortness of breath.    Cardiovascular:  Negative for chest pain and palpitations.   Gastrointestinal:  Negative for diarrhea, nausea and vomiting.   Endocrine: Negative for polyuria.   Genitourinary:  Negative for dysuria and frequency.   Musculoskeletal:  Negative for arthralgias and myalgias.   Skin:  Negative for pallor and rash.   Neurological:  Negative for dizziness, weakness, light-headedness, numbness and headaches.   Psychiatric/Behavioral:  Negative for agitation, behavioral problems and sleep disturbance.    All other systems reviewed and are negative.      Current Outpatient Medications on File Prior to Visit   Medication Sig   • amLODIPine (NORVASC) 10 mg tablet TAKE 1 TABLET BY MOUTH  DAILY   • Fluocinolone Acetonide Scalp 0.01 % OIL Apply a thin layer topically daily at night one hour before bedtime.   • fluorouracil (EFUDEX) 5 % cream Apply topically 2 (two) times a day Right cheek   • imiquimod (ALDARA) 5 % cream Apply topically once a day Monday thru Friday for 6 weeks (Patient taking differently: if needed)   • lisinopril (ZESTRIL) 20 mg tablet TAKE 1 TABLET BY MOUTH DAILY   • metoprolol succinate (TOPROL-XL) 50 mg 24 hr tablet TAKE 1 TABLET BY MOUTH DAILY   • mometasone (ELOCON) 0.1 % cream APPLY TO EARS ONCE TO TWICE  "DAILY AS NEEDED FOR SCALING   • Multiple Vitamins-Minerals (PRESERVISION AREDS 2) CAPS Take 1 capsule by mouth 2 (two) times a day   • mupirocin (BACTROBAN) 2 % ointment Apply topically two to three times a day to sore areas (Patient taking differently: Apply topically if needed Apply topically two to three times a day to sore areas)   • Niacin (VITAMIN B-3 PO) Take by mouth   • Niacinamide-[id number]-[id number]-[id number] (NICOTINAMIDE PO) Take 500 mg by mouth 2 (two) times a day   • bacitracin ointment Apply topically daily for 7 days Apply to bolster site daily (Patient taking differently: Apply topically as needed Apply to bolster site daily)   • Pyridoxine HCl (VITAMIN B-6 PO) Take by mouth (Patient not taking: Reported on 3/4/2024)   • traMADol (Ultram) 50 mg tablet Take 1 tablet (50 mg total) by mouth every 6 (six) hours as needed for moderate pain for up to 15 doses (Patient not taking: Reported on 10/27/2023)   • [DISCONTINUED] alclomethasone (ACLOVATE) 0.05 % cream Apply topically 2 (two) times a day as needed        Objective     /80 (BP Location: Right arm, Patient Position: Sitting, Cuff Size: Standard)   Pulse 78   Ht 5' 9\" (1.753 m)   Wt 80.7 kg (178 lb)   SpO2 98%   BMI 26.29 kg/m²     Physical Exam  Vitals and nursing note reviewed.   Constitutional:       General: He is not in acute distress.     Appearance: He is well-developed.   HENT:      Head: Normocephalic and atraumatic.      Right Ear: External ear normal.      Left Ear: External ear normal.      Nose: Nose normal.      Mouth/Throat:      Pharynx: No oropharyngeal exudate.   Eyes:      Conjunctiva/sclera: Conjunctivae normal.      Pupils: Pupils are equal, round, and reactive to light.   Neck:      Thyroid: No thyromegaly.      Trachea: No tracheal deviation.   Cardiovascular:      Rate and Rhythm: Normal rate and regular rhythm.      Heart sounds: Normal heart sounds. No murmur heard.     No friction rub.   Pulmonary:      Effort: Pulmonary " effort is normal. No respiratory distress.      Breath sounds: Normal breath sounds. No wheezing or rales.   Abdominal:      General: Bowel sounds are normal. There is no distension.      Palpations: Abdomen is soft.      Tenderness: There is no abdominal tenderness. There is no guarding or rebound.   Musculoskeletal:         General: No tenderness. Normal range of motion.      Cervical back: Normal range of motion and neck supple.   Lymphadenopathy:      Cervical: No cervical adenopathy.   Skin:     General: Skin is warm and dry.      Findings: No erythema or rash.   Neurological:      Mental Status: He is alert and oriented to person, place, and time.      Cranial Nerves: No cranial nerve deficit.      Coordination: Coordination normal.   Psychiatric:         Behavior: Behavior normal.         Thought Content: Thought content normal.       Memo Law PA-C

## 2024-04-30 NOTE — TELEPHONE ENCOUNTER
Patient called stating when he was in to get his sutures removed on 4/4 from his mohs procedure he was prescribed efudex cream for a lesion on the right cheek he applied the efudex about 7 times and now that lesion bleeds off and on patient does have history of bcc scc and melanoma, he was wondering if he could come in to see Dr. Caraballo to make sure it does not need a biopsy. Please advise as there is nothing opened and based on skin cancer history can't book with an AP

## 2024-05-02 ENCOUNTER — OFFICE VISIT (OUTPATIENT)
Dept: SURGICAL ONCOLOGY | Facility: CLINIC | Age: 84
End: 2024-05-02
Payer: MEDICARE

## 2024-05-02 VITALS
WEIGHT: 178.2 LBS | SYSTOLIC BLOOD PRESSURE: 142 MMHG | TEMPERATURE: 97.6 F | OXYGEN SATURATION: 96 % | DIASTOLIC BLOOD PRESSURE: 80 MMHG | BODY MASS INDEX: 26.39 KG/M2 | HEART RATE: 70 BPM | HEIGHT: 69 IN

## 2024-05-02 DIAGNOSIS — C49.0 SARCOMA OF SCALP (HCC): ICD-10-CM

## 2024-05-02 DIAGNOSIS — C43.31 MALIGNANT MELANOMA OF NOSE (HCC): ICD-10-CM

## 2024-05-02 DIAGNOSIS — C43.30 MALIGNANT MELANOMA OF FACE EXCLUDING EYELID, NOSE, LIP, AND EAR (HCC): Primary | ICD-10-CM

## 2024-05-02 DIAGNOSIS — C44.319 BASAL CELL CARCINOMA OF SKIN OF OTHER PARTS OF FACE: ICD-10-CM

## 2024-05-02 PROCEDURE — 99213 OFFICE O/P EST LOW 20 MIN: CPT

## 2024-05-02 NOTE — PROGRESS NOTES
Surgical Oncology Follow Up       [address]  [facility] SURGICAL ONCOLOGY [place]  [address]  [facility] [address]    Alex Cheung  1940  [id number]  [address]  [facility] SURGICAL ONCOLOGY [place]  [address] [place] [address]    Chief Complaint   Patient presents with   • Follow-up       Assessment/Plan:  1. Malignant melanoma of face excluding eyelid, nose, lip, and ear (HCC)  - 6 mo f/u Dr. Booth     2. Malignant melanoma of nose (HCC)    3. Basal cell carcinoma of skin of other parts of face    4. 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HCC)       Discussion/Summary:  Patient is an 83-year-old male presenting today for 6-month follow-up for melanoma. He has a hx of sarcoma and basal cell carcinoma of the skin as well. In February 2022 he had a wide excision for melanoma on the nose and in July 2023 he had a wide excision of the right cheek for another melanoma. He is currently on observation. He had a basal cell removed from his left wrist in November 2023 and another basal cell removed from right lip in March of this year. He had an ultrasound of the head neck with lymph node mapping on 4/16/24 which showed no evidence of metastatic disease. Her had a PET scan ordered by Dr. Call that showed mild focal uptake along the skin surface of the anterior nose, which may be physiologic but for which direct physical examination is recommended given history of melanoma. On clinical exam he pointed out a basal cell on his right nose and another on his right cheek. Derm is going to resect the right nose lesion on [address]. He states he was using a cream for the lesion on the right cheek but he d/c because the lesion started to ooze. He is f/u with derm about this at his upcoming appt. We will see the patient back in 6 months or sooner should the need arise. She was instructed to call with any questions or concerns prior to this time. All questions were answered today.  "    History of Present Illness:     Oncology History   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HCC)   11/8/2017 Surgery    right vertex of scalp - s/p excision and full thickness graft for closure on 11/8/17 for atypical fibroxanthoma.       1/29/2018 Biopsy    biopsy from the anterior aspect of the graft showing atypical epithelioid fibro histiocytic tumor, with lesion extending to the deep tissue edge.  This is similar to prior biopsy done.  Differential includes pleomorphic sarcoma versus an extreme atypical example of atypical fibro histiocytoma.     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 See Note.     -- FNCLCC Histologic Grade 3  (total score: 7 of 8).       * Tumor differentiation score: 3 of 3.        * Mitotic count score: 3 of 3; > 19 mitoses/10 HPF (33 mitoses/10 HPF).       * Necrosis score: 1 of 2; present, < 50%.  - Tumor extends into deep reticular dermis, subcutis and fascia.   - Perineural invasion: Present; intraneural invasion identified (0.4 mm largest nerve diameter).   - Lymphovascular invasion: Focally suspicious.   - Bone invasion: Not identified.   - Central nervous system extension: Not identified.   - Margins: uninvolved by sarcoma but close.    -- Sarcoma  0.15 mm from nearest deep margin.   - Additional Pathologic Findings: Changes consistent with prior surgical site.     -- Focal ulceration with bacterial colonization, acute and chronic inflammation.     -- Best representative tumor block: A5.      5/29/2018 - 7/5/2018 Radiation    Plan ID Energy Fractions Dose per Fraction (cGy) Dose Correction (cGy) Total Dose Delivered (cGy) Elapsed Days   Vertex Scalp 9E 27 / 27 200 0 5,400 37          Basal cell carcinoma of skin of other parts of face   7/10/2023 Surgery    Skin, right cheek, \"basal cell " "carcinoma\", excision:  Scar. No residual basal cell carcinoma identified in the examined sections.        Malignant melanoma of nose (HCC)   12/6/2022 Biopsy    Skin, left ala, shave biopsy:     MELANOMA (thickness: 0.6 mm); extending to the tissue edges      Ulceration: not seen  Anatomic (Hal) level: III  Type: lentigo maligna melanoma  Mitoses: 0/mm2  Margin assessment: invasive melanoma extends to peripheral specimen margin and deep specimen margin focally  Pathologic stage: pT1a     2/7/2023 Surgery    Skin, nose, left nasal, wide excision:  Residual melanoma, invasive, desmoplastic type, and scar, margins free.   Incidental basal cell carcinoma, superficial and nodular type, not extending to the margins.    pT2a     2/7/2023 -  Cancer Staged    Staging form: Melanoma of the Skin, AJCC 8th Edition  - Clinical stage from 2/7/2023: Stage IB (cT2a, cN0, cM0) - Signed by Sona Call MD on 11/27/2023       Malignant melanoma of face excluding eyelid, nose, lip, and ear ([facility])   5/16/2023 Initial Diagnosis    Malignant melanoma of face excluding eyelid, nose, lip, and ear ([facility])  Right mid cheek    A. Skin, right mid cheek, shave biopsy:     Combined ulcerated MELANOMA (thickness: at least 1.5 mm) and BASAL CELL CARCINOMA; transected (see note).     Note: There are foci of basal cell carcinoma and melanoma in which the components are separate from each other (as can be seen in a collision tumor), but there are also multiple foci in which the melanoma and basal cell carcinoma are closely intermingled (\"basomelanocytic tumor\" or \"combined melanoma and basal cell carcinoma of the intermingled type\"), which is a rare occurrence.1 SOX10, MART-1, HMB45, p40, and Tarun-EP4 immunostains were reviewed and support the diagnosis. Please see synoptic report for additional details. The results were emailed to Dr. Matthews on 5/19/2023.     Synoptic report for melanoma of the skin  Thickness: at least 1.5 mm  Ulceration: " "present  Anatomic (Hal) level: at least IV  Type: superficial spreading melanoma  Mitoses: 1/mm2  Microsatellites: cannot be determined  Lymphovascular invasion: not seen  Neurotropism: not seen  Tumor regression: not seen  Tumor-infiltrating lymphocytes (TIL): present, non-brisk  Margin assessment: invasive melanoma extends to deep specimen margin; melanoma in situ extends to peripheral specimen margin  Pathologic stage: at least pT2b  Associated nevus: not seen         5/16/2023 -  Cancer Staged    Staging form: Melanoma of the Skin, AJCC 8th Edition  - Clinical stage from 5/16/2023: Stage IIA (cT2b, cN0, cM0) - Signed by Sona Call MD on 5/26/2023       7/10/2023 Surgery    Skin, right cheek \"melanoma\", wide excision:  Residual combined melanoma (Breslow thickness: 2.3 mm) and basal cell carcinoma (malignant basomelanocytic tumor); margins free.   pT3a     7/10/2023 -  Cancer Staged    Staging form: Melanoma of the Skin, AJCC 8th Edition  - Pathologic stage from 7/10/2023: Stage IIB (pT3b, pN0, cM0) - Signed by Sona Call MD on 11/28/2023            -Interval History: Patient is an 83-year-old male presenting today for 6-month follow-up for melanoma. He is currently on observation. He had a basal cell removed from his left wrist in November 2023 and another basal cell removed from right lip in March of this year. He had an ultrasound of the head neck with lymph node mapping on 4/16/24 which showed no evidence of metastatic disease. Her had a PET scan ordered by Dr. Call that showed mild focal uptake along the skin surface of the anterior nose, which may be physiologic but for which direct physical examination is recommended given history of melanoma. He notes a lesion on his right cheek and nose. He denies persistent headaches, bone pain, back pain, shortness of breath, or abdominal pain.        Review of Systems:  Review of Systems   Constitutional:  Negative for activity change, appetite " change, fatigue and unexpected weight change.   Respiratory:  Negative for cough and shortness of breath.    Cardiovascular:  Negative for chest pain.   Gastrointestinal:  Negative for abdominal pain, diarrhea, nausea and vomiting.   Endocrine: Negative for heat intolerance.   Musculoskeletal:  Negative for arthralgias, back pain and myalgias.   Skin:  Negative for rash.   Neurological:  Negative for weakness and headaches.   Hematological:  Negative for adenopathy.       Patient Active Problem List   Diagnosis   • Actinic keratosis   • Stage 3 chronic kidney disease (HCC)   • Gout   • Hyperlipidemia   • Hypertension   • Irritable bowel syndrome   • Macular degeneration   • Urinary incontinence, post-void dribbling   • 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HCC)   • Basal cell carcinoma of skin of other parts of face   • Pulmonary nodules   • Incisional hernia, without obstruction or gangrene   • Status post repair of ventral hernia   • Dizziness   • Urinary frequency   • Lumbar back pain with radiculopathy affecting lower extremity   • Scalp ulceration, with necrosis of bone (HCC)   • Radiation necrosis of skull  (HCC)   • Malignant melanoma of nose (HCC)   • Encounter for geriatric assessment   • Malignant melanoma of face excluding eyelid, nose, lip, and ear (HCC)   • Anxiety   • Reactive airway disease   • Exudative age-related macular degeneration, unspecified laterality, unspecified stage (HCC)     Past Medical History:   Diagnosis Date   • Anxiety    • Arthritis     back   • Back pain    • Balance problem    • Basal cell carcinoma (BCC)     in past   • Basal cell carcinoma (BCC) 10/04/2022    Right [place]   • Basal cell carcinoma (BCC) 10/03/2023    right lip, mohs   • Basal cell carcinoma (BCC) 03/05/2024    right tip of nose   • BCC (basal cell carcinoma of skin) 08/17/2021    Right tip of nose   • BCC (basal cell carcinoma of skin) 08/08/2022    Left lip   • BCC (basal cell carcinoma of skin) 08/08/2022    Right cheek   •  Cancer (HCC)    • Gout    • Hard of hearing    • Hearing aid worn     ziyad   • Hearing aid worn    • Herniated nucleus pulposus, L4-5    • [place] (hard of hearing)    • Hypertension    • Incisional hernia    • Incontinence    • Macular degeneration    • Melanoma (HCC)     left cheek- history   • Melanoma (HCC) 08/08/2022    Right neck   • Nocturia    • Pulmonary nodules    • Reactive airway disease    • 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   • Skin cancer 07/01/2020    AFX- scalp   • Squamous cell carcinoma     in past   • Squamous cell skin cancer      Past Surgical History:   Procedure Laterality Date   • BASAL CELL CARCINOMA EXCISION Right 07/10/2023    Procedure: EXCISION BASAL CELL CARCINOMA  RIGHT CHEEK;  Surgeon: Fortunato Booth MD;  Location: [facility];  Service: Surgical Oncology   • CATARACT EXTRACTION Bilateral    • COLONOSCOPY     • FLAP LOCAL HEAD / NECK Right 07/10/2023    Procedure: RIGHT CHEEK RECON W/ LOCAL FLAP & SKIN GRAFT;  Surgeon: Leola Chatman MD;  Location: [facility];  Service: Plastics   • HERNIA REPAIR     • HYDROCELE EXCISION / REPAIR     • MASTOID SURGERY Left    • MOHS SURGERY     • MOHS SURGERY  09/29/2021    Right tip of nose-Dr. Caraballo   • MOHS SURGERY  10/06/2022    Left lip-Dr. Caraballo   • MOHS SURGERY  05/16/2023    Right [place] - Dr. Matthews   • MOHS SURGERY Right 03/27/2024    BCC right lip, Dr Caraballo   • OTHER SURGICAL HISTORY      sarcoma scalp with skin graft   • UT EXCISION MALIGNANT LESION F/E/E/N/L 0.5 CM/< Left 02/07/2023    Procedure: WIDE EXCISION OF LEFT NASAL MELANOMA;  Surgeon: Fortunato Booth MD;  [facility]: [facility];  Service: Surgical Oncology   • UT REPAIR FIRST ABDOMINAL WALL HERNIA N/A 04/22/2021    Procedure: REPAIR HERNIA INCISIONAL OPEN WITH MESH;  Surgeon: Darryl Landry MD;  [facility]: [facility];  Service: General   • SCALP EXCISION N/A 03/28/2018    Procedure: SCALP SARCOMA EXCISION WITH FULL THICKNESS SKIN GRAFT;  Surgeon: Jani Pagan  MD;  Location: [facility];  Service: Plastics   • SKIN BIOPSY     • SKIN CANCER EXCISION     • SKIN LESION EXCISION Right 07/10/2023    Procedure: WIDE EXCISION MELANOMA SCAR, RIGHT CHEEK;  Surgeon: Fortunato Booth MD;  Location: [facility];  Service: Surgical Oncology   • SPLIT THICKNESS SKIN GRAFT Left 2023    Procedure: APPLICATION OF SKIN SUBSTITUTE GRAFT TO NOSE;  Surgeon: Leola Chatman MD;  Location:  MAIN OR;  Service: Plastics   • TONSILLECTOMY AND ADENOIDECTOMY       Family History   Problem Relation Age of Onset   • Colon cancer Father    • Heart failure Father    • Heart disease Mother    • Stroke Mother      Social History     Socioeconomic History   • Marital status: /Civil Union     Spouse name: Not on file   • Number of children: Not on file   • Years of education: Not on file   • Highest education level: Not on file   Occupational History   • Not on file   Tobacco Use   • Smoking status: Former     Current packs/day: 0.00     Average packs/day: 0.3 packs/day for 15.0 years (3.8 ttl pk-yrs)     Types: Cigarettes     Start date: 3/13/1973     Quit date: 3/13/1988     Years since quittin.1     Passive exposure: Past   • Smokeless tobacco: Never   Vaping Use   • Vaping status: Never Used   Substance and Sexual Activity   • Alcohol use: Not Currently     Comment: beer once in awhile   • Drug use: No   • Sexual activity: Yes   Other Topics Concern   • Not on file   Social History Narrative   • Not on file     Social Determinants of Health     Financial Resource Strain: Medium Risk (2023)    Overall Financial Resource Strain (CARDIA)    • Difficulty of Paying Living Expenses: Somewhat hard   Food Insecurity: Not on file   Transportation Needs: No Transportation Needs (2023)    PRAPARE - Transportation    • Lack of Transportation (Medical): No    • Lack of Transportation (Non-Medical): No   Physical Activity: Not on file   Stress: Not on file   Social Connections: Not on  file   Intimate Partner Violence: Not on file   Housing Stability: Not on file       Current Outpatient Medications:   •  amLODIPine (NORVASC) 10 mg tablet, TAKE 1 TABLET BY MOUTH  DAILY, Disp: 90 tablet, Rfl: 3  •  Fluocinolone Acetonide Scalp 0.01 % OIL, Apply a thin layer topically daily at night one hour before bedtime., Disp: 118.28 mL, Rfl: 5  •  fluorouracil (EFUDEX) 5 % cream, Apply topically 2 (two) times a day Right cheek, Disp: 40 g, Rfl: 0  •  imiquimod (ALDARA) 5 % cream, Apply topically once a day Monday thru Friday for 6 weeks (Patient taking differently: if needed), Disp: 24 each, Rfl: 1  •  lisinopril (ZESTRIL) 20 mg tablet, TAKE 1 TABLET BY MOUTH DAILY, Disp: 90 tablet, Rfl: 1  •  metoprolol succinate (TOPROL-XL) 50 mg 24 hr tablet, TAKE 1 TABLET BY MOUTH DAILY, Disp: 90 tablet, Rfl: 1  •  mometasone (ELOCON) 0.1 % cream, APPLY TO EARS ONCE TO TWICE DAILY AS NEEDED FOR SCALING, Disp: , Rfl:   •  Multiple Vitamins-Minerals (PRESERVISION AREDS 2) CAPS, Take 1 capsule by mouth 2 (two) times a day, Disp: , Rfl:   •  mupirocin (BACTROBAN) 2 % ointment, Apply topically two to three times a day to sore areas (Patient taking differently: Apply topically if needed Apply topically two to three times a day to sore areas), Disp: 22 g, Rfl: 3  •  Niacin (VITAMIN B-3 PO), Take by mouth, Disp: , Rfl:   •  Niacinamide-Zn-Cu-Vysqsi-Tm-Ff (NICOTINAMIDE PO), Take 500 mg by mouth 2 (two) times a day, Disp: , Rfl:   •  bacitracin ointment, Apply topically daily for 7 days Apply to bolster site daily (Patient taking differently: Apply topically as needed Apply to bolster site daily), Disp: 30 g, Rfl: 0  •  Pyridoxine HCl (VITAMIN B-6 PO), Take by mouth (Patient not taking: Reported on 3/4/2024), Disp: , Rfl:   •  traMADol (Ultram) 50 mg tablet, Take 1 tablet (50 mg total) by mouth every 6 (six) hours as needed for moderate pain for up to 15 doses (Patient not taking: Reported on 10/27/2023), Disp: 15 tablet, Rfl:  0  Allergies   Allergen Reactions   • Erythromycin Other (See Comments)     Thrush      Vitals:    05/02/24 0856   BP: 142/80   Pulse: 70   Temp: 97.6 °F (36.4 °C)   SpO2: 96%       Physical Exam  Vitals reviewed.   Constitutional:       General: He is not in acute distress.     Appearance: Normal appearance. He is well-developed.   HENT:      Head: Normocephalic and atraumatic.     Cardiovascular:      Rate and Rhythm: Normal rate and regular rhythm.      Heart sounds: Normal heart sounds.   Pulmonary:      Effort: Pulmonary effort is normal.      Breath sounds: Normal breath sounds.   Abdominal:      General: There is no distension.      Palpations: Abdomen is soft. There is no mass.      Tenderness: There is no abdominal tenderness.   Musculoskeletal:         General: Normal range of motion.      Cervical back: Normal range of motion.   Lymphadenopathy:      Upper Body:      Right upper body: No supraclavicular adenopathy.      Left upper body: No supraclavicular adenopathy.   Skin:     General: Skin is warm and dry.      Findings: Lesion present. No rash.   Neurological:      Mental Status: He is alert and oriented to person, place, and time.           Results:    Imaging  US head neck lymph node mapping    Result Date: 4/22/2024  Narrative: NECK ULTRASOUND INDICATION: C43.31: Malignant melanoma of nose. COMPARISON: None. FINDINGS: Ultrasound of the cervical lymph node chains was performed with a high frequency linear transducer. Lymph nodes maintain normal morphologic contour, echogenicity and short axis dimensions of less than 0.7 cm.     Impression: No evidence of metastatic disease. Workstation performed: [id number]       I reviewed the above imaging data.      Advance Care Planning/Advance Directives:  Discussed disease status, cancer treatment plans and/or cancer treatment goals with the patient.

## 2024-06-05 ENCOUNTER — OFFICE VISIT (OUTPATIENT)
Dept: HEMATOLOGY ONCOLOGY | Facility: CLINIC | Age: 84
End: 2024-06-05
Payer: MEDICARE

## 2024-06-05 VITALS
TEMPERATURE: 98 F | HEIGHT: 69 IN | OXYGEN SATURATION: 97 % | HEART RATE: 78 BPM | WEIGHT: 176 LBS | RESPIRATION RATE: 18 BRPM | DIASTOLIC BLOOD PRESSURE: 72 MMHG | SYSTOLIC BLOOD PRESSURE: 136 MMHG | BODY MASS INDEX: 26.07 KG/M2

## 2024-06-05 DIAGNOSIS — Z85.820 ENCOUNTER FOR FOLLOW-UP SURVEILLANCE OF MELANOMA: Primary | ICD-10-CM

## 2024-06-05 DIAGNOSIS — C49.0 SARCOMA OF SCALP (HCC): ICD-10-CM

## 2024-06-05 DIAGNOSIS — C43.30 MALIGNANT MELANOMA OF FACE EXCLUDING EYELID, NOSE, LIP, AND EAR (HCC): ICD-10-CM

## 2024-06-05 DIAGNOSIS — C43.31 MALIGNANT MELANOMA OF NOSE (HCC): Primary | ICD-10-CM

## 2024-06-05 DIAGNOSIS — C44.319 BASAL CELL CARCINOMA OF SKIN OF OTHER PARTS OF FACE: ICD-10-CM

## 2024-06-05 DIAGNOSIS — C43.31 MALIGNANT MELANOMA OF NOSE (HCC): ICD-10-CM

## 2024-06-05 DIAGNOSIS — Z08 ENCOUNTER FOR FOLLOW-UP SURVEILLANCE OF MELANOMA: Primary | ICD-10-CM

## 2024-06-05 PROCEDURE — G2211 COMPLEX E/M VISIT ADD ON: HCPCS | Performed by: INTERNAL MEDICINE

## 2024-06-05 PROCEDURE — 99214 OFFICE O/P EST MOD 30 MIN: CPT | Performed by: INTERNAL MEDICINE

## 2024-06-05 NOTE — LETTER
Luz Maria 10, 2024     Fortunato Booth MD  [address] [facility]  [address] [facility] [address]    Patient: Alex Cheung   YOB: 1940   Date of Visit: 6/5/2024       Dear Dr. Booth:    Thank you for referring Alex Cheung to me for evaluation. Below are my notes for this consultation.    If you have questions, please do not hesitate to call me. I look forward to following your patient along with you.         Sincerely,        Sona Call MD        CC: MD Memo Ortega PA-C Nicholas Michael Cardiges, MD Johnny Chung, MD John Greg Brady, DO Sona Call MD  6/10/2024  5:34 PM  Sign when Signing Visit  [facility] [place]  [address] [facility] [address]  [phone]  [phone]     Date of Visit: 6/5/2024  Name: Alex Cheung   YOB: 1940        Subjective    VISIT DIAGNOSIS:  Diagnoses and all orders for this visit:    Encounter for follow-up surveillance of melanoma    Malignant melanoma of nose (HCC)    Malignant melanoma of face excluding eyelid, nose, lip, and ear (HCC)    Basal cell carcinoma of skin of other parts of face    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HCC)        Oncology History   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HCC)   11/8/2017 Surgery    right vertex of scalp - s/p excision and full thickness graft for closure on 11/8/17 for atypical fibroxanthoma.       1/29/2018 Biopsy    biopsy from the anterior aspect of the graft showing atypical epithelioid fibro histiocytic tumor, with lesion extending to the deep tissue edge.  This is similar to prior biopsy done.  Differential includes pleomorphic sarcoma versus an extreme atypical example of atypical fibro histiocytoma.     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 See Note.      "-- FNCLCC Histologic Grade 3  (total score: 7 of 8).       * Tumor differentiation score: 3 of 3.        * Mitotic count score: 3 of 3; > 19 mitoses/10 HPF (33 mitoses/10 HPF).       * Necrosis score: 1 of 2; present, < 50%.  - Tumor extends into deep reticular dermis, subcutis and fascia.   - Perineural invasion: Present; intraneural invasion identified (0.4 mm largest nerve diameter).   - Lymphovascular invasion: Focally suspicious.   - Bone invasion: Not identified.   - Central nervous system extension: Not identified.   - Margins: uninvolved by sarcoma but close.    -- Sarcoma  0.15 mm from nearest deep margin.   - Additional Pathologic Findings: Changes consistent with prior surgical site.     -- Focal ulceration with bacterial colonization, acute and chronic inflammation.     -- Best representative tumor block: A5.      5/29/2018 - 7/5/2018 Radiation    Plan ID Energy Fractions Dose per Fraction (cGy) Dose Correction (cGy) Total Dose Delivered (cGy) Elapsed Days   Vertex Scalp 9E 27 / 27 200 0 5,400 37            Basal cell carcinoma of skin of other parts of face   7/10/2023 Surgery    Skin, right cheek, \"basal cell carcinoma\", excision:  Scar. No residual basal cell carcinoma identified in the examined sections.        Malignant melanoma of nose (HCC)   12/6/2022 Biopsy    Skin, left ala, shave biopsy:     MELANOMA (thickness: 0.6 mm); extending to the tissue edges      Ulceration: not seen  Anatomic (Hal) level: III  Type: lentigo maligna melanoma  Mitoses: 0/mm2  Margin assessment: invasive melanoma extends to peripheral specimen margin and deep specimen margin focally  Pathologic stage: pT1a     2/7/2023 Surgery    Skin, nose, left nasal, wide excision:  Residual melanoma, invasive, desmoplastic type, and scar, margins free.   Incidental basal cell carcinoma, superficial and nodular type, not extending to the margins.    pT2a     2/7/2023 -  Cancer Staged    Staging form: Melanoma of the Skin, AJCC 8th " "Edition  - Clinical stage from 2/7/2023: Stage IB (cT2a, cN0, cM0) - Signed by Sona Call MD on 11/27/2023       Malignant melanoma of face excluding eyelid, nose, lip, and ear (HCC)   5/16/2023 Initial Diagnosis    Malignant melanoma of face excluding eyelid, nose, lip, and ear (HCC)  Right mid cheek    A. Skin, right mid cheek, shave biopsy:     Combined ulcerated MELANOMA (thickness: at least 1.5 mm) and BASAL CELL CARCINOMA; transected (see note).     Note: There are foci of basal cell carcinoma and melanoma in which the components are separate from each other (as can be seen in a collision tumor), but there are also multiple foci in which the melanoma and basal cell carcinoma are closely intermingled (\"basomelanocytic tumor\" or \"combined melanoma and basal cell carcinoma of the intermingled type\"), which is a rare occurrence.1 SOX10, MART-1, HMB45, p40, and Tarun-EP4 immunostains were reviewed and support the diagnosis. Please see synoptic report for additional details. The results were emailed to Dr. Matthews on 5/19/2023.     Synoptic report for melanoma of the skin  Thickness: at least 1.5 mm  Ulceration: present  Anatomic (Hal) level: at least IV  Type: superficial spreading melanoma  Mitoses: 1/mm2  Microsatellites: cannot be determined  Lymphovascular invasion: not seen  Neurotropism: not seen  Tumor regression: not seen  Tumor-infiltrating lymphocytes (TIL): present, non-brisk  Margin assessment: invasive melanoma extends to deep specimen margin; melanoma in situ extends to peripheral specimen margin  Pathologic stage: at least pT2b  Associated nevus: not seen         5/16/2023 -  Cancer Staged    Staging form: Melanoma of the Skin, AJCC 8th Edition  - Clinical stage from 5/16/2023: Stage IIA (cT2b, cN0, cM0) - Signed by Sona Call MD on 5/26/2023       7/10/2023 Surgery    Skin, right cheek \"melanoma\", wide excision:  Residual combined melanoma (Breslow thickness: 2.3 mm) and basal cell " carcinoma (malignant basomelanocytic tumor); margins free.   pT3a     7/10/2023 -  Cancer Staged    Staging form: Melanoma of the Skin, AJCC 8th Edition  - Pathologic stage from 7/10/2023: Stage IIB (pT3b, pN0, cM0) - Signed by Sona Call MD on 11/28/2023          Cancer Staging   Malignant melanoma of face excluding eyelid, nose, lip, and ear (HCC)  Staging form: Melanoma of the Skin, AJCC 8th Edition  - Clinical stage from 5/16/2023: Stage IIA (cT2b, cN0, cM0) - Signed by Sona Call MD on 5/26/2023  - Pathologic stage from 7/10/2023: Stage IIB (pT3b, pN0, cM0) - Signed by Sona Call MD on 11/28/2023    Malignant melanoma of nose (HCC)  Staging form: Melanoma of the Skin, AJCC 8th Edition  - Clinical stage from 2/7/2023: Stage IB (cT2a, cN0, cM0) - Signed by Sona Call MD on 11/27/2023          HISTORY OF PRESENT ILLNESS: Alex Cheung is a 84 y.o. male  who has melanoma, with multiple primaries including a stage IIb melanoma of the face here for continued monitoring, follow-up and surveillance.    He had another basal cell removed by dermatology from his right upper lip.  Site has healed nicely.  No additional new, changing, concerning skin lesions.  No lymphadenopathy.  Follows with dermatology very closely.  Has seen surgical oncology as well.      He did have ultrasound of the head and neck looking at lymph nodes on 4/16/2024.  No concerning features for suspicious looking lymph nodes.  All within normal limits with largest being 0.7 cm.        REVIEW OF SYSTEMS:  Review of Systems   Constitutional:  Negative for appetite change, fatigue, fever and unexpected weight change.   HENT:   Negative for lump/mass, trouble swallowing and voice change.    Eyes:  Negative for icterus.   Respiratory:  Negative for cough, shortness of breath and wheezing.    Cardiovascular:  Negative for leg swelling.   Gastrointestinal:  Negative for abdominal pain, constipation, diarrhea, nausea and vomiting.    Genitourinary:  Negative for difficulty urinating and hematuria.    Musculoskeletal:  Negative for arthralgias, gait problem and myalgias.   Skin:  Negative for itching and rash.        No new, changing, or concerning lesions.   Neurological:  Negative for extremity weakness, gait problem, headaches, light-headedness and numbness.   Hematological:  Negative for adenopathy.        MEDICATIONS:    Current Outpatient Medications:   •  amLODIPine (NORVASC) 10 mg tablet, TAKE 1 TABLET BY MOUTH  DAILY, Disp: 90 tablet, Rfl: 3  •  bacitracin ointment, Apply topically daily for 7 days Apply to bolster site daily (Patient taking differently: Apply topically as needed Apply to bolster site daily), Disp: 30 g, Rfl: 0  •  Fluocinolone Acetonide Scalp 0.01 % OIL, Apply a thin layer topically daily at night one hour before bedtime., Disp: 118.28 mL, Rfl: 5  •  fluorouracil (EFUDEX) 5 % cream, Apply topically 2 (two) times a day Right cheek (Patient not taking: Reported on 6/6/2024), Disp: 40 g, Rfl: 0  •  imiquimod (ALDARA) 5 % cream, Apply topically once a day Monday thru Friday for 6 weeks (Patient taking differently: if needed), Disp: 24 each, Rfl: 1  •  lisinopril (ZESTRIL) 20 mg tablet, TAKE 1 TABLET BY MOUTH DAILY, Disp: 90 tablet, Rfl: 1  •  metoprolol succinate (TOPROL-XL) 50 mg 24 hr tablet, TAKE 1 TABLET BY MOUTH DAILY, Disp: 90 tablet, Rfl: 1  •  mometasone (ELOCON) 0.1 % cream, APPLY TO EARS ONCE TO TWICE DAILY AS NEEDED FOR SCALING, Disp: , Rfl:   •  Multiple Vitamins-Minerals (PRESERVISION AREDS 2) CAPS, Take 1 capsule by mouth 2 (two) times a day, Disp: , Rfl:   •  mupirocin (BACTROBAN) 2 % ointment, Apply topically two to three times a day to sore areas (Patient taking differently: Apply topically if needed Apply topically two to three times a day to sore areas), Disp: 22 g, Rfl: 3  •  Niacin (VITAMIN B-3 PO), Take by mouth, Disp: , Rfl:   •  Niacinamide-Zn-Cu-Zztgtj-Fh-Nt (NICOTINAMIDE PO), Take 500 mg by mouth  2 (two) times a day, Disp: , Rfl:   •  Pyridoxine HCl (VITAMIN B-6 PO), Take by mouth (Patient not taking: Reported on 3/4/2024), Disp: , Rfl:   •  traMADol (Ultram) 50 mg tablet, Take 1 tablet (50 mg total) by mouth every 6 (six) hours as needed for moderate pain for up to 15 doses (Patient not taking: Reported on 10/27/2023), Disp: 15 tablet, Rfl: 0     ALLERGIES:  Allergies   Allergen Reactions   • Erythromycin Other (See Comments)     Thrush         ACTIVE PROBLEMS:  Patient Active Problem List   Diagnosis   • Actinic keratosis   • Stage 3 chronic kidney disease (HCC)   • Gout   • Hyperlipidemia   • Hypertension   • Irritable bowel syndrome   • Macular degeneration   • Urinary incontinence, post-void dribbling   • 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HCC)   • Basal cell carcinoma of skin of other parts of face   • Pulmonary nodules   • Incisional hernia, without obstruction or gangrene   • Status post repair of ventral hernia   • Dizziness   • Urinary frequency   • Lumbar back pain with radiculopathy affecting lower extremity   • Scalp ulceration, with necrosis of bone (HCC)   • Radiation necrosis of skull  (HCC)   • Malignant melanoma of nose (HCC)   • Encounter for geriatric assessment   • Malignant melanoma of face excluding eyelid, nose, lip, and ear (HCC)   • Anxiety   • Reactive airway disease   • Exudative age-related macular degeneration, unspecified laterality, unspecified stage (HCC)          PAST MEDICAL HISTORY:   Past Medical History:   Diagnosis Date   • Anxiety    • Arthritis     back   • Back pain    • Balance problem    • Basal cell carcinoma (BCC)     in past   • Basal cell carcinoma (BCC) 10/04/2022    Right Temple   • Basal cell carcinoma (BCC) 10/03/2023    right lip, mohs   • Basal cell carcinoma (BCC) 03/05/2024    right tip of nose   • BCC (basal cell carcinoma of skin) 08/17/2021    Right tip of nose   • BCC (basal cell carcinoma of skin) 08/08/2022    Left lip   • BCC (basal cell carcinoma of skin)  08/08/2022    Right cheek   • Cancer (HCC)    • Gout    • Hard of hearing    • Hearing aid worn     ziyad   • Hearing aid worn    • Herniated nucleus pulposus, L4-5    • [place] (hard of hearing)    • Hypertension    • Incisional hernia    • Incontinence    • Macular degeneration    • Melanoma (HCC)     left cheek- history   • Melanoma (HCC) 08/08/2022    Right neck   • Nocturia    • Pulmonary nodules    • Reactive airway disease    • 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   • Skin cancer 07/01/2020    AFX- scalp   • Squamous cell carcinoma     in past   • Squamous cell skin cancer         PAST SURGICAL HISTORY:  Past Surgical History:   Procedure Laterality Date   • BASAL CELL CARCINOMA EXCISION Right 07/10/2023    Procedure: EXCISION BASAL CELL CARCINOMA  RIGHT CHEEK;  Surgeon: Fortunato Booth MD;  Location: [facility];  Service: Surgical Oncology   • CATARACT EXTRACTION Bilateral    • COLONOSCOPY     • FLAP LOCAL HEAD / NECK Right 07/10/2023    Procedure: RIGHT CHEEK RECON W/ LOCAL FLAP & SKIN GRAFT;  Surgeon: Leola Chatman MD;  Location: [facility];  Service: Plastics   • HERNIA REPAIR     • HYDROCELE EXCISION / REPAIR     • MASTOID SURGERY Left    • MOHS SURGERY     • MOHS SURGERY  09/29/2021    Right tip of nose-Dr. Caraballo   • MOHS SURGERY  10/06/2022    Left lip-Dr. Caraballo   • MOHS SURGERY  05/16/2023    Right Restorationist - Dr. Matthews   • MOHS SURGERY Right 03/27/2024    BCC right lip, Dr Caraballo   • OTHER SURGICAL HISTORY      sarcoma scalp with skin graft   • NV EXCISION MALIGNANT LESION F/E/E/N/L 0.5 CM/< Left 02/07/2023    Procedure: WIDE EXCISION OF LEFT NASAL MELANOMA;  Surgeon: Fortunato Booth MD;  [facility]: [facility];  Service: Surgical Oncology   • NV REPAIR FIRST ABDOMINAL WALL HERNIA N/A 04/22/2021    Procedure: REPAIR HERNIA INCISIONAL OPEN WITH MESH;  Surgeon: Darryl Landry MD;  [facility];  Service: General   • SCALP EXCISION N/A 03/28/2018    Procedure: SCALP SARCOMA  EXCISION WITH FULL THICKNESS SKIN GRAFT;  Surgeon: Jani Pagan MD;  [facility];  Service: Plastics   • SKIN BIOPSY     • SKIN CANCER EXCISION     • SKIN LESION EXCISION Right 07/10/2023    Procedure: WIDE EXCISION MELANOMA SCAR, RIGHT CHEEK;  Surgeon: Fortunato Booth MD;  [facility];  Service: Surgical Oncology   • SPLIT THICKNESS SKIN GRAFT Left 2023    Procedure: APPLICATION OF SKIN SUBSTITUTE GRAFT TO NOSE;  Surgeon: Leola Chatman MD;  Location:  MAIN OR;  Service: Plastics   • TONSILLECTOMY AND ADENOIDECTOMY          SOCIAL HISTORY:  Social History     Socioeconomic History   • Marital status: /Civil Union     Spouse name: Not on file   • Number of children: Not on file   • Years of education: Not on file   • Highest education level: Not on file   Occupational History   • Not on file   Tobacco Use   • Smoking status: Former     Current packs/day: 0.00     Average packs/day: 0.3 packs/day for 15.0 years (3.8 ttl pk-yrs)     Types: Cigarettes     Start date: 3/13/1973     Quit date: 3/13/1988     Years since quittin.2     Passive exposure: Past   • Smokeless tobacco: Never   Vaping Use   • Vaping status: Never Used   Substance and Sexual Activity   • Alcohol use: Not Currently     Comment: beer once in awhile   • Drug use: No   • Sexual activity: Yes   Other Topics Concern   • Not on file   Social History Narrative   • Not on file     Social Determinants of Health     Financial Resource Strain: Medium Risk (2023)    Overall Financial Resource Strain (CARDIA)    • Difficulty of Paying Living Expenses: Somewhat hard   Food Insecurity: Not on file   Transportation Needs: No Transportation Needs (2023)    PRAPARE - Transportation    • Lack of Transportation (Medical): No    • Lack of Transportation (Non-Medical): No   Physical Activity: Not on file   Stress: Not on file   Social Connections: Not on file   Intimate Partner Violence: Not on file   Housing  "Stability: Not on file        FAMILY HISTORY:  Family History   Problem Relation Age of Onset   • Colon cancer Father    • Heart failure Father    • Heart disease Mother    • Stroke Mother            Objective    PHYSICAL EXAMINATION:   Blood pressure 136/72, pulse 78, temperature 98 °F (36.7 °C), temperature source Temporal, resp. rate 18, height 5' 9\" (1.753 m), weight 79.8 kg (176 lb), SpO2 97%.     Pain Score: 0-No pain     ECOG Performance Status      Flowsheet Row Most Recent Value   ECOG Performance Status 1 - Restricted in physically strenuous activity but ambulatory and able to carry out work of a light or sedentary nature, e.g., light house work, office work               Physical Exam  Constitutional:       General: He is not in acute distress.     Appearance: Normal appearance. He is not ill-appearing or toxic-appearing.   HENT:      Head: Normocephalic and atraumatic.      Right Ear: External ear normal.      Left Ear: External ear normal.      Nose: Nose normal.      Mouth/Throat:      Mouth: Mucous membranes are moist.      Pharynx: Oropharynx is clear.   Eyes:      General: No scleral icterus.        Right eye: No discharge.         Left eye: No discharge.      Conjunctiva/sclera: Conjunctivae normal.   Cardiovascular:      Rate and Rhythm: Normal rate and regular rhythm.      Pulses: Normal pulses.      Heart sounds: No murmur heard.     No friction rub. No gallop.   Pulmonary:      Effort: Pulmonary effort is normal. No respiratory distress.      Breath sounds: Normal breath sounds. No wheezing or rales.   Abdominal:      General: Bowel sounds are normal. There is no distension.      Palpations: There is no mass.      Tenderness: There is no abdominal tenderness. There is no rebound.   Musculoskeletal:         General: No swelling or tenderness.      Cervical back: Normal range of motion. No rigidity.      Right lower leg: No edema.      Left lower leg: No edema.   Lymphadenopathy:      Head:      " Right side of head: No submandibular, preauricular or posterior auricular adenopathy.      Left side of head: No submandibular, preauricular or posterior auricular adenopathy.      Cervical: No cervical adenopathy.      Right cervical: No superficial or posterior cervical adenopathy.     Left cervical: No superficial or posterior cervical adenopathy.      Upper Body:      Right upper body: No supraclavicular or axillary adenopathy.      Left upper body: No supraclavicular or axillary adenopathy.   Skin:     General: Skin is warm.      Coloration: Skin is not jaundiced.      Findings: No lesion or rash.      Comments: Well healed surgical scar.  No evidence of recurrence at primary site.  Healed scars on his face from multiple skin cancer procedures/excisions.   Neurological:      General: No focal deficit present.      Mental Status: He is alert and oriented to person, place, and time.      Cranial Nerves: No cranial nerve deficit.      Motor: No weakness.      Gait: Gait normal.   Psychiatric:         Mood and Affect: Mood normal.         Behavior: Behavior normal.         Thought Content: Thought content normal.         Judgment: Judgment normal.         I reviewed lab data in the chart.    WBC   Date Value Ref Range Status   04/25/2024 6.23 4.31 - 10.16 Thousand/uL Final   10/20/2023 6.57 4.31 - 10.16 Thousand/uL Final   06/27/2023 6.69 4.31 - 10.16 Thousand/uL Final     Hemoglobin   Date Value Ref Range Status   04/25/2024 15.1 12.0 - 17.0 g/dL Final   10/20/2023 14.7 12.0 - 17.0 g/dL Final   06/27/2023 14.2 12.0 - 17.0 g/dL Final     Platelets   Date Value Ref Range Status   04/25/2024 190 149 - 390 Thousands/uL Final   10/20/2023 217 149 - 390 Thousands/uL Final   06/27/2023 204 149 - 390 Thousands/uL Final     MCV   Date Value Ref Range Status   04/25/2024 91 82 - 98 fL Final   10/20/2023 93 82 - 98 fL Final   06/27/2023 91 82 - 98 fL Final      Sodium   Date Value Ref Range Status   05/05/2015 142 136 - 145  mmol/L Final   10/28/2014 138 136 - 145 mmol/L Final   04/11/2014 141 136 - 145 mmol/L Final     Potassium   Date Value Ref Range Status   04/25/2024 4.6 3.5 - 5.3 mmol/L Final   10/20/2023 4.3 3.5 - 5.3 mmol/L Final   06/27/2023 3.9 3.5 - 5.3 mmol/L Final   07/04/2020 4.1 3.5 - 5.2 mmol/L Final   07/03/2020 3.8 3.5 - 5.2 mmol/L Final   07/02/2020 3.8 3.5 - 5.2 mmol/L Final     Chloride   Date Value Ref Range Status   04/25/2024 104 96 - 108 mmol/L Final   10/20/2023 105 96 - 108 mmol/L Final   06/27/2023 109 (H) 96 - 108 mmol/L Final   07/04/2020 111 101 - 111 mmol/L Final   07/03/2020 106 101 - 111 mmol/L Final   07/02/2020 110 101 - 111 mmol/L Final     Carbon Dioxide   Date Value Ref Range Status   07/04/2020 27 22 - 32 mmol/L Final   07/03/2020 26 22 - 32 mmol/L Final   07/02/2020 26 22 - 32 mmol/L Final     CO2   Date Value Ref Range Status   04/25/2024 27 21 - 32 mmol/L Final   10/20/2023 28 21 - 32 mmol/L Final   06/27/2023 28 21 - 32 mmol/L Final     BUN   Date Value Ref Range Status   04/25/2024 22 5 - 25 mg/dL Final   10/20/2023 21 5 - 25 mg/dL Final   06/27/2023 22 5 - 25 mg/dL Final   07/04/2020 25 (H) 8 - 20 mg/dL Final   07/03/2020 25 (H) 8 - 20 mg/dL Final   07/02/2020 17 8 - 20 mg/dL Final     Creatinine   Date Value Ref Range Status   04/25/2024 1.35 (H) 0.60 - 1.30 mg/dL Final     Comment:     Standardized to IDMS reference method   10/20/2023 1.22 0.60 - 1.30 mg/dL Final     Comment:     Standardized to IDMS reference method   06/27/2023 1.40 (H) 0.60 - 1.30 mg/dL Final     Comment:     Standardized to IDMS reference method   07/04/2020 1.24 0.80 - 1.30 mg/dL Final   07/03/2020 1.56 (H) 0.80 - 1.30 mg/dL Final   07/02/2020 1.18 0.80 - 1.30 mg/dL Final     Glucose   Date Value Ref Range Status   05/27/2023 133 65 - 140 mg/dL Final     Comment:     If the patient is fasting, the ADA then defines impaired fasting glucose as > 100 mg/dL and diabetes as > or equal to 123 mg/dL.   06/10/2021 90 65 -  140 mg/dL Final     Comment:     If the patient is fasting, the ADA then defines impaired fasting glucose as > 100 mg/dL and diabetes as > or equal to 123 mg/dL.  Specimen collection should occur prior to Sulfasalazine administration due to the potential for falsely depressed results. Specimen collection should occur prior to Sulfapyridine administration due to the potential for falsely elevated results.   06/09/2021 88 65 - 140 mg/dL Final     Comment:     If the patient is fasting, the ADA then defines impaired fasting glucose as > 100 mg/dL and diabetes as > or equal to 123 mg/dL.  Specimen collection should occur prior to Sulfasalazine administration due to the potential for falsely depressed results. Specimen collection should occur prior to Sulfapyridine administration due to the potential for falsely elevated results.   07/04/2020 85 82 - 115 mg/dL Final     Comment:            American Diabetes Association Reference Ranges:        -------------------------------------------------         Normal Adult:             65-99 mg/dL         Impaired Fasting Glucose: 100-125 mg/dL         Diabetes:                 >126 mg/dL         Recommend repeat testing for confirmatory diagnosis of diabetes.   07/03/2020 105 82 - 115 mg/dL Final     Comment:            American Diabetes Association Reference Ranges:        -------------------------------------------------         Normal Adult:             65-99 mg/dL         Impaired Fasting Glucose: 100-125 mg/dL         Diabetes:                 >126 mg/dL         Recommend repeat testing for confirmatory diagnosis of diabetes.   07/02/2020 133 (H) 82 - 115 mg/dL Final     Comment:            American Diabetes Association Reference Ranges:        -------------------------------------------------         Normal Adult:             65-99 mg/dL         Impaired Fasting Glucose: 100-125 mg/dL         Diabetes:                 >126 mg/dL         Recommend repeat testing for  confirmatory diagnosis of diabetes.     eGFR, Non-   Date Value Ref Range Status   07/04/2020 56 (L) >60 mL/min/1.73m2 Final     Comment:            If patient is pregnant,has restricted blood flow         or age >75 reference NKDEP guidelines.          07/03/2020 43 (L) >60 mL/min/1.73m2 Final     Comment:            If patient is pregnant,has restricted blood flow         or age >75 reference NKDEP guidelines.          07/02/2020 59 (L) >60 mL/min/1.73m2 Final     Comment:            If patient is pregnant,has restricted blood flow         or age >75 reference NKDEP guidelines.            Calcium   Date Value Ref Range Status   04/25/2024 10.2 8.4 - 10.2 mg/dL Final   10/20/2023 10.1 8.4 - 10.2 mg/dL Final   06/27/2023 9.4 8.3 - 10.1 mg/dL Final   07/04/2020 8.7 (L) 8.8 - 10.2 mg/dL Final   07/03/2020 8.1 (L) 8.8 - 10.2 mg/dL Final   07/02/2020 8.1 (L) 8.8 - 10.2 mg/dL Final     Total Protein   Date Value Ref Range Status   05/05/2015 7.6 6.4 - 8.2 g/dL Final   04/11/2014 6.8 6.4 - 8.2 g/dL Final     Albumin   Date Value Ref Range Status   04/25/2024 4.3 3.5 - 5.0 g/dL Final   10/20/2023 4.2 3.5 - 5.0 g/dL Final   06/27/2023 3.8 3.5 - 5.0 g/dL Final   05/05/2015 3.8 3.5 - 5.0 g/dL Final   04/11/2014 3.6 3.5 - 5.0 g/dL Final     Total Bilirubin   Date Value Ref Range Status   04/25/2024 0.87 0.20 - 1.00 mg/dL Final     Comment:     Use of this assay is not recommended for patients undergoing treatment with eltrombopag due to the potential for falsely elevated results.  N-acetyl-p-benzoquinone imine (metabolite of Acetaminophen) will generate erroneously low results in samples for patients that have taken an overdose of Acetaminophen.   10/20/2023 1.14 (H) 0.20 - 1.00 mg/dL Final     Comment:     Use of this assay is not recommended for patients undergoing treatment with eltrombopag due to the potential for falsely elevated results.  N-acetyl-p-benzoquinone imine (metabolite of Acetaminophen) will  "generate erroneously low results in samples for patients that have taken an overdose of Acetaminophen.   06/27/2023 0.90 0.20 - 1.00 mg/dL Final     Comment:     Use of this assay is not recommended for patients undergoing treatment with eltrombopag due to the potential for falsely elevated results.     Alkaline Phosphatase   Date Value Ref Range Status   04/25/2024 114 (H) 34 - 104 U/L Final   10/20/2023 104 34 - 104 U/L Final   06/27/2023 129 (H) 46 - 116 U/L Final   05/05/2015 119 (H) 46 - 116 U/L Final   04/11/2014 123 50 - 136 U/L Final     AST   Date Value Ref Range Status   04/25/2024 18 13 - 39 U/L Final   10/20/2023 19 13 - 39 U/L Final   06/27/2023 20 5 - 45 U/L Final     Comment:     Specimen collection should occur prior to Sulfasalazine administration due to the potential for falsely depressed results.    05/05/2015 27 0 - 45 U/L Final   04/11/2014 19 0 - 45 U/L Final     ALT   Date Value Ref Range Status   04/25/2024 21 7 - 52 U/L Final     Comment:     Specimen collection should occur prior to Sulfasalazine administration due to the potential for falsely depressed results.    10/20/2023 21 7 - 52 U/L Final     Comment:     Specimen collection should occur prior to Sulfasalazine administration due to the potential for falsely depressed results.    06/27/2023 37 12 - 78 U/L Final     Comment:     Specimen collection should occur prior to Sulfasalazine and/or Sulfapyridine administration due to the potential for falsely depressed results.    05/05/2015 53 16 - 63 U/L Final   04/11/2014 48 6 - 78 U/L Final      LD   Date Value Ref Range Status   04/25/2024 165 140 - 271 U/L Final     No results found for: \"TSH\"  No results found for: \"N1GGUMS\"   No results found for: \"FREET4\"      RECENT IMAGING:  No results found.          Assessment/Plan  Mr. Cheung is a 84-year-old gentleman with multiple melanoma primaries including stage IIb on his face here for continued monitoring, follow-up and surveillance.    1. " Encounter for follow-up surveillance of melanoma  2. Malignant melanoma of nose (HCC)  3. Malignant melanoma of face excluding eyelid, nose, lip, and ear (HCC)  No clinical evidence of melanoma recurrence or metastasis.  He was not able to get blood work prior to today.  He will get blood work within the next few weeks.  He is due for follow-up in 3 months with imaging at that time.  Orders placed and prescription divided for imaging to be done.  He knows to continue to monitor for any concerning skin lesions or lymphadenopathy.  He continues to follow with dermatology closely.  He knows to call with issues or concerns prior to his next visit.    4. Basal cell carcinoma of skin of other parts of face  Recent resection of a new basal cell on his right lip by dermatology.  Healing nicely.  Does have a concerning lesion on his right arm.  He does follow closely with dermatology.    5. 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HCC)  Stable.  No evidence of recurrence on physical exam.  We continue to monitor him closely for all his skin cancers and will continue to monitor his scalp as well.        Return in about 3 months (around 9/5/2024) for Office Visit, labs, scans.     Sona Call MD, PhD

## 2024-06-06 ENCOUNTER — OFFICE VISIT (OUTPATIENT)
Dept: DERMATOLOGY | Facility: CLINIC | Age: 84
End: 2024-06-06
Payer: MEDICARE

## 2024-06-06 VITALS — TEMPERATURE: 98.3 F | WEIGHT: 174.7 LBS | BODY MASS INDEX: 25.87 KG/M2 | HEIGHT: 69 IN

## 2024-06-06 DIAGNOSIS — D48.9 NEOPLASM OF UNCERTAIN BEHAVIOR: ICD-10-CM

## 2024-06-06 DIAGNOSIS — Z85.820 HISTORY OF MELANOMA: ICD-10-CM

## 2024-06-06 DIAGNOSIS — L57.0 KERATOSIS, ACTINIC: Primary | ICD-10-CM

## 2024-06-06 DIAGNOSIS — Z85.828 HISTORY OF BASAL CELL CARCINOMA: ICD-10-CM

## 2024-06-06 DIAGNOSIS — Z85.831 HISTORY OF SARCOMA: ICD-10-CM

## 2024-06-06 DIAGNOSIS — Z85.89 HISTORY OF SQUAMOUS CELL CARCINOMA: ICD-10-CM

## 2024-06-06 PROCEDURE — 88341 IMHCHEM/IMCYTCHM EA ADD ANTB: CPT | Performed by: STUDENT IN AN ORGANIZED HEALTH CARE EDUCATION/TRAINING PROGRAM

## 2024-06-06 PROCEDURE — 88342 IMHCHEM/IMCYTCHM 1ST ANTB: CPT | Performed by: STUDENT IN AN ORGANIZED HEALTH CARE EDUCATION/TRAINING PROGRAM

## 2024-06-06 PROCEDURE — 17003 DESTRUCT PREMALG LES 2-14: CPT

## 2024-06-06 PROCEDURE — 11104 PUNCH BX SKIN SINGLE LESION: CPT

## 2024-06-06 PROCEDURE — 88305 TISSUE EXAM BY PATHOLOGIST: CPT | Performed by: STUDENT IN AN ORGANIZED HEALTH CARE EDUCATION/TRAINING PROGRAM

## 2024-06-06 PROCEDURE — 17000 DESTRUCT PREMALG LESION: CPT

## 2024-06-06 PROCEDURE — 99214 OFFICE O/P EST MOD 30 MIN: CPT

## 2024-06-06 NOTE — PROGRESS NOTES
"[facility] Dermatology Clinic Note     Patient Name: Alex Cheung  Encounter Date: 6/6/2024     Have you been cared for by a [facility] Dermatologist in the last 3 years and, if so, which description applies to you?    Yes.  I have been here within the last 3 years, and my medical history has NOT changed since that time.  I am MALE/not capable of bearing children.    REVIEW OF SYSTEMS:  Have you recently had or currently have any of the following? No changes in my recent health.   PAST MEDICAL HISTORY:  Have you personally ever had or currently have any of the following?  If \"YES,\" then please provide more detail. No changes in my medical history.   HISTORY OF IMMUNOSUPPRESSION: Do you have a history of any of the following:  Systemic Immunosuppression such as Diabetes, Biologic or Immunotherapy, Chemotherapy, Organ Transplantation, Bone Marrow Transplantation?  No     Answering \"YES\" requires the addition of the dotphrase \"IMMUNOSUPPRESSED\" as the first diagnosis of the patient's visit.   FAMILY HISTORY:  Any \"first degree relatives\" (parent, brother, sister, or child) with the following?    No changes in my family's known health.   PATIENT EXPERIENCE:    Do you want the Dermatologist to perform a COMPLETE skin exam today including a clinical examination under the \"bra and underwear\" areas?  NO  If necessary, do we have your permission to call and leave a detailed message on your Preferred Phone number that includes your specific medical information?  Yes      Allergies   Allergen Reactions    Erythromycin Other (See Comments)     Thrush       Current Outpatient Medications:     amLODIPine (NORVASC) 10 mg tablet, TAKE 1 TABLET BY MOUTH  DAILY, Disp: 90 tablet, Rfl: 3    bacitracin ointment, Apply topically daily for 7 days Apply to bolster site daily (Patient taking differently: Apply topically as needed Apply to bolster site daily), Disp: 30 g, Rfl: 0    Fluocinolone Acetonide Scalp 0.01 % OIL, Apply a thin " layer topically daily at night one hour before bedtime., Disp: 118.28 mL, Rfl: 5    fluorouracil (EFUDEX) 5 % cream, Apply topically 2 (two) times a day Right cheek, Disp: 40 g, Rfl: 0    imiquimod (ALDARA) 5 % cream, Apply topically once a day Monday thru Friday for 6 weeks (Patient taking differently: if needed), Disp: 24 each, Rfl: 1    lisinopril (ZESTRIL) 20 mg tablet, TAKE 1 TABLET BY MOUTH DAILY, Disp: 90 tablet, Rfl: 1    metoprolol succinate (TOPROL-XL) 50 mg 24 hr tablet, TAKE 1 TABLET BY MOUTH DAILY, Disp: 90 tablet, Rfl: 1    mometasone (ELOCON) 0.1 % cream, APPLY TO EARS ONCE TO TWICE DAILY AS NEEDED FOR SCALING, Disp: , Rfl:     Multiple Vitamins-Minerals (PRESERVISION AREDS 2) CAPS, Take 1 capsule by mouth 2 (two) times a day, Disp: , Rfl:     mupirocin (BACTROBAN) 2 % ointment, Apply topically two to three times a day to sore areas (Patient taking differently: Apply topically if needed Apply topically two to three times a day to sore areas), Disp: 22 g, Rfl: 3    Niacin (VITAMIN B-3 PO), Take by mouth, Disp: , Rfl:     Niacinamide-Zn-Cu-Yvyfeb-Qm-Qy (NICOTINAMIDE PO), Take 500 mg by mouth 2 (two) times a day, Disp: , Rfl:     Pyridoxine HCl (VITAMIN B-6 PO), Take by mouth (Patient not taking: Reported on 3/4/2024), Disp: , Rfl:     traMADol (Ultram) 50 mg tablet, Take 1 tablet (50 mg total) by mouth every 6 (six) hours as needed for moderate pain for up to 15 doses (Patient not taking: Reported on 10/27/2023), Disp: 15 tablet, Rfl: 0          Whom besides the patient is providing clinical information about today's encounter?   NO ADDITIONAL HISTORIAN (patient alone provided history)    Physical Exam and Assessment/Plan by Diagnosis:    NEOPLASM OF UNCERTAIN BEHAVIOR OF SKIN    Physical Exam:  (Anatomic Location); (Size and Morphological Description); (Differential Diagnosis):  Specimen A; Location:Right Dorsal Forearm ; Skin;Punch Biopsy; 84 year old male with a 2 cm by 1.2 cm brown pigmented plaque  "with central black/grey pigmentation ; rule out melanoma    Additional History of Present Condition:  Patient reports changing lesion.    Assessment and Plan:  I have discussed with the patient that a sample of skin via a \"skin biopsy” would be potentially helpful to further make a specific diagnosis under the microscope.  Based on a thorough discussion of this condition and the management approach to it (including a comprehensive discussion of the known risks, side effects and potential benefits of treatment), the patient (family) agrees to implement the following specific plan:    Procedure:  Skin Biopsy.  After a thorough discussion of treatment options and risk/benefits/alternatives (including but not limited to local pain, scarring, dyspigmentation, blistering, possible superinfection, and inability to confirm a diagnosis via histopathology), verbal and written consent were obtained and portion of the rash was biopsied for tissue sample.  See below for consent that was obtained from patient and subsequent Procedure Note.   PROCEDURE NOTE:  PUNCH BIOPSY      Performing Physician:  / Anna Bolaños PA-C    Anatomic Location; Clinical Description with size (cm); Pre-Op Diagnosis:    Specimen A; Location:Right Dorsal Forearm ; Skin;Punch Biopsy; 84 year old male with a 2 cm by 1.2 cm brown pigmented plaque with central black/grey pigmentation ; rule out melanoma     Anesthesia: 3:1 1% xylocaine with epi and 1-100,000 buffered      Topical anesthesia: None       Indications: To indicate diagnosis and management plan.    Procedure Details     Patient informed of the risks (including bleeding,scaring and infection) and benefits of the procedure explained. Verbal and written informed consent obtained. The area was prepped and draped in the usual fashion. Anesthesia was obtained with 1% lidocaine with epinephrine. The skin was then stretched perpendicular to the skin tension lines and a punch biopsy to an " "appropriate sampling depth was obtained with a 4 mm punch with a forceps and iris scissors.     Hemostasis was obtained with 4-0 Prolene x 2 sutures.     Complications:  None      Specimen has been sent for review by Dermatopathology.      Plan:  1. Instructed to keep the wound dry and covered for 24-48h and clean thereafter.  2. Warning signs of infection were reviewed.    3. Recommended that the patient use acetaminophen as needed for pain  4. Sutures if any should be removed in 7 days ( HAVE THEM REMOVED ON DAY OF MOHS PROCEDURE 6/12/2024)    Standard post-procedure care has been explained and has been included in written form within the patient's copy of Informed Consent.     ACTINIC KERATOSIS    Physical Exam:  Anatomic Location: Right forehead, right temple, right cheek  Morphologic Description:  Scaly pink papules  Pertinent Positives:  Pertinent Negatives:    Additional History of Present Condition:  Patient presents today for a follow up. Patient was applying Efudex 5% cream to right cheek however after 11 days of applying, the area started to bleed. Patient has MOHS surgery scheduled for June 12 on nose for BCC. Patient has history of BCC, SCC, MELANOMA,and  PLEOMORPHIC SARCOMA    History of bleeding from this lesion? YES, after 11 days of Efudex  History of pain, tenderness, and/or itching within this lesion? NO  Personal history of skin cancer? YES, BCC,SCC,MELANOMA  Family history of skin cancer? NO  Previous treatment to the same site? YES, Treated with Efudex    Plan:  PRESCRIPTION MANAGEMENT:  Several topical management options and their side effects were discussed including \"field therapies\" such as Efudex (5-fluorouracil) and/or Aldara (imiquimod). Efudex may be used alone or in combination with Calcipotriene. Begin treatment once regions that have been sprayed with liquid nitrogen are healed. Redness and irritation are to be expected within a few days of field therapy treatment and should resolve " within 1 to 2 weeks following treatment. Do NOT cover the treatment areas with bandages. Use a sunscreen with an SPF 50+ while using field therapy.  We discussed the pre-cancerous nature of the condition. Actinic keratosis is found on sun-damaged skin and there is small risk that the condition could turn into a skin cancer called squamous cell carcinoma. There is no risk of actinic keratosis turning into melanoma.  Proliferative actinic keratosis tends to be resistant against standard treatments, including topical therapies and cryotherapy. It has a tendency to develop into infiltrative squamous cell carcinoma. Excision may be considered.  We discussed sun protection measures, including using sunscreen with an SPF 50+ year round, avoiding the sun, and wearing protective clothing such as long sleeves and pants when out in the sun.  Continue to monitor clinically for signs of recurrence. Discussed with patient the importance of keeping up to date with full body skin exams.  Cryotherapy performed in the office (See Procedure Note). We discussed that a hypopigmentation or scar may form in the region following cryotherapy.     PROCEDURES PERFORMED TODAY ASSOCIATED WITH THIS CONDITION:          Cryotherapy: PROCEDURE:  DESTRUCTION OF PRE-MALIGNANT LESIONS  After a thorough discussion of treatment options and risk/benefits/alternatives (including but not limited to local pain, scarring, dyspigmentation, blistering, and possible superinfection), verbal and written consent were obtained and the aforementioned lesions were treated on with cryotherapy using liquid nitrogen x 1 cycle for 5-10 seconds.    TOTAL NUMBER of 5 pre-malignant lesions were treated today on the ANATOMIC LOCATION: Right forehead, right temple, right cheek.     The patient tolerated the procedure well, and after-care instructions were provided.         MEDICAL DECISION MAKING  Treatment Goal:  Resolution of this ACUTE, UNCOMPLICATED condition.       A  recent or new short-term problem for which treatment is CONSIDERED OR INITIATED. There is little to no risk of mortality with treatment, and full recovery without functional impairment is expected.  Diagnosis or treatment significantly limited by the following social determinants of health:  NONE IDENTIFIED          HISTORY OF BASAL CELL CARCINOMA     Physical Exam:  Anatomic Location Affected:  Right temple, right cheek, left lip, right tip of nose, left wrist  Morphological Description of scar:  scars are healed; active basal cell present on right tip of nose. Plan for Mohs 6/12.  Suspected Recurrence: No     Additional History of Present Condition:  History of basal cell carcinoma with no sign of recurrence. MOHS done 05/16/2023, Accession #[id number]. MOHS done 10/13/2022, Accession # [id number]. MOHS done 10/06/22, Accession # [id number]. MOHS done 09/29/21, Accession # [id number]. MOHS done 11/30/23 Accession # [id number] Mohs for right tip of nose is planned for 6/12/24 with Dr. Caraballo     Assessment and Plan:  Based on a thorough discussion of this condition and the management approach to it (including a comprehensive discussion of the known risks, side effects and potential benefits of treatment), the patient (family) agrees to implement the following specific plan:  Monitor for changes or recurrence  Routine skin checks  Patient and his wife to decide whether they would like to proceed with Mohs as scheduled for 6/12 on right tip of nose or push off until fall. I advised if they end up pushing back Mohs to notify the office and set up nurse visit for suture removal     How can basal cell carcinoma be prevented?  The most important way to prevent BCC is to avoid sunburn. This is especially important in childhood and early life. Fair skinned individuals and those with a personal or family history of BCC should protect their skin from sun exposure daily, year-round and lifelong.  Stay indoors or under the shade in  the middle of the day   Wear covering clothing   Apply high protection factor SPF50+ broad-spectrum sunscreens generously to exposed skin if outdoors   Avoid indoor tanning (sun beds, solaria)  Oral nicotinamide (vitamin B3) in a dose of 500 mg twice daily may reduce the number and severity of BCCs.     What is the outlook for basal cell carcinoma?  Most BCCs are cured by treatment. Cure is most likely if treatment is undertaken when the lesion is small.  About 50% of people with BCC develop a second one within 3 years of the first. They are also at increased risk of other skin cancers, especially melanoma. Regular self-skin examinations and long-term annual skin checks by an experienced health professional are recommended.     HISTORY OF MELANOMA     Physical Exam:  Anatomic Location Affected:  Right neck, Left cheek and left deltoid( 0.9 mm excised 06/19/2019 by Dr. Lewis. Right cheek Emmy ( 2.3mm breslow), desmoplastic left ala   Morphological Description of Scar:  Well healed  Year Treated:  09/14/2022, Left cheek-2007, left deltoid 2019, right cheek 7.10.23, left ala 2.7.23   Suspected Recurrence: no  Regional adenopathy: no     Additional History of Present Condition:  History of melanoma with no signs of recurrence. Excision done 09/14/2022, Accession # [id number].      Assessment and Plan:  Based on a thorough discussion of this condition and the management approach to it (including a comprehensive discussion of the known risks, side effects and potential benefits of treatment), the patient (family) agrees to implement the following specific plan:  Monitor for changes or recurrence  Routine skin checks     What happens at follow-up?  The main purpose of follow-up is to detect recurrences early (metastatic melanoma), but it also offers an opportunity to diagnose a new primary melanoma at the first possible opportunity. A second invasive melanoma occurs in 5-10% of melanoma patients and a new melanoma in situ  "is diagnosed in more than 20% of melanoma patients.     Our practice makes the following recommendations for follow-up for patients with invasive melanoma.  At-least \"monthly\" self-skin examinations   Routine skin checks by a board certified dermatologist  Follow-up intervals are \"every 3 months\" within 2 years of a new melanoma diagnosis; \"every 6 months\" between 2-4 years of a new melanoma diagnosis; and \"annually\" after 4 years of a new melanoma diagnosis  Individual patient's needs should be considered before an appropriate follow-up is offered  Provide education and support to help the patient adjust to their illness     Follow-up appointments should include:  A check of the scar where the primary melanoma was removed  Checking the regional lymph nodes  A general skin examination  A full physical examination at least annually by your primary care physician     In those with more advanced primary disease, follow-up may include:  Blood tests  Imaging: ultrasound, X-ray, CT, MRI and PET scan.     Most tests are not worthwhile for patients with stage 1 or 2 melanoma unless there are signs or symptoms of disease recurrence or metastasis. No tests are necessary for healthy patients who have remained well for five years or longer after removal of their melanoma.     What is the outlook for patients with melanoma?  Melanoma in situ is cured by excision because it has no potential to spread around the body.  The risk of spread and ultimate death from invasive melanoma depends on several factors, but the main one is the Breslow thickness of the melanoma at the time it was surgically removed.  Metastases are rare for melanomas < 0.75 mm and the risk for tumours 0.75-1 mm thick is about 5%. The risk steadily increases with thickness so that melanomas > 4 mm have a risk of metastasis of about 40%.     Melanoma is a potentially serious type of skin cancer, in which there is uncontrolled growth of melanocytes (pigment cells). " Melanoma is sometimes called malignant melanoma.  Normal melanocytes are found in the basal layer of the epidermis (the outer layer of skin). Melanocytes produce a protein called melanin, which protects skin cells by absorbing ultraviolet (UV) radiation. Melanocytes are found in equal numbers in black and white skin, but melanocytes in black skin produce much more melanin. People with dark brown or black skin are very much less likely to be damaged by UV radiation than those with white skin.     HISTORY OF SQUAMOUS CELL CARCINOMA      Physical Exam:  Anatomic Location Affected:  Multiple Sites  Morphological Description of Scar:  Well healed  Suspected Recurrence: no  Regional adenopathy: no     Additional History of Present Condition:  History of SCC with no signs of reurrence     Assessment and Plan:  Based on a thorough discussion of this condition and the management approach to it (including a comprehensive discussion of the known risks, side effects and potential benefits of treatment), the patient (family) agrees to implement the following specific plan:  Monitor for changes or recurrence  Routine skin checks     How can SCC be prevented?  The most important way to prevent SCC is to avoid sunburn. This is especially important in childhood and early life. Fair skinned individuals and those with a personal or family history of BCC should protect their skin from sun exposure daily, year-round and lifelong.  Stay indoors or under the shade in the middle of the day   Wear covering clothing   Apply high protection factor SPF50+ broad-spectrum sunscreens generously to exposed skin if outdoors   Avoid indoor tanning (sun beds, solaria)        What is the outlook for SCC?  Most SCC are cured by treatment. Cure is most likely if treatment is undertaken when the lesion is small. A small percent of SCC's can spread to lymph  nodes and long term monitoring is indicated.   They are also at increased risk of other skin  cancers, especially melanoma. Regular self-skin examinations and long-term annual skin checks by an experienced health professional are recommended.     HISTORY OF PLEOMORPHIC SARCOMA     Physical Exam:  Anatomic Location Affected:  Scalp  Morphological Description:  100% take of free flap, no signs of recurrence , free graft 20cm right scalp with full take     Additional History of Present Condition:  Patient has had multiple procedures in right paretal scalp area where a flap was done. Patient also had 27 treatents of radiation to treat cancer.  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 By muniz jose and temple     Assessment and Plan:  Based on a thorough discussion of this condition and the management approach to it (including a comprehensive discussion of the known risks, side effects and potential benefits of treatment), the patient (family) agrees to implement the following specific plan:  Continue to follow up at [place]    Scribe Attestation      I,:  Jessi Hernandez am acting as a scribe while in the presence of the attending physician.:       I,:  Anna Bolaños PA-C personally performed the services described in this documentation    as scribed in my presence.:

## 2024-06-06 NOTE — PATIENT INSTRUCTIONS
"NEOPLASM OF UNCERTAIN BEHAVIOR OF SKIN  Assessment and Plan:  I have discussed with the patient that a sample of skin via a \"skin biopsy” would be potentially helpful to further make a specific diagnosis under the microscope.  Based on a thorough discussion of this condition and the management approach to it (including a comprehensive discussion of the known risks, side effects and potential benefits of treatment), the patient (family) agrees to implement the following specific plan:    Procedure:  Skin Biopsy.  After a thorough discussion of treatment options and risk/benefits/alternatives (including but not limited to local pain, scarring, dyspigmentation, blistering, possible superinfection, and inability to confirm a diagnosis via histopathology), verbal and written consent were obtained and portion of the rash was biopsied for tissue sample.  See below for consent that was obtained from patient and subsequent Procedure Note.   PROCEDURE NOTE:  PUNCH BIOPSY      Plan:  1. Instructed to keep the wound dry and covered for 24-48h and clean thereafter.  2. Warning signs of infection were reviewed.    3. Recommended that the patient use acetaminophen as needed for pain  4. Sutures if any should be removed in 7 days ( Have them remove on day of MOHS PROCEDURE 6/12/2024)      Standard post-procedure care has been explained and has been included in written form within the patient's copy of Informed Consent.            ACTINIC KERATOSIS    Plan:  PRESCRIPTION MANAGEMENT:  Several topical management options and their side effects were discussed including \"field therapies\" such as Efudex (5-fluorouracil) and/or Aldara (imiquimod). Efudex may be used alone or in combination with Calcipotriene. Begin treatment once regions that have been sprayed with liquid nitrogen are healed. Redness and irritation are to be expected within a few days of field therapy treatment and should resolve within 1 to 2 weeks following treatment. Do " NOT cover the treatment areas with bandages. Use a sunscreen with an SPF 50+ while using field therapy.  We discussed the pre-cancerous nature of the condition. Actinic keratosis is found on sun-damaged skin and there is small risk that the condition could turn into a skin cancer called squamous cell carcinoma. There is no risk of actinic keratosis turning into melanoma.  Proliferative actinic keratosis tends to be resistant against standard treatments, including topical therapies and cryotherapy. It has a tendency to develop into infiltrative squamous cell carcinoma. Excision may be considered.  We discussed sun protection measures, including using sunscreen with an SPF 50+ year round, avoiding the sun, and wearing protective clothing such as long sleeves and pants when out in the sun.  Continue to monitor clinically for signs of recurrence. Discussed with patient the importance of keeping up to date with full body skin exams.  Cryotherapy performed in the office (See Procedure Note). We discussed that a hypopigmentation or scar may form in the region following cryotherapy.

## 2024-06-10 PROCEDURE — 88341 IMHCHEM/IMCYTCHM EA ADD ANTB: CPT | Performed by: STUDENT IN AN ORGANIZED HEALTH CARE EDUCATION/TRAINING PROGRAM

## 2024-06-10 PROCEDURE — 88305 TISSUE EXAM BY PATHOLOGIST: CPT | Performed by: STUDENT IN AN ORGANIZED HEALTH CARE EDUCATION/TRAINING PROGRAM

## 2024-06-10 PROCEDURE — 88342 IMHCHEM/IMCYTCHM 1ST ANTB: CPT | Performed by: STUDENT IN AN ORGANIZED HEALTH CARE EDUCATION/TRAINING PROGRAM

## 2024-06-10 NOTE — PROGRESS NOTES
[facility] [place]  [address]  [place] [address]  [phone]  [phone]     Date of Visit: 6/5/2024  Name: Alex Cheung   YOB: 1940        Subjective    VISIT DIAGNOSIS:  Diagnoses and all orders for this visit:    Encounter for follow-up surveillance of melanoma    Malignant melanoma of nose (HCC)    Malignant melanoma of face excluding eyelid, nose, lip, and ear (HCC)    Basal cell carcinoma of skin of other parts of face    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HCC)        Oncology History   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HCC)   11/8/2017 Surgery    right vertex of scalp - s/p excision and full thickness graft for closure on 11/8/17 for atypical fibroxanthoma.       1/29/2018 Biopsy    biopsy from the anterior aspect of the graft showing atypical epithelioid fibro histiocytic tumor, with lesion extending to the deep tissue edge.  This is similar to prior biopsy done.  Differential includes pleomorphic sarcoma versus an extreme atypical example of atypical fibro histiocytoma.     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 See Note.     -- FNCLCC Histologic Grade 3  (total score: 7 of 8).       * Tumor differentiation score: 3 of 3.        * Mitotic count score: 3 of 3; > 19 mitoses/10 HPF (33 mitoses/10 HPF).       * Necrosis score: 1 of 2; present, < 50%.  - Tumor extends into deep reticular dermis, subcutis and fascia.   - Perineural invasion: Present; intraneural invasion identified (0.4 mm largest nerve diameter).   - Lymphovascular invasion: Focally suspicious.   - Bone invasion: Not identified.   - Central nervous system extension: Not identified.   - Margins: uninvolved by sarcoma but close.    -- Sarcoma  0.15 mm from nearest deep margin.   - Additional Pathologic  "Findings: Changes consistent with prior surgical site.     -- Focal ulceration with bacterial colonization, acute and chronic inflammation.     -- Best representative tumor block: A5.      5/29/2018 - 7/5/2018 Radiation    Plan ID Energy Fractions Dose per Fraction (cGy) Dose Correction (cGy) Total Dose Delivered (cGy) Elapsed Days   Vertex Scalp 9E 27 / 27 200 0 5,400 37            Basal cell carcinoma of skin of other parts of face   7/10/2023 Surgery    Skin, right cheek, \"basal cell carcinoma\", excision:  Scar. No residual basal cell carcinoma identified in the examined sections.        Malignant melanoma of nose (HCC)   12/6/2022 Biopsy    Skin, left ala, shave biopsy:     MELANOMA (thickness: 0.6 mm); extending to the tissue edges      Ulceration: not seen  Anatomic (Hal) level: III  Type: lentigo maligna melanoma  Mitoses: 0/mm2  Margin assessment: invasive melanoma extends to peripheral specimen margin and deep specimen margin focally  Pathologic stage: pT1a     2/7/2023 Surgery    Skin, nose, left nasal, wide excision:  Residual melanoma, invasive, desmoplastic type, and scar, margins free.   Incidental basal cell carcinoma, superficial and nodular type, not extending to the margins.    pT2a     2/7/2023 -  Cancer Staged    Staging form: Melanoma of the Skin, AJCC 8th Edition  - Clinical stage from 2/7/2023: Stage IB (cT2a, cN0, cM0) - Signed by Sona Call MD on 11/27/2023       Malignant melanoma of face excluding eyelid, nose, lip, and ear (HCC)   5/16/2023 Initial Diagnosis    Malignant melanoma of face excluding eyelid, nose, lip, and ear (HCC)  Right mid cheek    A. Skin, right mid cheek, shave biopsy:     Combined ulcerated MELANOMA (thickness: at least 1.5 mm) and BASAL CELL CARCINOMA; transected (see note).     Note: There are foci of basal cell carcinoma and melanoma in which the components are separate from each other (as can be seen in a collision tumor), but there are also multiple foci " "in which the melanoma and basal cell carcinoma are closely intermingled (\"basomelanocytic tumor\" or \"combined melanoma and basal cell carcinoma of the intermingled type\"), which is a rare occurrence.1 SOX10, MART-1, HMB45, p40, and Tarun-EP4 immunostains were reviewed and support the diagnosis. Please see synoptic report for additional details. The results were emailed to Dr. Matthews on 5/19/2023.     Synoptic report for melanoma of the skin  Thickness: at least 1.5 mm  Ulceration: present  Anatomic (Hal) level: at least IV  Type: superficial spreading melanoma  Mitoses: 1/mm2  Microsatellites: cannot be determined  Lymphovascular invasion: not seen  Neurotropism: not seen  Tumor regression: not seen  Tumor-infiltrating lymphocytes (TIL): present, non-brisk  Margin assessment: invasive melanoma extends to deep specimen margin; melanoma in situ extends to peripheral specimen margin  Pathologic stage: at least pT2b  Associated nevus: not seen         5/16/2023 -  Cancer Staged    Staging form: Melanoma of the Skin, AJCC 8th Edition  - Clinical stage from 5/16/2023: Stage IIA (cT2b, cN0, cM0) - Signed by Sona Call MD on 5/26/2023       7/10/2023 Surgery    Skin, right cheek \"melanoma\", wide excision:  Residual combined melanoma (Breslow thickness: 2.3 mm) and basal cell carcinoma (malignant basomelanocytic tumor); margins free.   pT3a     7/10/2023 -  Cancer Staged    Staging form: Melanoma of the Skin, AJCC 8th Edition  - Pathologic stage from 7/10/2023: Stage IIB (pT3b, pN0, cM0) - Signed by Sona Call MD on 11/28/2023          Cancer Staging   Malignant melanoma of face excluding eyelid, nose, lip, and ear (HCC)  Staging form: Melanoma of the Skin, AJCC 8th Edition  - Clinical stage from 5/16/2023: Stage IIA (cT2b, cN0, cM0) - Signed by Sona Call MD on 5/26/2023  - Pathologic stage from 7/10/2023: Stage IIB (pT3b, pN0, cM0) - Signed by Sona Call MD on 11/28/2023    Malignant melanoma of " nose (HCC)  Staging form: Melanoma of the Skin, AJCC 8th Edition  - Clinical stage from 2/7/2023: Stage IB (cT2a, cN0, cM0) - Signed by Sona Call MD on 11/27/2023          HISTORY OF PRESENT ILLNESS: Alex Cheung is a 84 y.o. male  who has melanoma, with multiple primaries including a stage IIb melanoma of the face here for continued monitoring, follow-up and surveillance.    He had another basal cell removed by dermatology from his right upper lip.  Site has healed nicely.  No additional new, changing, concerning skin lesions.  No lymphadenopathy.  Follows with dermatology very closely.  Has seen surgical oncology as well.      He did have ultrasound of the head and neck looking at lymph nodes on 4/16/2024.  No concerning features for suspicious looking lymph nodes.  All within normal limits with largest being 0.7 cm.        REVIEW OF SYSTEMS:  Review of Systems   Constitutional:  Negative for appetite change, fatigue, fever and unexpected weight change.   HENT:   Negative for lump/mass, trouble swallowing and voice change.    Eyes:  Negative for icterus.   Respiratory:  Negative for cough, shortness of breath and wheezing.    Cardiovascular:  Negative for leg swelling.   Gastrointestinal:  Negative for abdominal pain, constipation, diarrhea, nausea and vomiting.   Genitourinary:  Negative for difficulty urinating and hematuria.    Musculoskeletal:  Negative for arthralgias, gait problem and myalgias.   Skin:  Negative for itching and rash.        No new, changing, or concerning lesions.   Neurological:  Negative for extremity weakness, gait problem, headaches, light-headedness and numbness.   Hematological:  Negative for adenopathy.        MEDICATIONS:    Current Outpatient Medications:     amLODIPine (NORVASC) 10 mg tablet, TAKE 1 TABLET BY MOUTH  DAILY, Disp: 90 tablet, Rfl: 3    bacitracin ointment, Apply topically daily for 7 days Apply to bolster site daily (Patient taking differently: Apply  topically as needed Apply to bolster site daily), Disp: 30 g, Rfl: 0    Fluocinolone Acetonide Scalp 0.01 % OIL, Apply a thin layer topically daily at night one hour before bedtime., Disp: 118.28 mL, Rfl: 5    fluorouracil (EFUDEX) 5 % cream, Apply topically 2 (two) times a day Right cheek (Patient not taking: Reported on 6/6/2024), Disp: 40 g, Rfl: 0    imiquimod (ALDARA) 5 % cream, Apply topically once a day Monday thru Friday for 6 weeks (Patient taking differently: if needed), Disp: 24 each, Rfl: 1    lisinopril (ZESTRIL) 20 mg tablet, TAKE 1 TABLET BY MOUTH DAILY, Disp: 90 tablet, Rfl: 1    metoprolol succinate (TOPROL-XL) 50 mg 24 hr tablet, TAKE 1 TABLET BY MOUTH DAILY, Disp: 90 tablet, Rfl: 1    mometasone (ELOCON) 0.1 % cream, APPLY TO EARS ONCE TO TWICE DAILY AS NEEDED FOR SCALING, Disp: , Rfl:     Multiple Vitamins-Minerals (PRESERVISION AREDS 2) CAPS, Take 1 capsule by mouth 2 (two) times a day, Disp: , Rfl:     mupirocin (BACTROBAN) 2 % ointment, Apply topically two to three times a day to sore areas (Patient taking differently: Apply topically if needed Apply topically two to three times a day to sore areas), Disp: 22 g, Rfl: 3    Niacin (VITAMIN B-3 PO), Take by mouth, Disp: , Rfl:     Niacinamide-Zn-Cu-Lgnllm-Km-Wt (NICOTINAMIDE PO), Take 500 mg by mouth 2 (two) times a day, Disp: , Rfl:     Pyridoxine HCl (VITAMIN B-6 PO), Take by mouth (Patient not taking: Reported on 3/4/2024), Disp: , Rfl:     traMADol (Ultram) 50 mg tablet, Take 1 tablet (50 mg total) by mouth every 6 (six) hours as needed for moderate pain for up to 15 doses (Patient not taking: Reported on 10/27/2023), Disp: 15 tablet, Rfl: 0     ALLERGIES:  Allergies   Allergen Reactions    Erythromycin Other (See Comments)     Thrush         ACTIVE PROBLEMS:  Patient Active Problem List   Diagnosis    Actinic keratosis    Stage 3 chronic kidney disease (HCC)    Gout    Hyperlipidemia    Hypertension    Irritable bowel syndrome    Macular  degeneration    Urinary incontinence, post-void dribbling    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HCC)    Basal cell carcinoma of skin of other parts of face    Pulmonary nodules    Incisional hernia, without obstruction or gangrene    Status post repair of ventral hernia    Dizziness    Urinary frequency    Lumbar back pain with radiculopathy affecting lower extremity    Scalp ulceration, with necrosis of bone (HCC)    Radiation necrosis of skull  (HCC)    Malignant melanoma of nose (HCC)    Encounter for geriatric assessment    Malignant melanoma of face excluding eyelid, nose, lip, and ear (HCC)    Anxiety    Reactive airway disease    Exudative age-related macular degeneration, unspecified laterality, unspecified stage (HCC)          PAST MEDICAL HISTORY:   Past Medical History:   Diagnosis Date    Anxiety     Arthritis     back    Back pain     Balance problem     Basal cell carcinoma (BCC)     in past    Basal cell carcinoma (BCC) 10/04/2022    Right Temple    Basal cell carcinoma (BCC) 10/03/2023    right lip, mohs    Basal cell carcinoma (BCC) 03/05/2024    right tip of nose    BCC (basal cell carcinoma of skin) 08/17/2021    Right tip of nose    BCC (basal cell carcinoma of skin) 08/08/2022    Left lip    BCC (basal cell carcinoma of skin) 08/08/2022    Right cheek    Cancer (HCC)     Gout     Hard of hearing     Hearing aid worn     ziyad    Hearing aid worn     Herniated nucleus pulposus, L4-5     [place] (hard of hearing)     Hypertension     Incisional hernia     Incontinence     Macular degeneration     Melanoma (HCC)     left cheek- history    Melanoma (HCC) 08/08/2022    Right neck    Nocturia     Pulmonary nodules     Reactive airway disease     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    Skin cancer 07/01/2020    AFX- scalp    Squamous cell carcinoma     in past    Squamous cell skin cancer         PAST SURGICAL HISTORY:  Past Surgical History:   Procedure Laterality Date    BASAL CELL CARCINOMA EXCISION  Right 07/10/2023    Procedure: EXCISION BASAL CELL CARCINOMA  RIGHT CHEEK;  Surgeon: Fortunato Booth MD;  Location: [facility];  Service: Surgical Oncology    CATARACT EXTRACTION Bilateral     COLONOSCOPY      FLAP LOCAL HEAD / NECK Right 07/10/2023    Procedure: RIGHT CHEEK RECON W/ LOCAL FLAP & SKIN GRAFT;  Surgeon: Leola Chatman MD;  Location: [facility];  Service: Plastics    HERNIA REPAIR      HYDROCELE EXCISION / REPAIR      MASTOID SURGERY Left     MOHS SURGERY      MOHS SURGERY  09/29/2021    Right tip of nose-Dr. Caraballo    MOHS SURGERY  10/06/2022    Left lip-Dr. Caraballo    MOHS SURGERY  05/16/2023    Right Jainism - Dr. Matthews    MOHS SURGERY Right 03/27/2024    BCC right lip, Dr Caraballo    OTHER SURGICAL HISTORY      sarcoma scalp with skin graft    FL EXCISION MALIGNANT LESION F/E/E/N/L 0.5 CM/< Left 02/07/2023    Procedure: WIDE EXCISION OF LEFT NASAL MELANOMA;  Surgeon: Fortunato Booth MD;  [facility];  Service: Surgical Oncology    FL REPAIR FIRST ABDOMINAL WALL HERNIA N/A 04/22/2021    Procedure: REPAIR HERNIA INCISIONAL OPEN WITH MESH;  Surgeon: Darryl Landry MD;  [facility];  Service: General    SCALP EXCISION N/A 03/28/2018    Procedure: SCALP SARCOMA EXCISION WITH FULL THICKNESS SKIN GRAFT;  Surgeon: Jani Pagan MD;  Location: [facility];  Service: Plastics    SKIN BIOPSY      SKIN CANCER EXCISION      SKIN LESION EXCISION Right 07/10/2023    Procedure: WIDE EXCISION MELANOMA SCAR, RIGHT CHEEK;  Surgeon: Fortunato Booth MD;  [facility];  Service: Surgical Oncology    SPLIT THICKNESS SKIN GRAFT Left 02/07/2023    Procedure: APPLICATION OF SKIN SUBSTITUTE GRAFT TO NOSE;  Surgeon: Leola Chatman MD;  Location: [facility];  Service: Plastics    TONSILLECTOMY AND ADENOIDECTOMY          SOCIAL HISTORY:  Social History     Socioeconomic History    Marital status: /Civil Union     Spouse name: Not on file    Number of children: Not on file    Years of  "education: Not on file    Highest education level: Not on file   Occupational History    Not on file   Tobacco Use    Smoking status: Former     Current packs/day: 0.00     Average packs/day: 0.3 packs/day for 15.0 years (3.8 ttl pk-yrs)     Types: Cigarettes     Start date: 3/13/1973     Quit date: 3/13/1988     Years since quittin.2     Passive exposure: Past    Smokeless tobacco: Never   Vaping Use    Vaping status: Never Used   Substance and Sexual Activity    Alcohol use: Not Currently     Comment: beer once in awhile    Drug use: No    Sexual activity: Yes   Other Topics Concern    Not on file   Social History Narrative    Not on file     Social Determinants of Health     Financial Resource Strain: Medium Risk (2023)    Overall Financial Resource Strain (CARDIA)     Difficulty of Paying Living Expenses: Somewhat hard   Food Insecurity: Not on file   Transportation Needs: No Transportation Needs (2023)    PRAPARE - Transportation     Lack of Transportation (Medical): No     Lack of Transportation (Non-Medical): No   Physical Activity: Not on file   Stress: Not on file   Social Connections: Not on file   Intimate Partner Violence: Not on file   Housing Stability: Not on file        FAMILY HISTORY:  Family History   Problem Relation Age of Onset    Colon cancer Father     Heart failure Father     Heart disease Mother     Stroke Mother            Objective    PHYSICAL EXAMINATION:   Blood pressure 136/72, pulse 78, temperature 98 °F (36.7 °C), temperature source Temporal, resp. rate 18, height 5' 9\" (1.753 m), weight 79.8 kg (176 lb), SpO2 97%.     Pain Score: 0-No pain     ECOG Performance Status      Flowsheet Row Most Recent Value   ECOG Performance Status 1 - Restricted in physically strenuous activity but ambulatory and able to carry out work of a light or sedentary nature, e.g., light house work, office work               Physical Exam  Constitutional:       General: He is not in acute " distress.     Appearance: Normal appearance. He is not ill-appearing or toxic-appearing.   HENT:      Head: Normocephalic and atraumatic.      Right Ear: External ear normal.      Left Ear: External ear normal.      Nose: Nose normal.      Mouth/Throat:      Mouth: Mucous membranes are moist.      Pharynx: Oropharynx is clear.   Eyes:      General: No scleral icterus.        Right eye: No discharge.         Left eye: No discharge.      Conjunctiva/sclera: Conjunctivae normal.   Cardiovascular:      Rate and Rhythm: Normal rate and regular rhythm.      Pulses: Normal pulses.      Heart sounds: No murmur heard.     No friction rub. No gallop.   Pulmonary:      Effort: Pulmonary effort is normal. No respiratory distress.      Breath sounds: Normal breath sounds. No wheezing or rales.   Abdominal:      General: Bowel sounds are normal. There is no distension.      Palpations: There is no mass.      Tenderness: There is no abdominal tenderness. There is no rebound.   Musculoskeletal:         General: No swelling or tenderness.      Cervical back: Normal range of motion. No rigidity.      Right lower leg: No edema.      Left lower leg: No edema.   Lymphadenopathy:      Head:      Right side of head: No submandibular, preauricular or posterior auricular adenopathy.      Left side of head: No submandibular, preauricular or posterior auricular adenopathy.      Cervical: No cervical adenopathy.      Right cervical: No superficial or posterior cervical adenopathy.     Left cervical: No superficial or posterior cervical adenopathy.      Upper Body:      Right upper body: No supraclavicular or axillary adenopathy.      Left upper body: No supraclavicular or axillary adenopathy.   Skin:     General: Skin is warm.      Coloration: Skin is not jaundiced.      Findings: No lesion or rash.      Comments: Well healed surgical scar.  No evidence of recurrence at primary site.  Healed scars on his face from multiple skin cancer  procedures/excisions.   Neurological:      General: No focal deficit present.      Mental Status: He is alert and oriented to person, place, and time.      Cranial Nerves: No cranial nerve deficit.      Motor: No weakness.      Gait: Gait normal.   Psychiatric:         Mood and Affect: Mood normal.         Behavior: Behavior normal.         Thought Content: Thought content normal.         Judgment: Judgment normal.         I reviewed lab data in the chart.    WBC   Date Value Ref Range Status   04/25/2024 6.23 4.31 - 10.16 Thousand/uL Final   10/20/2023 6.57 4.31 - 10.16 Thousand/uL Final   06/27/2023 6.69 4.31 - 10.16 Thousand/uL Final     Hemoglobin   Date Value Ref Range Status   04/25/2024 15.1 12.0 - 17.0 g/dL Final   10/20/2023 14.7 12.0 - 17.0 g/dL Final   06/27/2023 14.2 12.0 - 17.0 g/dL Final     Platelets   Date Value Ref Range Status   04/25/2024 190 149 - 390 Thousands/uL Final   10/20/2023 217 149 - 390 Thousands/uL Final   06/27/2023 204 149 - 390 Thousands/uL Final     MCV   Date Value Ref Range Status   04/25/2024 91 82 - 98 fL Final   10/20/2023 93 82 - 98 fL Final   06/27/2023 91 82 - 98 fL Final      Sodium   Date Value Ref Range Status   05/05/2015 142 136 - 145 mmol/L Final   10/28/2014 138 136 - 145 mmol/L Final   04/11/2014 141 136 - 145 mmol/L Final     Potassium   Date Value Ref Range Status   04/25/2024 4.6 3.5 - 5.3 mmol/L Final   10/20/2023 4.3 3.5 - 5.3 mmol/L Final   06/27/2023 3.9 3.5 - 5.3 mmol/L Final   07/04/2020 4.1 3.5 - 5.2 mmol/L Final   07/03/2020 3.8 3.5 - 5.2 mmol/L Final   07/02/2020 3.8 3.5 - 5.2 mmol/L Final     Chloride   Date Value Ref Range Status   04/25/2024 104 96 - 108 mmol/L Final   10/20/2023 105 96 - 108 mmol/L Final   06/27/2023 109 (H) 96 - 108 mmol/L Final   07/04/2020 111 101 - 111 mmol/L Final   07/03/2020 106 101 - 111 mmol/L Final   07/02/2020 110 101 - 111 mmol/L Final     Carbon Dioxide   Date Value Ref Range Status   07/04/2020 27 22 - 32 mmol/L Final    07/03/2020 26 22 - 32 mmol/L Final   07/02/2020 26 22 - 32 mmol/L Final     CO2   Date Value Ref Range Status   04/25/2024 27 21 - 32 mmol/L Final   10/20/2023 28 21 - 32 mmol/L Final   06/27/2023 28 21 - 32 mmol/L Final     BUN   Date Value Ref Range Status   04/25/2024 22 5 - 25 mg/dL Final   10/20/2023 21 5 - 25 mg/dL Final   06/27/2023 22 5 - 25 mg/dL Final   07/04/2020 25 (H) 8 - 20 mg/dL Final   07/03/2020 25 (H) 8 - 20 mg/dL Final   07/02/2020 17 8 - 20 mg/dL Final     Creatinine   Date Value Ref Range Status   04/25/2024 1.35 (H) 0.60 - 1.30 mg/dL Final     Comment:     Standardized to IDMS reference method   10/20/2023 1.22 0.60 - 1.30 mg/dL Final     Comment:     Standardized to IDMS reference method   06/27/2023 1.40 (H) 0.60 - 1.30 mg/dL Final     Comment:     Standardized to IDMS reference method   07/04/2020 1.24 0.80 - 1.30 mg/dL Final   07/03/2020 1.56 (H) 0.80 - 1.30 mg/dL Final   07/02/2020 1.18 0.80 - 1.30 mg/dL Final     Glucose   Date Value Ref Range Status   05/27/2023 133 65 - 140 mg/dL Final     Comment:     If the patient is fasting, the ADA then defines impaired fasting glucose as > 100 mg/dL and diabetes as > or equal to 123 mg/dL.   06/10/2021 90 65 - 140 mg/dL Final     Comment:     If the patient is fasting, the ADA then defines impaired fasting glucose as > 100 mg/dL and diabetes as > or equal to 123 mg/dL.  Specimen collection should occur prior to Sulfasalazine administration due to the potential for falsely depressed results. Specimen collection should occur prior to Sulfapyridine administration due to the potential for falsely elevated results.   06/09/2021 88 65 - 140 mg/dL Final     Comment:     If the patient is fasting, the ADA then defines impaired fasting glucose as > 100 mg/dL and diabetes as > or equal to 123 mg/dL.  Specimen collection should occur prior to Sulfasalazine administration due to the potential for falsely depressed results. Specimen collection should occur  prior to Sulfapyridine administration due to the potential for falsely elevated results.   07/04/2020 85 82 - 115 mg/dL Final     Comment:            American Diabetes Association Reference Ranges:        -------------------------------------------------         Normal Adult:             65-99 mg/dL         Impaired Fasting Glucose: 100-125 mg/dL         Diabetes:                 >126 mg/dL         Recommend repeat testing for confirmatory diagnosis of diabetes.   07/03/2020 105 82 - 115 mg/dL Final     Comment:            American Diabetes Association Reference Ranges:        -------------------------------------------------         Normal Adult:             65-99 mg/dL         Impaired Fasting Glucose: 100-125 mg/dL         Diabetes:                 >126 mg/dL         Recommend repeat testing for confirmatory diagnosis of diabetes.   07/02/2020 133 (H) 82 - 115 mg/dL Final     Comment:            American Diabetes Association Reference Ranges:        -------------------------------------------------         Normal Adult:             65-99 mg/dL         Impaired Fasting Glucose: 100-125 mg/dL         Diabetes:                 >126 mg/dL         Recommend repeat testing for confirmatory diagnosis of diabetes.     eGFR, Non-   Date Value Ref Range Status   07/04/2020 56 (L) >60 mL/min/1.73m2 Final     Comment:            If patient is pregnant,has restricted blood flow         or age >75 reference NKDEP guidelines.          07/03/2020 43 (L) >60 mL/min/1.73m2 Final     Comment:            If patient is pregnant,has restricted blood flow         or age >75 reference NKDEP guidelines.          07/02/2020 59 (L) >60 mL/min/1.73m2 Final     Comment:            If patient is pregnant,has restricted blood flow         or age >75 reference NKDEP guidelines.            Calcium   Date Value Ref Range Status   04/25/2024 10.2 8.4 - 10.2 mg/dL Final   10/20/2023 10.1 8.4 - 10.2 mg/dL Final   06/27/2023 9.4 8.3 -  10.1 mg/dL Final   07/04/2020 8.7 (L) 8.8 - 10.2 mg/dL Final   07/03/2020 8.1 (L) 8.8 - 10.2 mg/dL Final   07/02/2020 8.1 (L) 8.8 - 10.2 mg/dL Final     Total Protein   Date Value Ref Range Status   05/05/2015 7.6 6.4 - 8.2 g/dL Final   04/11/2014 6.8 6.4 - 8.2 g/dL Final     Albumin   Date Value Ref Range Status   04/25/2024 4.3 3.5 - 5.0 g/dL Final   10/20/2023 4.2 3.5 - 5.0 g/dL Final   06/27/2023 3.8 3.5 - 5.0 g/dL Final   05/05/2015 3.8 3.5 - 5.0 g/dL Final   04/11/2014 3.6 3.5 - 5.0 g/dL Final     Total Bilirubin   Date Value Ref Range Status   04/25/2024 0.87 0.20 - 1.00 mg/dL Final     Comment:     Use of this assay is not recommended for patients undergoing treatment with eltrombopag due to the potential for falsely elevated results.  N-acetyl-p-benzoquinone imine (metabolite of Acetaminophen) will generate erroneously low results in samples for patients that have taken an overdose of Acetaminophen.   10/20/2023 1.14 (H) 0.20 - 1.00 mg/dL Final     Comment:     Use of this assay is not recommended for patients undergoing treatment with eltrombopag due to the potential for falsely elevated results.  N-acetyl-p-benzoquinone imine (metabolite of Acetaminophen) will generate erroneously low results in samples for patients that have taken an overdose of Acetaminophen.   06/27/2023 0.90 0.20 - 1.00 mg/dL Final     Comment:     Use of this assay is not recommended for patients undergoing treatment with eltrombopag due to the potential for falsely elevated results.     Alkaline Phosphatase   Date Value Ref Range Status   04/25/2024 114 (H) 34 - 104 U/L Final   10/20/2023 104 34 - 104 U/L Final   06/27/2023 129 (H) 46 - 116 U/L Final   05/05/2015 119 (H) 46 - 116 U/L Final   04/11/2014 123 50 - 136 U/L Final     AST   Date Value Ref Range Status   04/25/2024 18 13 - 39 U/L Final   10/20/2023 19 13 - 39 U/L Final   06/27/2023 20 5 - 45 U/L Final     Comment:     Specimen collection should occur prior to Sulfasalazine  "administration due to the potential for falsely depressed results.    05/05/2015 27 0 - 45 U/L Final   04/11/2014 19 0 - 45 U/L Final     ALT   Date Value Ref Range Status   04/25/2024 21 7 - 52 U/L Final     Comment:     Specimen collection should occur prior to Sulfasalazine administration due to the potential for falsely depressed results.    10/20/2023 21 7 - 52 U/L Final     Comment:     Specimen collection should occur prior to Sulfasalazine administration due to the potential for falsely depressed results.    06/27/2023 37 12 - 78 U/L Final     Comment:     Specimen collection should occur prior to Sulfasalazine and/or Sulfapyridine administration due to the potential for falsely depressed results.    05/05/2015 53 16 - 63 U/L Final   04/11/2014 48 6 - 78 U/L Final      LD   Date Value Ref Range Status   04/25/2024 165 140 - 271 U/L Final     No results found for: \"TSH\"  No results found for: \"Y4YNXEB\"   No results found for: \"FREET4\"      RECENT IMAGING:  No results found.          Assessment/Plan  Mr. Cheung is a 84-year-old gentleman with multiple melanoma primaries including stage IIb on his face here for continued monitoring, follow-up and surveillance.    1. Encounter for follow-up surveillance of melanoma  2. Malignant melanoma of nose (HCC)  3. Malignant melanoma of face excluding eyelid, nose, lip, and ear (HCC)  No clinical evidence of melanoma recurrence or metastasis.  He was not able to get blood work prior to today.  He will get blood work within the next few weeks.  He is due for follow-up in 3 months with imaging at that time.  Orders placed and prescription divided for imaging to be done.  He knows to continue to monitor for any concerning skin lesions or lymphadenopathy.  He continues to follow with dermatology closely.  He knows to call with issues or concerns prior to his next visit.    4. Basal cell carcinoma of skin of other parts of face  Recent resection of a new basal cell on his right " lip by dermatology.  Healing nicely.  Does have a concerning lesion on his right arm.  He does follow closely with dermatology.    5. 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HCC)  Stable.  No evidence of recurrence on physical exam.  We continue to monitor him closely for all his skin cancers and will continue to monitor his scalp as well.        Return in about 3 months (around 9/5/2024) for Office Visit, labs, scans.     Sona Call MD, PhD

## 2024-06-12 ENCOUNTER — PROCEDURE VISIT (OUTPATIENT)
Dept: DERMATOLOGY | Facility: CLINIC | Age: 84
End: 2024-06-12
Payer: MEDICARE

## 2024-06-12 VITALS
WEIGHT: 172 LBS | SYSTOLIC BLOOD PRESSURE: 172 MMHG | HEIGHT: 69 IN | TEMPERATURE: 97.8 F | HEART RATE: 73 BPM | BODY MASS INDEX: 25.48 KG/M2 | OXYGEN SATURATION: 97 % | DIASTOLIC BLOOD PRESSURE: 88 MMHG

## 2024-06-12 DIAGNOSIS — Z48.02 ENCOUNTER FOR REMOVAL OF SUTURES: Primary | ICD-10-CM

## 2024-06-12 DIAGNOSIS — C44.311 BASAL CELL CARCINOMA (BCC) OF SKIN OF NOSE: ICD-10-CM

## 2024-06-12 PROCEDURE — 15260 FTH/GFT FR N/E/E/L 20 SQCM/<: CPT | Performed by: DERMATOLOGY

## 2024-06-12 PROCEDURE — 17311 MOHS 1 STAGE H/N/HF/G: CPT | Performed by: DERMATOLOGY

## 2024-06-12 NOTE — RESULT ENCOUNTER NOTE
DERMATOPATHOLOGY RESULT NOTE    Results reviewed by ordering physician.  Called patient to personally discuss results. Discussed results with patient at time of mohs surgery arm      Instructions for Clinical Derm Team:   (remember to route Result Note to appropriate staff):    At time of visit scheduled for excision of arm lesion , done    Result & Plan by Specimen:    Specimen A: malignant  Plan: excision    Final Diagnosis  A. Skin, right dorsal forearm, biopsy:    Pigmented BASAL CELL CARCINOMA (SUPERFICIAL AND NODULAR TYPE); extends to the tissue edges.

## 2024-06-12 NOTE — PATIENT INSTRUCTIONS
"Mohs Microscopic Surgery After Care    WOUND CARE AFTER SURGERY:    For the donor site on the right clavicle:    Try NOT to remove the pressure bandage for 48 hours. Keep the area clean and dry while this bandage is on.     After removing the bandage for the first time, gently clean the area with soap and water. If the bandage is difficult to remove, getting the bandage wet in the shower will sometimes help soften the adhesive and allow it to be removed more easily.     You will now need to cleanse this area daily in the shower with gentle soap. There is no need to scrub the area.     for fast absorbing gut: All your stitches will dissolve over the next two weeks. You will need to keep these moist with Vaseline or Aquaphor ointment (these are both over the counter ointments and not prescriptions) and covered with a bandage over the next 2 weeks for them to dissolve appropriately. Non stick dressing and paper tape (or Hypafix branded tape) are      For the graft site (Nose):     Do NOT remove the pressure bandage until follow up appointment next week.  If you are having issues with the bandage, you can call the Mohs department and have it replaced.         RESTRICTIONS:     For two DAYS:   - You will need to take it very easy as this time is highest risk for bleeding. Being a \"couch potato\" during these two days is generally recommended.   - For surgeries on the face/neck/scalp: Avoid leaning down to pick things up off the floor as this brings blood up to your head. Instead, squat down to pick things up.     For two WEEKS:   - No heavy lifting (anything greater than 10 pounds)   - You can start to do slow, gentle activities such as slow walking but nothing to increase your heart rate and blood pressure too much (such as cardiovascular exercise). It is important to take it easy as there is still a risk for bleeding and a high risk popping of stitches open during this time.     If we did surgery near the eyes " (including the nose, forehead, front part of your scalp, cheeks): It is VERY common to get a large amount of swelling around your eyes (puffy eyes). Although less frequent, this can be enough to swell your eyes shut and can also come along with bruising. This should not hurt and is very expected and normal. It is typically worst at ~ 3 days out from your surgery and dramatically better 1 week post-operatively.     If we did surgery around your nose: No blowing your nose as this puts you at higher risk of popping stitches durign this time. Instead dab under your nose with a tissue or use a Q-tip inside your nose.        MANAGING YOUR PAIN AFTER SURGERY     You can expect to have some pain after surgery. This is normal. The pain is typically worse the first two days after surgery, and quickly begins to get better.     The best strategy for controlling your pain after surgery is around the clock pain control. You can take over the counter Acetaminophen (Tylenol) for discomfort, if no contraindications.     If you are taking this at the maximum dose, you can alternate this with Motrin (ibuprofen or Advil) as well. Alternating these medications with each other allows you to maximize your pain control. In addition to Tylenol and Motrin, you can use heating pads or ice packs on your incisions to help reduce your pain.     How will I alternate your regular strength over-the-counter pain medication?  You will take a dose of pain medication every three hours.   Start by taking 650 mg of Tylenol (2 pills of 325 mg)   3 hours later take 600 mg of Motrin (3 pills of 200 mg)   3 hours after taking the Motrin take 650 mg of Tylenol   3 hours after that take 600 mg of Motrin.    See example - if your first dose of Tylenol is at 12:00 PM     12:00 PM  Tylenol 650 mg (2 pills of 325 mg)    3:00 PM  Motrin 600 mg (3 pills of 200 mg)    6:00 PM  Tylenol 650 mg (2 pills of 325 mg)    9:00 PM  Motrin 600 mg (3 pills of 200 mg)     Continue alternating every 3 hours      Important:   Do not take more than 4000mg of Tylenol or 3200mg of Motrin in a 24-hour period.     What if I still have pain?   If you have pain that is not controlled with the over-the-counter pain medications (Tylenol and Motrin or Advil), don't hesitate to call our staff using the number provided. We will help make sure you are managing your pain in the best way possible, and if necessary, we can provide a prescription for additional pain medication.     CALL OUR OFFICE IMMEDIATELY FOR ANY SIGNS OF INFECTION:    This includes fever, chills, increased redness, warmth, tenderness, severe discomfort/pain, or pus or foul smell coming from the wound. If you are experiencing any of the above, please call [facility] directly at [phone].    IF BLEEDING IS NOTICED:    Place a clean cloth over the area and apply firm pressure for thirty minutes.  Check the wound ONLY after 30 minutes of direct pressure; do not cheat and sneak a peak, as that does not count.  If bleeding persists after 30 minutes of legitimate direct pressure, then try one more round of direct pressure to the area.  Should the bleeding become heavier or not stop after the second attempt, call [facility] directly at [phone]. Your call will get routed to the dermatology surgeon on call even after hours.

## 2024-06-12 NOTE — PROGRESS NOTES
MOHS Procedure Note    Patient: Alex Cheung  : 1940  MRN: [id number]  Date: 2024    History of Present Illness: The patient is a 84 y.o. male who presents with complaints of basal cell carcinoma, superficial and nodular type, right tip of nose.    Past Medical History:   Diagnosis Date   • Anxiety    • Arthritis     back   • Back pain    • Balance problem    • Basal cell carcinoma (BCC)     in past   • Basal cell carcinoma (BCC) 10/04/2022    Right [place]   • Basal cell carcinoma (BCC) 10/03/2023    right lip, mohs   • Basal cell carcinoma (BCC) 2024    right tip of nose   • BCC (basal cell carcinoma of skin) 2021    Right tip of nose   • BCC (basal cell carcinoma of skin) 2022    Left lip   • BCC (basal cell carcinoma of skin) 2022    Right cheek   • Cancer (HCC)    • Gout    • Hard of hearing    • Hearing aid worn     ziyad   • Hearing aid worn    • Herniated nucleus pulposus, L4-5    • [place] (hard of hearing)    • Hypertension    • Incisional hernia    • Incontinence    • Macular degeneration    • Melanoma (HCC)     left cheek- history   • Melanoma (HCC) 2022    Right neck   • Nocturia    • Pulmonary nodules    • Reactive airway disease    • 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   • Skin cancer 2020    AFX- scalp   • Squamous cell carcinoma     in past   • Squamous cell skin cancer        Past Surgical History:   Procedure Laterality Date   • BASAL CELL CARCINOMA EXCISION Right 07/10/2023    Procedure: EXCISION BASAL CELL CARCINOMA  RIGHT CHEEK;  Surgeon: Fortunato Booth MD;  [facility];  Service: Surgical Oncology   • CATARACT EXTRACTION Bilateral    • COLONOSCOPY     • FLAP LOCAL HEAD / NECK Right 07/10/2023    Procedure: RIGHT CHEEK RECON W/ LOCAL FLAP & SKIN GRAFT;  Surgeon: Leola Chatman MD;  [facility];  Service: Plastics   • HERNIA REPAIR     • HYDROCELE EXCISION / REPAIR     • MASTOID SURGERY Left    • MOHS SURGERY      • MOHS SURGERY  09/29/2021    Right tip of nose-Dr. Caraballo   • MOHS SURGERY  10/06/2022    Left lip-Dr. Caraballo   • MOHS SURGERY  05/16/2023    Right [place] - Dr. Matthews   • MOHS SURGERY Right 03/27/2024    BCC right lip, Dr Caraballo   • MOHS SURGERY Right 06/12/2024    BCC right tip of nose;    • OTHER SURGICAL HISTORY      sarcoma scalp with skin graft   • FL EXCISION MALIGNANT LESION F/E/E/N/L 0.5 CM/< Left 02/07/2023    Procedure: WIDE EXCISION OF LEFT NASAL MELANOMA;  Surgeon: Frotunato Booth MD;  [facility];  Service: Surgical Oncology   • FL REPAIR FIRST ABDOMINAL WALL HERNIA N/A 04/22/2021    Procedure: REPAIR HERNIA INCISIONAL OPEN WITH MESH;  Surgeon: Darryl Landry MD;  [facility];  Service: General   • SCALP EXCISION N/A 03/28/2018    Procedure: SCALP SARCOMA EXCISION WITH FULL THICKNESS SKIN GRAFT;  Surgeon: Jani Pagan MD;  [facility];  Service: Plastics   • SKIN BIOPSY     • SKIN CANCER EXCISION     • SKIN LESION EXCISION Right 07/10/2023    Procedure: WIDE EXCISION MELANOMA SCAR, RIGHT CHEEK;  Surgeon: Fortunato Booth MD;  [facility];  Service: Surgical Oncology   • SPLIT THICKNESS SKIN GRAFT Left 02/07/2023    Procedure: APPLICATION OF SKIN SUBSTITUTE GRAFT TO NOSE;  Surgeon: Leola Chatman MD;  [facility];  Service: Plastics   • TONSILLECTOMY AND ADENOIDECTOMY           Current Outpatient Medications:   •  amLODIPine (NORVASC) 10 mg tablet, TAKE 1 TABLET BY MOUTH  DAILY, Disp: 90 tablet, Rfl: 3  •  bacitracin ointment, Apply topically daily for 7 days Apply to bolster site daily (Patient taking differently: Apply topically as needed Apply to bolster site daily), Disp: 30 g, Rfl: 0  •  Fluocinolone Acetonide Scalp 0.01 % OIL, Apply a thin layer topically daily at night one hour before bedtime., Disp: 118.28 mL, Rfl: 5  •  fluorouracil (EFUDEX) 5 % cream, Apply topically 2 (two) times a day Right cheek (Patient not taking: Reported on  6/6/2024), Disp: 40 g, Rfl: 0  •  imiquimod (ALDARA) 5 % cream, Apply topically once a day Monday thru Friday for 6 weeks (Patient taking differently: if needed), Disp: 24 each, Rfl: 1  •  lisinopril (ZESTRIL) 20 mg tablet, TAKE 1 TABLET BY MOUTH DAILY, Disp: 90 tablet, Rfl: 1  •  metoprolol succinate (TOPROL-XL) 50 mg 24 hr tablet, TAKE 1 TABLET BY MOUTH DAILY, Disp: 90 tablet, Rfl: 1  •  mometasone (ELOCON) 0.1 % cream, APPLY TO EARS ONCE TO TWICE DAILY AS NEEDED FOR SCALING, Disp: , Rfl:   •  Multiple Vitamins-Minerals (PRESERVISION AREDS 2) CAPS, Take 1 capsule by mouth 2 (two) times a day, Disp: , Rfl:   •  mupirocin (BACTROBAN) 2 % ointment, Apply topically two to three times a day to sore areas (Patient taking differently: Apply topically if needed Apply topically two to three times a day to sore areas), Disp: 22 g, Rfl: 3  •  Niacin (VITAMIN B-3 PO), Take by mouth, Disp: , Rfl:   •  Niacinamide-Zn-Cu-Zaglad-Cc-Bd (NICOTINAMIDE PO), Take 500 mg by mouth 2 (two) times a day, Disp: , Rfl:   •  Pyridoxine HCl (VITAMIN B-6 PO), Take by mouth (Patient not taking: Reported on 3/4/2024), Disp: , Rfl:   •  traMADol (Ultram) 50 mg tablet, Take 1 tablet (50 mg total) by mouth every 6 (six) hours as needed for moderate pain for up to 15 doses (Patient not taking: Reported on 10/27/2023), Disp: 15 tablet, Rfl: 0    Allergies   Allergen Reactions   • Erythromycin Other (See Comments)     Thrush        Physical Exam:   Vitals:    06/12/24 1252   BP: (!) 172/88   Pulse: 73   Temp: 97.8 °F (36.6 °C)   SpO2: 97%     General: Awake, Alert, Oriented x 3, Mood and affect appropriate  Respiratory: Respirations even and unlabored  Cardiovascular: Peripheral pulses intact; no edema  Musculoskeletal Exam: N/A    Assessment: 1.1 x 1.1 cm crusted papule, location previous biopsy. Biopsy proved positive for basal cell carcinoma, superficial and nodular type, on tip of the nose.    Plan: Mohs    Time of H&P Completion:1257    MOHS  Procedure Timeout    Flowsheet Row Most Recent Value   Timeout: 0100   Patient Identity Verified: Yes   Correct Site Verified: Yes   Correct Procedure Verified: Yes          MOHS Diagnosis/Indication/Location/ID    Flowsheet Row Most Recent Value   Pathology Type Basal cell carcinoma   Anatomic Site --  [Right tip of nose]   Indications for MOHS tumor location   MOHS ID [id number]          MOHS Site/Accession/Pre-Post    Flowsheet Row Most Recent Value   Original Site Identified (as submitted by referring clinician) Referral, Photo   Biopsy Accession/Specimen # (as submitted by referring clincian) [id number]   Pre-MOHS Size Length (cm) 1.1   Pre-MOHS Size Width (cm) 1.1   Post-MOHS Size-Length (cm) 1.6   Post MOHS Size-Width (cm) 1.6   Repair Type Full thickness skin graft   Suture Type Ethilon, Fast absorbing gut, Vicryl   Ethilon Suture Size 5   Fast Absorbing Suture Size 5   Vicryl Suture Size 5   Flap/Graft area (cm2) 2.56   Anesthetic Used 1% Lidocaine with epinephrine  [buffered]          MOHS Tumor Stage 1 Information    Flowsheet Row Most Recent Value   Tissue Sections (blocks) 2   Microscopic Exam Section 1: No tumor identified in section.  [no tumor post curettage]   Microscopic Exam Section 2: Irregularly shaped cords and strands of basaloid keratinocytes infiltrate the dermis with a spiky growth pattern. The nuclei at the periphery of the islands have a palisaded arrangement. Islands are associated with a fibromyxoid stroma and clefting.  [all margins clear.  Beginnin with 4th complete section infiltrating attneuated nest of tumor seen.]   Tumor Clear After Stage I? Yes                      Patient identified, procedure verified, site identified and verified. Time out completed. Surgical removal of the lesion discussed with the patient (risks and benefits, including possibility of scarring, infection, recurrence or potential for further treatment)  I have specifically identified the site with the patient.  I have discussed the fact that the patient will have a scar after the procedure regardless of granulation or repair with sutures. I have discussed that the repair options can range from granulation in some cases to linear or curvilinear closures to larger flaps or grafts.  There are sometimes flaps or grafts used that require multiples stages of surgery and will not be completed today, rather be completed over a series of appointments. I have discussed that occasionally due to location, size or depth of the lesion I may recommend consultation with and transfer of care for further removal or the reconstruction to another provider such as ophthalmology surgery, plastic surgery, ENT surgery, or surgical oncology. There are cases in which other testing such as imaging with MRI or CT scan or testing of lymph nodes is recommended because of the nature/depth/location of tumor seen during the removal. There is a risk of injury to nerves causing temporary or permanent numbness or the inability to move muscles full such as the inability to lift eyebrows. Questions answered and verbal and written consent was obtained.    OPERATIVE REPORT: FULL-THICKNESS SKIN GRAFT     With the patient in the supine position and under adequate local anesthesia with 1% lidocaine with epinephrine 1:100,000, the defect was scrubbed with Iodine. Sterile drapes were placed from the sterile tray.      After discussion, because of the location and sizes of the surgical defect,  a full thickness skin graft  was judged to give the best possible cosmetic and functional result. I was noted due to scar tissue and sized of defect linear closure and flap was felt not to be feasible.    A telfa template of the recipient site was used to create the shape of the skin resected from the donor site, the supraclavicular fossa.  The graft donor site was raised, hemostasis was achieved, cutaneous margins were approximated and closed with buried subcutaneous sutures  as noted above. The cutaneous margins were closed using interrupted layered suture. The wound was dressed with a non-stick pad, and a compression dressing.      The skin graft was defatted. The bed of the recipient site was prepared by limited surgical debridement. Hemostasis was obtained by pinpoint electrocoagulation.   The graft was placed into the recipient site and secured by simple interrupted and running sutures as noted above.    Estimated blood loss was less than 5 mL.  The patient tolerated the procedure well.  The recipient graft site was dressed with  petrolatum, a non-stick pad, and a compression dressing.  Wound care was discussed with the patient, and a wound care instruction sheet was given.     Earnest Caraballo MD served as the surgeon and pathologist during the procedure.    Postoperative care: Wound care discussed at length.  I urged the patient to call us if any problems or question should arise.     Complications: none  Post-op medications: none  Patient condition after procedure: stable  Discharge plans: Plan for return to us for suture removal, as scheduled in 7 days.                The tumor qualifies for Mohs based on AUC criteria. Dr. Caraballo served as the surgeon and pathologist during the procedure.    Postoperative care: No would care should be necessary prior to the next visit but I urged the patient to call us if any problems or question should arise prior to that time. If circumstances should change, we will contact the patient to make other arrangements.       Suture removal    Date/Time: 6/12/2024 1:00 PM    Performed by: Isadora Nettles MA  Authorized by: Earnest Caraballo MD  Universal Protocol:  Consent: Verbal consent obtained.  Consent given by: patient  Timeout called at: 6/12/2024 1:05 PM.  Patient understanding: patient states understanding of the procedure being performed  Relevant documents: relevant documents present and verified  Test results: test results available and  properly labeled  Site marked: the operative site was marked  Patient identity confirmed: verbally with patient and arm band      Patient location:  Clinic  Location:     Laterality:  Right    Location:  Upper extremity    Upper extremity location:  Arm    Arm location:  R lower arm  Procedure details:     Tools used:  Suture removal kit    Wound appearance:  No sign(s) of infection and good wound healing    Number of sutures removed:  2  Post-procedure details:     Post-removal:  Band-Aid applied (vaseline)    Patient tolerance of procedure:  Tolerated well, no immediate complications  Comments:      Results of biopsy were reviewed with patient by .  It was advised for patient to schedule an excision for removal.   Patient wanted to wait 6 months before excision, okay per   Patient was scheduled for December 2024 with .              Scribe Attestation    I,:  Isadora Nettles MA am acting as a scribe while in the presence of the attending physician.:       I,:  Earnest Caraballo MD personally performed the services described in this documentation    as scribed in my presence.:

## 2024-06-19 ENCOUNTER — OFFICE VISIT (OUTPATIENT)
Dept: DERMATOLOGY | Facility: CLINIC | Age: 84
End: 2024-06-19

## 2024-06-19 DIAGNOSIS — Z48.89 ENCOUNTER FOR POST SURGICAL WOUND CHECK: Primary | ICD-10-CM

## 2024-06-19 PROCEDURE — 99024 POSTOP FOLLOW-UP VISIT: CPT | Performed by: DERMATOLOGY

## 2024-06-20 DIAGNOSIS — I10 PRIMARY HYPERTENSION: ICD-10-CM

## 2024-06-20 NOTE — PROGRESS NOTES
WOUND CHECK    Physical Exam:  Anatomic Location Affected:  right tip of nose  Description of wound: well healing but very fragile graft  Closure Type: full thickness graft from the right clavicle    Additional History of Present Condition:  s/p Mohs sx on 6/12/24, sutures remain intact until next week's appointment with Dr. Caraballo.     Assessment and Plan:  Based on a thorough discussion of this condition and the management approach to it (including a comprehensive discussion of the known risks, side effects and potential benefits of treatment), the patient (family) agrees to implement the following specific plan:  Patient will begin to gently cleanse wound but avoid direct water pressure.   Patient will apply vaseline and a bandage daily until suture removal next week.   Patient will call if there are any questions or concerns prior to next week's appointment.          Dusky graft, will leave sutures and start wound care.  Vaseline and bandage applied today.  Wound care discussed.  S/R in 1 week.    Scribe Attestation      I,:  Cherie Mcpherson RN am acting as a scribe while in the presence of the attending physician.:       I,:  Wayne Awad MD personally performed the services described in this documentation    as scribed in my presence.:

## 2024-06-21 RX ORDER — METOPROLOL SUCCINATE 50 MG/1
TABLET, EXTENDED RELEASE ORAL
Qty: 90 TABLET | Refills: 1 | Status: SHIPPED | OUTPATIENT
Start: 2024-06-21

## 2024-06-26 ENCOUNTER — OFFICE VISIT (OUTPATIENT)
Dept: DERMATOLOGY | Facility: CLINIC | Age: 84
End: 2024-06-26

## 2024-06-26 DIAGNOSIS — Z48.02 ENCOUNTER FOR REMOVAL OF SUTURES: Primary | ICD-10-CM

## 2024-06-26 PROCEDURE — 99024 POSTOP FOLLOW-UP VISIT: CPT | Performed by: DERMATOLOGY

## 2024-06-26 NOTE — PROGRESS NOTES
"Suture removal    Date/Time: 6/26/2024 2:30 PM    Performed by: Nic Poole RN  Authorized by: Earnest Caraballo MD  Universal Protocol:  Consent: Verbal consent obtained. Written consent not obtained.  Risks and benefits: risks, benefits and alternatives were discussed  Consent given by: patient  Time out: Immediately prior to procedure a \"time out\" was called to verify the correct patient, procedure, equipment, support staff and site/side marked as required.  Timeout called at: 6/26/2024 2:50 PM.  Patient understanding: patient states understanding of the procedure being performed  Patient consent: the patient's understanding of the procedure matches consent given  Procedure consent: procedure consent matches procedure scheduled  Relevant documents: relevant documents not present or verified  Test results: test results not available  Site marked: the operative site was not marked  Radiology Images displayed and confirmed. If images not available, report reviewed: imaging studies not available  Patient identity confirmed: verbally with patient      Patient location:  Clinic  Location:     Laterality:  Right    Location:  Head/neck    Head/neck location:  Nose (tip of nose)  Procedure details:     Tools used:  Suture removal kit    Wound appearance:  No sign(s) of infection, good wound healing, clean, moist, pink, nontender and nonpurulent    Number of sutures removed:  4  Post-procedure details:     Patient tolerance of procedure:  Tolerated well, no immediate complications  Comments:      Patient was encouraged to continue to clean and care for the wound until fully healed. Patient was encouraged to continue to follow up for regular full body skin exams as scheduled.     Suture removal    Date/Time: 6/26/2024 2:30 PM    Performed by: Nic Poole RN  Authorized by: Earnest Caraballo MD  Universal Protocol:  Consent: Verbal consent obtained. Written consent not obtained.  Risks and benefits: risks, benefits " "and alternatives were discussed  Consent given by: patient  Time out: Immediately prior to procedure a \"time out\" was called to verify the correct patient, procedure, equipment, support staff and site/side marked as required.  Timeout called at: 6/26/2024 2:30 PM.  Patient understanding: patient states understanding of the procedure being performed  Patient consent: the patient's understanding of the procedure matches consent given  Procedure consent: procedure consent matches procedure scheduled  Relevant documents: relevant documents not present or verified  Test results: test results not available  Site marked: the operative site was not marked  Radiology Images displayed and confirmed. If images not available, report reviewed: imaging studies not available  Patient identity confirmed: verbally with patient      Patient location:  Clinic  Location:     Laterality:  Right    Location:  Trunk    Trunk location:  Chest (Supraclavlicular)  Procedure details:     Tools used:  Suture removal kit    Wound appearance:  No sign(s) of infection, good wound healing, clean, moist, pink, nontender and nonpurulent    Number of sutures removed:  4  Post-procedure details:     Post-removal:  Band-Aid applied (Vaseline Ointment)    Patient tolerance of procedure:  Tolerated well, no immediate complications  Comments:      Patient was encouraged to continue to clean and care for the wound until fully healed. Patient was encouraged to continue to follow up for regular full body skin exams as scheduled.               "

## 2024-07-12 DIAGNOSIS — I10 ESSENTIAL HYPERTENSION: ICD-10-CM

## 2024-07-12 RX ORDER — AMLODIPINE BESYLATE 10 MG/1
TABLET ORAL
Qty: 90 TABLET | Refills: 3 | Status: SHIPPED | OUTPATIENT
Start: 2024-07-12

## 2024-07-30 ENCOUNTER — CLINICAL SUPPORT (OUTPATIENT)
Dept: DERMATOLOGY | Facility: CLINIC | Age: 84
End: 2024-07-30

## 2024-07-30 DIAGNOSIS — Z48.89 ENCOUNTER FOR POST SURGICAL WOUND CHECK: Primary | ICD-10-CM

## 2024-07-30 PROCEDURE — 99024 POSTOP FOLLOW-UP VISIT: CPT | Performed by: DERMATOLOGY

## 2024-07-30 NOTE — PROGRESS NOTES
WOUND CHECK    Physical Exam:  Anatomic Location Affected:  Right tip of nose   Closure Type: skin graft placed     Additional History of Present Condition:  Patient s/p mohs on 06/12/2024. Graft was placed.     Assessment and Plan:  Based on a thorough discussion of this condition and the management approach to it (including a comprehensive discussion of the known risks, side effects and potential benefits of treatment), the patient (family) agrees to implement the following specific plan:  Dr Caraballo debrided wound  Start scrubbing area with soap and water  Monitor site for next 3 weeks and call back if any issues with healing

## 2024-07-31 ENCOUNTER — OFFICE VISIT (OUTPATIENT)
Dept: PODIATRY | Facility: CLINIC | Age: 84
End: 2024-07-31
Payer: MEDICARE

## 2024-07-31 VITALS — RESPIRATION RATE: 18 BRPM | WEIGHT: 172 LBS | HEIGHT: 69 IN | BODY MASS INDEX: 25.48 KG/M2

## 2024-07-31 DIAGNOSIS — I73.9 PERIPHERAL VASCULAR DISEASE, UNSPECIFIED (HCC): Primary | ICD-10-CM

## 2024-07-31 PROCEDURE — 11719 TRIM NAIL(S) ANY NUMBER: CPT | Performed by: PODIATRIST

## 2024-07-31 PROCEDURE — RECHECK: Performed by: PODIATRIST

## 2024-07-31 NOTE — PROGRESS NOTES
Established patient with class findings presents for nail care.  Vascular exam:  DP  0/4 bilateral; PT  0 4 bilateral   Dermatological exam:  Each toenail is thick and  dystrophic.  Diagnosis:  PVD  Treatment: Trimmed multiple dystrophic toenails.     Nail removal    Date/Time: 7/31/2024 1:45 PM    Performed by: Jake Cabezas DPM  Authorized by: Jake Cabezas DPM    Nails trimmed:     Number of nails trimmed:  10

## 2024-08-27 ENCOUNTER — APPOINTMENT (OUTPATIENT)
Dept: LAB | Facility: CLINIC | Age: 84
End: 2024-08-27
Payer: MEDICARE

## 2024-08-27 DIAGNOSIS — C43.31 MALIGNANT MELANOMA OF NOSE (HCC): ICD-10-CM

## 2024-08-27 DIAGNOSIS — C43.30 MALIGNANT MELANOMA OF FACE EXCLUDING EYELID, NOSE, LIP, AND EAR (HCC): ICD-10-CM

## 2024-08-27 DIAGNOSIS — Z85.820 ENCOUNTER FOR FOLLOW-UP SURVEILLANCE OF MELANOMA: ICD-10-CM

## 2024-08-27 DIAGNOSIS — Z08 ENCOUNTER FOR FOLLOW-UP SURVEILLANCE OF MELANOMA: ICD-10-CM

## 2024-08-27 DIAGNOSIS — C44.319 BASAL CELL CARCINOMA OF SKIN OF OTHER PARTS OF FACE: ICD-10-CM

## 2024-08-27 DIAGNOSIS — C43.9 MELANOMA OF SKIN (HCC): ICD-10-CM

## 2024-08-27 LAB
ALBUMIN SERPL BCG-MCNC: 4 G/DL (ref 3.5–5)
ALP SERPL-CCNC: 111 U/L (ref 34–104)
ALT SERPL W P-5'-P-CCNC: 17 U/L (ref 7–52)
ANION GAP SERPL CALCULATED.3IONS-SCNC: 7 MMOL/L (ref 4–13)
AST SERPL W P-5'-P-CCNC: 15 U/L (ref 13–39)
BASOPHILS # BLD AUTO: 0.04 THOUSANDS/ÂΜL (ref 0–0.1)
BASOPHILS NFR BLD AUTO: 1 % (ref 0–1)
BILIRUB SERPL-MCNC: 0.51 MG/DL (ref 0.2–1)
BUN SERPL-MCNC: 19 MG/DL (ref 5–25)
CALCIUM SERPL-MCNC: 9.3 MG/DL (ref 8.4–10.2)
CHLORIDE SERPL-SCNC: 108 MMOL/L (ref 96–108)
CO2 SERPL-SCNC: 26 MMOL/L (ref 21–32)
CREAT SERPL-MCNC: 1.23 MG/DL (ref 0.6–1.3)
EOSINOPHIL # BLD AUTO: 0.4 THOUSAND/ÂΜL (ref 0–0.61)
EOSINOPHIL NFR BLD AUTO: 6 % (ref 0–6)
ERYTHROCYTE [DISTWIDTH] IN BLOOD BY AUTOMATED COUNT: 12.7 % (ref 11.6–15.1)
GFR SERPL CREATININE-BSD FRML MDRD: 53 ML/MIN/1.73SQ M
GLUCOSE P FAST SERPL-MCNC: 100 MG/DL (ref 65–99)
HCT VFR BLD AUTO: 42.1 % (ref 36.5–49.3)
HGB BLD-MCNC: 14 G/DL (ref 12–17)
IMM GRANULOCYTES # BLD AUTO: 0.02 THOUSAND/UL (ref 0–0.2)
IMM GRANULOCYTES NFR BLD AUTO: 0 % (ref 0–2)
LDH SERPL-CCNC: 162 U/L (ref 140–271)
LYMPHOCYTES # BLD AUTO: 1.79 THOUSANDS/ÂΜL (ref 0.6–4.47)
LYMPHOCYTES NFR BLD AUTO: 27 % (ref 14–44)
MCH RBC QN AUTO: 30.6 PG (ref 26.8–34.3)
MCHC RBC AUTO-ENTMCNC: 33.3 G/DL (ref 31.4–37.4)
MCV RBC AUTO: 92 FL (ref 82–98)
MONOCYTES # BLD AUTO: 0.45 THOUSAND/ÂΜL (ref 0.17–1.22)
MONOCYTES NFR BLD AUTO: 7 % (ref 4–12)
NEUTROPHILS # BLD AUTO: 3.85 THOUSANDS/ÂΜL (ref 1.85–7.62)
NEUTS SEG NFR BLD AUTO: 59 % (ref 43–75)
NRBC BLD AUTO-RTO: 0 /100 WBCS
PLATELET # BLD AUTO: 185 THOUSANDS/UL (ref 149–390)
PMV BLD AUTO: 11.4 FL (ref 8.9–12.7)
POTASSIUM SERPL-SCNC: 4.2 MMOL/L (ref 3.5–5.3)
PROT SERPL-MCNC: 7 G/DL (ref 6.4–8.4)
RBC # BLD AUTO: 4.57 MILLION/UL (ref 3.88–5.62)
SODIUM SERPL-SCNC: 141 MMOL/L (ref 135–147)
WBC # BLD AUTO: 6.55 THOUSAND/UL (ref 4.31–10.16)

## 2024-08-27 PROCEDURE — 36415 COLL VENOUS BLD VENIPUNCTURE: CPT

## 2024-08-27 PROCEDURE — 80053 COMPREHEN METABOLIC PANEL: CPT

## 2024-08-27 PROCEDURE — 83615 LACTATE (LD) (LDH) ENZYME: CPT

## 2024-08-27 PROCEDURE — 85025 COMPLETE CBC W/AUTO DIFF WBC: CPT

## 2024-08-29 ENCOUNTER — HOSPITAL ENCOUNTER (OUTPATIENT)
Dept: CT IMAGING | Facility: HOSPITAL | Age: 84
Discharge: HOME | End: 2024-08-29
Attending: INTERNAL MEDICINE
Payer: MEDICARE

## 2024-08-29 DIAGNOSIS — C43.31 MALIGNANT MELANOMA OF NOSE (HCC): ICD-10-CM

## 2024-08-29 DIAGNOSIS — C43.30 MALIGNANT MELANOMA OF FACE EXCLUDING EYELID, NOSE, LIP, AND EAR (HCC): ICD-10-CM

## 2024-08-29 PROCEDURE — 70491 CT SOFT TISSUE NECK W/DYE: CPT

## 2024-08-29 PROCEDURE — 74177 CT ABD & PELVIS W/CONTRAST: CPT

## 2024-08-29 PROCEDURE — 71260 CT THORAX DX C+: CPT

## 2024-08-29 RX ADMIN — IOHEXOL 100 ML: 350 INJECTION, SOLUTION INTRAVENOUS at 09:45

## 2024-09-03 ENCOUNTER — TELEPHONE (OUTPATIENT)
Dept: HEMATOLOGY ONCOLOGY | Facility: CLINIC | Age: 84
End: 2024-09-03

## 2024-09-03 NOTE — TELEPHONE ENCOUNTER
Patient called stating he is unable to make the appointment scheduled for 9/5. Per patient wife was recently hospitalized. Patient is requesting if follow up can be changed to virtual/telephone call to discuss CT scan results. Advised patient I will send message to office.       Thank you!

## 2024-09-05 ENCOUNTER — TELEMEDICINE (OUTPATIENT)
Dept: HEMATOLOGY ONCOLOGY | Facility: CLINIC | Age: 84
End: 2024-09-05
Payer: MEDICARE

## 2024-09-05 DIAGNOSIS — Z08 ENCOUNTER FOR FOLLOW-UP SURVEILLANCE OF MELANOMA: Primary | ICD-10-CM

## 2024-09-05 DIAGNOSIS — C43.31 MALIGNANT MELANOMA OF NOSE (HCC): ICD-10-CM

## 2024-09-05 DIAGNOSIS — C43.30 MALIGNANT MELANOMA OF FACE EXCLUDING EYELID, NOSE, LIP, AND EAR (HCC): ICD-10-CM

## 2024-09-05 DIAGNOSIS — Z85.820 ENCOUNTER FOR FOLLOW-UP SURVEILLANCE OF MELANOMA: Primary | ICD-10-CM

## 2024-09-05 PROCEDURE — 99213 OFFICE O/P EST LOW 20 MIN: CPT | Performed by: INTERNAL MEDICINE

## 2024-09-05 PROCEDURE — G2211 COMPLEX E/M VISIT ADD ON: HCPCS | Performed by: INTERNAL MEDICINE

## 2024-09-05 NOTE — PROGRESS NOTES
Virtual Regular Visit  Name: Alex Cheung      : 1940      MRN: [id number]  Encounter Provider: Sona Call MD  Encounter Date: 2024   Encounter department: [facility] [place]    Verification of patient location:    Patient is located at Home in the following state in which I hold an active license [place]    Assessment & Plan   1. Encounter for follow-up surveillance of melanoma  2. Malignant melanoma of face excluding eyelid, nose, lip, and ear (HCC)  3. Malignant melanoma of nose (HCC)      He is doing well and has no evidence of melanoma recurrence or metastasis.  Labs reviewed and okay.  He will continue to follow-up with us closely.  He will also follow-up with dermatology.  He is due for follow-up again in 3 months.  At that time he will have full body exam as well as blood work.  Orders placed and prescription divided for blood work to be done in 3 months.  I will also have our schedulers call to schedule him for a follow-up in 3 months.  He knows to call with issues or concerns prior to his next visit.  He knows to continue to monitor for any new, changing, concerning skin lesions or lymphadenopathy.    Encounter provider Sona Call MD    The patient was identified by name and date of birth. Alex Cheung was informed that this is a telemedicine visit and that the visit is being conducted through Telephone as patient was unable to connect to Telehealth and virutal visits. My office door was closed. No one else was in the room.  He acknowledged consent and understanding of privacy and security of the video platform. The patient has agreed to participate and understands they can discontinue the visit at any time.    Patient is aware this is a billable service.     History of Present Illness     Alex Cheung is a 84 y.o. 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      His wife just came home from the hospital with a  bowel perforation and has a drain.  He needed to make this a virtual visit.    He is doing well.  Denies any issues concerns or complaints at this time.  Feeling good.  Denies any new, changing, concerning skin lesions.  Denies any lymphadenopathy.  No changes noted by him.    He did have imaging on 8/29/2024 with a CT soft tissues neck which is negative for any lymphadenopathy, masses, or nodules no concerning features for melanoma.  He had a CT chest, abdomen and pelvis on the same day that also demonstrates no evidence of metastatic melanoma.    Review of Systems   Constitutional:  Negative for activity change, chills, fatigue and fever.   HENT:  Negative for ear pain, sore throat, trouble swallowing and voice change.    Eyes:  Negative for pain and visual disturbance.   Respiratory:  Negative for cough and shortness of breath.    Cardiovascular:  Negative for chest pain and palpitations.   Gastrointestinal:  Negative for abdominal pain, constipation, diarrhea, nausea and vomiting.   Genitourinary:  Negative for dysuria and hematuria.   Musculoskeletal:  Negative for arthralgias, back pain and myalgias.   Skin:  Negative for color change and rash.   Neurological:  Negative for dizziness, seizures, syncope, light-headedness, numbness and headaches.   Hematological:  Negative for adenopathy.   All other systems reviewed and are negative.    Pertinent Medical History     Current Outpatient Medications on File Prior to Visit   Medication Sig Dispense Refill    amLODIPine (NORVASC) 10 mg tablet TAKE 1 TABLET BY MOUTH DAILY 90 tablet 3    bacitracin ointment Apply topically daily for 7 days Apply to bolster site daily (Patient taking differently: Apply topically as needed Apply to bolster site daily) 30 g 0    Fluocinolone Acetonide Scalp 0.01 % OIL Apply a thin layer topically daily at night one hour before bedtime. 118.28 mL 5    fluorouracil (EFUDEX) 5 % cream Apply topically 2 (two) times a day Right cheek (Patient  not taking: Reported on 2024) 40 g 0    imiquimod (ALDARA) 5 % cream Apply topically once a day Monday thru Friday for 6 weeks (Patient taking differently: if needed) 24 each 1    lisinopril (ZESTRIL) 20 mg tablet TAKE 1 TABLET BY MOUTH DAILY 90 tablet 1    metoprolol succinate (TOPROL-XL) 50 mg 24 hr tablet TAKE 1 TABLET BY MOUTH DAILY 90 tablet 1    mometasone (ELOCON) 0.1 % cream APPLY TO EARS ONCE TO TWICE DAILY AS NEEDED FOR SCALING      Multiple Vitamins-Minerals (PRESERVISION AREDS 2) CAPS Take 1 capsule by mouth 2 (two) times a day      mupirocin (BACTROBAN) 2 % ointment Apply topically two to three times a day to sore areas (Patient taking differently: Apply topically if needed Apply topically two to three times a day to sore areas) 22 g 3    Niacin (VITAMIN B-3 PO) Take by mouth      Niacinamide-Zn-Cu-Lszoka-Xi-Ar (NICOTINAMIDE PO) Take 500 mg by mouth 2 (two) times a day      Pyridoxine HCl (VITAMIN B-6 PO) Take by mouth (Patient not taking: Reported on 3/4/2024)      traMADol (Ultram) 50 mg tablet Take 1 tablet (50 mg total) by mouth every 6 (six) hours as needed for moderate pain for up to 15 doses (Patient not taking: Reported on 10/27/2023) 15 tablet 0    [DISCONTINUED] alclomethasone (ACLOVATE) 0.05 % cream Apply topically 2 (two) times a day as needed        No current facility-administered medications on file prior to visit.      Social History     Tobacco Use    Smoking status: Former     Current packs/day: 0.00     Average packs/day: 0.3 packs/day for 15.0 years (3.8 ttl pk-yrs)     Types: Cigarettes     Start date: 3/13/1973     Quit date: 3/13/1988     Years since quittin.5     Passive exposure: Past    Smokeless tobacco: Never   Vaping Use    Vaping status: Never Used   Substance and Sexual Activity    Alcohol use: Not Currently     Comment: beer once in awhile    Drug use: No    Sexual activity: Yes     Objective     There were no vitals taken for this visit.  Physical Exam  No  physical exam as telephone         Latest Reference Range & Units 08/27/24 08:14   Sodium 135 - 147 mmol/L 141   Potassium 3.5 - 5.3 mmol/L 4.2   Chloride 96 - 108 mmol/L 108   Carbon Dioxide 21 - 32 mmol/L 26   ANION GAP 4 - 13 mmol/L 7   BUN 5 - 25 mg/dL 19   Creatinine 0.60 - 1.30 mg/dL 1.23   GLUCOSE, FASTING 65 - 99 mg/dL 100 (H)   Calcium 8.4 - 10.2 mg/dL 9.3   AST 13 - 39 U/L 15   ALT 7 - 52 U/L 17   ALK PHOS 34 - 104 U/L 111 (H)   Total Protein 6.4 - 8.4 g/dL 7.0   Albumin 3.5 - 5.0 g/dL 4.0   Total Bilirubin 0.20 - 1.00 mg/dL 0.51   GFR, Calculated ml/min/1.73sq m 53   LD (LDH) 140 - 271 U/L 162   WBC 4.31 - 10.16 Thousand/uL 6.55   RBC 3.88 - 5.62 Million/uL 4.57   Hemoglobin 12.0 - 17.0 g/dL 14.0   Hematocrit 36.5 - 49.3 % 42.1   MCV 82 - 98 fL 92   MCH 26.8 - 34.3 pg 30.6   MCHC 31.4 - 37.4 g/dL 33.3   RDW 11.6 - 15.1 % 12.7   Platelet Count 149 - 390 Thousands/uL 185   MPV 8.9 - 12.7 fL 11.4   nRBC /100 WBCs 0   Segmented % 43 - 75 % 59   Lymphocytes % 14 - 44 % 27   Monocytes % 4 - 12 % 7   Eosinophils % 0 - 6 % 6   Basophils % 0 - 1 % 1   Immature Grans % 0 - 2 % 0   Absolute Immature Grans 0.00 - 0.20 Thousand/uL 0.02   Absolute Neutrophils 1.85 - 7.62 Thousands/µL 3.85   Absolute Lymphocytes 0.60 - 4.47 Thousands/µL 1.79   Absolute Monocytes 0.17 - 1.22 Thousand/µL 0.45   Absolute Eosinophils 0.00 - 0.61 Thousand/µL 0.40   Absolute Basophils 0.00 - 0.10 Thousands/µL 0.04   (H): Data is abnormally high  Visit Time  Total Visit Duration: 15 minutes in reviewing records, reviewing blood work, reviewing images, discussion with patient and counseling and coordination of care.    Sona Call MD, PhD

## 2024-09-05 NOTE — LETTER
2024     Fortunato Booth MD  [address]  [address] [facility] [address]    Patient: Alex Cheung   YOB: 1940   Date of Visit: 2024       Dear Dr. Booth:    Thank you for referring Alex Cheung to me for evaluation. Below are my notes for this consultation.    If you have questions, please do not hesitate to call me. I look forward to following your patient along with you.         Sincerely,        Sona Call MD        CC: DEON Velarde MD Johnny Chung, MD John Greg Brady, DO Stephen Senft, MD Melissa A Wilson, MD  2024 12:13 PM  Sign when Signing Visit  Virtual Regular Visit  Name: Alex Cheung      : 1940      MRN: [id number]  Encounter Provider: Sona Call MD  Encounter Date: 2024   Encounter department: [facility] [place]    Verification of patient location:    Patient is located at Home in the following state in which I hold an active license [place]    Assessment & Plan  1. Encounter for follow-up surveillance of melanoma  2. Malignant melanoma of face excluding eyelid, nose, lip, and ear (HCC)  3. Malignant melanoma of nose (HCC)      He is doing well and has no evidence of melanoma recurrence or metastasis.  Labs reviewed and okay.  He will continue to follow-up with us closely.  He will also follow-up with dermatology.  He is due for follow-up again in 3 months.  At that time he will have full body exam as well as blood work.  Orders placed and prescription divided for blood work to be done in 3 months.  I will also have our schedulers call to schedule him for a follow-up in 3 months.  He knows to call with issues or concerns prior to his next visit.  He knows to continue to monitor for any new, changing, concerning skin lesions or lymphadenopathy.    Encounter provider Sona Call MD    The patient was identified by name and date of birth. Alex Cheung was  informed that this is a telemedicine visit and that the visit is being conducted through Telephone as patient was unable to connect to Telehealth and virutal visits. My office door was closed. No one else was in the room.  He acknowledged consent and understanding of privacy and security of the video platform. The patient has agreed to participate and understands they can discontinue the visit at any time.    Patient is aware this is a billable service.     History of Present Illness    Alex Cheung is a 84 y.o. 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      His wife just came home from the hospital with a bowel perforation and has a drain.  He needed to make this a virtual visit.    He is doing well.  Denies any issues concerns or complaints at this time.  Feeling good.  Denies any new, changing, concerning skin lesions.  Denies any lymphadenopathy.  No changes noted by him.    He did have imaging on 8/29/2024 with a CT soft tissues neck which is negative for any lymphadenopathy, masses, or nodules no concerning features for melanoma.  He had a CT chest, abdomen and pelvis on the same day that also demonstrates no evidence of metastatic melanoma.    Review of Systems   Constitutional:  Negative for activity change, chills, fatigue and fever.   HENT:  Negative for ear pain, sore throat, trouble swallowing and voice change.    Eyes:  Negative for pain and visual disturbance.   Respiratory:  Negative for cough and shortness of breath.    Cardiovascular:  Negative for chest pain and palpitations.   Gastrointestinal:  Negative for abdominal pain, constipation, diarrhea, nausea and vomiting.   Genitourinary:  Negative for dysuria and hematuria.   Musculoskeletal:  Negative for arthralgias, back pain and myalgias.   Skin:  Negative for color change and rash.   Neurological:  Negative for dizziness, seizures, syncope, light-headedness, numbness and headaches.   Hematological:   Negative for adenopathy.   All other systems reviewed and are negative.    Pertinent Medical History    Current Outpatient Medications on File Prior to Visit   Medication Sig Dispense Refill   • amLODIPine (NORVASC) 10 mg tablet TAKE 1 TABLET BY MOUTH DAILY 90 tablet 3   • bacitracin ointment Apply topically daily for 7 days Apply to bolster site daily (Patient taking differently: Apply topically as needed Apply to bolster site daily) 30 g 0   • Fluocinolone Acetonide Scalp 0.01 % OIL Apply a thin layer topically daily at night one hour before bedtime. 118.28 mL 5   • fluorouracil (EFUDEX) 5 % cream Apply topically 2 (two) times a day Right cheek (Patient not taking: Reported on 6/6/2024) 40 g 0   • imiquimod (ALDARA) 5 % cream Apply topically once a day Monday thru Friday for 6 weeks (Patient taking differently: if needed) 24 each 1   • lisinopril (ZESTRIL) 20 mg tablet TAKE 1 TABLET BY MOUTH DAILY 90 tablet 1   • metoprolol succinate (TOPROL-XL) 50 mg 24 hr tablet TAKE 1 TABLET BY MOUTH DAILY 90 tablet 1   • mometasone (ELOCON) 0.1 % cream APPLY TO EARS ONCE TO TWICE DAILY AS NEEDED FOR SCALING     • Multiple Vitamins-Minerals (PRESERVISION AREDS 2) CAPS Take 1 capsule by mouth 2 (two) times a day     • mupirocin (BACTROBAN) 2 % ointment Apply topically two to three times a day to sore areas (Patient taking differently: Apply topically if needed Apply topically two to three times a day to sore areas) 22 g 3   • Niacin (VITAMIN B-3 PO) Take by mouth     • Niacinamide-Zn-Cu-Ghzsoa-Ee-Xo (NICOTINAMIDE PO) Take 500 mg by mouth 2 (two) times a day     • Pyridoxine HCl (VITAMIN B-6 PO) Take by mouth (Patient not taking: Reported on 3/4/2024)     • traMADol (Ultram) 50 mg tablet Take 1 tablet (50 mg total) by mouth every 6 (six) hours as needed for moderate pain for up to 15 doses (Patient not taking: Reported on 10/27/2023) 15 tablet 0   • [DISCONTINUED] alclomethasone (ACLOVATE) 0.05 % cream Apply topically 2 (two)  times a day as needed        No current facility-administered medications on file prior to visit.      Social History     Tobacco Use   • Smoking status: Former     Current packs/day: 0.00     Average packs/day: 0.3 packs/day for 15.0 years (3.8 ttl pk-yrs)     Types: Cigarettes     Start date: 3/13/1973     Quit date: 3/13/1988     Years since quittin.5     Passive exposure: Past   • Smokeless tobacco: Never   Vaping Use   • Vaping status: Never Used   Substance and Sexual Activity   • Alcohol use: Not Currently     Comment: beer once in awhile   • Drug use: No   • Sexual activity: Yes     Objective    There were no vitals taken for this visit.  Physical Exam  No physical exam as telephone         Latest Reference Range & Units 24 08:14   Sodium 135 - 147 mmol/L 141   Potassium 3.5 - 5.3 mmol/L 4.2   Chloride 96 - 108 mmol/L 108   Carbon Dioxide 21 - 32 mmol/L 26   ANION GAP 4 - 13 mmol/L 7   BUN 5 - 25 mg/dL 19   Creatinine 0.60 - 1.30 mg/dL 1.23   GLUCOSE, FASTING 65 - 99 mg/dL 100 (H)   Calcium 8.4 - 10.2 mg/dL 9.3   AST 13 - 39 U/L 15   ALT 7 - 52 U/L 17   ALK PHOS 34 - 104 U/L 111 (H)   Total Protein 6.4 - 8.4 g/dL 7.0   Albumin 3.5 - 5.0 g/dL 4.0   Total Bilirubin 0.20 - 1.00 mg/dL 0.51   GFR, Calculated ml/min/1.73sq m 53   LD (LDH) 140 - 271 U/L 162   WBC 4.31 - 10.16 Thousand/uL 6.55   RBC 3.88 - 5.62 Million/uL 4.57   Hemoglobin 12.0 - 17.0 g/dL 14.0   Hematocrit 36.5 - 49.3 % 42.1   MCV 82 - 98 fL 92   MCH 26.8 - 34.3 pg 30.6   MCHC 31.4 - 37.4 g/dL 33.3   RDW 11.6 - 15.1 % 12.7   Platelet Count 149 - 390 Thousands/uL 185   MPV 8.9 - 12.7 fL 11.4   nRBC /100 WBCs 0   Segmented % 43 - 75 % 59   Lymphocytes % 14 - 44 % 27   Monocytes % 4 - 12 % 7   Eosinophils % 0 - 6 % 6   Basophils % 0 - 1 % 1   Immature Grans % 0 - 2 % 0   Absolute Immature Grans 0.00 - 0.20 Thousand/uL 0.02   Absolute Neutrophils 1.85 - 7.62 Thousands/µL 3.85   Absolute Lymphocytes 0.60 - 4.47 Thousands/µL 1.79   Absolute  Monocytes 0.17 - 1.22 Thousand/µL 0.45   Absolute Eosinophils 0.00 - 0.61 Thousand/µL 0.40   Absolute Basophils 0.00 - 0.10 Thousands/µL 0.04   (H): Data is abnormally high  Visit Time  Total Visit Duration: 15 minutes in reviewing records, reviewing blood work, reviewing images, discussion with patient and counseling and coordination of care.    Sona Call MD, PhD

## 2024-09-13 DIAGNOSIS — I10 ESSENTIAL HYPERTENSION: ICD-10-CM

## 2024-09-16 RX ORDER — LISINOPRIL 20 MG/1
TABLET ORAL
Qty: 90 TABLET | Refills: 1 | Status: SHIPPED | OUTPATIENT
Start: 2024-09-16

## 2024-10-15 ENCOUNTER — HOSPITAL ENCOUNTER (OUTPATIENT)
Dept: ULTRASOUND IMAGING | Facility: HOSPITAL | Age: 84
Discharge: HOME | End: 2024-10-15
Payer: MEDICARE

## 2024-10-15 DIAGNOSIS — C43.30 MALIGNANT MELANOMA OF FACE EXCLUDING EYELID, NOSE, LIP, AND EAR (HCC): ICD-10-CM

## 2024-10-15 PROCEDURE — 76536 US EXAM OF HEAD AND NECK: CPT

## 2024-10-16 ENCOUNTER — OFFICE VISIT (OUTPATIENT)
Dept: PODIATRY | Facility: CLINIC | Age: 84
End: 2024-10-16
Payer: MEDICARE

## 2024-10-16 VITALS — WEIGHT: 176 LBS | HEIGHT: 69 IN | RESPIRATION RATE: 18 BRPM | BODY MASS INDEX: 26.07 KG/M2

## 2024-10-16 DIAGNOSIS — I73.9 PERIPHERAL VASCULAR DISEASE, UNSPECIFIED (HCC): Primary | ICD-10-CM

## 2024-10-16 PROCEDURE — G0127 TRIM NAIL(S): HCPCS | Performed by: PODIATRIST

## 2024-10-16 PROCEDURE — RECHECK: Performed by: PODIATRIST

## 2024-10-16 NOTE — PROGRESS NOTES
Established patient with class findings presents for nail care.  Vascular exam:  DP  0/4 bilateral; PT  0 4 bilateral   Dermatological exam:  Each toenail is thick and  dystrophic.  Diagnosis: PVD  Treatment: Trimmed multiple dystrophic toenails.     Nail removal    Date/Time: 10/16/2024 2:00 PM    Performed by: Jake Cabezas DPM  Authorized by: Jake Cabezas DPM    Nails trimmed:     Number of nails trimmed:  10       no

## 2024-11-01 ENCOUNTER — RA CDI HCC (OUTPATIENT)
Dept: OTHER | Facility: HOSPITAL | Age: 84
End: 2024-11-01

## 2024-11-04 ENCOUNTER — TELEPHONE (OUTPATIENT)
Age: 84
End: 2024-11-04

## 2024-11-04 NOTE — TELEPHONE ENCOUNTER
Patient called, state she has an upcoming appointment questions if lab orders were issued . Upon chart review/lab confirmed lab orders expected 12/2024. Please advise Patient at [phone], if any further questions.

## 2024-11-05 ENCOUNTER — APPOINTMENT (OUTPATIENT)
Dept: LAB | Facility: CLINIC | Age: 84
End: 2024-11-05
Payer: MEDICARE
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
## 2024-11-05 DIAGNOSIS — E55.9 VITAMIN D DEFICIENCY: ICD-10-CM

## 2024-11-05 DIAGNOSIS — I10 PRIMARY HYPERTENSION: ICD-10-CM

## 2024-11-05 DIAGNOSIS — M87.38: ICD-10-CM

## 2024-11-05 DIAGNOSIS — R97.20 ELEVATED PSA: ICD-10-CM

## 2024-11-05 DIAGNOSIS — N18.30 STAGE 3 CHRONIC KIDNEY DISEASE, UNSPECIFIED WHETHER STAGE 3A OR 3B CKD (HCC): ICD-10-CM

## 2024-11-05 DIAGNOSIS — E78.5 HYPERLIPIDEMIA, UNSPECIFIED HYPERLIPIDEMIA TYPE: ICD-10-CM

## 2024-11-05 DIAGNOSIS — C49.0 SARCOMA OF SCALP (HCC): ICD-10-CM

## 2024-11-05 LAB
25(OH)D3 SERPL-MCNC: 20 NG/ML (ref 30–100)
ALBUMIN SERPL BCG-MCNC: 4.2 G/DL (ref 3.5–5)
ALP SERPL-CCNC: 119 U/L (ref 34–104)
ALT SERPL W P-5'-P-CCNC: 15 U/L (ref 7–52)
ANION GAP SERPL CALCULATED.3IONS-SCNC: 9 MMOL/L (ref 4–13)
AST SERPL W P-5'-P-CCNC: 16 U/L (ref 13–39)
BASOPHILS # BLD AUTO: 0.03 THOUSANDS/ΜL (ref 0–0.1)
BASOPHILS NFR BLD AUTO: 1 % (ref 0–1)
BILIRUB SERPL-MCNC: 0.82 MG/DL (ref 0.2–1)
BUN SERPL-MCNC: 24 MG/DL (ref 5–25)
CALCIUM SERPL-MCNC: 10 MG/DL (ref 8.4–10.2)
CHLORIDE SERPL-SCNC: 104 MMOL/L (ref 96–108)
CHOLEST SERPL-MCNC: 176 MG/DL
CO2 SERPL-SCNC: 27 MMOL/L (ref 21–32)
CREAT SERPL-MCNC: 1.21 MG/DL (ref 0.6–1.3)
EOSINOPHIL # BLD AUTO: 0.24 THOUSAND/ΜL (ref 0–0.61)
EOSINOPHIL NFR BLD AUTO: 4 % (ref 0–6)
ERYTHROCYTE [DISTWIDTH] IN BLOOD BY AUTOMATED COUNT: 13 % (ref 11.6–15.1)
GFR SERPL CREATININE-BSD FRML MDRD: 54 ML/MIN/1.73SQ M
GLUCOSE P FAST SERPL-MCNC: 98 MG/DL (ref 65–99)
HCT VFR BLD AUTO: 43.3 % (ref 36.5–49.3)
HDLC SERPL-MCNC: 39 MG/DL
HGB BLD-MCNC: 14.5 G/DL (ref 12–17)
IMM GRANULOCYTES # BLD AUTO: 0.02 THOUSAND/UL (ref 0–0.2)
IMM GRANULOCYTES NFR BLD AUTO: 0 % (ref 0–2)
LDLC SERPL CALC-MCNC: 114 MG/DL (ref 0–100)
LYMPHOCYTES # BLD AUTO: 1.91 THOUSANDS/ΜL (ref 0.6–4.47)
LYMPHOCYTES NFR BLD AUTO: 32 % (ref 14–44)
MCH RBC QN AUTO: 30.3 PG (ref 26.8–34.3)
MCHC RBC AUTO-ENTMCNC: 33.5 G/DL (ref 31.4–37.4)
MCV RBC AUTO: 90 FL (ref 82–98)
MONOCYTES # BLD AUTO: 0.41 THOUSAND/ΜL (ref 0.17–1.22)
MONOCYTES NFR BLD AUTO: 7 % (ref 4–12)
NEUTROPHILS # BLD AUTO: 3.43 THOUSANDS/ΜL (ref 1.85–7.62)
NEUTS SEG NFR BLD AUTO: 56 % (ref 43–75)
NRBC BLD AUTO-RTO: 0 /100 WBCS
PLATELET # BLD AUTO: 214 THOUSANDS/UL (ref 149–390)
PMV BLD AUTO: 11.4 FL (ref 8.9–12.7)
POTASSIUM SERPL-SCNC: 4.4 MMOL/L (ref 3.5–5.3)
PROT SERPL-MCNC: 7.4 G/DL (ref 6.4–8.4)
PSA SERPL-MCNC: 5.47 NG/ML (ref 0–4)
RBC # BLD AUTO: 4.79 MILLION/UL (ref 3.88–5.62)
SODIUM SERPL-SCNC: 140 MMOL/L (ref 135–147)
TRIGL SERPL-MCNC: 114 MG/DL
TSH SERPL DL<=0.05 MIU/L-ACNC: 2.94 UIU/ML (ref 0.45–4.5)
WBC # BLD AUTO: 6.04 THOUSAND/UL (ref 4.31–10.16)

## 2024-11-05 PROCEDURE — 85025 COMPLETE CBC W/AUTO DIFF WBC: CPT

## 2024-11-05 PROCEDURE — 80061 LIPID PANEL: CPT

## 2024-11-05 PROCEDURE — 80053 COMPREHEN METABOLIC PANEL: CPT

## 2024-11-05 PROCEDURE — 82306 VITAMIN D 25 HYDROXY: CPT

## 2024-11-05 PROCEDURE — 84443 ASSAY THYROID STIM HORMONE: CPT

## 2024-11-05 PROCEDURE — 36415 COLL VENOUS BLD VENIPUNCTURE: CPT

## 2024-11-05 PROCEDURE — 84153 ASSAY OF PSA TOTAL: CPT

## 2024-11-07 ENCOUNTER — OFFICE VISIT (OUTPATIENT)
Dept: FAMILY MEDICINE CLINIC | Facility: CLINIC | Age: 84
End: 2024-11-07
Payer: MEDICARE

## 2024-11-07 VITALS
WEIGHT: 176 LBS | HEIGHT: 69 IN | HEART RATE: 70 BPM | SYSTOLIC BLOOD PRESSURE: 144 MMHG | DIASTOLIC BLOOD PRESSURE: 76 MMHG | OXYGEN SATURATION: 96 % | BODY MASS INDEX: 26.07 KG/M2

## 2024-11-07 DIAGNOSIS — C43.31 MALIGNANT MELANOMA OF NOSE (HCC): ICD-10-CM
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
## 2024-11-07 DIAGNOSIS — Z00.00 MEDICARE ANNUAL WELLNESS VISIT, SUBSEQUENT: ICD-10-CM

## 2024-11-07 DIAGNOSIS — M87.38: ICD-10-CM

## 2024-11-07 DIAGNOSIS — E78.5 HYPERLIPIDEMIA, UNSPECIFIED HYPERLIPIDEMIA TYPE: ICD-10-CM

## 2024-11-07 DIAGNOSIS — I10 PRIMARY HYPERTENSION: Primary | ICD-10-CM

## 2024-11-07 DIAGNOSIS — R97.20 ELEVATED PSA: ICD-10-CM

## 2024-11-07 DIAGNOSIS — E55.9 VITAMIN D DEFICIENCY: ICD-10-CM

## 2024-11-07 DIAGNOSIS — C49.0 SARCOMA OF SCALP (HCC): ICD-10-CM

## 2024-11-07 DIAGNOSIS — M51.362 DEGENERATION OF INTERVERTEBRAL DISC OF LUMBAR REGION WITH DISCOGENIC BACK PAIN AND LOWER EXTREMITY PAIN: ICD-10-CM

## 2024-11-07 PROCEDURE — 99214 OFFICE O/P EST MOD 30 MIN: CPT | Performed by: PHYSICIAN ASSISTANT

## 2024-11-07 PROCEDURE — G0439 PPPS, SUBSEQ VISIT: HCPCS | Performed by: PHYSICIAN ASSISTANT

## 2024-11-07 NOTE — PATIENT INSTRUCTIONS
Assessment/plan:  1.  Hypertension-presently stable with metoprolol, lisinopril, and amlodipine therapy, no medication changes.  2.  Hyperlipidemia-stable on niacin  3.  Malignant melanoma of the nose-stable, following with dermatology.  4.  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  6.  Vitamin D deficiency-recommend 5000 units vitamin D daily over-the-counter.  7.  Elevated PSA-level is slightly elevated at 5.4.  This has fluctuated from 3.9-5.3 since 2018. Discussed further management with urology however patient is not interested in any aggressive measures and would be happy to continue monitoring at this point.  8.  Low back pain with bilateral radiculopathy-patient seems to be limited from walking distances because of burning pain in the thighs.  He did have severe degenerative changes on x-ray 3 years ago.  Would recommend repeating x-ray to rule out compression deformity of the spine.  Consider further evaluation with spine and pain management.  He is not interested in physical therapy and states that he does stay active but would like to consider further treatment if necessary.  9.  Annual Medicare wellness visit-questionnaire completed, vaccines reviewed.

## 2024-11-07 NOTE — PROGRESS NOTES
Ambulatory Visit  Name: Alex Cheung      : 1940      MRN: [id number]  Encounter Provider: Memo Law PA-C  Encounter Date: 2024   Encounter department: [facility]  Patient Instructions       Assessment/plan:  1.  Hypertension-presently stable with metoprolol, lisinopril, and amlodipine therapy, no medication changes.  2.  Hyperlipidemia-stable on niacin  3.  Malignant melanoma of the nose-stable, following with dermatology.  4.  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  6.  Vitamin D deficiency-recommend 5000 units vitamin D daily over-the-counter.  7.  Elevated PSA-level is slightly elevated at 5.4.  This has fluctuated from 3.9-5.3 since 2018. Discussed further management with urology however patient is not interested in any aggressive measures and would be happy to continue monitoring at this point.  8.  Low back pain with bilateral radiculopathy-patient seems to be limited from walking distances because of burning pain in the thighs.  He did have severe degenerative changes on x-ray 3 years ago.  Would recommend repeating x-ray to rule out compression deformity of the spine.  Consider further evaluation with spine and pain management.  He is not interested in physical therapy and states that he does stay active but would like to consider further treatment if necessary.  9.  Annual Medicare wellness visit-questionnaire completed, vaccines reviewed.    Assessment & Plan  Primary hypertension    Orders:    CBC and differential; Future    Comprehensive metabolic panel; Future    Lipid Panel with Direct LDL reflex; Future    TSH, 3rd generation; Future    Vitamin D 25 hydroxy; Future    Malignant melanoma of nose (HCC)         Hyperlipidemia, unspecified hyperlipidemia type    Orders:    CBC and differential; Future    Comprehensive metabolic panel; Future    Lipid Panel with Direct  LDL reflex; Future    TSH, 3rd generation; Future    Vitamin D 25 hydroxy; Future    Radiation necrosis of skull  (HCC)    Orders:    CBC and differential; Future    Comprehensive metabolic panel; Future    Lipid Panel with Direct LDL reflex; Future    TSH, 3rd generation; Future    Vitamin D 25 hydroxy; Future    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HCC)    Orders:    CBC and differential; Future    Comprehensive metabolic panel; Future    Lipid Panel with Direct LDL reflex; Future    TSH, 3rd generation; Future    Vitamin D 25 hydroxy; Future    Vitamin D deficiency    Orders:    CBC and differential; Future    Comprehensive metabolic panel; Future    Lipid Panel with Direct LDL reflex; Future    TSH, 3rd generation; Future    Vitamin D 25 hydroxy; Future    Elevated PSA    Orders:    PSA Total, Diagnostic; Future    Medicare annual wellness visit, subsequent         Degeneration of intervertebral disc of lumbar region with discogenic back pain and lower extremity pain    Orders:    XR spine lumbar minimum 4 views non injury; Future      Depression Screening and Follow-up Plan: Patient was screened for depression during today's encounter. They screened negative with a PHQ-2 score of 0.      Preventive health issues were discussed with patient, and age appropriate screening tests were ordered as noted in patient's After Visit Summary. Personalized health advice and appropriate referrals for health education or preventive services given if needed, as noted in patient's After Visit Summary.    History of Present Illness     HPI: This is an 84-year-old gentleman that presents to the office for follow-up of chronic health conditions and recent blood work.  He has been feeling well for the most part but he does complain of some back pain that has been bothering him and he seems to get pain that radiates to into the legs.  He notices this mostly when he is walking longer distances.  He states he used to be able to walk 3 or 4 miles without  difficulty but now gets burning pain in the thighs which limits him.  He did have x-ray of the lumbar spine about 3 years ago which showed significant degenerative disc changes.  He has had history of sarcoma of the scalp previously and melanoma of the nose.  He has increased risk for cancer and osteoporotic compression fractures.       Patient Care Team:  Memo Law PA-C as PCP - General (Family Medicine)  Garrison Parrish MD (Radiation Oncology)  Jani Pagan MD (Plastic Surgery)  Gianfranco Lewis DO (Dermatology Cancer Specialist)  Fortunato Booth MD (Oncology)  Earnest Caraballo MD (Dermatology)  Sona Call MD (Oncology)    Review of Systems   Constitutional:  Negative for chills, fatigue and fever.   HENT:  Negative for congestion, ear pain and sinus pressure.    Eyes:  Negative for visual disturbance.   Respiratory:  Negative for cough, chest tightness and shortness of breath.    Cardiovascular:  Negative for chest pain and palpitations.   Gastrointestinal:  Negative for diarrhea, nausea and vomiting.   Endocrine: Negative for polyuria.   Genitourinary:  Negative for dysuria and frequency.   Musculoskeletal:  Negative for arthralgias and myalgias.   Skin:  Negative for pallor and rash.   Neurological:  Negative for dizziness, weakness, light-headedness, numbness and headaches.   Psychiatric/Behavioral:  Negative for agitation, behavioral problems and sleep disturbance.    All other systems reviewed and are negative.    Medical History Reviewed by provider this encounter:       Annual Wellness Visit Questionnaire   Alex is here for his Subsequent Wellness visit.     Health Risk Assessment:   Patient rates overall health as good. Patient feels that their physical health rating is same. Patient is very satisfied with their life. Eyesight was rated as same. Hearing was rated as same. Patient feels that their emotional and mental health rating is same. Patients states they are never, rarely  angry. Patient states they are sometimes unusually tired/fatigued. Pain experienced in the last 7 days has been none. Patient states that he has experienced no weight loss or gain in last 6 months.     Depression Screening:   PHQ-2 Score: 0      Fall Risk Screening:   In the past year, patient has experienced: no history of falling in past year      Home Safety:  Patient does not have trouble with stairs inside or outside of their home. Patient has working smoke alarms and has working carbon monoxide detector. Home safety hazards include: none.     Nutrition:   Current diet is Regular.     Medications:   Patient is able to manage medications.     Activities of Daily Living (ADLs)/Instrumental Activities of Daily Living (IADLs):   Walk and transfer into and out of bed and chair?: Yes  Dress and groom yourself?: Yes    Bathe or shower yourself?: Yes    Feed yourself? Yes  Do your laundry/housekeeping?: Yes  Manage your money, pay your bills and track your expenses?: Yes  Make your own meals?: Yes    Do your own shopping?: Yes    Previous Hospitalizations:   Any hospitalizations or ED visits within the last 12 months?: No      Advance Care Planning:   Living will: Yes    Durable POA for healthcare: Yes    Advanced directive: Yes      Cognitive Screening:   Provider or family/friend/caregiver concerned regarding cognition?: No    PREVENTIVE SCREENINGS      Cardiovascular Screening:    General: Screening Not Indicated and History Lipid Disorder      Diabetes Screening:     General: Screening Current      Colorectal Cancer Screening:     General: Screening Not Indicated      Prostate Cancer Screening:    General: Screening Not Indicated      Osteoporosis Screening:    General: Screening Not Indicated      Abdominal Aortic Aneurysm (AAA) Screening:    Risk factors include: tobacco use        General: Screening Not Indicated      Lung Cancer Screening:     General: Screening Not Indicated      Hepatitis C Screening:     "General: Screening Not Indicated    Screening, Brief Intervention, and Referral to Treatment (SBIRT)    Screening    Typical number of drinks in a week: 0    Single Item Drug Screening:  How often have you used an illegal drug (including marijuana) or a prescription medication for non-medical reasons in the past year? never    Single Item Drug Screen Score: 0  Interpretation: Negative screen for possible drug use disorder    Social Determinants of Health     Financial Resource Strain: Medium Risk (4/13/2023)    Overall Financial Resource Strain (CARDIA)     Difficulty of Paying Living Expenses: Somewhat hard   Food Insecurity: No Food Insecurity (11/7/2024)    Hunger Vital Sign     Worried About Running Out of Food in the Last Year: Never true     Ran Out of Food in the Last Year: Never true   Transportation Needs: No Transportation Needs (11/7/2024)    PRAPARE - Transportation     Lack of Transportation (Medical): No     Lack of Transportation (Non-Medical): No   Housing Stability: Unknown (11/7/2024)    Housing Stability Vital Sign     Unable to Pay for Housing in the Last Year: No     Homeless in the Last Year: No   Utilities: Not At Risk (11/7/2024)    [facility] Utilities     Threatened with loss of utilities: No     No results found.    Objective     /76 (BP Location: Left arm, Patient Position: Sitting, Cuff Size: Large)   Pulse 70   Ht 5' 9\" (1.753 m)   Wt 79.8 kg (176 lb)   SpO2 96%   BMI 25.99 kg/m²     Physical Exam  Constitutional:       General: He is not in acute distress.     Appearance: He is well-developed.   HENT:      Head: Normocephalic and atraumatic.      Right Ear: Tympanic membrane normal.      Left Ear: Tympanic membrane normal.   Eyes:      Conjunctiva/sclera: Conjunctivae normal.   Cardiovascular:      Rate and Rhythm: Normal rate and regular rhythm.   Pulmonary:      Effort: Pulmonary effort is normal.   Abdominal:      General: Abdomen is flat. Bowel sounds are normal. There is no " distension.      Palpations: Abdomen is soft. There is no mass.   Musculoskeletal:         General: Normal range of motion.      Cervical back: Normal range of motion.   Skin:     General: Skin is warm.      Findings: No rash.   Neurological:      Mental Status: He is alert and oriented to person, place, and time.   Psychiatric:         Mood and Affect: Mood normal.

## 2024-11-08 ENCOUNTER — OFFICE VISIT (OUTPATIENT)
Dept: SURGICAL ONCOLOGY | Facility: CLINIC | Age: 84
End: 2024-11-08
Payer: MEDICARE

## 2024-11-08 VITALS
RESPIRATION RATE: 16 BRPM | HEART RATE: 63 BPM | DIASTOLIC BLOOD PRESSURE: 80 MMHG | SYSTOLIC BLOOD PRESSURE: 128 MMHG | WEIGHT: 176 LBS | TEMPERATURE: 98.1 F | OXYGEN SATURATION: 96 % | BODY MASS INDEX: 26.07 KG/M2 | HEIGHT: 69 IN

## 2024-11-08 DIAGNOSIS — C43.30 MALIGNANT MELANOMA OF FACE EXCLUDING EYELID, NOSE, LIP, AND EAR (HCC): Primary | ICD-10-CM

## 2024-11-08 PROCEDURE — 99213 OFFICE O/P EST LOW 20 MIN: CPT | Performed by: SURGERY

## 2024-11-08 RX ORDER — ACETAMINOPHEN 160 MG
2000 TABLET,DISINTEGRATING ORAL DAILY
COMMUNITY

## 2024-11-08 NOTE — PROGRESS NOTES
Surgical Oncology Follow Up       [address]  [facility] SURGICAL ONCOLOGY [place]  [address]  [facility] [address]    Alex Cheung  1940  [id number]  [address]  [facility] SURGICAL ONCOLOGY [place]  [address] [place] [address]    Chief Complaint   Patient presents with    Follow-up       Assessment/Plan:    No problem-specific Assessment & Plan notes found for this encounter.       Diagnoses and all orders for this visit:    Malignant melanoma of face excluding eyelid, nose, lip, and ear (HCC)    Other orders  -     Cholecalciferol (Vitamin D3) 50 MCG (2000 UT) capsule; Take 2,000 Units by mouth daily      Advance Care Planning/Advance Directives:  Discussed disease status, cancer treatment plans and/or cancer treatment goals with the patient.     Oncology History   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HCC)   11/8/2017 Surgery    right vertex of scalp - s/p excision and full thickness graft for closure on 11/8/17 for atypical fibroxanthoma.       1/29/2018 Biopsy    biopsy from the anterior aspect of the graft showing atypical epithelioid fibro histiocytic tumor, with lesion extending to the deep tissue edge.  This is similar to prior biopsy done.  Differential includes pleomorphic sarcoma versus an extreme atypical example of atypical fibro histiocytoma.     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 See Note.     -- FNCLCC Histologic Grade 3  (total score: 7 of 8).       * Tumor differentiation score: 3 of 3.        * Mitotic count score: 3 of 3; > 19 mitoses/10 HPF (33 mitoses/10 HPF).       * Necrosis score: 1 of 2; present, < 50%.  - Tumor extends into deep reticular dermis, subcutis and fascia.   - Perineural invasion: Present; intraneural invasion  "identified (0.4 mm largest nerve diameter).   - Lymphovascular invasion: Focally suspicious.   - Bone invasion: Not identified.   - Central nervous system extension: Not identified.   - Margins: uninvolved by sarcoma but close.    -- Sarcoma  0.15 mm from nearest deep margin.   - Additional Pathologic Findings: Changes consistent with prior surgical site.     -- Focal ulceration with bacterial colonization, acute and chronic inflammation.     -- Best representative tumor block: A5.      5/29/2018 - 7/5/2018 Radiation    Plan ID Energy Fractions Dose per Fraction (cGy) Dose Correction (cGy) Total Dose Delivered (cGy) Elapsed Days   Vertex Scalp 9E 27 / 27 200 0 5,400 37            Basal cell carcinoma of skin of other parts of face   7/10/2023 Surgery    Skin, right cheek, \"basal cell carcinoma\", excision:  Scar. No residual basal cell carcinoma identified in the examined sections.        Malignant melanoma of nose ([facility])   12/6/2022 Biopsy    Skin, left ala, shave biopsy:     MELANOMA (thickness: 0.6 mm); extending to the tissue edges      Ulceration: not seen  Anatomic (Hal) level: III  Type: lentigo maligna melanoma  Mitoses: 0/mm2  Margin assessment: invasive melanoma extends to peripheral specimen margin and deep specimen margin focally  Pathologic stage: pT1a     2/7/2023 Surgery    Skin, nose, left nasal, wide excision:  Residual melanoma, invasive, desmoplastic type, and scar, margins free.   Incidental basal cell carcinoma, superficial and nodular type, not extending to the margins.    pT2a     2/7/2023 -  Cancer Staged    Staging form: Melanoma of the Skin, AJCC 8th Edition  - Clinical stage from 2/7/2023: Stage IB (cT2a, cN0, cM0) - Signed by Sona Call MD on 11/27/2023       Malignant melanoma of face excluding eyelid, nose, lip, and ear (HCC)   5/16/2023 Initial Diagnosis    Malignant melanoma of face excluding eyelid, nose, lip, and ear (HCC)  Right mid cheek    A. Skin, right mid cheek, shave " "biopsy:     Combined ulcerated MELANOMA (thickness: at least 1.5 mm) and BASAL CELL CARCINOMA; transected (see note).     Note: There are foci of basal cell carcinoma and melanoma in which the components are separate from each other (as can be seen in a collision tumor), but there are also multiple foci in which the melanoma and basal cell carcinoma are closely intermingled (\"basomelanocytic tumor\" or \"combined melanoma and basal cell carcinoma of the intermingled type\"), which is a rare occurrence.1 SOX10, MART-1, HMB45, p40, and Tarun-EP4 immunostains were reviewed and support the diagnosis. Please see synoptic report for additional details. The results were emailed to Dr. Matthews on 5/19/2023.     Synoptic report for melanoma of the skin  Thickness: at least 1.5 mm  Ulceration: present  Anatomic (Hal) level: at least IV  Type: superficial spreading melanoma  Mitoses: 1/mm2  Microsatellites: cannot be determined  Lymphovascular invasion: not seen  Neurotropism: not seen  Tumor regression: not seen  Tumor-infiltrating lymphocytes (TIL): present, non-brisk  Margin assessment: invasive melanoma extends to deep specimen margin; melanoma in situ extends to peripheral specimen margin  Pathologic stage: at least pT2b  Associated nevus: not seen         5/16/2023 -  Cancer Staged    Staging form: Melanoma of the Skin, AJCC 8th Edition  - Clinical stage from 5/16/2023: Stage IIA (cT2b, cN0, cM0) - Signed by Sona Call MD on 5/26/2023       7/10/2023 Surgery    Skin, right cheek \"melanoma\", wide excision:  Residual combined melanoma (Breslow thickness: 2.3 mm) and basal cell carcinoma (malignant basomelanocytic tumor); margins free.   pT3a     7/10/2023 -  Cancer Staged    Staging form: Melanoma of the Skin, AJCC 8th Edition  - Pathologic stage from 7/10/2023: Stage IIB (pT3b, pN0, cM0) - Signed by Sona Call MD on 11/28/2023           History of Present Illness: 84-year-old man here for surveillance visit.  " History of melanoma excised from right cheek as well as left ala.  -Interval History: No major complaints report.  He does have a history of basal cell cancer excised from his nose, which was grafted.  He has noticed a new flaky area along the anterior aspect of this graft.  He will be seeing his dermatology team in the next 3 weeks.    Review of Systems:  Review of Systems   Constitutional: Negative.    HENT: Negative.     Eyes: Negative.    Respiratory: Negative.     Cardiovascular: Negative.    Gastrointestinal: Negative.    Endocrine: Negative.    Genitourinary: Negative.    Musculoskeletal: Negative.    Skin: Negative.    Allergic/Immunologic: Negative.    Neurological: Negative.    Hematological: Negative.    Psychiatric/Behavioral: Negative.     All other systems reviewed and are negative.      Patient Active Problem List   Diagnosis    Actinic keratosis    Stage 3 chronic kidney disease (HCC)    Gout    Hyperlipidemia    Hypertension    Irritable bowel syndrome    Macular degeneration    Urinary incontinence, post-void dribbling    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HCC)    Basal cell carcinoma of skin of other parts of face    Pulmonary nodules    Incisional hernia, without obstruction or gangrene    Status post repair of ventral hernia    Dizziness    Urinary frequency    Lumbar back pain with radiculopathy affecting lower extremity    Scalp ulceration, with necrosis of bone (HCC)    Radiation necrosis of skull  (HCC)    Malignant melanoma of nose (HCC)    Encounter for geriatric assessment    Malignant melanoma of face excluding eyelid, nose, lip, and ear (HCC)    Anxiety    Reactive airway disease    Exudative age-related macular degeneration, unspecified laterality, unspecified stage (HCC)    Vitamin D deficiency    Elevated PSA     Past Medical History:   Diagnosis Date    Anxiety     Arthritis     back    Back pain     Balance problem     Basal cell carcinoma (BCC)     in past    Basal cell carcinoma (BCC) 10/04/2022     Right [place]    Basal cell carcinoma (BCC) 10/03/2023    right lip, mohs    Basal cell carcinoma (BCC) 03/05/2024    right tip of nose    BCC (basal cell carcinoma of skin) 08/17/2021    Right tip of nose    BCC (basal cell carcinoma of skin) 08/08/2022    Left lip    BCC (basal cell carcinoma of skin) 08/08/2022    Right cheek    Cancer (HCC)     Gout     Hard of hearing     Hearing aid worn     ziyad    Hearing aid worn     Herniated nucleus pulposus, L4-5     [place] (hard of hearing)     Hypertension     Incisional hernia     Incontinence     Macular degeneration     Melanoma (HCC)     left cheek- history    Melanoma (HCC) 08/08/2022    Right neck    Nocturia     Pulmonary nodules     Reactive airway disease     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    Skin cancer 07/01/2020    AFX- scalp    Squamous cell carcinoma     in past    Squamous cell skin cancer      Past Surgical History:   Procedure Laterality Date    BASAL CELL CARCINOMA EXCISION Right 07/10/2023    Procedure: EXCISION BASAL CELL CARCINOMA  RIGHT CHEEK;  Surgeon: Fortunato Booth MD;  Location: [facility];  Service: Surgical Oncology    CATARACT EXTRACTION Bilateral     COLONOSCOPY      FLAP LOCAL HEAD / NECK Right 07/10/2023    Procedure: RIGHT CHEEK RECON W/ LOCAL FLAP & SKIN GRAFT;  Surgeon: Leola Chatman MD;  Location: [facility];  Service: Plastics    HERNIA REPAIR      HYDROCELE EXCISION / REPAIR      MASTOID SURGERY Left     MOHS SURGERY      MOHS SURGERY  09/29/2021    Right tip of nose-Dr. Caraballo    MOHS SURGERY  10/06/2022    Left lip-Dr. Caraballo    MOHS SURGERY  05/16/2023    Right [place] - Dr. Matthews    MOHS SURGERY Right 03/27/2024    BCC right lip, Dr Caraballo    MOHS SURGERY Right 06/12/2024    BCC right tip of nose;     OTHER SURGICAL HISTORY      sarcoma scalp with skin graft    NY EXCISION MALIGNANT LESION F/E/E/N/L 0.5 CM/< Left 02/07/2023    Procedure: WIDE EXCISION OF LEFT NASAL MELANOMA;  Surgeon: Fortunato  MD Emmy;  [facility]: [facility];  Service: Surgical Oncology    KS REPAIR FIRST ABDOMINAL WALL HERNIA N/A 2021    Procedure: REPAIR HERNIA INCISIONAL OPEN WITH MESH;  Surgeon: Darryl Landry MD;  [facility];  Service: General    SCALP EXCISION N/A 2018    Procedure: SCALP SARCOMA EXCISION WITH FULL THICKNESS SKIN GRAFT;  Surgeon: Jani Pagan MD;  [facility];  Service: Plastics    SKIN BIOPSY      SKIN CANCER EXCISION      SKIN LESION EXCISION Right 07/10/2023    Procedure: WIDE EXCISION MELANOMA SCAR, RIGHT CHEEK;  Surgeon: Fortunato Booth MD;  [facility];  Service: Surgical Oncology    SPLIT THICKNESS SKIN GRAFT Left 2023    Procedure: APPLICATION OF SKIN SUBSTITUTE GRAFT TO NOSE;  Surgeon: Leola Chatman MD;  [facility]: [facility];  Service: Plastics    TONSILLECTOMY AND ADENOIDECTOMY       Family History   Problem Relation Age of Onset    Colon cancer Father     Heart failure Father     Heart disease Mother     Stroke Mother      Social History     Socioeconomic History    Marital status: /Civil Union     Spouse name: Not on file    Number of children: Not on file    Years of education: Not on file    Highest education level: Not on file   Occupational History    Not on file   Tobacco Use    Smoking status: Former     Current packs/day: 0.00     Average packs/day: 0.3 packs/day for 15.0 years (3.8 ttl pk-yrs)     Types: Cigarettes     Start date: 3/13/1973     Quit date: 3/13/1988     Years since quittin.6     Passive exposure: Past    Smokeless tobacco: Never   Vaping Use    Vaping status: Never Used   Substance and Sexual Activity    Alcohol use: Not Currently     Comment: beer once in awhile    Drug use: No    Sexual activity: Yes   Other Topics Concern    Not on file   Social History Narrative    Not on file     Social Determinants of Health     Financial Resource Strain: Medium Risk (2023)    Overall Financial Resource Strain (CARDIA)      Difficulty of Paying Living Expenses: Somewhat hard   Food Insecurity: No Food Insecurity (11/7/2024)    Hunger Vital Sign     Worried About Running Out of Food in the Last Year: Never true     Ran Out of Food in the Last Year: Never true   Transportation Needs: No Transportation Needs (11/7/2024)    PRAPARE - Transportation     Lack of Transportation (Medical): No     Lack of Transportation (Non-Medical): No   Physical Activity: Not on file   Stress: Not on file   Social Connections: Not on file   Intimate Partner Violence: Not on file   Housing Stability: Unknown (11/7/2024)    Housing Stability Vital Sign     Unable to Pay for Housing in the Last Year: No     Number of Times Moved in the Last Year: Not on file     Homeless in the Last Year: No       Current Outpatient Medications:     amLODIPine (NORVASC) 10 mg tablet, TAKE 1 TABLET BY MOUTH DAILY, Disp: 90 tablet, Rfl: 3    bacitracin ointment, Apply topically daily for 7 days Apply to bolster site daily, Disp: 30 g, Rfl: 0    Cholecalciferol (Vitamin D3) 50 MCG (2000 UT) capsule, Take 2,000 Units by mouth daily, Disp: , Rfl:     Fluocinolone Acetonide Scalp 0.01 % OIL, Apply a thin layer topically daily at night one hour before bedtime., Disp: 118.28 mL, Rfl: 5    imiquimod (ALDARA) 5 % cream, Apply topically once a day Monday thru Friday for 6 weeks (Patient taking differently: if needed), Disp: 24 each, Rfl: 1    lisinopril (ZESTRIL) 20 mg tablet, TAKE 1 TABLET BY MOUTH DAILY, Disp: 90 tablet, Rfl: 1    metoprolol succinate (TOPROL-XL) 50 mg 24 hr tablet, TAKE 1 TABLET BY MOUTH DAILY, Disp: 90 tablet, Rfl: 1    mometasone (ELOCON) 0.1 % cream, APPLY TO EARS ONCE TO TWICE DAILY AS NEEDED FOR SCALING, Disp: , Rfl:     Multiple Vitamins-Minerals (PRESERVISION AREDS 2) CAPS, Take 1 capsule by mouth 2 (two) times a day, Disp: , Rfl:     mupirocin (BACTROBAN) 2 % ointment, Apply topically two to three times a day to sore areas (Patient taking differently: Apply  topically if needed Apply topically two to three times a day to sore areas), Disp: 22 g, Rfl: 3    Niacin (VITAMIN B-3 PO), Take by mouth, Disp: , Rfl:     Niacinamide-Zn-Cu-Xtylqp-Te-Fd (NICOTINAMIDE PO), Take 500 mg by mouth 2 (two) times a day, Disp: , Rfl:     fluorouracil (EFUDEX) 5 % cream, Apply topically 2 (two) times a day Right cheek (Patient not taking: Reported on 6/6/2024), Disp: 40 g, Rfl: 0    Pyridoxine HCl (VITAMIN B-6 PO), Take by mouth (Patient not taking: Reported on 3/4/2024), Disp: , Rfl:     traMADol (Ultram) 50 mg tablet, Take 1 tablet (50 mg total) by mouth every 6 (six) hours as needed for moderate pain for up to 15 doses (Patient not taking: Reported on 10/27/2023), Disp: 15 tablet, Rfl: 0  Allergies   Allergen Reactions    Erythromycin Other (See Comments)     Thrush      Vitals:    11/08/24 1310   BP: 128/80   Pulse: 63   Resp: 16   Temp: 98.1 °F (36.7 °C)   SpO2: 96%       Physical Exam  Vitals reviewed.   Constitutional:       Appearance: Normal appearance.   HENT:      Head: Normocephalic and atraumatic.      Right Ear: External ear normal.      Left Ear: External ear normal.      Nose: Nose normal.   Eyes:      Extraocular Movements: Extraocular movements intact.      Pupils: Pupils are equal, round, and reactive to light.   Cardiovascular:      Rate and Rhythm: Normal rate and regular rhythm.      Pulses: Normal pulses.      Heart sounds: Normal heart sounds.   Pulmonary:      Effort: Pulmonary effort is normal.      Breath sounds: Normal breath sounds.   Abdominal:      General: Abdomen is flat.      Palpations: Abdomen is soft.   Musculoskeletal:         General: Normal range of motion.      Cervical back: Normal range of motion and neck supple.   Skin:     General: Skin is warm and dry.      Comments: Left ala and left cheek without evidence recurrence visible or palpable.  No cervical adenopathy.  Right aspect of nose with raised nodular area anterior to previous graft.    Neurological:      General: No focal deficit present.      Mental Status: He is alert and oriented to person, place, and time.   Psychiatric:         Mood and Affect: Mood normal.         Behavior: Behavior normal.           Results:  Labs:  none    Imaging  US head neck lymph node mapping    Result Date: 10/20/2024  Narrative: NECK ULTRASOUND INDICATION: C43.30: Malignant melanoma of unspecified part of face. COMPARISON: Neck ultrasound 4/16/2024. FINDINGS: Ultrasound of the d cervical lymph node chains was performed with a high frequency linear transducer. Lymph nodes maintain normal morphologic contour, echogenicity and short axis dimensions of less than 0.7 cm. No evidence for microcalcification or focal nodularity.     Impression: No evidence of metastatic disease. Workstation performed: [id number]     I reviewed the above laboratory and imaging data.    Discussion/Summary: 84-year-old man, history of stage I melanoma of nose and stage II melanoma of left cheek.  No evidence of melanoma recurrence.  He does have history of basal cancer as well.  He will be seeing dermatology team in the next few weeks to evaluate his prior graft site for possible recurrence, basal cell cancer.  Plan to follow-up in 6 months.

## 2024-11-15 DIAGNOSIS — I10 PRIMARY HYPERTENSION: ICD-10-CM

## 2024-11-18 RX ORDER — METOPROLOL SUCCINATE 50 MG/1
50 TABLET, EXTENDED RELEASE ORAL DAILY
Qty: 90 TABLET | Refills: 1 | Status: SHIPPED | OUTPATIENT
Start: 2024-11-18

## 2024-11-22 ENCOUNTER — TELEPHONE (OUTPATIENT)
Dept: HEMATOLOGY ONCOLOGY | Facility: CLINIC | Age: 84
End: 2024-11-22

## 2024-11-22 NOTE — TELEPHONE ENCOUNTER
Spoke w/ pt. He has been rs for 12/12 @ 100 pm w/ Dr. Call. Pt verbally confirmed the appt. Pt would like to know if this appt can be a virtual instead?

## 2024-11-22 NOTE — TELEPHONE ENCOUNTER
"I hear you are covering for Dr. Call.  Per recent note, it seems,  \"He is due for follow-up again in 3 months.  At that time he will have full body exam as well as blood work.  Orders placed and prescription divided for blood work to be done in 3 months.\"  I can call and relay, just want to confirm with you before he must come for in-person f/u.  Please LMK when you can, thanks.  "

## 2024-12-04 ENCOUNTER — PROCEDURE VISIT (OUTPATIENT)
Dept: DERMATOLOGY | Facility: CLINIC | Age: 84
End: 2024-12-04
Payer: MEDICARE

## 2024-12-04 VITALS
BODY MASS INDEX: 26.25 KG/M2 | DIASTOLIC BLOOD PRESSURE: 88 MMHG | WEIGHT: 177.2 LBS | HEIGHT: 69 IN | TEMPERATURE: 97 F | SYSTOLIC BLOOD PRESSURE: 145 MMHG

## 2024-12-04 DIAGNOSIS — Z51.89 VISIT FOR WOUND CHECK: Primary | ICD-10-CM

## 2024-12-04 PROCEDURE — 11900 INJECT SKIN LESIONS </W 7: CPT | Performed by: DERMATOLOGY

## 2024-12-04 NOTE — PATIENT INSTRUCTIONS
WOUND CHECK    Assessment and Plan:  Based on a thorough discussion of this condition and the management approach to it (including a comprehensive discussion of the known risks, side effects and potential benefits of treatment), the patient (family) agrees to implement the following specific plan:  Kenalog injection administered today in office  Consent form signed by patient in office   Message sent to Dr. Caraballo by Dr. Awad for further steps on how patient can continue to see Dr. Caraballo. Will reach out to patient once a plan is set   Monitor area for any changes     PROCEDURE:  INTRALESIONAL STEROID INJECTION (KENALOG INJECTION)    Purpose: Triamcinolone is a synthetic glucocorticoid corticosteroid that has marked anti-inflammatory action. It is prepared in sterile aqueous suspension suitable for injecting directly into a lesion on or immediately below the skin to treat a dermal inflammatory process.     Indications: It is indicated for alopecia areata; inflammatory acne cysts; discoid lupus erythematosus; keloids and hypertrophic scars; inflammatory lesions of granuloma annulare, lichen planus, lichen simplex chronicus (neurodermatitis), psoriatic plaques, and other localized inflammatory skin conditions.     Potential Side Effects: I understand that triamcinolone injection can potentially cause early and/or delayed adverse effects such as:    Pain    Impaired wound healing    Increased hair growth    Bleeding    White or brown marks    Steroid acne    Infection    Telangiectasia    Skin thinning    Cutaneous and subcutaneous lipoatrophy (most common) appearing as skin indentations or dimples around the injection sites a few weeks after treatment     PROCEDURE NOTE:  After verbal and written consent were obtained, the to-be-treated area was wiped and cleaned with rubbing alcohol 70%.      There was less than 1 mL of blood loss and little to no discomfort.  The area was bandaged with a Band-aid.  The patient  tolerated the procedure well and remained in the office for observation.  With no signs of an adverse reaction, the patient was eventually discharged from clinic.

## 2024-12-04 NOTE — PROGRESS NOTES
"[facility] Note     Patient Name: Alex Cheung  Encounter Date: 12/4/24     Have you been cared for by a [facility] Dermatologist in the last 3 years and, if so, which description applies to you?    Yes.  I have been here within the last 3 years, and my medical history has NOT changed since that time.  I am MALE/not capable of bearing children.    REVIEW OF SYSTEMS:  Have you recently had or currently have any of the following? No changes in my recent health.   PAST MEDICAL HISTORY:  Have you personally ever had or currently have any of the following?  If \"YES,\" then please provide more detail. No changes in my medical history.   HISTORY OF IMMUNOSUPPRESSION: Do you have a history of any of the following:  Systemic Immunosuppression such as Diabetes, Biologic or Immunotherapy, Chemotherapy, Organ Transplantation, Bone Marrow Transplantation or Prednisone?  No     Answering \"YES\" requires the addition of the dotphrase \"IMMUNOSUPPRESSED\" as the first diagnosis of the patient's visit.   FAMILY HISTORY:  Any \"first degree relatives\" (parent, brother, sister, or child) with the following?    No changes in my family's known health.   PATIENT EXPERIENCE:    Do you want the Dermatologist to perform a COMPLETE skin exam today including a clinical examination under the \"bra and underwear\" areas?  NO  If necessary, do we have your permission to call and leave a detailed message on your Preferred Phone number that includes your specific medical information?  Yes      Allergies   Allergen Reactions    Erythromycin Other (See Comments)     Thrush       Current Outpatient Medications:     amLODIPine (NORVASC) 10 mg tablet, TAKE 1 TABLET BY MOUTH DAILY, Disp: 90 tablet, Rfl: 3    bacitracin ointment, Apply topically daily for 7 days Apply to bolster site daily, Disp: 30 g, Rfl: 0    Cholecalciferol (Vitamin D3) 50 MCG (2000 UT) capsule, Take 2,000 Units by mouth daily, Disp: , Rfl:     Fluocinolone Acetonide " Scalp 0.01 % OIL, Apply a thin layer topically daily at night one hour before bedtime., Disp: 118.28 mL, Rfl: 5    fluorouracil (EFUDEX) 5 % cream, Apply topically 2 (two) times a day Right cheek (Patient not taking: Reported on 6/6/2024), Disp: 40 g, Rfl: 0    imiquimod (ALDARA) 5 % cream, Apply topically once a day Monday thru Friday for 6 weeks (Patient taking differently: if needed), Disp: 24 each, Rfl: 1    lisinopril (ZESTRIL) 20 mg tablet, TAKE 1 TABLET BY MOUTH DAILY, Disp: 90 tablet, Rfl: 1    metoprolol succinate (TOPROL-XL) 50 mg 24 hr tablet, TAKE 1 TABLET BY MOUTH DAILY, Disp: 90 tablet, Rfl: 1    mometasone (ELOCON) 0.1 % cream, APPLY TO EARS ONCE TO TWICE DAILY AS NEEDED FOR SCALING, Disp: , Rfl:     Multiple Vitamins-Minerals (PRESERVISION AREDS 2) CAPS, Take 1 capsule by mouth 2 (two) times a day, Disp: , Rfl:     mupirocin (BACTROBAN) 2 % ointment, Apply topically two to three times a day to sore areas (Patient taking differently: Apply topically if needed Apply topically two to three times a day to sore areas), Disp: 22 g, Rfl: 3    Niacin (VITAMIN B-3 PO), Take by mouth, Disp: , Rfl:     Niacinamide-Zn-Cu-Anfmvu-Ws-Jj (NICOTINAMIDE PO), Take 500 mg by mouth 2 (two) times a day, Disp: , Rfl:     Pyridoxine HCl (VITAMIN B-6 PO), Take by mouth (Patient not taking: Reported on 3/4/2024), Disp: , Rfl:     traMADol (Ultram) 50 mg tablet, Take 1 tablet (50 mg total) by mouth every 6 (six) hours as needed for moderate pain for up to 15 doses (Patient not taking: Reported on 10/27/2023), Disp: 15 tablet, Rfl: 0          Whom besides the patient is providing clinical information about today's encounter?   NO ADDITIONAL HISTORIAN (patient alone provided history)    Physical Exam and Assessment/Plan by Diagnosis:    WOUND CHECK    Physical Exam:  Anatomic Location Affected:  right tip of nose   Closure Type: skin graft placed     Additional History of Present Condition:  MOHs procedure for removal of a BCC  done on right tip of nose by Dr. Caraballo in 6/12/24. Skin graft placed on area. Pt not too pleased with appearance and wanted it to be checked     Assessment and Plan:  Based on a thorough discussion of this condition and the management approach to it (including a comprehensive discussion of the known risks, side effects and potential benefits of treatment), the patient (family) agrees to implement the following specific plan:  Kenalog injection administered today in office  Consent form signed by patient in office   Message sent to Dr. Caraballo by Dr. Awad for further steps on how patient can continue to see Dr. Caraballo. Will reach out to patient once a plan is set   Monitor area for any changes     PROCEDURE:  INTRALESIONAL STEROID INJECTION (KENALOG INJECTION)    Purpose: Triamcinolone is a synthetic glucocorticoid corticosteroid that has marked anti-inflammatory action. It is prepared in sterile aqueous suspension suitable for injecting directly into a lesion on or immediately below the skin to treat a dermal inflammatory process.     Indications: It is indicated for alopecia areata; inflammatory acne cysts; discoid lupus erythematosus; keloids and hypertrophic scars; inflammatory lesions of granuloma annulare, lichen planus, lichen simplex chronicus (neurodermatitis), psoriatic plaques, and other localized inflammatory skin conditions.     Potential Side Effects: I understand that triamcinolone injection can potentially cause early and/or delayed adverse effects such as:    Pain    Impaired wound healing    Increased hair growth    Bleeding    White or brown marks    Steroid acne    Infection    Telangiectasia    Skin thinning    Cutaneous and subcutaneous lipoatrophy (most common) appearing as skin indentations or dimples around the injection sites a few weeks after treatment     PROCEDURE NOTE:  After verbal and written consent were obtained, the to-be-treated area was wiped and cleaned with rubbing alcohol 70%.       Then, a total of 0.1 mL of Kenalog (mixed with saline) CONCENTRATION:  5 mg/mL   (Lot# [id number]; Expiration 3/2026, NDC#: 9813-2713-48) was injected intralesionally into a total of 1 lesion/s on the following anatomic areas:  right tip of nose using a 3-mL syringe and a 30 1/2-gauge needle.      There was less than 1 mL of blood loss and little to no discomfort.  The area was bandaged with a Band-aid.  The patient tolerated the procedure well and remained in the office for observation.  With no signs of an adverse reaction, the patient was eventually discharged from clinic.       Hypertrophic scar on graft site treated with ILK 5mg/ml, total of 0.1ml injected.  Well tolerated.  Discussed the need for future treatments and will make appointment for next FBSE.    Scribe Attestation      I,:  Elena Mercer MA am acting as a scribe while in the presence of the attending physician.:       I,:  Wayne Awad MD personally performed the services described in this documentation    as scribed in my presence.:

## 2024-12-05 ENCOUNTER — TELEPHONE (OUTPATIENT)
Dept: DERMATOLOGY | Facility: CLINIC | Age: 84
End: 2024-12-05

## 2024-12-05 NOTE — TELEPHONE ENCOUNTER
Called patient to schedule f/u appointment for skin check - BCC on L forearm -  Per Dr. Caraballo. Left message for patient to call the office to schedule appointment. Please schedule with an AP on a Thurs or Fri in [place] when Dr. Caraballo is in the office. Or can be with any provider who has an opening.

## 2024-12-06 ENCOUNTER — TELEPHONE (OUTPATIENT)
Age: 84
End: 2024-12-06

## 2024-12-06 NOTE — TELEPHONE ENCOUNTER
Patient calling to request his appt 12/12/24 @ 1pm with Dr. Call be a virtual visit.  He states he called 11/22/24 and he's still waiting for a reply.  Please call him at [phone]

## 2024-12-06 NOTE — TELEPHONE ENCOUNTER
Rec'd call from patient to schedule an appointment with a AP and Dr Caraballo going in the room.    Appt scheduled 12/19/2024 11 am

## 2024-12-12 ENCOUNTER — TELEMEDICINE (OUTPATIENT)
Dept: HEMATOLOGY ONCOLOGY | Facility: CLINIC | Age: 84
End: 2024-12-12
Payer: MEDICARE

## 2024-12-12 DIAGNOSIS — C43.30 MALIGNANT MELANOMA OF FACE EXCLUDING EYELID, NOSE, LIP, AND EAR (HCC): ICD-10-CM

## 2024-12-12 DIAGNOSIS — C44.91 BASAL CELL CARCINOMA (BCC) OF MULTIPLE SITES: ICD-10-CM

## 2024-12-12 DIAGNOSIS — C44.319 BASAL CELL CARCINOMA OF SKIN OF OTHER PARTS OF FACE: ICD-10-CM

## 2024-12-12 DIAGNOSIS — C43.9 MELANOMA OF SKIN (HCC): ICD-10-CM

## 2024-12-12 DIAGNOSIS — Z08 ENCOUNTER FOR FOLLOW-UP SURVEILLANCE OF MELANOMA: Primary | ICD-10-CM

## 2024-12-12 DIAGNOSIS — Z85.820 ENCOUNTER FOR FOLLOW-UP SURVEILLANCE OF MELANOMA: Primary | ICD-10-CM

## 2024-12-12 DIAGNOSIS — C49.0 SARCOMA OF SCALP (HCC): ICD-10-CM

## 2024-12-12 DIAGNOSIS — C43.31 MALIGNANT MELANOMA OF NOSE (HCC): ICD-10-CM

## 2024-12-12 PROCEDURE — G2211 COMPLEX E/M VISIT ADD ON: HCPCS | Performed by: INTERNAL MEDICINE

## 2024-12-12 PROCEDURE — 99214 OFFICE O/P EST MOD 30 MIN: CPT | Performed by: INTERNAL MEDICINE

## 2024-12-12 NOTE — ASSESSMENT & PLAN NOTE
Orders:  •  CT neck chest abdomen pelvis w contrast; Future  •  CBC and differential; Future  •  Comprehensive metabolic panel; Future  •  LD,Blood; Future

## 2024-12-12 NOTE — PROGRESS NOTES
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  Discussed risk for local recurrence, local regional recurrence and distant disease.  Discussed this is why we obtain periodic imaging and do physical exams every 3 months.  He will see Dr. Caraballo next week for full body check.  Reviewed blood work that looks okay its either within normal limits or not clinically significant.  Reviewed his ultrasound he did have of the head and neck area.    He is due for full body imaging in 3 months upon his return visit did not do blood work.  All orders placed and prescription provided.  Someone will call him to get everything scheduled up.    Knows to call with issues or concerns prior to his next visit.       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  Discussed risk for local recurrence, local regional recurrence and distant disease.  Discussed this is why we obtain periodic imaging and do physical exams every 3 months.  He will see Dr. Caraballo next week for full body check.  Reviewed blood work that looks okay its either within normal limits or not clinically significant.  Reviewed his ultrasound he did have of the head and neck area.    He is due for full body imaging in 3 months upon his return visit did not do blood work.  All orders placed and prescription provided.  Someone will call him to get everything scheduled up.    Knows to call with issues or concerns prior to his next  visit.  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  Discussed risk for local recurrence, local regional recurrence and distant disease.  Discussed this is why we obtain periodic imaging and do physical exams every 3 months.  He will see Dr. Caraballo next week for full body check.  Reviewed blood work that looks okay its either within normal limits or not clinically significant.  Reviewed his ultrasound he did have of the head and neck area.    He is due for full body imaging in 3 months upon his return visit did not do blood work.  All orders placed and prescription provided.  Someone will call him to get everything scheduled up.    Knows to call with issues or concerns prior to his next visit.  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  Discussed risk for local recurrence, local regional recurrence and distant disease.  Discussed this is why we obtain periodic imaging and do physical exams every 3 months.  He will see Dr. Caraballo next week for full body check.  Reviewed blood work that looks okay its either within normal limits or not clinically significant.  Reviewed his ultrasound he did have of the head and neck area.    He is due for full body imaging in 3 months upon his return visit did not do blood work.  All orders placed and prescription provided.  Someone will call him to get everything scheduled up.    Knows to call with issues or concerns prior to his next visit.  Orders:    CT neck chest abdomen pelvis w contrast; Future    CBC and differential; Future    Comprehensive metabolic  panel; Future    LD,Blood; Future    Basal cell carcinoma of skin of other parts of face  Continue with follow-up with dermatology and removal of any identified basal cells.  He follows closely with dermatology as well as myself.  We will continue to monitor.       Basal cell carcinoma (BCC) of multiple sites  Continue with follow-up with dermatology and removal of any identified basal cells.  He follows closely with dermatology as well as myself.  We will continue to monitor.          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HCC)  Continue to monitor for any recurrence on exam.  We do periodically image for melanoma which would image for recurrence of any neoplastic process.             Encounter provider Sona Call MD    The patient was identified by name and date of birth. Alex Cheung was informed that this is a telemedicine visit and that the visit is being conducted through the Epic Embedded platform. He agrees to proceed. Was not audrey to connect to the visit virtually so was conducted by telephone.  My office door was closed. No one else was in the room.  He acknowledged consent and understanding of privacy and security of the video platform. The patient has agreed to participate and understands they can discontinue the visit at any time.    Patient is aware this is a billable service.     History of Present Illness     Mr. Cheung is a 84-year-old gentleman with stage IIb melanoma of the nose, multiple basal cell carcinomas and some of the scalp here for continued monitoring, follow-up and surveillance.    He had Mohs procedure to remove basal cell from his nose and June 2024 with Dr. Pauline carvalho.  He did see Dr. Awad approximately 1 week ago and had steroid injected into the area.  Otherwise no concerns for recurrence at this time.  He has a follow-up appointment with Dr. Caraballo next week.  Denies any concerning lesions today.  Denies lymphadenopathy.    Continues to follow closely with dermatology and surgical oncology.  He  did have an ultrasound of the head and neck on 10/15/2024 that demonstrates no evidence of metastatic disease.      Review of Systems   Constitutional:  Negative for activity change, chills, fatigue, fever and unexpected weight change.   HENT:  Negative for dental problem, ear pain, sore throat, trouble swallowing and voice change.    Eyes:  Negative for pain and visual disturbance.   Respiratory:  Negative for cough and shortness of breath.    Cardiovascular:  Negative for chest pain and palpitations.   Gastrointestinal:  Negative for abdominal pain, constipation, diarrhea, nausea and vomiting.   Genitourinary:  Negative for dysuria and hematuria.   Musculoskeletal:  Negative for arthralgias, back pain and myalgias.   Skin:  Negative for color change and rash.   Neurological:  Negative for seizures, syncope, weakness and numbness.   Hematological:  Negative for adenopathy.   All other systems reviewed and are negative.      Objective   There were no vitals taken for this visit.    Physical Exam  Not done as a virtual visit    Visit Time  Total Visit Duration: 20 minutes including review of history, review of blood work, review of imaging, symptom/side effect assessment and management, documentation, counseling and coordination of care.    Sona Call MD, PhD

## 2024-12-12 NOTE — LETTER
2024     Fortunato Booth MD  [address]  [address]  [place] [address]    Patient: Alex Cheung   YOB: 1940   Date of Visit: 2024       Dear Dr. Booth:    Thank you for referring Alex Cheung to me for evaluation. Below are my notes for this consultation.    If you have questions, please do not hesitate to call me. I look forward to following your patient along with you.         Sincerely,        Sona Call MD        CC: DEON Velarde MD Johnny Chung, MD John Greg Brady, DO Stephen Senft, MD Melissa A Wilson, MD  2024  1:28 PM  Sign when Signing Visit  Virtual Regular Visit  Name: Alex Cheung      : 1940      MRN: [id number]  Encounter Provider: Sona Call MD  Encounter Date: 2024   Encounter department: 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  Discussed risk for local recurrence, local regional recurrence and distant disease.  Discussed this is why we obtain periodic imaging and do physical exams every 3 months.  He will see Dr. Caraballo next week for full body check.  Reviewed blood work that looks okay its either within normal limits or not clinically significant.  Reviewed his ultrasound he did have of the head and neck area.    He is due for full body imaging in 3 months upon his return visit did not do blood work.  All orders placed and prescription provided.  Someone will call him to get everything scheduled up.    Knows to call with issues or concerns prior to his next visit.       Malignant melanoma of face excluding eyelid, nose, lip, and ear (HCC)  We reviewed his risk of recurrence with his stage IIb  melanoma on his nose.  Discussed risk for local recurrence, local regional recurrence and distant disease.  Discussed this is why we obtain periodic imaging and do physical exams every 3 months.  He will see Dr. Caraballo next week for full body check.  Reviewed blood work that looks okay its either within normal limits or not clinically significant.  Reviewed his ultrasound he did have of the head and neck area.    He is due for full body imaging in 3 months upon his return visit did not do blood work.  All orders placed and prescription provided.  Someone will call him to get everything scheduled up.    Knows to call with issues or concerns prior to his next visit.  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  Discussed risk for local recurrence, local regional recurrence and distant disease.  Discussed this is why we obtain periodic imaging and do physical exams every 3 months.  He will see Dr. Caraballo next week for full body check.  Reviewed blood work that looks okay its either within normal limits or not clinically significant.  Reviewed his ultrasound he did have of the head and neck area.    He is due for full body imaging in 3 months upon his return visit did not do blood work.  All orders placed and prescription provided.  Someone will call him to get everything scheduled up.    Knows to call with issues or concerns prior to his next visit.  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  Discussed risk for local recurrence, local regional recurrence and distant disease.  Discussed  this is why we obtain periodic imaging and do physical exams every 3 months.  He will see Dr. Caraballo next week for full body check.  Reviewed blood work that looks okay its either within normal limits or not clinically significant.  Reviewed his ultrasound he did have of the head and neck area.    He is due for full body imaging in 3 months upon his return visit did not do blood work.  All orders placed and prescription provided.  Someone will call him to get everything scheduled up.    Knows to call with issues or concerns prior to his next visit.  Orders:  •  CT neck chest abdomen pelvis w contrast; Future  •  CBC and differential; Future  •  Comprehensive metabolic panel; Future  •  LD,Blood; Future    Basal cell carcinoma of skin of other parts of face  Continue with follow-up with dermatology and removal of any identified basal cells.  He follows closely with dermatology as well as myself.  We will continue to monitor.       Basal cell carcinoma (BCC) of multiple sites  Continue with follow-up with dermatology and removal of any identified basal cells.  He follows closely with dermatology as well as myself.  We will continue to monitor.          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HCC)  Continue to monitor for any recurrence on exam.  We do periodically image for melanoma which would image for recurrence of any neoplastic process.             Encounter provider Sona Call MD    The patient was identified by name and date of birth. Alex Cheung was informed that this is a telemedicine visit and that the visit is being conducted through the Epic Embedded platform. He agrees to proceed. Was not audrey to connect to the visit virtually so was conducted by telephone.  My office door was closed. No one else was in the room.  He acknowledged consent and understanding of privacy and security of the video platform. The patient has agreed to participate and understands they can discontinue the visit at any time.    Patient is aware  this is a billable service.     History of Present Illness    Mr. Cheung is a 84-year-old gentleman with stage IIb melanoma of the nose, multiple basal cell carcinomas and some of the scalp here for continued monitoring, follow-up and surveillance.    He had Mohs procedure to remove basal cell from his nose and June 2024 with Dr. Pauline carvalho.  He did see Dr. Awad approximately 1 week ago and had steroid injected into the area.  Otherwise no concerns for recurrence at this time.  He has a follow-up appointment with Dr. Caraballo next week.  Denies any concerning lesions today.  Denies lymphadenopathy.    Continues to follow closely with dermatology and surgical oncology.  He did have an ultrasound of the head and neck on 10/15/2024 that demonstrates no evidence of metastatic disease.      Review of Systems   Constitutional:  Negative for activity change, chills, fatigue, fever and unexpected weight change.   HENT:  Negative for dental problem, ear pain, sore throat, trouble swallowing and voice change.    Eyes:  Negative for pain and visual disturbance.   Respiratory:  Negative for cough and shortness of breath.    Cardiovascular:  Negative for chest pain and palpitations.   Gastrointestinal:  Negative for abdominal pain, constipation, diarrhea, nausea and vomiting.   Genitourinary:  Negative for dysuria and hematuria.   Musculoskeletal:  Negative for arthralgias, back pain and myalgias.   Skin:  Negative for color change and rash.   Neurological:  Negative for seizures, syncope, weakness and numbness.   Hematological:  Negative for adenopathy.   All other systems reviewed and are negative.      Objective  There were no vitals taken for this visit.    Physical Exam  Not done as a virtual visit    Visit Time  Total Visit Duration: 20 minutes including review of history, review of blood work, review of imaging, symptom/side effect assessment and management, documentation, counseling and coordination of care.    Sona  MD Jakub, PhD

## 2024-12-19 ENCOUNTER — OFFICE VISIT (OUTPATIENT)
Dept: DERMATOLOGY | Facility: CLINIC | Age: 84
End: 2024-12-19

## 2024-12-19 DIAGNOSIS — Z85.828 HISTORY OF BASAL CELL CARCINOMA: ICD-10-CM

## 2024-12-19 DIAGNOSIS — L82.1 SEBORRHEIC KERATOSIS: ICD-10-CM

## 2024-12-19 DIAGNOSIS — L81.4 LENTIGO: ICD-10-CM

## 2024-12-19 DIAGNOSIS — Z85.820 HISTORY OF MELANOMA: ICD-10-CM

## 2024-12-19 DIAGNOSIS — D18.01 CHERRY ANGIOMA: ICD-10-CM

## 2024-12-19 DIAGNOSIS — Z85.89 HISTORY OF SQUAMOUS CELL CARCINOMA: ICD-10-CM

## 2024-12-19 DIAGNOSIS — C44.311 BASAL CELL CARCINOMA (BCC) OF SKIN OF NOSE: Primary | ICD-10-CM

## 2024-12-19 DIAGNOSIS — Z85.831 HISTORY OF SARCOMA: ICD-10-CM

## 2024-12-19 DIAGNOSIS — D48.9 NEOPLASM OF UNCERTAIN BEHAVIOR: ICD-10-CM

## 2024-12-19 DIAGNOSIS — D22.9 MULTIPLE BENIGN MELANOCYTIC NEVI: ICD-10-CM

## 2024-12-19 PROCEDURE — 88305 TISSUE EXAM BY PATHOLOGIST: CPT | Performed by: PATHOLOGY

## 2024-12-19 NOTE — PROGRESS NOTES
"[facility] Note     Patient Name: Alex Cheung  Encounter Date: 12/19/24       Have you been cared for by a [facility] Dermatologist in the last 3 years and, if so, which description applies to you?    Yes.  I have been here within the last 3 years, and my medical history has NOT changed since that time.  I am MALE/not capable of bearing children.    REVIEW OF SYSTEMS:  Have you recently had or currently have any of the following? No changes in my recent health.   PAST MEDICAL HISTORY:  Have you personally ever had or currently have any of the following?  If \"YES,\" then please provide more detail. No changes in my medical history.   HISTORY OF IMMUNOSUPPRESSION: Do you have a history of any of the following:  Systemic Immunosuppression such as Diabetes, Biologic or Immunotherapy, Chemotherapy, Organ Transplantation, Bone Marrow Transplantation or Prednisone?  No     Answering \"YES\" requires the addition of the dotphrase \"IMMUNOSUPPRESSED\" as the first diagnosis of the patient's visit.   FAMILY HISTORY:  Any \"first degree relatives\" (parent, brother, sister, or child) with the following?    No changes in my family's known health.   PATIENT EXPERIENCE:    Do you want the Dermatologist to perform a COMPLETE skin exam today including a clinical examination under the \"bra and underwear\" areas?  Yes  If necessary, do we have your permission to call and leave a detailed message on your Preferred Phone number that includes your specific medical information?  Yes      Allergies   Allergen Reactions   • Erythromycin Other (See Comments)     Thrush       Current Outpatient Medications:   •  amLODIPine (NORVASC) 10 mg tablet, TAKE 1 TABLET BY MOUTH DAILY, Disp: 90 tablet, Rfl: 3  •  Cholecalciferol (Vitamin D3) 50 MCG (2000 UT) capsule, Take 2,000 Units by mouth daily, Disp: , Rfl:   •  Fluocinolone Acetonide Scalp 0.01 % OIL, Apply a thin layer topically daily at night one hour before bedtime., Disp: " 118.28 mL, Rfl: 5  •  imiquimod (ALDARA) 5 % cream, Apply topically once a day Monday thru Friday for 6 weeks (Patient taking differently: if needed), Disp: 24 each, Rfl: 1  •  lisinopril (ZESTRIL) 20 mg tablet, TAKE 1 TABLET BY MOUTH DAILY, Disp: 90 tablet, Rfl: 1  •  metoprolol succinate (TOPROL-XL) 50 mg 24 hr tablet, TAKE 1 TABLET BY MOUTH DAILY, Disp: 90 tablet, Rfl: 1  •  mometasone (ELOCON) 0.1 % cream, APPLY TO EARS ONCE TO TWICE DAILY AS NEEDED FOR SCALING, Disp: , Rfl:   •  Multiple Vitamins-Minerals (PRESERVISION AREDS 2) CAPS, Take 1 capsule by mouth 2 (two) times a day, Disp: , Rfl:   •  mupirocin (BACTROBAN) 2 % ointment, Apply topically two to three times a day to sore areas (Patient taking differently: Apply topically if needed Apply topically two to three times a day to sore areas), Disp: 22 g, Rfl: 3  •  Niacin (VITAMIN B-3 PO), Take by mouth, Disp: , Rfl:   •  Niacinamide-Zn-Cu-Yusmpm-Zf-Hk (NICOTINAMIDE PO), Take 500 mg by mouth 2 (two) times a day, Disp: , Rfl:   •  bacitracin ointment, Apply topically daily for 7 days Apply to bolster site daily, Disp: 30 g, Rfl: 0  •  fluorouracil (EFUDEX) 5 % cream, Apply topically 2 (two) times a day Right cheek (Patient not taking: Reported on 6/6/2024), Disp: 40 g, Rfl: 0  •  Pyridoxine HCl (VITAMIN B-6 PO), Take by mouth (Patient not taking: Reported on 3/4/2024), Disp: , Rfl:   •  traMADol (Ultram) 50 mg tablet, Take 1 tablet (50 mg total) by mouth every 6 (six) hours as needed for moderate pain for up to 15 doses (Patient not taking: Reported on 10/27/2023), Disp: 15 tablet, Rfl: 0          Whom besides the patient is providing clinical information about today's encounter?   NO ADDITIONAL HISTORIAN (patient alone provided history)    Physical Exam and Assessment/Plan by Diagnosis:    UNTREATED BASAL CELL CARCINOMA    Physical Exam:  Anatomic Location Affected:  Right dorsal forearm  Morphological Description:  pearly erythematous papule within suspected  surrounding seborrheic keratosis   Pertinent Positives:  Pertinent Negatives:  Prior biopsy: Yes;  If YES, prior accession or case #: [id number]      Assessment and Plan:    Patient defers treatment at this time. He is aware of basal cell carcinoma diagnosis and previous plan for excision. Patient has had numerous other skin cancers and being that this one is not bleeding or bothersome he does not prefer to pursue surgical intervention at this time.   Patient was counseled on adverse effects of leaving this untreated.       HISTORY OF BASAL CELL CARCINOMA     Physical Exam:  Anatomic Location Affected:  Right temple, right cheek, left lip, right tip of nose, left wrist  Morphological Description of scar:  scars are healed; Right arm has active lesion  Suspected Recurrence: No     Additional History of Present Condition:  History of basal cell carcinoma with no sign of recurrence. MOHS done 05/16/2023, Accession #[id number]. MOHS done 10/13/2022, Accession # [id number]. MOHS done 10/06/22, Accession # [id number]. MOHS done 09/29/21, Accession # [id number]. MOHS done 11/30/23 Accession # [id number] Mohs done 6/12/24     Assessment and Plan:  Based on a thorough discussion of this condition and the management approach to it (including a comprehensive discussion of the known risks, side effects and potential benefits of treatment), the patient (family) agrees to implement the following specific plan:  Monitor for changes or recurrence  Routine skin checks q 6 mo. Seeing Dr. Call and Dr. Booth regularly as well    How can basal cell carcinoma be prevented?  The most important way to prevent BCC is to avoid sunburn. This is especially important in childhood and early life. Fair skinned individuals and those with a personal or family history of BCC should protect their skin from sun exposure daily, year-round and lifelong.  Stay indoors or under the shade in the middle of the day   Wear covering clothing   Apply high  protection factor SPF50+ broad-spectrum sunscreens generously to exposed skin if outdoors   Avoid indoor tanning (sun beds, solaria)  Oral nicotinamide (vitamin B3) in a dose of 500 mg twice daily may reduce the number and severity of BCCs.     What is the outlook for basal cell carcinoma?  Most BCCs are cured by treatment. Cure is most likely if treatment is undertaken when the lesion is small.  About 50% of people with BCC develop a second one within 3 years of the first. They are also at increased risk of other skin cancers, especially melanoma. Regular self-skin examinations and long-term annual skin checks by an experienced health professional are recommended.     HISTORY OF MELANOMA     Physical Exam:  Anatomic Location Affected:  Right neck, Left cheek and left deltoid( 0.9 mm excised 06/19/2019 by Dr. Lewsi. Right cheek Emmy ( 2.3mm breslow), desmoplastic left ala   Morphological Description of Scar:  Well healed  Year Treated:  09/14/2022, Left cheek-2007, left deltoid 2019, right cheek 7.10.23, left ala 2.7.23   Suspected Recurrence: no  Regional adenopathy: no     Additional History of Present Condition:  History of melanoma with no signs of recurrence. Excision done 09/14/2022, Accession # [id number].      Assessment and Plan:  Based on a thorough discussion of this condition and the management approach to it (including a comprehensive discussion of the known risks, side effects and potential benefits of treatment), the patient (family) agrees to implement the following specific plan:  Monitor for changes or recurrence  Routine skin checks q 6mo   Seeing Dr. Call and Dr. Booth regularly as well     What happens at follow-up?  The main purpose of follow-up is to detect recurrences early (metastatic melanoma), but it also offers an opportunity to diagnose a new primary melanoma at the first possible opportunity. A second invasive melanoma occurs in 5-10% of melanoma patients and a new melanoma in situ  "is diagnosed in more than 20% of melanoma patients.     Our practice makes the following recommendations for follow-up for patients with invasive melanoma.  At-least \"monthly\" self-skin examinations   Routine skin checks by a board certified dermatologist  Follow-up intervals are \"every 3 months\" within 2 years of a new melanoma diagnosis; \"every 6 months\" between 2-4 years of a new melanoma diagnosis; and \"annually\" after 4 years of a new melanoma diagnosis  Individual patient's needs should be considered before an appropriate follow-up is offered  Provide education and support to help the patient adjust to their illness     Follow-up appointments should include:  A check of the scar where the primary melanoma was removed  Checking the regional lymph nodes  A general skin examination  A full physical examination at least annually by your primary care physician     In those with more advanced primary disease, follow-up may include:  Blood tests  Imaging: ultrasound, X-ray, CT, MRI and PET scan.     Most tests are not worthwhile for patients with stage 1 or 2 melanoma unless there are signs or symptoms of disease recurrence or metastasis. No tests are necessary for healthy patients who have remained well for five years or longer after removal of their melanoma.     What is the outlook for patients with melanoma?  Melanoma in situ is cured by excision because it has no potential to spread around the body.  The risk of spread and ultimate death from invasive melanoma depends on several factors, but the main one is the Breslow thickness of the melanoma at the time it was surgically removed.  Metastases are rare for melanomas < 0.75 mm and the risk for tumours 0.75-1 mm thick is about 5%. The risk steadily increases with thickness so that melanomas > 4 mm have a risk of metastasis of about 40%.     Melanoma is a potentially serious type of skin cancer, in which there is uncontrolled growth of melanocytes (pigment cells). " Melanoma is sometimes called malignant melanoma.  Normal melanocytes are found in the basal layer of the epidermis (the outer layer of skin). Melanocytes produce a protein called melanin, which protects skin cells by absorbing ultraviolet (UV) radiation. Melanocytes are found in equal numbers in black and white skin, but melanocytes in black skin produce much more melanin. People with dark brown or black skin are very much less likely to be damaged by UV radiation than those with white skin.     HISTORY OF SQUAMOUS CELL CARCINOMA      Physical Exam:  Anatomic Location Affected:  Multiple Sites  Morphological Description of Scar:  Well healed  Suspected Recurrence: no  Regional adenopathy: no     Additional History of Present Condition:  History of SCC with no signs of reurrence     Assessment and Plan:  Based on a thorough discussion of this condition and the management approach to it (including a comprehensive discussion of the known risks, side effects and potential benefits of treatment), the patient (family) agrees to implement the following specific plan:  Monitor for changes or recurrence  Routine skin checks q 6 mo. Seeing Dr. Call and Dr. Booth regularly as well     How can SCC be prevented?  The most important way to prevent SCC is to avoid sunburn. This is especially important in childhood and early life. Fair skinned individuals and those with a personal or family history of BCC should protect their skin from sun exposure daily, year-round and lifelong.  Stay indoors or under the shade in the middle of the day   Wear covering clothing   Apply high protection factor SPF50+ broad-spectrum sunscreens generously to exposed skin if outdoors   Avoid indoor tanning (sun beds, solaria)        What is the outlook for SCC?  Most SCC are cured by treatment. Cure is most likely if treatment is undertaken when the lesion is small. A small percent of SCC's can spread to lymph  nodes and long term monitoring is  "indicated.   They are also at increased risk of other skin cancers, especially melanoma. Regular self-skin examinations and long-term annual skin checks by an experienced health professional are recommended.     HISTORY OF PLEOMORPHIC SARCOMA     Physical Exam:  Anatomic Location Affected:  Scalp  Morphological Description:  100% take of free flap, no signs of recurrence , free graft 20cm right scalp with full take      Additional History of Present Condition:  Patient has had multiple procedures in right paretal scalp area where a flap was done. Patient also had 27 treatents of radiation to treat cancer.  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 By [facility] and temple     Assessment and Plan:  Based on a thorough discussion of this condition and the management approach to it (including a comprehensive discussion of the known risks, side effects and potential benefits of treatment), the patient (family) agrees to implement the following specific plan:  Continue to follow up at [place]      NEOPLASM OF UNCERTAIN BEHAVIOR OF SKIN    Physical Exam:  (Anatomic Location); (Size and Morphological Description); (Differential Diagnosis):  Specimen A: Left superior lateral brow; 0.3 x 0.4 pearly pink papule with brown/black pigment. Ddx. Pigmented bcc  Specimen B: Left inferior lateral brow; 1.5 cm x 1.1 cm scaly indurated erythematous nodule. Ddx: bcc  Specimen C: Left shoulder; 0.7 cm pink/brown/black pigmented papule. Pigmented BCC vs. MM              Additional History of Present Condition:  Present on exam    Assessment and Plan:  I have discussed with the patient that a sample of skin via a \"skin biopsy” would be potentially helpful to further make a specific diagnosis under the microscope.  Based on a thorough discussion of this condition and the management approach to it (including a comprehensive discussion of the known risks, " side effects and potential benefits of treatment), the patient (family) agrees to implement the following specific plan:    Procedure:  Skin Biopsy.  After a thorough discussion of treatment options and risk/benefits/alternatives (including but not limited to local pain, scarring, dyspigmentation, blistering, possible superinfection, and inability to confirm a diagnosis via histopathology), verbal and written consent were obtained and portion of the rash was biopsied for tissue sample.  See below for consent that was obtained from patient and subsequent Procedure Note.    PROCEDURE TANGENTIAL (SHAVE) BIOPSY NOTE:    Performing Physician:  Anna Mcgregor PA-C  Anatomic Location; Clinical Description with size (cm); Pre-Op Diagnosis:   Specimen A: Left superior lateral brow; 0.3 x 0.4 pearly pink papule with brown/black pigment. Ddx. Pigmented bcc  Specimen B: Left inferior lateral brow; 1.5 cm x 1.1 cm scaly indurated erythematous nodule. Ddx: bcc  Specimen C: Left shoulder; 0.7 cm pink/brown/black pigmented papule. Pigmented BCC vs. MM  Post-op diagnosis: Same     Local anesthesia: 1% xylocaine with epi      Topical anesthesia: None    Hemostasis: Aluminum chloride       After obtaining informed consent  at which time there was a discussion about the purpose of biopsy  and low risks of infection and bleeding.  The area was prepped and draped in the usual fashion. Anesthesia was obtained with 1% lidocaine with epinephrine. A shave biopsy to an appropriate sampling depth was obtained by Shave (Dermablade or 15 blade) The resulting wound was covered with surgical ointment and bandaged appropriately.     The patient tolerated the procedure well without complications and was without signs of functional compromise.      Specimen has been sent for review by Dermatopathology.    Standard post-procedure care has been explained and has been included in written form within the patient's copy of Informed Consent.      Scribe  Attestation    I,:  Roc Olsen am acting as a scribe while in the presence of the attending physician.:       I,:  Anna Mcgregor PA-C personally performed the services described in this documentation    as scribed in my presence.:

## 2024-12-23 PROCEDURE — 88305 TISSUE EXAM BY PATHOLOGIST: CPT | Performed by: PATHOLOGY

## 2024-12-26 ENCOUNTER — RESULTS FOLLOW-UP (OUTPATIENT)
Dept: DERMATOLOGY | Facility: CLINIC | Age: 84
End: 2024-12-26

## 2024-12-26 DIAGNOSIS — C44.91 INFILTRATIVE BASAL CELL CARCINOMA: Primary | ICD-10-CM

## 2024-12-26 NOTE — RESULT ENCOUNTER NOTE
DERMATOPATHOLOGY RESULT NOTE    Results reviewed by ordering provider  Called patient to personally discuss results. Discussed results with patient.       Instructions for Clinical Derm Team:   (remember to route Result Note to appropriate staff):    Call patient and schedule for excision of Specimen C.   Mohs referral placed for specimen A and B    Result & Plan by Specimen:    Specimen A: malignant  Plan: MOHs      Specimen B: malignant  Plan: MOHs      Specimen C: malignant  Plan: excision    Tissue Exam: [id number]  Order: [id number]   Status: Final result      Dx: Neoplasm of uncertain behavior    Test Result Released: Yes (not seen)    View Follow-Up Encounter     Component  Ref Range & Units (hover)   Case Report  Surgical Pathology Report                         Case: [id number]                                  Authorizing Provider:  Anna Mcgregor PA-C          Collected:           12/19/2024 1453              Ordering Location:     [facility]      Received:            12/19/2024 1453                                     [place]                                                                Pathologist:           Constantino Hill MD                                                      Specimens:   A) - Skin, Other, Specimen A: Left superior lateral brow                                           B) - Skin, Other, Specimen B: Left inferior lateral brow                                           C) - Skin, Other, Specimen C: Left shoulder                                              Final Diagnosis  A. Skin, Left superior lateral brow:  BASAL CELL CARCINOMA, NODULAR, PIGMENTED      B. Skin, Left inferior lateral brow:   BASAL CELL CARCINOMA, INFILTRATIVE AND NODULAR      C. Skin, Left shoulder:   BASAL CELL CARCINOMA, NODULAR, PIGMENTED      Electronically signed by Constantino Hill MD on 12/23/2024 at 1235 EST  Note   Interpretation performed at [facility]  [facility]  [facility] [address]    Additional Information   All reported additional testing was performed with appropriately reactive controls.  These tests were developed and their performance characteristics determined by [facility] or appropriate performing facility, though some tests may be performed on tissues which have not been validated for performance characteristics (such as staining performed on alcohol exposed cell blocks and decalcified tissues).  Results should be interpreted with caution and in the context of the patients' clinical condition. These tests may not be cleared or approved by the U.S. Food and Drug Administration, though the FDA has determined that such clearance or approval is not necessary. These tests are used for clinical purposes and they should not be regarded as investigational or for research. This laboratory has been approved by [facility], designated as a high-complexity laboratory and is qualified to perform these tests.  .  Gross Description   A. The specimen is received in formalin, labeled with the patient's name and hospital number, and is designated \" left superior lateral brow\".  It consists of a 0.5 x 0.4 x 0.1 cm skin shave.  The epidermis displays an ill-defined 0.4 x 0.3 cm tan-brown scaly patch extending to the skin margin.  The resection margin is inked green and the specimen is bisected.  Entirely submitted between sponges in cassette A1.  B. The specimen is received in formalin, labeled with the patient's name and hospital number, and is designated \" skin left inferior lateral brow\".  It consists of a 0.7 x 0.4 x 0.1 cm tan-brown variegated keratotic skin shave.  The resection margin is inked green and the specimen is bisected.  Entirely submitted between sponges in cassette B1.  C. The specimen is received in formalin, labeled with the patient's name and hospital number, and is designated \" skin left shoulder\".  It consists of a 0.9 x 0.8 x 0.2 cm skin " biopsy.  The epidermis displays an ill-defined 0.6 x 0.6 cm tan-brown scaly plaque at the skin margin.  The resection margin is inked green and the specimen is bisected.  Entirely submitted between sponges in cassette C1.    Note: The estimated total formalin fixation time based upon information provided by the submitting clinician and the standard processing schedule is under 72 hours.  [facility]  Clinical Information   ATTENTION:  DERMPATH GROUP    SPECIMEN A; Skin; Anatomic Location: left superior lateral brow; Procedure/Protocol: Skin Specimen (submit in FORMALIN):Tangential Biopsy (includes shave, scoop, saucerization, curette) (CPT 13422; each additional tangential biopsy is CPT 75959)  84 y.o. year old  Male with a Morphological Description: 0.3 x 0.4 pearly pink papule with brown/black pigment. Ddx. Pigmented bcc    SPECIMEN B; Skin; Anatomic Location: Left inferior lateral brow; Procedure/Protocol: Skin Specimen (submit in FORMALIN):Tangential Biopsy (includes shave, scoop, saucerization, curette) (CPT 39893; each additional tangential biopsy is CPT 38137)  84 y.o. year old  Male with a Morphological Description: 1.5 cm x 1.1 cm scaly indurated erythematous nodule. Ddx: bcc    SPECIMEN C; Skin; Anatomic Location: Left shoulder; Procedure/Protocol: Skin Specimen (submit in FORMALIN):Tangential Biopsy (includes shave, scoop, saucerization, curette) (CPT 52120; each additional tangential biopsy is CPT 97759)  84 y.o. year old  Male with a Morphological Description: 0.7 cm pink/brown/black pigmented papule. Pigmented BCC r/o MM  Resulting Agency [facility]

## 2024-12-30 ENCOUNTER — TELEPHONE (OUTPATIENT)
Dept: DERMATOLOGY | Facility: CLINIC | Age: 84
End: 2024-12-30

## 2024-12-30 NOTE — TELEPHONE ENCOUNTER
Pre- operative Mohs Telephone Scheduling Note    Do you have a pacemaker, defibrillator, spinal or brain stimulator? no    Do you take antibiotics before skin or dental procedures? no  If yes, will likely require pre-operative antibiotics. Ask  the patient why they take the antibiotics (usually because of joint replacement).    Do you have a history of a joint replacements within the past 2 years? no   If yes, will likely require pre-operative antibiotics. Ask if orthopaedic surgeon has prescribed pre-operative antibiotics to take before procedures/dental work?    Do you take any OTC medications that thin your blood (Aspirin, Aleve, Ibuprofen) or supplements that thin your blood (fish oil, garlic, vitamin E, Ginko Biloba)? no    Do you take any prescribed medications that thin your blood (Coumadin, Plavix, Xarelto, Eliquis or another prescribed blood thinner)? no    Do you have an allergy to lidocaine or epinephrine? no    Do you have allergies to Iodine? no    Do you wear a lidocaine patch? no    Have you ever been diagnosed with HIV, AIDS, Hep B and Hep C? no    Do you use a cane, walker or wheelchair? no    Is the patient from a nursing home? no If yes, Is there any special accommodations that is needed for patient n/a    Do you smoke? no      If yes,  patient to try and stop 2 days before surgery and 7 days after the surgery. Minimizing smoking as much as possible during this time will improve healing and the cosmetic result after surgery.    Do you use supplemental oxygen? If so, how many liters and can you be off it for a short period of time? N/a    Date scheduled: 6/26/25 at 9 with Dr Matthews     Coordination of Care with other provider (Oculoplastics, Plastics, ENT) required? no   IF YES, PLEASE FORWARD TO APPROPRIATE PERSONNEL TO HELP COORDINATE.    Are there remaining tumors to be scheduled? no    Was Prior Authorization obtained? No (please use [email or web address] to document prior auth)

## 2024-12-30 NOTE — LETTER
Alex Cheung     1940    1936 [address] [address]    Dear Alex Cheung,    You are scheduled to have the MOHS procedure on June 26, 2025 at 9 am for eyebrow with Dr.Nadia Matthews. Our office is located in [facility] our address is [address] [place] [address]. Once you arrive please check in with our front staff in [address] and they will escort you to the MOHS waiting room.  If you have someone bringing you to your appointment they may wait in the waiting room or accompany you in your visit.      Below you will find some pre-op instructions along with some information regarding the MOHS procedure.     If you have any questions please call our office at [phone].       Thank you,    [facility]         PRE-OPERATIVE INSTRUCTIONS - MOHS    Before your scheduled surgery, there are a number of important precautions and positive steps you should take to help prepare yourself for a successful treatment and speedy recovery.    Some of the steps, which are listed below, may seem unnecessary and inconvenient, but they are important. For example, when you stop smoking, you increase your ability to heal. Occasionally, there may be valid reasons, personal or medical, why you can't comply. In such cases, please call the office so we can discuss possible ways to overcome any obstacles you may be encountering.    If you have any questions about the surgery, or remember additional medical information that you forgot to mention to our staff, please contact the office prior to your surgery.    GENERAL INFORMATION REGARDING MOHS MICROGRAPHIC SURGERY    Mohs surgery is a specialized technique for the removal of skin cancer developed by Dr. Frederick Mohs over 50 years ago to improve the cure rates of skin cancer. Traditionally, skin cancers are treated by destructive methods (radiation, freezing, scraping, and burning) or  excision (cutting out the tissue with standards margins and sending it to an outside laboratory for testing). These methods all yield cure rates between 65%-94%. However, for cancers located in cosmetically sensitive areas, large tumors, or tumors unsuccessfully treated by other means, Mohs surgery offers a higher cure rate. In most cases, Mohs surgery provides you with a 99% cure rate for primary (previously untreated) basal cell cancer and a 95% cure rate for primary squamous cell cancer. In Mohs surgery, tissue is removed and processed in a way that we are able to check 100% of the margins, giving the highest cure rate for any method of treating skin cancers while providing maximal preservation of normal skin. This allows the surgeon to produce an optimal cosmetic result for the patient by maximizing the amount of tissue removed yielding as small a scar as possible    On the day of surgery, you will be given local anesthesia only (similar to what was given to you during your initial biopsy). You will remain awake. You will verify the location of the skin cancer prior to the onset of the surgery. Once the area is numb, the tissue containing the skin cancer will be removed, taking a small safety margin. This margin is usually smaller than what would be taken with a standard excision. Once the tissue is removed, it is marked and oriented. The first layer (“Stage I”) will be processed in our laboratory. The wound will be treated for bleeding and a bandage will be placed to keep you comfortable while you wait an approximate 45 minutes-1 hour (for the processing of the tissue) in your room. Your Mohs surgeon will examine the pathology in the lab, checking all the margins. If any tumor remains, you will need to take a second layer of skin (“Stage 2”). The area will be re-anesthetized and your Mohs surgeon will remove more skin only in the area where the tumor exists. This process will continue until all the skin cancer  is removed. Unfortunately, there is no method to predict how many layers or stages will be taken.    Once the tumor has been removed completely, we will discuss the best ways to close the defect. Most wounds may be closed with stitches. A larger wound may require a skin graft or a flap. In rare instances, especially for cancers around the eye or for larger cancers, we may work with another surgeon (oculoplastic, ENT, plastics) with special skills to assist with reconstruction.  If the surgery is coordinated with another specialist, you will have the Mohs portion of the procedure first and see the coordinated specialist after the skin cancer has been removed.  Always follow the pre-operative instructions of the surgeon doing the closure.      Medications: Please take all your normal medications the morning of your surgery. If you are a diabetic, please bring your insulin or medications with you, as well as a snack to avoid having low blood sugar during your day with us.    1) Blood Thinners    VERY IMPORTANT: We do NOT stop or hold prescribed blood thinners (such as Coumadin/Warfarin, Plavix, Eliquis, Pradaxa, Brilinta, Apixaban, Xarelto, Lovonox, Rivaroxaban, or Aggrenox) before Mohs surgery. Additionally, If you take aspirin because you have had a stroke, heart attack, heart disease, other condition, or your physician has prescribed you to take it, please continue your aspirin.    Although there is a risk of increased bruising and bleeding, we are still able to safely perform surgery while continuing these medications. Please NEVER stop your prescribed blood thinner without the managing doctor's (the doctor that prescribed the medication) permission or knowledge. If you have any concerns about not holding your blood thinner, please address these with your Mohs surgeon.    Most people should stop all non-steroidal anti-inflammatory medications (Motrin, Naproxen, Advil, Midol, Aleve, etc.) for 7 days prior to your  scheduled surgery and 2 days after (unless instructed otherwise after surgery).  You may take Tylenol for pain.     Vitamins and Supplements  Avoid taking any supplements with Vitamin E, Fish Oil, Gingko, Ginseng, and Garlic for 2 weeks before and 2 days after your surgery. These thin your blood.    Lidocaine Patches  Avoid wearing any over the counter or prescribed Lidocaine patches the day of the surgery.    Alcohol: Avoid drinking alcohol for 2 days prior to your surgery, and for 2 days afterwards (it thins the blood and causes more bruising and swelling).    Smoking: Try to STOP or reduce smoking significantly the week before your surgery, and especially the week afterwards (it greatly improves how well you heal). Tobacco smoke deprives the blood of oxygen, which is urgently needed by the wound during the healing process.    Contact Lenses: Do not wear them on the day of the surgery. Instead, wear glasses and bring your case, in case we need to remove them.    Clothing: Do not wear your nicest clothing on your surgery day. We recommend wearing a button down shirt that will not disrupt your post-operative dressing when changing later that night. Please avoid wearing jeans during the procedure to help prevent damage to our equipment.     Bathing: On the morning of your surgery, you may bathe or shower normally. If you get your hair done on a weekly basis, remember to get your hair washed the day before surgery.   - You will need to keep your surgical site dry for a minimum of 48 hours after surgery.    Makeup: If your surgery is on the face, please do not wear any makeup on the day of the surgery.    Jewelry: Please try to avoid wearing jewelry on the day of surgery.    Food: On the morning of surgery, have breakfast but limit your intake of caffeinated beverages. They are diuretic and may inconvenience you during surgery. If you are following up with another surgeon the same day as your Mohs surgery, you must  receive permission to eat breakfast from that surgeon.      What to bring with you on the day of your surgery:  Bring snacks - Since you could be at the office long, you may bring snacks and/or lunch with you. Some snacks and drinks are available at the office as well.   Bring a sweater - Bring a sweater or jacket that buttons or zips down the front and will not disturb your wound dressing during removal.  Bring something to do - You will be spending much of the day in our office. There will be 45-60 minute waiting periods  between layers/stages, and while there is a television with cable in every room, it is nice to have something to keep you occupied such as books, magazines, knitting, music, or work.     Planning Ahead:  Other Appointments - It is important to realize that no matter how small the skin cancer appears to be, looks can be deceiving. Since your surgery may last the entire day, you should not schedule any other appointments that day.  Special Occasions - Surgery often creates swelling and bruising. Also, the post-op dressing may be rather large and obvious. Keep this in mind as you arrange your social/work schedule. If an important event is already planned, please check with your referring physician or your Mohs surgeon to see if the surgery can be postponed.  Activity Limits after Surgery - If surgery was performed on your face, we recommend that you keep your activity level to a minimum for 2-3 days (the blood pressure elevation related to exercise can lead to bleeding). If you have stitches in an area that will be under tension or significant movement (neck, back, arms, legs), you will need to avoid heavy lifting (anything over 5 lbs) or exercise for at least 2 weeks and possibly longer. We also advise that you limit out of town travel for the first 7 days after surgery. You should also wait at least 7 days before going into a pool or the ocean.  Housework - Since you will need to minimize activity  after surgery, plan to do your groceries, laundry, gardening, and other heavy household chores prior to your surgery. Please make arrangements for assistance during the post-op period. If surgery is around your mouth area, you may need to eat soft foods, such as soup, milkshakes, or yogurt for 48 hours.    Purchasing bandage supplies: Prior to surgery, please purchase the following items to care for your surgical wound properly.  Cotton swabs (Q-tips)  Vaseline or Aquaphor  Telfa pads (or any non-stick dressing)  Paper tape or Hypafix tape  Gauze pads (3x3)    Transportation: It is often reassuring and comforting to have a  drive you to and from the surgery. He or she is welcome to wait in the office during the surgery. If you do not have a , you may drive to and from your procedure (unless stated otherwise). If the site being treated is near your eye, be aware that the final bandage may cause some obstruction of vision.     Rescheduling: If you need to reschedule your surgery, please notify the office as soon as possible.

## 2025-01-15 ENCOUNTER — OFFICE VISIT (OUTPATIENT)
Dept: PODIATRY | Facility: CLINIC | Age: 85
End: 2025-01-15
Payer: MEDICARE

## 2025-01-15 VITALS — HEIGHT: 69 IN | RESPIRATION RATE: 18 BRPM | BODY MASS INDEX: 26.07 KG/M2 | WEIGHT: 176 LBS

## 2025-01-15 DIAGNOSIS — I73.9 PERIPHERAL VASCULAR DISEASE, UNSPECIFIED (HCC): Primary | ICD-10-CM

## 2025-01-15 PROCEDURE — RECHECK: Performed by: PODIATRIST

## 2025-01-15 PROCEDURE — 11719 TRIM NAIL(S) ANY NUMBER: CPT | Performed by: PODIATRIST

## 2025-01-15 NOTE — PROGRESS NOTES
Established patient with class findings presents for nail care.  Vascular exam:  DP  0/4 bilateral; PT  0 4 bilateral   Dermatological exam:  Each toenail is thick and  dystrophic.  Diagnosis:  PVD  Treatment: Trimmed multiple dystrophic toenails.     Nail removal    Date/Time: 1/15/2025 2:45 PM    Performed by: Jake Cabezas DPM  Authorized by: Jake Cabezas DPM    Nails trimmed:     Number of nails trimmed:  10

## 2025-01-31 DIAGNOSIS — I10 ESSENTIAL HYPERTENSION: ICD-10-CM

## 2025-02-03 RX ORDER — LISINOPRIL 20 MG/1
20 TABLET ORAL DAILY
Qty: 90 TABLET | Refills: 1 | Status: SHIPPED | OUTPATIENT
Start: 2025-02-03

## 2025-03-06 ENCOUNTER — PROCEDURE VISIT (OUTPATIENT)
Dept: DERMATOLOGY | Facility: CLINIC | Age: 85
End: 2025-03-06
Payer: MEDICARE

## 2025-03-06 VITALS — WEIGHT: 176 LBS | BODY MASS INDEX: 25.99 KG/M2 | TEMPERATURE: 98.1 F

## 2025-03-06 DIAGNOSIS — C44.619 BASAL CELL CARCINOMA (BCC) OF LEFT SHOULDER: Primary | ICD-10-CM

## 2025-03-06 PROCEDURE — 17261 DSTRJ MAL LES T/A/L .6-1.0CM: CPT | Performed by: DERMATOLOGY

## 2025-03-06 NOTE — PROGRESS NOTES
"[facility] Note     Patient Name: Alex Cheung  Encounter Date: 3/6/2025     Have you been cared for by a [facility] Dermatologist in the last 3 years and, if so, which description applies to you?    Yes.  I have been here within the last 3 years, and my medical history has NOT changed since that time.  I am MALE/not capable of bearing children.    REVIEW OF SYSTEMS:  Have you recently had or currently have any of the following? No changes in my recent health.   PAST MEDICAL HISTORY:  Have you personally ever had or currently have any of the following?  If \"YES,\" then please provide more detail. No changes in my medical history.   HISTORY OF IMMUNOSUPPRESSION: Do you have a history of any of the following:  Systemic Immunosuppression such as Diabetes, Biologic or Immunotherapy, Chemotherapy, Organ Transplantation, Bone Marrow Transplantation or Prednisone?  No     Answering \"YES\" requires the addition of the dotphrase \"IMMUNOSUPPRESSED\" as the first diagnosis of the patient's visit.   FAMILY HISTORY:  Any \"first degree relatives\" (parent, brother, sister, or child) with the following?    No changes in my family's known health.   PATIENT EXPERIENCE:    Do you want the Dermatologist to perform a COMPLETE skin exam today including a clinical examination under the \"bra and underwear\" areas?  NO  If necessary, do we have your permission to call and leave a detailed message on your Preferred Phone number that includes your specific medical information?  Yes      Allergies   Allergen Reactions    Erythromycin Other (See Comments)     Thrush       Current Outpatient Medications:     amLODIPine (NORVASC) 10 mg tablet, TAKE 1 TABLET BY MOUTH DAILY, Disp: 90 tablet, Rfl: 3    bacitracin ointment, Apply topically daily for 7 days Apply to bolster site daily, Disp: 30 g, Rfl: 0    Cholecalciferol (Vitamin D3) 50 MCG (2000 UT) capsule, Take 2,000 Units by mouth daily, Disp: , Rfl:     Fluocinolone Acetonide " Scalp 0.01 % OIL, Apply a thin layer topically daily at night one hour before bedtime., Disp: 118.28 mL, Rfl: 5    fluorouracil (EFUDEX) 5 % cream, Apply topically 2 (two) times a day Right cheek (Patient not taking: Reported on 6/6/2024), Disp: 40 g, Rfl: 0    imiquimod (ALDARA) 5 % cream, Apply topically once a day Monday thru Friday for 6 weeks (Patient taking differently: if needed), Disp: 24 each, Rfl: 1    lisinopril (ZESTRIL) 20 mg tablet, TAKE 1 TABLET BY MOUTH DAILY, Disp: 90 tablet, Rfl: 1    metoprolol succinate (TOPROL-XL) 50 mg 24 hr tablet, TAKE 1 TABLET BY MOUTH DAILY, Disp: 90 tablet, Rfl: 1    mometasone (ELOCON) 0.1 % cream, APPLY TO EARS ONCE TO TWICE DAILY AS NEEDED FOR SCALING, Disp: , Rfl:     Multiple Vitamins-Minerals (PRESERVISION AREDS 2) CAPS, Take 1 capsule by mouth 2 (two) times a day, Disp: , Rfl:     mupirocin (BACTROBAN) 2 % ointment, Apply topically two to three times a day to sore areas (Patient taking differently: Apply topically if needed Apply topically two to three times a day to sore areas), Disp: 22 g, Rfl: 3    Niacin (VITAMIN B-3 PO), Take by mouth, Disp: , Rfl:     Niacinamide-Zn-Cu-Txntkw-Ln-Ca (NICOTINAMIDE PO), Take 500 mg by mouth 2 (two) times a day, Disp: , Rfl:     Pyridoxine HCl (VITAMIN B-6 PO), Take by mouth (Patient not taking: Reported on 3/4/2024), Disp: , Rfl:     traMADol (Ultram) 50 mg tablet, Take 1 tablet (50 mg total) by mouth every 6 (six) hours as needed for moderate pain for up to 15 doses (Patient not taking: Reported on 10/27/2023), Disp: 15 tablet, Rfl: 0          Whom besides the patient is providing clinical information about today's encounter?   NO ADDITIONAL HISTORIAN (patient alone provided history)    Physical Exam and Assessment/Plan by Diagnosis:      CURETTAGE AND DESICCATION Malignant Lesion    Diagnosis: Basal cell carcinoma Nodular type   Prior biopsy: Yes  Access Number: [id number]   Location Left shoulder  Size preop 6 mm  Size postop  1.7 cm       Additional History of Present Condition:  Here today for treatment of nodular BCC left shoulder. The patient was counseled on the diagnosis and options for treatment. Options offered included excision (lowest risk of recurrence, ability to confirm clear margins via histology, but most invasive in that sutures will be placed and activity must be limited for several weeks), electrodessication and curettage (no sutures, however cannot confirm margin status as tissue is destroyed during procedure, and increased risk of recurrence. If the lesion recurs, surgical removal would be recommended). Topical chemotherapy was also discussed (highest risk of recurrence of the 3 options and requires 6 months of continuous therapy). The patient elected to proceed with ED&C.      Assessment and Plan:  Based on a thorough discussion of this condition and the management approach to it (including a comprehensive discussion of the known risks, side effects and potential benefits of treatment), the patient (family) agrees to treat the above described lesion with desiccation and curettage.    Procedure:  The area was cleanly  prepped in usual manner  Anesthesia:1% xylocaine with epi    The above described lesion was aggressively curetted to mid dermis followed by desiccation  Number of cycles of desiccation and curettage 3  A clean dry dressing was placed on site. Oral and written postop care instructions were discussed and reviewed      DISCUSSION OF TREATMENT AND POSTOP CARE FOR PATIENT    What is curettage and desiccation?  Curettage and desiccation is a type of electrosurgery in which a skin lesion is scraped off and heat is applied to the skin surface.    What is involved in curettage and cautery?  Your doctor will explain to you why your skin lesion needs treatment and the procedure involved. You may have to sign a consent form to indicate that you consent to the surgical procedure. Tell your doctor if you are taking any  medication, or if you have any allergies or medical conditions.  The doctor will inject some local anaesthetic into the area surrounding the lesion to be treated. This will make the skin go numb so no pain should be felt during the procedure. You may feel a pushing sensation but this should not be painful. The skin lesion is scraped off with a curette, which is like a small spoon with very sharp edges.  The lesion should be sent to a pathology laboratory for analysis. The wound surface is then cauterised with a hot wire beaded tip or electrosurgical unit (diathermy). This stops bleeding and helps destroys any remaining skin tumour cells.  This procedure is usually repeated twice for malignant skin lesions (serial curettage and cautery).  A dressing may be applied and instructions should be given on how to care for your wound.    What types of skin lesions can be treated by curettage?  Curettage is suitable to treat lesions where the material being scraped off is softer than the surrounding skin or when there is a natural cleavage plane between the lesion and the surrounding normal tissue. The following are sometimes treated by curettage:  Squamous cell carcinoma in situ   Actinic keratoses   Basal cell carcinomas that are large, deep or recurrent are usually not suitable for curettage. Lesions with poorly defined edges are also generally unsuitable.Curettage and desiccation is most often used in more superficial type basal cell carcinoma.    Will I have a scar?  Curettage often results in some sort of scar especially if accompanied by cautery.   The scars from curettage are usually flat and round. They are a similar size to that of the original skin lesion. Some people have an abnormal response to skin healing and these people may get larger scars than usual (keloids and hypertrophic scarring).    How do I look after the wound following skin curettage?  The wound may be tender when the local anaesthetic wears  off.  Leave the dressing in place till the nest day. Avoid strenuous exertion and stretching of the area.   If there is any bleeding, press on the wound firmly with a folded towel without looking at it for 30 minutes. If it is still bleeding after this time, seek medical attention.  Follow instructions a written in our consent ,  The wound from curettage will take approximately 2-3 weeks to heal over. The scar will initially be red and raised but usually reduces in colour and size over several months.  .      Scribe Attestation      I,:  Darya Werner MA am acting as a scribe while in the presence of the attending physician.:       I,:  Kathia Gonzalez MD personally performed the services described in this documentation    as scribed in my presence.:

## 2025-03-10 ENCOUNTER — APPOINTMENT (OUTPATIENT)
Dept: LAB | Facility: CLINIC | Age: 85
End: 2025-03-10
Payer: MEDICARE

## 2025-03-10 DIAGNOSIS — C43.30 MALIGNANT MELANOMA OF FACE EXCLUDING EYELID, NOSE, LIP, AND EAR (HCC): ICD-10-CM

## 2025-03-10 DIAGNOSIS — C43.9 MELANOMA OF SKIN (HCC): ICD-10-CM

## 2025-03-10 DIAGNOSIS — C43.31 MALIGNANT MELANOMA OF NOSE (HCC): ICD-10-CM

## 2025-03-10 LAB
ALBUMIN SERPL BCG-MCNC: 4.3 G/DL (ref 3.5–5)
ALP SERPL-CCNC: 121 U/L (ref 34–104)
ALT SERPL W P-5'-P-CCNC: 23 U/L (ref 7–52)
ANION GAP SERPL CALCULATED.3IONS-SCNC: 10 MMOL/L (ref 4–13)
AST SERPL W P-5'-P-CCNC: 20 U/L (ref 13–39)
BASOPHILS # BLD AUTO: 0.04 THOUSANDS/ÂΜL (ref 0–0.1)
BASOPHILS NFR BLD AUTO: 1 % (ref 0–1)
BILIRUB SERPL-MCNC: 0.94 MG/DL (ref 0.2–1)
BUN SERPL-MCNC: 17 MG/DL (ref 5–25)
CALCIUM SERPL-MCNC: 10 MG/DL (ref 8.4–10.2)
CHLORIDE SERPL-SCNC: 105 MMOL/L (ref 96–108)
CO2 SERPL-SCNC: 26 MMOL/L (ref 21–32)
CREAT SERPL-MCNC: 1.2 MG/DL (ref 0.6–1.3)
EOSINOPHIL # BLD AUTO: 0.29 THOUSAND/ÂΜL (ref 0–0.61)
EOSINOPHIL NFR BLD AUTO: 4 % (ref 0–6)
ERYTHROCYTE [DISTWIDTH] IN BLOOD BY AUTOMATED COUNT: 13.2 % (ref 11.6–15.1)
GFR SERPL CREATININE-BSD FRML MDRD: 54 ML/MIN/1.73SQ M
GLUCOSE P FAST SERPL-MCNC: 98 MG/DL (ref 65–99)
HCT VFR BLD AUTO: 42.3 % (ref 36.5–49.3)
HGB BLD-MCNC: 14.7 G/DL (ref 12–17)
IMM GRANULOCYTES # BLD AUTO: 0.02 THOUSAND/UL (ref 0–0.2)
IMM GRANULOCYTES NFR BLD AUTO: 0 % (ref 0–2)
LDH SERPL-CCNC: 180 U/L (ref 140–271)
LYMPHOCYTES # BLD AUTO: 2.17 THOUSANDS/ÂΜL (ref 0.6–4.47)
LYMPHOCYTES NFR BLD AUTO: 28 % (ref 14–44)
MCH RBC QN AUTO: 31.3 PG (ref 26.8–34.3)
MCHC RBC AUTO-ENTMCNC: 34.8 G/DL (ref 31.4–37.4)
MCV RBC AUTO: 90 FL (ref 82–98)
MONOCYTES # BLD AUTO: 0.53 THOUSAND/ÂΜL (ref 0.17–1.22)
MONOCYTES NFR BLD AUTO: 7 % (ref 4–12)
NEUTROPHILS # BLD AUTO: 4.69 THOUSANDS/ÂΜL (ref 1.85–7.62)
NEUTS SEG NFR BLD AUTO: 60 % (ref 43–75)
NRBC BLD AUTO-RTO: 0 /100 WBCS
PLATELET # BLD AUTO: 194 THOUSANDS/UL (ref 149–390)
PMV BLD AUTO: 10.8 FL (ref 8.9–12.7)
POTASSIUM SERPL-SCNC: 3.6 MMOL/L (ref 3.5–5.3)
PROT SERPL-MCNC: 7.3 G/DL (ref 6.4–8.4)
RBC # BLD AUTO: 4.69 MILLION/UL (ref 3.88–5.62)
SODIUM SERPL-SCNC: 141 MMOL/L (ref 135–147)
WBC # BLD AUTO: 7.74 THOUSAND/UL (ref 4.31–10.16)

## 2025-03-10 PROCEDURE — 85025 COMPLETE CBC W/AUTO DIFF WBC: CPT

## 2025-03-10 PROCEDURE — 83615 LACTATE (LD) (LDH) ENZYME: CPT

## 2025-03-10 PROCEDURE — 80053 COMPREHEN METABOLIC PANEL: CPT

## 2025-03-10 PROCEDURE — 36415 COLL VENOUS BLD VENIPUNCTURE: CPT

## 2025-03-13 ENCOUNTER — RESULTS FOLLOW-UP (OUTPATIENT)
Dept: FAMILY MEDICINE CLINIC | Facility: CLINIC | Age: 85
End: 2025-03-13

## 2025-03-13 ENCOUNTER — HOSPITAL ENCOUNTER (OUTPATIENT)
Dept: CT IMAGING | Facility: HOSPITAL | Age: 85
Discharge: HOME | End: 2025-03-13
Attending: INTERNAL MEDICINE
Payer: MEDICARE

## 2025-03-13 ENCOUNTER — HOSPITAL ENCOUNTER (OUTPATIENT)
Dept: RADIOLOGY | Facility: HOSPITAL | Age: 85
Discharge: HOME | End: 2025-03-13
Payer: MEDICARE

## 2025-03-13 DIAGNOSIS — M51.362 DEGENERATION OF INTERVERTEBRAL DISC OF LUMBAR REGION WITH DISCOGENIC BACK PAIN AND LOWER EXTREMITY PAIN: ICD-10-CM

## 2025-03-13 DIAGNOSIS — C43.30 MALIGNANT MELANOMA OF FACE EXCLUDING EYELID, NOSE, LIP, AND EAR (HCC): ICD-10-CM

## 2025-03-13 DIAGNOSIS — C43.9 MELANOMA OF SKIN (HCC): ICD-10-CM

## 2025-03-13 DIAGNOSIS — C43.31 MALIGNANT MELANOMA OF NOSE (HCC): ICD-10-CM

## 2025-03-13 PROCEDURE — 74177 CT ABD & PELVIS W/CONTRAST: CPT

## 2025-03-13 PROCEDURE — 70491 CT SOFT TISSUE NECK W/DYE: CPT

## 2025-03-13 PROCEDURE — 72110 X-RAY EXAM L-2 SPINE 4/>VWS: CPT

## 2025-03-13 PROCEDURE — 71260 CT THORAX DX C+: CPT

## 2025-03-13 RX ADMIN — IOHEXOL 100 ML: 350 INJECTION, SOLUTION INTRAVENOUS at 10:25

## 2025-04-16 ENCOUNTER — OFFICE VISIT (OUTPATIENT)
Dept: PODIATRY | Facility: CLINIC | Age: 85
End: 2025-04-16
Payer: MEDICARE

## 2025-04-16 VITALS — HEIGHT: 69 IN | RESPIRATION RATE: 18 BRPM | WEIGHT: 173 LBS | BODY MASS INDEX: 25.62 KG/M2

## 2025-04-16 DIAGNOSIS — I73.9 PERIPHERAL VASCULAR DISEASE, UNSPECIFIED (HCC): Primary | ICD-10-CM

## 2025-04-16 PROCEDURE — 11719 TRIM NAIL(S) ANY NUMBER: CPT | Performed by: PODIATRIST

## 2025-04-16 PROCEDURE — RECHECK: Performed by: PODIATRIST

## 2025-04-16 NOTE — PROGRESS NOTES
Established patient with class findings presents for nail care.  Vascular exam:  DP  0/4 bilateral; PT  0 4 bilateral   Dermatological exam:  Each toenail is thick and  dystrophic.  Diagnosis:  PVD  Treatment: Trimmed multiple dystrophic toenails.     Nail removal    Date/Time: 4/16/2025 2:00 PM    Performed by: Jake Cabezas DPM  Authorized by: Jake Cabezas DPM    Nails trimmed:     Number of nails trimmed:  10

## 2025-04-26 DIAGNOSIS — I10 PRIMARY HYPERTENSION: ICD-10-CM

## 2025-04-28 RX ORDER — METOPROLOL SUCCINATE 50 MG/1
50 TABLET, EXTENDED RELEASE ORAL DAILY
Qty: 90 TABLET | Refills: 1 | Status: SHIPPED | OUTPATIENT
Start: 2025-04-28

## 2025-04-30 ENCOUNTER — TELEMEDICINE (OUTPATIENT)
Dept: HEMATOLOGY ONCOLOGY | Facility: CLINIC | Age: 85
End: 2025-04-30
Payer: MEDICARE

## 2025-04-30 DIAGNOSIS — C43.30 MALIGNANT MELANOMA OF FACE EXCLUDING EYELID, NOSE, LIP, AND EAR (HCC): ICD-10-CM

## 2025-04-30 DIAGNOSIS — C44.319 BASAL CELL CARCINOMA OF SKIN OF OTHER PARTS OF FACE: ICD-10-CM

## 2025-04-30 DIAGNOSIS — Z08 ENCOUNTER FOR FOLLOW-UP SURVEILLANCE OF MELANOMA: Primary | ICD-10-CM

## 2025-04-30 DIAGNOSIS — C43.31 MALIGNANT MELANOMA OF NOSE (HCC): ICD-10-CM

## 2025-04-30 DIAGNOSIS — Z85.820 ENCOUNTER FOR FOLLOW-UP SURVEILLANCE OF MELANOMA: Primary | ICD-10-CM

## 2025-04-30 DIAGNOSIS — C49.0 SARCOMA OF SCALP (HCC): ICD-10-CM

## 2025-04-30 PROCEDURE — G2211 COMPLEX E/M VISIT ADD ON: HCPCS | Performed by: INTERNAL MEDICINE

## 2025-04-30 PROCEDURE — 99214 OFFICE O/P EST MOD 30 MIN: CPT | Performed by: INTERNAL MEDICINE

## 2025-04-30 NOTE — PROGRESS NOTES
Name: Alex Cheung      : 1940      MRN: [id number]  Encounter Provider: Sona Call MD  Encounter Date: 2025   Encounter department: [facility] [place]  :  Assessment & Plan        No follow-ups on file.    History of Present Illness {?Quick Links Encounters * My Last Note * Last Note in Specialty * Snapshot * Since Last Visit * History :98299}  No chief complaint on file.    Oncology History   Cancer Staging   Malignant melanoma of face excluding eyelid, nose, lip, and ear (HCC)  Staging form: Melanoma of the Skin, AJCC 8th Edition  - Clinical stage from 2023: Stage IIA (cT2b, cN0, cM0) - Signed by Sona Call MD on 2023  - Pathologic stage from 7/10/2023: Stage IIB (pT3b, pN0, cM0) - Signed by Sona Call MD on 2023    Malignant melanoma of nose (HCC)  Staging form: Melanoma of the Skin, AJCC 8th Edition  - Clinical stage from 2023: Stage IB (cT2a, cN0, cM0) - Signed by Sona Call MD on 2023  Oncology History   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HCC)   2017 Surgery    right vertex of scalp - s/p excision and full thickness graft for closure on 17 for atypical fibroxanthoma.       2018 Biopsy    biopsy from the anterior aspect of the graft showing atypical epithelioid fibro histiocytic tumor, with lesion extending to the deep tissue edge.  This is similar to prior biopsy done.  Differential includes pleomorphic sarcoma versus an extreme atypical example of atypical fibro histiocytoma.     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 See Note.     -- FNCLCC Histologic Grade 3  (total score: 7 of 8).       * Tumor differentiation score: 3 of 3.        * Mitotic count score: 3 of 3; > 19 mitoses/10 HPF (33  "mitoses/10 HPF).       * Necrosis score: 1 of 2; present, < 50%.  - Tumor extends into deep reticular dermis, subcutis and fascia.   - Perineural invasion: Present; intraneural invasion identified (0.4 mm largest nerve diameter).   - Lymphovascular invasion: Focally suspicious.   - Bone invasion: Not identified.   - Central nervous system extension: Not identified.   - Margins: uninvolved by sarcoma but close.    -- Sarcoma  0.15 mm from nearest deep margin.   - Additional Pathologic Findings: Changes consistent with prior surgical site.     -- Focal ulceration with bacterial colonization, acute and chronic inflammation.     -- Best representative tumor block: A5.      5/29/2018 - 7/5/2018 Radiation    Plan ID Energy Fractions Dose per Fraction (cGy) Dose Correction (cGy) Total Dose Delivered (cGy) Elapsed Days   Vertex Scalp 9E 27 / 27 200 0 5,400 37            Basal cell carcinoma of skin of other parts of face   7/10/2023 Surgery    Skin, right cheek, \"basal cell carcinoma\", excision:  Scar. No residual basal cell carcinoma identified in the examined sections.        Malignant melanoma of nose (HCC)   12/6/2022 Biopsy    Skin, left ala, shave biopsy:     MELANOMA (thickness: 0.6 mm); extending to the tissue edges      Ulceration: not seen  Anatomic (Hal) level: III  Type: lentigo maligna melanoma  Mitoses: 0/mm2  Margin assessment: invasive melanoma extends to peripheral specimen margin and deep specimen margin focally  Pathologic stage: pT1a     2/7/2023 Surgery    Skin, nose, left nasal, wide excision:  Residual melanoma, invasive, desmoplastic type, and scar, margins free.   Incidental basal cell carcinoma, superficial and nodular type, not extending to the margins.    pT2a     2/7/2023 -  Cancer Staged    Staging form: Melanoma of the Skin, AJCC 8th Edition  - Clinical stage from 2/7/2023: Stage IB (cT2a, cN0, cM0) - Signed by Sona Call MD on 11/27/2023       Malignant melanoma of face excluding " "eyelid, nose, lip, and ear (HCC)   5/16/2023 Initial Diagnosis    Malignant melanoma of face excluding eyelid, nose, lip, and ear (HCC)  Right mid cheek    A. Skin, right mid cheek, shave biopsy:     Combined ulcerated MELANOMA (thickness: at least 1.5 mm) and BASAL CELL CARCINOMA; transected (see note).     Note: There are foci of basal cell carcinoma and melanoma in which the components are separate from each other (as can be seen in a collision tumor), but there are also multiple foci in which the melanoma and basal cell carcinoma are closely intermingled (\"basomelanocytic tumor\" or \"combined melanoma and basal cell carcinoma of the intermingled type\"), which is a rare occurrence.1 SOX10, MART-1, HMB45, p40, and Tarun-EP4 immunostains were reviewed and support the diagnosis. Please see synoptic report for additional details. The results were emailed to Dr. Matthews on 5/19/2023.     Synoptic report for melanoma of the skin  Thickness: at least 1.5 mm  Ulceration: present  Anatomic (Hal) level: at least IV  Type: superficial spreading melanoma  Mitoses: 1/mm2  Microsatellites: cannot be determined  Lymphovascular invasion: not seen  Neurotropism: not seen  Tumor regression: not seen  Tumor-infiltrating lymphocytes (TIL): present, non-brisk  Margin assessment: invasive melanoma extends to deep specimen margin; melanoma in situ extends to peripheral specimen margin  Pathologic stage: at least pT2b  Associated nevus: not seen         5/16/2023 -  Cancer Staged    Staging form: Melanoma of the Skin, AJCC 8th Edition  - Clinical stage from 5/16/2023: Stage IIA (cT2b, cN0, cM0) - Signed by Sona Call MD on 5/26/2023       7/10/2023 Surgery    Skin, right cheek \"melanoma\", wide excision:  Residual combined melanoma (Breslow thickness: 2.3 mm) and basal cell carcinoma (malignant basomelanocytic tumor); margins free.   pT3a     7/10/2023 -  Cancer Staged    Staging form: Melanoma of the Skin, AJCC 8th Edition  - " Pathologic stage from 7/10/2023: Stage IIB (pT3b, pN0, cM0) - Signed by Sona Call MD on 11/28/2023          Pertinent Medical History   {There is no content from the last Pertinent Medical History section.}  04/30/25: ***     Review of Systems  {Select to insert medical history sections (Optional):56452}      Objective {?Quick Links Trend Vitals * Enter New Vitals * Results Review * Timeline (Adult) * Labs * Imaging * Cardiology * Procedures * Lung Cancer Screening * Surgical eConsent :66027}  There were no vitals taken for this visit.    Pain Screening:     ECOG   ***  Physical Exam    Labs: I have reviewed the following labs:  Lab Results   Component Value Date/Time    WBC 7.74 03/10/2025 07:27 AM    RBC 4.69 03/10/2025 07:27 AM    Hemoglobin 14.7 03/10/2025 07:27 AM    Hematocrit 42.3 03/10/2025 07:27 AM    MCV 90 03/10/2025 07:27 AM    MCH 31.3 03/10/2025 07:27 AM    RDW 13.2 03/10/2025 07:27 AM    Platelets 194 03/10/2025 07:27 AM    Segmented % 60 03/10/2025 07:27 AM    Lymphocytes % 28 03/10/2025 07:27 AM    Monocytes % 7 03/10/2025 07:27 AM    Eosinophils Relative 4 03/10/2025 07:27 AM    Basophils Relative 1 03/10/2025 07:27 AM    Immature Grans % 0 03/10/2025 07:27 AM    Absolute Neutrophils 4.69 03/10/2025 07:27 AM     Lab Results   Component Value Date/Time    Potassium 3.6 03/10/2025 07:27 AM    Chloride 105 03/10/2025 07:27 AM    CO2 26 03/10/2025 07:27 AM    BUN 17 03/10/2025 07:27 AM    Creatinine 1.20 03/10/2025 07:27 AM    Glucose, Fasting 98 03/10/2025 07:27 AM    Calcium 10.0 03/10/2025 07:27 AM    AST 20 03/10/2025 07:27 AM    ALT 23 03/10/2025 07:27 AM    Alkaline Phosphatase 121 (H) 03/10/2025 07:27 AM    Total Protein 7.3 03/10/2025 07:27 AM    Albumin 4.3 03/10/2025 07:27 AM    Total Bilirubin 0.94 03/10/2025 07:27 AM    eGFR 54 03/10/2025 07:27 AM   {SL AMB ONCOLOGY LABS (Optional):16946}    {Radiology Results Review (Optional):52384}    {Administrative / Billing Section  (Optional):29448}

## 2025-04-30 NOTE — ASSESSMENT & PLAN NOTE
Orders:  •  CBC and differential; Future  •  Comprehensive metabolic panel; Future  •  LD,Blood; Future

## 2025-04-30 NOTE — LETTER
2025     Fortunato Booth MD  [address]  [address]  [place] [address]    Patient: Alex Cheung   YOB: 1940   Date of Visit: 2025       Dear MD Memo Quijano PA-C Nicholas Michael Cardiges, MD Johnny Chung, MD John Greg Brady, DO:    Thank you for referring Alex Cheung to me for evaluation. Below are my notes for this consultation.    If you have questions, please do not hesitate to call me. I look forward to following your patient along with you.         Sincerely,        Sona Call MD        CC: DEON Velarde MD Johnny Chung, MD John Greg Brady, DO Sona Call MD  2025 10:28 AM  Sign when Signing Visit  Virtual Regular VisitName: Alex Cheung      : 1940      MRN: [id number]  Encounter Provider: Sona Call MD  Encounter Date: 2025   Encounter department: [facility] [place]  :  Assessment & Plan  Encounter for follow-up surveillance of melanoma  Mr. Cheung is a 84-year-old gentleman with stage IIb melanoma as well as basal cell carcinoma of the face and other areas and sarcoma of the scalp here for continued monitoring, follow-up and surveillance.  He is doing well with no clinical evidence of any type of skin cancer as he did have a skin exam by dermatology in 2025.  At that time he did have a basal cell on his arm removed with electric curettage.  He denies any concerning findings at this time.  He will follow closely with dermatology.  We discussed that his imaging was negative for any evidence of locally recurrent or metastatic melanoma.  Discussed that we will continue to follow him every 3 months.  We will schedule him to see me in 3 months with blood work at that time.  He knows to call with issues or concerns prior to his next visit.    Did discuss potentially getting a brain MRI due to his new off balance issues.  At this  time he would like to hold off.  States he is not dizzy or affected just feels a little bit weak and balance issues and he is okay as long as he is holding onto something.  Discussed that he should let us know if this is worsening.       Malignant melanoma of face excluding eyelid, nose, lip, and ear (HCC)  Mr. Cheung is a 84-year-old gentleman with stage IIb melanoma as well as basal cell carcinoma of the face and other areas and sarcoma of the scalp here for continued monitoring, follow-up and surveillance.  He is doing well with no clinical evidence of any type of skin cancer as he did have a skin exam by dermatology in March 2025.  At that time he did have a basal cell on his arm removed with electric curettage.  He denies any concerning findings at this time.  He will follow closely with dermatology.  We discussed that his imaging was negative for any evidence of locally recurrent or metastatic melanoma.  Discussed that we will continue to follow him every 3 months.  We will schedule him to see me in 3 months with blood work at that time.  He knows to call with issues or concerns prior to his next visit.    Did discuss potentially getting a brain MRI due to his new off balance issues.  At this time he would like to hold off.  States he is not dizzy or affected just feels a little bit weak and balance issues and he is okay as long as he is holding onto something.  Discussed that he should let us know if this is worsening.     Orders:  •  CBC and differential; Future  •  Comprehensive metabolic panel; Future  •  LD,Blood; Future    Malignant melanoma of nose (HCC)  Mr. Cheung is a 84-year-old gentleman with stage IIb melanoma as well as basal cell carcinoma of the face and other areas and sarcoma of the scalp here for continued monitoring, follow-up and surveillance.  He is doing well with no clinical evidence of any type of skin cancer as he did have a skin exam by dermatology in March 2025.  At that time he did  have a basal cell on his arm removed with electric curettage.  He denies any concerning findings at this time.  He will follow closely with dermatology.  We discussed that his imaging was negative for any evidence of locally recurrent or metastatic melanoma.  Discussed that we will continue to follow him every 3 months.  We will schedule him to see me in 3 months with blood work at that time.  He knows to call with issues or concerns prior to his next visit.    Did discuss potentially getting a brain MRI due to his new off balance issues.  At this time he would like to hold off.  States he is not dizzy or affected just feels a little bit weak and balance issues and he is okay as long as he is holding onto something.  Discussed that he should let us know if this is worsening.     Orders:  •  CBC and differential; Future  •  Comprehensive metabolic panel; Future  •  LD,Blood; Future    Basal cell carcinoma of skin of other parts of face  Has been resected and continues to monitor for new, changing, concerning skin lesions.  Also had a basal cell removed from his arm with electric curettage.  He follows closely with dermatology.  We do continue to monitor him and will look for other skin lesions.       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HCC)  Resected and resolved.  We do monitor for recurrent skin cancers including melanoma basal cell and sarcoma.  He gets imaged for his melanoma and we continue to monitor for any disease recurrence or metastasis.  Would obviously biopsy any concerning lesion.             History of Present Illness    Mr. Cheung is a 84-year-old gentleman with history of multiple skin cancers including stage IIb melanoma and basal cell carcinoma and sarcoma of the scalp.  Most recently had a basal cell removed from his arm by dermatology with electric curettage.  Denies any concerning skin lesions at this time.  Denies any lymphadenopathy.  He does to continue to follow with dermatology very closely.  He has an  ultrasound upcoming of his head and neck and will see surgical oncology following that.  He feels okay.  Energy is okay.  Does feel he is starting to lose a little bit of balance and needs to use a cart when he goes shopping.  He is able to mow his lawn and push the lawnmower but took him some extra time.  He is going to discuss with his primary care physician about physical therapy.    We did discuss his imaging he had done on 3/13/2025 with a CT soft tissue neck and chest abdomen and pelvis.  Imaging of his soft tissue neck demonstrates no evidence of cervical lymph nodes.  Imaging of chest, abdomen pelvis also demonstrates no evidence of metastatic disease.            Review of Systems   Constitutional:  Negative for activity change, chills, diaphoresis, fatigue, fever and unexpected weight change.   HENT:  Negative for congestion, hearing loss, trouble swallowing and voice change.    Respiratory:  Negative for cough, shortness of breath and wheezing.    Cardiovascular:  Negative for chest pain, palpitations and leg swelling.   Gastrointestinal:  Negative for abdominal distention, abdominal pain, constipation, diarrhea, nausea and vomiting.   Musculoskeletal:  Negative for arthralgias and myalgias.   Skin:  Negative for color change and rash.   Neurological:  Negative for dizziness, seizures, speech difficulty, weakness, light-headedness and headaches.        Balance issues   Hematological:  Negative for adenopathy.       Objective  There were no vitals taken for this visit.    Physical Exam  Patient unable to connect for virtual visit, so visit was done by telephone.  No physical exam.      Administrative Statements  Encounter provider Sona Call MD    The Patient is located at Home and in the following state in which I hold an active license [place].    The patient was identified by name and date of birth. Alex Cheung was informed that this is a telemedicine visit and that the visit is being conducted  through the Epic Embedded platform.  Patient unable to logon to virtual visit as he does not have access.  Appointment was continued via telephone call.  He agrees to proceed..  My office door was closed. No one else was in the room.  He acknowledged consent and understanding of privacy and security of the video platform. The patient has agreed to participate and understands they can discontinue the visit at any time.    I have spent a total time of 20 minutes in caring for this patient on the day of the visit/encounter including Diagnostic results, Instructions for management, Patient and family education, Impressions, Counseling / Coordination of care, Documenting in the medical record, Reviewing/placing orders in the medical record (including tests, medications, and/or procedures), Obtaining or reviewing history  , and Communicating with other healthcare professionals , not including the time spent for establishing the audio/video connection.    Sona Call MD, PhD

## 2025-04-30 NOTE — PROGRESS NOTES
Virtual Regular VisitName: Alex Cheung      : 1940      MRN: [id number]  Encounter Provider: Sona Call MD  Encounter Date: 2025   Encounter department: [facility] [place]  :  Assessment & Plan  Encounter for follow-up surveillance of melanoma  Mr. Cheung is a 84-year-old gentleman with stage IIb melanoma as well as basal cell carcinoma of the face and other areas and sarcoma of the scalp here for continued monitoring, follow-up and surveillance.  He is doing well with no clinical evidence of any type of skin cancer as he did have a skin exam by dermatology in 2025.  At that time he did have a basal cell on his arm removed with electric curettage.  He denies any concerning findings at this time.  He will follow closely with dermatology.  We discussed that his imaging was negative for any evidence of locally recurrent or metastatic melanoma.  Discussed that we will continue to follow him every 3 months.  We will schedule him to see me in 3 months with blood work at that time.  He knows to call with issues or concerns prior to his next visit.    Did discuss potentially getting a brain MRI due to his new off balance issues.  At this time he would like to hold off.  States he is not dizzy or affected just feels a little bit weak and balance issues and he is okay as long as he is holding onto something.  Discussed that he should let us know if this is worsening.       Malignant melanoma of face excluding eyelid, nose, lip, and ear (HCC)  Mr. Cheung is a 84-year-old gentleman with stage IIb melanoma as well as basal cell carcinoma of the face and other areas and sarcoma of the scalp here for continued monitoring, follow-up and surveillance.  He is doing well with no clinical evidence of any type of skin cancer as he did have a skin exam by dermatology in 2025.  At that time he did have a basal cell on his arm removed with electric curettage.  He denies any  concerning findings at this time.  He will follow closely with dermatology.  We discussed that his imaging was negative for any evidence of locally recurrent or metastatic melanoma.  Discussed that we will continue to follow him every 3 months.  We will schedule him to see me in 3 months with blood work at that time.  He knows to call with issues or concerns prior to his next visit.    Did discuss potentially getting a brain MRI due to his new off balance issues.  At this time he would like to hold off.  States he is not dizzy or affected just feels a little bit weak and balance issues and he is okay as long as he is holding onto something.  Discussed that he should let us know if this is worsening.     Orders:    CBC and differential; Future    Comprehensive metabolic panel; Future    LD,Blood; Future    Malignant melanoma of nose (HCC)  Mr. Cheung is a 84-year-old gentleman with stage IIb melanoma as well as basal cell carcinoma of the face and other areas and sarcoma of the scalp here for continued monitoring, follow-up and surveillance.  He is doing well with no clinical evidence of any type of skin cancer as he did have a skin exam by dermatology in March 2025.  At that time he did have a basal cell on his arm removed with electric curettage.  He denies any concerning findings at this time.  He will follow closely with dermatology.  We discussed that his imaging was negative for any evidence of locally recurrent or metastatic melanoma.  Discussed that we will continue to follow him every 3 months.  We will schedule him to see me in 3 months with blood work at that time.  He knows to call with issues or concerns prior to his next visit.    Did discuss potentially getting a brain MRI due to his new off balance issues.  At this time he would like to hold off.  States he is not dizzy or affected just feels a little bit weak and balance issues and he is okay as long as he is holding onto something.  Discussed that he  should let us know if this is worsening.     Orders:    CBC and differential; Future    Comprehensive metabolic panel; Future    LD,Blood; Future    Basal cell carcinoma of skin of other parts of face  Has been resected and continues to monitor for new, changing, concerning skin lesions.  Also had a basal cell removed from his arm with electric curettage.  He follows closely with dermatology.  We do continue to monitor him and will look for other skin lesions.       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HCC)  Resected and resolved.  We do monitor for recurrent skin cancers including melanoma basal cell and sarcoma.  He gets imaged for his melanoma and we continue to monitor for any disease recurrence or metastasis.  Would obviously biopsy any concerning lesion.             History of Present Illness     Mr. Cheung is a 84-year-old gentleman with history of multiple skin cancers including stage IIb melanoma and basal cell carcinoma and sarcoma of the scalp.  Most recently had a basal cell removed from his arm by dermatology with electric curettage.  Denies any concerning skin lesions at this time.  Denies any lymphadenopathy.  He does to continue to follow with dermatology very closely.  He has an ultrasound upcoming of his head and neck and will see surgical oncology following that.  He feels okay.  Energy is okay.  Does feel he is starting to lose a little bit of balance and needs to use a cart when he goes shopping.  He is able to mow his lawn and push the lawnmower but took him some extra time.  He is going to discuss with his primary care physician about physical therapy.    We did discuss his imaging he had done on 3/13/2025 with a CT soft tissue neck and chest abdomen and pelvis.  Imaging of his soft tissue neck demonstrates no evidence of cervical lymph nodes.  Imaging of chest, abdomen pelvis also demonstrates no evidence of metastatic disease.            Review of Systems   Constitutional:  Negative for activity change, chills,  diaphoresis, fatigue, fever and unexpected weight change.   HENT:  Negative for congestion, hearing loss, trouble swallowing and voice change.    Respiratory:  Negative for cough, shortness of breath and wheezing.    Cardiovascular:  Negative for chest pain, palpitations and leg swelling.   Gastrointestinal:  Negative for abdominal distention, abdominal pain, constipation, diarrhea, nausea and vomiting.   Musculoskeletal:  Negative for arthralgias and myalgias.   Skin:  Negative for color change and rash.   Neurological:  Negative for dizziness, seizures, speech difficulty, weakness, light-headedness and headaches.        Balance issues   Hematological:  Negative for adenopathy.       Objective   There were no vitals taken for this visit.    Physical Exam  Patient unable to connect for virtual visit, so visit was done by telephone.  No physical exam.      Administrative Statements   Encounter provider Sona Call MD    The Patient is located at Home and in the following state in which I hold an active license [place].    The patient was identified by name and date of birth. Alex NGHIA Skye was informed that this is a telemedicine visit and that the visit is being conducted through the Epic Embedded platform.  Patient unable to logon to virtual visit as he does not have access.  Appointment was continued via telephone call.  He agrees to proceed..  My office door was closed. No one else was in the room.  He acknowledged consent and understanding of privacy and security of the video platform. The patient has agreed to participate and understands they can discontinue the visit at any time.    I have spent a total time of 20 minutes in caring for this patient on the day of the visit/encounter including Diagnostic results, Instructions for management, Patient and family education, Impressions, Counseling / Coordination of care, Documenting in the medical record, Reviewing/placing orders in the medical record (including  tests, medications, and/or procedures), Obtaining or reviewing history  , and Communicating with other healthcare professionals , not including the time spent for establishing the audio/video connection.    Sona Call MD, PhD

## 2025-05-01 ENCOUNTER — PATIENT MESSAGE (OUTPATIENT)
Dept: DERMATOLOGY | Facility: CLINIC | Age: 85
End: 2025-05-01

## 2025-05-05 ENCOUNTER — HOSPITAL ENCOUNTER (OUTPATIENT)
Dept: ULTRASOUND IMAGING | Facility: HOSPITAL | Age: 85
Discharge: HOME | End: 2025-05-05
Attending: SURGERY
Payer: MEDICARE

## 2025-05-05 DIAGNOSIS — C43.30 MALIGNANT MELANOMA OF FACE EXCLUDING EYELID, NOSE, LIP, AND EAR (HCC): ICD-10-CM

## 2025-05-05 PROCEDURE — 76536 US EXAM OF HEAD AND NECK: CPT

## 2025-05-07 ENCOUNTER — APPOINTMENT (OUTPATIENT)
Dept: LAB | Facility: CLINIC | Age: 85
End: 2025-05-07
Payer: MEDICARE

## 2025-05-07 DIAGNOSIS — C49.0 SARCOMA OF SCALP (HCC): ICD-10-CM
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
## 2025-05-07 DIAGNOSIS — E78.5 HYPERLIPIDEMIA, UNSPECIFIED HYPERLIPIDEMIA TYPE: ICD-10-CM

## 2025-05-07 DIAGNOSIS — E55.9 VITAMIN D DEFICIENCY: ICD-10-CM

## 2025-05-07 DIAGNOSIS — M87.38: ICD-10-CM

## 2025-05-07 DIAGNOSIS — R97.20 ELEVATED PSA: ICD-10-CM

## 2025-05-07 DIAGNOSIS — I10 PRIMARY HYPERTENSION: ICD-10-CM

## 2025-05-07 LAB
25(OH)D3 SERPL-MCNC: 29.1 NG/ML (ref 30–100)
ALBUMIN SERPL BCG-MCNC: 4.5 G/DL (ref 3.5–5)
ALP SERPL-CCNC: 127 U/L (ref 34–104)
ALT SERPL W P-5'-P-CCNC: 16 U/L (ref 7–52)
ANION GAP SERPL CALCULATED.3IONS-SCNC: 9 MMOL/L (ref 4–13)
AST SERPL W P-5'-P-CCNC: 14 U/L (ref 13–39)
BASOPHILS # BLD AUTO: 0.04 THOUSANDS/ÂΜL (ref 0–0.1)
BASOPHILS NFR BLD AUTO: 1 % (ref 0–1)
BILIRUB SERPL-MCNC: 0.91 MG/DL (ref 0.2–1)
BUN SERPL-MCNC: 22 MG/DL (ref 5–25)
CALCIUM SERPL-MCNC: 10.2 MG/DL (ref 8.4–10.2)
CHLORIDE SERPL-SCNC: 106 MMOL/L (ref 96–108)
CHOLEST SERPL-MCNC: 182 MG/DL (ref ?–200)
CO2 SERPL-SCNC: 26 MMOL/L (ref 21–32)
CREAT SERPL-MCNC: 1.21 MG/DL (ref 0.6–1.3)
EOSINOPHIL # BLD AUTO: 0.29 THOUSAND/ÂΜL (ref 0–0.61)
EOSINOPHIL NFR BLD AUTO: 5 % (ref 0–6)
ERYTHROCYTE [DISTWIDTH] IN BLOOD BY AUTOMATED COUNT: 13.1 % (ref 11.6–15.1)
GFR SERPL CREATININE-BSD FRML MDRD: 54 ML/MIN/1.73SQ M
GLUCOSE P FAST SERPL-MCNC: 98 MG/DL (ref 65–99)
HCT VFR BLD AUTO: 44.9 % (ref 36.5–49.3)
HDLC SERPL-MCNC: 39 MG/DL
HGB BLD-MCNC: 14.9 G/DL (ref 12–17)
IMM GRANULOCYTES # BLD AUTO: 0.02 THOUSAND/UL (ref 0–0.2)
IMM GRANULOCYTES NFR BLD AUTO: 0 % (ref 0–2)
LDLC SERPL CALC-MCNC: 124 MG/DL (ref 0–100)
LYMPHOCYTES # BLD AUTO: 1.86 THOUSANDS/ÂΜL (ref 0.6–4.47)
LYMPHOCYTES NFR BLD AUTO: 29 % (ref 14–44)
MCH RBC QN AUTO: 30.5 PG (ref 26.8–34.3)
MCHC RBC AUTO-ENTMCNC: 33.2 G/DL (ref 31.4–37.4)
MCV RBC AUTO: 92 FL (ref 82–98)
MONOCYTES # BLD AUTO: 0.44 THOUSAND/ÂΜL (ref 0.17–1.22)
MONOCYTES NFR BLD AUTO: 7 % (ref 4–12)
NEUTROPHILS # BLD AUTO: 3.79 THOUSANDS/ÂΜL (ref 1.85–7.62)
NEUTS SEG NFR BLD AUTO: 58 % (ref 43–75)
NRBC BLD AUTO-RTO: 0 /100 WBCS
PLATELET # BLD AUTO: 183 THOUSANDS/UL (ref 149–390)
PMV BLD AUTO: 10.9 FL (ref 8.9–12.7)
POTASSIUM SERPL-SCNC: 4 MMOL/L (ref 3.5–5.3)
PROT SERPL-MCNC: 7.8 G/DL (ref 6.4–8.4)
PSA SERPL-MCNC: 6.49 NG/ML (ref 0–4)
RBC # BLD AUTO: 4.89 MILLION/UL (ref 3.88–5.62)
SODIUM SERPL-SCNC: 141 MMOL/L (ref 135–147)
TRIGL SERPL-MCNC: 95 MG/DL (ref ?–150)
TSH SERPL DL<=0.05 MIU/L-ACNC: 3.15 UIU/ML (ref 0.45–4.5)
WBC # BLD AUTO: 6.44 THOUSAND/UL (ref 4.31–10.16)

## 2025-05-07 PROCEDURE — 82306 VITAMIN D 25 HYDROXY: CPT

## 2025-05-07 PROCEDURE — 84443 ASSAY THYROID STIM HORMONE: CPT

## 2025-05-07 PROCEDURE — 85025 COMPLETE CBC W/AUTO DIFF WBC: CPT

## 2025-05-07 PROCEDURE — 84153 ASSAY OF PSA TOTAL: CPT

## 2025-05-07 PROCEDURE — 80053 COMPREHEN METABOLIC PANEL: CPT

## 2025-05-07 PROCEDURE — 36415 COLL VENOUS BLD VENIPUNCTURE: CPT

## 2025-05-07 PROCEDURE — 80061 LIPID PANEL: CPT

## 2025-05-09 ENCOUNTER — TELEPHONE (OUTPATIENT)
Dept: SURGICAL ONCOLOGY | Facility: CLINIC | Age: 85
End: 2025-05-09

## 2025-05-09 ENCOUNTER — TELEPHONE (OUTPATIENT)
Age: 85
End: 2025-05-09

## 2025-05-09 ENCOUNTER — TELEMEDICINE (OUTPATIENT)
Dept: SURGICAL ONCOLOGY | Facility: CLINIC | Age: 85
End: 2025-05-09
Payer: MEDICARE

## 2025-05-09 DIAGNOSIS — C43.31 MALIGNANT MELANOMA OF NOSE (HCC): ICD-10-CM

## 2025-05-09 DIAGNOSIS — C44.319 BASAL CELL CARCINOMA OF SKIN OF OTHER PARTS OF FACE: ICD-10-CM

## 2025-05-09 DIAGNOSIS — C43.30 MALIGNANT MELANOMA OF FACE EXCLUDING EYELID, NOSE, LIP, AND EAR (HCC): Primary | ICD-10-CM

## 2025-05-09 PROCEDURE — 99214 OFFICE O/P EST MOD 30 MIN: CPT

## 2025-05-09 NOTE — TELEPHONE ENCOUNTER
Patient called, asking for his appointment today to be over the phone instead of coming into the office

## 2025-05-09 NOTE — ASSESSMENT & PLAN NOTE
- MOHs procedure on 6/5/25 for BCC   - derm f/u 7/3/25  - hem onc 7/30/25 (q3 mo visits)  - 6 mo f/u telephone visit to review U/S

## 2025-05-09 NOTE — PROGRESS NOTES
Virtual Brief Visit  Name: Alex Cheung      : 1940      MRN: [id number]  Encounter Provider: SILVANA Carrasco  Encounter Date: 2025   Encounter department: [facility] [place]  :  Assessment & Plan  Malignant melanoma of face excluding eyelid, nose, lip, and ear (HCC)  Patient is an 84-year-old male presenting for a virtual 6-month follow-up for melanoma. He did not want to come into the office for an exam today. In 2022 he had a wide excision for melanoma on the nose and in 2023 he had a wide excision of the right cheek for another melanoma. He has a hx of a sarcoma and multiple basal cell carcinomas of the skin. He had a CT c/a/p on 3/13/25 which showed no evidence of metastatic disease in the chest, abdomen or pelvis. He had an ultrasound of the head neck with lymph node mapping on 25 which showed no suspicious cervical lymphadenopathy by ultrasound. He is pending a MOHs procedure on 25 for a BCC. He has no concerns today just mentions all his appts he has. We discussed follow ups and plan moving forward as he follows closely with hem/onc and dermatology. I informed him we will plan for 6 mo f/u telephone visit to review u/s head neck ln. However, if Dr. Call is going to be following with CT neck and CT chest/abd/pelvis q6 months, we can forgo ultrasounds of the neck as the CT scans are a more optimal test. He was agreeable. I will call him at the end of July after his appt with Dr. Call to formulate our plan moving forward. All questions were answered.  Malignant melanoma of nose (HCC)    Basal cell carcinoma of skin of other parts of face  - MOHs procedure on 25 for BCC   - derm f/u 7/3/25  - hem onc 25 (q3 mo visits)  - 6 mo f/u telephone visit to review U/S       History of Present Illness   HPI    Administrative Statements   Encounter provider SILVANA Carrasco    The Patient is located at Home and in  the following state in which I hold an active license [place].    The patient was identified by name and date of birth. Alex Cheung was informed that this is a telemedicine visit and that the visit is being conducted through the Epic Embedded platform. He agrees to proceed..  My office door was closed. No one else was in the room.  He acknowledged consent and understanding of privacy and security of the video platform. The patient has agreed to participate and understands they can discontinue the visit at any time.    I have spent a total time of 15 minutes in caring for this patient on the day of the visit/encounter including Diagnostic results, Counseling / Coordination of care, Documenting in the medical record, Reviewing/placing orders in the medical record (including tests, medications, and/or procedures), and Obtaining or reviewing history  , not including the time spent for establishing the audio/video connection.

## 2025-05-09 NOTE — TELEPHONE ENCOUNTER
Spoke with pt to follow up from virtual today.  Ultrasound scheduled for 11/5/25 @ 9:30 AM EVELYN Jonas and follow up virtual appt scheduled for Thurs 11/13/25 @ 9:30 AM

## 2025-05-09 NOTE — ASSESSMENT & PLAN NOTE
Patient is an 84-year-old male presenting for a virtual 6-month follow-up for melanoma. He did not want to come into the office for an exam today. In February 2022 he had a wide excision for melanoma on the nose and in July 2023 he had a wide excision of the right cheek for another melanoma. He has a hx of a sarcoma and multiple basal cell carcinomas of the skin. He had a CT c/a/p on 3/13/25 which showed no evidence of metastatic disease in the chest, abdomen or pelvis. He had an ultrasound of the head neck with lymph node mapping on 5/5/25 which showed no suspicious cervical lymphadenopathy by ultrasound. He is pending a MOHs procedure on 6/5/25 for a BCC. He has no concerns today just mentions all his appts he has. We discussed follow ups and plan moving forward as he follows closely with hem/onc and dermatology. I informed him we will plan for 6 mo f/u telephone visit to review u/s head neck ln. However, if Dr. Call is going to be following with CT neck and CT chest/abd/pelvis q6 months, we can forgo ultrasounds of the neck as the CT scans are a more optimal test. He was agreeable. I will call him at the end of July after his appt with Dr. Call to formulate our plan moving forward. All questions were answered.

## 2025-05-12 NOTE — ASSESSMENT & PLAN NOTE
Lab Results   Component Value Date    EGFR 54 05/07/2025    EGFR 54 03/10/2025    EGFR 54 11/05/2024    CREATININE 1.21 05/07/2025    CREATININE 1.20 03/10/2025    CREATININE 1.21 11/05/2024   Stable on lisinopril therapy with GFR stable in the 50s.

## 2025-05-13 ENCOUNTER — OFFICE VISIT (OUTPATIENT)
Dept: FAMILY MEDICINE CLINIC | Facility: CLINIC | Age: 85
End: 2025-05-13
Payer: MEDICARE

## 2025-05-13 VITALS
HEART RATE: 66 BPM | DIASTOLIC BLOOD PRESSURE: 78 MMHG | SYSTOLIC BLOOD PRESSURE: 130 MMHG | OXYGEN SATURATION: 98 % | HEIGHT: 69 IN | WEIGHT: 174.4 LBS | BODY MASS INDEX: 25.83 KG/M2

## 2025-05-13 DIAGNOSIS — L98.494: ICD-10-CM

## 2025-05-13 DIAGNOSIS — C43.31 MALIGNANT MELANOMA OF NOSE (HCC): ICD-10-CM
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
## 2025-05-13 DIAGNOSIS — Z23 ENCOUNTER FOR IMMUNIZATION: ICD-10-CM

## 2025-05-13 DIAGNOSIS — M87.38: ICD-10-CM

## 2025-05-13 DIAGNOSIS — I10 PRIMARY HYPERTENSION: Primary | ICD-10-CM

## 2025-05-13 DIAGNOSIS — E78.5 HYPERLIPIDEMIA, UNSPECIFIED HYPERLIPIDEMIA TYPE: ICD-10-CM

## 2025-05-13 DIAGNOSIS — R97.20 ELEVATED PSA: ICD-10-CM

## 2025-05-13 DIAGNOSIS — G89.29 CHRONIC BILATERAL LOW BACK PAIN WITHOUT SCIATICA: ICD-10-CM

## 2025-05-13 DIAGNOSIS — M54.50 CHRONIC BILATERAL LOW BACK PAIN WITHOUT SCIATICA: ICD-10-CM

## 2025-05-13 DIAGNOSIS — N18.30 STAGE 3 CHRONIC KIDNEY DISEASE, UNSPECIFIED WHETHER STAGE 3A OR 3B CKD (HCC): ICD-10-CM

## 2025-05-13 DIAGNOSIS — C49.0 SARCOMA OF SCALP (HCC): ICD-10-CM

## 2025-05-13 DIAGNOSIS — H35.3290 EXUDATIVE AGE-RELATED MACULAR DEGENERATION, UNSPECIFIED LATERALITY, UNSPECIFIED STAGE (HCC): ICD-10-CM

## 2025-05-13 PROCEDURE — G2211 COMPLEX E/M VISIT ADD ON: HCPCS | Performed by: PHYSICIAN ASSISTANT

## 2025-05-13 PROCEDURE — 99214 OFFICE O/P EST MOD 30 MIN: CPT | Performed by: PHYSICIAN ASSISTANT

## 2025-05-13 PROCEDURE — G0009 ADMIN PNEUMOCOCCAL VACCINE: HCPCS | Performed by: PHYSICIAN ASSISTANT

## 2025-05-13 PROCEDURE — 90677 PCV20 VACCINE IM: CPT | Performed by: PHYSICIAN ASSISTANT

## 2025-05-13 NOTE — PROGRESS NOTES
Name: Alex Cheung      : 1940      MRN: [id number]  Encounter Provider: Memo Law PA-C  Encounter Date: 2025   Encounter department: [facility]  :  Assessment & Plan  Primary hypertension  Stable with amlodipine, lisinopril, and metoprolol.  No medication changes.       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HCC)  Stable status post surgical removal, continue follows up with hematology/oncology.       Radiation necrosis of skull  (HCC)  Stable, following with hematology/oncology.       Stage 3 chronic kidney disease, unspecified whether stage 3a or 3b CKD (HCC)  Lab Results   Component Value Date    EGFR 54 2025    EGFR 54 03/10/2025    EGFR 54 2024    CREATININE 1.21 2025    CREATININE 1.20 03/10/2025    CREATININE 1.21 2024   Stable on lisinopril therapy with GFR stable in the 50s.       Malignant melanoma of nose (HCC)  Stable, continues follow-up with dermatology.       Hyperlipidemia, unspecified hyperlipidemia type  Stable with LDL of 124 on niacin therapy.  No medication changes.       Chronic bilateral low back pain without sciatica    Orders:  •  Ambulatory Referral to Physical Therapy; Future    Scalp ulceration, with necrosis of bone (HCC)         Exudative age-related macular degeneration, unspecified laterality, unspecified stage (HCC)  Patient continues to follow with ophthalmology.  He is having difficulty with reading.       Elevated PSA  PSA has gone up from 5.4-6.4 in the past 6 months.  We did discuss the increased velocity and risk of prostate cancer.  Patient states that he understands the risk but he is not interested in further evaluation and treatment and testing with urology.  Patient states that he is almost 85 years old and would not want any invasive procedures.  Will continue monitoring at this time.  Orders:  •  PSA Total, Diagnostic; Future    Encounter for immunization  Prevnar 20 vaccination recommended and given today.  Orders:  •   Pneumococcal Conjugate Vaccine 20-valent (Pcv20)           History of Present Illness   HPI: This is an 84-year-old gentleman that presents to the office for follow-up of chronic health conditions.  He does have a history of sarcoma of the scalp and history of radiation necrosis of the skull.  He has had removal of part of the skull bone and grafting procedures.  He has been stable with this for about 5 years now.  He also had melanoma of the nose and scalp and continues to follow with dermatology.  He has had recent CT imaging of the head and neck area without any lymph node involvement.  He had CT of the abdomen and pelvis and did not have any evidence of metastatic disease.  He continues to stay fairly active for his age.  He does have some arthritis of the spine which seems to bother him from time to time.  He is still contemplating physical therapy for this.  He has also had some elevations in his PSA level.  He states that his age however he is not interested in further evaluation and treatment.  He is happy to continue monitoring.      Review of Systems   Constitutional:  Negative for chills, fatigue and fever.   HENT:  Negative for congestion, ear pain and sinus pressure.    Eyes:  Negative for visual disturbance.   Respiratory:  Negative for cough, chest tightness and shortness of breath.    Cardiovascular:  Negative for chest pain and palpitations.   Gastrointestinal:  Negative for diarrhea, nausea and vomiting.   Endocrine: Negative for polyuria.   Genitourinary:  Negative for dysuria and frequency.   Musculoskeletal:  Negative for arthralgias and myalgias.   Skin:  Negative for pallor and rash.   Neurological:  Negative for dizziness, weakness, light-headedness, numbness and headaches.   Psychiatric/Behavioral:  Negative for agitation, behavioral problems and sleep disturbance.    All other systems reviewed and are negative.      Objective   /78 (BP Location: Left arm, Patient Position: Sitting,  "Cuff Size: Adult)   Pulse 66   Ht 5' 9\" (1.753 m)   Wt 79.1 kg (174 lb 6.4 oz)   SpO2 98%   BMI 25.75 kg/m²      Physical Exam  Constitutional:       General: He is not in acute distress.     Appearance: He is well-developed.   HENT:      Head: Normocephalic and atraumatic.      Right Ear: Tympanic membrane normal.      Left Ear: Tympanic membrane normal.   Eyes:      Conjunctiva/sclera: Conjunctivae normal.   Cardiovascular:      Rate and Rhythm: Normal rate and regular rhythm.   Pulmonary:      Effort: Pulmonary effort is normal.   Abdominal:      General: Abdomen is flat. Bowel sounds are normal. There is no distension.      Palpations: Abdomen is soft. There is no mass.   Musculoskeletal:         General: Normal range of motion.      Cervical back: Normal range of motion.   Skin:     General: Skin is warm.      Findings: No rash.   Neurological:      Mental Status: He is alert and oriented to person, place, and time.   Psychiatric:         Mood and Affect: Mood normal.         "

## 2025-05-13 NOTE — ASSESSMENT & PLAN NOTE
PSA has gone up from 5.4-6.4 in the past 6 months.  We did discuss the increased velocity and risk of prostate cancer.  Patient states that he understands the risk but he is not interested in further evaluation and treatment and testing with urology.  Patient states that he is almost 85 years old and would not want any invasive procedures.  Will continue monitoring at this time.  Orders:  •  PSA Total, Diagnostic; Future

## 2025-05-29 DIAGNOSIS — I10 ESSENTIAL HYPERTENSION: ICD-10-CM

## 2025-05-29 RX ORDER — AMLODIPINE BESYLATE 10 MG/1
10 TABLET ORAL DAILY
Qty: 90 TABLET | Refills: 1 | Status: SHIPPED | OUTPATIENT
Start: 2025-05-29

## 2025-06-02 NOTE — PATIENT COMMUNICATION
Rec'd call from patient requesting to confirm upcoming MOHs procedure for 6/5. Procedure is marked for r/s. MOHs staff not available at the time of call.    Please contact patient ASAP. Thank you.

## 2025-06-02 NOTE — PATIENT COMMUNICATION
Pt calling to confirm appt, he is confused as he was told it needed to be rescheduled    Advised pt per Lissett's note it is still on.    Confirmed appt date/time/arrival time/location with pt

## 2025-06-05 ENCOUNTER — PROCEDURE VISIT (OUTPATIENT)
Dept: DERMATOLOGY | Facility: CLINIC | Age: 85
End: 2025-06-05
Payer: MEDICARE

## 2025-06-05 VITALS
BODY MASS INDEX: 25.77 KG/M2 | TEMPERATURE: 98.6 F | HEART RATE: 75 BPM | HEIGHT: 69 IN | OXYGEN SATURATION: 97 % | WEIGHT: 174 LBS | DIASTOLIC BLOOD PRESSURE: 82 MMHG | SYSTOLIC BLOOD PRESSURE: 178 MMHG

## 2025-06-05 DIAGNOSIS — C44.319 BASAL CELL CARCINOMA (BCC) OF SKIN OF OTHER PART OF FACE: Primary | ICD-10-CM

## 2025-06-05 PROCEDURE — 17311 MOHS 1 STAGE H/N/HF/G: CPT | Performed by: DERMATOLOGY

## 2025-06-05 PROCEDURE — 12053 INTMD RPR FACE/MM 5.1-7.5 CM: CPT | Performed by: DERMATOLOGY

## 2025-06-05 NOTE — PROGRESS NOTES
Mohs Procedure Note    Patient: Alex Cheung  : 1940  MRN: [id number]  Date: 2025    History of Present Illness: The patient is a 85 y.o. male who presents with complaints of basal cell carcinoma, nodular,   left superior and inferior lateral eye brow.     Past Medical History[1]    Past Surgical History[2]    Current Medications[3]    Allergies[4]    Physical Exam:   Vitals:    25 1142   BP: (!) 178/82   Pulse: 75   Temp: 98.6 °F (37 °C)   SpO2: 97%       Skin: 1.3 x 2.4 pink subcutaneous nodule on left superior and inferior lateral eye brow     Assessment: Biopsy proven to be positive for basal cell carcinoma, nodular,   left superior and inferior lateral eye brow.    Plan: Mohs     Mohs Procedure Timeout      Flowsheet Row Most Recent Value   Timeout: 1158   Patient Identity Verified: Yes   Correct Site Verified: Yes   Correct Procedure Verified: Yes            Mohs Diagnosis/Indication/Location/ID      Flowsheet Row Most Recent Value   Pathology Type Basal cell carcinoma   Anatomic Site left eyebrow   Indications for Mohs tumor location   Mohs ID [id number]            Mohs Site/Accession/Pre-Post      Flowsheet Row Most Recent Value   Original Site Identified (as submitted by referring clinician) Referral, Photo   Biopsy Accession/Specimen # (as submitted by referring clincian) [id number]   Pre-Mohs Size Length (cm) 1.3   Pre-Mohs Size Width (cm): 2.4   Post-Mohs Size-Length (cm) 2.5   Post Mohs Size-Width (cm) 3.5   Repair Type Intermediate layered closure   Suture Type Vicryl   Vicryl Suture Size 4  [3]   Final repair length (cm): 6.3   Anesthetic Used 1% Lidocaine with epinephrine  [buffered]            Mohs Tumor Stage 1 Information      Flowsheet Row Most Recent Value   Tissue Sections (blocks) 2   Microscopic Exam Section 1: No tumor identified in section.   Microscopic Exam Section 2: No tumor identified in section.   Tumor Clear After Stage I? Yes                      Patient  identified, procedure verified, site identified and verified. Time out completed. Surgical removal of the lesion discussed with the patient (risks and benefits, including possibility of scarring, infection, recurrence or potential for further treatment).    I have specifically identified the site with the patient. I have discussed the fact that the patient will have a scar after the procedure regardless of granulation or repair with sutures. I have discussed that the repair options can range from granulation in some cases to linear or curvilinear closures to larger flaps or grafts.  There are sometimes flaps or grafts used that require multiples stages of surgery and will not be completed today, rather be completed over a series of appointments. I have discussed that occasionally due to location, size or depth of the lesion I may recommend consultation with and transfer of care for further removal or the reconstruction to another provider such as ophthalmology surgery, plastic surgery, ENT surgery, or surgical oncology. There are cases in which other testing such as imaging with MRI or CT scan or testing of lymph nodes is recommended because of the nature/depth/location of tumor seen during the removal. There is a risk of injury to nerves causing temporary or permanent numbness or the inability to move muscles full such as the inability to lift eyebrows. Questions answered and verbal and written consent was obtained.    The tumor qualifies for Mohs based on AUC criteria. Dr. Richard served as the surgeon and pathologist during the procedure.    Stage I:  Mohs surgery was performed in the first stage. A 2mm margin was taken around the visible and palpable tumor and biopsy scar. Excision of the residual wound resulted in 1 gross section(s). Hemostasis was obtained by spot electrocoagulation and a pressure dressing was placed. The patient tolerated the procedure well and no complications were noted.  Adjacent edges of  each gross section were color coded and multiple, horizontal, frozen sections were prepared from the undersurface of the gross sections. The total microscopic area was examined and tumor extension was plotted on the anatomic map. A tumor free plane was demonstrated and excision of the tumor was complete. The final defect measured 2.5x3.5 cm and extended to the level of the muscle. Because of the size and depth of the defect, the defect needed to be closed.    With the patient in the supine position and under adequate local anesthesia with 1% lidocaine with epinephrine 1:100,000, the defect was scrubbed with Chlorhexidine. Sterile drapes were placed from the sterile tray.  Because of the location of the surgical defect, an intermediate closure was judged to give the best possible cosmetic and functional result.  The edges of the defect were carefully debrided removing any dead or coagulated tissue.      Hemostasis was obtained by pinpoint electrocoagulation.  Careful planning of removal of redundant tissue at either end of the defect was drawn out so that the suture lines would fall in the optimal orientation with regard to the relaxed skin tension lines.  These were then removed with a #15c blade scalpel.  The wound was then approximated by a deep layer of buried vertical mattress sutures and the cutaneous margins were approximated and closed by cyanoacrylate.  Estimated blood loss was less than 5 mL.      The patient tolerated the procedure well.  The wound was dressed with a non-stick pad and a compression dressing.     Leola Richard MD served as the surgeon and pathologist during the procedure.    Postoperative care: Wound care discussed at length and written instructions provided.  I urged the patient to call us if any problems or question should arise.     Complications: none  Post-op medications: none  Patient condition after procedure: stable  Discharge plans: Plan for follow up as planned with general  dermatologist for skin checks or sooner if needed for healing surgical site.     Scribe Attestation      I,:  Isadora Nettles MA am acting as a scribe while in the presence of the attending physician.:       I,:  Leola Richard MD personally performed the services described in this documentation    as scribed in my presence.:                     [1]   Past Medical History:  Diagnosis Date    Anxiety     Arthritis     back    Back pain     Balance problem     Basal cell carcinoma 12/19/2024    Left superior and inferior lateral eye brow-Mohs    Basal cell carcinoma (BCC)     in past    Basal cell carcinoma (BCC) 10/04/2022    Right [place]    Basal cell carcinoma (BCC) 10/03/2023    right lip, mohs    Basal cell carcinoma (BCC) 03/05/2024    right tip of nose    BCC (basal cell carcinoma of skin) 08/17/2021    Right tip of nose    BCC (basal cell carcinoma of skin) 08/08/2022    Left lip    BCC (basal cell carcinoma of skin) 08/08/2022    Right cheek    Cancer (HCC)     Gout     Hard of hearing     Hearing aid worn     ziyad    Hearing aid worn     Herniated nucleus pulposus, L4-5     [place] (hard of hearing)     Hypertension     Incisional hernia     Incontinence     Macular degeneration     Melanoma (HCC)     left cheek- history    Melanoma (HCC) 08/08/2022    Right neck    Nocturia     Pulmonary nodules     Reactive airway disease     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    Skin cancer 07/01/2020    AFX- scalp    Squamous cell carcinoma     in past    Squamous cell skin cancer    [2]   Past Surgical History:  Procedure Laterality Date    BASAL CELL CARCINOMA EXCISION Right 07/10/2023    Procedure: EXCISION BASAL CELL CARCINOMA  RIGHT CHEEK;  Surgeon: Fortunato Booth MD;  [facility];  Service: Surgical Oncology    CATARACT EXTRACTION Bilateral     COLONOSCOPY      FLAP LOCAL HEAD / NECK Right 07/10/2023    Procedure: RIGHT CHEEK RECON W/ LOCAL FLAP & SKIN GRAFT;  Surgeon: Leola Alford  MD Compa;  Location: [facility];  Service: Plastics    HERNIA REPAIR      HYDROCELE EXCISION / REPAIR      MASTOID SURGERY Left     MOHS SURGERY      MOHS SURGERY  09/29/2021    Right tip of nose-Dr. Caraballo    MOHS SURGERY  10/06/2022    Left lip-Dr. Caraballo    MOHS SURGERY  05/16/2023    Right [place] - Dr. Matthews    MOHS SURGERY Right 03/27/2024    BCC right lip, Dr Caraballo    MOHS SURGERY Right 06/12/2024    BCC right tip of nose;     MOHS SURGERY Left 06/05/2025    BCC left superior and inferior lateral eye brow;     OTHER SURGICAL HISTORY      sarcoma scalp with skin graft    [place] EXCISION MALIGNANT LESION F/E/E/N/L 0.5 CM/< Left 02/07/2023    Procedure: WIDE EXCISION OF LEFT NASAL MELANOMA;  Surgeon: Fortunato Booth MD;  [facility];  Service: Surgical Oncology    [place] REPAIR FIRST ABDOMINAL WALL HERNIA N/A 04/22/2021    Procedure: REPAIR HERNIA INCISIONAL OPEN WITH MESH;  Surgeon: Darryl Landry MD;  [facility];  Service: General    SCALP EXCISION N/A 03/28/2018    Procedure: SCALP SARCOMA EXCISION WITH FULL THICKNESS SKIN GRAFT;  Surgeon: Jani Pagan MD;  [facility];  Service: Plastics    SKIN BIOPSY      SKIN CANCER EXCISION      SKIN LESION EXCISION Right 07/10/2023    Procedure: WIDE EXCISION MELANOMA SCAR, RIGHT CHEEK;  Surgeon: Fortunato Booth MD;  [facility];  Service: Surgical Oncology    SPLIT THICKNESS SKIN GRAFT Left 02/07/2023    Procedure: APPLICATION OF SKIN SUBSTITUTE GRAFT TO NOSE;  Surgeon: Leola Chatman MD;  [facility];  Service: Plastics    TONSILLECTOMY AND ADENOIDECTOMY     [3]   Current Outpatient Medications:     Cholecalciferol (Vitamin D3) 50 MCG (2000 UT) capsule, Take 2,000 Units by mouth in the morning., Disp: , Rfl:     Fluocinolone Acetonide Scalp 0.01 % OIL, Apply a thin layer topically daily at night one hour before bedtime., Disp: 118.28 mL, Rfl: 5    imiquimod (ALDARA) 5 % cream, Apply topically once a day  Monday thru Friday for 6 weeks, Disp: 24 each, Rfl: 1    lisinopril (ZESTRIL) 20 mg tablet, TAKE 1 TABLET BY MOUTH DAILY, Disp: 90 tablet, Rfl: 1    metoprolol succinate (TOPROL-XL) 50 mg 24 hr tablet, TAKE 1 TABLET BY MOUTH DAILY, Disp: 90 tablet, Rfl: 1    mometasone (ELOCON) 0.1 % cream, , Disp: , Rfl:     Multiple Vitamins-Minerals (PRESERVISION AREDS 2) CAPS, Take 1 capsule by mouth in the morning and 1 capsule in the evening., Disp: , Rfl:     mupirocin (BACTROBAN) 2 % ointment, Apply topically two to three times a day to sore areas, Disp: 22 g, Rfl: 3    Niacin (VITAMIN B-3 PO), Take by mouth, Disp: , Rfl:     Niacinamide-Zn-Cu-Rggvwi-Vk-Yw (NICOTINAMIDE PO), Take 500 mg by mouth in the morning and 500 mg before bedtime., Disp: , Rfl:     amLODIPine (NORVASC) 10 mg tablet, TAKE 1 TABLET BY MOUTH DAILY, Disp: 90 tablet, Rfl: 1    bacitracin ointment, Apply topically daily for 7 days Apply to bolster site daily, Disp: 30 g, Rfl: 0  [4]   Allergies  Allergen Reactions    Erythromycin Other (See Comments)     Thrush     Amoxicillin Other (See Comments)

## 2025-06-05 NOTE — PATIENT INSTRUCTIONS
"Mohs Surgery After Care    WOUND CARE AFTER SURGERY:    Try NOT to remove the pressure bandage for 48 hours. Keep the area clean and dry while this bandage is on.     After removing the bandage for the first time, gently clean the area with soap and water. If the bandage is difficult to remove, getting the bandage wet in the shower will sometimes help soften the adhesive and allow it to be removed more easily.     You will now need to cleanse this area daily in the shower with gentle soap. There is no need to scrub the area. There is no need for further wound care for 2 weeks. You will notice over this time that the glue will start to flake off. Do not apply and Vaseline or Aquaphor to the area as this will dissolve your skin glue.  If you would like to keep the area covered, non stick dressing and paper tape (or Hypafix) are recommended for sensitive skin but a bandaid is fine if it covers the area well.    After 2 weeks, you can go ahead and use some Vaseline or Aquaphor to help dissolve any remaining glue.     RESTRICTIONS:     For two DAYS:   - You will need to take it very easy as this time is highest risk for bleeding. Being a \"couch potato\" during these two days is generally recommended.   - For surgeries on the face/neck/scalp: Avoid leaning down to pick things up off the floor as this brings blood up to your head. Instead, squat down to pick things up.     For two WEEKS:   - No heavy lifting (anything greater than 10 pounds)   - You can start to do slow, gentle activities such as slow walking but nothing to increase your heart rate and blood pressure too much (such as cardiovascular exercise). It is important to take it easy as there is still a risk for bleeding and a high risk popping of stitches open during this time.     If we did surgery near the eyes (including the nose, forehead, front part of your scalp, cheeks): It is VERY common to get a large amount of swelling around your eyes (puffy eyes). " Although less frequent, this can be enough to swell your eyes shut and can also come along with bruising. This should not hurt and is very expected and normal. It is typically worst at ~ 3 days out from your surgery and dramatically better 1 week post-operatively.     MANAGING YOUR PAIN AFTER SURGERY     You can expect to have some pain after surgery. This is normal. The pain is typically worse the first two days after surgery, and quickly begins to get better.     The best strategy for controlling your pain after surgery is around the clock pain control. You can take over the counter Acetaminophen (Tylenol) for discomfort, if no contraindications.     If you are taking this at the maximum dose, you can alternate this with Motrin (ibuprofen or Advil) as well. Alternating these medications with each other allows you to maximize your pain control. In addition to Tylenol and Motrin, you can use heating pads or ice packs on your incisions to help reduce your pain.     How will I alternate your regular strength over-the-counter pain medication?  You will take a dose of pain medication every three hours.   Start by taking 650 mg of Tylenol (2 pills of 325 mg)   3 hours later take 600 mg of Motrin (3 pills of 200 mg)   3 hours after taking the Motrin take 650 mg of Tylenol   3 hours after that take 600 mg of Motrin.    See example - if your first dose of Tylenol is at 12:00 PM     12:00 PM  Tylenol 650 mg (2 pills of 325 mg)    3:00 PM  Motrin 600 mg (3 pills of 200 mg)    6:00 PM  Tylenol 650 mg (2 pills of 325 mg)    9:00 PM  Motrin 600 mg (3 pills of 200 mg)    Continue alternating every 3 hours      Important:   Do not take more than 4000mg of Tylenol or 3200mg of Motrin in a 24-hour period.     What if I still have pain?   If you have pain that is not controlled with the over-the-counter pain medications (Tylenol and Motrin or Advil), don't hesitate to call our staff using the number provided. We will help make sure  you are managing your pain in the best way possible, and if necessary, we can provide a prescription for additional pain medication.     CALL OUR OFFICE IMMEDIATELY FOR ANY SIGNS OF INFECTION:    This includes fever, chills, increased redness, warmth, tenderness, severe discomfort/pain, or pus or foul smell coming from the wound. If you are experiencing any of the above, please call [facility] Mohs Department directly at [phone].    IF BLEEDING IS NOTICED:    Place a clean cloth over the area and apply firm pressure for thirty minutes.  Check the wound ONLY after 30 minutes of direct pressure; do not cheat and sneak a peak, as that does not count.  If bleeding persists after 30 minutes of legitimate direct pressure, then try one more round of direct pressure to the area.  Should the bleeding become heavier or not stop after the second attempt, call [facility] directly at [phone]. Your call will get routed to the dermatology surgeon on call even after hours.

## 2025-06-25 ENCOUNTER — OFFICE VISIT (OUTPATIENT)
Dept: PODIATRY | Facility: CLINIC | Age: 85
End: 2025-06-25
Payer: MEDICARE

## 2025-06-25 ENCOUNTER — OFFICE VISIT (OUTPATIENT)
Dept: DERMATOLOGY | Facility: CLINIC | Age: 85
End: 2025-06-25

## 2025-06-25 VITALS — HEIGHT: 68 IN | WEIGHT: 175 LBS | BODY MASS INDEX: 26.52 KG/M2

## 2025-06-25 DIAGNOSIS — I73.9 PERIPHERAL VASCULAR DISEASE, UNSPECIFIED (HCC): Primary | ICD-10-CM

## 2025-06-25 DIAGNOSIS — D48.5 NEOPLASM OF UNCERTAIN BEHAVIOR OF SKIN: Primary | ICD-10-CM

## 2025-06-25 DIAGNOSIS — Z85.831 HISTORY OF SARCOMA: ICD-10-CM

## 2025-06-25 DIAGNOSIS — C44.619 BASAL CELL CARCINOMA (BCC) OF LEFT SHOULDER: ICD-10-CM

## 2025-06-25 DIAGNOSIS — Z85.820 HISTORY OF MELANOMA: ICD-10-CM

## 2025-06-25 DIAGNOSIS — Z85.828 HISTORY OF BASAL CELL CARCINOMA: ICD-10-CM

## 2025-06-25 DIAGNOSIS — Z85.89 HISTORY OF SQUAMOUS CELL CARCINOMA: ICD-10-CM

## 2025-06-25 PROCEDURE — 88305 TISSUE EXAM BY PATHOLOGIST: CPT | Performed by: STUDENT IN AN ORGANIZED HEALTH CARE EDUCATION/TRAINING PROGRAM

## 2025-06-25 PROCEDURE — RECHECK: Performed by: PODIATRIST

## 2025-06-25 PROCEDURE — 88342 IMHCHEM/IMCYTCHM 1ST ANTB: CPT | Performed by: STUDENT IN AN ORGANIZED HEALTH CARE EDUCATION/TRAINING PROGRAM

## 2025-06-25 PROCEDURE — 11719 TRIM NAIL(S) ANY NUMBER: CPT | Performed by: PODIATRIST

## 2025-06-25 PROCEDURE — 88341 IMHCHEM/IMCYTCHM EA ADD ANTB: CPT | Performed by: STUDENT IN AN ORGANIZED HEALTH CARE EDUCATION/TRAINING PROGRAM

## 2025-06-25 NOTE — PROGRESS NOTES
"Name: Alex Cheung      : 1940      MRN: [id number]  Encounter Provider: Jake Cabezas DPM  Encounter Date: 2025   Encounter department: [facility] [place]    Treatment consists of nail trimming.  :  Assessment & Plan  Peripheral vascular disease, unspecified (HCC)  Nail trimming           History of Present Illness   HPI  Alex Cheung is a 85 y.o. male who presents for palliative toenail care.      Review of Systems       Objective   Ht 5' 8\" (1.727 m)   Wt 79.4 kg (175 lb)   BMI 26.61 kg/m²      Physical Exam    Cardiovascular:      Comments: No palpable pedal pulses bilateral    Skin:     Comments: Elongated toenails x 10       Nail removal    Date/Time: 2025 1:45 PM    Performed by: Jake Cabezas DPM  Authorized by: Jake Cabezas DPM    Nails trimmed:     Number of nails trimmed:  10          "
done

## 2025-06-25 NOTE — PROGRESS NOTES
"[facility] Dermatology Clinic Note     Patient Name: Alex Cheung  Encounter Date: 6/25/2025     Have you been cared for by a [facility] Dermatologist in the last 3 years and, if so, which description applies to you? Yes. I have been here within the last 3 years, and my medical history has NOT changed since that time. I am not of child-bearing potential.     REVIEW OF SYSTEMS:  Have you recently had or currently have any of the following? No changes in my recent health.   PAST MEDICAL HISTORY:  Have you personally ever had or currently have any of the following?  If \"YES,\" then please provide more detail. No changes in my medical history.   HISTORY OF IMMUNOSUPPRESSION: Do you have a history of any of the following:  Systemic Immunosuppression such as Diabetes, Biologic or Immunotherapy, Chemotherapy, Organ Transplantation, Bone Marrow Transplantation or Prednisone?  No     Answering \"YES\" requires the addition of the dotphrase \"IMMUNOSUPPRESSED\" as the first diagnosis of the patient's visit.   FAMILY HISTORY:  Any \"first degree relatives\" (parent, brother, sister, or child) with the following?    No changes in my family's known health.   PATIENT EXPERIENCE:    Do you want the Dermatologist to perform a COMPLETE skin exam today including a clinical examination under the \"bra and underwear\" areas?  NO  If necessary, do we have your permission to call and leave a detailed message on your Preferred Phone number that includes your specific medical information?  Yes      Allergies[1] Current Medications[2]        Whom besides the patient is providing clinical information about today's encounter?   NO ADDITIONAL HISTORIAN (patient alone provided history)    Physical Exam and Assessment/Plan by Diagnosis:    UNTREATED BASAL CELL CARCINOMA     Physical Exam:  Anatomic Location Affected:  Right dorsal forearm  Morphological Description:  pearly erythematous papule within suspected surrounding seborrheic keratosis "   Pertinent Positives:  Pertinent Negatives:  Prior biopsy: Yes;  If YES, prior accession or case #: [id number]                 Assessment and Plan:     Patient defers treatment at this time. He is aware of basal cell carcinoma diagnosis and previous plan for excision. Patient has had numerous other skin cancers and being that this one is not bleeding or bothersome he does not prefer to pursue surgical intervention at this time.   Patient was counseled on adverse effects of leaving this untreated.       HISTORY OF BASAL CELL CARCINOMA     Physical Exam:  Anatomic Location Affected:  Right temple, right cheek, left lip, right tip of nose, left wrist, left shoulder, left superior and inferior lateral eyebrow  Morphological Description of scar:  scars are healed; Right arm has active lesion  Suspected Recurrence: No     Additional History of Present Condition:  History of basal cell carcinoma with no sign of recurrence. MOHS done 05/16/2023, Accession #[id number]. MOHS done 10/13/2022, Accession # [id number]. MOHS done 10/06/22, Accession # [id number]. MOHS done 09/29/21, Accession # [id number]. MOHS done 11/30/23 Accession # [id number] Mohs done 6/12/24, [id number] EDC done 3/6/2025, [id number] Mohs done 6/5/2025     Assessment and Plan:  Based on a thorough discussion of this condition and the management approach to it (including a comprehensive discussion of the known risks, side effects and potential benefits of treatment), the patient (family) agrees to implement the following specific plan:  Monitor for changes or recurrence  Routine skin checks q 6 mo. Seeing Dr. Call and Dr. Booth regularly as well     How can basal cell carcinoma be prevented?  The most important way to prevent BCC is to avoid sunburn. This is especially important in childhood and early life. Fair skinned individuals and those with a personal or family history of BCC should protect their skin from sun exposure daily, year-round and  lifelong.  Stay indoors or under the shade in the middle of the day   Wear covering clothing   Apply high protection factor SPF50+ broad-spectrum sunscreens generously to exposed skin if outdoors   Avoid indoor tanning (sun beds, solaria)  Oral nicotinamide (vitamin B3) in a dose of 500 mg twice daily may reduce the number and severity of BCCs.     What is the outlook for basal cell carcinoma?  Most BCCs are cured by treatment. Cure is most likely if treatment is undertaken when the lesion is small.  About 50% of people with BCC develop a second one within 3 years of the first. They are also at increased risk of other skin cancers, especially melanoma. Regular self-skin examinations and long-term annual skin checks by an experienced health professional are recommended.     HISTORY OF MELANOMA     Physical Exam:  Anatomic Location Affected:  Right neck, Left cheek and left deltoid( 0.9 mm excised 06/19/2019 by Dr. Lewis. Right cheek Emmy ( 2.3mm breslow), desmoplastic left ala   Morphological Description of Scar:  Well healed  Year Treated:  09/14/2022, Left cheek-2007, left deltoid 2019, right cheek 7.10.23, left ala 2.7.23   Suspected Recurrence: no  Regional adenopathy: no     Additional History of Present Condition:  History of melanoma with no signs of recurrence. Excision done 09/14/2022, Accession # [id number].      Assessment and Plan:  Based on a thorough discussion of this condition and the management approach to it (including a comprehensive discussion of the known risks, side effects and potential benefits of treatment), the patient (family) agrees to implement the following specific plan:  Monitor for changes or recurrence  Routine skin checks q 6mo   Seeing Dr. Call and Dr. Booth regularly as well     What happens at follow-up?  The main purpose of follow-up is to detect recurrences early (metastatic melanoma), but it also offers an opportunity to diagnose a new primary melanoma at the first  "possible opportunity. A second invasive melanoma occurs in 5-10% of melanoma patients and a new melanoma in situ is diagnosed in more than 20% of melanoma patients.     Our practice makes the following recommendations for follow-up for patients with invasive melanoma.  At-least \"monthly\" self-skin examinations   Routine skin checks by a board certified dermatologist  Follow-up intervals are \"every 3 months\" within 2 years of a new melanoma diagnosis; \"every 6 months\" between 2-4 years of a new melanoma diagnosis; and \"annually\" after 4 years of a new melanoma diagnosis  Individual patient's needs should be considered before an appropriate follow-up is offered  Provide education and support to help the patient adjust to their illness     Follow-up appointments should include:  A check of the scar where the primary melanoma was removed  Checking the regional lymph nodes  A general skin examination  A full physical examination at least annually by your primary care physician     In those with more advanced primary disease, follow-up may include:  Blood tests  Imaging: ultrasound, X-ray, CT, MRI and PET scan.     Most tests are not worthwhile for patients with stage 1 or 2 melanoma unless there are signs or symptoms of disease recurrence or metastasis. No tests are necessary for healthy patients who have remained well for five years or longer after removal of their melanoma.     What is the outlook for patients with melanoma?  Melanoma in situ is cured by excision because it has no potential to spread around the body.  The risk of spread and ultimate death from invasive melanoma depends on several factors, but the main one is the Breslow thickness of the melanoma at the time it was surgically removed.  Metastases are rare for melanomas < 0.75 mm and the risk for tumours 0.75-1 mm thick is about 5%. The risk steadily increases with thickness so that melanomas > 4 mm have a risk of metastasis of about 40%.     Melanoma is " a potentially serious type of skin cancer, in which there is uncontrolled growth of melanocytes (pigment cells). Melanoma is sometimes called malignant melanoma.  Normal melanocytes are found in the basal layer of the epidermis (the outer layer of skin). Melanocytes produce a protein called melanin, which protects skin cells by absorbing ultraviolet (UV) radiation. Melanocytes are found in equal numbers in black and white skin, but melanocytes in black skin produce much more melanin. People with dark brown or black skin are very much less likely to be damaged by UV radiation than those with white skin.     HISTORY OF SQUAMOUS CELL CARCINOMA      Physical Exam:  Anatomic Location Affected:  Multiple Sites  Morphological Description of Scar:  Well healed  Suspected Recurrence: no  Regional adenopathy: no     Additional History of Present Condition:  History of SCC with no signs of reurrence     Assessment and Plan:  Based on a thorough discussion of this condition and the management approach to it (including a comprehensive discussion of the known risks, side effects and potential benefits of treatment), the patient (family) agrees to implement the following specific plan:  Monitor for changes or recurrence  Routine skin checks q 6 mo. Seeing Dr. Call and Dr. Booth regularly as well     How can SCC be prevented?  The most important way to prevent SCC is to avoid sunburn. This is especially important in childhood and early life. Fair skinned individuals and those with a personal or family history of BCC should protect their skin from sun exposure daily, year-round and lifelong.  Stay indoors or under the shade in the middle of the day   Wear covering clothing   Apply high protection factor SPF50+ broad-spectrum sunscreens generously to exposed skin if outdoors   Avoid indoor tanning (sun beds, solaria)        What is the outlook for SCC?  Most SCC are cured by treatment. Cure is most likely if treatment is  "undertaken when the lesion is small. A small percent of SCC's can spread to lymph  nodes and long term monitoring is indicated.   They are also at increased risk of other skin cancers, especially melanoma. Regular self-skin examinations and long-term annual skin checks by an experienced health professional are recommended.     HISTORY OF PLEOMORPHIC SARCOMA     Physical Exam:  Anatomic Location Affected:  Scalp  Morphological Description:  100% take of free flap, no signs of recurrence , free graft 20cm right scalp with full take      Additional History of Present Condition:  Patient has had multiple procedures in right paretal scalp area where a flap was done. Patient also had 27 treatents of radiation to treat cancer.  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 By muniz jose and temple     Assessment and Plan:  Based on a thorough discussion of this condition and the management approach to it (including a comprehensive discussion of the known risks, side effects and potential benefits of treatment), the patient (family) agrees to implement the following specific plan:  Continue to follow up at [facility]    NEOPLASM OF UNCERTAIN BEHAVIOR OF SKIN    Physical Exam:  (Anatomic Location); (Size and Morphological Description); (Differential Diagnosis):  Left forearm; erythematous papule; BCC vs SCC      Additional History of Present Condition:  states presented 2 months ago    Assessment and Plan:  I have discussed with the patient that a sample of skin via a \"skin biopsy” would be potentially helpful to further make a specific diagnosis under the microscope.  Based on a thorough discussion of this condition and the management approach to it (including a comprehensive discussion of the known risks, side effects and potential benefits of treatment), the patient (family) agrees to implement the following specific plan:    Procedure:  Skin " Biopsy.  After a thorough discussion of treatment options and risk/benefits/alternatives (including but not limited to local pain, scarring, dyspigmentation, blistering, possible superinfection, and inability to confirm a diagnosis via histopathology), verbal and written consent were obtained and portion of the rash was biopsied for tissue sample.  See below for consent that was obtained from patient and subsequent Procedure Note.    PROCEDURE TANGENTIAL (SHAVE) BIOPSY NOTE:    Performing Physician:   Anatomic Location; Clinical Description with size (cm); Pre-Op Diagnosis:   Left forearm; erythematous papule; BCC vs SCC  Post-op diagnosis: Same     Local anesthesia: 1% xylocaine with epi      Topical anesthesia: None    Hemostasis: Electrocautery       After obtaining informed consent  at which time there was a discussion about the purpose of biopsy  and low risks of infection and bleeding.  The area was prepped and draped in the usual fashion. Anesthesia was obtained with 1% lidocaine with epinephrine. A shave biopsy to an appropriate sampling depth was obtained by Shave (Dermablade or 15 blade) The resulting wound was covered with surgical ointment and bandaged appropriately.     The patient tolerated the procedure well without complications and was without signs of functional compromise.      Specimen has been sent for review by Dermatopathology.    Standard post-procedure care has been explained and has been included in written form within the patient's copy of Informed Consent.    INFORMED CONSENT DISCUSSION AND POST-OPERATIVE INSTRUCTIONS FOR PATIENT    I.  RATIONALE FOR PROCEDURE  I understand that a skin biopsy allows the Dermatologist to test a lesion or rash under the microscope to obtain a diagnosis.  It usually involves numbing the area with numbing medication and removing a small piece of skin; sometimes the area will be closed with sutures. In this specific procedure, sutures are not usually  "needed.  If any sutures are placed, then they are usually need to be removed in 2 weeks or less.    I understand that my Dermatologist recommends that a skin \"shave\" biopsy be performed today.  A local anesthetic, similar to the kind that a dentist uses when filling a cavity, will be injected with a very small needle into the skin area to be sampled.  The injected skin and tissue underneath \"will go to sleep” and become numb so no pain should be felt afterwards.  An instrument shaped like a tiny \"razor blade\" (shave biopsy instrument) will be used to cut a small piece of tissue and skin from the area so that a sample of tissue can be taken and examined more closely under the microscope.  A slight amount of bleeding will occur, but it will be stopped with direct pressure and a pressure bandage and any other appropriate methods.  I understands that a scar will form where the wound was created.  Surgical ointment will be applied to help protect the wound.  Sutures are not usually needed.    II.  RISKS AND POTENTIAL COMPLICATIONS   I understand the risks and potential complications of a skin biopsy include but are not limited to the following:  Bleeding  Infection  Pain  Scar/keloid  Skin discoloration  Incomplete Removal  Recurrence  Nerve Damage/Numbness/Loss of Function  Allergic Reaction to Anesthesia  Biopsies are diagnostic procedures and based on findings additional treatment or evaluation may be required  Loss or destruction of specimen resulting in no additional findings    My Dermatologist has explained to me the nature of the condition, the nature of the procedure, and the benefits to be reasonably expected compared with alternative approaches.  My Dermatologist has discussed the likelihood of major risks or complications of this procedure including the specific risks listed above, such as bleeding, infection, and scarring/keloid.  I understand that a scar is expected after this procedure.  I understand that " "my physician cannot predict if the scar will form a \"keloid,\" which extends beyond the borders of the wound that is created.  A keloid is a thick, painful, and bumpy scar.  A keloid can be difficult to treat, as it does not always respond well to therapy, which includes injecting cortisone directly into the keloid every few weeks.  While this usually reduces the pain and size of the scar, it does not eliminate it.      I understand that photographs may be taken before and after the procedure.  These will be maintained as part of the medical providers confidential records and may not be made available to me.  I further authorize the medical provider to use the photographs for teaching purposes or to illustrate scientific papers, books, or lectures if in his/her judgment, medical research, education, or science may benefit from its use.    I have had an opportunity to fully inquire about the risks and benefits of this procedure and its alternatives.   I have been given ample time and opportunity to ask questions and to seek a second opinion if I wished to do so.  I acknowledge that there have specifically been no guarantees as to the cosmetic results from the procedure.  I am aware that with any procedure there is always the possibility of an unexpected complication.    III. POST-PROCEDURAL CARE (WHAT YOU WILL NEED TO DO \"AFTER THE BIOPSY\" TO OPTIMIZE HEALING)    Keep the area clean and dry.  Try NOT to remove the bandage or get it wet for the first 24 hours.    Gently clean the area and apply surgical ointment (such as Vaseline petrolatum ointment, which is available \"over the counter\" and not a prescription) to the biopsy site for up to 2 weeks straight.  This acts to protect the wound from the outside world.      Sutures are not usually placed in this procedure.  If any sutures were placed, return for suture removal as instructed (generally 1 week for the face, 2 weeks for the body).      Take Acetaminophen " (Tylenol) for discomfort, if no contraindications.  Ibuprofen or aspirin could make bleeding worse.    Call our office immediately for signs of infection: fever, chills, increased redness, warmth, tenderness, discomfort/pain, or pus or foul smell coming from the wound.    WHAT TO DO IF THERE IS ANY BLEEDING?  If a small amount of bleeding is noticed, place a clean cloth over the area and apply firm pressure for ten minutes.  Check the wound after 10 minutes of direct pressure.  If bleeding persists, try one more time for an additional 10 minutes of direct pressure on the area.  If the bleeding becomes heavier or does not stop after the second attempt, or if you have any other questions about this procedure, then please call your [facility]'s Dermatologist by calling [phone] (SKIN).     I hereby acknowledge that I have reviewed and verified the site with my Dermatologist and have requested and authorized my Dermatologist to proceed with the procedure.    ACTINIC KERATOSES     Physical Exam:  Anatomic Location Affected:  mid forehead, upper lip  Morphological Description:  Thin pink papule(s) with gritty scale       Assessment and Plan:  Based on a thorough discussion of this condition and the management approach to it (including a comprehensive discussion of the known risks, side effects and potential benefits of treatment), the patient (family) agrees to implement the following specific plan:  Treated with cryotherapy today; written and verbal consent obtained     PROCEDURE:  DESTRUCTION OF PRE-MALIGNANT LESIONS  After a thorough discussion of treatment options and risk/benefits/alternatives (including but not limited to local pain, scarring, dyspigmentation, blistering, and possible superinfection), verbal and written consent were obtained and the aforementioned lesions were treated on with cryotherapy using liquid nitrogen x 2 cycles for 5-10 seconds. The patient tolerated the procedure well, and after-care  instructions were provided.     TOTAL NUMBER of 4 pre-malignant lesions were treated today on the ANATOMIC LOCATION: forehead x3 and upper lip.     Patient instructions:  Your pre-cancerous lesions (called actinic keratosis) were treated with liquid nitrogen today. The treated areas will get more red, crusted over the next few days. There might be some blistering. Apply vaseline to the treated area for the next week to help it heal fully. Do not pick at the area. Return in 3-4 weeks for another round of liquid nitrogen treatment if lesion(s)  fails to fully resolve.    Scribe Attestation      I,:  Ludivina Florez MA am acting as a scribe while in the presence of the attending physician.:       I,:  Angélica Pina MD personally performed the services described in this documentation    as scribed in my presence.:                  [1]   Allergies  Allergen Reactions    Erythromycin Other (See Comments)     Thrush     Amoxicillin Other (See Comments)   [2]   Current Outpatient Medications:     amLODIPine (NORVASC) 10 mg tablet, TAKE 1 TABLET BY MOUTH DAILY, Disp: 90 tablet, Rfl: 1    bacitracin ointment, Apply topically daily for 7 days Apply to [facility] site daily, Disp: 30 g, Rfl: 0    Cholecalciferol (Vitamin D3) 50 MCG (2000 UT) capsule, Take 2,000 Units by mouth in the morning., Disp: , Rfl:     Fluocinolone Acetonide Scalp 0.01 % OIL, Apply a thin layer topically daily at night one hour before bedtime., Disp: 118.28 mL, Rfl: 5    imiquimod (ALDARA) 5 % cream, Apply topically once a day Monday thru Friday for 6 weeks, Disp: 24 each, Rfl: 1    lisinopril (ZESTRIL) 20 mg tablet, TAKE 1 TABLET BY MOUTH DAILY, Disp: 90 tablet, Rfl: 1    metoprolol succinate (TOPROL-XL) 50 mg 24 hr tablet, TAKE 1 TABLET BY MOUTH DAILY, Disp: 90 tablet, Rfl: 1    mometasone (ELOCON) 0.1 % cream, , Disp: , Rfl:     Multiple Vitamins-Minerals (PRESERVISION AREDS 2) CAPS, Take 1 capsule by mouth in the morning and 1 capsule in the evening.,  Disp: , Rfl:     mupirocin (BACTROBAN) 2 % ointment, Apply topically two to three times a day to sore areas, Disp: 22 g, Rfl: 3    Niacin (VITAMIN B-3 PO), Take by mouth, Disp: , Rfl:     Niacinamide-Zn-Cu-Ubyzsj-Ls-Xw (NICOTINAMIDE PO), Take 500 mg by mouth in the morning and 500 mg before bedtime., Disp: , Rfl:

## 2025-07-03 ENCOUNTER — TELEPHONE (OUTPATIENT)
Dept: DERMATOLOGY | Facility: CLINIC | Age: 85
End: 2025-07-03

## 2025-07-03 PROCEDURE — 88305 TISSUE EXAM BY PATHOLOGIST: CPT | Performed by: STUDENT IN AN ORGANIZED HEALTH CARE EDUCATION/TRAINING PROGRAM

## 2025-07-03 PROCEDURE — 88341 IMHCHEM/IMCYTCHM EA ADD ANTB: CPT | Performed by: STUDENT IN AN ORGANIZED HEALTH CARE EDUCATION/TRAINING PROGRAM

## 2025-07-03 PROCEDURE — 88342 IMHCHEM/IMCYTCHM 1ST ANTB: CPT | Performed by: STUDENT IN AN ORGANIZED HEALTH CARE EDUCATION/TRAINING PROGRAM

## 2025-07-03 NOTE — TELEPHONE ENCOUNTER
Per Dr. Pina phone call to patient to schedule surgery appt for both Right and left arm for a 1 hour excision for both; patient mentioned that he doesn't know when he wants to get it done; he will call us back in 1 week at the [facility] office to schedule that appt.

## 2025-07-05 DIAGNOSIS — I10 ESSENTIAL HYPERTENSION: ICD-10-CM

## 2025-07-07 RX ORDER — LISINOPRIL 20 MG/1
20 TABLET ORAL DAILY
Qty: 90 TABLET | Refills: 1 | Status: SHIPPED | OUTPATIENT
Start: 2025-07-07

## 2025-07-10 NOTE — TELEPHONE ENCOUNTER
Patient called back and I scheduled him for an hour excision for the basal cell carcinoma on the right and left arm with Dr. Gonzalez on 9/25/25

## 2025-07-30 ENCOUNTER — TELEPHONE (OUTPATIENT)
Age: 85
End: 2025-07-30

## 2025-07-31 ENCOUNTER — OFFICE VISIT (OUTPATIENT)
Dept: HEMATOLOGY ONCOLOGY | Facility: CLINIC | Age: 85
End: 2025-07-31
Payer: MEDICARE

## 2025-08-01 ENCOUNTER — APPOINTMENT (OUTPATIENT)
Dept: RADIOLOGY | Facility: CLINIC | Age: 85
End: 2025-08-01
Attending: INTERNAL MEDICINE
Payer: MEDICARE

## 2025-08-01 DIAGNOSIS — M25.562 ACUTE PAIN OF LEFT KNEE: ICD-10-CM

## 2025-08-01 PROCEDURE — 73564 X-RAY EXAM KNEE 4 OR MORE: CPT

## 2025-08-05 ENCOUNTER — TELEPHONE (OUTPATIENT)
Dept: HEMATOLOGY ONCOLOGY | Facility: CLINIC | Age: 85
End: 2025-08-05

## 2025-08-12 ENCOUNTER — TELEPHONE (OUTPATIENT)
Dept: SURGICAL ONCOLOGY | Facility: CLINIC | Age: 85
End: 2025-08-12

## (undated) DEVICE — INTENDED FOR TISSUE SEPARATION, AND OTHER PROCEDURES THAT REQUIRE A SHARP SURGICAL BLADE TO PUNCTURE OR CUT.: Brand: BARD-PARKER SAFETY BLADES SIZE 15, STERILE

## (undated) DEVICE — SUT PROLENE 2-0 MO-6 30 IN 8417H

## (undated) DEVICE — TIBURON SPLIT SHEET: Brand: CONVERTORS

## (undated) DEVICE — NEEDLE 25G X 1 1/2

## (undated) DEVICE — SUT VICRYL 0 UR-6 27 IN J603H

## (undated) DEVICE — TUBING SUCTION 5MM X 12 FT

## (undated) DEVICE — SYRINGE 10ML LL

## (undated) DEVICE — MONOPOLAR CURVED SCISSORS: Brand: ENDOWRIST

## (undated) DEVICE — SUT MONOCRYL 4-0 PS-2 27 IN Y426H

## (undated) DEVICE — GLOVE INDICATOR PI UNDERGLOVE SZ 8 BLUE

## (undated) DEVICE — GLOVE SRG BIOGEL 7

## (undated) DEVICE — ELECTRODE BLADE MOD E-Z CLEAN 2.5IN 6.4CM -0012M

## (undated) DEVICE — GLOVE INDICATOR PI UNDERGLOVE SZ 6.5 BLUE

## (undated) DEVICE — BULB SYRINGE,IRRIGATION WITH PROTECTIVE CAP: Brand: DOVER

## (undated) DEVICE — SCD SEQUENTIAL COMPRESSION COMFORT SLEEVE MEDIUM KNEE LENGTH: Brand: KENDALL SCD

## (undated) DEVICE — ARM DRAPE

## (undated) DEVICE — BETHLEHEM UNIVERSAL MINOR GEN: Brand: CARDINAL HEALTH

## (undated) DEVICE — INSULATED NEEDLE ELECTRODE: Brand: EDGE

## (undated) DEVICE — SUT MONOCRYL 3-0 SH 27 IN Y416H

## (undated) DEVICE — 2000CC GUARDIAN II: Brand: GUARDIAN

## (undated) DEVICE — CHEST/BREAST DRAPE: Brand: CONVERTORS

## (undated) DEVICE — SUT SILK 3-0 PS-1 18 IN 1684G

## (undated) DEVICE — CHLORAPREP HI-LITE 26ML ORANGE

## (undated) DEVICE — 3000CC GUARDIAN II: Brand: GUARDIAN

## (undated) DEVICE — PENCIL ELECTROSURG E-Z CLEAN -0035H

## (undated) DEVICE — TRAY FOLEY 16FR URIMETER SURESTEP

## (undated) DEVICE — [HIGH FLOW INSUFFLATOR,  DO NOT USE IF PACKAGE IS DAMAGED,  KEEP DRY,  KEEP AWAY FROM SUNLIGHT,  PROTECT FROM HEAT AND RADIOACTIVE SOURCES.]: Brand: PNEUMOSURE

## (undated) DEVICE — INTENDED FOR TISSUE SEPARATION, AND OTHER PROCEDURES THAT REQUIRE A SHARP SURGICAL BLADE TO PUNCTURE OR CUT.: Brand: BARD-PARKER ® CARBON RIB-BACK BLADES

## (undated) DEVICE — ELECTRODE EZ CLEAN BLADE -0012

## (undated) DEVICE — PROVE COVER: Brand: UNBRANDED

## (undated) DEVICE — PMI DISPOSABLE PUNCTURE CLOSURE DEVICE / SUTURE GRASPER: Brand: PMI

## (undated) DEVICE — BINDER ABDOMINAL 30-45 IN

## (undated) DEVICE — PROGRASP FORCEPS: Brand: ENDOWRIST

## (undated) DEVICE — GLOVE SRG BIOGEL 6.5

## (undated) DEVICE — SPONGE STICK WITH PVP-I: Brand: KENDALL

## (undated) DEVICE — PADDING CAST 2IN COTTON STRL

## (undated) DEVICE — 3M™ TEGADERM™ TRANSPARENT FILM DRESSING FRAME STYLE, 1626W, 4 IN X 4-3/4 IN (10 CM X 12 CM), 50/CT 4CT/CASE: Brand: 3M™ TEGADERM™

## (undated) DEVICE — GLOVE SRG BIOGEL ECLIPSE 7.5

## (undated) DEVICE — COLUMN DRAPE

## (undated) DEVICE — GLOVE INDICATOR PI UNDERGLOVE SZ 7 BLUE

## (undated) DEVICE — ELECTRODE BLADE MOD E-Z CLEAN  2.75IN 7CM -0012AM

## (undated) DEVICE — POOLE SUCTION HANDLE: Brand: CARDINAL HEALTH

## (undated) DEVICE — SUT VICRYL 2-0 SH 27 IN UNDYED J417H

## (undated) DEVICE — CANNULA SEAL

## (undated) DEVICE — TOWEL SET X-RAY

## (undated) DEVICE — SUT CHROMIC 4-0 PS-2 18 IN 1637G

## (undated) DEVICE — ELECTRODE NEEDLE MOD E-Z CLEAN 2.75IN 7CM -0013M

## (undated) DEVICE — INTENDED FOR TISSUE SEPARATION, AND OTHER PROCEDURES THAT REQUIRE A SHARP SURGICAL BLADE TO PUNCTURE OR CUT.: Brand: BARD-PARKER SAFETY BLADES SIZE 11, STERILE

## (undated) DEVICE — OCCLUSIVE GAUZE STRIP,3% BISMUTH TRIBROMOPHENATE IN PETROLATUM BLEND: Brand: XEROFORM

## (undated) DEVICE — SPECIMEN CONTAINER STERILE PEEL PACK

## (undated) DEVICE — LIGACLIP MCA MULTIPLE CLIP APPLIERS, 20 SMALL CLIPS: Brand: LIGACLIP

## (undated) DEVICE — SUT SILK 2-0 SH 30 IN K833H

## (undated) DEVICE — SUT VICRYL 3-0 SH 27 IN J416H

## (undated) DEVICE — SKIN MARKER DUAL TIP WITH RULER CAP, FLEXIBLE RULER AND LABELS: Brand: DEVON

## (undated) DEVICE — DRAPE EQUIPMENT RF WAND

## (undated) DEVICE — ASTOUND STANDARD SURGICAL GOWN, XL: Brand: CONVERTORS

## (undated) DEVICE — DRESSING XEROFORM 5 X 9

## (undated) DEVICE — SUT STRATAFIX SPIRAL MONOCRYL PLUS 4-0 PS-2 45CM SXMP1B118

## (undated) DEVICE — PACK UNIVERSAL DRAPES SUB-Q ICD

## (undated) DEVICE — GLOVE SRG BIOGEL 7.5

## (undated) DEVICE — ADHESIVE SKN CLSR HISTOACRYL FLEX 0.5ML LF

## (undated) DEVICE — PACK PBDS PLASTIC HEAD AND NECK RF

## (undated) DEVICE — DRESSING SILON DUAL-DRESS 50 FOAM 5.5 X 6 IN

## (undated) DEVICE — SUT VICRYL 0 CT-1 36 IN J946H

## (undated) DEVICE — DRAPE TOWEL: Brand: CONVERTORS

## (undated) DEVICE — SUT VICRYL 2-0 CT-1 36 IN J945H

## (undated) DEVICE — ALLENTOWN LAP CHOLE APP PACK: Brand: CARDINAL HEALTH

## (undated) DEVICE — GLOVE SRG BIOGEL ECLIPSE 7

## (undated) DEVICE — COTTON BALLS: Brand: DEROYAL

## (undated) DEVICE — PLUMEPEN PRO 10FT

## (undated) DEVICE — ELECTRODE NEEDLE MEGAFINE 2IN E-Z CLEAN MEGADYNE -0118

## (undated) DEVICE — DECANTER: Brand: UNBRANDED

## (undated) DEVICE — POV-IOD SOLUTION 4OZ BT

## (undated) DEVICE — SPONGE LAP 18 X 18 IN

## (undated) DEVICE — 3M™ STERI-STRIP™ COMPOUND BENZOIN TINCTURE 40 BAGS/CARTON 4 CARTONS/CASE C1544: Brand: 3M™ STERI-STRIP™

## (undated) DEVICE — SUT SILK 3-0 SH CR/8 18 IN C013D

## (undated) DEVICE — SUT VLOC 90 3-0 V-20 9IN VLOCM0644

## (undated) DEVICE — BLADELESS OBTURATOR: Brand: WECK VISTA

## (undated) DEVICE — ADHESIVE SKIN HIGH VISCOSITY EXOFIN 1ML

## (undated) DEVICE — CORNEAL PROTECTOR ADULT

## (undated) DEVICE — PACK UNIVERSAL NECK

## (undated) DEVICE — MEDI-VAC YANKAUER SUCTION HANDLE W/BULBOUS AND CONTROL VENT: Brand: CARDINAL HEALTH

## (undated) DEVICE — BETHLEHEM UNIVERSAL OUTPATIENT: Brand: CARDINAL HEALTH

## (undated) DEVICE — CAUTERY TIP POLISHER: Brand: DEVON

## (undated) DEVICE — STRL UNIVERSAL MINOR GENERAL: Brand: CARDINAL HEALTH

## (undated) DEVICE — MEGA SUTURECUT ND: Brand: ENDOWRIST

## (undated) DEVICE — DRAPE LAPAROTOMY W/POUCHES

## (undated) DEVICE — DRAPE SHEET X-LG

## (undated) DEVICE — DISPOSABLE EQUIPMENT COVER: Brand: SMALL TOWEL DRAPE

## (undated) DEVICE — LIGHT GLOVE GREEN

## (undated) DEVICE — LUBRICANT INST ELECTROLUBE ANTISTK WO PAD

## (undated) DEVICE — TIP COVER ACCESSORY

## (undated) DEVICE — PREMIUM DRY TRAY LF: Brand: MEDLINE INDUSTRIES, INC.

## (undated) DEVICE — SUT ETHILON 2-0 FS 18 IN 664H

## (undated) DEVICE — BIPOLAR CORD DISP

## (undated) DEVICE — UNDYED MONOFILAMENT (POLYDIOXANONE), ABSORBABLE SURGICAL SUTURE: Brand: PDS

## (undated) DEVICE — SUT SILK 3-0 SH 30 IN K832H

## (undated) DEVICE — DRAPE SHEET THREE QUARTER

## (undated) DEVICE — GLOVE INDICATOR PI UNDERGLOVE SZ 7.5 BLUE